# Patient Record
Sex: MALE | Race: WHITE | NOT HISPANIC OR LATINO | Employment: OTHER | URBAN - METROPOLITAN AREA
[De-identification: names, ages, dates, MRNs, and addresses within clinical notes are randomized per-mention and may not be internally consistent; named-entity substitution may affect disease eponyms.]

---

## 2021-01-01 ENCOUNTER — APPOINTMENT (EMERGENCY)
Dept: RADIOLOGY | Facility: HOSPITAL | Age: 76
DRG: 391 | End: 2021-01-01
Payer: COMMERCIAL

## 2021-01-01 ENCOUNTER — TELEPHONE (OUTPATIENT)
Dept: HEMATOLOGY ONCOLOGY | Facility: CLINIC | Age: 76
End: 2021-01-01

## 2021-01-01 ENCOUNTER — LAB (OUTPATIENT)
Dept: LAB | Facility: HOSPITAL | Age: 76
End: 2021-01-01
Attending: INTERNAL MEDICINE
Payer: COMMERCIAL

## 2021-01-01 ENCOUNTER — TELEPHONE (OUTPATIENT)
Dept: HEMATOLOGY ONCOLOGY | Facility: HOSPITAL | Age: 76
End: 2021-01-01

## 2021-01-01 ENCOUNTER — OFFICE VISIT (OUTPATIENT)
Dept: HEMATOLOGY ONCOLOGY | Facility: HOSPITAL | Age: 76
End: 2021-01-01
Payer: COMMERCIAL

## 2021-01-01 ENCOUNTER — TELEPHONE (OUTPATIENT)
Dept: INFUSION CENTER | Facility: CLINIC | Age: 76
End: 2021-01-01

## 2021-01-01 ENCOUNTER — RADIATION ONCOLOGY CONSULT (OUTPATIENT)
Dept: RADIATION ONCOLOGY | Facility: HOSPITAL | Age: 76
End: 2021-01-01
Attending: RADIOLOGY
Payer: COMMERCIAL

## 2021-01-01 ENCOUNTER — APPOINTMENT (OUTPATIENT)
Dept: RADIATION ONCOLOGY | Facility: HOSPITAL | Age: 76
End: 2021-01-01
Payer: COMMERCIAL

## 2021-01-01 ENCOUNTER — APPOINTMENT (EMERGENCY)
Dept: RADIOLOGY | Facility: HOSPITAL | Age: 76
DRG: 643 | End: 2021-01-01
Attending: EMERGENCY MEDICINE
Payer: COMMERCIAL

## 2021-01-01 ENCOUNTER — OFFICE VISIT (OUTPATIENT)
Dept: ENDOCRINOLOGY | Facility: HOSPITAL | Age: 76
End: 2021-01-01
Payer: COMMERCIAL

## 2021-01-01 ENCOUNTER — HOSPITAL ENCOUNTER (OUTPATIENT)
Dept: INFUSION CENTER | Facility: HOSPITAL | Age: 76
Discharge: HOME | End: 2021-04-20
Attending: INTERNAL MEDICINE
Payer: COMMERCIAL

## 2021-01-01 ENCOUNTER — SOCIAL WORK (OUTPATIENT)
Dept: PALLIATIVE MEDICINE | Age: 76
End: 2021-01-01
Payer: COMMERCIAL

## 2021-01-01 ENCOUNTER — TELEPHONE (OUTPATIENT)
Dept: NUTRITION | Facility: CLINIC | Age: 76
End: 2021-01-01

## 2021-01-01 ENCOUNTER — APPOINTMENT (INPATIENT)
Dept: CT IMAGING | Facility: HOSPITAL | Age: 76
DRG: 391 | End: 2021-01-01
Payer: COMMERCIAL

## 2021-01-01 ENCOUNTER — TELEPHONE (OUTPATIENT)
Dept: NEPHROLOGY | Facility: CLINIC | Age: 76
End: 2021-01-01

## 2021-01-01 ENCOUNTER — OFFICE VISIT (OUTPATIENT)
Dept: SURGICAL ONCOLOGY | Facility: CLINIC | Age: 76
End: 2021-01-01
Payer: COMMERCIAL

## 2021-01-01 ENCOUNTER — NUTRITION (OUTPATIENT)
Dept: NUTRITION | Facility: HOSPITAL | Age: 76
End: 2021-01-01

## 2021-01-01 ENCOUNTER — APPOINTMENT (EMERGENCY)
Dept: RADIOLOGY | Facility: HOSPITAL | Age: 76
DRG: 602 | End: 2021-01-01
Payer: COMMERCIAL

## 2021-01-01 ENCOUNTER — APPOINTMENT (INPATIENT)
Dept: NEUROLOGY | Facility: HOSPITAL | Age: 76
DRG: 643 | End: 2021-01-01
Payer: COMMERCIAL

## 2021-01-01 ENCOUNTER — HOSPITAL ENCOUNTER (OUTPATIENT)
Dept: GASTROENTEROLOGY | Facility: HOSPITAL | Age: 76
Setting detail: OUTPATIENT SURGERY
Discharge: HOME | End: 2021-02-23
Attending: INTERNAL MEDICINE | Admitting: INTERNAL MEDICINE
Payer: COMMERCIAL

## 2021-01-01 ENCOUNTER — APPOINTMENT (OUTPATIENT)
Dept: RADIOLOGY | Facility: HOSPITAL | Age: 76
End: 2021-01-01
Payer: COMMERCIAL

## 2021-01-01 ENCOUNTER — TELEPHONE (OUTPATIENT)
Dept: HEMATOLOGY ONCOLOGY | Facility: MEDICAL CENTER | Age: 76
End: 2021-01-01

## 2021-01-01 ENCOUNTER — HOSPITAL ENCOUNTER (INPATIENT)
Facility: HOSPITAL | Age: 76
LOS: 6 days | Discharge: NURSING, REHAB OR LONG-TERM CARE | DRG: 602 | End: 2021-04-14
Attending: EMERGENCY MEDICINE | Admitting: INTERNAL MEDICINE
Payer: COMMERCIAL

## 2021-01-01 ENCOUNTER — OFFICE VISIT (OUTPATIENT)
Dept: PALLIATIVE MEDICINE | Age: 76
End: 2021-01-01
Payer: COMMERCIAL

## 2021-01-01 ENCOUNTER — ANESTHESIA (OUTPATIENT)
Dept: GASTROENTEROLOGY | Facility: HOSPITAL | Age: 76
End: 2021-01-01

## 2021-01-01 ENCOUNTER — PATIENT OUTREACH (OUTPATIENT)
Dept: CASE MANAGEMENT | Facility: HOSPITAL | Age: 76
End: 2021-01-01

## 2021-01-01 ENCOUNTER — ANESTHESIA EVENT (OUTPATIENT)
Dept: PERIOP | Facility: HOSPITAL | Age: 76
End: 2021-01-01
Payer: COMMERCIAL

## 2021-01-01 ENCOUNTER — HOSPITAL ENCOUNTER (OUTPATIENT)
Dept: INFUSION CENTER | Facility: HOSPITAL | Age: 76
Discharge: HOME | End: 2021-08-30
Attending: INTERNAL MEDICINE
Payer: COMMERCIAL

## 2021-01-01 ENCOUNTER — HOSPITAL ENCOUNTER (OUTPATIENT)
Dept: INFUSION CENTER | Facility: HOSPITAL | Age: 76
Discharge: HOME | DRG: 602 | End: 2021-04-06
Attending: INTERNAL MEDICINE
Payer: COMMERCIAL

## 2021-01-01 ENCOUNTER — HOSPITAL ENCOUNTER (OUTPATIENT)
Dept: INFUSION CENTER | Facility: HOSPITAL | Age: 76
Discharge: HOME | End: 2021-08-23
Attending: INTERNAL MEDICINE
Payer: COMMERCIAL

## 2021-01-01 ENCOUNTER — APPOINTMENT (EMERGENCY)
Dept: RADIOLOGY | Facility: HOSPITAL | Age: 76
End: 2021-01-01
Payer: MEDICARE

## 2021-01-01 ENCOUNTER — HOSPITAL ENCOUNTER (OUTPATIENT)
Dept: INFUSION CENTER | Facility: HOSPITAL | Age: 76
Discharge: HOME | End: 2021-03-23
Attending: INTERNAL MEDICINE
Payer: COMMERCIAL

## 2021-01-01 ENCOUNTER — PREP FOR PROCEDURE (OUTPATIENT)
Dept: GASTROENTEROLOGY | Facility: CLINIC | Age: 76
End: 2021-01-01

## 2021-01-01 ENCOUNTER — LAB (OUTPATIENT)
Dept: LAB | Facility: HOSPITAL | Age: 76
End: 2021-01-01
Payer: COMMERCIAL

## 2021-01-01 ENCOUNTER — HOSPITAL ENCOUNTER (OUTPATIENT)
Dept: INFUSION CENTER | Facility: HOSPITAL | Age: 76
Discharge: HOME | End: 2021-08-16
Attending: INTERNAL MEDICINE
Payer: COMMERCIAL

## 2021-01-01 ENCOUNTER — APPOINTMENT (INPATIENT)
Dept: NON INVASIVE DIAGNOSTICS | Facility: HOSPITAL | Age: 76
DRG: 391 | End: 2021-01-01
Payer: COMMERCIAL

## 2021-01-01 ENCOUNTER — PATIENT OUTREACH (OUTPATIENT)
Dept: HEMATOLOGY ONCOLOGY | Facility: CLINIC | Age: 76
End: 2021-01-01

## 2021-01-01 ENCOUNTER — DOCUMENTATION (OUTPATIENT)
Dept: FAMILY MEDICINE CLINIC | Facility: HOSPITAL | Age: 76
End: 2021-01-01

## 2021-01-01 ENCOUNTER — APPOINTMENT (EMERGENCY)
Dept: RADIOLOGY | Facility: HOSPITAL | Age: 76
DRG: 643 | End: 2021-01-01
Payer: COMMERCIAL

## 2021-01-01 ENCOUNTER — HOSPITAL ENCOUNTER (OUTPATIENT)
Dept: INFUSION CENTER | Facility: HOSPITAL | Age: 76
Discharge: HOME | End: 2021-06-07
Attending: INTERNAL MEDICINE
Payer: COMMERCIAL

## 2021-01-01 ENCOUNTER — PATIENT MESSAGE (OUTPATIENT)
Dept: PALLIATIVE MEDICINE | Age: 76
End: 2021-01-01

## 2021-01-01 ENCOUNTER — HOSPITAL ENCOUNTER (INPATIENT)
Facility: HOSPITAL | Age: 76
LOS: 4 days | Discharge: NURSING, REHAB OR LONG-TERM CARE | DRG: 643 | End: 2021-07-19
Attending: EMERGENCY MEDICINE | Admitting: INTERNAL MEDICINE
Payer: COMMERCIAL

## 2021-01-01 ENCOUNTER — DOCUMENTATION (OUTPATIENT)
Dept: HEMATOLOGY ONCOLOGY | Facility: CLINIC | Age: 76
End: 2021-01-01

## 2021-01-01 ENCOUNTER — TELEPHONE (OUTPATIENT)
Dept: CT IMAGING | Facility: HOSPITAL | Age: 76
End: 2021-01-01

## 2021-01-01 ENCOUNTER — LAB (OUTPATIENT)
Dept: LAB | Facility: HOSPITAL | Age: 76
DRG: 602 | End: 2021-01-01
Attending: INTERNAL MEDICINE
Payer: COMMERCIAL

## 2021-01-01 ENCOUNTER — TELEPHONE (OUTPATIENT)
Dept: GASTROENTEROLOGY | Facility: MEDICAL CENTER | Age: 76
End: 2021-01-01

## 2021-01-01 ENCOUNTER — APPOINTMENT (EMERGENCY)
Dept: CT IMAGING | Facility: HOSPITAL | Age: 76
End: 2021-01-01
Payer: MEDICARE

## 2021-01-01 ENCOUNTER — NURSE TRIAGE (OUTPATIENT)
Dept: OTHER | Facility: OTHER | Age: 76
End: 2021-01-01

## 2021-01-01 ENCOUNTER — TELEPHONE (OUTPATIENT)
Dept: NEPHROLOGY | Facility: HOSPITAL | Age: 76
End: 2021-01-01

## 2021-01-01 ENCOUNTER — HOSPITAL ENCOUNTER (EMERGENCY)
Facility: HOSPITAL | Age: 76
Discharge: HOME | End: 2021-09-02
Attending: EMERGENCY MEDICINE | Admitting: EMERGENCY MEDICINE
Payer: COMMERCIAL

## 2021-01-01 ENCOUNTER — APPOINTMENT (EMERGENCY)
Dept: RADIOLOGY | Facility: HOSPITAL | Age: 76
DRG: 637 | End: 2021-01-01
Payer: COMMERCIAL

## 2021-01-01 ENCOUNTER — APPOINTMENT (EMERGENCY)
Dept: CT IMAGING | Facility: HOSPITAL | Age: 76
End: 2021-01-01
Payer: COMMERCIAL

## 2021-01-01 ENCOUNTER — HOSPITAL ENCOUNTER (OUTPATIENT)
Dept: INFUSION CENTER | Facility: HOSPITAL | Age: 76
Discharge: HOME | End: 2021-07-06
Attending: INTERNAL MEDICINE
Payer: COMMERCIAL

## 2021-01-01 ENCOUNTER — CONSULT (OUTPATIENT)
Dept: SURGICAL ONCOLOGY | Facility: HOSPITAL | Age: 76
End: 2021-01-01
Payer: COMMERCIAL

## 2021-01-01 ENCOUNTER — HOSPITAL ENCOUNTER (OUTPATIENT)
Dept: INFUSION CENTER | Facility: HOSPITAL | Age: 76
Discharge: HOME | End: 2021-06-14
Attending: INTERNAL MEDICINE
Payer: COMMERCIAL

## 2021-01-01 ENCOUNTER — APPOINTMENT (EMERGENCY)
Dept: CT IMAGING | Facility: HOSPITAL | Age: 76
DRG: 602 | End: 2021-01-01
Payer: COMMERCIAL

## 2021-01-01 ENCOUNTER — HOSPITAL ENCOUNTER (OUTPATIENT)
Dept: INFUSION CENTER | Facility: HOSPITAL | Age: 76
Discharge: HOME | End: 2021-07-19
Attending: INTERNAL MEDICINE

## 2021-01-01 ENCOUNTER — APPOINTMENT (EMERGENCY)
Dept: CT IMAGING | Facility: HOSPITAL | Age: 76
DRG: 643 | End: 2021-01-01
Payer: COMMERCIAL

## 2021-01-01 ENCOUNTER — HOSPITAL ENCOUNTER (OUTPATIENT)
Dept: INFUSION CENTER | Facility: HOSPITAL | Age: 76
Discharge: HOME | End: 2021-03-30
Attending: INTERNAL MEDICINE
Payer: COMMERCIAL

## 2021-01-01 ENCOUNTER — TELEPHONE (OUTPATIENT)
Dept: OTHER | Facility: HOSPITAL | Age: 76
End: 2021-01-01

## 2021-01-01 ENCOUNTER — ANESTHESIA (OUTPATIENT)
Dept: PERIOP | Facility: HOSPITAL | Age: 76
End: 2021-01-01
Payer: COMMERCIAL

## 2021-01-01 ENCOUNTER — HOSPITAL ENCOUNTER (OUTPATIENT)
Dept: CT IMAGING | Facility: HOSPITAL | Age: 76
Discharge: HOME | End: 2021-03-05
Payer: COMMERCIAL

## 2021-01-01 ENCOUNTER — HOSPITAL ENCOUNTER (OUTPATIENT)
Dept: INFUSION CENTER | Facility: HOSPITAL | Age: 76
End: 2021-01-01
Attending: INTERNAL MEDICINE

## 2021-01-01 ENCOUNTER — APPOINTMENT (OUTPATIENT)
Dept: CT IMAGING | Facility: HOSPITAL | Age: 76
DRG: 643 | End: 2021-01-01
Payer: COMMERCIAL

## 2021-01-01 ENCOUNTER — CONSULT (OUTPATIENT)
Dept: HEMATOLOGY ONCOLOGY | Facility: HOSPITAL | Age: 76
End: 2021-01-01
Payer: COMMERCIAL

## 2021-01-01 ENCOUNTER — HOSPITAL ENCOUNTER (OUTPATIENT)
Dept: RADIOLOGY | Facility: HOSPITAL | Age: 76
Discharge: HOME | End: 2021-07-08
Payer: COMMERCIAL

## 2021-01-01 ENCOUNTER — HOSPITAL ENCOUNTER (INPATIENT)
Facility: HOSPITAL | Age: 76
LOS: 10 days | Discharge: HOME HEALTH | DRG: 391 | End: 2021-05-17
Attending: EMERGENCY MEDICINE | Admitting: INTERNAL MEDICINE
Payer: COMMERCIAL

## 2021-01-01 ENCOUNTER — HOSPITAL ENCOUNTER (OUTPATIENT)
Facility: HOSPITAL | Age: 76
Setting detail: OUTPATIENT SURGERY
Discharge: HOME | End: 2021-03-26
Attending: STUDENT IN AN ORGANIZED HEALTH CARE EDUCATION/TRAINING PROGRAM | Admitting: STUDENT IN AN ORGANIZED HEALTH CARE EDUCATION/TRAINING PROGRAM
Payer: COMMERCIAL

## 2021-01-01 ENCOUNTER — OFFICE VISIT (OUTPATIENT)
Dept: LAB | Facility: HOSPITAL | Age: 76
End: 2021-01-01
Attending: STUDENT IN AN ORGANIZED HEALTH CARE EDUCATION/TRAINING PROGRAM
Payer: COMMERCIAL

## 2021-01-01 ENCOUNTER — LAB (OUTPATIENT)
Dept: LAB | Facility: HOSPITAL | Age: 76
DRG: 637 | End: 2021-01-01
Attending: INTERNAL MEDICINE
Payer: COMMERCIAL

## 2021-01-01 ENCOUNTER — HOSPITAL ENCOUNTER (OUTPATIENT)
Dept: INFUSION CENTER | Facility: HOSPITAL | Age: 76
Discharge: HOME | End: 2021-05-18
Attending: INTERNAL MEDICINE

## 2021-01-01 ENCOUNTER — APPOINTMENT (INPATIENT)
Dept: NON INVASIVE DIAGNOSTICS | Facility: HOSPITAL | Age: 76
DRG: 602 | End: 2021-01-01
Payer: COMMERCIAL

## 2021-01-01 ENCOUNTER — LAB REQUISITION (OUTPATIENT)
Dept: LAB | Facility: HOSPITAL | Age: 76
End: 2021-01-01
Payer: MEDICARE

## 2021-01-01 ENCOUNTER — TELEPHONE (OUTPATIENT)
Dept: ENDOCRINOLOGY | Facility: CLINIC | Age: 76
End: 2021-01-01

## 2021-01-01 ENCOUNTER — HOSPITAL ENCOUNTER (OUTPATIENT)
Dept: INFUSION CENTER | Facility: HOSPITAL | Age: 76
Discharge: HOME | DRG: 643 | End: 2021-07-12
Attending: INTERNAL MEDICINE
Payer: COMMERCIAL

## 2021-01-01 ENCOUNTER — TELEPHONE (OUTPATIENT)
Dept: GASTROENTEROLOGY | Facility: CLINIC | Age: 76
End: 2021-01-01

## 2021-01-01 ENCOUNTER — ANESTHESIA EVENT (OUTPATIENT)
Dept: GASTROENTEROLOGY | Facility: HOSPITAL | Age: 76
End: 2021-01-01

## 2021-01-01 ENCOUNTER — HOSPITAL ENCOUNTER (OUTPATIENT)
Dept: INFUSION CENTER | Facility: HOSPITAL | Age: 76
Discharge: HOME | DRG: 391 | End: 2021-05-04
Attending: INTERNAL MEDICINE
Payer: COMMERCIAL

## 2021-01-01 ENCOUNTER — OFFICE VISIT (OUTPATIENT)
Dept: ENDOCRINOLOGY | Facility: CLINIC | Age: 76
End: 2021-01-01
Payer: COMMERCIAL

## 2021-01-01 ENCOUNTER — HOSPITAL ENCOUNTER (OUTPATIENT)
Dept: INFUSION CENTER | Facility: HOSPITAL | Age: 76
Discharge: HOME | End: 2021-06-21
Attending: INTERNAL MEDICINE
Payer: COMMERCIAL

## 2021-01-01 ENCOUNTER — HOSPITAL ENCOUNTER (EMERGENCY)
Facility: HOSPITAL | Age: 76
Discharge: HOME | End: 2021-09-08
Attending: EMERGENCY MEDICINE | Admitting: EMERGENCY MEDICINE
Payer: MEDICARE

## 2021-01-01 ENCOUNTER — HOSPITAL ENCOUNTER (OUTPATIENT)
Dept: INFUSION CENTER | Facility: HOSPITAL | Age: 76
Discharge: HOME | End: 2021-04-27
Attending: INTERNAL MEDICINE
Payer: COMMERCIAL

## 2021-01-01 ENCOUNTER — HOSPITAL ENCOUNTER (INPATIENT)
Facility: HOSPITAL | Age: 76
LOS: 2 days | Discharge: HOME HEALTH | DRG: 637 | End: 2021-09-01
Attending: EMERGENCY MEDICINE | Admitting: INTERNAL MEDICINE
Payer: COMMERCIAL

## 2021-01-01 ENCOUNTER — OFFICE VISIT (OUTPATIENT)
Dept: NEPHROLOGY | Facility: HOSPITAL | Age: 76
End: 2021-01-01
Payer: COMMERCIAL

## 2021-01-01 ENCOUNTER — HOSPITAL ENCOUNTER (INPATIENT)
Facility: HOSPITAL | Age: 76
LOS: 2 days | Discharge: HOME | DRG: 643 | End: 2021-06-01
Attending: EMERGENCY MEDICINE | Admitting: INTERNAL MEDICINE
Payer: COMMERCIAL

## 2021-01-01 ENCOUNTER — CLINICAL SUPPORT (OUTPATIENT)
Dept: RADIATION ONCOLOGY | Facility: HOSPITAL | Age: 76
End: 2021-01-01
Attending: RADIOLOGY

## 2021-01-01 VITALS
TEMPERATURE: 97.8 F | DIASTOLIC BLOOD PRESSURE: 74 MMHG | OXYGEN SATURATION: 97 % | SYSTOLIC BLOOD PRESSURE: 156 MMHG | HEART RATE: 82 BPM | WEIGHT: 148 LBS | BODY MASS INDEX: 20.05 KG/M2 | RESPIRATION RATE: 16 BRPM | HEIGHT: 72 IN

## 2021-01-01 VITALS
OXYGEN SATURATION: 100 % | RESPIRATION RATE: 14 BRPM | DIASTOLIC BLOOD PRESSURE: 65 MMHG | HEIGHT: 71 IN | TEMPERATURE: 98.2 F | BODY MASS INDEX: 21.14 KG/M2 | WEIGHT: 151.01 LBS | HEART RATE: 90 BPM | SYSTOLIC BLOOD PRESSURE: 151 MMHG

## 2021-01-01 VITALS
RESPIRATION RATE: 14 BRPM | DIASTOLIC BLOOD PRESSURE: 60 MMHG | HEIGHT: 71 IN | HEART RATE: 84 BPM | SYSTOLIC BLOOD PRESSURE: 133 MMHG | OXYGEN SATURATION: 100 % | WEIGHT: 145.94 LBS | TEMPERATURE: 98.8 F | BODY MASS INDEX: 20.43 KG/M2

## 2021-01-01 VITALS
HEIGHT: 70 IN | TEMPERATURE: 98.3 F | WEIGHT: 136.69 LBS | BODY MASS INDEX: 19.57 KG/M2 | DIASTOLIC BLOOD PRESSURE: 58 MMHG | HEART RATE: 101 BPM | OXYGEN SATURATION: 98 % | SYSTOLIC BLOOD PRESSURE: 140 MMHG | RESPIRATION RATE: 16 BRPM

## 2021-01-01 VITALS
HEART RATE: 84 BPM | SYSTOLIC BLOOD PRESSURE: 129 MMHG | TEMPERATURE: 97.7 F | BODY MASS INDEX: 20.33 KG/M2 | OXYGEN SATURATION: 100 % | DIASTOLIC BLOOD PRESSURE: 60 MMHG | RESPIRATION RATE: 20 BRPM | HEIGHT: 70 IN | WEIGHT: 142 LBS

## 2021-01-01 VITALS
RESPIRATION RATE: 16 BRPM | BODY MASS INDEX: 19.66 KG/M2 | DIASTOLIC BLOOD PRESSURE: 68 MMHG | HEART RATE: 81 BPM | SYSTOLIC BLOOD PRESSURE: 118 MMHG | HEIGHT: 70 IN

## 2021-01-01 VITALS
DIASTOLIC BLOOD PRESSURE: 57 MMHG | RESPIRATION RATE: 16 BRPM | HEART RATE: 91 BPM | SYSTOLIC BLOOD PRESSURE: 124 MMHG | WEIGHT: 142.86 LBS | BODY MASS INDEX: 20 KG/M2 | HEIGHT: 71 IN | TEMPERATURE: 97.2 F

## 2021-01-01 VITALS
HEIGHT: 71 IN | RESPIRATION RATE: 14 BRPM | TEMPERATURE: 97.3 F | BODY MASS INDEX: 19.85 KG/M2 | HEART RATE: 101 BPM | SYSTOLIC BLOOD PRESSURE: 106 MMHG | DIASTOLIC BLOOD PRESSURE: 53 MMHG | WEIGHT: 141.76 LBS | OXYGEN SATURATION: 100 %

## 2021-01-01 VITALS
RESPIRATION RATE: 18 BRPM | OXYGEN SATURATION: 97 % | HEIGHT: 70 IN | WEIGHT: 142.8 LBS | TEMPERATURE: 97.5 F | SYSTOLIC BLOOD PRESSURE: 140 MMHG | DIASTOLIC BLOOD PRESSURE: 62 MMHG | HEART RATE: 96 BPM | BODY MASS INDEX: 20.44 KG/M2

## 2021-01-01 VITALS
RESPIRATION RATE: 16 BRPM | SYSTOLIC BLOOD PRESSURE: 112 MMHG | DIASTOLIC BLOOD PRESSURE: 62 MMHG | TEMPERATURE: 97.4 F | HEIGHT: 70 IN | HEART RATE: 108 BPM | WEIGHT: 138 LBS | OXYGEN SATURATION: 97 % | BODY MASS INDEX: 19.76 KG/M2

## 2021-01-01 VITALS
WEIGHT: 148 LBS | BODY MASS INDEX: 20.37 KG/M2 | OXYGEN SATURATION: 97 % | DIASTOLIC BLOOD PRESSURE: 77 MMHG | SYSTOLIC BLOOD PRESSURE: 146 MMHG | TEMPERATURE: 97.8 F | DIASTOLIC BLOOD PRESSURE: 54 MMHG | BODY MASS INDEX: 20.18 KG/M2 | TEMPERATURE: 96.8 F | HEIGHT: 71 IN | SYSTOLIC BLOOD PRESSURE: 116 MMHG | OXYGEN SATURATION: 100 % | RESPIRATION RATE: 16 BRPM | OXYGEN SATURATION: 81 % | RESPIRATION RATE: 14 BRPM | HEART RATE: 97 BPM | WEIGHT: 145.5 LBS | DIASTOLIC BLOOD PRESSURE: 68 MMHG | WEIGHT: 149 LBS | TEMPERATURE: 98.4 F | HEIGHT: 71 IN | SYSTOLIC BLOOD PRESSURE: 166 MMHG | RESPIRATION RATE: 16 BRPM | HEART RATE: 76 BPM | HEIGHT: 72 IN | HEART RATE: 96 BPM | BODY MASS INDEX: 20.72 KG/M2

## 2021-01-01 VITALS
DIASTOLIC BLOOD PRESSURE: 71 MMHG | HEIGHT: 71 IN | OXYGEN SATURATION: 98 % | WEIGHT: 150.35 LBS | HEART RATE: 97 BPM | SYSTOLIC BLOOD PRESSURE: 143 MMHG | TEMPERATURE: 97.8 F | RESPIRATION RATE: 16 BRPM | BODY MASS INDEX: 21.05 KG/M2

## 2021-01-01 VITALS — HEART RATE: 78 BPM

## 2021-01-01 VITALS
OXYGEN SATURATION: 97 % | WEIGHT: 145.06 LBS | DIASTOLIC BLOOD PRESSURE: 63 MMHG | TEMPERATURE: 96.8 F | HEIGHT: 71 IN | HEART RATE: 54 BPM | BODY MASS INDEX: 20.31 KG/M2 | RESPIRATION RATE: 14 BRPM | SYSTOLIC BLOOD PRESSURE: 154 MMHG

## 2021-01-01 VITALS
WEIGHT: 143 LBS | DIASTOLIC BLOOD PRESSURE: 58 MMHG | RESPIRATION RATE: 16 BRPM | HEART RATE: 104 BPM | SYSTOLIC BLOOD PRESSURE: 126 MMHG | HEIGHT: 71 IN | BODY MASS INDEX: 20.02 KG/M2

## 2021-01-01 VITALS
BODY MASS INDEX: 23.03 KG/M2 | SYSTOLIC BLOOD PRESSURE: 131 MMHG | DIASTOLIC BLOOD PRESSURE: 66 MMHG | OXYGEN SATURATION: 97 % | HEART RATE: 89 BPM | WEIGHT: 170 LBS | HEIGHT: 72 IN | TEMPERATURE: 98 F | RESPIRATION RATE: 16 BRPM

## 2021-01-01 VITALS
TEMPERATURE: 98.7 F | SYSTOLIC BLOOD PRESSURE: 135 MMHG | HEART RATE: 100 BPM | BODY MASS INDEX: 19.88 KG/M2 | RESPIRATION RATE: 18 BRPM | DIASTOLIC BLOOD PRESSURE: 70 MMHG | HEIGHT: 70 IN | WEIGHT: 138.89 LBS

## 2021-01-01 VITALS
DIASTOLIC BLOOD PRESSURE: 62 MMHG | TEMPERATURE: 97.6 F | BODY MASS INDEX: 20.35 KG/M2 | OXYGEN SATURATION: 98 % | HEART RATE: 98 BPM | SYSTOLIC BLOOD PRESSURE: 130 MMHG | WEIGHT: 141.8 LBS | RESPIRATION RATE: 18 BRPM

## 2021-01-01 VITALS
BODY MASS INDEX: 19.81 KG/M2 | OXYGEN SATURATION: 99 % | WEIGHT: 141.54 LBS | DIASTOLIC BLOOD PRESSURE: 57 MMHG | HEIGHT: 71 IN | SYSTOLIC BLOOD PRESSURE: 115 MMHG | HEART RATE: 88 BPM | TEMPERATURE: 96.9 F | RESPIRATION RATE: 14 BRPM

## 2021-01-01 VITALS
HEART RATE: 90 BPM | OXYGEN SATURATION: 98 % | TEMPERATURE: 97.6 F | HEIGHT: 70 IN | DIASTOLIC BLOOD PRESSURE: 58 MMHG | BODY MASS INDEX: 21.09 KG/M2 | SYSTOLIC BLOOD PRESSURE: 168 MMHG | RESPIRATION RATE: 18 BRPM

## 2021-01-01 VITALS
HEART RATE: 95 BPM | RESPIRATION RATE: 16 BRPM | BODY MASS INDEX: 20.58 KG/M2 | OXYGEN SATURATION: 96 % | WEIGHT: 147 LBS | SYSTOLIC BLOOD PRESSURE: 96 MMHG | HEIGHT: 71 IN | TEMPERATURE: 97.8 F | DIASTOLIC BLOOD PRESSURE: 42 MMHG

## 2021-01-01 VITALS
HEIGHT: 70 IN | WEIGHT: 148.59 LBS | HEART RATE: 82 BPM | DIASTOLIC BLOOD PRESSURE: 58 MMHG | RESPIRATION RATE: 20 BRPM | SYSTOLIC BLOOD PRESSURE: 118 MMHG | BODY MASS INDEX: 21.27 KG/M2 | OXYGEN SATURATION: 100 % | TEMPERATURE: 97.3 F

## 2021-01-01 VITALS
HEIGHT: 71 IN | OXYGEN SATURATION: 99 % | BODY MASS INDEX: 19.91 KG/M2 | DIASTOLIC BLOOD PRESSURE: 60 MMHG | SYSTOLIC BLOOD PRESSURE: 131 MMHG | WEIGHT: 142.2 LBS | RESPIRATION RATE: 14 BRPM | HEART RATE: 95 BPM | TEMPERATURE: 97.5 F

## 2021-01-01 VITALS
OXYGEN SATURATION: 100 % | TEMPERATURE: 97.7 F | SYSTOLIC BLOOD PRESSURE: 122 MMHG | HEART RATE: 55 BPM | DIASTOLIC BLOOD PRESSURE: 68 MMHG | RESPIRATION RATE: 14 BRPM

## 2021-01-01 VITALS
HEART RATE: 105 BPM | HEIGHT: 70 IN | SYSTOLIC BLOOD PRESSURE: 124 MMHG | BODY MASS INDEX: 20.35 KG/M2 | DIASTOLIC BLOOD PRESSURE: 64 MMHG

## 2021-01-01 VITALS
SYSTOLIC BLOOD PRESSURE: 185 MMHG | DIASTOLIC BLOOD PRESSURE: 100 MMHG | OXYGEN SATURATION: 100 % | HEART RATE: 97 BPM | RESPIRATION RATE: 20 BRPM | TEMPERATURE: 97.6 F

## 2021-01-01 VITALS
SYSTOLIC BLOOD PRESSURE: 129 MMHG | BODY MASS INDEX: 21.18 KG/M2 | TEMPERATURE: 97.4 F | OXYGEN SATURATION: 99 % | RESPIRATION RATE: 18 BRPM | HEART RATE: 102 BPM | WEIGHT: 147.93 LBS | DIASTOLIC BLOOD PRESSURE: 60 MMHG | HEIGHT: 70 IN

## 2021-01-01 VITALS
SYSTOLIC BLOOD PRESSURE: 120 MMHG | HEART RATE: 82 BPM | HEIGHT: 70 IN | DIASTOLIC BLOOD PRESSURE: 64 MMHG | WEIGHT: 139.4 LBS | TEMPERATURE: 97.1 F | RESPIRATION RATE: 16 BRPM | BODY MASS INDEX: 19.96 KG/M2 | OXYGEN SATURATION: 98 %

## 2021-01-01 VITALS
WEIGHT: 138.45 LBS | RESPIRATION RATE: 16 BRPM | BODY MASS INDEX: 19.6 KG/M2 | HEART RATE: 90 BPM | TEMPERATURE: 97.8 F | DIASTOLIC BLOOD PRESSURE: 72 MMHG | OXYGEN SATURATION: 100 % | SYSTOLIC BLOOD PRESSURE: 154 MMHG

## 2021-01-01 VITALS
SYSTOLIC BLOOD PRESSURE: 135 MMHG | DIASTOLIC BLOOD PRESSURE: 65 MMHG | HEART RATE: 93 BPM | TEMPERATURE: 98 F | OXYGEN SATURATION: 100 % | RESPIRATION RATE: 20 BRPM

## 2021-01-01 VITALS
BODY MASS INDEX: 21.14 KG/M2 | HEIGHT: 71 IN | RESPIRATION RATE: 16 BRPM | SYSTOLIC BLOOD PRESSURE: 128 MMHG | TEMPERATURE: 97.8 F | DIASTOLIC BLOOD PRESSURE: 61 MMHG | HEART RATE: 74 BPM | WEIGHT: 151 LBS | OXYGEN SATURATION: 100 %

## 2021-01-01 VITALS
SYSTOLIC BLOOD PRESSURE: 175 MMHG | DIASTOLIC BLOOD PRESSURE: 75 MMHG | HEART RATE: 86 BPM | HEIGHT: 71 IN | BODY MASS INDEX: 20.56 KG/M2 | RESPIRATION RATE: 14 BRPM | OXYGEN SATURATION: 100 % | WEIGHT: 146.83 LBS | TEMPERATURE: 96.9 F

## 2021-01-01 VITALS
BODY MASS INDEX: 20.36 KG/M2 | HEART RATE: 99 BPM | TEMPERATURE: 97 F | HEIGHT: 70 IN | SYSTOLIC BLOOD PRESSURE: 124 MMHG | DIASTOLIC BLOOD PRESSURE: 60 MMHG | WEIGHT: 142.2 LBS | RESPIRATION RATE: 16 BRPM

## 2021-01-01 VITALS
HEART RATE: 103 BPM | TEMPERATURE: 97.8 F | DIASTOLIC BLOOD PRESSURE: 60 MMHG | BODY MASS INDEX: 20.37 KG/M2 | SYSTOLIC BLOOD PRESSURE: 126 MMHG | HEIGHT: 70 IN

## 2021-01-01 VITALS
HEART RATE: 92 BPM | TEMPERATURE: 97.8 F | SYSTOLIC BLOOD PRESSURE: 155 MMHG | OXYGEN SATURATION: 98 % | DIASTOLIC BLOOD PRESSURE: 69 MMHG | RESPIRATION RATE: 16 BRPM

## 2021-01-01 VITALS
WEIGHT: 142 LBS | DIASTOLIC BLOOD PRESSURE: 60 MMHG | SYSTOLIC BLOOD PRESSURE: 118 MMHG | HEART RATE: 82 BPM | TEMPERATURE: 97.9 F | BODY MASS INDEX: 19.86 KG/M2

## 2021-01-01 VITALS
OXYGEN SATURATION: 98 % | BODY MASS INDEX: 21.05 KG/M2 | SYSTOLIC BLOOD PRESSURE: 136 MMHG | HEIGHT: 70 IN | HEART RATE: 79 BPM | WEIGHT: 147 LBS | DIASTOLIC BLOOD PRESSURE: 63 MMHG | TEMPERATURE: 98.4 F | RESPIRATION RATE: 18 BRPM

## 2021-01-01 VITALS — DIASTOLIC BLOOD PRESSURE: 60 MMHG | RESPIRATION RATE: 12 BRPM | TEMPERATURE: 98.2 F | SYSTOLIC BLOOD PRESSURE: 98 MMHG

## 2021-01-01 VITALS
DIASTOLIC BLOOD PRESSURE: 42 MMHG | TEMPERATURE: 98.1 F | SYSTOLIC BLOOD PRESSURE: 104 MMHG | HEART RATE: 98 BPM | RESPIRATION RATE: 16 BRPM | OXYGEN SATURATION: 98 %

## 2021-01-01 DIAGNOSIS — Z78.9 NEED FOR FOLLOW-UP BY SOCIAL WORKER: ICD-10-CM

## 2021-01-01 DIAGNOSIS — N17.9 AKI (ACUTE KIDNEY INJURY) (HCC): Primary | ICD-10-CM

## 2021-01-01 DIAGNOSIS — I10 ESSENTIAL HYPERTENSION: ICD-10-CM

## 2021-01-01 DIAGNOSIS — E78.2 MIXED HYPERLIPIDEMIA: Chronic | ICD-10-CM

## 2021-01-01 DIAGNOSIS — G60.9 IDIOPATHIC PERIPHERAL NEUROPATHY: ICD-10-CM

## 2021-01-01 DIAGNOSIS — C25.9 ADENOCARCINOMA OF PANCREAS (HCC): Primary | ICD-10-CM

## 2021-01-01 DIAGNOSIS — M25.511 RIGHT SHOULDER PAIN, UNSPECIFIED CHRONICITY: ICD-10-CM

## 2021-01-01 DIAGNOSIS — C25.9 ADENOCARCINOMA OF PANCREAS (HCC): ICD-10-CM

## 2021-01-01 DIAGNOSIS — Z79.4 TYPE 2 DIABETES MELLITUS WITH HYPERGLYCEMIA, WITH LONG-TERM CURRENT USE OF INSULIN (HCC): Chronic | ICD-10-CM

## 2021-01-01 DIAGNOSIS — K21.9 GASTROESOPHAGEAL REFLUX DISEASE WITHOUT ESOPHAGITIS: Primary | ICD-10-CM

## 2021-01-01 DIAGNOSIS — E87.1 HYPONATREMIA: ICD-10-CM

## 2021-01-01 DIAGNOSIS — R93.5 ABNORMAL CT OF THE ABDOMEN: ICD-10-CM

## 2021-01-01 DIAGNOSIS — D64.9 ANEMIA, UNSPECIFIED TYPE: ICD-10-CM

## 2021-01-01 DIAGNOSIS — K86.2 PANCREATIC CYST: ICD-10-CM

## 2021-01-01 DIAGNOSIS — R42 POSTURAL DIZZINESS WITH PRESYNCOPE: ICD-10-CM

## 2021-01-01 DIAGNOSIS — Z71.89 GOALS OF CARE, COUNSELING/DISCUSSION: ICD-10-CM

## 2021-01-01 DIAGNOSIS — C25.0 ADENOCARCINOMA OF HEAD OF PANCREAS (HCC): ICD-10-CM

## 2021-01-01 DIAGNOSIS — L03.115 BILATERAL CELLULITIS OF LOWER LEG: ICD-10-CM

## 2021-01-01 DIAGNOSIS — R55 POSTURAL DIZZINESS WITH PRESYNCOPE: ICD-10-CM

## 2021-01-01 DIAGNOSIS — Z66 DNR (DO NOT RESUSCITATE): ICD-10-CM

## 2021-01-01 DIAGNOSIS — Z51.5 HOSPICE CARE PATIENT: ICD-10-CM

## 2021-01-01 DIAGNOSIS — B95.5 STREPTOCOCCAL BACTEREMIA: Primary | ICD-10-CM

## 2021-01-01 DIAGNOSIS — E11.65 TYPE 2 DIABETES MELLITUS WITH HYPERGLYCEMIA, WITH LONG-TERM CURRENT USE OF INSULIN (HCC): Chronic | ICD-10-CM

## 2021-01-01 DIAGNOSIS — Z71.3 NUTRITIONAL COUNSELING: Primary | ICD-10-CM

## 2021-01-01 DIAGNOSIS — Z66 PHYSICIAN ORDERS FOR LIFE-SUSTAINING TREATMENT (POLST) FORM INDICATES PATIENT WISH FOR DO-NOT-RESUSCITATE STATUS: ICD-10-CM

## 2021-01-01 DIAGNOSIS — Z11.59 ENCOUNTER FOR SCREENING FOR OTHER VIRAL DISEASES: ICD-10-CM

## 2021-01-01 DIAGNOSIS — G89.3 CANCER ASSOCIATED PAIN: ICD-10-CM

## 2021-01-01 DIAGNOSIS — Z79.4 TYPE 2 DIABETES MELLITUS WITH HYPERGLYCEMIA, WITH LONG-TERM CURRENT USE OF INSULIN (HCC): Primary | Chronic | ICD-10-CM

## 2021-01-01 DIAGNOSIS — E88.09 HYPOALBUMINEMIA: ICD-10-CM

## 2021-01-01 DIAGNOSIS — R55 SYNCOPE AND COLLAPSE: ICD-10-CM

## 2021-01-01 DIAGNOSIS — E87.1 HYPONATREMIA: Primary | ICD-10-CM

## 2021-01-01 DIAGNOSIS — A41.9 SEVERE SEPSIS (HCC): Primary | ICD-10-CM

## 2021-01-01 DIAGNOSIS — R78.81 BACTEREMIA: ICD-10-CM

## 2021-01-01 DIAGNOSIS — E11.65 TYPE 2 DIABETES MELLITUS WITH HYPERGLYCEMIA, WITH LONG-TERM CURRENT USE OF INSULIN (HCC): ICD-10-CM

## 2021-01-01 DIAGNOSIS — E11.65 TYPE 2 DIABETES MELLITUS WITH HYPERGLYCEMIA, WITH LONG-TERM CURRENT USE OF INSULIN (HCC): Primary | Chronic | ICD-10-CM

## 2021-01-01 DIAGNOSIS — N17.9 AKI (ACUTE KIDNEY INJURY) (HCC): ICD-10-CM

## 2021-01-01 DIAGNOSIS — I95.9 HYPOTENSION: ICD-10-CM

## 2021-01-01 DIAGNOSIS — E43 SEVERE PROTEIN-CALORIE MALNUTRITION (HCC): ICD-10-CM

## 2021-01-01 DIAGNOSIS — K86.89 PANCREATIC MASS: ICD-10-CM

## 2021-01-01 DIAGNOSIS — Z51.5 HOSPICE CARE PATIENT: Primary | ICD-10-CM

## 2021-01-01 DIAGNOSIS — E27.8 ADRENAL NODULE (HCC): ICD-10-CM

## 2021-01-01 DIAGNOSIS — R53.1 GENERALIZED WEAKNESS: ICD-10-CM

## 2021-01-01 DIAGNOSIS — Z79.4 TYPE 2 DIABETES MELLITUS WITH HYPERGLYCEMIA, WITH LONG-TERM CURRENT USE OF INSULIN (HCC): ICD-10-CM

## 2021-01-01 DIAGNOSIS — R42 DIZZINESS: ICD-10-CM

## 2021-01-01 DIAGNOSIS — I10 ESSENTIAL HYPERTENSION: Primary | ICD-10-CM

## 2021-01-01 DIAGNOSIS — R53.1 WEAKNESS: ICD-10-CM

## 2021-01-01 DIAGNOSIS — L03.116 BILATERAL CELLULITIS OF LOWER LEG: ICD-10-CM

## 2021-01-01 DIAGNOSIS — K55.069 MESENTERIC VENOUS THROMBOSIS (HCC): ICD-10-CM

## 2021-01-01 DIAGNOSIS — Z79.4 TYPE 2 DIABETES MELLITUS WITH HYPERGLYCEMIA, WITH LONG-TERM CURRENT USE OF INSULIN (HCC): Primary | ICD-10-CM

## 2021-01-01 DIAGNOSIS — Z20.828 CONTACT WITH AND (SUSPECTED) EXPOSURE TO OTHER VIRAL COMMUNICABLE DISEASES: ICD-10-CM

## 2021-01-01 DIAGNOSIS — E87.5 HYPERKALEMIA: Primary | ICD-10-CM

## 2021-01-01 DIAGNOSIS — L03.90 CELLULITIS: ICD-10-CM

## 2021-01-01 DIAGNOSIS — E78.2 MIXED HYPERLIPIDEMIA: ICD-10-CM

## 2021-01-01 DIAGNOSIS — S00.03XA HEMATOMA OF SCALP, INITIAL ENCOUNTER: ICD-10-CM

## 2021-01-01 DIAGNOSIS — K86.2 PANCREATIC CYST: Primary | ICD-10-CM

## 2021-01-01 DIAGNOSIS — Z71.89 ADVANCED CARE PLANNING/COUNSELING DISCUSSION: ICD-10-CM

## 2021-01-01 DIAGNOSIS — R65.20 SEVERE SEPSIS (HCC): Primary | ICD-10-CM

## 2021-01-01 DIAGNOSIS — D72.829 LEUKOCYTOSIS: ICD-10-CM

## 2021-01-01 DIAGNOSIS — S41.119A ARM LACERATION: Primary | ICD-10-CM

## 2021-01-01 DIAGNOSIS — G62.9 PERIPHERAL POLYNEUROPATHY: ICD-10-CM

## 2021-01-01 DIAGNOSIS — E87.5 HYPERKALEMIA: ICD-10-CM

## 2021-01-01 DIAGNOSIS — R78.81 STREPTOCOCCAL BACTEREMIA: Primary | ICD-10-CM

## 2021-01-01 DIAGNOSIS — E11.65 TYPE 2 DIABETES MELLITUS WITH HYPERGLYCEMIA, WITH LONG-TERM CURRENT USE OF INSULIN (HCC): Primary | ICD-10-CM

## 2021-01-01 DIAGNOSIS — Z71.89 COUNSELING AND COORDINATION OF CARE: Primary | ICD-10-CM

## 2021-01-01 DIAGNOSIS — I10 ESSENTIAL HYPERTENSION: Chronic | ICD-10-CM

## 2021-01-01 DIAGNOSIS — R73.9 HYPERGLYCEMIA: ICD-10-CM
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
## 2021-01-01 DIAGNOSIS — E83.42 HYPOMAGNESEMIA: ICD-10-CM

## 2021-01-01 LAB
ABO GROUP BLD BPU: NORMAL
ABO GROUP BLD BPU: NORMAL
ABO GROUP BLD: NORMAL
ACANTHOCYTES BLD QL SMEAR: PRESENT
ALBUMIN SERPL BCP-MCNC: 1.9 G/DL (ref 3.5–5)
ALBUMIN SERPL BCP-MCNC: 2.1 G/DL (ref 3.5–5)
ALBUMIN SERPL BCP-MCNC: 2.3 G/DL (ref 3.5–5)
ALBUMIN SERPL BCP-MCNC: 2.5 G/DL (ref 3.5–5)
ALBUMIN SERPL BCP-MCNC: 2.6 G/DL (ref 3.5–5)
ALBUMIN SERPL BCP-MCNC: 2.7 G/DL (ref 3.5–5)
ALBUMIN SERPL BCP-MCNC: 2.8 G/DL (ref 3.5–5)
ALBUMIN SERPL BCP-MCNC: 2.9 G/DL (ref 3.5–5)
ALBUMIN SERPL BCP-MCNC: 3 G/DL (ref 3.5–5)
ALBUMIN SERPL BCP-MCNC: 3.1 G/DL (ref 3.5–5)
ALBUMIN SERPL BCP-MCNC: 3.2 G/DL (ref 3.5–5)
ALBUMIN SERPL BCP-MCNC: 3.3 G/DL (ref 3.5–5)
ALP SERPL-CCNC: 100 U/L (ref 46–116)
ALP SERPL-CCNC: 103 U/L (ref 46–116)
ALP SERPL-CCNC: 104 U/L (ref 46–116)
ALP SERPL-CCNC: 105 U/L (ref 46–116)
ALP SERPL-CCNC: 106 U/L (ref 46–116)
ALP SERPL-CCNC: 106 U/L (ref 46–116)
ALP SERPL-CCNC: 107 U/L (ref 46–116)
ALP SERPL-CCNC: 107 U/L (ref 46–116)
ALP SERPL-CCNC: 110 U/L (ref 46–116)
ALP SERPL-CCNC: 112 U/L (ref 46–116)
ALP SERPL-CCNC: 114 U/L (ref 46–116)
ALP SERPL-CCNC: 121 U/L (ref 46–116)
ALP SERPL-CCNC: 121 U/L (ref 46–116)
ALP SERPL-CCNC: 125 U/L (ref 46–116)
ALP SERPL-CCNC: 127 U/L (ref 46–116)
ALP SERPL-CCNC: 129 U/L (ref 46–116)
ALP SERPL-CCNC: 131 U/L (ref 46–116)
ALP SERPL-CCNC: 134 U/L (ref 46–116)
ALP SERPL-CCNC: 152 U/L (ref 46–116)
ALP SERPL-CCNC: 64 U/L (ref 46–116)
ALP SERPL-CCNC: 75 U/L (ref 46–116)
ALP SERPL-CCNC: 79 U/L (ref 46–116)
ALP SERPL-CCNC: 87 U/L (ref 46–116)
ALP SERPL-CCNC: 88 U/L (ref 46–116)
ALP SERPL-CCNC: 89 U/L (ref 46–116)
ALP SERPL-CCNC: 95 U/L (ref 46–116)
ALP SERPL-CCNC: 97 U/L (ref 46–116)
ALT SERPL W P-5'-P-CCNC: 19 U/L (ref 12–78)
ALT SERPL W P-5'-P-CCNC: 22 U/L (ref 12–78)
ALT SERPL W P-5'-P-CCNC: 23 U/L (ref 12–78)
ALT SERPL W P-5'-P-CCNC: 27 U/L (ref 12–78)
ALT SERPL W P-5'-P-CCNC: 28 U/L (ref 12–78)
ALT SERPL W P-5'-P-CCNC: 29 U/L (ref 12–78)
ALT SERPL W P-5'-P-CCNC: 30 U/L (ref 12–78)
ALT SERPL W P-5'-P-CCNC: 31 U/L (ref 12–78)
ALT SERPL W P-5'-P-CCNC: 32 U/L (ref 12–78)
ALT SERPL W P-5'-P-CCNC: 32 U/L (ref 12–78)
ALT SERPL W P-5'-P-CCNC: 33 U/L (ref 12–78)
ALT SERPL W P-5'-P-CCNC: 33 U/L (ref 12–78)
ALT SERPL W P-5'-P-CCNC: 35 U/L (ref 12–78)
ALT SERPL W P-5'-P-CCNC: 38 U/L (ref 12–78)
ALT SERPL W P-5'-P-CCNC: 44 U/L (ref 12–78)
ALT SERPL W P-5'-P-CCNC: 52 U/L (ref 12–78)
ALT SERPL W P-5'-P-CCNC: 53 U/L (ref 12–78)
ALT SERPL W P-5'-P-CCNC: 62 U/L (ref 12–78)
ALT SERPL W P-5'-P-CCNC: 72 U/L (ref 12–78)
ALT SERPL W P-5'-P-CCNC: 78 U/L (ref 12–78)
ALT SERPL W P-5'-P-CCNC: 81 U/L (ref 12–78)
ALT SERPL W P-5'-P-CCNC: 86 U/L (ref 12–78)
AMYLASE FLD QL: 38 U/L
ANION GAP SERPL CALCULATED.3IONS-SCNC: 10 MMOL/L (ref 4–13)
ANION GAP SERPL CALCULATED.3IONS-SCNC: 11 MMOL/L (ref 4–13)
ANION GAP SERPL CALCULATED.3IONS-SCNC: 12 MMOL/L (ref 4–13)
ANION GAP SERPL CALCULATED.3IONS-SCNC: 14 MMOL/L (ref 4–13)
ANION GAP SERPL CALCULATED.3IONS-SCNC: 5 MMOL/L (ref 4–13)
ANION GAP SERPL CALCULATED.3IONS-SCNC: 6 MMOL/L (ref 4–13)
ANION GAP SERPL CALCULATED.3IONS-SCNC: 7 MMOL/L (ref 4–13)
ANION GAP SERPL CALCULATED.3IONS-SCNC: 8 MMOL/L (ref 4–13)
ANION GAP SERPL CALCULATED.3IONS-SCNC: 9 MMOL/L (ref 4–13)
ANISOCYTOSIS BLD QL SMEAR: PRESENT
APTT PPP: 25 SECONDS (ref 23–37)
APTT PPP: 29 SECONDS (ref 23–37)
APTT PPP: 35 SECONDS (ref 23–37)
AST SERPL W P-5'-P-CCNC: 10 U/L (ref 5–45)
AST SERPL W P-5'-P-CCNC: 12 U/L (ref 5–45)
AST SERPL W P-5'-P-CCNC: 13 U/L (ref 5–45)
AST SERPL W P-5'-P-CCNC: 14 U/L (ref 5–45)
AST SERPL W P-5'-P-CCNC: 14 U/L (ref 5–45)
AST SERPL W P-5'-P-CCNC: 15 U/L (ref 5–45)
AST SERPL W P-5'-P-CCNC: 15 U/L (ref 5–45)
AST SERPL W P-5'-P-CCNC: 16 U/L (ref 5–45)
AST SERPL W P-5'-P-CCNC: 16 U/L (ref 5–45)
AST SERPL W P-5'-P-CCNC: 17 U/L (ref 5–45)
AST SERPL W P-5'-P-CCNC: 18 U/L (ref 5–45)
AST SERPL W P-5'-P-CCNC: 19 U/L (ref 5–45)
AST SERPL W P-5'-P-CCNC: 20 U/L (ref 5–45)
AST SERPL W P-5'-P-CCNC: 20 U/L (ref 5–45)
AST SERPL W P-5'-P-CCNC: 21 U/L (ref 5–45)
AST SERPL W P-5'-P-CCNC: 22 U/L (ref 5–45)
AST SERPL W P-5'-P-CCNC: 24 U/L (ref 5–45)
AST SERPL W P-5'-P-CCNC: 25 U/L (ref 5–45)
AST SERPL W P-5'-P-CCNC: 27 U/L (ref 5–45)
AST SERPL W P-5'-P-CCNC: 29 U/L (ref 5–45)
AST SERPL W P-5'-P-CCNC: 30 U/L (ref 5–45)
AST SERPL W P-5'-P-CCNC: 30 U/L (ref 5–45)
AST SERPL W P-5'-P-CCNC: 38 U/L (ref 5–45)
AST SERPL W P-5'-P-CCNC: 49 U/L (ref 5–45)
AST SERPL W P-5'-P-CCNC: 75 U/L (ref 5–45)
ATRIAL RATE: 105 BPM
ATRIAL RATE: 109 BPM
ATRIAL RATE: 127 BPM
ATRIAL RATE: 153 BPM
ATRIAL RATE: 79 BPM
ATRIAL RATE: 83 BPM
ATRIAL RATE: 89 BPM
ATRIAL RATE: 91 BPM
BACTERIA BLD CULT: ABNORMAL
BACTERIA BLD CULT: ABNORMAL
BACTERIA BLD CULT: NORMAL
BACTERIA SPEC BFLD CULT: NORMAL
BACTERIA UR QL AUTO: ABNORMAL /HPF
BACTERIA UR QL AUTO: ABNORMAL /HPF
BACTERIA UR QL AUTO: NORMAL /HPF
BASE EXCESS BLDA CALC-SCNC: -2 MMOL/L (ref -2–3)
BASE EXCESS BLDA CALC-SCNC: -4 MMOL/L (ref -2–3)
BASE EXCESS BLDA CALC-SCNC: -4 MMOL/L (ref -2–3)
BASO STIPL BLD QL SMEAR: PRESENT
BASOPHILS # BLD AUTO: 0.01 THOUSANDS/ΜL (ref 0–0.1)
BASOPHILS # BLD AUTO: 0.02 THOUSANDS/ΜL (ref 0–0.1)
BASOPHILS # BLD AUTO: 0.03 THOUSANDS/ΜL (ref 0–0.1)
BASOPHILS # BLD AUTO: 0.04 THOUSANDS/ΜL (ref 0–0.1)
BASOPHILS # BLD AUTO: 0.05 THOUSANDS/ΜL (ref 0–0.1)
BASOPHILS # BLD AUTO: 0.06 THOUSANDS/ΜL (ref 0–0.1)
BASOPHILS # BLD AUTO: 0.06 THOUSANDS/ΜL (ref 0–0.1)
BASOPHILS # BLD AUTO: 0.07 THOUSANDS/ΜL (ref 0–0.1)
BASOPHILS # BLD AUTO: 0.08 THOUSANDS/ΜL (ref 0–0.1)
BASOPHILS # BLD AUTO: 0.11 THOUSANDS/ΜL (ref 0–0.1)
BASOPHILS # BLD AUTO: 0.16 THOUSANDS/ΜL (ref 0–0.1)
BASOPHILS # BLD AUTO: 0.17 THOUSANDS/ΜL (ref 0–0.1)
BASOPHILS # BLD MANUAL: 0 THOUSAND/UL (ref 0–0.1)
BASOPHILS # BLD MANUAL: 0.09 THOUSAND/UL (ref 0–0.1)
BASOPHILS # BLD MANUAL: 0.13 THOUSAND/UL (ref 0–0.1)
BASOPHILS # BLD MANUAL: 0.16 THOUSAND/UL (ref 0–0.1)
BASOPHILS NFR BLD AUTO: 0 % (ref 0–1)
BASOPHILS NFR BLD AUTO: 1 % (ref 0–1)
BASOPHILS NFR BLD AUTO: 2 % (ref 0–1)
BASOPHILS NFR MAR MANUAL: 0 % (ref 0–1)
BASOPHILS NFR MAR MANUAL: 1 % (ref 0–1)
BASOPHILS NFR MAR MANUAL: 1 % (ref 0–1)
BASOPHILS NFR MAR MANUAL: 2 % (ref 0–1)
BETA-HYDROXYBUTYRATE: 0.1 MMOL/L
BETA-HYDROXYBUTYRATE: 0.4 MMOL/L
BILIRUB DIRECT SERPL-MCNC: 0.1 MG/DL (ref 0–0.2)
BILIRUB SERPL-MCNC: 0.2 MG/DL (ref 0.2–1)
BILIRUB SERPL-MCNC: 0.3 MG/DL (ref 0.2–1)
BILIRUB SERPL-MCNC: 0.3 MG/DL (ref 0.2–1)
BILIRUB SERPL-MCNC: 0.35 MG/DL (ref 0.2–1)
BILIRUB SERPL-MCNC: 0.4 MG/DL (ref 0.2–1)
BILIRUB SERPL-MCNC: 0.5 MG/DL (ref 0.2–1)
BILIRUB SERPL-MCNC: 0.6 MG/DL (ref 0.2–1)
BILIRUB SERPL-MCNC: 0.62 MG/DL (ref 0.2–1)
BILIRUB SERPL-MCNC: 0.7 MG/DL (ref 0.2–1)
BILIRUB SERPL-MCNC: 1 MG/DL (ref 0.2–1)
BILIRUB UR QL STRIP: NEGATIVE
BLD GP AB SCN SERPL QL: NEGATIVE
BLD GP AB SCN SERPL QL: NEGATIVE
BPU ID: NORMAL
BPU ID: NORMAL
BUN SERPL-MCNC: 13 MG/DL (ref 5–25)
BUN SERPL-MCNC: 14 MG/DL (ref 5–25)
BUN SERPL-MCNC: 15 MG/DL (ref 5–25)
BUN SERPL-MCNC: 16 MG/DL (ref 5–25)
BUN SERPL-MCNC: 17 MG/DL (ref 5–25)
BUN SERPL-MCNC: 18 MG/DL (ref 5–25)
BUN SERPL-MCNC: 18 MG/DL (ref 5–25)
BUN SERPL-MCNC: 19 MG/DL (ref 5–25)
BUN SERPL-MCNC: 20 MG/DL (ref 5–25)
BUN SERPL-MCNC: 20 MG/DL (ref 5–25)
BUN SERPL-MCNC: 21 MG/DL (ref 5–25)
BUN SERPL-MCNC: 22 MG/DL (ref 5–25)
BUN SERPL-MCNC: 22 MG/DL (ref 5–25)
BUN SERPL-MCNC: 23 MG/DL (ref 5–25)
BUN SERPL-MCNC: 24 MG/DL (ref 5–25)
BUN SERPL-MCNC: 25 MG/DL (ref 5–25)
BUN SERPL-MCNC: 26 MG/DL (ref 5–25)
BUN SERPL-MCNC: 27 MG/DL (ref 5–25)
BUN SERPL-MCNC: 28 MG/DL (ref 5–25)
BUN SERPL-MCNC: 30 MG/DL (ref 5–25)
BUN SERPL-MCNC: 31 MG/DL (ref 5–25)
BUN SERPL-MCNC: 32 MG/DL (ref 5–25)
BUN SERPL-MCNC: 32 MG/DL (ref 5–25)
BUN SERPL-MCNC: 33 MG/DL (ref 5–25)
BUN SERPL-MCNC: 33 MG/DL (ref 5–25)
BUN SERPL-MCNC: 35 MG/DL (ref 5–25)
C DIFF TOX GENS STL QL NAA+PROBE: NEGATIVE
CA-I BLD-SCNC: 1.11 MMOL/L (ref 1.12–1.32)
CA-I BLD-SCNC: 1.16 MMOL/L (ref 1.12–1.32)
CA-I BLD-SCNC: 1.2 MMOL/L (ref 1.12–1.32)
CALCIUM ALBUM COR SERPL-MCNC: 10.3 MG/DL (ref 8.3–10.1)
CALCIUM ALBUM COR SERPL-MCNC: 8.6 MG/DL (ref 8.3–10.1)
CALCIUM ALBUM COR SERPL-MCNC: 8.8 MG/DL (ref 8.3–10.1)
CALCIUM ALBUM COR SERPL-MCNC: 8.9 MG/DL (ref 8.3–10.1)
CALCIUM ALBUM COR SERPL-MCNC: 8.9 MG/DL (ref 8.3–10.1)
CALCIUM ALBUM COR SERPL-MCNC: 9.1 MG/DL (ref 8.3–10.1)
CALCIUM ALBUM COR SERPL-MCNC: 9.1 MG/DL (ref 8.3–10.1)
CALCIUM ALBUM COR SERPL-MCNC: 9.2 MG/DL (ref 8.3–10.1)
CALCIUM ALBUM COR SERPL-MCNC: 9.3 MG/DL (ref 8.3–10.1)
CALCIUM ALBUM COR SERPL-MCNC: 9.4 MG/DL (ref 8.3–10.1)
CALCIUM ALBUM COR SERPL-MCNC: 9.6 MG/DL (ref 8.3–10.1)
CALCIUM ALBUM COR SERPL-MCNC: 9.6 MG/DL (ref 8.3–10.1)
CALCIUM ALBUM COR SERPL-MCNC: 9.7 MG/DL (ref 8.3–10.1)
CALCIUM ALBUM COR SERPL-MCNC: 9.8 MG/DL (ref 8.3–10.1)
CALCIUM SERPL-MCNC: 7.3 MG/DL (ref 8.3–10.1)
CALCIUM SERPL-MCNC: 7.3 MG/DL (ref 8.3–10.1)
CALCIUM SERPL-MCNC: 7.4 MG/DL (ref 8.3–10.1)
CALCIUM SERPL-MCNC: 7.5 MG/DL (ref 8.3–10.1)
CALCIUM SERPL-MCNC: 7.6 MG/DL (ref 8.3–10.1)
CALCIUM SERPL-MCNC: 7.6 MG/DL (ref 8.3–10.1)
CALCIUM SERPL-MCNC: 7.7 MG/DL (ref 8.3–10.1)
CALCIUM SERPL-MCNC: 7.8 MG/DL (ref 8.3–10.1)
CALCIUM SERPL-MCNC: 7.9 MG/DL (ref 8.3–10.1)
CALCIUM SERPL-MCNC: 8 MG/DL (ref 8.3–10.1)
CALCIUM SERPL-MCNC: 8 MG/DL (ref 8.3–10.1)
CALCIUM SERPL-MCNC: 8.1 MG/DL (ref 8.3–10.1)
CALCIUM SERPL-MCNC: 8.2 MG/DL (ref 8.3–10.1)
CALCIUM SERPL-MCNC: 8.3 MG/DL (ref 8.3–10.1)
CALCIUM SERPL-MCNC: 8.4 MG/DL (ref 8.3–10.1)
CALCIUM SERPL-MCNC: 8.5 MG/DL (ref 8.3–10.1)
CALCIUM SERPL-MCNC: 8.6 MG/DL (ref 8.3–10.1)
CALCIUM SERPL-MCNC: 8.7 MG/DL (ref 8.3–10.1)
CALCIUM SERPL-MCNC: 8.9 MG/DL (ref 8.3–10.1)
CALCIUM SERPL-MCNC: 9.2 MG/DL (ref 8.3–10.1)
CALCIUM SERPL-MCNC: 9.3 MG/DL (ref 8.3–10.1)
CANCER AG19-9 SERPL-ACNC: 1020 U/ML (ref 0–35)
CANCER AG19-9 SERPL-ACNC: 1570 U/ML (ref 0–35)
CANCER AG19-9 SERPL-ACNC: 1632 U/ML (ref 0–35)
CANCER AG19-9 SERPL-ACNC: 2620 U/ML (ref 0–35)
CANCER AG19-9 SERPL-ACNC: 2643 U/ML (ref 0–35)
CANCER AG19-9 SERPL-ACNC: 2838 U/ML (ref 0–35)
CANCER AG19-9 SERPL-ACNC: 3886 U/ML (ref 0–35)
CANCER AG19-9 SERPL-ACNC: 3933 U/ML (ref 0–35)
CANCER AG19-9 SERPL-ACNC: 4584 U/ML (ref 0–35)
CANCER AG19-9 SERPL-ACNC: 4675 U/ML (ref 0–35)
CANCER AG19-9 SERPL-ACNC: 648 U/ML (ref 0–35)
CANCER AG19-9 SERPL-ACNC: 759 U/ML (ref 0–35)
CANCER AG19-9 SERPL-ACNC: 795 U/ML (ref 0–35)
CANCER AG19-9 SERPL-ACNC: 877 U/ML (ref 0–35)
CANCER AG19-9 SERPL-ACNC: ABNORMAL U/ML (ref 0–35)
CEA FLD-MCNC: NORMAL NG/ML
CHLORIDE SERPL-SCNC: 100 MMOL/L (ref 100–108)
CHLORIDE SERPL-SCNC: 101 MMOL/L (ref 100–108)
CHLORIDE SERPL-SCNC: 101 MMOL/L (ref 100–108)
CHLORIDE SERPL-SCNC: 102 MMOL/L (ref 100–108)
CHLORIDE SERPL-SCNC: 102 MMOL/L (ref 100–108)
CHLORIDE SERPL-SCNC: 89 MMOL/L (ref 100–108)
CHLORIDE SERPL-SCNC: 90 MMOL/L (ref 100–108)
CHLORIDE SERPL-SCNC: 91 MMOL/L (ref 100–108)
CHLORIDE SERPL-SCNC: 92 MMOL/L (ref 100–108)
CHLORIDE SERPL-SCNC: 92 MMOL/L (ref 100–108)
CHLORIDE SERPL-SCNC: 94 MMOL/L (ref 100–108)
CHLORIDE SERPL-SCNC: 95 MMOL/L (ref 100–108)
CHLORIDE SERPL-SCNC: 96 MMOL/L (ref 100–108)
CHLORIDE SERPL-SCNC: 97 MMOL/L (ref 100–108)
CHLORIDE SERPL-SCNC: 98 MMOL/L (ref 100–108)
CHLORIDE SERPL-SCNC: 99 MMOL/L (ref 100–108)
CHLORIDE UR-SCNC: 107 MMOL/L
CLARITY UR: CLEAR
CO2 SERPL-SCNC: 18 MMOL/L (ref 21–32)
CO2 SERPL-SCNC: 18 MMOL/L (ref 21–32)
CO2 SERPL-SCNC: 20 MMOL/L (ref 21–32)
CO2 SERPL-SCNC: 21 MMOL/L (ref 21–32)
CO2 SERPL-SCNC: 22 MMOL/L (ref 21–32)
CO2 SERPL-SCNC: 23 MMOL/L (ref 21–32)
CO2 SERPL-SCNC: 24 MMOL/L (ref 21–32)
CO2 SERPL-SCNC: 25 MMOL/L (ref 21–32)
CO2 SERPL-SCNC: 26 MMOL/L (ref 21–32)
CO2 SERPL-SCNC: 26 MMOL/L (ref 21–32)
CO2 SERPL-SCNC: 27 MMOL/L (ref 21–32)
CO2 SERPL-SCNC: 27 MMOL/L (ref 21–32)
COLOR UR: YELLOW
CORTIS AM PEAK SERPL-MCNC: 18.4 UG/DL (ref 4.2–22.4)
CREAT SERPL-MCNC: 0.6 MG/DL (ref 0.6–1.3)
CREAT SERPL-MCNC: 0.64 MG/DL (ref 0.6–1.3)
CREAT SERPL-MCNC: 0.65 MG/DL (ref 0.6–1.3)
CREAT SERPL-MCNC: 0.67 MG/DL (ref 0.6–1.3)
CREAT SERPL-MCNC: 0.68 MG/DL (ref 0.6–1.3)
CREAT SERPL-MCNC: 0.69 MG/DL (ref 0.6–1.3)
CREAT SERPL-MCNC: 0.69 MG/DL (ref 0.6–1.3)
CREAT SERPL-MCNC: 0.7 MG/DL (ref 0.6–1.3)
CREAT SERPL-MCNC: 0.71 MG/DL (ref 0.6–1.3)
CREAT SERPL-MCNC: 0.72 MG/DL (ref 0.6–1.3)
CREAT SERPL-MCNC: 0.72 MG/DL (ref 0.6–1.3)
CREAT SERPL-MCNC: 0.73 MG/DL (ref 0.6–1.3)
CREAT SERPL-MCNC: 0.74 MG/DL (ref 0.6–1.3)
CREAT SERPL-MCNC: 0.75 MG/DL (ref 0.6–1.3)
CREAT SERPL-MCNC: 0.77 MG/DL (ref 0.6–1.3)
CREAT SERPL-MCNC: 0.78 MG/DL (ref 0.6–1.3)
CREAT SERPL-MCNC: 0.78 MG/DL (ref 0.6–1.3)
CREAT SERPL-MCNC: 0.79 MG/DL (ref 0.6–1.3)
CREAT SERPL-MCNC: 0.8 MG/DL (ref 0.6–1.3)
CREAT SERPL-MCNC: 0.81 MG/DL (ref 0.6–1.3)
CREAT SERPL-MCNC: 0.82 MG/DL (ref 0.6–1.3)
CREAT SERPL-MCNC: 0.83 MG/DL (ref 0.6–1.3)
CREAT SERPL-MCNC: 0.84 MG/DL (ref 0.6–1.3)
CREAT SERPL-MCNC: 0.86 MG/DL (ref 0.6–1.3)
CREAT SERPL-MCNC: 0.87 MG/DL (ref 0.6–1.3)
CREAT SERPL-MCNC: 0.87 MG/DL (ref 0.6–1.3)
CREAT SERPL-MCNC: 0.89 MG/DL (ref 0.6–1.3)
CREAT SERPL-MCNC: 0.89 MG/DL (ref 0.6–1.3)
CREAT SERPL-MCNC: 0.9 MG/DL (ref 0.6–1.3)
CREAT SERPL-MCNC: 0.91 MG/DL (ref 0.6–1.3)
CREAT SERPL-MCNC: 0.91 MG/DL (ref 0.6–1.3)
CREAT SERPL-MCNC: 0.93 MG/DL (ref 0.6–1.3)
CREAT SERPL-MCNC: 0.94 MG/DL (ref 0.6–1.3)
CREAT SERPL-MCNC: 0.94 MG/DL (ref 0.6–1.3)
CREAT SERPL-MCNC: 0.95 MG/DL (ref 0.6–1.3)
CREAT SERPL-MCNC: 0.95 MG/DL (ref 0.6–1.3)
CREAT SERPL-MCNC: 1 MG/DL (ref 0.6–1.3)
CREAT SERPL-MCNC: 1.02 MG/DL (ref 0.6–1.3)
CREAT SERPL-MCNC: 1.1 MG/DL (ref 0.6–1.3)
CREAT SERPL-MCNC: 1.12 MG/DL (ref 0.6–1.3)
CREAT SERPL-MCNC: 1.12 MG/DL (ref 0.6–1.3)
CREAT SERPL-MCNC: 1.13 MG/DL (ref 0.6–1.3)
CREAT SERPL-MCNC: 1.14 MG/DL (ref 0.6–1.3)
CREAT SERPL-MCNC: 1.15 MG/DL (ref 0.6–1.3)
CREAT SERPL-MCNC: 1.28 MG/DL (ref 0.6–1.3)
CREAT UR-MCNC: 77.9 MG/DL
CREAT UR-MCNC: <13 MG/DL
CROSSMATCH: NORMAL
CROSSMATCH: NORMAL
EOSINOPHIL # BLD AUTO: 0.01 THOUSAND/ΜL (ref 0–0.61)
EOSINOPHIL # BLD AUTO: 0.02 THOUSAND/ΜL (ref 0–0.61)
EOSINOPHIL # BLD AUTO: 0.03 THOUSAND/ΜL (ref 0–0.61)
EOSINOPHIL # BLD AUTO: 0.08 THOUSAND/ΜL (ref 0–0.61)
EOSINOPHIL # BLD AUTO: 0.09 THOUSAND/ΜL (ref 0–0.61)
EOSINOPHIL # BLD AUTO: 0.11 THOUSAND/ΜL (ref 0–0.61)
EOSINOPHIL # BLD AUTO: 0.13 THOUSAND/ΜL (ref 0–0.61)
EOSINOPHIL # BLD AUTO: 0.17 THOUSAND/ΜL (ref 0–0.61)
EOSINOPHIL # BLD AUTO: 0.2 THOUSAND/ΜL (ref 0–0.61)
EOSINOPHIL # BLD AUTO: 0.2 THOUSAND/ΜL (ref 0–0.61)
EOSINOPHIL # BLD AUTO: 0.23 THOUSAND/ΜL (ref 0–0.61)
EOSINOPHIL # BLD AUTO: 0.25 THOUSAND/ΜL (ref 0–0.61)
EOSINOPHIL # BLD AUTO: 0.28 THOUSAND/ΜL (ref 0–0.61)
EOSINOPHIL # BLD AUTO: 0.28 THOUSAND/ΜL (ref 0–0.61)
EOSINOPHIL # BLD AUTO: 0.35 THOUSAND/ΜL (ref 0–0.61)
EOSINOPHIL # BLD AUTO: 0.37 THOUSAND/ΜL (ref 0–0.61)
EOSINOPHIL # BLD AUTO: 0.42 THOUSAND/ΜL (ref 0–0.61)
EOSINOPHIL # BLD AUTO: 0.44 THOUSAND/ΜL (ref 0–0.61)
EOSINOPHIL # BLD AUTO: 0.49 THOUSAND/ΜL (ref 0–0.61)
EOSINOPHIL # BLD AUTO: 0.56 THOUSAND/ΜL (ref 0–0.61)
EOSINOPHIL # BLD AUTO: 0.63 THOUSAND/ΜL (ref 0–0.61)
EOSINOPHIL # BLD AUTO: 0.98 THOUSAND/ΜL (ref 0–0.61)
EOSINOPHIL # BLD AUTO: 1.17 THOUSAND/ΜL (ref 0–0.61)
EOSINOPHIL # BLD MANUAL: 0 THOUSAND/UL (ref 0–0.4)
EOSINOPHIL # BLD MANUAL: 0 THOUSAND/UL (ref 0–0.4)
EOSINOPHIL # BLD MANUAL: 0.09 THOUSAND/UL (ref 0–0.4)
EOSINOPHIL # BLD MANUAL: 0.19 THOUSAND/UL (ref 0–0.4)
EOSINOPHIL # BLD MANUAL: 0.32 THOUSAND/UL (ref 0–0.4)
EOSINOPHIL # BLD MANUAL: 0.43 THOUSAND/UL (ref 0–0.4)
EOSINOPHIL # BLD MANUAL: 0.56 THOUSAND/UL (ref 0–0.4)
EOSINOPHIL # BLD MANUAL: 0.56 THOUSAND/UL (ref 0–0.4)
EOSINOPHIL # BLD MANUAL: 0.62 THOUSAND/UL (ref 0–0.4)
EOSINOPHIL # BLD MANUAL: 0.72 THOUSAND/UL (ref 0–0.4)
EOSINOPHIL NFR BLD AUTO: 0 % (ref 0–6)
EOSINOPHIL NFR BLD AUTO: 1 % (ref 0–6)
EOSINOPHIL NFR BLD AUTO: 10 % (ref 0–6)
EOSINOPHIL NFR BLD AUTO: 2 % (ref 0–6)
EOSINOPHIL NFR BLD AUTO: 3 % (ref 0–6)
EOSINOPHIL NFR BLD AUTO: 4 % (ref 0–6)
EOSINOPHIL NFR BLD AUTO: 5 % (ref 0–6)
EOSINOPHIL NFR BLD AUTO: 6 % (ref 0–6)
EOSINOPHIL NFR BLD AUTO: 7 % (ref 0–6)
EOSINOPHIL NFR BLD AUTO: 8 % (ref 0–6)
EOSINOPHIL NFR BLD AUTO: 9 % (ref 0–6)
EOSINOPHIL NFR BLD MANUAL: 0 % (ref 0–6)
EOSINOPHIL NFR BLD MANUAL: 0 % (ref 0–6)
EOSINOPHIL NFR BLD MANUAL: 1 % (ref 0–6)
EOSINOPHIL NFR BLD MANUAL: 3 % (ref 0–6)
EOSINOPHIL NFR BLD MANUAL: 4 % (ref 0–6)
EOSINOPHIL NFR BLD MANUAL: 4 % (ref 0–6)
EOSINOPHIL NFR BLD MANUAL: 5 % (ref 0–6)
EOSINOPHIL NFR BLD MANUAL: 5 % (ref 0–6)
EOSINOPHIL NFR BLD MANUAL: 6 % (ref 0–6)
EOSINOPHIL NFR BLD MANUAL: 6 % (ref 0–6)
ERYTHROCYTE [DISTWIDTH] IN BLOOD BY AUTOMATED COUNT: 12.7 % (ref 11.6–15.1)
ERYTHROCYTE [DISTWIDTH] IN BLOOD BY AUTOMATED COUNT: 13.5 % (ref 11.6–15.1)
ERYTHROCYTE [DISTWIDTH] IN BLOOD BY AUTOMATED COUNT: 14.1 % (ref 11.6–15.1)
ERYTHROCYTE [DISTWIDTH] IN BLOOD BY AUTOMATED COUNT: 14.8 % (ref 11.6–15.1)
ERYTHROCYTE [DISTWIDTH] IN BLOOD BY AUTOMATED COUNT: 14.9 % (ref 11.6–15.1)
ERYTHROCYTE [DISTWIDTH] IN BLOOD BY AUTOMATED COUNT: 15.1 % (ref 11.6–15.1)
ERYTHROCYTE [DISTWIDTH] IN BLOOD BY AUTOMATED COUNT: 15.3 % (ref 11.6–15.1)
ERYTHROCYTE [DISTWIDTH] IN BLOOD BY AUTOMATED COUNT: 15.4 % (ref 11.6–15.1)
ERYTHROCYTE [DISTWIDTH] IN BLOOD BY AUTOMATED COUNT: 15.6 % (ref 11.6–15.1)
ERYTHROCYTE [DISTWIDTH] IN BLOOD BY AUTOMATED COUNT: 15.7 % (ref 11.6–15.1)
ERYTHROCYTE [DISTWIDTH] IN BLOOD BY AUTOMATED COUNT: 15.8 % (ref 11.6–15.1)
ERYTHROCYTE [DISTWIDTH] IN BLOOD BY AUTOMATED COUNT: 15.9 % (ref 11.6–15.1)
ERYTHROCYTE [DISTWIDTH] IN BLOOD BY AUTOMATED COUNT: 15.9 % (ref 11.6–15.1)
ERYTHROCYTE [DISTWIDTH] IN BLOOD BY AUTOMATED COUNT: 16.2 % (ref 11.6–15.1)
ERYTHROCYTE [DISTWIDTH] IN BLOOD BY AUTOMATED COUNT: 16.3 % (ref 11.6–15.1)
ERYTHROCYTE [DISTWIDTH] IN BLOOD BY AUTOMATED COUNT: 16.3 % (ref 11.6–15.1)
ERYTHROCYTE [DISTWIDTH] IN BLOOD BY AUTOMATED COUNT: 16.5 % (ref 11.6–15.1)
ERYTHROCYTE [DISTWIDTH] IN BLOOD BY AUTOMATED COUNT: 16.5 % (ref 11.6–15.1)
ERYTHROCYTE [DISTWIDTH] IN BLOOD BY AUTOMATED COUNT: 16.7 % (ref 11.6–15.1)
ERYTHROCYTE [DISTWIDTH] IN BLOOD BY AUTOMATED COUNT: 16.9 % (ref 11.6–15.1)
ERYTHROCYTE [DISTWIDTH] IN BLOOD BY AUTOMATED COUNT: 17 % (ref 11.6–15.1)
ERYTHROCYTE [DISTWIDTH] IN BLOOD BY AUTOMATED COUNT: 17 % (ref 11.6–15.1)
ERYTHROCYTE [DISTWIDTH] IN BLOOD BY AUTOMATED COUNT: 17.2 % (ref 11.6–15.1)
ERYTHROCYTE [DISTWIDTH] IN BLOOD BY AUTOMATED COUNT: 17.3 % (ref 11.6–15.1)
ERYTHROCYTE [DISTWIDTH] IN BLOOD BY AUTOMATED COUNT: 17.8 % (ref 11.6–15.1)
ERYTHROCYTE [DISTWIDTH] IN BLOOD BY AUTOMATED COUNT: 18.1 % (ref 11.6–15.1)
ERYTHROCYTE [DISTWIDTH] IN BLOOD BY AUTOMATED COUNT: 18.3 % (ref 11.6–15.1)
ERYTHROCYTE [DISTWIDTH] IN BLOOD BY AUTOMATED COUNT: 18.4 % (ref 11.6–15.1)
ERYTHROCYTE [DISTWIDTH] IN BLOOD BY AUTOMATED COUNT: 18.5 % (ref 11.6–15.1)
ERYTHROCYTE [DISTWIDTH] IN BLOOD BY AUTOMATED COUNT: 18.6 % (ref 11.6–15.1)
ERYTHROCYTE [DISTWIDTH] IN BLOOD BY AUTOMATED COUNT: 18.6 % (ref 11.6–15.1)
ERYTHROCYTE [DISTWIDTH] IN BLOOD BY AUTOMATED COUNT: 18.7 % (ref 11.6–15.1)
ERYTHROCYTE [DISTWIDTH] IN BLOOD BY AUTOMATED COUNT: 18.9 % (ref 11.6–15.1)
ERYTHROCYTE [DISTWIDTH] IN BLOOD BY AUTOMATED COUNT: 19.1 % (ref 11.6–15.1)
ERYTHROCYTE [DISTWIDTH] IN BLOOD BY AUTOMATED COUNT: 19.1 % (ref 11.6–15.1)
ERYTHROCYTE [DISTWIDTH] IN BLOOD BY AUTOMATED COUNT: 19.2 % (ref 11.6–15.1)
ERYTHROCYTE [DISTWIDTH] IN BLOOD BY AUTOMATED COUNT: 19.4 % (ref 11.6–15.1)
ERYTHROCYTE [DISTWIDTH] IN BLOOD BY AUTOMATED COUNT: 19.6 % (ref 11.6–15.1)
ERYTHROCYTE [DISTWIDTH] IN BLOOD BY AUTOMATED COUNT: 20.5 % (ref 11.6–15.1)
ERYTHROCYTE [DISTWIDTH] IN BLOOD BY AUTOMATED COUNT: 20.8 % (ref 11.6–15.1)
ERYTHROCYTE [DISTWIDTH] IN BLOOD BY AUTOMATED COUNT: 21 % (ref 11.6–15.1)
EST. AVERAGE GLUCOSE BLD GHB EST-MCNC: 223 MG/DL
FERRITIN SERPL-MCNC: 994 NG/ML (ref 8–388)
FLUAV RNA RESP QL NAA+PROBE: NEGATIVE
FLUBV RNA RESP QL NAA+PROBE: NEGATIVE
GFR SERPL CREATININE-BSD FRML MDRD: 54 ML/MIN/1.73SQ M
GFR SERPL CREATININE-BSD FRML MDRD: 61 ML/MIN/1.73SQ M
GFR SERPL CREATININE-BSD FRML MDRD: 62 ML/MIN/1.73SQ M
GFR SERPL CREATININE-BSD FRML MDRD: 63 ML/MIN/1.73SQ M
GFR SERPL CREATININE-BSD FRML MDRD: 65 ML/MIN/1.73SQ M
GFR SERPL CREATININE-BSD FRML MDRD: 71 ML/MIN/1.73SQ M
GFR SERPL CREATININE-BSD FRML MDRD: 73 ML/MIN/1.73SQ M
GFR SERPL CREATININE-BSD FRML MDRD: 77 ML/MIN/1.73SQ M
GFR SERPL CREATININE-BSD FRML MDRD: 77 ML/MIN/1.73SQ M
GFR SERPL CREATININE-BSD FRML MDRD: 78 ML/MIN/1.73SQ M
GFR SERPL CREATININE-BSD FRML MDRD: 78 ML/MIN/1.73SQ M
GFR SERPL CREATININE-BSD FRML MDRD: 79 ML/MIN/1.73SQ M
GFR SERPL CREATININE-BSD FRML MDRD: 82 ML/MIN/1.73SQ M
GFR SERPL CREATININE-BSD FRML MDRD: 82 ML/MIN/1.73SQ M
GFR SERPL CREATININE-BSD FRML MDRD: 83 ML/MIN/1.73SQ M
GFR SERPL CREATININE-BSD FRML MDRD: 84 ML/MIN/1.73SQ M
GFR SERPL CREATININE-BSD FRML MDRD: 85 ML/MIN/1.73SQ M
GFR SERPL CREATININE-BSD FRML MDRD: 85 ML/MIN/1.73SQ M
GFR SERPL CREATININE-BSD FRML MDRD: 86 ML/MIN/1.73SQ M
GFR SERPL CREATININE-BSD FRML MDRD: 87 ML/MIN/1.73SQ M
GFR SERPL CREATININE-BSD FRML MDRD: 87 ML/MIN/1.73SQ M
GFR SERPL CREATININE-BSD FRML MDRD: 88 ML/MIN/1.73SQ M
GFR SERPL CREATININE-BSD FRML MDRD: 89 ML/MIN/1.73SQ M
GFR SERPL CREATININE-BSD FRML MDRD: 90 ML/MIN/1.73SQ M
GFR SERPL CREATININE-BSD FRML MDRD: 91 ML/MIN/1.73SQ M
GFR SERPL CREATININE-BSD FRML MDRD: 92 ML/MIN/1.73SQ M
GFR SERPL CREATININE-BSD FRML MDRD: 93 ML/MIN/1.73SQ M
GFR SERPL CREATININE-BSD FRML MDRD: 93 ML/MIN/1.73SQ M
GFR SERPL CREATININE-BSD FRML MDRD: 95 ML/MIN/1.73SQ M
GFR SERPL CREATININE-BSD FRML MDRD: 95 ML/MIN/1.73SQ M
GFR SERPL CREATININE-BSD FRML MDRD: 98 ML/MIN/1.73SQ M
GLUCOSE P FAST SERPL-MCNC: 166 MG/DL (ref 65–99)
GLUCOSE P FAST SERPL-MCNC: 177 MG/DL (ref 65–99)
GLUCOSE P FAST SERPL-MCNC: 178 MG/DL (ref 65–99)
GLUCOSE P FAST SERPL-MCNC: 231 MG/DL (ref 65–99)
GLUCOSE P FAST SERPL-MCNC: 237 MG/DL (ref 65–99)
GLUCOSE P FAST SERPL-MCNC: 288 MG/DL (ref 65–99)
GLUCOSE P FAST SERPL-MCNC: 291 MG/DL (ref 65–99)
GLUCOSE P FAST SERPL-MCNC: 303 MG/DL (ref 65–99)
GLUCOSE P FAST SERPL-MCNC: 308 MG/DL (ref 65–99)
GLUCOSE P FAST SERPL-MCNC: 396 MG/DL (ref 65–99)
GLUCOSE P FAST SERPL-MCNC: 44 MG/DL (ref 65–99)
GLUCOSE P FAST SERPL-MCNC: 460 MG/DL (ref 65–99)
GLUCOSE SERPL-MCNC: 101 MG/DL (ref 65–140)
GLUCOSE SERPL-MCNC: 103 MG/DL (ref 65–140)
GLUCOSE SERPL-MCNC: 106 MG/DL (ref 65–140)
GLUCOSE SERPL-MCNC: 112 MG/DL (ref 65–140)
GLUCOSE SERPL-MCNC: 115 MG/DL (ref 65–140)
GLUCOSE SERPL-MCNC: 122 MG/DL (ref 65–140)
GLUCOSE SERPL-MCNC: 124 MG/DL (ref 65–140)
GLUCOSE SERPL-MCNC: 129 MG/DL (ref 65–140)
GLUCOSE SERPL-MCNC: 133 MG/DL (ref 65–140)
GLUCOSE SERPL-MCNC: 134 MG/DL (ref 65–140)
GLUCOSE SERPL-MCNC: 141 MG/DL (ref 65–140)
GLUCOSE SERPL-MCNC: 142 MG/DL (ref 65–140)
GLUCOSE SERPL-MCNC: 142 MG/DL (ref 65–140)
GLUCOSE SERPL-MCNC: 146 MG/DL (ref 65–140)
GLUCOSE SERPL-MCNC: 150 MG/DL (ref 65–140)
GLUCOSE SERPL-MCNC: 151 MG/DL (ref 65–140)
GLUCOSE SERPL-MCNC: 153 MG/DL (ref 65–140)
GLUCOSE SERPL-MCNC: 157 MG/DL (ref 65–140)
GLUCOSE SERPL-MCNC: 161 MG/DL (ref 65–140)
GLUCOSE SERPL-MCNC: 162 MG/DL (ref 65–140)
GLUCOSE SERPL-MCNC: 165 MG/DL (ref 65–140)
GLUCOSE SERPL-MCNC: 165 MG/DL (ref 65–140)
GLUCOSE SERPL-MCNC: 167 MG/DL (ref 65–140)
GLUCOSE SERPL-MCNC: 167 MG/DL (ref 65–140)
GLUCOSE SERPL-MCNC: 168 MG/DL (ref 65–140)
GLUCOSE SERPL-MCNC: 168 MG/DL (ref 65–140)
GLUCOSE SERPL-MCNC: 169 MG/DL (ref 65–140)
GLUCOSE SERPL-MCNC: 169 MG/DL (ref 65–140)
GLUCOSE SERPL-MCNC: 171 MG/DL (ref 65–140)
GLUCOSE SERPL-MCNC: 172 MG/DL (ref 65–140)
GLUCOSE SERPL-MCNC: 173 MG/DL (ref 65–140)
GLUCOSE SERPL-MCNC: 174 MG/DL (ref 65–140)
GLUCOSE SERPL-MCNC: 174 MG/DL (ref 65–140)
GLUCOSE SERPL-MCNC: 175 MG/DL (ref 65–140)
GLUCOSE SERPL-MCNC: 177 MG/DL (ref 65–140)
GLUCOSE SERPL-MCNC: 179 MG/DL (ref 65–140)
GLUCOSE SERPL-MCNC: 181 MG/DL (ref 65–140)
GLUCOSE SERPL-MCNC: 181 MG/DL (ref 65–140)
GLUCOSE SERPL-MCNC: 182 MG/DL (ref 65–140)
GLUCOSE SERPL-MCNC: 183 MG/DL (ref 65–140)
GLUCOSE SERPL-MCNC: 183 MG/DL (ref 65–140)
GLUCOSE SERPL-MCNC: 185 MG/DL (ref 65–140)
GLUCOSE SERPL-MCNC: 185 MG/DL (ref 65–140)
GLUCOSE SERPL-MCNC: 187 MG/DL (ref 65–140)
GLUCOSE SERPL-MCNC: 188 MG/DL (ref 65–140)
GLUCOSE SERPL-MCNC: 191 MG/DL (ref 65–140)
GLUCOSE SERPL-MCNC: 194 MG/DL (ref 65–140)
GLUCOSE SERPL-MCNC: 195 MG/DL (ref 65–140)
GLUCOSE SERPL-MCNC: 196 MG/DL (ref 65–140)
GLUCOSE SERPL-MCNC: 199 MG/DL (ref 65–140)
GLUCOSE SERPL-MCNC: 209 MG/DL (ref 65–140)
GLUCOSE SERPL-MCNC: 211 MG/DL (ref 65–140)
GLUCOSE SERPL-MCNC: 211 MG/DL (ref 65–140)
GLUCOSE SERPL-MCNC: 212 MG/DL (ref 65–140)
GLUCOSE SERPL-MCNC: 214 MG/DL (ref 65–140)
GLUCOSE SERPL-MCNC: 216 MG/DL (ref 65–140)
GLUCOSE SERPL-MCNC: 216 MG/DL (ref 65–140)
GLUCOSE SERPL-MCNC: 219 MG/DL (ref 65–140)
GLUCOSE SERPL-MCNC: 220 MG/DL (ref 65–140)
GLUCOSE SERPL-MCNC: 221 MG/DL (ref 65–140)
GLUCOSE SERPL-MCNC: 223 MG/DL (ref 65–140)
GLUCOSE SERPL-MCNC: 225 MG/DL (ref 65–140)
GLUCOSE SERPL-MCNC: 227 MG/DL (ref 65–140)
GLUCOSE SERPL-MCNC: 227 MG/DL (ref 65–140)
GLUCOSE SERPL-MCNC: 228 MG/DL (ref 65–140)
GLUCOSE SERPL-MCNC: 229 MG/DL (ref 65–140)
GLUCOSE SERPL-MCNC: 229 MG/DL (ref 65–140)
GLUCOSE SERPL-MCNC: 233 MG/DL (ref 65–140)
GLUCOSE SERPL-MCNC: 234 MG/DL (ref 65–140)
GLUCOSE SERPL-MCNC: 234 MG/DL (ref 65–140)
GLUCOSE SERPL-MCNC: 236 MG/DL (ref 65–140)
GLUCOSE SERPL-MCNC: 236 MG/DL (ref 65–140)
GLUCOSE SERPL-MCNC: 237 MG/DL (ref 65–140)
GLUCOSE SERPL-MCNC: 237 MG/DL (ref 65–140)
GLUCOSE SERPL-MCNC: 239 MG/DL (ref 65–140)
GLUCOSE SERPL-MCNC: 240 MG/DL (ref 65–140)
GLUCOSE SERPL-MCNC: 244 MG/DL (ref 65–140)
GLUCOSE SERPL-MCNC: 247 MG/DL (ref 65–140)
GLUCOSE SERPL-MCNC: 249 MG/DL (ref 65–140)
GLUCOSE SERPL-MCNC: 251 MG/DL (ref 65–140)
GLUCOSE SERPL-MCNC: 253 MG/DL (ref 65–140)
GLUCOSE SERPL-MCNC: 253 MG/DL (ref 65–140)
GLUCOSE SERPL-MCNC: 254 MG/DL (ref 65–140)
GLUCOSE SERPL-MCNC: 256 MG/DL (ref 65–140)
GLUCOSE SERPL-MCNC: 256 MG/DL (ref 65–140)
GLUCOSE SERPL-MCNC: 257 MG/DL (ref 65–140)
GLUCOSE SERPL-MCNC: 258 MG/DL (ref 65–140)
GLUCOSE SERPL-MCNC: 258 MG/DL (ref 65–140)
GLUCOSE SERPL-MCNC: 259 MG/DL (ref 65–140)
GLUCOSE SERPL-MCNC: 259 MG/DL (ref 65–140)
GLUCOSE SERPL-MCNC: 261 MG/DL (ref 65–140)
GLUCOSE SERPL-MCNC: 262 MG/DL (ref 65–140)
GLUCOSE SERPL-MCNC: 263 MG/DL (ref 65–140)
GLUCOSE SERPL-MCNC: 265 MG/DL (ref 65–140)
GLUCOSE SERPL-MCNC: 267 MG/DL (ref 65–140)
GLUCOSE SERPL-MCNC: 267 MG/DL (ref 65–140)
GLUCOSE SERPL-MCNC: 268 MG/DL (ref 65–140)
GLUCOSE SERPL-MCNC: 268 MG/DL (ref 65–140)
GLUCOSE SERPL-MCNC: 273 MG/DL (ref 65–140)
GLUCOSE SERPL-MCNC: 274 MG/DL (ref 65–140)
GLUCOSE SERPL-MCNC: 275 MG/DL (ref 65–140)
GLUCOSE SERPL-MCNC: 275 MG/DL (ref 65–140)
GLUCOSE SERPL-MCNC: 277 MG/DL (ref 65–140)
GLUCOSE SERPL-MCNC: 279 MG/DL (ref 65–140)
GLUCOSE SERPL-MCNC: 280 MG/DL (ref 65–140)
GLUCOSE SERPL-MCNC: 281 MG/DL (ref 65–140)
GLUCOSE SERPL-MCNC: 284 MG/DL (ref 65–140)
GLUCOSE SERPL-MCNC: 284 MG/DL (ref 65–140)
GLUCOSE SERPL-MCNC: 290 MG/DL (ref 65–140)
GLUCOSE SERPL-MCNC: 291 MG/DL (ref 65–140)
GLUCOSE SERPL-MCNC: 293 MG/DL (ref 65–140)
GLUCOSE SERPL-MCNC: 293 MG/DL (ref 65–140)
GLUCOSE SERPL-MCNC: 296 MG/DL (ref 65–140)
GLUCOSE SERPL-MCNC: 299 MG/DL (ref 65–140)
GLUCOSE SERPL-MCNC: 301 MG/DL (ref 65–140)
GLUCOSE SERPL-MCNC: 305 MG/DL (ref 65–140)
GLUCOSE SERPL-MCNC: 306 MG/DL (ref 65–140)
GLUCOSE SERPL-MCNC: 309 MG/DL (ref 65–140)
GLUCOSE SERPL-MCNC: 316 MG/DL (ref 65–140)
GLUCOSE SERPL-MCNC: 316 MG/DL (ref 65–140)
GLUCOSE SERPL-MCNC: 319 MG/DL (ref 65–140)
GLUCOSE SERPL-MCNC: 345 MG/DL (ref 65–140)
GLUCOSE SERPL-MCNC: 346 MG/DL (ref 65–140)
GLUCOSE SERPL-MCNC: 346 MG/DL (ref 65–140)
GLUCOSE SERPL-MCNC: 354 MG/DL (ref 65–140)
GLUCOSE SERPL-MCNC: 354 MG/DL (ref 65–140)
GLUCOSE SERPL-MCNC: 359 MG/DL (ref 65–140)
GLUCOSE SERPL-MCNC: 373 MG/DL (ref 65–140)
GLUCOSE SERPL-MCNC: 374 MG/DL (ref 65–140)
GLUCOSE SERPL-MCNC: 388 MG/DL (ref 65–140)
GLUCOSE SERPL-MCNC: 395 MG/DL (ref 65–140)
GLUCOSE SERPL-MCNC: 395 MG/DL (ref 65–140)
GLUCOSE SERPL-MCNC: 416 MG/DL (ref 65–140)
GLUCOSE SERPL-MCNC: 421 MG/DL (ref 65–140)
GLUCOSE SERPL-MCNC: 428 MG/DL (ref 65–140)
GLUCOSE SERPL-MCNC: 450 MG/DL (ref 65–140)
GLUCOSE SERPL-MCNC: 462 MG/DL (ref 65–140)
GLUCOSE SERPL-MCNC: 484 MG/DL (ref 65–140)
GLUCOSE SERPL-MCNC: 495 MG/DL (ref 65–140)
GLUCOSE SERPL-MCNC: 66 MG/DL (ref 65–140)
GLUCOSE SERPL-MCNC: 67 MG/DL (ref 65–140)
GLUCOSE SERPL-MCNC: 69 MG/DL (ref 65–140)
GLUCOSE SERPL-MCNC: 73 MG/DL (ref 65–140)
GLUCOSE SERPL-MCNC: 76 MG/DL (ref 65–140)
GLUCOSE SERPL-MCNC: 76 MG/DL (ref 65–140)
GLUCOSE SERPL-MCNC: 83 MG/DL (ref 65–140)
GLUCOSE SERPL-MCNC: 85 MG/DL (ref 65–140)
GLUCOSE SERPL-MCNC: 85 MG/DL (ref 65–140)
GLUCOSE SERPL-MCNC: 86 MG/DL (ref 65–140)
GLUCOSE SERPL-MCNC: 89 MG/DL (ref 65–140)
GLUCOSE SERPL-MCNC: 91 MG/DL (ref 65–140)
GLUCOSE SERPL-MCNC: 95 MG/DL (ref 65–140)
GLUCOSE SERPL-MCNC: 98 MG/DL (ref 65–140)
GLUCOSE UR STRIP-MCNC: ABNORMAL MG/DL
GRAM STN SPEC: ABNORMAL
GRAM STN SPEC: ABNORMAL
GRAM STN SPEC: NORMAL
GRAM STN SPEC: NORMAL
HBA1C MFR BLD HPLC: 7.9 %
HBA1C MFR BLD: 9.4 %
HCO3 BLDA-SCNC: 20.4 MMOL/L (ref 24–30)
HCO3 BLDA-SCNC: 20.4 MMOL/L (ref 24–30)
HCO3 BLDA-SCNC: 21.2 MMOL/L (ref 24–30)
HCT VFR BLD AUTO: 19.5 % (ref 36.5–49.3)
HCT VFR BLD AUTO: 21.3 % (ref 36.5–49.3)
HCT VFR BLD AUTO: 21.6 % (ref 36.5–49.3)
HCT VFR BLD AUTO: 21.6 % (ref 36.5–49.3)
HCT VFR BLD AUTO: 21.8 % (ref 36.5–49.3)
HCT VFR BLD AUTO: 21.9 % (ref 36.5–49.3)
HCT VFR BLD AUTO: 22.6 % (ref 36.5–49.3)
HCT VFR BLD AUTO: 23 % (ref 36.5–49.3)
HCT VFR BLD AUTO: 23.1 % (ref 36.5–49.3)
HCT VFR BLD AUTO: 23.7 % (ref 36.5–49.3)
HCT VFR BLD AUTO: 24.5 % (ref 36.5–49.3)
HCT VFR BLD AUTO: 24.9 % (ref 36.5–49.3)
HCT VFR BLD AUTO: 25 % (ref 36.5–49.3)
HCT VFR BLD AUTO: 25.5 % (ref 36.5–49.3)
HCT VFR BLD AUTO: 25.5 % (ref 36.5–49.3)
HCT VFR BLD AUTO: 25.6 % (ref 36.5–49.3)
HCT VFR BLD AUTO: 25.8 % (ref 36.5–49.3)
HCT VFR BLD AUTO: 26.3 % (ref 36.5–49.3)
HCT VFR BLD AUTO: 26.4 % (ref 36.5–49.3)
HCT VFR BLD AUTO: 26.5 % (ref 36.5–49.3)
HCT VFR BLD AUTO: 26.8 % (ref 36.5–49.3)
HCT VFR BLD AUTO: 26.9 % (ref 36.5–49.3)
HCT VFR BLD AUTO: 27.2 % (ref 36.5–49.3)
HCT VFR BLD AUTO: 27.3 % (ref 36.5–49.3)
HCT VFR BLD AUTO: 27.5 % (ref 36.5–49.3)
HCT VFR BLD AUTO: 27.6 % (ref 36.5–49.3)
HCT VFR BLD AUTO: 27.7 % (ref 36.5–49.3)
HCT VFR BLD AUTO: 27.9 % (ref 36.5–49.3)
HCT VFR BLD AUTO: 28.1 % (ref 36.5–49.3)
HCT VFR BLD AUTO: 28.2 % (ref 36.5–49.3)
HCT VFR BLD AUTO: 28.2 % (ref 36.5–49.3)
HCT VFR BLD AUTO: 28.4 % (ref 36.5–49.3)
HCT VFR BLD AUTO: 28.7 % (ref 36.5–49.3)
HCT VFR BLD AUTO: 29.2 % (ref 36.5–49.3)
HCT VFR BLD AUTO: 29.3 % (ref 36.5–49.3)
HCT VFR BLD AUTO: 29.5 % (ref 36.5–49.3)
HCT VFR BLD AUTO: 29.7 % (ref 36.5–49.3)
HCT VFR BLD AUTO: 30.1 % (ref 36.5–49.3)
HCT VFR BLD AUTO: 30.3 % (ref 36.5–49.3)
HCT VFR BLD AUTO: 30.7 % (ref 36.5–49.3)
HCT VFR BLD AUTO: 30.7 % (ref 36.5–49.3)
HCT VFR BLD AUTO: 31.6 % (ref 36.5–49.3)
HCT VFR BLD AUTO: 32.8 % (ref 36.5–49.3)
HCT VFR BLD AUTO: 33.1 % (ref 36.5–49.3)
HCT VFR BLD AUTO: 33.1 % (ref 36.5–49.3)
HCT VFR BLD CALC: 25 % (ref 36.5–49.3)
HCT VFR BLD CALC: 25 % (ref 36.5–49.3)
HCT VFR BLD CALC: 27 % (ref 36.5–49.3)
HGB BLD-MCNC: 10.2 G/DL (ref 12–17)
HGB BLD-MCNC: 10.6 G/DL (ref 12–17)
HGB BLD-MCNC: 10.7 G/DL (ref 12–17)
HGB BLD-MCNC: 10.8 G/DL (ref 12–17)
HGB BLD-MCNC: 6.5 G/DL (ref 12–17)
HGB BLD-MCNC: 7 G/DL (ref 12–17)
HGB BLD-MCNC: 7.1 G/DL (ref 12–17)
HGB BLD-MCNC: 7.3 G/DL (ref 12–17)
HGB BLD-MCNC: 7.3 G/DL (ref 12–17)
HGB BLD-MCNC: 7.4 G/DL (ref 12–17)
HGB BLD-MCNC: 7.4 G/DL (ref 12–17)
HGB BLD-MCNC: 7.7 G/DL (ref 12–17)
HGB BLD-MCNC: 7.8 G/DL (ref 12–17)
HGB BLD-MCNC: 7.8 G/DL (ref 12–17)
HGB BLD-MCNC: 7.9 G/DL (ref 12–17)
HGB BLD-MCNC: 8.2 G/DL (ref 12–17)
HGB BLD-MCNC: 8.2 G/DL (ref 12–17)
HGB BLD-MCNC: 8.3 G/DL (ref 12–17)
HGB BLD-MCNC: 8.3 G/DL (ref 12–17)
HGB BLD-MCNC: 8.5 G/DL (ref 12–17)
HGB BLD-MCNC: 8.7 G/DL (ref 12–17)
HGB BLD-MCNC: 8.8 G/DL (ref 12–17)
HGB BLD-MCNC: 8.9 G/DL (ref 12–17)
HGB BLD-MCNC: 9 G/DL (ref 12–17)
HGB BLD-MCNC: 9.1 G/DL (ref 12–17)
HGB BLD-MCNC: 9.2 G/DL (ref 12–17)
HGB BLD-MCNC: 9.3 G/DL (ref 12–17)
HGB BLD-MCNC: 9.4 G/DL (ref 12–17)
HGB BLD-MCNC: 9.4 G/DL (ref 12–17)
HGB BLD-MCNC: 9.5 G/DL (ref 12–17)
HGB BLD-MCNC: 9.7 G/DL (ref 12–17)
HGB BLD-MCNC: 9.8 G/DL (ref 12–17)
HGB BLD-MCNC: 9.8 G/DL (ref 12–17)
HGB BLD-MCNC: 9.9 G/DL (ref 12–17)
HGB BLD-MCNC: 9.9 G/DL (ref 12–17)
HGB BLDA-MCNC: 8.5 G/DL (ref 12–17)
HGB BLDA-MCNC: 8.5 G/DL (ref 12–17)
HGB BLDA-MCNC: 9.2 G/DL (ref 12–17)
HGB UR QL STRIP.AUTO: ABNORMAL
HGB UR QL STRIP.AUTO: NEGATIVE
HYPERCHROMIA BLD QL SMEAR: PRESENT
IMM GRANULOCYTES # BLD AUTO: 0.02 THOUSAND/UL (ref 0–0.2)
IMM GRANULOCYTES # BLD AUTO: 0.03 THOUSAND/UL (ref 0–0.2)
IMM GRANULOCYTES # BLD AUTO: 0.05 THOUSAND/UL (ref 0–0.2)
IMM GRANULOCYTES # BLD AUTO: 0.05 THOUSAND/UL (ref 0–0.2)
IMM GRANULOCYTES # BLD AUTO: 0.06 THOUSAND/UL (ref 0–0.2)
IMM GRANULOCYTES # BLD AUTO: 0.06 THOUSAND/UL (ref 0–0.2)
IMM GRANULOCYTES # BLD AUTO: 0.07 THOUSAND/UL (ref 0–0.2)
IMM GRANULOCYTES # BLD AUTO: 0.08 THOUSAND/UL (ref 0–0.2)
IMM GRANULOCYTES # BLD AUTO: 0.1 THOUSAND/UL (ref 0–0.2)
IMM GRANULOCYTES # BLD AUTO: 0.11 THOUSAND/UL (ref 0–0.2)
IMM GRANULOCYTES # BLD AUTO: 0.11 THOUSAND/UL (ref 0–0.2)
IMM GRANULOCYTES # BLD AUTO: 0.12 THOUSAND/UL (ref 0–0.2)
IMM GRANULOCYTES # BLD AUTO: 0.13 THOUSAND/UL (ref 0–0.2)
IMM GRANULOCYTES # BLD AUTO: 0.13 THOUSAND/UL (ref 0–0.2)
IMM GRANULOCYTES # BLD AUTO: 0.14 THOUSAND/UL (ref 0–0.2)
IMM GRANULOCYTES # BLD AUTO: 0.15 THOUSAND/UL (ref 0–0.2)
IMM GRANULOCYTES # BLD AUTO: 0.15 THOUSAND/UL (ref 0–0.2)
IMM GRANULOCYTES # BLD AUTO: 0.23 THOUSAND/UL (ref 0–0.2)
IMM GRANULOCYTES # BLD AUTO: >0.5 THOUSAND/UL (ref 0–0.2)
IMM GRANULOCYTES NFR BLD AUTO: 0 % (ref 0–2)
IMM GRANULOCYTES NFR BLD AUTO: 1 % (ref 0–2)
IMM GRANULOCYTES NFR BLD AUTO: 15 % (ref 0–2)
IMM GRANULOCYTES NFR BLD AUTO: 2 % (ref 0–2)
INR PPP: 1.18 (ref 0.84–1.19)
INR PPP: 1.25 (ref 0.84–1.19)
INR PPP: 1.54 (ref 0.84–1.19)
IRON SATN MFR SERPL: 15 %
IRON SERPL-MCNC: 24 UG/DL (ref 65–175)
KETONES UR STRIP-MCNC: ABNORMAL MG/DL
KETONES UR STRIP-MCNC: ABNORMAL MG/DL
KETONES UR STRIP-MCNC: NEGATIVE MG/DL
LACTATE SERPL-SCNC: 1.7 MMOL/L (ref 0.5–2)
LACTATE SERPL-SCNC: 1.8 MMOL/L (ref 0.5–2)
LACTATE SERPL-SCNC: 1.9 MMOL/L (ref 0.5–2)
LACTATE SERPL-SCNC: 2 MMOL/L (ref 0.5–2)
LACTATE SERPL-SCNC: 2.2 MMOL/L (ref 0.5–2)
LACTATE SERPL-SCNC: 2.7 MMOL/L (ref 0.5–2)
LACTATE SERPL-SCNC: 3.1 MMOL/L (ref 0.5–2)
LACTATE SERPL-SCNC: 3.2 MMOL/L (ref 0.5–2)
LACTATE SERPL-SCNC: 3.9 MMOL/L (ref 0.5–2)
LACTATE SERPL-SCNC: 4.2 MMOL/L (ref 0.5–2)
LACTATE SERPL-SCNC: 4.6 MMOL/L (ref 0.5–2)
LACTATE SERPL-SCNC: 5.7 MMOL/L (ref 0.5–2)
LEUKOCYTE ESTERASE UR QL STRIP: ABNORMAL
LEUKOCYTE ESTERASE UR QL STRIP: NEGATIVE
LIPASE SERPL-CCNC: 81 U/L (ref 73–393)
LYMPHOCYTES # BLD AUTO: 0.33 THOUSANDS/ΜL (ref 0.6–4.47)
LYMPHOCYTES # BLD AUTO: 0.33 THOUSANDS/ΜL (ref 0.6–4.47)
LYMPHOCYTES # BLD AUTO: 0.41 THOUSANDS/ΜL (ref 0.6–4.47)
LYMPHOCYTES # BLD AUTO: 0.42 THOUSAND/UL (ref 0.6–4.47)
LYMPHOCYTES # BLD AUTO: 0.43 THOUSAND/UL (ref 0.6–4.47)
LYMPHOCYTES # BLD AUTO: 0.49 THOUSANDS/ΜL (ref 0.6–4.47)
LYMPHOCYTES # BLD AUTO: 0.5 THOUSANDS/ΜL (ref 0.6–4.47)
LYMPHOCYTES # BLD AUTO: 0.51 THOUSANDS/ΜL (ref 0.6–4.47)
LYMPHOCYTES # BLD AUTO: 0.81 THOUSANDS/ΜL (ref 0.6–4.47)
LYMPHOCYTES # BLD AUTO: 0.83 THOUSANDS/ΜL (ref 0.6–4.47)
LYMPHOCYTES # BLD AUTO: 0.84 THOUSAND/UL (ref 0.6–4.47)
LYMPHOCYTES # BLD AUTO: 0.95 THOUSANDS/ΜL (ref 0.6–4.47)
LYMPHOCYTES # BLD AUTO: 0.96 THOUSANDS/ΜL (ref 0.6–4.47)
LYMPHOCYTES # BLD AUTO: 1.1 THOUSANDS/ΜL (ref 0.6–4.47)
LYMPHOCYTES # BLD AUTO: 1.19 THOUSANDS/ΜL (ref 0.6–4.47)
LYMPHOCYTES # BLD AUTO: 1.2 THOUSANDS/ΜL (ref 0.6–4.47)
LYMPHOCYTES # BLD AUTO: 1.26 THOUSANDS/ΜL (ref 0.6–4.47)
LYMPHOCYTES # BLD AUTO: 1.29 THOUSANDS/ΜL (ref 0.6–4.47)
LYMPHOCYTES # BLD AUTO: 1.33 THOUSANDS/ΜL (ref 0.6–4.47)
LYMPHOCYTES # BLD AUTO: 1.38 THOUSANDS/ΜL (ref 0.6–4.47)
LYMPHOCYTES # BLD AUTO: 1.42 THOUSANDS/ΜL (ref 0.6–4.47)
LYMPHOCYTES # BLD AUTO: 1.56 THOUSAND/UL (ref 0.6–4.47)
LYMPHOCYTES # BLD AUTO: 1.57 THOUSAND/UL (ref 0.6–4.47)
LYMPHOCYTES # BLD AUTO: 1.6 THOUSANDS/ΜL (ref 0.6–4.47)
LYMPHOCYTES # BLD AUTO: 1.62 THOUSAND/UL (ref 0.6–4.47)
LYMPHOCYTES # BLD AUTO: 1.63 THOUSANDS/ΜL (ref 0.6–4.47)
LYMPHOCYTES # BLD AUTO: 1.67 THOUSANDS/ΜL (ref 0.6–4.47)
LYMPHOCYTES # BLD AUTO: 17 % (ref 14–44)
LYMPHOCYTES # BLD AUTO: 2.05 THOUSANDS/ΜL (ref 0.6–4.47)
LYMPHOCYTES # BLD AUTO: 2.08 THOUSAND/UL (ref 0.6–4.47)
LYMPHOCYTES # BLD AUTO: 2.13 THOUSANDS/ΜL (ref 0.6–4.47)
LYMPHOCYTES # BLD AUTO: 21 % (ref 14–44)
LYMPHOCYTES # BLD AUTO: 24 % (ref 14–44)
LYMPHOCYTES # BLD AUTO: 25 % (ref 14–44)
LYMPHOCYTES # BLD AUTO: 32 % (ref 14–44)
LYMPHOCYTES # BLD AUTO: 37 % (ref 14–44)
LYMPHOCYTES # BLD AUTO: 4.51 THOUSAND/UL (ref 0.6–4.47)
LYMPHOCYTES # BLD AUTO: 4.52 THOUSAND/UL (ref 0.6–4.47)
LYMPHOCYTES # BLD AUTO: 40 % (ref 14–44)
LYMPHOCYTES # BLD AUTO: 5.76 THOUSAND/UL (ref 0.6–4.47)
LYMPHOCYTES # BLD AUTO: 6 % (ref 14–44)
LYMPHOCYTES # BLD AUTO: 7 % (ref 14–44)
LYMPHOCYTES # BLD AUTO: 8 % (ref 14–44)
LYMPHOCYTES NFR BLD AUTO: 10 % (ref 14–44)
LYMPHOCYTES NFR BLD AUTO: 12 % (ref 14–44)
LYMPHOCYTES NFR BLD AUTO: 12 % (ref 14–44)
LYMPHOCYTES NFR BLD AUTO: 13 % (ref 14–44)
LYMPHOCYTES NFR BLD AUTO: 14 % (ref 14–44)
LYMPHOCYTES NFR BLD AUTO: 14 % (ref 14–44)
LYMPHOCYTES NFR BLD AUTO: 15 % (ref 14–44)
LYMPHOCYTES NFR BLD AUTO: 15 % (ref 14–44)
LYMPHOCYTES NFR BLD AUTO: 17 % (ref 14–44)
LYMPHOCYTES NFR BLD AUTO: 18 % (ref 14–44)
LYMPHOCYTES NFR BLD AUTO: 19 % (ref 14–44)
LYMPHOCYTES NFR BLD AUTO: 21 % (ref 14–44)
LYMPHOCYTES NFR BLD AUTO: 23 % (ref 14–44)
LYMPHOCYTES NFR BLD AUTO: 29 % (ref 14–44)
LYMPHOCYTES NFR BLD AUTO: 3 % (ref 14–44)
LYMPHOCYTES NFR BLD AUTO: 30 % (ref 14–44)
LYMPHOCYTES NFR BLD AUTO: 4 % (ref 14–44)
LYMPHOCYTES NFR BLD AUTO: 7 % (ref 14–44)
LYMPHOCYTES NFR BLD AUTO: 8 % (ref 14–44)
LYMPHOCYTES NFR BLD AUTO: 9 % (ref 14–44)
MACROCYTES BLD QL AUTO: PRESENT
MACROCYTES BLD QL AUTO: PRESENT
MAGNESIUM SERPL-MCNC: 1.5 MG/DL (ref 1.6–2.6)
MAGNESIUM SERPL-MCNC: 1.6 MG/DL (ref 1.6–2.6)
MAGNESIUM SERPL-MCNC: 1.6 MG/DL (ref 1.6–2.6)
MAGNESIUM SERPL-MCNC: 2 MG/DL (ref 1.6–2.6)
MCH RBC QN AUTO: 29.5 PG (ref 26.8–34.3)
MCH RBC QN AUTO: 29.8 PG (ref 26.8–34.3)
MCH RBC QN AUTO: 29.9 PG (ref 26.8–34.3)
MCH RBC QN AUTO: 29.9 PG (ref 26.8–34.3)
MCH RBC QN AUTO: 30 PG (ref 26.8–34.3)
MCH RBC QN AUTO: 30 PG (ref 26.8–34.3)
MCH RBC QN AUTO: 30.3 PG (ref 26.8–34.3)
MCH RBC QN AUTO: 30.3 PG (ref 26.8–34.3)
MCH RBC QN AUTO: 30.4 PG (ref 26.8–34.3)
MCH RBC QN AUTO: 30.8 PG (ref 26.8–34.3)
MCH RBC QN AUTO: 30.9 PG (ref 26.8–34.3)
MCH RBC QN AUTO: 30.9 PG (ref 26.8–34.3)
MCH RBC QN AUTO: 31.2 PG (ref 26.8–34.3)
MCH RBC QN AUTO: 31.3 PG (ref 26.8–34.3)
MCH RBC QN AUTO: 31.4 PG (ref 26.8–34.3)
MCH RBC QN AUTO: 31.5 PG (ref 26.8–34.3)
MCH RBC QN AUTO: 31.6 PG (ref 26.8–34.3)
MCH RBC QN AUTO: 31.8 PG (ref 26.8–34.3)
MCH RBC QN AUTO: 31.9 PG (ref 26.8–34.3)
MCH RBC QN AUTO: 32 PG (ref 26.8–34.3)
MCH RBC QN AUTO: 32.2 PG (ref 26.8–34.3)
MCH RBC QN AUTO: 32.2 PG (ref 26.8–34.3)
MCH RBC QN AUTO: 32.3 PG (ref 26.8–34.3)
MCH RBC QN AUTO: 32.4 PG (ref 26.8–34.3)
MCH RBC QN AUTO: 32.7 PG (ref 26.8–34.3)
MCH RBC QN AUTO: 33 PG (ref 26.8–34.3)
MCH RBC QN AUTO: 33.3 PG (ref 26.8–34.3)
MCH RBC QN AUTO: 33.5 PG (ref 26.8–34.3)
MCH RBC QN AUTO: 33.6 PG (ref 26.8–34.3)
MCH RBC QN AUTO: 33.6 PG (ref 26.8–34.3)
MCH RBC QN AUTO: 33.8 PG (ref 26.8–34.3)
MCH RBC QN AUTO: 33.9 PG (ref 26.8–34.3)
MCH RBC QN AUTO: 34.3 PG (ref 26.8–34.3)
MCH RBC QN AUTO: 34.4 PG (ref 26.8–34.3)
MCH RBC QN AUTO: 34.5 PG (ref 26.8–34.3)
MCH RBC QN AUTO: 34.6 PG (ref 26.8–34.3)
MCH RBC QN AUTO: 34.8 PG (ref 26.8–34.3)
MCH RBC QN AUTO: 35.3 PG (ref 26.8–34.3)
MCHC RBC AUTO-ENTMCNC: 31 G/DL (ref 31.4–37.4)
MCHC RBC AUTO-ENTMCNC: 31.7 G/DL (ref 31.4–37.4)
MCHC RBC AUTO-ENTMCNC: 31.8 G/DL (ref 31.4–37.4)
MCHC RBC AUTO-ENTMCNC: 32 G/DL (ref 31.4–37.4)
MCHC RBC AUTO-ENTMCNC: 32 G/DL (ref 31.4–37.4)
MCHC RBC AUTO-ENTMCNC: 32.2 G/DL (ref 31.4–37.4)
MCHC RBC AUTO-ENTMCNC: 32.3 G/DL (ref 31.4–37.4)
MCHC RBC AUTO-ENTMCNC: 32.4 G/DL (ref 31.4–37.4)
MCHC RBC AUTO-ENTMCNC: 32.5 G/DL (ref 31.4–37.4)
MCHC RBC AUTO-ENTMCNC: 32.6 G/DL (ref 31.4–37.4)
MCHC RBC AUTO-ENTMCNC: 32.6 G/DL (ref 31.4–37.4)
MCHC RBC AUTO-ENTMCNC: 32.7 G/DL (ref 31.4–37.4)
MCHC RBC AUTO-ENTMCNC: 32.8 G/DL (ref 31.4–37.4)
MCHC RBC AUTO-ENTMCNC: 32.9 G/DL (ref 31.4–37.4)
MCHC RBC AUTO-ENTMCNC: 32.9 G/DL (ref 31.4–37.4)
MCHC RBC AUTO-ENTMCNC: 33 G/DL (ref 31.4–37.4)
MCHC RBC AUTO-ENTMCNC: 33.1 G/DL (ref 31.4–37.4)
MCHC RBC AUTO-ENTMCNC: 33.2 G/DL (ref 31.4–37.4)
MCHC RBC AUTO-ENTMCNC: 33.2 G/DL (ref 31.4–37.4)
MCHC RBC AUTO-ENTMCNC: 33.3 G/DL (ref 31.4–37.4)
MCHC RBC AUTO-ENTMCNC: 33.4 G/DL (ref 31.4–37.4)
MCHC RBC AUTO-ENTMCNC: 33.5 G/DL (ref 31.4–37.4)
MCHC RBC AUTO-ENTMCNC: 33.7 G/DL (ref 31.4–37.4)
MCHC RBC AUTO-ENTMCNC: 33.8 G/DL (ref 31.4–37.4)
MCHC RBC AUTO-ENTMCNC: 33.9 G/DL (ref 31.4–37.4)
MCHC RBC AUTO-ENTMCNC: 33.9 G/DL (ref 31.4–37.4)
MCHC RBC AUTO-ENTMCNC: 34.3 G/DL (ref 31.4–37.4)
MCV RBC AUTO: 100 FL (ref 82–98)
MCV RBC AUTO: 101 FL (ref 82–98)
MCV RBC AUTO: 103 FL (ref 82–98)
MCV RBC AUTO: 103 FL (ref 82–98)
MCV RBC AUTO: 104 FL (ref 82–98)
MCV RBC AUTO: 107 FL (ref 82–98)
MCV RBC AUTO: 108 FL (ref 82–98)
MCV RBC AUTO: 92 FL (ref 82–98)
MCV RBC AUTO: 92 FL (ref 82–98)
MCV RBC AUTO: 93 FL (ref 82–98)
MCV RBC AUTO: 94 FL (ref 82–98)
MCV RBC AUTO: 95 FL (ref 82–98)
MCV RBC AUTO: 96 FL (ref 82–98)
MCV RBC AUTO: 97 FL (ref 82–98)
MCV RBC AUTO: 98 FL (ref 82–98)
MCV RBC AUTO: 98 FL (ref 82–98)
METAMYELOCYTES NFR BLD MANUAL: 1 % (ref 0–1)
METAMYELOCYTES NFR BLD MANUAL: 2 % (ref 0–1)
METAMYELOCYTES NFR BLD MANUAL: 3 % (ref 0–1)
METAMYELOCYTES NFR BLD MANUAL: 6 % (ref 0–1)
MONOCYTES # BLD AUTO: 0.05 THOUSAND/ΜL (ref 0.17–1.22)
MONOCYTES # BLD AUTO: 0.07 THOUSAND/UL (ref 0–1.22)
MONOCYTES # BLD AUTO: 0.08 THOUSAND/ΜL (ref 0.17–1.22)
MONOCYTES # BLD AUTO: 0.08 THOUSAND/ΜL (ref 0.17–1.22)
MONOCYTES # BLD AUTO: 0.1 THOUSAND/ΜL (ref 0.17–1.22)
MONOCYTES # BLD AUTO: 0.14 THOUSAND/ΜL (ref 0.17–1.22)
MONOCYTES # BLD AUTO: 0.14 THOUSAND/ΜL (ref 0.17–1.22)
MONOCYTES # BLD AUTO: 0.18 THOUSAND/ΜL (ref 0.17–1.22)
MONOCYTES # BLD AUTO: 0.2 THOUSAND/ΜL (ref 0.17–1.22)
MONOCYTES # BLD AUTO: 0.23 THOUSAND/ΜL (ref 0.17–1.22)
MONOCYTES # BLD AUTO: 0.31 THOUSAND/UL (ref 0–1.22)
MONOCYTES # BLD AUTO: 0.31 THOUSAND/ΜL (ref 0.17–1.22)
MONOCYTES # BLD AUTO: 0.31 THOUSAND/ΜL (ref 0.17–1.22)
MONOCYTES # BLD AUTO: 0.34 THOUSAND/ΜL (ref 0.17–1.22)
MONOCYTES # BLD AUTO: 0.35 THOUSAND/ΜL (ref 0.17–1.22)
MONOCYTES # BLD AUTO: 0.37 THOUSAND/UL (ref 0–1.22)
MONOCYTES # BLD AUTO: 0.38 THOUSAND/ΜL (ref 0.17–1.22)
MONOCYTES # BLD AUTO: 0.41 THOUSAND/ΜL (ref 0.17–1.22)
MONOCYTES # BLD AUTO: 0.45 THOUSAND/UL (ref 0–1.22)
MONOCYTES # BLD AUTO: 0.49 THOUSAND/UL (ref 0–1.22)
MONOCYTES # BLD AUTO: 0.59 THOUSAND/UL (ref 0–1.22)
MONOCYTES # BLD AUTO: 0.59 THOUSAND/ΜL (ref 0.17–1.22)
MONOCYTES # BLD AUTO: 0.68 THOUSAND/UL (ref 0–1.22)
MONOCYTES # BLD AUTO: 0.84 THOUSAND/ΜL (ref 0.17–1.22)
MONOCYTES # BLD AUTO: 1.16 THOUSAND/ΜL (ref 0.17–1.22)
MONOCYTES # BLD AUTO: 1.3 THOUSAND/UL (ref 0–1.22)
MONOCYTES # BLD AUTO: 1.35 THOUSAND/ΜL (ref 0.17–1.22)
MONOCYTES # BLD AUTO: 1.41 THOUSAND/UL (ref 0–1.22)
MONOCYTES # BLD AUTO: 1.51 THOUSAND/ΜL (ref 0.17–1.22)
MONOCYTES # BLD AUTO: 1.65 THOUSAND/ΜL (ref 0.17–1.22)
MONOCYTES # BLD AUTO: 1.73 THOUSAND/ΜL (ref 0.17–1.22)
MONOCYTES # BLD AUTO: 1.93 THOUSAND/ΜL (ref 0.17–1.22)
MONOCYTES # BLD AUTO: 2.17 THOUSAND/UL (ref 0–1.22)
MONOCYTES NFR BLD AUTO: 0 % (ref 4–12)
MONOCYTES NFR BLD AUTO: 1 % (ref 4–12)
MONOCYTES NFR BLD AUTO: 10 % (ref 4–12)
MONOCYTES NFR BLD AUTO: 12 % (ref 4–12)
MONOCYTES NFR BLD AUTO: 13 % (ref 4–12)
MONOCYTES NFR BLD AUTO: 15 % (ref 4–12)
MONOCYTES NFR BLD AUTO: 15 % (ref 4–12)
MONOCYTES NFR BLD AUTO: 16 % (ref 4–12)
MONOCYTES NFR BLD AUTO: 16 % (ref 4–12)
MONOCYTES NFR BLD AUTO: 2 % (ref 4–12)
MONOCYTES NFR BLD AUTO: 3 % (ref 4–12)
MONOCYTES NFR BLD AUTO: 3 % (ref 4–12)
MONOCYTES NFR BLD AUTO: 5 % (ref 4–12)
MONOCYTES NFR BLD AUTO: 6 % (ref 4–12)
MONOCYTES NFR BLD AUTO: 6 % (ref 4–12)
MONOCYTES NFR BLD AUTO: 7 % (ref 4–12)
MONOCYTES NFR BLD AUTO: 8 % (ref 4–12)
MONOCYTES NFR BLD AUTO: 8 % (ref 4–12)
MONOCYTES NFR BLD: 1 % (ref 4–12)
MONOCYTES NFR BLD: 10 % (ref 4–12)
MONOCYTES NFR BLD: 15 % (ref 4–12)
MONOCYTES NFR BLD: 18 % (ref 4–12)
MONOCYTES NFR BLD: 2 % (ref 4–12)
MONOCYTES NFR BLD: 4 % (ref 4–12)
MONOCYTES NFR BLD: 6 % (ref 4–12)
MONOCYTES NFR BLD: 7 % (ref 4–12)
MONOCYTES NFR BLD: 8 % (ref 4–12)
MONOCYTES NFR BLD: 9 % (ref 4–12)
MYELOCYTES NFR BLD MANUAL: 1 % (ref 0–1)
MYELOCYTES NFR BLD MANUAL: 2 % (ref 0–1)
MYELOCYTES NFR BLD MANUAL: 4 % (ref 0–1)
MYELOCYTES NFR BLD MANUAL: 6 % (ref 0–1)
NEUTROPHILS # BLD AUTO: 10.21 THOUSANDS/ΜL (ref 1.85–7.62)
NEUTROPHILS # BLD AUTO: 11.81 THOUSANDS/ΜL (ref 1.85–7.62)
NEUTROPHILS # BLD AUTO: 13.21 THOUSANDS/ΜL (ref 1.85–7.62)
NEUTROPHILS # BLD AUTO: 16.92 THOUSANDS/ΜL (ref 1.85–7.62)
NEUTROPHILS # BLD AUTO: 17.9 THOUSANDS/ΜL (ref 1.85–7.62)
NEUTROPHILS # BLD AUTO: 2.55 THOUSANDS/ΜL (ref 1.85–7.62)
NEUTROPHILS # BLD AUTO: 2.8 THOUSANDS/ΜL (ref 1.85–7.62)
NEUTROPHILS # BLD AUTO: 3.32 THOUSANDS/ΜL (ref 1.85–7.62)
NEUTROPHILS # BLD AUTO: 3.99 THOUSANDS/ΜL (ref 1.85–7.62)
NEUTROPHILS # BLD AUTO: 4.22 THOUSANDS/ΜL (ref 1.85–7.62)
NEUTROPHILS # BLD AUTO: 4.44 THOUSANDS/ΜL (ref 1.85–7.62)
NEUTROPHILS # BLD AUTO: 4.55 THOUSANDS/ΜL (ref 1.85–7.62)
NEUTROPHILS # BLD AUTO: 5.48 THOUSANDS/ΜL (ref 1.85–7.62)
NEUTROPHILS # BLD AUTO: 5.94 THOUSANDS/ΜL (ref 1.85–7.62)
NEUTROPHILS # BLD AUTO: 6.03 THOUSANDS/ΜL (ref 1.85–7.62)
NEUTROPHILS # BLD AUTO: 6.09 THOUSANDS/ΜL (ref 1.85–7.62)
NEUTROPHILS # BLD AUTO: 6.53 THOUSANDS/ΜL (ref 1.85–7.62)
NEUTROPHILS # BLD AUTO: 6.62 THOUSANDS/ΜL (ref 1.85–7.62)
NEUTROPHILS # BLD AUTO: 7 THOUSANDS/ΜL (ref 1.85–7.62)
NEUTROPHILS # BLD AUTO: 8.48 THOUSANDS/ΜL (ref 1.85–7.62)
NEUTROPHILS # BLD AUTO: 9.02 THOUSANDS/ΜL (ref 1.85–7.62)
NEUTROPHILS # BLD AUTO: 9.09 THOUSANDS/ΜL (ref 1.85–7.62)
NEUTROPHILS # BLD AUTO: 9.63 THOUSANDS/ΜL (ref 1.85–7.62)
NEUTROPHILS # BLD MANUAL: 4.09 THOUSAND/UL (ref 1.85–7.62)
NEUTROPHILS # BLD MANUAL: 4.11 THOUSAND/UL (ref 1.85–7.62)
NEUTROPHILS # BLD MANUAL: 4.61 THOUSAND/UL (ref 1.85–7.62)
NEUTROPHILS # BLD MANUAL: 4.69 THOUSAND/UL (ref 1.85–7.62)
NEUTROPHILS # BLD MANUAL: 4.73 THOUSAND/UL (ref 1.85–7.62)
NEUTROPHILS # BLD MANUAL: 6.48 THOUSAND/UL (ref 1.85–7.62)
NEUTROPHILS # BLD MANUAL: 6.48 THOUSAND/UL (ref 1.85–7.62)
NEUTROPHILS # BLD MANUAL: 7.48 THOUSAND/UL (ref 1.85–7.62)
NEUTROPHILS # BLD MANUAL: 7.94 THOUSAND/UL (ref 1.85–7.62)
NEUTROPHILS # BLD MANUAL: 8.56 THOUSAND/UL (ref 1.85–7.62)
NEUTS BAND NFR BLD MANUAL: 1 % (ref 0–8)
NEUTS BAND NFR BLD MANUAL: 1 % (ref 0–8)
NEUTS BAND NFR BLD MANUAL: 2 % (ref 0–8)
NEUTS BAND NFR BLD MANUAL: 2 % (ref 0–8)
NEUTS BAND NFR BLD MANUAL: 3 % (ref 0–8)
NEUTS BAND NFR BLD MANUAL: 3 % (ref 0–8)
NEUTS BAND NFR BLD MANUAL: 4 % (ref 0–8)
NEUTS BAND NFR BLD MANUAL: 5 % (ref 0–8)
NEUTS SEG NFR BLD AUTO: 37 % (ref 43–75)
NEUTS SEG NFR BLD AUTO: 49 % (ref 43–75)
NEUTS SEG NFR BLD AUTO: 51 % (ref 43–75)
NEUTS SEG NFR BLD AUTO: 52 % (ref 43–75)
NEUTS SEG NFR BLD AUTO: 53 % (ref 43–75)
NEUTS SEG NFR BLD AUTO: 53 % (ref 43–75)
NEUTS SEG NFR BLD AUTO: 56 % (ref 43–75)
NEUTS SEG NFR BLD AUTO: 57 % (ref 43–75)
NEUTS SEG NFR BLD AUTO: 57 % (ref 43–75)
NEUTS SEG NFR BLD AUTO: 60 % (ref 43–75)
NEUTS SEG NFR BLD AUTO: 61 % (ref 43–75)
NEUTS SEG NFR BLD AUTO: 62 % (ref 43–75)
NEUTS SEG NFR BLD AUTO: 63 % (ref 43–75)
NEUTS SEG NFR BLD AUTO: 64 % (ref 43–75)
NEUTS SEG NFR BLD AUTO: 68 % (ref 43–75)
NEUTS SEG NFR BLD AUTO: 69 % (ref 43–75)
NEUTS SEG NFR BLD AUTO: 69 % (ref 43–75)
NEUTS SEG NFR BLD AUTO: 71 % (ref 43–75)
NEUTS SEG NFR BLD AUTO: 73 % (ref 43–75)
NEUTS SEG NFR BLD AUTO: 75 % (ref 43–75)
NEUTS SEG NFR BLD AUTO: 76 % (ref 43–75)
NEUTS SEG NFR BLD AUTO: 77 % (ref 43–75)
NEUTS SEG NFR BLD AUTO: 79 % (ref 43–75)
NEUTS SEG NFR BLD AUTO: 80 % (ref 43–75)
NEUTS SEG NFR BLD AUTO: 80 % (ref 43–75)
NEUTS SEG NFR BLD AUTO: 85 % (ref 43–75)
NEUTS SEG NFR BLD AUTO: 85 % (ref 43–75)
NEUTS SEG NFR BLD AUTO: 90 % (ref 43–75)
NEUTS SEG NFR BLD AUTO: 92 % (ref 43–75)
NEUTS SEG NFR BLD AUTO: 93 % (ref 43–75)
NEUTS SEG NFR BLD AUTO: 95 % (ref 43–75)
NEUTS SEG NFR BLD AUTO: 95 % (ref 43–75)
NEUTS SEG NFR BLD AUTO: 97 % (ref 43–75)
NITRITE UR QL STRIP: NEGATIVE
NON-SQ EPI CELLS URNS QL MICRO: ABNORMAL /HPF
NON-SQ EPI CELLS URNS QL MICRO: ABNORMAL /HPF
NON-SQ EPI CELLS URNS QL MICRO: NORMAL /HPF
NRBC BLD AUTO-RTO: 0 /100 WBCS
NT-PROBNP SERPL-MCNC: 126 PG/ML
OSMOLALITY UR/SERPL-RTO: 276 MMOL/KG (ref 282–298)
OSMOLALITY UR: 285 MMOL/KG
OSMOLALITY UR: 496 MMOL/KG
OVALOCYTES BLD QL SMEAR: PRESENT
P AXIS: 56 DEGREES
P AXIS: 60 DEGREES
P AXIS: 66 DEGREES
P AXIS: 66 DEGREES
P AXIS: 69 DEGREES
P AXIS: 70 DEGREES
P AXIS: 71 DEGREES
P AXIS: 74 DEGREES
PCO2 BLD: 21 MMOL/L (ref 21–32)
PCO2 BLD: 21 MMOL/L (ref 21–32)
PCO2 BLD: 22 MMOL/L (ref 21–32)
PCO2 BLD: 29 MM HG (ref 42–50)
PCO2 BLD: 33.1 MM HG (ref 42–50)
PCO2 BLD: 34 MM HG (ref 42–50)
PH BLD: 7.39 [PH] (ref 7.3–7.4)
PH BLD: 7.4 [PH] (ref 7.3–7.4)
PH BLD: 7.47 [PH] (ref 7.3–7.4)
PH UR STRIP.AUTO: 5.5 [PH]
PH UR STRIP.AUTO: 6 [PH]
PH UR STRIP.AUTO: 7 [PH]
PH UR STRIP.AUTO: 7 [PH]
PHOSPHATE SERPL-MCNC: 3.7 MG/DL (ref 2.3–4.1)
PHOSPHATE SERPL-MCNC: 3.8 MG/DL (ref 2.3–4.1)
PHOSPHATE SERPL-MCNC: 3.9 MG/DL (ref 2.3–4.1)
PLASMA CELLS NFR BLD: 1 % (ref 0–0)
PLATELET # BLD AUTO: 124 THOUSANDS/UL (ref 149–390)
PLATELET # BLD AUTO: 132 THOUSANDS/UL (ref 149–390)
PLATELET # BLD AUTO: 141 THOUSANDS/UL (ref 149–390)
PLATELET # BLD AUTO: 149 THOUSANDS/UL (ref 149–390)
PLATELET # BLD AUTO: 150 THOUSANDS/UL (ref 149–390)
PLATELET # BLD AUTO: 163 THOUSANDS/UL (ref 149–390)
PLATELET # BLD AUTO: 175 THOUSANDS/UL (ref 149–390)
PLATELET # BLD AUTO: 176 THOUSANDS/UL (ref 149–390)
PLATELET # BLD AUTO: 178 THOUSANDS/UL (ref 149–390)
PLATELET # BLD AUTO: 178 THOUSANDS/UL (ref 149–390)
PLATELET # BLD AUTO: 189 THOUSANDS/UL (ref 149–390)
PLATELET # BLD AUTO: 192 THOUSANDS/UL (ref 149–390)
PLATELET # BLD AUTO: 194 THOUSANDS/UL (ref 149–390)
PLATELET # BLD AUTO: 197 THOUSANDS/UL (ref 149–390)
PLATELET # BLD AUTO: 197 THOUSANDS/UL (ref 149–390)
PLATELET # BLD AUTO: 216 THOUSANDS/UL (ref 149–390)
PLATELET # BLD AUTO: 223 THOUSANDS/UL (ref 149–390)
PLATELET # BLD AUTO: 228 THOUSANDS/UL (ref 149–390)
PLATELET # BLD AUTO: 240 THOUSANDS/UL (ref 149–390)
PLATELET # BLD AUTO: 256 THOUSANDS/UL (ref 149–390)
PLATELET # BLD AUTO: 257 THOUSANDS/UL (ref 149–390)
PLATELET # BLD AUTO: 277 THOUSANDS/UL (ref 149–390)
PLATELET # BLD AUTO: 281 THOUSANDS/UL (ref 149–390)
PLATELET # BLD AUTO: 288 THOUSANDS/UL (ref 149–390)
PLATELET # BLD AUTO: 290 THOUSANDS/UL (ref 149–390)
PLATELET # BLD AUTO: 291 THOUSANDS/UL (ref 149–390)
PLATELET # BLD AUTO: 292 THOUSANDS/UL (ref 149–390)
PLATELET # BLD AUTO: 293 THOUSANDS/UL (ref 149–390)
PLATELET # BLD AUTO: 294 THOUSANDS/UL (ref 149–390)
PLATELET # BLD AUTO: 300 THOUSANDS/UL (ref 149–390)
PLATELET # BLD AUTO: 301 THOUSANDS/UL (ref 149–390)
PLATELET # BLD AUTO: 305 THOUSANDS/UL (ref 149–390)
PLATELET # BLD AUTO: 311 THOUSANDS/UL (ref 149–390)
PLATELET # BLD AUTO: 330 THOUSANDS/UL (ref 149–390)
PLATELET # BLD AUTO: 348 THOUSANDS/UL (ref 149–390)
PLATELET # BLD AUTO: 350 THOUSANDS/UL (ref 149–390)
PLATELET # BLD AUTO: 376 THOUSANDS/UL (ref 149–390)
PLATELET # BLD AUTO: 387 THOUSANDS/UL (ref 149–390)
PLATELET # BLD AUTO: 390 THOUSANDS/UL (ref 149–390)
PLATELET # BLD AUTO: 421 THOUSANDS/UL (ref 149–390)
PLATELET # BLD AUTO: 425 THOUSANDS/UL (ref 149–390)
PLATELET # BLD AUTO: 498 THOUSANDS/UL (ref 149–390)
PLATELET # BLD AUTO: 501 THOUSANDS/UL (ref 149–390)
PLATELET # BLD AUTO: 502 THOUSANDS/UL (ref 149–390)
PLATELET # BLD AUTO: 772 THOUSANDS/UL (ref 149–390)
PLATELET BLD QL SMEAR: ABNORMAL
PLATELET BLD QL SMEAR: ABNORMAL
PLATELET BLD QL SMEAR: ADEQUATE
PMV BLD AUTO: 10 FL (ref 8.9–12.7)
PMV BLD AUTO: 10 FL (ref 8.9–12.7)
PMV BLD AUTO: 10.1 FL (ref 8.9–12.7)
PMV BLD AUTO: 10.1 FL (ref 8.9–12.7)
PMV BLD AUTO: 10.3 FL (ref 8.9–12.7)
PMV BLD AUTO: 10.4 FL (ref 8.9–12.7)
PMV BLD AUTO: 10.6 FL (ref 8.9–12.7)
PMV BLD AUTO: 10.6 FL (ref 8.9–12.7)
PMV BLD AUTO: 10.8 FL (ref 8.9–12.7)
PMV BLD AUTO: 10.9 FL (ref 8.9–12.7)
PMV BLD AUTO: 8.6 FL (ref 8.9–12.7)
PMV BLD AUTO: 8.7 FL (ref 8.9–12.7)
PMV BLD AUTO: 8.7 FL (ref 8.9–12.7)
PMV BLD AUTO: 8.9 FL (ref 8.9–12.7)
PMV BLD AUTO: 8.9 FL (ref 8.9–12.7)
PMV BLD AUTO: 9.1 FL (ref 8.9–12.7)
PMV BLD AUTO: 9.1 FL (ref 8.9–12.7)
PMV BLD AUTO: 9.2 FL (ref 8.9–12.7)
PMV BLD AUTO: 9.3 FL (ref 8.9–12.7)
PMV BLD AUTO: 9.4 FL (ref 8.9–12.7)
PMV BLD AUTO: 9.5 FL (ref 8.9–12.7)
PMV BLD AUTO: 9.6 FL (ref 8.9–12.7)
PMV BLD AUTO: 9.6 FL (ref 8.9–12.7)
PMV BLD AUTO: 9.7 FL (ref 8.9–12.7)
PMV BLD AUTO: 9.8 FL (ref 8.9–12.7)
PMV BLD AUTO: 9.9 FL (ref 8.9–12.7)
PMV BLD AUTO: 9.9 FL (ref 8.9–12.7)
PO2 BLD: 21 MM HG (ref 35–45)
PO2 BLD: 22 MM HG (ref 35–45)
PO2 BLD: 52 MM HG (ref 35–45)
POIKILOCYTOSIS BLD QL SMEAR: PRESENT
POLYCHROMASIA BLD QL SMEAR: PRESENT
POLYCHROMASIA BLD QL SMEAR: PRESENT
POTASSIUM BLD-SCNC: 4.2 MMOL/L (ref 3.5–5.3)
POTASSIUM BLD-SCNC: 4.7 MMOL/L (ref 3.5–5.3)
POTASSIUM BLD-SCNC: 5.7 MMOL/L (ref 3.5–5.3)
POTASSIUM SERPL-SCNC: 3.6 MMOL/L (ref 3.5–5.3)
POTASSIUM SERPL-SCNC: 4 MMOL/L (ref 3.5–5.3)
POTASSIUM SERPL-SCNC: 4.1 MMOL/L (ref 3.5–5.3)
POTASSIUM SERPL-SCNC: 4.2 MMOL/L (ref 3.5–5.3)
POTASSIUM SERPL-SCNC: 4.3 MMOL/L (ref 3.5–5.3)
POTASSIUM SERPL-SCNC: 4.4 MMOL/L (ref 3.5–5.3)
POTASSIUM SERPL-SCNC: 4.5 MMOL/L (ref 3.5–5.3)
POTASSIUM SERPL-SCNC: 4.5 MMOL/L (ref 3.5–5.3)
POTASSIUM SERPL-SCNC: 4.6 MMOL/L (ref 3.5–5.3)
POTASSIUM SERPL-SCNC: 4.7 MMOL/L (ref 3.5–5.3)
POTASSIUM SERPL-SCNC: 4.8 MMOL/L (ref 3.5–5.3)
POTASSIUM SERPL-SCNC: 4.9 MMOL/L (ref 3.5–5.3)
POTASSIUM SERPL-SCNC: 5.1 MMOL/L (ref 3.5–5.3)
POTASSIUM SERPL-SCNC: 5.2 MMOL/L (ref 3.5–5.3)
POTASSIUM SERPL-SCNC: 5.3 MMOL/L (ref 3.5–5.3)
POTASSIUM SERPL-SCNC: 5.4 MMOL/L (ref 3.5–5.3)
POTASSIUM SERPL-SCNC: 5.6 MMOL/L (ref 3.5–5.3)
POTASSIUM SERPL-SCNC: 5.7 MMOL/L (ref 3.5–5.3)
POTASSIUM SERPL-SCNC: 5.8 MMOL/L (ref 3.5–5.3)
POTASSIUM SERPL-SCNC: 5.9 MMOL/L (ref 3.5–5.3)
PR INTERVAL: 144 MS
PR INTERVAL: 148 MS
PR INTERVAL: 150 MS
PR INTERVAL: 158 MS
PR INTERVAL: 160 MS
PR INTERVAL: 160 MS
PR INTERVAL: 168 MS
PR INTERVAL: 178 MS
PROCALCITONIN SERPL-MCNC: 0.05 NG/ML
PROCALCITONIN SERPL-MCNC: 0.07 NG/ML
PROCALCITONIN SERPL-MCNC: 0.1 NG/ML
PROCALCITONIN SERPL-MCNC: 0.12 NG/ML
PROCALCITONIN SERPL-MCNC: 0.27 NG/ML
PROCALCITONIN SERPL-MCNC: 0.9 NG/ML
PROCALCITONIN SERPL-MCNC: 1.21 NG/ML
PROCALCITONIN SERPL-MCNC: 1.88 NG/ML
PROCALCITONIN SERPL-MCNC: <0.05 NG/ML
PROCALCITONIN SERPL-MCNC: <0.05 NG/ML
PROT SERPL-MCNC: 4.8 G/DL (ref 6.4–8.2)
PROT SERPL-MCNC: 4.8 G/DL (ref 6.4–8.2)
PROT SERPL-MCNC: 5 G/DL (ref 6.4–8.2)
PROT SERPL-MCNC: 5.1 G/DL (ref 6.4–8.2)
PROT SERPL-MCNC: 5.3 G/DL (ref 6.4–8.2)
PROT SERPL-MCNC: 5.3 G/DL (ref 6.4–8.2)
PROT SERPL-MCNC: 5.5 G/DL (ref 6.4–8.2)
PROT SERPL-MCNC: 5.7 G/DL (ref 6.4–8.2)
PROT SERPL-MCNC: 5.8 G/DL (ref 6.4–8.2)
PROT SERPL-MCNC: 6 G/DL (ref 6.4–8.2)
PROT SERPL-MCNC: 6 G/DL (ref 6.4–8.2)
PROT SERPL-MCNC: 6.1 G/DL (ref 6.4–8.2)
PROT SERPL-MCNC: 6.2 G/DL (ref 6.4–8.2)
PROT SERPL-MCNC: 6.2 G/DL (ref 6.4–8.2)
PROT SERPL-MCNC: 6.3 G/DL (ref 6.4–8.2)
PROT SERPL-MCNC: 6.4 G/DL (ref 6.4–8.2)
PROT SERPL-MCNC: 6.4 G/DL (ref 6.4–8.2)
PROT SERPL-MCNC: 6.5 G/DL (ref 6.4–8.2)
PROT SERPL-MCNC: 6.6 G/DL (ref 6.4–8.2)
PROT SERPL-MCNC: 6.6 G/DL (ref 6.4–8.2)
PROT SERPL-MCNC: 6.7 G/DL (ref 6.4–8.2)
PROT SERPL-MCNC: 6.7 G/DL (ref 6.4–8.2)
PROT SERPL-MCNC: 6.9 G/DL (ref 6.4–8.2)
PROT SERPL-MCNC: 6.9 G/DL (ref 6.4–8.2)
PROT SERPL-MCNC: 7 G/DL (ref 6.4–8.2)
PROT UR STRIP-MCNC: NEGATIVE MG/DL
PROT UR-MCNC: 15 MG/DL
PROT/CREAT UR: 0.19 MG/G{CREAT} (ref 0–0.1)
PROTHROMBIN TIME: 15 SECONDS (ref 11.6–14.5)
PROTHROMBIN TIME: 15.7 SECONDS (ref 11.6–14.5)
PROTHROMBIN TIME: 18.4 SECONDS (ref 11.6–14.5)
QRS AXIS: -14 DEGREES
QRS AXIS: -3 DEGREES
QRS AXIS: -4 DEGREES
QRS AXIS: -6 DEGREES
QRS AXIS: -8 DEGREES
QRS AXIS: 1 DEGREES
QRS AXIS: 2 DEGREES
QRS AXIS: 9 DEGREES
QRSD INTERVAL: 80 MS
QRSD INTERVAL: 84 MS
QRSD INTERVAL: 86 MS
QRSD INTERVAL: 88 MS
QRSD INTERVAL: 88 MS
QT INTERVAL: 202 MS
QT INTERVAL: 316 MS
QT INTERVAL: 326 MS
QT INTERVAL: 330 MS
QT INTERVAL: 334 MS
QT INTERVAL: 336 MS
QT INTERVAL: 352 MS
QT INTERVAL: 362 MS
QTC INTERVAL: 322 MS
QTC INTERVAL: 401 MS
QTC INTERVAL: 403 MS
QTC INTERVAL: 410 MS
QTC INTERVAL: 413 MS
QTC INTERVAL: 425 MS
QTC INTERVAL: 430 MS
QTC INTERVAL: 449 MS
RBC # BLD AUTO: 1.87 MILLION/UL (ref 3.88–5.62)
RBC # BLD AUTO: 2.11 MILLION/UL (ref 3.88–5.62)
RBC # BLD AUTO: 2.16 MILLION/UL (ref 3.88–5.62)
RBC # BLD AUTO: 2.18 MILLION/UL (ref 3.88–5.62)
RBC # BLD AUTO: 2.21 MILLION/UL (ref 3.88–5.62)
RBC # BLD AUTO: 2.27 MILLION/UL (ref 3.88–5.62)
RBC # BLD AUTO: 2.29 MILLION/UL (ref 3.88–5.62)
RBC # BLD AUTO: 2.34 MILLION/UL (ref 3.88–5.62)
RBC # BLD AUTO: 2.37 MILLION/UL (ref 3.88–5.62)
RBC # BLD AUTO: 2.43 MILLION/UL (ref 3.88–5.62)
RBC # BLD AUTO: 2.44 MILLION/UL (ref 3.88–5.62)
RBC # BLD AUTO: 2.58 MILLION/UL (ref 3.88–5.62)
RBC # BLD AUTO: 2.63 MILLION/UL (ref 3.88–5.62)
RBC # BLD AUTO: 2.65 MILLION/UL (ref 3.88–5.62)
RBC # BLD AUTO: 2.66 MILLION/UL (ref 3.88–5.62)
RBC # BLD AUTO: 2.7 MILLION/UL (ref 3.88–5.62)
RBC # BLD AUTO: 2.73 MILLION/UL (ref 3.88–5.62)
RBC # BLD AUTO: 2.74 MILLION/UL (ref 3.88–5.62)
RBC # BLD AUTO: 2.75 MILLION/UL (ref 3.88–5.62)
RBC # BLD AUTO: 2.76 MILLION/UL (ref 3.88–5.62)
RBC # BLD AUTO: 2.76 MILLION/UL (ref 3.88–5.62)
RBC # BLD AUTO: 2.79 MILLION/UL (ref 3.88–5.62)
RBC # BLD AUTO: 2.8 MILLION/UL (ref 3.88–5.62)
RBC # BLD AUTO: 2.81 MILLION/UL (ref 3.88–5.62)
RBC # BLD AUTO: 2.82 MILLION/UL (ref 3.88–5.62)
RBC # BLD AUTO: 2.84 MILLION/UL (ref 3.88–5.62)
RBC # BLD AUTO: 2.91 MILLION/UL (ref 3.88–5.62)
RBC # BLD AUTO: 2.92 MILLION/UL (ref 3.88–5.62)
RBC # BLD AUTO: 2.94 MILLION/UL (ref 3.88–5.62)
RBC # BLD AUTO: 2.95 MILLION/UL (ref 3.88–5.62)
RBC # BLD AUTO: 2.97 MILLION/UL (ref 3.88–5.62)
RBC # BLD AUTO: 3.03 MILLION/UL (ref 3.88–5.62)
RBC # BLD AUTO: 3.04 MILLION/UL (ref 3.88–5.62)
RBC # BLD AUTO: 3.08 MILLION/UL (ref 3.88–5.62)
RBC # BLD AUTO: 3.09 MILLION/UL (ref 3.88–5.62)
RBC # BLD AUTO: 3.1 MILLION/UL (ref 3.88–5.62)
RBC # BLD AUTO: 3.15 MILLION/UL (ref 3.88–5.62)
RBC # BLD AUTO: 3.2 MILLION/UL (ref 3.88–5.62)
RBC # BLD AUTO: 3.21 MILLION/UL (ref 3.88–5.62)
RBC # BLD AUTO: 3.32 MILLION/UL (ref 3.88–5.62)
RBC # BLD AUTO: 3.4 MILLION/UL (ref 3.88–5.62)
RBC # BLD AUTO: 3.4 MILLION/UL (ref 3.88–5.62)
RBC # BLD AUTO: 3.44 MILLION/UL (ref 3.88–5.62)
RBC #/AREA URNS AUTO: ABNORMAL /HPF
RBC #/AREA URNS AUTO: ABNORMAL /HPF
RBC #/AREA URNS AUTO: NORMAL /HPF
RBC MORPH BLD: PRESENT
RH BLD: POSITIVE
RSV RNA RESP QL NAA+PROBE: NEGATIVE
SAO2 % BLD FROM PO2: 34 % (ref 60–85)
SAO2 % BLD FROM PO2: 38 % (ref 60–85)
SAO2 % BLD FROM PO2: 89 % (ref 60–85)
SARS-COV-2 RNA RESP QL NAA+PROBE: NEGATIVE
SODIUM 24H UR-SCNC: 57 MOL/L
SODIUM 24H UR-SCNC: 96 MOL/L
SODIUM BLD-SCNC: 124 MMOL/L (ref 136–145)
SODIUM BLD-SCNC: 129 MMOL/L (ref 136–145)
SODIUM BLD-SCNC: 133 MMOL/L (ref 136–145)
SODIUM SERPL-SCNC: 120 MMOL/L (ref 136–145)
SODIUM SERPL-SCNC: 120 MMOL/L (ref 136–145)
SODIUM SERPL-SCNC: 122 MMOL/L (ref 136–145)
SODIUM SERPL-SCNC: 124 MMOL/L (ref 136–145)
SODIUM SERPL-SCNC: 125 MMOL/L (ref 136–145)
SODIUM SERPL-SCNC: 126 MMOL/L (ref 136–145)
SODIUM SERPL-SCNC: 127 MMOL/L (ref 136–145)
SODIUM SERPL-SCNC: 127 MMOL/L (ref 136–145)
SODIUM SERPL-SCNC: 128 MMOL/L (ref 136–145)
SODIUM SERPL-SCNC: 129 MMOL/L (ref 136–145)
SODIUM SERPL-SCNC: 130 MMOL/L (ref 136–145)
SODIUM SERPL-SCNC: 131 MMOL/L (ref 136–145)
SODIUM SERPL-SCNC: 132 MMOL/L (ref 136–145)
SODIUM SERPL-SCNC: 133 MMOL/L (ref 136–145)
SODIUM SERPL-SCNC: 134 MMOL/L (ref 136–145)
SODIUM SERPL-SCNC: 134 MMOL/L (ref 136–145)
SP GR UR STRIP.AUTO: 1.01 (ref 1–1.03)
SP GR UR STRIP.AUTO: 1.02 (ref 1–1.03)
SP GR UR STRIP.AUTO: 1.03 (ref 1–1.03)
SPECIMEN EXPIRATION DATE: NORMAL
SPECIMEN EXPIRATION DATE: NORMAL
SPECIMEN SOURCE: ABNORMAL
T WAVE AXIS: 36 DEGREES
T WAVE AXIS: 42 DEGREES
T WAVE AXIS: 44 DEGREES
T WAVE AXIS: 56 DEGREES
T WAVE AXIS: 62 DEGREES
T WAVE AXIS: 64 DEGREES
T WAVE AXIS: 66 DEGREES
T WAVE AXIS: 70 DEGREES
T4 FREE SERPL-MCNC: 1.02 NG/DL (ref 0.76–1.46)
TIBC SERPL-MCNC: 155 UG/DL (ref 250–450)
TOTAL CELLS COUNTED SPEC: 100
TROPONIN I SERPL-MCNC: <0.02 NG/ML
TSH SERPL DL<=0.05 MIU/L-ACNC: 4.7 UIU/ML (ref 0.36–3.74)
UNIT DISPENSE STATUS: NORMAL
UNIT DISPENSE STATUS: NORMAL
UNIT PRODUCT CODE: NORMAL
UNIT PRODUCT CODE: NORMAL
UNIT RH: NORMAL
UNIT RH: NORMAL
URATE SERPL-MCNC: 1.6 MG/DL (ref 4.2–8)
URATE UR-MCNC: 12.5 MG/DL
UROBILINOGEN UR QL STRIP.AUTO: 0.2 E.U./DL
UROBILINOGEN UR QL STRIP.AUTO: 1 E.U./DL
UROBILINOGEN UR QL STRIP.AUTO: 1 E.U./DL
VANCOMYCIN TROUGH SERPL-MCNC: 15.7 UG/ML (ref 10–20)
VANCOMYCIN TROUGH SERPL-MCNC: 21.1 UG/ML (ref 10–20)
VANCOMYCIN TROUGH SERPL-MCNC: 8.6 UG/ML (ref 10–20)
VARIANT LYMPHS # BLD AUTO: 1 %
VARIANT LYMPHS # BLD AUTO: 1 %
VENTRICULAR RATE: 101 BPM
VENTRICULAR RATE: 105 BPM
VENTRICULAR RATE: 109 BPM
VENTRICULAR RATE: 153 BPM
VENTRICULAR RATE: 79 BPM
VENTRICULAR RATE: 83 BPM
VENTRICULAR RATE: 89 BPM
VENTRICULAR RATE: 91 BPM
WBC # BLD AUTO: 10.49 THOUSAND/UL (ref 4.31–10.16)
WBC # BLD AUTO: 10.49 THOUSAND/UL (ref 4.31–10.16)
WBC # BLD AUTO: 10.64 THOUSAND/UL (ref 4.31–10.16)
WBC # BLD AUTO: 10.77 THOUSAND/UL (ref 4.31–10.16)
WBC # BLD AUTO: 10.96 THOUSAND/UL (ref 4.31–10.16)
WBC # BLD AUTO: 11.27 THOUSAND/UL (ref 4.31–10.16)
WBC # BLD AUTO: 11.41 THOUSAND/UL (ref 4.31–10.16)
WBC # BLD AUTO: 11.86 THOUSAND/UL (ref 4.31–10.16)
WBC # BLD AUTO: 12.03 THOUSAND/UL (ref 4.31–10.16)
WBC # BLD AUTO: 12.34 THOUSAND/UL (ref 4.31–10.16)
WBC # BLD AUTO: 12.39 THOUSAND/UL (ref 4.31–10.16)
WBC # BLD AUTO: 12.46 THOUSAND/UL (ref 4.31–10.16)
WBC # BLD AUTO: 12.76 THOUSAND/UL (ref 4.31–10.16)
WBC # BLD AUTO: 13.19 THOUSAND/UL (ref 4.31–10.16)
WBC # BLD AUTO: 13.51 THOUSAND/UL (ref 4.31–10.16)
WBC # BLD AUTO: 13.76 THOUSAND/UL (ref 4.31–10.16)
WBC # BLD AUTO: 14.08 THOUSAND/UL (ref 4.31–10.16)
WBC # BLD AUTO: 14.11 THOUSAND/UL (ref 4.31–10.16)
WBC # BLD AUTO: 15.57 THOUSAND/UL (ref 4.31–10.16)
WBC # BLD AUTO: 16.12 THOUSAND/UL (ref 4.31–10.16)
WBC # BLD AUTO: 17.74 THOUSAND/UL (ref 4.31–10.16)
WBC # BLD AUTO: 18.84 THOUSAND/UL (ref 4.31–10.16)
WBC # BLD AUTO: 3.74 THOUSAND/UL (ref 4.31–10.16)
WBC # BLD AUTO: 4.54 THOUSAND/UL (ref 4.31–10.16)
WBC # BLD AUTO: 4.66 THOUSAND/UL (ref 4.31–10.16)
WBC # BLD AUTO: 4.89 THOUSAND/UL (ref 4.31–10.16)
WBC # BLD AUTO: 5.18 THOUSAND/UL (ref 4.31–10.16)
WBC # BLD AUTO: 5.37 THOUSAND/UL (ref 4.31–10.16)
WBC # BLD AUTO: 5.41 THOUSAND/UL (ref 4.31–10.16)
WBC # BLD AUTO: 5.53 THOUSAND/UL (ref 4.31–10.16)
WBC # BLD AUTO: 5.95 THOUSAND/UL (ref 4.31–10.16)
WBC # BLD AUTO: 6.07 THOUSAND/UL (ref 4.31–10.16)
WBC # BLD AUTO: 6.49 THOUSAND/UL (ref 4.31–10.16)
WBC # BLD AUTO: 6.64 THOUSAND/UL (ref 4.31–10.16)
WBC # BLD AUTO: 6.97 THOUSAND/UL (ref 4.31–10.16)
WBC # BLD AUTO: 7.43 THOUSAND/UL (ref 4.31–10.16)
WBC # BLD AUTO: 7.55 THOUSAND/UL (ref 4.31–10.16)
WBC # BLD AUTO: 7.69 THOUSAND/UL (ref 4.31–10.16)
WBC # BLD AUTO: 7.89 THOUSAND/UL (ref 4.31–10.16)
WBC # BLD AUTO: 8.45 THOUSAND/UL (ref 4.31–10.16)
WBC # BLD AUTO: 8.47 THOUSAND/UL (ref 4.31–10.16)
WBC # BLD AUTO: 8.48 THOUSAND/UL (ref 4.31–10.16)
WBC # BLD AUTO: 8.68 THOUSAND/UL (ref 4.31–10.16)
WBC # BLD AUTO: 9.26 THOUSAND/UL (ref 4.31–10.16)
WBC # BLD AUTO: 9.44 THOUSAND/UL (ref 4.31–10.16)
WBC #/AREA URNS AUTO: ABNORMAL /HPF
WBC #/AREA URNS AUTO: ABNORMAL /HPF
WBC #/AREA URNS AUTO: NORMAL /HPF

## 2021-01-01 PROCEDURE — 85025 COMPLETE CBC W/AUTO DIFF WBC: CPT | Performed by: EMERGENCY MEDICINE

## 2021-01-01 PROCEDURE — G1004 CDSM NDSC: HCPCS

## 2021-01-01 PROCEDURE — 99232 SBSQ HOSP IP/OBS MODERATE 35: CPT | Performed by: INTERNAL MEDICINE

## 2021-01-01 PROCEDURE — 80053 COMPREHEN METABOLIC PANEL: CPT

## 2021-01-01 PROCEDURE — 82947 ASSAY GLUCOSE BLOOD QUANT: CPT

## 2021-01-01 PROCEDURE — 85027 COMPLETE CBC AUTOMATED: CPT | Performed by: INTERNAL MEDICINE

## 2021-01-01 PROCEDURE — 96374 THER/PROPH/DIAG INJ IV PUSH: CPT

## 2021-01-01 PROCEDURE — 36415 COLL VENOUS BLD VENIPUNCTURE: CPT | Performed by: EMERGENCY MEDICINE

## 2021-01-01 PROCEDURE — 82948 REAGENT STRIP/BLOOD GLUCOSE: CPT

## 2021-01-01 PROCEDURE — 84145 PROCALCITONIN (PCT): CPT | Performed by: EMERGENCY MEDICINE

## 2021-01-01 PROCEDURE — 36415 COLL VENOUS BLD VENIPUNCTURE: CPT

## 2021-01-01 PROCEDURE — 83605 ASSAY OF LACTIC ACID: CPT | Performed by: NURSE PRACTITIONER

## 2021-01-01 PROCEDURE — 87040 BLOOD CULTURE FOR BACTERIA: CPT

## 2021-01-01 PROCEDURE — 96413 CHEMO IV INFUSION 1 HR: CPT

## 2021-01-01 PROCEDURE — 85025 COMPLETE CBC W/AUTO DIFF WBC: CPT

## 2021-01-01 PROCEDURE — 71260 CT THORAX DX C+: CPT

## 2021-01-01 PROCEDURE — 86301 IMMUNOASSAY TUMOR CA 19-9: CPT

## 2021-01-01 PROCEDURE — 77001 FLUOROGUIDE FOR VEIN DEVICE: CPT | Performed by: STUDENT IN AN ORGANIZED HEALTH CARE EDUCATION/TRAINING PROGRAM

## 2021-01-01 PROCEDURE — 80053 COMPREHEN METABOLIC PANEL: CPT | Performed by: INTERNAL MEDICINE

## 2021-01-01 PROCEDURE — 99205 OFFICE O/P NEW HI 60 MIN: CPT | Performed by: STUDENT IN AN ORGANIZED HEALTH CARE EDUCATION/TRAINING PROGRAM

## 2021-01-01 PROCEDURE — 77301 RADIOTHERAPY DOSE PLAN IMRT: CPT | Performed by: RADIOLOGY

## 2021-01-01 PROCEDURE — 96417 CHEMO IV INFUS EACH ADDL SEQ: CPT

## 2021-01-01 PROCEDURE — 30233N1 TRANSFUSION OF NONAUTOLOGOUS RED BLOOD CELLS INTO PERIPHERAL VEIN, PERCUTANEOUS APPROACH: ICD-10-PCS | Performed by: INTERNAL MEDICINE

## 2021-01-01 PROCEDURE — 84100 ASSAY OF PHOSPHORUS: CPT | Performed by: EMERGENCY MEDICINE

## 2021-01-01 PROCEDURE — 99285 EMERGENCY DEPT VISIT HI MDM: CPT

## 2021-01-01 PROCEDURE — 36415 COLL VENOUS BLD VENIPUNCTURE: CPT | Performed by: INTERNAL MEDICINE

## 2021-01-01 PROCEDURE — 97530 THERAPEUTIC ACTIVITIES: CPT

## 2021-01-01 PROCEDURE — 82728 ASSAY OF FERRITIN: CPT | Performed by: INTERNAL MEDICINE

## 2021-01-01 PROCEDURE — 99239 HOSP IP/OBS DSCHRG MGMT >30: CPT | Performed by: INTERNAL MEDICINE

## 2021-01-01 PROCEDURE — 77386 HB NTSTY MODUL RAD TX DLVR CPLX: CPT | Performed by: INTERNAL MEDICINE

## 2021-01-01 PROCEDURE — 85025 COMPLETE CBC W/AUTO DIFF WBC: CPT | Performed by: PHYSICIAN ASSISTANT

## 2021-01-01 PROCEDURE — U0003 INFECTIOUS AGENT DETECTION BY NUCLEIC ACID (DNA OR RNA); SEVERE ACUTE RESPIRATORY SYNDROME CORONAVIRUS 2 (SARS-COV-2) (CORONAVIRUS DISEASE [COVID-19]), AMPLIFIED PROBE TECHNIQUE, MAKING USE OF HIGH THROUGHPUT TECHNOLOGIES AS DESCRIBED BY CMS-2020-01-R: HCPCS | Performed by: INTERNAL MEDICINE

## 2021-01-01 PROCEDURE — 77263 THER RADIOLOGY TX PLNG CPLX: CPT | Performed by: RADIOLOGY

## 2021-01-01 PROCEDURE — 93010 ELECTROCARDIOGRAM REPORT: CPT | Performed by: INTERNAL MEDICINE

## 2021-01-01 PROCEDURE — 85025 COMPLETE CBC W/AUTO DIFF WBC: CPT | Performed by: INTERNAL MEDICINE

## 2021-01-01 PROCEDURE — U0003 INFECTIOUS AGENT DETECTION BY NUCLEIC ACID (DNA OR RNA); SEVERE ACUTE RESPIRATORY SYNDROME CORONAVIRUS 2 (SARS-COV-2) (CORONAVIRUS DISEASE [COVID-19]), AMPLIFIED PROBE TECHNIQUE, MAKING USE OF HIGH THROUGHPUT TECHNOLOGIES AS DESCRIBED BY CMS-2020-01-R: HCPCS | Performed by: PHYSICIAN ASSISTANT

## 2021-01-01 PROCEDURE — 96360 HYDRATION IV INFUSION INIT: CPT

## 2021-01-01 PROCEDURE — C1788 PORT, INDWELLING, IMP: HCPCS | Performed by: STUDENT IN AN ORGANIZED HEALTH CARE EDUCATION/TRAINING PROGRAM

## 2021-01-01 PROCEDURE — 88305 TISSUE EXAM BY PATHOLOGIST: CPT | Performed by: PATHOLOGY

## 2021-01-01 PROCEDURE — 70450 CT HEAD/BRAIN W/O DYE: CPT

## 2021-01-01 PROCEDURE — 83605 ASSAY OF LACTIC ACID: CPT | Performed by: EMERGENCY MEDICINE

## 2021-01-01 PROCEDURE — 86901 BLOOD TYPING SEROLOGIC RH(D): CPT | Performed by: INTERNAL MEDICINE

## 2021-01-01 PROCEDURE — 84439 ASSAY OF FREE THYROXINE: CPT | Performed by: INTERNAL MEDICINE

## 2021-01-01 PROCEDURE — 82010 KETONE BODYS QUAN: CPT | Performed by: EMERGENCY MEDICINE

## 2021-01-01 PROCEDURE — 84484 ASSAY OF TROPONIN QUANT: CPT | Performed by: EMERGENCY MEDICINE

## 2021-01-01 PROCEDURE — 82570 ASSAY OF URINE CREATININE: CPT | Performed by: INTERNAL MEDICINE

## 2021-01-01 PROCEDURE — 96367 TX/PROPH/DG ADDL SEQ IV INF: CPT

## 2021-01-01 PROCEDURE — 80048 BASIC METABOLIC PNL TOTAL CA: CPT | Performed by: PHYSICIAN ASSISTANT

## 2021-01-01 PROCEDURE — 80053 COMPREHEN METABOLIC PANEL: CPT | Performed by: EMERGENCY MEDICINE

## 2021-01-01 PROCEDURE — 77014 CHG CT GUIDANCE PLACEMENT RAD THERAPY FIELDS: CPT | Performed by: INTERNAL MEDICINE

## 2021-01-01 PROCEDURE — 77014 CHG CT GUIDANCE PLACEMENT RAD THERAPY FIELDS: CPT | Performed by: RADIOLOGY

## 2021-01-01 PROCEDURE — 74177 CT ABD & PELVIS W/CONTRAST: CPT

## 2021-01-01 PROCEDURE — 97116 GAIT TRAINING THERAPY: CPT

## 2021-01-01 PROCEDURE — 71045 X-RAY EXAM CHEST 1 VIEW: CPT

## 2021-01-01 PROCEDURE — 82803 BLOOD GASES ANY COMBINATION: CPT

## 2021-01-01 PROCEDURE — 80048 BASIC METABOLIC PNL TOTAL CA: CPT | Performed by: INTERNAL MEDICINE

## 2021-01-01 PROCEDURE — 99285 EMERGENCY DEPT VISIT HI MDM: CPT | Performed by: EMERGENCY MEDICINE

## 2021-01-01 PROCEDURE — 99205 OFFICE O/P NEW HI 60 MIN: CPT | Performed by: INTERNAL MEDICINE

## 2021-01-01 PROCEDURE — 99232 SBSQ HOSP IP/OBS MODERATE 35: CPT | Performed by: PHYSICIAN ASSISTANT

## 2021-01-01 PROCEDURE — 85027 COMPLETE CBC AUTOMATED: CPT

## 2021-01-01 PROCEDURE — 93005 ELECTROCARDIOGRAM TRACING: CPT

## 2021-01-01 PROCEDURE — 97167 OT EVAL HIGH COMPLEX 60 MIN: CPT

## 2021-01-01 PROCEDURE — 84443 ASSAY THYROID STIM HORMONE: CPT | Performed by: INTERNAL MEDICINE

## 2021-01-01 PROCEDURE — 86301 IMMUNOASSAY TUMOR CA 19-9: CPT | Performed by: INTERNAL MEDICINE

## 2021-01-01 PROCEDURE — 99215 OFFICE O/P EST HI 40 MIN: CPT | Performed by: NURSE PRACTITIONER

## 2021-01-01 PROCEDURE — 85007 BL SMEAR W/DIFF WBC COUNT: CPT

## 2021-01-01 PROCEDURE — 84295 ASSAY OF SERUM SODIUM: CPT

## 2021-01-01 PROCEDURE — 90715 TDAP VACCINE 7 YRS/> IM: CPT | Performed by: EMERGENCY MEDICINE

## 2021-01-01 PROCEDURE — 90471 IMMUNIZATION ADMIN: CPT

## 2021-01-01 PROCEDURE — 85014 HEMATOCRIT: CPT

## 2021-01-01 PROCEDURE — 72125 CT NECK SPINE W/O DYE: CPT

## 2021-01-01 PROCEDURE — 73030 X-RAY EXAM OF SHOULDER: CPT

## 2021-01-01 PROCEDURE — 85610 PROTHROMBIN TIME: CPT | Performed by: EMERGENCY MEDICINE

## 2021-01-01 PROCEDURE — 81001 URINALYSIS AUTO W/SCOPE: CPT | Performed by: EMERGENCY MEDICINE

## 2021-01-01 PROCEDURE — 84560 ASSAY OF URINE/URIC ACID: CPT | Performed by: INTERNAL MEDICINE

## 2021-01-01 PROCEDURE — 77386 HB NTSTY MODUL RAD TX DLVR CPLX: CPT | Performed by: STUDENT IN AN ORGANIZED HEALTH CARE EDUCATION/TRAINING PROGRAM

## 2021-01-01 PROCEDURE — 85730 THROMBOPLASTIN TIME PARTIAL: CPT | Performed by: EMERGENCY MEDICINE

## 2021-01-01 PROCEDURE — 84145 PROCALCITONIN (PCT): CPT | Performed by: INTERNAL MEDICINE

## 2021-01-01 PROCEDURE — 99222 1ST HOSP IP/OBS MODERATE 55: CPT | Performed by: PHYSICIAN ASSISTANT

## 2021-01-01 PROCEDURE — 77300 RADIATION THERAPY DOSE PLAN: CPT | Performed by: RADIOLOGY

## 2021-01-01 PROCEDURE — 85027 COMPLETE CBC AUTOMATED: CPT | Performed by: EMERGENCY MEDICINE

## 2021-01-01 PROCEDURE — 81003 URINALYSIS AUTO W/O SCOPE: CPT | Performed by: EMERGENCY MEDICINE

## 2021-01-01 PROCEDURE — 99213 OFFICE O/P EST LOW 20 MIN: CPT | Performed by: INTERNAL MEDICINE

## 2021-01-01 PROCEDURE — 77338 DESIGN MLC DEVICE FOR IMRT: CPT | Performed by: RADIOLOGY

## 2021-01-01 PROCEDURE — 97163 PT EVAL HIGH COMPLEX 45 MIN: CPT

## 2021-01-01 PROCEDURE — 82330 ASSAY OF CALCIUM: CPT

## 2021-01-01 PROCEDURE — 99214 OFFICE O/P EST MOD 30 MIN: CPT | Performed by: INTERNAL MEDICINE

## 2021-01-01 PROCEDURE — P9016 RBC LEUKOCYTES REDUCED: HCPCS

## 2021-01-01 PROCEDURE — 82150 ASSAY OF AMYLASE: CPT | Performed by: INTERNAL MEDICINE

## 2021-01-01 PROCEDURE — 86900 BLOOD TYPING SEROLOGIC ABO: CPT | Performed by: INTERNAL MEDICINE

## 2021-01-01 PROCEDURE — 99291 CRITICAL CARE FIRST HOUR: CPT | Performed by: EMERGENCY MEDICINE

## 2021-01-01 PROCEDURE — 84550 ASSAY OF BLOOD/URIC ACID: CPT | Performed by: INTERNAL MEDICINE

## 2021-01-01 PROCEDURE — 86850 RBC ANTIBODY SCREEN: CPT | Performed by: INTERNAL MEDICINE

## 2021-01-01 PROCEDURE — 84100 ASSAY OF PHOSPHORUS: CPT | Performed by: INTERNAL MEDICINE

## 2021-01-01 PROCEDURE — 88172 CYTP DX EVAL FNA 1ST EA SITE: CPT | Performed by: PATHOLOGY

## 2021-01-01 PROCEDURE — 84300 ASSAY OF URINE SODIUM: CPT | Performed by: INTERNAL MEDICINE

## 2021-01-01 PROCEDURE — 85007 BL SMEAR W/DIFF WBC COUNT: CPT | Performed by: EMERGENCY MEDICINE

## 2021-01-01 PROCEDURE — 96377 APPLICATON ON-BODY INJECTOR: CPT

## 2021-01-01 PROCEDURE — 99211 OFF/OP EST MAY X REQ PHY/QHP: CPT | Performed by: RADIOLOGY

## 2021-01-01 PROCEDURE — NC001 PR NO CHARGE

## 2021-01-01 PROCEDURE — 99214 OFFICE O/P EST MOD 30 MIN: CPT | Performed by: PHYSICIAN ASSISTANT

## 2021-01-01 PROCEDURE — 80202 ASSAY OF VANCOMYCIN: CPT | Performed by: INTERNAL MEDICINE

## 2021-01-01 PROCEDURE — 87040 BLOOD CULTURE FOR BACTERIA: CPT | Performed by: EMERGENCY MEDICINE

## 2021-01-01 PROCEDURE — 85027 COMPLETE CBC AUTOMATED: CPT | Performed by: PHYSICIAN ASSISTANT

## 2021-01-01 PROCEDURE — 84156 ASSAY OF PROTEIN URINE: CPT

## 2021-01-01 PROCEDURE — 99213 OFFICE O/P EST LOW 20 MIN: CPT | Performed by: STUDENT IN AN ORGANIZED HEALTH CARE EDUCATION/TRAINING PROGRAM

## 2021-01-01 PROCEDURE — 99223 1ST HOSP IP/OBS HIGH 75: CPT | Performed by: NURSE PRACTITIONER

## 2021-01-01 PROCEDURE — 86923 COMPATIBILITY TEST ELECTRIC: CPT

## 2021-01-01 PROCEDURE — 83735 ASSAY OF MAGNESIUM: CPT | Performed by: EMERGENCY MEDICINE

## 2021-01-01 PROCEDURE — 77399 UNLISTED PX MED RADJ PHYSICS: CPT | Performed by: RADIOLOGY

## 2021-01-01 PROCEDURE — 87205 SMEAR GRAM STAIN: CPT | Performed by: INTERNAL MEDICINE

## 2021-01-01 PROCEDURE — 99284 EMERGENCY DEPT VISIT MOD MDM: CPT

## 2021-01-01 PROCEDURE — 93306 TTE W/DOPPLER COMPLETE: CPT | Performed by: INTERNAL MEDICINE

## 2021-01-01 PROCEDURE — 99223 1ST HOSP IP/OBS HIGH 75: CPT | Performed by: INTERNAL MEDICINE

## 2021-01-01 PROCEDURE — 95819 EEG AWAKE AND ASLEEP: CPT | Performed by: PSYCHIATRY & NEUROLOGY

## 2021-01-01 PROCEDURE — 83036 HEMOGLOBIN GLYCOSYLATED A1C: CPT | Performed by: PHYSICIAN ASSISTANT

## 2021-01-01 PROCEDURE — 99284 EMERGENCY DEPT VISIT MOD MDM: CPT | Performed by: EMERGENCY MEDICINE

## 2021-01-01 PROCEDURE — 83550 IRON BINDING TEST: CPT | Performed by: INTERNAL MEDICINE

## 2021-01-01 PROCEDURE — 83605 ASSAY OF LACTIC ACID: CPT | Performed by: PHYSICIAN ASSISTANT

## 2021-01-01 PROCEDURE — 43239 EGD BIOPSY SINGLE/MULTIPLE: CPT | Performed by: INTERNAL MEDICINE

## 2021-01-01 PROCEDURE — 36593 DECLOT VASCULAR DEVICE: CPT

## 2021-01-01 PROCEDURE — 36561 INSERT TUNNELED CV CATH: CPT | Performed by: PHYSICIAN ASSISTANT

## 2021-01-01 PROCEDURE — 88112 CYTOPATH CELL ENHANCE TECH: CPT | Performed by: PATHOLOGY

## 2021-01-01 PROCEDURE — U0003 INFECTIOUS AGENT DETECTION BY NUCLEIC ACID (DNA OR RNA); SEVERE ACUTE RESPIRATORY SYNDROME CORONAVIRUS 2 (SARS-COV-2) (CORONAVIRUS DISEASE [COVID-19]), AMPLIFIED PROBE TECHNIQUE, MAKING USE OF HIGH THROUGHPUT TECHNOLOGIES AS DESCRIBED BY CMS-2020-01-R: HCPCS | Performed by: EMERGENCY MEDICINE

## 2021-01-01 PROCEDURE — 85007 BL SMEAR W/DIFF WBC COUNT: CPT | Performed by: INTERNAL MEDICINE

## 2021-01-01 PROCEDURE — 80202 ASSAY OF VANCOMYCIN: CPT | Performed by: PHYSICIAN ASSISTANT

## 2021-01-01 PROCEDURE — 77470 SPECIAL RADIATION TREATMENT: CPT | Performed by: RADIOLOGY

## 2021-01-01 PROCEDURE — 99239 HOSP IP/OBS DSCHRG MGMT >30: CPT | Performed by: PHYSICIAN ASSISTANT

## 2021-01-01 PROCEDURE — 77386 HB NTSTY MODUL RAD TX DLVR CPLX: CPT | Performed by: RADIOLOGY

## 2021-01-01 PROCEDURE — 93880 EXTRACRANIAL BILAT STUDY: CPT | Performed by: SURGERY

## 2021-01-01 PROCEDURE — 84132 ASSAY OF SERUM POTASSIUM: CPT

## 2021-01-01 PROCEDURE — 83690 ASSAY OF LIPASE: CPT | Performed by: EMERGENCY MEDICINE

## 2021-01-01 PROCEDURE — 99497 ADVNCD CARE PLAN 30 MIN: CPT | Performed by: PHYSICIAN ASSISTANT

## 2021-01-01 PROCEDURE — 83930 ASSAY OF BLOOD OSMOLALITY: CPT | Performed by: INTERNAL MEDICINE

## 2021-01-01 PROCEDURE — 88173 CYTOPATH EVAL FNA REPORT: CPT | Performed by: PATHOLOGY

## 2021-01-01 PROCEDURE — 84145 PROCALCITONIN (PCT): CPT | Performed by: PHYSICIAN ASSISTANT

## 2021-01-01 PROCEDURE — 99222 1ST HOSP IP/OBS MODERATE 55: CPT | Performed by: INTERNAL MEDICINE

## 2021-01-01 PROCEDURE — 83540 ASSAY OF IRON: CPT | Performed by: INTERNAL MEDICINE

## 2021-01-01 PROCEDURE — 81001 URINALYSIS AUTO W/SCOPE: CPT

## 2021-01-01 PROCEDURE — 82378 CARCINOEMBRYONIC ANTIGEN: CPT | Performed by: INTERNAL MEDICINE

## 2021-01-01 PROCEDURE — 77334 RADIATION TREATMENT AID(S): CPT | Performed by: RADIOLOGY

## 2021-01-01 PROCEDURE — 99497 ADVNCD CARE PLAN 30 MIN: CPT | Performed by: INTERNAL MEDICINE

## 2021-01-01 PROCEDURE — 99223 1ST HOSP IP/OBS HIGH 75: CPT | Performed by: PHYSICIAN ASSISTANT

## 2021-01-01 PROCEDURE — U0005 INFEC AGEN DETEC AMPLI PROBE: HCPCS | Performed by: PHYSICIAN ASSISTANT

## 2021-01-01 PROCEDURE — 1036F TOBACCO NON-USER: CPT | Performed by: NURSE PRACTITIONER

## 2021-01-01 PROCEDURE — 99220 PR INITIAL OBSERVATION CARE/DAY 70 MINUTES: CPT | Performed by: INTERNAL MEDICINE

## 2021-01-01 PROCEDURE — 84145 PROCALCITONIN (PCT): CPT | Performed by: NURSE PRACTITIONER

## 2021-01-01 PROCEDURE — 96375 TX/PRO/DX INJ NEW DRUG ADDON: CPT

## 2021-01-01 PROCEDURE — 77336 RADIATION PHYSICS CONSULT: CPT | Performed by: RADIOLOGY

## 2021-01-01 PROCEDURE — 82570 ASSAY OF URINE CREATININE: CPT

## 2021-01-01 PROCEDURE — 77427 RADIATION TX MANAGEMENT X5: CPT | Performed by: RADIOLOGY

## 2021-01-01 PROCEDURE — U0005 INFEC AGEN DETEC AMPLI PROBE: HCPCS | Performed by: INTERNAL MEDICINE

## 2021-01-01 PROCEDURE — 87070 CULTURE OTHR SPECIMN AEROBIC: CPT | Performed by: INTERNAL MEDICINE

## 2021-01-01 PROCEDURE — 80053 COMPREHEN METABOLIC PANEL: CPT | Performed by: PHYSICIAN ASSISTANT

## 2021-01-01 PROCEDURE — 83935 ASSAY OF URINE OSMOLALITY: CPT | Performed by: INTERNAL MEDICINE

## 2021-01-01 PROCEDURE — 87181 SC STD AGAR DILUTION PER AGT: CPT | Performed by: EMERGENCY MEDICINE

## 2021-01-01 PROCEDURE — 86920 COMPATIBILITY TEST SPIN: CPT

## 2021-01-01 PROCEDURE — 73080 X-RAY EXAM OF ELBOW: CPT

## 2021-01-01 PROCEDURE — 84132 ASSAY OF SERUM POTASSIUM: CPT | Performed by: INTERNAL MEDICINE

## 2021-01-01 PROCEDURE — 97535 SELF CARE MNGMENT TRAINING: CPT

## 2021-01-01 PROCEDURE — 87040 BLOOD CULTURE FOR BACTERIA: CPT | Performed by: PHYSICIAN ASSISTANT

## 2021-01-01 PROCEDURE — 77014 CHG CT GUIDANCE PLACEMENT RAD THERAPY FIELDS: CPT | Performed by: STUDENT IN AN ORGANIZED HEALTH CARE EDUCATION/TRAINING PROGRAM

## 2021-01-01 PROCEDURE — 85007 BL SMEAR W/DIFF WBC COUNT: CPT | Performed by: PHYSICIAN ASSISTANT

## 2021-01-01 PROCEDURE — 80048 BASIC METABOLIC PNL TOTAL CA: CPT | Performed by: NURSE PRACTITIONER

## 2021-01-01 PROCEDURE — 99232 SBSQ HOSP IP/OBS MODERATE 35: CPT | Performed by: NURSE PRACTITIONER

## 2021-01-01 PROCEDURE — 93306 TTE W/DOPPLER COMPLETE: CPT

## 2021-01-01 PROCEDURE — 82533 TOTAL CORTISOL: CPT | Performed by: INTERNAL MEDICINE

## 2021-01-01 PROCEDURE — 43238 EGD US FINE NEEDLE BX/ASPIR: CPT | Performed by: INTERNAL MEDICINE

## 2021-01-01 PROCEDURE — 1160F RVW MEDS BY RX/DR IN RCRD: CPT | Performed by: NURSE PRACTITIONER

## 2021-01-01 PROCEDURE — 80048 BASIC METABOLIC PNL TOTAL CA: CPT | Performed by: EMERGENCY MEDICINE

## 2021-01-01 PROCEDURE — 83880 ASSAY OF NATRIURETIC PEPTIDE: CPT | Performed by: EMERGENCY MEDICINE

## 2021-01-01 PROCEDURE — 72040 X-RAY EXAM NECK SPINE 2-3 VW: CPT

## 2021-01-01 PROCEDURE — 99233 SBSQ HOSP IP/OBS HIGH 50: CPT | Performed by: INTERNAL MEDICINE

## 2021-01-01 PROCEDURE — 87493 C DIFF AMPLIFIED PROBE: CPT | Performed by: PHYSICIAN ASSISTANT

## 2021-01-01 PROCEDURE — 87077 CULTURE AEROBIC IDENTIFY: CPT | Performed by: EMERGENCY MEDICINE

## 2021-01-01 PROCEDURE — 77336 RADIATION PHYSICS CONSULT: CPT | Performed by: STUDENT IN AN ORGANIZED HEALTH CARE EDUCATION/TRAINING PROGRAM

## 2021-01-01 PROCEDURE — 84132 ASSAY OF SERUM POTASSIUM: CPT | Performed by: PHYSICIAN ASSISTANT

## 2021-01-01 PROCEDURE — 82436 ASSAY OF URINE CHLORIDE: CPT | Performed by: INTERNAL MEDICINE

## 2021-01-01 PROCEDURE — 99214 OFFICE O/P EST MOD 30 MIN: CPT | Performed by: STUDENT IN AN ORGANIZED HEALTH CARE EDUCATION/TRAINING PROGRAM

## 2021-01-01 PROCEDURE — 95816 EEG AWAKE AND DROWSY: CPT

## 2021-01-01 PROCEDURE — 36561 INSERT TUNNELED CV CATH: CPT | Performed by: STUDENT IN AN ORGANIZED HEALTH CARE EDUCATION/TRAINING PROGRAM

## 2021-01-01 PROCEDURE — 93880 EXTRACRANIAL BILAT STUDY: CPT

## 2021-01-01 PROCEDURE — 83735 ASSAY OF MAGNESIUM: CPT | Performed by: INTERNAL MEDICINE

## 2021-01-01 PROCEDURE — 0241U HB NFCT DS VIR RESP RNA 4 TRGT: CPT | Performed by: INTERNAL MEDICINE

## 2021-01-01 PROCEDURE — 80076 HEPATIC FUNCTION PANEL: CPT | Performed by: INTERNAL MEDICINE

## 2021-01-01 PROCEDURE — 70487 CT MAXILLOFACIAL W/DYE: CPT

## 2021-01-01 PROCEDURE — RECHECK: Performed by: INTERNAL MEDICINE

## 2021-01-01 PROCEDURE — U0005 INFEC AGEN DETEC AMPLI PROBE: HCPCS | Performed by: EMERGENCY MEDICINE

## 2021-01-01 PROCEDURE — 83605 ASSAY OF LACTIC ACID: CPT | Performed by: INTERNAL MEDICINE

## 2021-01-01 PROCEDURE — 96361 HYDRATE IV INFUSION ADD-ON: CPT

## 2021-01-01 PROCEDURE — 99204 OFFICE O/P NEW MOD 45 MIN: CPT | Performed by: INTERNAL MEDICINE

## 2021-01-01 DEVICE — 8F PLASTIC DIGNITY® MID-SIZED CT PORT W/SILICONE FILLED SUTURE HOLES W/ATTACHABLE CHRONOFLEX® POLYURETHANE CATHETER
Type: IMPLANTABLE DEVICE | Site: CHEST | Status: FUNCTIONAL
Brand: DIGNITY®

## 2021-01-01 RX ORDER — SODIUM CHLORIDE 1000 MG
1 TABLET, SOLUBLE MISCELLANEOUS
Qty: 90 TABLET | Refills: 0 | Status: ON HOLD | OUTPATIENT
Start: 2021-01-01 | End: 2021-01-01

## 2021-01-01 RX ORDER — ATORVASTATIN CALCIUM 40 MG/1
40 TABLET, FILM COATED ORAL
Status: DISCONTINUED | OUTPATIENT
Start: 2021-01-01 | End: 2021-01-01 | Stop reason: HOSPADM

## 2021-01-01 RX ORDER — CEFEPIME HYDROCHLORIDE 2 G/50ML
2000 INJECTION, SOLUTION INTRAVENOUS EVERY 12 HOURS
Status: DISCONTINUED | OUTPATIENT
Start: 2021-01-01 | End: 2021-01-01

## 2021-01-01 RX ORDER — MIDAZOLAM HYDROCHLORIDE 2 MG/2ML
INJECTION, SOLUTION INTRAMUSCULAR; INTRAVENOUS AS NEEDED
Status: DISCONTINUED | OUTPATIENT
Start: 2021-01-01 | End: 2021-01-01

## 2021-01-01 RX ORDER — INSULIN GLARGINE 100 [IU]/ML
25 INJECTION, SOLUTION SUBCUTANEOUS
Status: DISCONTINUED | OUTPATIENT
Start: 2021-01-01 | End: 2021-01-01 | Stop reason: HOSPADM

## 2021-01-01 RX ORDER — SODIUM CHLORIDE, SODIUM GLUCONATE, SODIUM ACETATE, POTASSIUM CHLORIDE, MAGNESIUM CHLORIDE, SODIUM PHOSPHATE, DIBASIC, AND POTASSIUM PHOSPHATE .53; .5; .37; .037; .03; .012; .00082 G/100ML; G/100ML; G/100ML; G/100ML; G/100ML; G/100ML; G/100ML
75 INJECTION, SOLUTION INTRAVENOUS CONTINUOUS
Status: DISCONTINUED | OUTPATIENT
Start: 2021-01-01 | End: 2021-01-01

## 2021-01-01 RX ORDER — SODIUM CHLORIDE 9 MG/ML
20 INJECTION, SOLUTION INTRAVENOUS ONCE
Status: CANCELLED | OUTPATIENT
Start: 2021-01-01

## 2021-01-01 RX ORDER — SODIUM CHLORIDE, SODIUM GLUCONATE, SODIUM ACETATE, POTASSIUM CHLORIDE, MAGNESIUM CHLORIDE, SODIUM PHOSPHATE, DIBASIC, AND POTASSIUM PHOSPHATE .53; .5; .37; .037; .03; .012; .00082 G/100ML; G/100ML; G/100ML; G/100ML; G/100ML; G/100ML; G/100ML
1000 INJECTION, SOLUTION INTRAVENOUS ONCE
Status: DISCONTINUED | OUTPATIENT
Start: 2021-01-01 | End: 2021-01-01

## 2021-01-01 RX ORDER — SODIUM CHLORIDE 9 MG/ML
20 INJECTION, SOLUTION INTRAVENOUS ONCE
Status: COMPLETED | OUTPATIENT
Start: 2021-01-01 | End: 2021-01-01

## 2021-01-01 RX ORDER — ACETAMINOPHEN 325 MG/1
650 TABLET ORAL EVERY 4 HOURS PRN
COMMUNITY

## 2021-01-01 RX ORDER — AMLODIPINE BESYLATE 2.5 MG/1
2.5 TABLET ORAL DAILY
Qty: 30 TABLET | Refills: 1 | Status: ON HOLD | OUTPATIENT
Start: 2021-01-01 | End: 2021-01-01 | Stop reason: ALTCHOICE

## 2021-01-01 RX ORDER — DEXTROSE MONOHYDRATE 25 G/50ML
25 INJECTION, SOLUTION INTRAVENOUS ONCE
Status: COMPLETED | OUTPATIENT
Start: 2021-01-01 | End: 2021-01-01

## 2021-01-01 RX ORDER — FUROSEMIDE 10 MG/ML
40 INJECTION INTRAMUSCULAR; INTRAVENOUS ONCE
Status: COMPLETED | OUTPATIENT
Start: 2021-01-01 | End: 2021-01-01

## 2021-01-01 RX ORDER — SODIUM CHLORIDE 9 MG/ML
75 INJECTION, SOLUTION INTRAVENOUS CONTINUOUS
Status: DISCONTINUED | OUTPATIENT
Start: 2021-01-01 | End: 2021-01-01

## 2021-01-01 RX ORDER — LORAZEPAM 0.5 MG/1
0.5 TABLET ORAL EVERY 8 HOURS PRN
Qty: 10 TABLET | Refills: 0 | Status: SHIPPED | OUTPATIENT
Start: 2021-01-01 | End: 2021-01-01

## 2021-01-01 RX ORDER — BLOOD SUGAR DIAGNOSTIC
STRIP MISCELLANEOUS
COMMUNITY
Start: 2021-01-01

## 2021-01-01 RX ORDER — INSULIN GLARGINE 100 [IU]/ML
8 INJECTION, SOLUTION SUBCUTANEOUS
Status: DISCONTINUED | OUTPATIENT
Start: 2021-01-01 | End: 2021-01-01

## 2021-01-01 RX ORDER — PROPOFOL 10 MG/ML
INJECTION, EMULSION INTRAVENOUS CONTINUOUS PRN
Status: DISCONTINUED | OUTPATIENT
Start: 2021-01-01 | End: 2021-01-01

## 2021-01-01 RX ORDER — ACETAMINOPHEN 325 MG/1
650 TABLET ORAL ONCE
Status: COMPLETED | OUTPATIENT
Start: 2021-01-01 | End: 2021-01-01

## 2021-01-01 RX ORDER — PREDNISONE 10 MG/1
10 TABLET ORAL DAILY
Status: COMPLETED | OUTPATIENT
Start: 2021-01-01 | End: 2021-01-01

## 2021-01-01 RX ORDER — ATORVASTATIN CALCIUM 40 MG/1
40 TABLET, FILM COATED ORAL DAILY
COMMUNITY
Start: 2020-01-01

## 2021-01-01 RX ORDER — ESCITALOPRAM OXALATE 5 MG/1
5 TABLET ORAL DAILY
Status: DISCONTINUED | OUTPATIENT
Start: 2021-01-01 | End: 2021-01-01 | Stop reason: HOSPADM

## 2021-01-01 RX ORDER — METOPROLOL SUCCINATE 50 MG/1
100 TABLET, EXTENDED RELEASE ORAL DAILY
Status: DISCONTINUED | OUTPATIENT
Start: 2021-01-01 | End: 2021-01-01 | Stop reason: HOSPADM

## 2021-01-01 RX ORDER — LORAZEPAM 0.5 MG/1
0.25 TABLET ORAL EVERY 8 HOURS PRN
Status: DISCONTINUED | OUTPATIENT
Start: 2021-01-01 | End: 2021-01-01 | Stop reason: HOSPADM

## 2021-01-01 RX ORDER — OXYCODONE HYDROCHLORIDE 5 MG/1
5 TABLET ORAL EVERY 4 HOURS PRN
Status: DISCONTINUED | OUTPATIENT
Start: 2021-01-01 | End: 2021-01-01 | Stop reason: HOSPADM

## 2021-01-01 RX ORDER — ACETAMINOPHEN 325 MG/1
650 TABLET ORAL EVERY 6 HOURS PRN
Status: DISCONTINUED | OUTPATIENT
Start: 2021-01-01 | End: 2021-01-01 | Stop reason: HOSPADM

## 2021-01-01 RX ORDER — CEFTRIAXONE 2 G/50ML
2000 INJECTION, SOLUTION INTRAVENOUS EVERY 24 HOURS
Status: DISCONTINUED | OUTPATIENT
Start: 2021-01-01 | End: 2021-01-01 | Stop reason: HOSPADM

## 2021-01-01 RX ORDER — SODIUM CHLORIDE, SODIUM GLUCONATE, SODIUM ACETATE, POTASSIUM CHLORIDE, MAGNESIUM CHLORIDE, SODIUM PHOSPHATE, DIBASIC, AND POTASSIUM PHOSPHATE .53; .5; .37; .037; .03; .012; .00082 G/100ML; G/100ML; G/100ML; G/100ML; G/100ML; G/100ML; G/100ML
1000 INJECTION, SOLUTION INTRAVENOUS ONCE
Status: COMPLETED | OUTPATIENT
Start: 2021-01-01 | End: 2021-01-01

## 2021-01-01 RX ORDER — LIDOCAINE HYDROCHLORIDE 20 MG/ML
INJECTION, SOLUTION INFILTRATION; PERINEURAL AS NEEDED
Status: DISCONTINUED | OUTPATIENT
Start: 2021-01-01 | End: 2021-01-01

## 2021-01-01 RX ORDER — CEFTRIAXONE 2 G/50ML
2000 INJECTION, SOLUTION INTRAVENOUS EVERY 24 HOURS
Qty: 550 ML | Refills: 0 | Status: SHIPPED | OUTPATIENT
Start: 2021-01-01 | End: 2021-01-01

## 2021-01-01 RX ORDER — LIDOCAINE HYDROCHLORIDE AND EPINEPHRINE 10; 10 MG/ML; UG/ML
20 INJECTION, SOLUTION INFILTRATION; PERINEURAL ONCE
Status: COMPLETED | OUTPATIENT
Start: 2021-01-01 | End: 2021-01-01

## 2021-01-01 RX ORDER — LANCETS
EACH MISCELLANEOUS
COMMUNITY
Start: 2021-01-01

## 2021-01-01 RX ORDER — DEXAMETHASONE 2 MG/1
2 TABLET ORAL
Qty: 30 TABLET | Refills: 0 | Status: SHIPPED | OUTPATIENT
Start: 2021-01-01

## 2021-01-01 RX ORDER — INSULIN GLARGINE 100 [IU]/ML
10 INJECTION, SOLUTION SUBCUTANEOUS EVERY MORNING
Status: DISCONTINUED | OUTPATIENT
Start: 2021-01-01 | End: 2021-01-01 | Stop reason: HOSPADM

## 2021-01-01 RX ORDER — MORPHINE SULFATE 100 MG/5ML
10 SOLUTION, CONCENTRATE ORAL EVERY 8 HOURS SCHEDULED
Qty: 30 ML | Refills: 0 | Status: SHIPPED | OUTPATIENT
Start: 2021-01-01

## 2021-01-01 RX ORDER — ONDANSETRON 2 MG/ML
4 INJECTION INTRAMUSCULAR; INTRAVENOUS EVERY 6 HOURS PRN
Status: DISCONTINUED | OUTPATIENT
Start: 2021-01-01 | End: 2021-01-01 | Stop reason: HOSPADM

## 2021-01-01 RX ORDER — INSULIN GLARGINE 100 [IU]/ML
12 INJECTION, SOLUTION SUBCUTANEOUS
Status: DISCONTINUED | OUTPATIENT
Start: 2021-01-01 | End: 2021-01-01

## 2021-01-01 RX ORDER — HYDROCODONE BITARTRATE AND ACETAMINOPHEN 5; 325 MG/1; MG/1
1 TABLET ORAL EVERY 4 HOURS PRN
COMMUNITY
End: 2021-01-01 | Stop reason: HOSPADM

## 2021-01-01 RX ORDER — CEFAZOLIN SODIUM 1 G/50ML
1000 SOLUTION INTRAVENOUS EVERY 8 HOURS
Status: DISCONTINUED | OUTPATIENT
Start: 2021-01-01 | End: 2021-01-01

## 2021-01-01 RX ORDER — ATORVASTATIN CALCIUM 40 MG/1
40 TABLET, FILM COATED ORAL DAILY
Status: DISCONTINUED | OUTPATIENT
Start: 2021-01-01 | End: 2021-01-01

## 2021-01-01 RX ORDER — METRONIDAZOLE 500 MG/1
500 TABLET ORAL EVERY 8 HOURS SCHEDULED
Qty: 30 TABLET | Refills: 0 | Status: SHIPPED | OUTPATIENT
Start: 2021-01-01 | End: 2021-01-01

## 2021-01-01 RX ORDER — TRAMADOL HYDROCHLORIDE 50 MG/1
50 TABLET ORAL EVERY 6 HOURS PRN
Qty: 15 TABLET | Refills: 0 | Status: SHIPPED | OUTPATIENT
Start: 2021-01-01 | End: 2021-01-01 | Stop reason: SDUPTHER

## 2021-01-01 RX ORDER — PROPOFOL 10 MG/ML
INJECTION, EMULSION INTRAVENOUS AS NEEDED
Status: DISCONTINUED | OUTPATIENT
Start: 2021-01-01 | End: 2021-01-01

## 2021-01-01 RX ORDER — ASPIRIN 81 MG/1
81 TABLET, CHEWABLE ORAL DAILY
Status: DISCONTINUED | OUTPATIENT
Start: 2021-01-01 | End: 2021-01-01 | Stop reason: HOSPADM

## 2021-01-01 RX ORDER — LANOLIN ALCOHOL/MO/W.PET/CERES
6 CREAM (GRAM) TOPICAL
Refills: 0
Start: 2021-01-01 | End: 2021-01-01

## 2021-01-01 RX ORDER — HYDROCODONE BITARTRATE AND ACETAMINOPHEN 5; 325 MG/1; MG/1
1 TABLET ORAL EVERY 4 HOURS PRN
COMMUNITY
Start: 2021-01-01 | End: 2021-01-01 | Stop reason: HOSPADM

## 2021-01-01 RX ORDER — SENNA PLUS 8.6 MG/1
1 TABLET ORAL 2 TIMES DAILY
Qty: 60 TABLET | Refills: 2 | Status: SHIPPED | OUTPATIENT
Start: 2021-01-01

## 2021-01-01 RX ORDER — CEFAZOLIN SODIUM 2 G/50ML
2000 SOLUTION INTRAVENOUS ONCE
Status: DISCONTINUED | OUTPATIENT
Start: 2021-01-01 | End: 2021-01-01

## 2021-01-01 RX ORDER — FENTANYL CITRATE/PF 50 MCG/ML
25 SYRINGE (ML) INJECTION
Status: DISCONTINUED | OUTPATIENT
Start: 2021-01-01 | End: 2021-01-01 | Stop reason: HOSPADM

## 2021-01-01 RX ORDER — ACETAMINOPHEN 325 MG/1
650 TABLET ORAL ONCE
Status: CANCELLED | OUTPATIENT
Start: 2021-01-01

## 2021-01-01 RX ORDER — INSULIN GLARGINE 100 [IU]/ML
INJECTION, SOLUTION SUBCUTANEOUS
Qty: 15 ML | Refills: 0 | Status: ON HOLD | OUTPATIENT
Start: 2021-01-01 | End: 2021-01-01 | Stop reason: SDUPTHER

## 2021-01-01 RX ORDER — SACCHAROMYCES BOULARDII 250 MG
250 CAPSULE ORAL 2 TIMES DAILY
Status: DISCONTINUED | OUTPATIENT
Start: 2021-01-01 | End: 2021-01-01 | Stop reason: HOSPADM

## 2021-01-01 RX ORDER — ATORVASTATIN CALCIUM 40 MG/1
40 TABLET, FILM COATED ORAL DAILY
Status: DISCONTINUED | OUTPATIENT
Start: 2021-01-01 | End: 2021-01-01 | Stop reason: HOSPADM

## 2021-01-01 RX ORDER — LANOLIN ALCOHOL/MO/W.PET/CERES
6 CREAM (GRAM) TOPICAL
Status: DISCONTINUED | OUTPATIENT
Start: 2021-01-01 | End: 2021-01-01 | Stop reason: HOSPADM

## 2021-01-01 RX ORDER — MAGNESIUM SULFATE HEPTAHYDRATE 40 MG/ML
2 INJECTION, SOLUTION INTRAVENOUS ONCE
Status: COMPLETED | OUTPATIENT
Start: 2021-01-01 | End: 2021-01-01

## 2021-01-01 RX ORDER — INSULIN GLARGINE 100 [IU]/ML
18 INJECTION, SOLUTION SUBCUTANEOUS
Status: DISCONTINUED | OUTPATIENT
Start: 2021-01-01 | End: 2021-01-01 | Stop reason: HOSPADM

## 2021-01-01 RX ORDER — METOPROLOL SUCCINATE 100 MG/1
100 TABLET, EXTENDED RELEASE ORAL DAILY
Status: ON HOLD | COMMUNITY
Start: 2021-01-01 | End: 2021-01-01 | Stop reason: ALTCHOICE

## 2021-01-01 RX ORDER — GLIMEPIRIDE 2 MG/1
4 TABLET ORAL
Status: COMPLETED | OUTPATIENT
Start: 2021-01-01 | End: 2021-01-01

## 2021-01-01 RX ORDER — PREDNISONE 20 MG/1
20 TABLET ORAL DAILY
Status: COMPLETED | OUTPATIENT
Start: 2021-01-01 | End: 2021-01-01

## 2021-01-01 RX ORDER — SODIUM CHLORIDE 1000 MG
2 TABLET, SOLUBLE MISCELLANEOUS
Qty: 90 TABLET | Refills: 0
Start: 2021-01-01 | End: 2021-01-01

## 2021-01-01 RX ORDER — AMOXICILLIN 250 MG
2 CAPSULE ORAL
Status: DISCONTINUED | OUTPATIENT
Start: 2021-01-01 | End: 2021-01-01

## 2021-01-01 RX ORDER — HYDRALAZINE HYDROCHLORIDE 20 MG/ML
5 INJECTION INTRAMUSCULAR; INTRAVENOUS EVERY 6 HOURS PRN
Status: DISCONTINUED | OUTPATIENT
Start: 2021-01-01 | End: 2021-01-01 | Stop reason: HOSPADM

## 2021-01-01 RX ORDER — PROCHLORPERAZINE MALEATE 5 MG/1
10 TABLET ORAL EVERY 6 HOURS PRN
Status: DISCONTINUED | OUTPATIENT
Start: 2021-01-01 | End: 2021-01-01 | Stop reason: HOSPADM

## 2021-01-01 RX ORDER — SODIUM CHLORIDE 1000 MG
1 TABLET, SOLUBLE MISCELLANEOUS
Status: DISCONTINUED | OUTPATIENT
Start: 2021-01-01 | End: 2021-01-01

## 2021-01-01 RX ORDER — INSULIN GLARGINE 100 [IU]/ML
20 INJECTION, SOLUTION SUBCUTANEOUS
Status: DISCONTINUED | OUTPATIENT
Start: 2021-01-01 | End: 2021-01-01 | Stop reason: HOSPADM

## 2021-01-01 RX ORDER — AMLODIPINE BESYLATE 2.5 MG/1
2.5 TABLET ORAL DAILY
Status: DISCONTINUED | OUTPATIENT
Start: 2021-01-01 | End: 2021-01-01

## 2021-01-01 RX ORDER — CEFAZOLIN SODIUM 1 G/50ML
1000 SOLUTION INTRAVENOUS ONCE
Status: COMPLETED | OUTPATIENT
Start: 2021-01-01 | End: 2021-01-01

## 2021-01-01 RX ORDER — OXYCODONE HYDROCHLORIDE 5 MG/1
2.5 TABLET ORAL EVERY 4 HOURS PRN
Status: DISCONTINUED | OUTPATIENT
Start: 2021-01-01 | End: 2021-01-01 | Stop reason: HOSPADM

## 2021-01-01 RX ORDER — MAGNESIUM SULFATE HEPTAHYDRATE 40 MG/ML
4 INJECTION, SOLUTION INTRAVENOUS ONCE
Status: COMPLETED | OUTPATIENT
Start: 2021-01-01 | End: 2021-01-01

## 2021-01-01 RX ORDER — TRAMADOL HYDROCHLORIDE 50 MG/1
50 TABLET ORAL EVERY 6 HOURS PRN
Qty: 15 TABLET | Refills: 0 | Status: SHIPPED | OUTPATIENT
Start: 2021-01-01 | End: 2021-01-01 | Stop reason: HOSPADM

## 2021-01-01 RX ORDER — AMLODIPINE BESYLATE 2.5 MG/1
2.5 TABLET ORAL DAILY
Status: DISCONTINUED | OUTPATIENT
Start: 2021-01-01 | End: 2021-01-01 | Stop reason: HOSPADM

## 2021-01-01 RX ORDER — SODIUM CHLORIDE 1000 MG
1 TABLET, SOLUBLE MISCELLANEOUS
Status: DISCONTINUED | OUTPATIENT
Start: 2021-01-01 | End: 2021-01-01 | Stop reason: HOSPADM

## 2021-01-01 RX ORDER — PROCHLORPERAZINE MALEATE 10 MG
10 TABLET ORAL EVERY 6 HOURS PRN
Qty: 45 TABLET | Refills: 3 | Status: SHIPPED | OUTPATIENT
Start: 2021-01-01

## 2021-01-01 RX ORDER — MORPHINE SULFATE 100 MG/5ML
5 SOLUTION, CONCENTRATE ORAL EVERY 8 HOURS SCHEDULED
Qty: 30 ML | Refills: 0 | Status: SHIPPED | OUTPATIENT
Start: 2021-01-01

## 2021-01-01 RX ORDER — OXYCODONE HYDROCHLORIDE 5 MG/1
5 TABLET ORAL EVERY 4 HOURS PRN
Qty: 10 TABLET | Refills: 0 | Status: SHIPPED | OUTPATIENT
Start: 2021-01-01 | End: 2021-01-01

## 2021-01-01 RX ORDER — INSULIN GLARGINE 100 [IU]/ML
INJECTION, SOLUTION SUBCUTANEOUS
Qty: 15 ML | Refills: 0 | Status: SHIPPED | OUTPATIENT
Start: 2021-01-01 | End: 2021-01-01

## 2021-01-01 RX ORDER — CEFEPIME HYDROCHLORIDE 2 G/50ML
2000 INJECTION, SOLUTION INTRAVENOUS EVERY 12 HOURS
Status: DISCONTINUED | OUTPATIENT
Start: 2021-01-01 | End: 2021-01-01 | Stop reason: HOSPADM

## 2021-01-01 RX ORDER — SODIUM CHLORIDE 1000 MG
2 TABLET, SOLUBLE MISCELLANEOUS
Status: DISCONTINUED | OUTPATIENT
Start: 2021-01-01 | End: 2021-01-01 | Stop reason: HOSPADM

## 2021-01-01 RX ORDER — PANTOPRAZOLE SODIUM 40 MG/1
TABLET, DELAYED RELEASE ORAL
Qty: 60 TABLET | Refills: 0 | Status: SHIPPED | OUTPATIENT
Start: 2021-01-01 | End: 2021-01-01

## 2021-01-01 RX ORDER — FENTANYL CITRATE 50 UG/ML
INJECTION, SOLUTION INTRAMUSCULAR; INTRAVENOUS AS NEEDED
Status: DISCONTINUED | OUTPATIENT
Start: 2021-01-01 | End: 2021-01-01

## 2021-01-01 RX ORDER — PREDNISONE 10 MG/1
TABLET ORAL
COMMUNITY
Start: 2021-01-01 | End: 2021-01-01 | Stop reason: HOSPADM

## 2021-01-01 RX ORDER — CEFUROXIME AXETIL 250 MG/1
500 TABLET ORAL EVERY 12 HOURS SCHEDULED
Status: DISCONTINUED | OUTPATIENT
Start: 2021-01-01 | End: 2021-01-01 | Stop reason: HOSPADM

## 2021-01-01 RX ORDER — MAGNESIUM HYDROXIDE/ALUMINUM HYDROXICE/SIMETHICONE 120; 1200; 1200 MG/30ML; MG/30ML; MG/30ML
30 SUSPENSION ORAL EVERY 6 HOURS PRN
Status: DISCONTINUED | OUTPATIENT
Start: 2021-01-01 | End: 2021-01-01 | Stop reason: HOSPADM

## 2021-01-01 RX ORDER — SODIUM CHLORIDE, SODIUM LACTATE, POTASSIUM CHLORIDE, CALCIUM CHLORIDE 600; 310; 30; 20 MG/100ML; MG/100ML; MG/100ML; MG/100ML
INJECTION, SOLUTION INTRAVENOUS CONTINUOUS PRN
Status: DISCONTINUED | OUTPATIENT
Start: 2021-01-01 | End: 2021-01-01

## 2021-01-01 RX ORDER — INSULIN GLARGINE 100 [IU]/ML
15 INJECTION, SOLUTION SUBCUTANEOUS
Status: DISCONTINUED | OUTPATIENT
Start: 2021-01-01 | End: 2021-01-01

## 2021-01-01 RX ORDER — ESCITALOPRAM OXALATE 5 MG/1
5 TABLET ORAL DAILY
COMMUNITY

## 2021-01-01 RX ORDER — PROCHLORPERAZINE MALEATE 5 MG/1
10 TABLET ORAL EVERY 6 HOURS PRN
Status: DISCONTINUED | OUTPATIENT
Start: 2021-01-01 | End: 2021-01-01

## 2021-01-01 RX ORDER — LISINOPRIL 20 MG/1
40 TABLET ORAL DAILY
Status: DISCONTINUED | OUTPATIENT
Start: 2021-01-01 | End: 2021-01-01 | Stop reason: HOSPADM

## 2021-01-01 RX ORDER — TRAMADOL HYDROCHLORIDE 50 MG/1
50 TABLET ORAL EVERY 6 HOURS PRN
Status: DISCONTINUED | OUTPATIENT
Start: 2021-01-01 | End: 2021-01-01

## 2021-01-01 RX ORDER — ASPIRIN 81 MG/1
81 TABLET, CHEWABLE ORAL DAILY
COMMUNITY
End: 2021-01-01 | Stop reason: HOSPADM

## 2021-01-01 RX ORDER — VANCOMYCIN HYDROCHLORIDE 1 G/200ML
15 INJECTION, SOLUTION INTRAVENOUS EVERY 12 HOURS
Status: DISCONTINUED | OUTPATIENT
Start: 2021-01-01 | End: 2021-01-01 | Stop reason: DRUGHIGH

## 2021-01-01 RX ORDER — CEFUROXIME AXETIL 500 MG/1
500 TABLET ORAL EVERY 12 HOURS SCHEDULED
Qty: 6 TABLET | Refills: 0
Start: 2021-01-01 | End: 2021-01-01

## 2021-01-01 RX ORDER — LORAZEPAM 0.5 MG/1
0.5 TABLET ORAL
COMMUNITY
Start: 2021-01-01 | End: 2021-01-01

## 2021-01-01 RX ORDER — LISINOPRIL 20 MG/1
40 TABLET ORAL DAILY
Status: DISCONTINUED | OUTPATIENT
Start: 2021-01-01 | End: 2021-01-01

## 2021-01-01 RX ORDER — SODIUM CHLORIDE 9 MG/ML
50 INJECTION, SOLUTION INTRAVENOUS CONTINUOUS
Status: DISCONTINUED | OUTPATIENT
Start: 2021-01-01 | End: 2021-01-01 | Stop reason: HOSPADM

## 2021-01-01 RX ORDER — AMOXICILLIN 250 MG
2 CAPSULE ORAL 2 TIMES DAILY
Status: DISCONTINUED | OUTPATIENT
Start: 2021-01-01 | End: 2021-01-01

## 2021-01-01 RX ORDER — INSULIN GLARGINE 100 [IU]/ML
20 INJECTION, SOLUTION SUBCUTANEOUS
Status: DISCONTINUED | OUTPATIENT
Start: 2021-01-01 | End: 2021-01-01

## 2021-01-01 RX ORDER — ACETAMINOPHEN 325 MG/1
650 TABLET ORAL EVERY 4 HOURS PRN
Status: DISCONTINUED | OUTPATIENT
Start: 2021-01-01 | End: 2021-01-01

## 2021-01-01 RX ORDER — SODIUM CHLORIDE 9 MG/ML
100 INJECTION, SOLUTION INTRAVENOUS CONTINUOUS
Status: DISCONTINUED | OUTPATIENT
Start: 2021-01-01 | End: 2021-01-01

## 2021-01-01 RX ORDER — ONDANSETRON 2 MG/ML
4 INJECTION INTRAMUSCULAR; INTRAVENOUS ONCE
Status: DISCONTINUED | OUTPATIENT
Start: 2021-01-01 | End: 2021-01-01 | Stop reason: HOSPADM

## 2021-01-01 RX ORDER — INSULIN ASPART 100 [IU]/ML
15 INJECTION, SOLUTION INTRAVENOUS; SUBCUTANEOUS DAILY
COMMUNITY
Start: 2021-01-01 | End: 2021-01-01 | Stop reason: SDUPTHER

## 2021-01-01 RX ORDER — POLYETHYLENE GLYCOL 3350 17 G/17G
17 POWDER, FOR SOLUTION ORAL DAILY
Status: DISCONTINUED | OUTPATIENT
Start: 2021-01-01 | End: 2021-01-01

## 2021-01-01 RX ORDER — SODIUM CHLORIDE 9 MG/ML
INJECTION, SOLUTION INTRAVENOUS CONTINUOUS PRN
Status: DISCONTINUED | OUTPATIENT
Start: 2021-01-01 | End: 2021-01-01

## 2021-01-01 RX ORDER — INSULIN GLARGINE 100 [IU]/ML
10 INJECTION, SOLUTION SUBCUTANEOUS
Status: DISCONTINUED | OUTPATIENT
Start: 2021-01-01 | End: 2021-01-01 | Stop reason: HOSPADM

## 2021-01-01 RX ORDER — PANTOPRAZOLE SODIUM 40 MG/1
TABLET, DELAYED RELEASE ORAL
COMMUNITY
Start: 2021-01-01 | End: 2021-01-01

## 2021-01-01 RX ORDER — NYSTATIN 100000 U/G
CREAM TOPICAL 2 TIMES DAILY
Qty: 30 G | Refills: 0 | Status: SHIPPED | OUTPATIENT
Start: 2021-01-01

## 2021-01-01 RX ORDER — OMEPRAZOLE 20 MG/1
20 CAPSULE, DELAYED RELEASE ORAL DAILY
Qty: 30 CAPSULE | Refills: 1 | Status: SHIPPED | OUTPATIENT
Start: 2021-01-01

## 2021-01-01 RX ORDER — CEFTRIAXONE 1 G/50ML
1000 INJECTION, SOLUTION INTRAVENOUS EVERY 24 HOURS
Status: DISCONTINUED | OUTPATIENT
Start: 2021-01-01 | End: 2021-01-01

## 2021-01-01 RX ORDER — ACETAMINOPHEN 325 MG/1
975 TABLET ORAL EVERY 8 HOURS SCHEDULED
Status: DISCONTINUED | OUTPATIENT
Start: 2021-01-01 | End: 2021-01-01 | Stop reason: HOSPADM

## 2021-01-01 RX ORDER — SILICONE ADHESIVE 1.5" X 3"
1 SHEET (EA) TOPICAL AS NEEDED
COMMUNITY
End: 2021-01-01 | Stop reason: HOSPADM

## 2021-01-01 RX ORDER — OXYCODONE HYDROCHLORIDE 5 MG/1
5 CAPSULE ORAL EVERY 4 HOURS PRN
COMMUNITY

## 2021-01-01 RX ORDER — TRAMADOL HYDROCHLORIDE 50 MG/1
50 TABLET ORAL EVERY 6 HOURS PRN
Status: DISCONTINUED | OUTPATIENT
Start: 2021-01-01 | End: 2021-01-01 | Stop reason: HOSPADM

## 2021-01-01 RX ORDER — RAMIPRIL 10 MG/1
10 CAPSULE ORAL DAILY
COMMUNITY
Start: 2020-01-01 | End: 2021-01-01

## 2021-01-01 RX ORDER — INSULIN ASPART 100 [IU]/ML
INJECTION, SOLUTION INTRAVENOUS; SUBCUTANEOUS
Qty: 15 ML
Start: 2021-01-01 | End: 2021-01-01

## 2021-01-01 RX ORDER — INSULIN HUMAN 100 [IU]/ML
10 INJECTION, SOLUTION PARENTERAL
Qty: 10 ML | Refills: 1 | Status: SHIPPED | OUTPATIENT
Start: 2021-01-01

## 2021-01-01 RX ORDER — INSULIN GLARGINE 100 [IU]/ML
12 INJECTION, SOLUTION SUBCUTANEOUS
Qty: 10 ML | Refills: 0
Start: 2021-01-01 | End: 2021-01-01 | Stop reason: HOSPADM

## 2021-01-01 RX ORDER — BLOOD-GLUCOSE METER
EACH MISCELLANEOUS
COMMUNITY
Start: 2021-01-01

## 2021-01-01 RX ORDER — CIPROFLOXACIN 500 MG/1
500 TABLET, FILM COATED ORAL EVERY 12 HOURS SCHEDULED
Qty: 14 TABLET | Refills: 0 | Status: SHIPPED | OUTPATIENT
Start: 2021-01-01 | End: 2021-10-20

## 2021-01-01 RX ORDER — CIPROFLOXACIN 500 MG/1
500 TABLET, FILM COATED ORAL EVERY 12 HOURS SCHEDULED
Qty: 6 TABLET | Refills: 0 | Status: SHIPPED | OUTPATIENT
Start: 2021-01-01 | End: 2021-01-01

## 2021-01-01 RX ORDER — GLIMEPIRIDE 4 MG/1
2 TABLET ORAL 2 TIMES DAILY
COMMUNITY
Start: 2020-01-01 | End: 2021-01-01 | Stop reason: ALTCHOICE

## 2021-01-01 RX ORDER — OXYCODONE HYDROCHLORIDE 5 MG/1
5 TABLET ORAL EVERY 4 HOURS PRN
Qty: 20 TABLET | Refills: 0 | Status: SHIPPED | OUTPATIENT
Start: 2021-01-01 | End: 2021-01-01 | Stop reason: HOSPADM

## 2021-01-01 RX ORDER — PEN NEEDLE, DIABETIC 32GX 5/32"
NEEDLE, DISPOSABLE MISCELLANEOUS
COMMUNITY
Start: 2021-01-01

## 2021-01-01 RX ORDER — POLYETHYLENE GLYCOL 3350 17 G/17G
17 POWDER, FOR SOLUTION ORAL DAILY PRN
Status: DISCONTINUED | OUTPATIENT
Start: 2021-01-01 | End: 2021-01-01 | Stop reason: HOSPADM

## 2021-01-01 RX ORDER — SACCHAROMYCES BOULARDII 250 MG
250 CAPSULE ORAL 2 TIMES DAILY
Refills: 0
Start: 2021-01-01

## 2021-01-01 RX ORDER — EPHEDRINE SULFATE 50 MG/ML
INJECTION INTRAVENOUS AS NEEDED
Status: DISCONTINUED | OUTPATIENT
Start: 2021-01-01 | End: 2021-01-01

## 2021-01-01 RX ORDER — VANCOMYCIN HYDROCHLORIDE 1 G/200ML
1000 INJECTION, SOLUTION INTRAVENOUS EVERY 12 HOURS
Status: DISCONTINUED | OUTPATIENT
Start: 2021-01-01 | End: 2021-01-01 | Stop reason: HOSPADM

## 2021-01-01 RX ORDER — VANCOMYCIN HYDROCHLORIDE 1 G/200ML
15 INJECTION, SOLUTION INTRAVENOUS ONCE
Status: COMPLETED | OUTPATIENT
Start: 2021-01-01 | End: 2021-01-01

## 2021-01-01 RX ORDER — LISINOPRIL 40 MG/1
40 TABLET ORAL DAILY
COMMUNITY

## 2021-01-01 RX ORDER — CALCIUM GLUCONATE 20 MG/ML
1 INJECTION, SOLUTION INTRAVENOUS ONCE
Status: COMPLETED | OUTPATIENT
Start: 2021-01-01 | End: 2021-01-01

## 2021-01-01 RX ADMIN — SODIUM CHLORIDE 2 G: 1 TABLET ORAL at 12:41

## 2021-01-01 RX ADMIN — OXYCODONE HYDROCHLORIDE 5 MG: 5 TABLET ORAL at 21:07

## 2021-01-01 RX ADMIN — ESCITALOPRAM 5 MG: 5 TABLET, FILM COATED ORAL at 08:01

## 2021-01-01 RX ADMIN — ENOXAPARIN SODIUM 40 MG: 40 INJECTION SUBCUTANEOUS at 09:05

## 2021-01-01 RX ADMIN — CEFAZOLIN SODIUM 1000 MG: 1 SOLUTION INTRAVENOUS at 11:05

## 2021-01-01 RX ADMIN — INSULIN LISPRO 4 UNITS: 100 INJECTION, SOLUTION INTRAVENOUS; SUBCUTANEOUS at 11:30

## 2021-01-01 RX ADMIN — METOPROLOL SUCCINATE 100 MG: 50 TABLET, EXTENDED RELEASE ORAL at 08:17

## 2021-01-01 RX ADMIN — MIDAZOLAM 1 MG: 1 INJECTION INTRAMUSCULAR; INTRAVENOUS at 11:40

## 2021-01-01 RX ADMIN — SODIUM CHLORIDE 1000 ML: 0.9 INJECTION, SOLUTION INTRAVENOUS at 09:24

## 2021-01-01 RX ADMIN — INSULIN LISPRO 3 UNITS: 100 INJECTION, SOLUTION INTRAVENOUS; SUBCUTANEOUS at 07:39

## 2021-01-01 RX ADMIN — ATORVASTATIN CALCIUM 40 MG: 40 TABLET, FILM COATED ORAL at 16:04

## 2021-01-01 RX ADMIN — CEFTRIAXONE 2000 MG: 2 INJECTION, SOLUTION INTRAVENOUS at 17:40

## 2021-01-01 RX ADMIN — INSULIN LISPRO 3 UNITS: 100 INJECTION, SOLUTION INTRAVENOUS; SUBCUTANEOUS at 21:30

## 2021-01-01 RX ADMIN — SODIUM CHLORIDE 1 G: 1 TABLET ORAL at 15:30

## 2021-01-01 RX ADMIN — INSULIN GLARGINE 12 UNITS: 100 INJECTION, SOLUTION SUBCUTANEOUS at 22:44

## 2021-01-01 RX ADMIN — SODIUM CHLORIDE 2 G: 1 TABLET ORAL at 15:53

## 2021-01-01 RX ADMIN — ATORVASTATIN CALCIUM 40 MG: 40 TABLET, FILM COATED ORAL at 11:52

## 2021-01-01 RX ADMIN — GEMCITABINE 1504.2 MG: 38 INJECTION, SOLUTION INTRAVENOUS at 12:34

## 2021-01-01 RX ADMIN — VANCOMYCIN HYDROCHLORIDE 750 MG: 750 INJECTION, SOLUTION INTRAVENOUS at 03:30

## 2021-01-01 RX ADMIN — INSULIN LISPRO 2 UNITS: 100 INJECTION, SOLUTION INTRAVENOUS; SUBCUTANEOUS at 16:57

## 2021-01-01 RX ADMIN — ASPIRIN 81 MG CHEWABLE TABLET 81 MG: 81 TABLET CHEWABLE at 08:21

## 2021-01-01 RX ADMIN — DEXTROSE MONOHYDRATE 25 ML: 500 INJECTION PARENTERAL at 11:58

## 2021-01-01 RX ADMIN — CEFUROXIME AXETIL 500 MG: 250 TABLET ORAL at 08:37

## 2021-01-01 RX ADMIN — INSULIN HUMAN 10 UNITS: 100 INJECTION, SOLUTION PARENTERAL at 11:58

## 2021-01-01 RX ADMIN — VANCOMYCIN HYDROCHLORIDE 750 MG: 750 INJECTION, SOLUTION INTRAVENOUS at 08:52

## 2021-01-01 RX ADMIN — INSULIN LISPRO 2 UNITS: 100 INJECTION, SOLUTION INTRAVENOUS; SUBCUTANEOUS at 21:50

## 2021-01-01 RX ADMIN — Medication 235 MG: at 10:37

## 2021-01-01 RX ADMIN — CEFEPIME HYDROCHLORIDE 2000 MG: 2 INJECTION, SOLUTION INTRAVENOUS at 03:58

## 2021-01-01 RX ADMIN — CEFAZOLIN SODIUM 1000 MG: 1 SOLUTION INTRAVENOUS at 04:38

## 2021-01-01 RX ADMIN — SODIUM CHLORIDE 20 ML/HR: 0.9 INJECTION, SOLUTION INTRAVENOUS at 14:45

## 2021-01-01 RX ADMIN — INSULIN LISPRO 2 UNITS: 100 INJECTION, SOLUTION INTRAVENOUS; SUBCUTANEOUS at 16:05

## 2021-01-01 RX ADMIN — DEXAMETHASONE SODIUM PHOSPHATE: 10 INJECTION, SOLUTION INTRAMUSCULAR; INTRAVENOUS at 09:10

## 2021-01-01 RX ADMIN — DEXAMETHASONE SODIUM PHOSPHATE: 10 INJECTION, SOLUTION INTRAMUSCULAR; INTRAVENOUS at 10:10

## 2021-01-01 RX ADMIN — RIVAROXABAN 15 MG: 15 TABLET, FILM COATED ORAL at 17:08

## 2021-01-01 RX ADMIN — CEFEPIME HYDROCHLORIDE 2000 MG: 2 INJECTION, SOLUTION INTRAVENOUS at 04:00

## 2021-01-01 RX ADMIN — RIVAROXABAN 15 MG: 15 TABLET, FILM COATED ORAL at 08:03

## 2021-01-01 RX ADMIN — INSULIN LISPRO 2 UNITS: 100 INJECTION, SOLUTION INTRAVENOUS; SUBCUTANEOUS at 17:05

## 2021-01-01 RX ADMIN — INSULIN LISPRO 1 UNITS: 100 INJECTION, SOLUTION INTRAVENOUS; SUBCUTANEOUS at 21:17

## 2021-01-01 RX ADMIN — SODIUM CHLORIDE 75 ML/HR: 0.9 INJECTION, SOLUTION INTRAVENOUS at 14:43

## 2021-01-01 RX ADMIN — SODIUM CHLORIDE 2 G: 1 TABLET ORAL at 08:05

## 2021-01-01 RX ADMIN — ACETAMINOPHEN 650 MG: 325 TABLET, FILM COATED ORAL at 13:56

## 2021-01-01 RX ADMIN — LORAZEPAM 0.25 MG: 0.5 TABLET ORAL at 09:48

## 2021-01-01 RX ADMIN — INSULIN LISPRO 3 UNITS: 100 INJECTION, SOLUTION INTRAVENOUS; SUBCUTANEOUS at 11:21

## 2021-01-01 RX ADMIN — INSULIN LISPRO 2 UNITS: 100 INJECTION, SOLUTION INTRAVENOUS; SUBCUTANEOUS at 11:32

## 2021-01-01 RX ADMIN — SODIUM CHLORIDE, SODIUM GLUCONATE, SODIUM ACETATE, POTASSIUM CHLORIDE, MAGNESIUM CHLORIDE, SODIUM PHOSPHATE, DIBASIC, AND POTASSIUM PHOSPHATE 1000 ML: .53; .5; .37; .037; .03; .012; .00082 INJECTION, SOLUTION INTRAVENOUS at 20:03

## 2021-01-01 RX ADMIN — GEMCITABINE 720.2 MG: 38 INJECTION, SOLUTION INTRAVENOUS at 11:21

## 2021-01-01 RX ADMIN — SODIUM CHLORIDE 1 G: 1 TABLET ORAL at 08:32

## 2021-01-01 RX ADMIN — VANCOMYCIN HYDROCHLORIDE 750 MG: 750 INJECTION, SOLUTION INTRAVENOUS at 08:04

## 2021-01-01 RX ADMIN — CEFTRIAXONE 1000 MG: 1 INJECTION, SOLUTION INTRAVENOUS at 21:24

## 2021-01-01 RX ADMIN — INSULIN LISPRO 3 UNITS: 100 INJECTION, SOLUTION INTRAVENOUS; SUBCUTANEOUS at 16:51

## 2021-01-01 RX ADMIN — SODIUM CHLORIDE 1 G: 1 TABLET ORAL at 16:04

## 2021-01-01 RX ADMIN — AMLODIPINE BESYLATE 2.5 MG: 2.5 TABLET ORAL at 09:03

## 2021-01-01 RX ADMIN — LISINOPRIL 40 MG: 20 TABLET ORAL at 08:17

## 2021-01-01 RX ADMIN — FUROSEMIDE 40 MG: 10 INJECTION, SOLUTION INTRAMUSCULAR; INTRAVENOUS at 21:44

## 2021-01-01 RX ADMIN — ACETAMINOPHEN 650 MG: 325 TABLET, FILM COATED ORAL at 10:12

## 2021-01-01 RX ADMIN — INSULIN LISPRO 1 UNITS: 100 INJECTION, SOLUTION INTRAVENOUS; SUBCUTANEOUS at 08:02

## 2021-01-01 RX ADMIN — RIVAROXABAN 15 MG: 15 TABLET, FILM COATED ORAL at 15:43

## 2021-01-01 RX ADMIN — FENTANYL CITRATE 50 MCG: 50 INJECTION, SOLUTION INTRAMUSCULAR; INTRAVENOUS at 11:40

## 2021-01-01 RX ADMIN — METRONIDAZOLE 500 MG: 500 INJECTION, SOLUTION INTRAVENOUS at 11:47

## 2021-01-01 RX ADMIN — IOHEXOL 100 ML: 350 INJECTION, SOLUTION INTRAVENOUS at 21:49

## 2021-01-01 RX ADMIN — INSULIN LISPRO 3 UNITS: 100 INJECTION, SOLUTION INTRAVENOUS; SUBCUTANEOUS at 11:53

## 2021-01-01 RX ADMIN — INSULIN GLARGINE 10 UNITS: 100 INJECTION, SOLUTION SUBCUTANEOUS at 08:03

## 2021-01-01 RX ADMIN — INSULIN LISPRO 3 UNITS: 100 INJECTION, SOLUTION INTRAVENOUS; SUBCUTANEOUS at 08:21

## 2021-01-01 RX ADMIN — CEFEPIME HYDROCHLORIDE 2000 MG: 2 INJECTION, SOLUTION INTRAVENOUS at 14:33

## 2021-01-01 RX ADMIN — FENTANYL CITRATE 25 MCG: 50 INJECTION, SOLUTION INTRAMUSCULAR; INTRAVENOUS at 12:05

## 2021-01-01 RX ADMIN — SODIUM CHLORIDE 2 G: 1 TABLET ORAL at 17:32

## 2021-01-01 RX ADMIN — INSULIN LISPRO 2 UNITS: 100 INJECTION, SOLUTION INTRAVENOUS; SUBCUTANEOUS at 11:52

## 2021-01-01 RX ADMIN — CALCIUM GLUCONATE 1 G: 20 INJECTION, SOLUTION INTRAVENOUS at 12:16

## 2021-01-01 RX ADMIN — PHENYLEPHRINE HYDROCHLORIDE 200 MCG: 10 INJECTION INTRAVENOUS at 10:55

## 2021-01-01 RX ADMIN — INSULIN GLARGINE 15 UNITS: 100 INJECTION, SOLUTION SUBCUTANEOUS at 21:50

## 2021-01-01 RX ADMIN — INSULIN LISPRO 6 UNITS: 100 INJECTION, SOLUTION INTRAVENOUS; SUBCUTANEOUS at 17:31

## 2021-01-01 RX ADMIN — INSULIN LISPRO 6 UNITS: 100 INJECTION, SOLUTION INTRAVENOUS; SUBCUTANEOUS at 16:11

## 2021-01-01 RX ADMIN — INSULIN LISPRO 5 UNITS: 100 INJECTION, SOLUTION INTRAVENOUS; SUBCUTANEOUS at 08:02

## 2021-01-01 RX ADMIN — ASPIRIN 81 MG CHEWABLE TABLET 81 MG: 81 TABLET CHEWABLE at 09:05

## 2021-01-01 RX ADMIN — RIVAROXABAN 15 MG: 15 TABLET, FILM COATED ORAL at 09:02

## 2021-01-01 RX ADMIN — PROPOFOL 40 MG: 10 INJECTION, EMULSION INTRAVENOUS at 11:49

## 2021-01-01 RX ADMIN — DEXAMETHASONE SODIUM PHOSPHATE: 10 INJECTION, SOLUTION INTRAMUSCULAR; INTRAVENOUS at 09:46

## 2021-01-01 RX ADMIN — DEXAMETHASONE SODIUM PHOSPHATE: 10 INJECTION, SOLUTION INTRAMUSCULAR; INTRAVENOUS at 13:52

## 2021-01-01 RX ADMIN — Medication 188 MG: at 10:45

## 2021-01-01 RX ADMIN — POLYETHYLENE GLYCOL 3350 17 G: 17 POWDER, FOR SOLUTION ORAL at 08:39

## 2021-01-01 RX ADMIN — Medication 250 MG: at 09:49

## 2021-01-01 RX ADMIN — LISINOPRIL 40 MG: 20 TABLET ORAL at 08:04

## 2021-01-01 RX ADMIN — INSULIN GLARGINE 18 UNITS: 100 INJECTION, SOLUTION SUBCUTANEOUS at 21:17

## 2021-01-01 RX ADMIN — FUROSEMIDE 40 MG: 10 INJECTION, SOLUTION INTRAVENOUS at 13:36

## 2021-01-01 RX ADMIN — METOPROLOL SUCCINATE 100 MG: 50 TABLET, EXTENDED RELEASE ORAL at 08:07

## 2021-01-01 RX ADMIN — IOHEXOL 85 ML: 350 INJECTION, SOLUTION INTRAVENOUS at 14:16

## 2021-01-01 RX ADMIN — FENTANYL CITRATE 25 MCG: 50 INJECTION, SOLUTION INTRAMUSCULAR; INTRAVENOUS at 12:07

## 2021-01-01 RX ADMIN — ASPIRIN 81 MG CHEWABLE TABLET 81 MG: 81 TABLET CHEWABLE at 09:03

## 2021-01-01 RX ADMIN — ESCITALOPRAM 5 MG: 5 TABLET, FILM COATED ORAL at 09:47

## 2021-01-01 RX ADMIN — ATORVASTATIN CALCIUM 40 MG: 40 TABLET, FILM COATED ORAL at 16:44

## 2021-01-01 RX ADMIN — SODIUM CHLORIDE 1 G: 1 TABLET ORAL at 12:20

## 2021-01-01 RX ADMIN — AMLODIPINE BESYLATE 2.5 MG: 2.5 TABLET ORAL at 08:01

## 2021-01-01 RX ADMIN — PROPOFOL 80 MG: 10 INJECTION, EMULSION INTRAVENOUS at 10:18

## 2021-01-01 RX ADMIN — INSULIN LISPRO 1 UNITS: 100 INJECTION, SOLUTION INTRAVENOUS; SUBCUTANEOUS at 12:15

## 2021-01-01 RX ADMIN — INSULIN LISPRO 10 UNITS: 100 INJECTION, SOLUTION INTRAVENOUS; SUBCUTANEOUS at 15:55

## 2021-01-01 RX ADMIN — SODIUM CHLORIDE, SODIUM LACTATE, POTASSIUM CHLORIDE, AND CALCIUM CHLORIDE: .6; .31; .03; .02 INJECTION, SOLUTION INTRAVENOUS at 10:59

## 2021-01-01 RX ADMIN — RIVAROXABAN 15 MG: 15 TABLET, FILM COATED ORAL at 07:54

## 2021-01-01 RX ADMIN — RIVAROXABAN 15 MG: 15 TABLET, FILM COATED ORAL at 09:25

## 2021-01-01 RX ADMIN — INSULIN LISPRO 3 UNITS: 100 INJECTION, SOLUTION INTRAVENOUS; SUBCUTANEOUS at 12:47

## 2021-01-01 RX ADMIN — IOHEXOL 100 ML: 350 INJECTION, SOLUTION INTRAVENOUS at 20:16

## 2021-01-01 RX ADMIN — INSULIN LISPRO 4 UNITS: 100 INJECTION, SOLUTION INTRAVENOUS; SUBCUTANEOUS at 21:09

## 2021-01-01 RX ADMIN — SODIUM CHLORIDE 50 ML/HR: 0.9 INJECTION, SOLUTION INTRAVENOUS at 08:53

## 2021-01-01 RX ADMIN — SODIUM CHLORIDE 1000 ML: 0.9 INJECTION, SOLUTION INTRAVENOUS at 21:44

## 2021-01-01 RX ADMIN — INSULIN LISPRO 2 UNITS: 100 INJECTION, SOLUTION INTRAVENOUS; SUBCUTANEOUS at 21:17

## 2021-01-01 RX ADMIN — SODIUM CHLORIDE 75 ML/HR: 0.9 INJECTION, SOLUTION INTRAVENOUS at 13:01

## 2021-01-01 RX ADMIN — RIVAROXABAN 15 MG: 15 TABLET, FILM COATED ORAL at 17:01

## 2021-01-01 RX ADMIN — GEMCITABINE 1800 MG: 38 INJECTION, SOLUTION INTRAVENOUS at 11:48

## 2021-01-01 RX ADMIN — AMLODIPINE BESYLATE 2.5 MG: 2.5 TABLET ORAL at 08:21

## 2021-01-01 RX ADMIN — GLIMEPIRIDE 4 MG: 2 TABLET ORAL at 13:21

## 2021-01-01 RX ADMIN — CEFEPIME HYDROCHLORIDE 2000 MG: 2 INJECTION, SOLUTION INTRAVENOUS at 02:54

## 2021-01-01 RX ADMIN — ATORVASTATIN CALCIUM 40 MG: 40 TABLET, FILM COATED ORAL at 17:08

## 2021-01-01 RX ADMIN — RIVAROXABAN 15 MG: 15 TABLET, FILM COATED ORAL at 08:23

## 2021-01-01 RX ADMIN — METRONIDAZOLE 500 MG: 500 INJECTION, SOLUTION INTRAVENOUS at 04:17

## 2021-01-01 RX ADMIN — INSULIN LISPRO 8 UNITS: 100 INJECTION, SOLUTION INTRAVENOUS; SUBCUTANEOUS at 08:11

## 2021-01-01 RX ADMIN — VANCOMYCIN HYDROCHLORIDE 750 MG: 750 INJECTION, SOLUTION INTRAVENOUS at 03:32

## 2021-01-01 RX ADMIN — ACETAMINOPHEN 650 MG: 325 TABLET, FILM COATED ORAL at 09:47

## 2021-01-01 RX ADMIN — VANCOMYCIN HYDROCHLORIDE 750 MG: 750 INJECTION, SOLUTION INTRAVENOUS at 15:45

## 2021-01-01 RX ADMIN — RIVAROXABAN 15 MG: 15 TABLET, FILM COATED ORAL at 16:44

## 2021-01-01 RX ADMIN — INSULIN GLARGINE 10 UNITS: 100 INJECTION, SOLUTION SUBCUTANEOUS at 21:58

## 2021-01-01 RX ADMIN — ASPIRIN 81 MG CHEWABLE TABLET 81 MG: 81 TABLET CHEWABLE at 08:02

## 2021-01-01 RX ADMIN — INSULIN GLARGINE 18 UNITS: 100 INJECTION, SOLUTION SUBCUTANEOUS at 23:03

## 2021-01-01 RX ADMIN — ASPIRIN 81 MG CHEWABLE TABLET 81 MG: 81 TABLET CHEWABLE at 09:47

## 2021-01-01 RX ADMIN — DEXAMETHASONE SODIUM PHOSPHATE 10 MG: 10 INJECTION, SOLUTION INTRAMUSCULAR; INTRAVENOUS at 14:45

## 2021-01-01 RX ADMIN — INSULIN LISPRO 5 UNITS: 100 INJECTION, SOLUTION INTRAVENOUS; SUBCUTANEOUS at 17:09

## 2021-01-01 RX ADMIN — INSULIN LISPRO 8 UNITS: 100 INJECTION, SOLUTION INTRAVENOUS; SUBCUTANEOUS at 09:47

## 2021-01-01 RX ADMIN — SODIUM CHLORIDE 20 ML/HR: 0.9 INJECTION, SOLUTION INTRAVENOUS at 09:07

## 2021-01-01 RX ADMIN — INSULIN LISPRO 1 UNITS: 100 INJECTION, SOLUTION INTRAVENOUS; SUBCUTANEOUS at 17:14

## 2021-01-01 RX ADMIN — METOPROLOL SUCCINATE 100 MG: 50 TABLET, EXTENDED RELEASE ORAL at 08:01

## 2021-01-01 RX ADMIN — GEMCITABINE 1504.2 MG: 38 INJECTION, SOLUTION INTRAVENOUS at 14:40

## 2021-01-01 RX ADMIN — IOHEXOL 100 ML: 350 INJECTION, SOLUTION INTRAVENOUS at 17:08

## 2021-01-01 RX ADMIN — INSULIN LISPRO 1 UNITS: 100 INJECTION, SOLUTION INTRAVENOUS; SUBCUTANEOUS at 07:58

## 2021-01-01 RX ADMIN — ATORVASTATIN CALCIUM 40 MG: 40 TABLET, FILM COATED ORAL at 17:07

## 2021-01-01 RX ADMIN — INSULIN LISPRO 2 UNITS: 100 INJECTION, SOLUTION INTRAVENOUS; SUBCUTANEOUS at 11:56

## 2021-01-01 RX ADMIN — ESCITALOPRAM 5 MG: 5 TABLET, FILM COATED ORAL at 08:04

## 2021-01-01 RX ADMIN — SODIUM CHLORIDE 1 G: 1 TABLET ORAL at 13:55

## 2021-01-01 RX ADMIN — INSULIN LISPRO 1 UNITS: 100 INJECTION, SOLUTION INTRAVENOUS; SUBCUTANEOUS at 17:26

## 2021-01-01 RX ADMIN — RIVAROXABAN 15 MG: 15 TABLET, FILM COATED ORAL at 17:17

## 2021-01-01 RX ADMIN — INSULIN LISPRO 6 UNITS: 100 INJECTION, SOLUTION INTRAVENOUS; SUBCUTANEOUS at 12:44

## 2021-01-01 RX ADMIN — ACETAMINOPHEN 650 MG: 325 TABLET, FILM COATED ORAL at 22:09

## 2021-01-01 RX ADMIN — RIVAROXABAN 15 MG: 15 TABLET, FILM COATED ORAL at 16:50

## 2021-01-01 RX ADMIN — INSULIN LISPRO 2 UNITS: 100 INJECTION, SOLUTION INTRAVENOUS; SUBCUTANEOUS at 11:43

## 2021-01-01 RX ADMIN — ASPIRIN 81 MG: 81 TABLET, CHEWABLE ORAL at 11:52

## 2021-01-01 RX ADMIN — PREDNISONE 10 MG: 10 TABLET ORAL at 08:32

## 2021-01-01 RX ADMIN — PEGFILGRASTIM 6 MG: KIT SUBCUTANEOUS at 10:49

## 2021-01-01 RX ADMIN — INSULIN LISPRO 3 UNITS: 100 INJECTION, SOLUTION INTRAVENOUS; SUBCUTANEOUS at 11:05

## 2021-01-01 RX ADMIN — INSULIN LISPRO 3 UNITS: 100 INJECTION, SOLUTION INTRAVENOUS; SUBCUTANEOUS at 13:01

## 2021-01-01 RX ADMIN — ASPIRIN 81 MG CHEWABLE TABLET 81 MG: 81 TABLET CHEWABLE at 09:25

## 2021-01-01 RX ADMIN — INSULIN LISPRO 2 UNITS: 100 INJECTION, SOLUTION INTRAVENOUS; SUBCUTANEOUS at 07:39

## 2021-01-01 RX ADMIN — ATORVASTATIN CALCIUM 40 MG: 40 TABLET, FILM COATED ORAL at 18:16

## 2021-01-01 RX ADMIN — VANCOMYCIN HYDROCHLORIDE 750 MG: 750 INJECTION, SOLUTION INTRAVENOUS at 15:32

## 2021-01-01 RX ADMIN — INSULIN GLARGINE 15 UNITS: 100 INJECTION, SOLUTION SUBCUTANEOUS at 21:17

## 2021-01-01 RX ADMIN — RIVAROXABAN 15 MG: 15 TABLET, FILM COATED ORAL at 08:01

## 2021-01-01 RX ADMIN — CEFAZOLIN SODIUM 1000 MG: 1 SOLUTION INTRAVENOUS at 20:30

## 2021-01-01 RX ADMIN — ALTEPLASE 2 MG: 2.2 INJECTION, POWDER, LYOPHILIZED, FOR SOLUTION INTRAVENOUS at 12:03

## 2021-01-01 RX ADMIN — ACETAMINOPHEN 975 MG: 325 TABLET, FILM COATED ORAL at 05:07

## 2021-01-01 RX ADMIN — ASPIRIN 81 MG CHEWABLE TABLET 81 MG: 81 TABLET CHEWABLE at 08:05

## 2021-01-01 RX ADMIN — INSULIN LISPRO 1 UNITS: 100 INJECTION, SOLUTION INTRAVENOUS; SUBCUTANEOUS at 11:53

## 2021-01-01 RX ADMIN — INSULIN LISPRO 5 UNITS: 100 INJECTION, SOLUTION INTRAVENOUS; SUBCUTANEOUS at 11:48

## 2021-01-01 RX ADMIN — INSULIN LISPRO 1 UNITS: 100 INJECTION, SOLUTION INTRAVENOUS; SUBCUTANEOUS at 08:11

## 2021-01-01 RX ADMIN — INSULIN GLARGINE 10 UNITS: 100 INJECTION, SOLUTION SUBCUTANEOUS at 21:20

## 2021-01-01 RX ADMIN — SODIUM CHLORIDE 20 ML/HR: 0.9 INJECTION, SOLUTION INTRAVENOUS at 10:47

## 2021-01-01 RX ADMIN — ASPIRIN 81 MG CHEWABLE TABLET 81 MG: 81 TABLET CHEWABLE at 08:45

## 2021-01-01 RX ADMIN — ASPIRIN 81 MG CHEWABLE TABLET 81 MG: 81 TABLET CHEWABLE at 08:01

## 2021-01-01 RX ADMIN — CEFUROXIME AXETIL 500 MG: 250 TABLET ORAL at 21:22

## 2021-01-01 RX ADMIN — INSULIN GLARGINE 12 UNITS: 100 INJECTION, SOLUTION SUBCUTANEOUS at 21:25

## 2021-01-01 RX ADMIN — INSULIN LISPRO 2 UNITS: 100 INJECTION, SOLUTION INTRAVENOUS; SUBCUTANEOUS at 17:09

## 2021-01-01 RX ADMIN — Medication 6 MG: at 21:57

## 2021-01-01 RX ADMIN — Medication 250 MG: at 18:22

## 2021-01-01 RX ADMIN — Medication 188 MG: at 10:32

## 2021-01-01 RX ADMIN — LISINOPRIL 40 MG: 20 TABLET ORAL at 08:13

## 2021-01-01 RX ADMIN — DEXAMETHASONE SODIUM PHOSPHATE: 10 INJECTION, SOLUTION INTRAMUSCULAR; INTRAVENOUS at 08:55

## 2021-01-01 RX ADMIN — ACETAMINOPHEN 975 MG: 325 TABLET, FILM COATED ORAL at 21:19

## 2021-01-01 RX ADMIN — SODIUM CHLORIDE 20 ML/HR: 9 INJECTION, SOLUTION INTRAVENOUS at 10:57

## 2021-01-01 RX ADMIN — ASPIRIN 81 MG CHEWABLE TABLET 81 MG: 81 TABLET CHEWABLE at 08:00

## 2021-01-01 RX ADMIN — INSULIN LISPRO 6 UNITS: 100 INJECTION, SOLUTION INTRAVENOUS; SUBCUTANEOUS at 12:45

## 2021-01-01 RX ADMIN — SODIUM BICARBONATE 50 MEQ: 84 INJECTION INTRAVENOUS at 12:14

## 2021-01-01 RX ADMIN — INSULIN GLARGINE 20 UNITS: 100 INJECTION, SOLUTION SUBCUTANEOUS at 21:08

## 2021-01-01 RX ADMIN — INSULIN GLARGINE 8 UNITS: 100 INJECTION, SOLUTION SUBCUTANEOUS at 21:30

## 2021-01-01 RX ADMIN — ALUMINUM HYDROXIDE, MAGNESIUM HYDROXIDE, AND SIMETHICONE 30 ML: 200; 200; 20 SUSPENSION ORAL at 02:18

## 2021-01-01 RX ADMIN — SODIUM CHLORIDE, SODIUM GLUCONATE, SODIUM ACETATE, POTASSIUM CHLORIDE, MAGNESIUM CHLORIDE, SODIUM PHOSPHATE, DIBASIC, AND POTASSIUM PHOSPHATE 1000 ML: .53; .5; .37; .037; .03; .012; .00082 INJECTION, SOLUTION INTRAVENOUS at 20:53

## 2021-01-01 RX ADMIN — SODIUM CHLORIDE, SODIUM GLUCONATE, SODIUM ACETATE, POTASSIUM CHLORIDE, MAGNESIUM CHLORIDE, SODIUM PHOSPHATE, DIBASIC, AND POTASSIUM PHOSPHATE 75 ML/HR: .53; .5; .37; .037; .03; .012; .00082 INJECTION, SOLUTION INTRAVENOUS at 13:40

## 2021-01-01 RX ADMIN — INSULIN LISPRO 1 UNITS: 100 INJECTION, SOLUTION INTRAVENOUS; SUBCUTANEOUS at 09:26

## 2021-01-01 RX ADMIN — ENOXAPARIN SODIUM 40 MG: 40 INJECTION SUBCUTANEOUS at 08:45

## 2021-01-01 RX ADMIN — ATORVASTATIN CALCIUM 40 MG: 40 TABLET, FILM COATED ORAL at 21:57

## 2021-01-01 RX ADMIN — INSULIN LISPRO 6 UNITS: 100 INJECTION, SOLUTION INTRAVENOUS; SUBCUTANEOUS at 21:58

## 2021-01-01 RX ADMIN — INSULIN GLARGINE 15 UNITS: 100 INJECTION, SOLUTION SUBCUTANEOUS at 22:52

## 2021-01-01 RX ADMIN — INSULIN LISPRO 5 UNITS: 100 INJECTION, SOLUTION INTRAVENOUS; SUBCUTANEOUS at 16:04

## 2021-01-01 RX ADMIN — AMLODIPINE BESYLATE 2.5 MG: 2.5 TABLET ORAL at 08:00

## 2021-01-01 RX ADMIN — PROPOFOL 120 MG: 10 INJECTION, EMULSION INTRAVENOUS at 11:48

## 2021-01-01 RX ADMIN — METOPROLOL SUCCINATE 100 MG: 50 TABLET, EXTENDED RELEASE ORAL at 08:45

## 2021-01-01 RX ADMIN — CEFEPIME HYDROCHLORIDE 2000 MG: 2 INJECTION, SOLUTION INTRAVENOUS at 12:46

## 2021-01-01 RX ADMIN — INSULIN LISPRO 2 UNITS: 100 INJECTION, SOLUTION INTRAVENOUS; SUBCUTANEOUS at 12:33

## 2021-01-01 RX ADMIN — SODIUM CHLORIDE 20 ML/HR: 0.9 INJECTION, SOLUTION INTRAVENOUS at 12:58

## 2021-01-01 RX ADMIN — AMLODIPINE BESYLATE 2.5 MG: 2.5 TABLET ORAL at 08:02

## 2021-01-01 RX ADMIN — SODIUM CHLORIDE 100 ML/HR: 0.9 INJECTION, SOLUTION INTRAVENOUS at 13:39

## 2021-01-01 RX ADMIN — MELATONIN TAB 3 MG 6 MG: 3 TAB at 21:17

## 2021-01-01 RX ADMIN — SODIUM CHLORIDE 100 ML/HR: 0.9 INJECTION, SOLUTION INTRAVENOUS at 02:45

## 2021-01-01 RX ADMIN — ATORVASTATIN CALCIUM 40 MG: 40 TABLET, FILM COATED ORAL at 17:04

## 2021-01-01 RX ADMIN — INSULIN LISPRO 3 UNITS: 100 INJECTION, SOLUTION INTRAVENOUS; SUBCUTANEOUS at 08:52

## 2021-01-01 RX ADMIN — INSULIN LISPRO 2 UNITS: 100 INJECTION, SOLUTION INTRAVENOUS; SUBCUTANEOUS at 16:44

## 2021-01-01 RX ADMIN — GEMCITABINE 1504.2 MG: 38 INJECTION, SOLUTION INTRAVENOUS at 10:17

## 2021-01-01 RX ADMIN — INSULIN LISPRO 5 UNITS: 100 INJECTION, SOLUTION INTRAVENOUS; SUBCUTANEOUS at 12:24

## 2021-01-01 RX ADMIN — INSULIN LISPRO 5 UNITS: 100 INJECTION, SOLUTION INTRAVENOUS; SUBCUTANEOUS at 08:03

## 2021-01-01 RX ADMIN — AMLODIPINE BESYLATE 2.5 MG: 2.5 TABLET ORAL at 10:47

## 2021-01-01 RX ADMIN — Medication 235 MG: at 09:49

## 2021-01-01 RX ADMIN — INSULIN LISPRO 4 UNITS: 100 INJECTION, SOLUTION INTRAVENOUS; SUBCUTANEOUS at 21:23

## 2021-01-01 RX ADMIN — ACETAMINOPHEN 650 MG: 325 TABLET, FILM COATED ORAL at 09:29

## 2021-01-01 RX ADMIN — SODIUM CHLORIDE 2 G: 1 TABLET ORAL at 12:46

## 2021-01-01 RX ADMIN — METOPROLOL SUCCINATE 100 MG: 50 TABLET, EXTENDED RELEASE ORAL at 08:00

## 2021-01-01 RX ADMIN — INSULIN LISPRO 2 UNITS: 100 INJECTION, SOLUTION INTRAVENOUS; SUBCUTANEOUS at 11:20

## 2021-01-01 RX ADMIN — GEMCITABINE 1504.2 MG: 38 INJECTION, SOLUTION INTRAVENOUS at 12:01

## 2021-01-01 RX ADMIN — SENNOSIDES AND DOCUSATE SODIUM 2 TABLET: 8.6; 5 TABLET ORAL at 17:07

## 2021-01-01 RX ADMIN — SODIUM CHLORIDE 20 ML/HR: 9 INJECTION, SOLUTION INTRAVENOUS at 09:45

## 2021-01-01 RX ADMIN — ATORVASTATIN CALCIUM 40 MG: 40 TABLET, FILM COATED ORAL at 17:01

## 2021-01-01 RX ADMIN — CEFAZOLIN SODIUM 1000 MG: 1 SOLUTION INTRAVENOUS at 20:33

## 2021-01-01 RX ADMIN — Medication 6 MG: at 21:20

## 2021-01-01 RX ADMIN — DEXAMETHASONE SODIUM PHOSPHATE 10 MG: 10 INJECTION, SOLUTION INTRAMUSCULAR; INTRAVENOUS at 10:47

## 2021-01-01 RX ADMIN — CEFEPIME HYDROCHLORIDE 2000 MG: 2 INJECTION, SOLUTION INTRAVENOUS at 15:16

## 2021-01-01 RX ADMIN — GEMCITABINE 1504.2 MG: 38 INJECTION, SOLUTION INTRAVENOUS at 10:14

## 2021-01-01 RX ADMIN — SODIUM CHLORIDE 20 ML/HR: 0.9 INJECTION, SOLUTION INTRAVENOUS at 09:36

## 2021-01-01 RX ADMIN — ATORVASTATIN CALCIUM 40 MG: 40 TABLET, FILM COATED ORAL at 17:30

## 2021-01-01 RX ADMIN — EPHEDRINE SULFATE 10 MG: 50 INJECTION, SOLUTION INTRAVENOUS at 12:05

## 2021-01-01 RX ADMIN — ASPIRIN 81 MG CHEWABLE TABLET 81 MG: 81 TABLET CHEWABLE at 08:47

## 2021-01-01 RX ADMIN — INSULIN LISPRO 10 UNITS: 100 INJECTION, SOLUTION INTRAVENOUS; SUBCUTANEOUS at 21:58

## 2021-01-01 RX ADMIN — SODIUM CHLORIDE 75 ML/HR: 0.9 INJECTION, SOLUTION INTRAVENOUS at 23:18

## 2021-01-01 RX ADMIN — INSULIN GLARGINE 20 UNITS: 100 INJECTION, SOLUTION SUBCUTANEOUS at 22:09

## 2021-01-01 RX ADMIN — INSULIN LISPRO 5 UNITS: 100 INJECTION, SOLUTION INTRAVENOUS; SUBCUTANEOUS at 08:06

## 2021-01-01 RX ADMIN — GEMCITABINE 1504.2 MG: 38 INJECTION, SOLUTION INTRAVENOUS at 11:46

## 2021-01-01 RX ADMIN — CEFTRIAXONE 2000 MG: 2 INJECTION, SOLUTION INTRAVENOUS at 18:16

## 2021-01-01 RX ADMIN — ACETAMINOPHEN 650 MG: 325 TABLET, FILM COATED ORAL at 11:26

## 2021-01-01 RX ADMIN — GEMCITABINE 1800 MG: 38 INJECTION, SOLUTION INTRAVENOUS at 11:05

## 2021-01-01 RX ADMIN — ESCITALOPRAM 5 MG: 5 TABLET, FILM COATED ORAL at 08:23

## 2021-01-01 RX ADMIN — CEFTRIAXONE 2000 MG: 2 INJECTION, SOLUTION INTRAVENOUS at 18:32

## 2021-01-01 RX ADMIN — INSULIN LISPRO 5 UNITS: 100 INJECTION, SOLUTION INTRAVENOUS; SUBCUTANEOUS at 16:12

## 2021-01-01 RX ADMIN — SODIUM CHLORIDE 2 G: 1 TABLET ORAL at 17:08

## 2021-01-01 RX ADMIN — RIVAROXABAN 15 MG: 15 TABLET, FILM COATED ORAL at 17:09

## 2021-01-01 RX ADMIN — ACETAMINOPHEN 650 MG: 325 TABLET, FILM COATED ORAL at 09:36

## 2021-01-01 RX ADMIN — INSULIN LISPRO 5 UNITS: 100 INJECTION, SOLUTION INTRAVENOUS; SUBCUTANEOUS at 17:31

## 2021-01-01 RX ADMIN — METFORMIN HYDROCHLORIDE 850 MG: 850 TABLET, FILM COATED ORAL at 13:21

## 2021-01-01 RX ADMIN — ACETAMINOPHEN 975 MG: 325 TABLET, FILM COATED ORAL at 06:20

## 2021-01-01 RX ADMIN — METOPROLOL SUCCINATE 100 MG: 50 TABLET, EXTENDED RELEASE ORAL at 08:21

## 2021-01-01 RX ADMIN — SODIUM CHLORIDE 50 ML/HR: 0.9 INJECTION, SOLUTION INTRAVENOUS at 14:10

## 2021-01-01 RX ADMIN — IOHEXOL 85 ML: 350 INJECTION, SOLUTION INTRAVENOUS at 16:15

## 2021-01-01 RX ADMIN — SODIUM CHLORIDE: 0.9 INJECTION, SOLUTION INTRAVENOUS at 10:15

## 2021-01-01 RX ADMIN — ACETAMINOPHEN 975 MG: 325 TABLET, FILM COATED ORAL at 14:07

## 2021-01-01 RX ADMIN — SODIUM CHLORIDE 12 UNITS/HR: 9 INJECTION, SOLUTION INTRAVENOUS at 21:45

## 2021-01-01 RX ADMIN — INSULIN GLARGINE 20 UNITS: 100 INJECTION, SOLUTION SUBCUTANEOUS at 21:42

## 2021-01-01 RX ADMIN — CEFAZOLIN SODIUM 1000 MG: 1 SOLUTION INTRAVENOUS at 03:44

## 2021-01-01 RX ADMIN — LISINOPRIL 40 MG: 20 TABLET ORAL at 08:19

## 2021-01-01 RX ADMIN — PHENYLEPHRINE HYDROCHLORIDE 100 MCG: 10 INJECTION INTRAVENOUS at 10:41

## 2021-01-01 RX ADMIN — INSULIN LISPRO 5 UNITS: 100 INJECTION, SOLUTION INTRAVENOUS; SUBCUTANEOUS at 11:57

## 2021-01-01 RX ADMIN — AMLODIPINE BESYLATE 2.5 MG: 2.5 TABLET ORAL at 08:07

## 2021-01-01 RX ADMIN — INSULIN LISPRO 8 UNITS: 100 INJECTION, SOLUTION INTRAVENOUS; SUBCUTANEOUS at 12:45

## 2021-01-01 RX ADMIN — IOHEXOL 50 ML: 240 INJECTION, SOLUTION INTRATHECAL; INTRAVASCULAR; INTRAVENOUS; ORAL at 15:08

## 2021-01-01 RX ADMIN — ENOXAPARIN SODIUM 40 MG: 40 INJECTION SUBCUTANEOUS at 08:52

## 2021-01-01 RX ADMIN — DEXAMETHASONE SODIUM PHOSPHATE 10 MG: 10 INJECTION, SOLUTION INTRAMUSCULAR; INTRAVENOUS at 12:58

## 2021-01-01 RX ADMIN — RIVAROXABAN 15 MG: 15 TABLET, FILM COATED ORAL at 08:07

## 2021-01-01 RX ADMIN — INSULIN LISPRO 3 UNITS: 100 INJECTION, SOLUTION INTRAVENOUS; SUBCUTANEOUS at 11:47

## 2021-01-01 RX ADMIN — INSULIN LISPRO 3 UNITS: 100 INJECTION, SOLUTION INTRAVENOUS; SUBCUTANEOUS at 16:26

## 2021-01-01 RX ADMIN — CEFAZOLIN SODIUM 1000 MG: 1 SOLUTION INTRAVENOUS at 20:26

## 2021-01-01 RX ADMIN — DEXAMETHASONE SODIUM PHOSPHATE: 10 INJECTION, SOLUTION INTRAMUSCULAR; INTRAVENOUS at 09:28

## 2021-01-01 RX ADMIN — SENNOSIDES AND DOCUSATE SODIUM 2 TABLET: 8.6; 5 TABLET ORAL at 17:05

## 2021-01-01 RX ADMIN — INSULIN LISPRO 5 UNITS: 100 INJECTION, SOLUTION INTRAVENOUS; SUBCUTANEOUS at 13:26

## 2021-01-01 RX ADMIN — ASPIRIN 81 MG CHEWABLE TABLET 81 MG: 81 TABLET CHEWABLE at 08:52

## 2021-01-01 RX ADMIN — SODIUM CHLORIDE 75 ML/HR: 0.9 INJECTION, SOLUTION INTRAVENOUS at 02:38

## 2021-01-01 RX ADMIN — ENOXAPARIN SODIUM 40 MG: 40 INJECTION SUBCUTANEOUS at 08:12

## 2021-01-01 RX ADMIN — INSULIN LISPRO 5 UNITS: 100 INJECTION, SOLUTION INTRAVENOUS; SUBCUTANEOUS at 08:44

## 2021-01-01 RX ADMIN — SODIUM CHLORIDE 2 G: 1 TABLET ORAL at 07:54

## 2021-01-01 RX ADMIN — Medication 188 MG: at 11:18

## 2021-01-01 RX ADMIN — METOPROLOL SUCCINATE 100 MG: 50 TABLET, EXTENDED RELEASE ORAL at 08:03

## 2021-01-01 RX ADMIN — FENTANYL CITRATE 50 MCG: 50 INJECTION INTRAMUSCULAR; INTRAVENOUS at 10:17

## 2021-01-01 RX ADMIN — CEFAZOLIN SODIUM 1000 MG: 1 SOLUTION INTRAVENOUS at 11:27

## 2021-01-01 RX ADMIN — ASPIRIN 81 MG CHEWABLE TABLET 81 MG: 81 TABLET CHEWABLE at 08:17

## 2021-01-01 RX ADMIN — ATORVASTATIN CALCIUM 40 MG: 40 TABLET, FILM COATED ORAL at 08:23

## 2021-01-01 RX ADMIN — VANCOMYCIN HYDROCHLORIDE 750 MG: 750 INJECTION, SOLUTION INTRAVENOUS at 22:43

## 2021-01-01 RX ADMIN — INSULIN LISPRO 5 UNITS: 100 INJECTION, SOLUTION INTRAVENOUS; SUBCUTANEOUS at 09:47

## 2021-01-01 RX ADMIN — ATORVASTATIN CALCIUM 40 MG: 40 TABLET, FILM COATED ORAL at 16:26

## 2021-01-01 RX ADMIN — SODIUM CHLORIDE 1000 ML: 0.9 INJECTION, SOLUTION INTRAVENOUS at 09:49

## 2021-01-01 RX ADMIN — INSULIN LISPRO 2 UNITS: 100 INJECTION, SOLUTION INTRAVENOUS; SUBCUTANEOUS at 17:30

## 2021-01-01 RX ADMIN — CEFAZOLIN SODIUM 1000 MG: 1 SOLUTION INTRAVENOUS at 11:57

## 2021-01-01 RX ADMIN — LIDOCAINE HYDROCHLORIDE 5 ML: 20 INJECTION, SOLUTION INFILTRATION; PERINEURAL at 10:18

## 2021-01-01 RX ADMIN — INSULIN LISPRO 4 UNITS: 100 INJECTION, SOLUTION INTRAVENOUS; SUBCUTANEOUS at 07:39

## 2021-01-01 RX ADMIN — INSULIN LISPRO 5 UNITS: 100 INJECTION, SOLUTION INTRAVENOUS; SUBCUTANEOUS at 08:50

## 2021-01-01 RX ADMIN — ATORVASTATIN CALCIUM 40 MG: 40 TABLET, FILM COATED ORAL at 21:19

## 2021-01-01 RX ADMIN — MELATONIN TAB 3 MG 6 MG: 3 TAB at 23:03

## 2021-01-01 RX ADMIN — INSULIN LISPRO 10 UNITS: 100 INJECTION, SOLUTION INTRAVENOUS; SUBCUTANEOUS at 11:41

## 2021-01-01 RX ADMIN — CEFAZOLIN SODIUM 1000 MG: 1 SOLUTION INTRAVENOUS at 03:36

## 2021-01-01 RX ADMIN — CEFAZOLIN SODIUM 1000 MG: 1 SOLUTION INTRAVENOUS at 04:05

## 2021-01-01 RX ADMIN — POLYETHYLENE GLYCOL 3350 17 G: 17 POWDER, FOR SOLUTION ORAL at 08:01

## 2021-01-01 RX ADMIN — INSULIN LISPRO 1 UNITS: 100 INJECTION, SOLUTION INTRAVENOUS; SUBCUTANEOUS at 12:24

## 2021-01-01 RX ADMIN — CEFAZOLIN SODIUM 1000 MG: 1 SOLUTION INTRAVENOUS at 20:39

## 2021-01-01 RX ADMIN — ACETAMINOPHEN 975 MG: 325 TABLET, FILM COATED ORAL at 13:53

## 2021-01-01 RX ADMIN — CEFTRIAXONE 2000 MG: 2 INJECTION, SOLUTION INTRAVENOUS at 18:45

## 2021-01-01 RX ADMIN — PROPOFOL 120 MCG/KG/MIN: 10 INJECTION, EMULSION INTRAVENOUS at 10:18

## 2021-01-01 RX ADMIN — SODIUM CHLORIDE 2 G: 1 TABLET ORAL at 08:23

## 2021-01-01 RX ADMIN — Medication 250 MG: at 13:53

## 2021-01-01 RX ADMIN — SODIUM CHLORIDE 2 G: 1 TABLET ORAL at 16:10

## 2021-01-01 RX ADMIN — ACETAMINOPHEN 975 MG: 325 TABLET, FILM COATED ORAL at 13:21

## 2021-01-01 RX ADMIN — ASPIRIN 81 MG CHEWABLE TABLET 81 MG: 81 TABLET CHEWABLE at 08:37

## 2021-01-01 RX ADMIN — LISINOPRIL 40 MG: 20 TABLET ORAL at 08:37

## 2021-01-01 RX ADMIN — INSULIN LISPRO 5 UNITS: 100 INJECTION, SOLUTION INTRAVENOUS; SUBCUTANEOUS at 15:53

## 2021-01-01 RX ADMIN — METOPROLOL SUCCINATE 100 MG: 50 TABLET, EXTENDED RELEASE ORAL at 23:17

## 2021-01-01 RX ADMIN — CEFEPIME HYDROCHLORIDE 2000 MG: 2 INJECTION, SOLUTION INTRAVENOUS at 04:08

## 2021-01-01 RX ADMIN — INSULIN LISPRO 3 UNITS: 100 INJECTION, SOLUTION INTRAVENOUS; SUBCUTANEOUS at 11:56

## 2021-01-01 RX ADMIN — SODIUM CHLORIDE 20 ML/HR: 9 INJECTION, SOLUTION INTRAVENOUS at 09:28

## 2021-01-01 RX ADMIN — CEFTRIAXONE 2000 MG: 2 INJECTION, SOLUTION INTRAVENOUS at 18:28

## 2021-01-01 RX ADMIN — ASPIRIN 81 MG CHEWABLE TABLET 81 MG: 81 TABLET CHEWABLE at 08:07

## 2021-01-01 RX ADMIN — ACETAMINOPHEN 650 MG: 325 TABLET, FILM COATED ORAL at 16:25

## 2021-01-01 RX ADMIN — INSULIN LISPRO 5 UNITS: 100 INJECTION, SOLUTION INTRAVENOUS; SUBCUTANEOUS at 11:54

## 2021-01-01 RX ADMIN — INSULIN LISPRO 3 UNITS: 100 INJECTION, SOLUTION INTRAVENOUS; SUBCUTANEOUS at 17:12

## 2021-01-01 RX ADMIN — RIVAROXABAN 15 MG: 15 TABLET, FILM COATED ORAL at 16:17

## 2021-01-01 RX ADMIN — PREDNISONE 20 MG: 20 TABLET ORAL at 08:00

## 2021-01-01 RX ADMIN — METRONIDAZOLE 500 MG: 500 INJECTION, SOLUTION INTRAVENOUS at 19:54

## 2021-01-01 RX ADMIN — MAGNESIUM SULFATE HEPTAHYDRATE 2 G: 40 INJECTION, SOLUTION INTRAVENOUS at 21:44

## 2021-01-01 RX ADMIN — INSULIN LISPRO 5 UNITS: 100 INJECTION, SOLUTION INTRAVENOUS; SUBCUTANEOUS at 11:42

## 2021-01-01 RX ADMIN — INSULIN GLARGINE 20 UNITS: 100 INJECTION, SOLUTION SUBCUTANEOUS at 22:07

## 2021-01-01 RX ADMIN — ESCITALOPRAM 5 MG: 5 TABLET, FILM COATED ORAL at 08:44

## 2021-01-01 RX ADMIN — RIVAROXABAN 15 MG: 15 TABLET, FILM COATED ORAL at 07:59

## 2021-01-01 RX ADMIN — RIVAROXABAN 15 MG: 15 TABLET, FILM COATED ORAL at 17:12

## 2021-01-01 RX ADMIN — VANCOMYCIN HYDROCHLORIDE 750 MG: 750 INJECTION, SOLUTION INTRAVENOUS at 17:03

## 2021-01-01 RX ADMIN — SENNOSIDES AND DOCUSATE SODIUM 2 TABLET: 8.6; 5 TABLET ORAL at 08:32

## 2021-01-01 RX ADMIN — VANCOMYCIN HYDROCHLORIDE 750 MG: 750 INJECTION, SOLUTION INTRAVENOUS at 23:16

## 2021-01-01 RX ADMIN — INSULIN LISPRO 6 UNITS: 100 INJECTION, SOLUTION INTRAVENOUS; SUBCUTANEOUS at 12:40

## 2021-01-01 RX ADMIN — INSULIN LISPRO 2 UNITS: 100 INJECTION, SOLUTION INTRAVENOUS; SUBCUTANEOUS at 17:07

## 2021-01-01 RX ADMIN — ASPIRIN 81 MG: 81 TABLET, CHEWABLE ORAL at 09:02

## 2021-01-01 RX ADMIN — INSULIN LISPRO 5 UNITS: 100 INJECTION, SOLUTION INTRAVENOUS; SUBCUTANEOUS at 16:57

## 2021-01-01 RX ADMIN — INSULIN GLARGINE 12 UNITS: 100 INJECTION, SOLUTION SUBCUTANEOUS at 21:34

## 2021-01-01 RX ADMIN — RIVAROXABAN 15 MG: 15 TABLET, FILM COATED ORAL at 15:53

## 2021-01-01 RX ADMIN — ACETAMINOPHEN 975 MG: 325 TABLET, FILM COATED ORAL at 14:33

## 2021-01-01 RX ADMIN — SODIUM CHLORIDE 1000 ML: 0.9 INJECTION, SOLUTION INTRAVENOUS at 20:13

## 2021-01-01 RX ADMIN — INSULIN LISPRO 4 UNITS: 100 INJECTION, SOLUTION INTRAVENOUS; SUBCUTANEOUS at 16:18

## 2021-01-01 RX ADMIN — GEMCITABINE 720.2 MG: 38 INJECTION, SOLUTION INTRAVENOUS at 13:27

## 2021-01-01 RX ADMIN — ATORVASTATIN CALCIUM 40 MG: 40 TABLET, FILM COATED ORAL at 08:04

## 2021-01-01 RX ADMIN — ACETAMINOPHEN 975 MG: 325 TABLET, FILM COATED ORAL at 21:30

## 2021-01-01 RX ADMIN — LISINOPRIL 40 MG: 20 TABLET ORAL at 08:45

## 2021-01-01 RX ADMIN — CEFTRIAXONE 2000 MG: 2 INJECTION, SOLUTION INTRAVENOUS at 17:49

## 2021-01-01 RX ADMIN — ACETAMINOPHEN 975 MG: 325 TABLET, FILM COATED ORAL at 05:18

## 2021-01-01 RX ADMIN — CEFEPIME HYDROCHLORIDE 2000 MG: 2 INJECTION, SOLUTION INTRAVENOUS at 14:35

## 2021-01-01 RX ADMIN — LIDOCAINE HYDROCHLORIDE,EPINEPHRINE BITARTRATE 20 ML: 10; .01 INJECTION, SOLUTION INFILTRATION; PERINEURAL at 23:32

## 2021-01-01 RX ADMIN — INSULIN GLARGINE 20 UNITS: 100 INJECTION, SOLUTION SUBCUTANEOUS at 21:59

## 2021-01-01 RX ADMIN — SODIUM CHLORIDE 20 ML/HR: 9 INJECTION, SOLUTION INTRAVENOUS at 09:46

## 2021-01-01 RX ADMIN — SODIUM CHLORIDE 75 ML/HR: 0.9 INJECTION, SOLUTION INTRAVENOUS at 02:58

## 2021-01-01 RX ADMIN — ATORVASTATIN CALCIUM 40 MG: 40 TABLET, FILM COATED ORAL at 09:02

## 2021-01-01 RX ADMIN — METOPROLOL SUCCINATE 100 MG: 50 TABLET, EXTENDED RELEASE ORAL at 09:03

## 2021-01-01 RX ADMIN — INSULIN LISPRO 2 UNITS: 100 INJECTION, SOLUTION INTRAVENOUS; SUBCUTANEOUS at 21:06

## 2021-01-01 RX ADMIN — INSULIN LISPRO 3 UNITS: 100 INJECTION, SOLUTION INTRAVENOUS; SUBCUTANEOUS at 11:32

## 2021-01-01 RX ADMIN — INSULIN LISPRO 3 UNITS: 100 INJECTION, SOLUTION INTRAVENOUS; SUBCUTANEOUS at 16:44

## 2021-01-01 RX ADMIN — CEFAZOLIN SODIUM 1000 MG: 1 SOLUTION INTRAVENOUS at 12:15

## 2021-01-01 RX ADMIN — ACETAMINOPHEN 975 MG: 325 TABLET, FILM COATED ORAL at 06:42

## 2021-01-01 RX ADMIN — INSULIN LISPRO 2 UNITS: 100 INJECTION, SOLUTION INTRAVENOUS; SUBCUTANEOUS at 13:01

## 2021-01-01 RX ADMIN — ESCITALOPRAM 5 MG: 5 TABLET, FILM COATED ORAL at 13:55

## 2021-01-01 RX ADMIN — MELATONIN TAB 3 MG 6 MG: 3 TAB at 23:50

## 2021-01-01 RX ADMIN — INSULIN LISPRO 8 UNITS: 100 INJECTION, SOLUTION INTRAVENOUS; SUBCUTANEOUS at 07:39

## 2021-01-01 RX ADMIN — ATORVASTATIN CALCIUM 40 MG: 40 TABLET, FILM COATED ORAL at 17:17

## 2021-01-01 RX ADMIN — PHENYLEPHRINE HYDROCHLORIDE 100 MCG: 10 INJECTION INTRAVENOUS at 12:05

## 2021-01-01 RX ADMIN — PIPERACILLIN SODIUM AND TAZOBACTAM SODIUM 3.38 G: 3; .375 INJECTION, POWDER, LYOPHILIZED, FOR SOLUTION INTRAVENOUS at 20:05

## 2021-01-01 RX ADMIN — ASPIRIN 81 MG CHEWABLE TABLET 81 MG: 81 TABLET CHEWABLE at 08:32

## 2021-01-01 RX ADMIN — ENOXAPARIN SODIUM 40 MG: 40 INJECTION SUBCUTANEOUS at 16:25

## 2021-01-01 RX ADMIN — INSULIN LISPRO 2 UNITS: 100 INJECTION, SOLUTION INTRAVENOUS; SUBCUTANEOUS at 08:50

## 2021-01-01 RX ADMIN — INSULIN LISPRO 4 UNITS: 100 INJECTION, SOLUTION INTRAVENOUS; SUBCUTANEOUS at 11:35

## 2021-01-01 RX ADMIN — ATORVASTATIN CALCIUM 40 MG: 40 TABLET, FILM COATED ORAL at 21:30

## 2021-01-01 RX ADMIN — INSULIN GLARGINE 10 UNITS: 100 INJECTION, SOLUTION SUBCUTANEOUS at 09:02

## 2021-01-01 RX ADMIN — ACETAMINOPHEN 650 MG: 325 TABLET, FILM COATED ORAL at 09:51

## 2021-01-01 RX ADMIN — SODIUM CHLORIDE 20 ML/HR: 0.9 INJECTION, SOLUTION INTRAVENOUS at 10:07

## 2021-01-01 RX ADMIN — CEFEPIME HYDROCHLORIDE 2000 MG: 2 INJECTION, SOLUTION INTRAVENOUS at 15:24

## 2021-01-01 RX ADMIN — VANCOMYCIN HYDROCHLORIDE 1000 MG: 1 INJECTION, SOLUTION INTRAVENOUS at 17:35

## 2021-01-01 RX ADMIN — ATORVASTATIN CALCIUM 40 MG: 40 TABLET, FILM COATED ORAL at 15:43

## 2021-01-01 RX ADMIN — INSULIN LISPRO 2 UNITS: 100 INJECTION, SOLUTION INTRAVENOUS; SUBCUTANEOUS at 07:15

## 2021-01-01 RX ADMIN — INSULIN GLARGINE 15 UNITS: 100 INJECTION, SOLUTION SUBCUTANEOUS at 23:18

## 2021-01-01 RX ADMIN — INSULIN LISPRO 6 UNITS: 100 INJECTION, SOLUTION INTRAVENOUS; SUBCUTANEOUS at 10:58

## 2021-01-01 RX ADMIN — SODIUM CHLORIDE 2 G: 1 TABLET ORAL at 08:46

## 2021-01-01 RX ADMIN — ACETAMINOPHEN 650 MG: 325 TABLET, FILM COATED ORAL at 22:58

## 2021-01-01 RX ADMIN — ESCITALOPRAM 5 MG: 5 TABLET, FILM COATED ORAL at 08:32

## 2021-01-01 RX ADMIN — SODIUM CHLORIDE 2 G: 1 TABLET ORAL at 09:49

## 2021-01-01 RX ADMIN — METOPROLOL SUCCINATE 100 MG: 50 TABLET, EXTENDED RELEASE ORAL at 09:25

## 2021-01-01 RX ADMIN — INSULIN GLARGINE 8 UNITS: 100 INJECTION, SOLUTION SUBCUTANEOUS at 21:43

## 2021-01-01 RX ADMIN — METRONIDAZOLE 500 MG: 500 INJECTION, SOLUTION INTRAVENOUS at 05:08

## 2021-01-01 RX ADMIN — INSULIN LISPRO 5 UNITS: 100 INJECTION, SOLUTION INTRAVENOUS; SUBCUTANEOUS at 17:12

## 2021-01-01 RX ADMIN — ESCITALOPRAM 5 MG: 5 TABLET, FILM COATED ORAL at 08:05

## 2021-01-01 RX ADMIN — MAGNESIUM SULFATE IN WATER 4 G: 40 INJECTION, SOLUTION INTRAVENOUS at 12:57

## 2021-01-01 RX ADMIN — METRONIDAZOLE 500 MG: 500 INJECTION, SOLUTION INTRAVENOUS at 20:35

## 2021-01-01 RX ADMIN — INSULIN LISPRO 4 UNITS: 100 INJECTION, SOLUTION INTRAVENOUS; SUBCUTANEOUS at 21:44

## 2021-01-01 RX ADMIN — INSULIN GLARGINE 8 UNITS: 100 INJECTION, SOLUTION SUBCUTANEOUS at 22:05

## 2021-01-01 RX ADMIN — METOPROLOL SUCCINATE 100 MG: 50 TABLET, EXTENDED RELEASE ORAL at 08:52

## 2021-01-01 RX ADMIN — ATORVASTATIN CALCIUM 40 MG: 40 TABLET, FILM COATED ORAL at 16:50

## 2021-01-01 RX ADMIN — INSULIN LISPRO 1 UNITS: 100 INJECTION, SOLUTION INTRAVENOUS; SUBCUTANEOUS at 11:42

## 2021-01-01 RX ADMIN — VANCOMYCIN HYDROCHLORIDE 750 MG: 750 INJECTION, SOLUTION INTRAVENOUS at 23:09

## 2021-01-01 RX ADMIN — ASPIRIN 81 MG CHEWABLE TABLET 81 MG: 81 TABLET CHEWABLE at 08:19

## 2021-01-01 RX ADMIN — GEMCITABINE 1504.2 MG: 38 INJECTION, SOLUTION INTRAVENOUS at 10:12

## 2021-01-01 RX ADMIN — INSULIN LISPRO 1 UNITS: 100 INJECTION, SOLUTION INTRAVENOUS; SUBCUTANEOUS at 08:03

## 2021-01-01 RX ADMIN — LISINOPRIL 40 MG: 20 TABLET ORAL at 08:23

## 2021-01-01 RX ADMIN — ENOXAPARIN SODIUM 40 MG: 40 INJECTION SUBCUTANEOUS at 08:21

## 2021-01-01 RX ADMIN — INSULIN LISPRO 5 UNITS: 100 INJECTION, SOLUTION INTRAVENOUS; SUBCUTANEOUS at 12:40

## 2021-01-01 RX ADMIN — PROCHLORPERAZINE MALEATE 10 MG: 5 TABLET ORAL at 21:07

## 2021-01-01 RX ADMIN — ATORVASTATIN CALCIUM 40 MG: 40 TABLET, FILM COATED ORAL at 16:57

## 2021-01-01 RX ADMIN — RIVAROXABAN 15 MG: 15 TABLET, FILM COATED ORAL at 15:30

## 2021-01-01 RX ADMIN — Medication 250 MG: at 08:05

## 2021-01-01 RX ADMIN — CEFEPIME HYDROCHLORIDE 2000 MG: 2 INJECTION, SOLUTION INTRAVENOUS at 14:18

## 2021-01-01 RX ADMIN — RIVAROXABAN 15 MG: 15 TABLET, FILM COATED ORAL at 16:36

## 2021-01-01 RX ADMIN — RIVAROXABAN 15 MG: 15 TABLET, FILM COATED ORAL at 16:04

## 2021-01-01 RX ADMIN — CEFEPIME HYDROCHLORIDE 2000 MG: 2 INJECTION, SOLUTION INTRAVENOUS at 03:29

## 2021-01-01 RX ADMIN — HYDRALAZINE HYDROCHLORIDE 5 MG: 20 INJECTION INTRAMUSCULAR; INTRAVENOUS at 14:20

## 2021-01-01 RX ADMIN — DEXAMETHASONE SODIUM PHOSPHATE: 10 INJECTION, SOLUTION INTRAMUSCULAR; INTRAVENOUS at 09:36

## 2021-01-01 RX ADMIN — SODIUM CHLORIDE 2 G: 1 TABLET ORAL at 11:41

## 2021-01-01 RX ADMIN — METOPROLOL SUCCINATE 100 MG: 50 TABLET, EXTENDED RELEASE ORAL at 08:37

## 2021-01-01 RX ADMIN — ACETAMINOPHEN 650 MG: 325 TABLET, FILM COATED ORAL at 08:55

## 2021-01-01 RX ADMIN — ACETAMINOPHEN 975 MG: 325 TABLET, FILM COATED ORAL at 21:57

## 2021-01-01 RX ADMIN — TETANUS TOXOID, REDUCED DIPHTHERIA TOXOID AND ACELLULAR PERTUSSIS VACCINE, ADSORBED 0.5 ML: 5; 2.5; 8; 8; 2.5 SUSPENSION INTRAMUSCULAR at 22:10

## 2021-01-01 RX ADMIN — INSULIN LISPRO 1 UNITS: 100 INJECTION, SOLUTION INTRAVENOUS; SUBCUTANEOUS at 21:23

## 2021-01-01 RX ADMIN — GEMCITABINE 1504.2 MG: 38 INJECTION, SOLUTION INTRAVENOUS at 11:40

## 2021-01-01 RX ADMIN — DEXAMETHASONE SODIUM PHOSPHATE: 10 INJECTION, SOLUTION INTRAMUSCULAR; INTRAVENOUS at 09:29

## 2021-01-01 RX ADMIN — ONDANSETRON 4 MG: 2 INJECTION INTRAMUSCULAR; INTRAVENOUS at 23:14

## 2021-01-01 RX ADMIN — RIVAROXABAN 15 MG: 15 TABLET, FILM COATED ORAL at 17:32

## 2021-01-01 RX ADMIN — ENOXAPARIN SODIUM 40 MG: 40 INJECTION SUBCUTANEOUS at 08:03

## 2021-01-01 RX ADMIN — INSULIN LISPRO 3 UNITS: 100 INJECTION, SOLUTION INTRAVENOUS; SUBCUTANEOUS at 17:58

## 2021-01-01 RX ADMIN — INSULIN LISPRO 5 UNITS: 100 INJECTION, SOLUTION INTRAVENOUS; SUBCUTANEOUS at 08:12

## 2021-01-01 RX ADMIN — INSULIN LISPRO 5 UNITS: 100 INJECTION, SOLUTION INTRAVENOUS; SUBCUTANEOUS at 08:37

## 2021-01-01 RX ADMIN — SODIUM CHLORIDE 500 ML: 0.9 INJECTION, SOLUTION INTRAVENOUS at 09:59

## 2021-01-01 RX ADMIN — METRONIDAZOLE 500 MG: 500 INJECTION, SOLUTION INTRAVENOUS at 11:50

## 2021-01-01 RX ADMIN — CIPROFLOXACIN: 2 INJECTION, SOLUTION INTRAVENOUS at 10:35

## 2021-01-01 RX ADMIN — INSULIN LISPRO 1 UNITS: 100 INJECTION, SOLUTION INTRAVENOUS; SUBCUTANEOUS at 17:01

## 2021-01-01 RX ADMIN — ACETAMINOPHEN 650 MG: 325 TABLET, FILM COATED ORAL at 09:27

## 2021-01-01 RX ADMIN — IOHEXOL 100 ML: 350 INJECTION, SOLUTION INTRAVENOUS at 22:16

## 2021-01-01 RX ADMIN — SODIUM CHLORIDE 1 G: 1 TABLET ORAL at 14:07

## 2021-01-01 RX ADMIN — SODIUM CHLORIDE 20 ML/HR: 0.9 INJECTION, SOLUTION INTRAVENOUS at 10:11

## 2021-01-01 RX ADMIN — CEFTRIAXONE 2000 MG: 2 INJECTION, SOLUTION INTRAVENOUS at 16:36

## 2021-01-01 RX ADMIN — INSULIN GLARGINE 20 UNITS: 100 INJECTION, SOLUTION SUBCUTANEOUS at 22:00

## 2021-01-01 RX ADMIN — SODIUM CHLORIDE 20 ML/HR: 0.9 INJECTION, SOLUTION INTRAVENOUS at 13:51

## 2021-01-01 RX ADMIN — INSULIN LISPRO 3 UNITS: 100 INJECTION, SOLUTION INTRAVENOUS; SUBCUTANEOUS at 09:04

## 2021-01-01 RX ADMIN — METRONIDAZOLE 500 MG: 500 INJECTION, SOLUTION INTRAVENOUS at 12:54

## 2021-01-01 RX ADMIN — POLYETHYLENE GLYCOL 3350 17 G: 17 POWDER, FOR SOLUTION ORAL at 13:55

## 2021-01-01 RX ADMIN — INSULIN LISPRO 1 UNITS: 100 INJECTION, SOLUTION INTRAVENOUS; SUBCUTANEOUS at 09:05

## 2021-01-01 RX ADMIN — PREDNISONE 30 MG: 20 TABLET ORAL at 13:55

## 2021-01-01 RX ADMIN — VANCOMYCIN HYDROCHLORIDE 1250 MG: 1 INJECTION, POWDER, LYOPHILIZED, FOR SOLUTION INTRAVENOUS at 12:47

## 2021-01-01 RX ADMIN — ATORVASTATIN CALCIUM 40 MG: 40 TABLET, FILM COATED ORAL at 16:25

## 2021-01-01 RX ADMIN — SODIUM CHLORIDE 20 ML/HR: 0.9 INJECTION, SOLUTION INTRAVENOUS at 09:48

## 2021-01-01 RX ADMIN — METOPROLOL SUCCINATE 100 MG: 50 TABLET, EXTENDED RELEASE ORAL at 08:19

## 2021-01-01 RX ADMIN — CEFTRIAXONE 2000 MG: 2 INJECTION, SOLUTION INTRAVENOUS at 18:57

## 2021-01-01 RX ADMIN — INSULIN LISPRO 6 UNITS: 100 INJECTION, SOLUTION INTRAVENOUS; SUBCUTANEOUS at 12:10

## 2021-01-01 RX ADMIN — RIVAROXABAN 15 MG: 15 TABLET, FILM COATED ORAL at 07:58

## 2021-01-01 RX ADMIN — ASPIRIN 81 MG CHEWABLE TABLET 81 MG: 81 TABLET CHEWABLE at 08:13

## 2021-01-01 RX ADMIN — INSULIN LISPRO 3 UNITS: 100 INJECTION, SOLUTION INTRAVENOUS; SUBCUTANEOUS at 11:49

## 2021-01-01 RX ADMIN — RIVAROXABAN 15 MG: 15 TABLET, FILM COATED ORAL at 07:35

## 2021-01-01 RX ADMIN — INSULIN LISPRO 2 UNITS: 100 INJECTION, SOLUTION INTRAVENOUS; SUBCUTANEOUS at 18:16

## 2021-01-01 RX ADMIN — ASPIRIN 81 MG: 81 TABLET, CHEWABLE ORAL at 08:03

## 2021-01-01 RX ADMIN — MELATONIN TAB 3 MG 6 MG: 3 TAB at 21:22

## 2021-01-01 RX ADMIN — DEXAMETHASONE SODIUM PHOSPHATE: 10 INJECTION, SOLUTION INTRAMUSCULAR; INTRAVENOUS at 10:57

## 2021-01-01 RX ADMIN — INSULIN LISPRO 5 UNITS: 100 INJECTION, SOLUTION INTRAVENOUS; SUBCUTANEOUS at 11:21

## 2021-01-01 RX ADMIN — SODIUM CHLORIDE 1 G: 1 TABLET ORAL at 11:37

## 2021-01-01 RX ADMIN — METOPROLOL SUCCINATE 100 MG: 50 TABLET, EXTENDED RELEASE ORAL at 09:05

## 2021-01-01 RX ADMIN — INSULIN LISPRO 3 UNITS: 100 INJECTION, SOLUTION INTRAVENOUS; SUBCUTANEOUS at 17:05

## 2021-01-01 RX ADMIN — ATORVASTATIN CALCIUM 40 MG: 40 TABLET, FILM COATED ORAL at 16:36

## 2021-01-01 RX ADMIN — SODIUM CHLORIDE, SODIUM GLUCONATE, SODIUM ACETATE, POTASSIUM CHLORIDE, MAGNESIUM CHLORIDE, SODIUM PHOSPHATE, DIBASIC, AND POTASSIUM PHOSPHATE 1000 ML: .53; .5; .37; .037; .03; .012; .00082 INJECTION, SOLUTION INTRAVENOUS at 22:07

## 2021-01-01 RX ADMIN — ACETAMINOPHEN 650 MG: 325 TABLET, FILM COATED ORAL at 09:15

## 2021-01-01 RX ADMIN — METOPROLOL SUCCINATE 100 MG: 50 TABLET, EXTENDED RELEASE ORAL at 08:13

## 2021-01-01 RX ADMIN — SODIUM CHLORIDE 1000 ML: 0.9 INJECTION, SOLUTION INTRAVENOUS at 19:00

## 2021-01-01 RX ADMIN — LISINOPRIL 40 MG: 20 TABLET ORAL at 08:03

## 2021-01-01 RX ADMIN — SODIUM CHLORIDE 20 ML/HR: 9 INJECTION, SOLUTION INTRAVENOUS at 08:53

## 2021-01-01 RX ADMIN — INSULIN LISPRO 2 UNITS: 100 INJECTION, SOLUTION INTRAVENOUS; SUBCUTANEOUS at 23:03

## 2021-01-01 RX ADMIN — GEMCITABINE 720.2 MG: 38 INJECTION, SOLUTION INTRAVENOUS at 15:20

## 2021-01-01 RX ADMIN — Medication 250 MG: at 17:32

## 2021-01-01 RX ADMIN — SODIUM CHLORIDE 1 G: 1 TABLET ORAL at 07:58

## 2021-01-01 RX ADMIN — ACETAMINOPHEN 650 MG: 325 TABLET, FILM COATED ORAL at 02:18

## 2021-01-01 RX ADMIN — ACETAMINOPHEN 650 MG: 325 TABLET, FILM COATED ORAL at 22:23

## 2021-01-01 RX ADMIN — ATORVASTATIN CALCIUM 40 MG: 40 TABLET, FILM COATED ORAL at 08:03

## 2021-01-01 RX ADMIN — ATORVASTATIN CALCIUM 40 MG: 40 TABLET, FILM COATED ORAL at 21:43

## 2021-01-01 RX ADMIN — INSULIN LISPRO 2 UNITS: 100 INJECTION, SOLUTION INTRAVENOUS; SUBCUTANEOUS at 23:19

## 2021-01-01 RX ADMIN — INSULIN LISPRO 1 UNITS: 100 INJECTION, SOLUTION INTRAVENOUS; SUBCUTANEOUS at 08:35

## 2021-01-01 RX ADMIN — Medication 235 MG: at 10:35

## 2021-01-01 RX ADMIN — DEXAMETHASONE SODIUM PHOSPHATE: 10 INJECTION, SOLUTION INTRAMUSCULAR; INTRAVENOUS at 09:49

## 2021-01-01 RX ADMIN — INSULIN GLARGINE 10 UNITS: 100 INJECTION, SOLUTION SUBCUTANEOUS at 22:03

## 2021-01-01 RX ADMIN — ACETAMINOPHEN 650 MG: 325 TABLET, FILM COATED ORAL at 20:35

## 2021-01-01 RX ADMIN — INSULIN LISPRO 2 UNITS: 100 INJECTION, SOLUTION INTRAVENOUS; SUBCUTANEOUS at 16:38

## 2021-01-01 RX ADMIN — SENNOSIDES AND DOCUSATE SODIUM 2 TABLET: 8.6; 5 TABLET ORAL at 21:43

## 2021-01-01 RX ADMIN — ACETAMINOPHEN 650 MG: 325 TABLET, FILM COATED ORAL at 02:26

## 2021-01-01 RX ADMIN — CEFAZOLIN SODIUM 1000 MG: 1 SOLUTION INTRAVENOUS at 05:02

## 2021-01-01 RX ADMIN — ASPIRIN 81 MG CHEWABLE TABLET 81 MG: 81 TABLET CHEWABLE at 08:03

## 2021-01-01 RX ADMIN — INSULIN GLARGINE 18 UNITS: 100 INJECTION, SOLUTION SUBCUTANEOUS at 21:23

## 2021-01-01 RX ADMIN — AMLODIPINE BESYLATE 2.5 MG: 2.5 TABLET ORAL at 09:25

## 2021-01-01 RX ADMIN — CEFUROXIME AXETIL 500 MG: 250 TABLET ORAL at 13:32

## 2021-01-01 RX ADMIN — VANCOMYCIN HYDROCHLORIDE 750 MG: 750 INJECTION, SOLUTION INTRAVENOUS at 14:18

## 2021-02-03 NOTE — TELEPHONE ENCOUNTER
Need to pull [facility] records including CT scan to get scanned into patient's chart

## 2021-02-03 NOTE — TELEPHONE ENCOUNTER
Pt called wants to know if Dr Davida Reardon has scheduled his Endoscopic US yet?  Please call him and let him know-if he does not answer lvm

## 2021-02-03 NOTE — TELEPHONE ENCOUNTER
Reviewed pt's [facility] notes and did find it to say will likely need an ERCP vs EUS to evaluate  Please advise

## 2021-02-08 NOTE — TELEPHONE ENCOUNTER
Pt called today questioning when the EUS is scheduled  I informed him records were obtained from [facility], but it is not yet scheduled and he should be hearing from the other office soon  Patient is anxious and would like to contacted this week if possible  He also requests information to be emailed to him, as his own handwriting is very shaky and he can hardly read it now   Cb: [phone]

## 2021-02-08 NOTE — TELEPHONE ENCOUNTER
Mert De Dios  - patient's PCP called today Dr Cheyanne Arroyo to f/u on this patient  Dr Cheyanne Arroyo is calling you directly to assist getting the patient scheduled for the EUS     Omar Babcock

## 2021-02-11 NOTE — TELEPHONE ENCOUNTER
EUS scheduled on 2/23/21 with Dr Hollins at [facility]  I gave pt verbal instructions/mailed

## 2021-02-23 PROBLEM — I10 HTN (HYPERTENSION): Chronic | Status: ACTIVE | Noted: 2021-01-01

## 2021-02-23 PROBLEM — K21.9 GASTROESOPHAGEAL REFLUX DISEASE: Chronic | Status: ACTIVE | Noted: 2021-01-01

## 2021-02-23 PROBLEM — K86.2 PANCREATIC CYST: Status: ACTIVE | Noted: 2021-01-01

## 2021-02-23 PROBLEM — E11.9 DIABETES MELLITUS, TYPE 2 (HCC): Chronic | Status: ACTIVE | Noted: 2021-01-01

## 2021-02-23 PROBLEM — E78.5 HYPERLIPIDEMIA: Chronic | Status: ACTIVE | Noted: 2021-01-01

## 2021-02-23 NOTE — ANESTHESIA PREPROCEDURE EVALUATION
Procedure:  ENDOSCOPIC ULTRASOUND (UPPER)    Relevant Problems   ANESTHESIA (within normal limits)      CARDIO   (+) HTN (hypertension)   (+) Hyperlipidemia      ENDO   (+) Diabetes mellitus, type 2 (HCC)      GI/HEPATIC   (+) Gastroesophageal reflux disease   (+) Pancreatic cyst        Physical Exam    Airway    Mallampati score: II  TM Distance: >3 FB  Neck ROM: full     Dental   Comment: Chipped teeth,     Cardiovascular  Rhythm: regular, Rate: normal,     Pulmonary  Pulmonary exam normal Breath sounds clear to auscultation,     Other Findings        Anesthesia Plan  ASA Score- 3     Anesthesia Type- IV sedation with anesthesia with ASA Monitors  Additional Monitors:   Airway Plan:           Plan Factors-Exercise tolerance (METS): >4 METS  Chart reviewed  Existing labs reviewed  Patient summary reviewed  Patient is not a current smoker  Induction-     Postoperative Plan-     Informed Consent- Anesthetic plan and risks discussed with patient  I personally reviewed this patient with the CRNA  Discussed and agreed on the Anesthesia Plan with the MARCUS Thompson

## 2021-02-23 NOTE — H&P
History and Physical - [facility]  Starrucca Setting 67 y o  male MRN: [id number]    HPI: Star[place]cca Setting is a 67y o  year old male who presents with  Recent onset of abdominal pain and weight loss was found to have a large peripancreatic fluid collection measuring 9 8 cm on imaging  He also has concern for a 2 1 cm mass in the head of the pancreas  Review of Systems    Historical Information   Past Medical History:   Diagnosis Date    Diabetes mellitus ([address] )     GERD (gastroesophageal reflux disease)     Hyperlipidemia     Hypertension      Past Surgical History:   Procedure Laterality Date    APPENDECTOMY      COLONOSCOPY       Social History   Social History     Substance and Sexual Activity   Alcohol Use Not Currently     Social History     Substance and Sexual Activity   Drug Use Never     Social History     Tobacco Use   Smoking Status Former Smoker    Types: Pipe    Quit date: 2/23/2011    Years since quitting: 10 0   Smokeless Tobacco Never Used     History reviewed  No pertinent family history  Meds/Allergies     (Not in a hospital admission)      No Known Allergies    Objective     /79   Pulse 99   Temp (!) 96 8 °F (36 °C) (Tympanic)   Resp 18   Ht 6' (1 829 m)   Wt 77 1 kg (170 lb)   SpO2 99%   BMI 23 06 kg/m²       PHYSICAL EXAM    Gen: NAD  CV: RRR  CHEST: Clear  ABD: soft, NT/ND  EXT: no edema  Neuro: AAO      ASSESSMENT/PLAN:  This is a 67y o  year old male here for   EUS for evaluation of pancreatic cyst as well as a pancreatic head mass  Will plan for EUS guided FNA of the pancreatic mass if seen  Possible aspiration or drainage of the peripancreatic fluid collection  The risks and complications of the procedures including bleeding, perforation and pancreatitis were discussed with the patient      PLAN:   Procedure:   EUS

## 2021-02-23 NOTE — ANESTHESIA POSTPROCEDURE EVALUATION
Post-Op Assessment Note    CV Status:  Stable  Pain Score: 0    Pain management: adequate     Mental Status:  Sleepy   Hydration Status:  Stable and euvolemic   PONV Controlled:  None   Airway Patency:  Patent      Post Op Vitals Reviewed: Yes      Staff: CRNA         No complications documented      BP   102/58   Temp     Pulse  71   Resp   18   SpO2   99

## 2021-03-01 NOTE — PROGRESS NOTES
Appointments made for [facility] per request of Dr Adrian Khalil:    CT chest- 3/5/21- Lynne Britton Seen- 3/12/21- [place] Dr Majorie Goltz- 3/15/21- Lynne butler    Call to [facility], introduced myself, explained my role and provided my contact information, reviewed the above appointments with him, he was agreeable, also made him aware for the CT he cannot eat 3 hours prior but may have clear liquids, and informed him there is blood work ordered for him and to please have that done as well, he stated he would have that done prior to his CT scan, he stated he may have some transportation issues, I advised him to let me know so I can assist him in obtaining transportation should he need it, he will be in touch, will also refer him to cancer counselor, he lives alone and has no children, but states he does have good support, I emailed him the above appointment information as he stated he was unsure he would be able to read his own handwriting, he was appreciative of the email, I instructed him to call with any questions or concerns as I am happy to assist however I can, will continue to follow up with [facility]

## 2021-03-02 NOTE — PROGRESS NOTES
Received a call from [facility] to review the location of his appointments, went through them and locations, he was very appreciative of the help, instructed him to call with any other questions or concerns

## 2021-03-04 NOTE — PROGRESS NOTES
Return call to Rahul Millan, he said he saw his PCP on Tuesday and he asked if he could be made aware of all plans for Rahul Millan moving forward, informed Rahul Millan I would be happy to send notes to him, Rahul Millan provided his contact information:    Dr Peyman Mccrary  Fax: [phone]  Phone: [phone]    Once he has his CT scan, surg/onc consult and Med/onc consult complete, I will fax results and notes to him, he was appreciative of this, will ask the team to keep him up to date as well

## 2021-03-09 NOTE — TELEPHONE ENCOUNTER
I called and left a voicemail for the patient to possibly come in at an earlier time for his appointment with Dr Luz Maria Mora on 3/15

## 2021-03-10 PROBLEM — C25.9 ADENOCARCINOMA OF PANCREAS (HCC): Status: ACTIVE | Noted: 2021-01-01

## 2021-03-10 NOTE — TELEPHONE ENCOUNTER
----- Message from Zaira Combs MD sent at 3/9/2021  4:00 PM EST -----  Please call patient :  No metastatic disease in the chest  He will be seeing oncology and surgery  Please call patient :  Overall blood work suggests hemoglobin (blood counts) were low and his pancreas cancer numbers are high which also goes along with the diagnosis of pancreas cancer

## 2021-03-10 NOTE — TELEPHONE ENCOUNTER
Return for pending biopsy result right lateral forehead;otherwise 6mos for total skin exam.    During the hours of 8:00 am to 5:00 pm, please contact us at one of the following offices: [facility] [phone] or [facility] [phone].     If it is urgent that you speak with someone outside of these hours, our [facility] Call Center will be able to assist you at [phone].    Thank you for choosing [facility] for your Dermatology care.    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    WOUND CARE AFTER SKIN BIOPSY: right lateral forehead    1.  Leave the bandaid on until tomorrow morning.  Then you may get the biopsy site wet in the bath or shower.    2. Starting tomorrow, gently cleanse the biopsy site 1-2 times each day with soap and water, pat dry and apply vaseline and a new bandage to the area until it is healed.  Keeping the area covered will allow it to heal more quickly than if it is left open to the air.    3. Continue treating the biopsy site until fully healed.  This may take 2-3 weeks for head or neck sites.  This may take up to 3-6 weeks for trunk or extremity (arm or leg) sites.    4. Call our office immediately if any signs of infection at the biopsy site (such as pain 1 inch beyond the incision site, green or yellow drainage, or red streaks from the site), bleeding, excessive swelling.  A thin pink rim surrounding the site is normal.    5. You will be notified of the results by letter or phone call (or at a recheck appointment).  If 3 weeks have passed since your procedure and you have not received the results, please call our office.    Dr. Nano Murphy  [facility]:  [phone]  [facility]:  [phone]      Cryotherapy / Liquid Nitrogen Therapy    Cryotherapy is used to treat warts, seborrheic keratoses, actinic keratoses ('precancers') and other benign and premalignant lesions.    Cryotherapy  Spoke to pt aware of results destroys tissue by direct freezing and thawing of skin lesions.  Larger lesions may require multiple treatments or longer freezing and thawing cycles.    Side effects include:           Mild to moderate pain                                             Redness around the treated area                                             Blisters                                             Temporary Numbness                                             Mild scarring                                             Local pigment changes             Nail dystrophy (if cuticle is treated)    If you develop a blister, it is typically best to leave it alone if possible.  If it is a large blister, it is okay to pop it with a sterilized needle. Regardless, try to keep the blister roof intact as this acts as 'nature's band aid' minimizing the risk of infection.  If you are prescribed efudex cream, wait until the cryotherapy sites heal completely before starting the efudex.  Call the clinic if you develop:  Bleeding  Open sores that do not heal within 1 week  Open sores that have draining pus, spreading redness, tenderness or warmth

## 2021-03-11 NOTE — PROGRESS NOTES
RECTAL/GI MULTIDISCIPLINARY CASE REVIEW    DATE: 3/11/21      PRESENTING DOCTOR: Dr Adrian Khalil      DIAGNOSIS: Pancreas Cancer      Luis Antonio Goncalves was presented at the Rectal/GI Multidisciplinary Conference today  PHYSICIAN RECOMMENDED PLAN:    -Consult Surgical Oncology and Medical Oncology  -Genetic Counselor recommends genetic counseling    -Patient is scheduled to see Dr Britton Seen on 3/12/21  -Patient is scheduled to see Dr Majorie Goltz on 3/15/21    Team agreed to plan      NCCN guidelines were readily available for review at this discussion

## 2021-03-12 NOTE — PROGRESS NOTES
Surgical Oncology Consultation    [address]  [facility] [address] [address]    Patient:  Mark Olvera  [birth year]  [id number]    Primary Care provider:  Xiao Polanco DO  [address] [address]    Referring provider:  No referring provider defined for this encounter  Diagnoses and all orders for this visit:    Adenocarcinoma of pancreas [facility])  -     Ambulatory referral to Social Work; Future    Need for follow-up by   -     Ambulatory referral to Social Work; Future    Other orders  -     ramipril (ALTACE) 10 MG capsule; Take 10 mg by mouth 2 (two) times a day  -     metoprolol succinate (TOPROL-XL) 100 mg 24 hr tablet; Take 100 mg by mouth daily  -     LORazepam (ATIVAN) 0 5 mg tablet; Take 0 5 mg by mouth  -     aspirin 81 mg chewable tablet; Chew 81 mg daily        Chief Complaint   Patient presents with    Consult       No follow-ups on file  Oncology History    No history exists  History of Present Illness  : This is a 21-year-old gentleman presents with a new diagnosis of pancreatic cancer  Briefly, the patient was admitted to [facility] about week's history significant abdominal pain, nausea, changes in stool  Workup there included CT scan which revealed large pancreatic cyst as well as an abnormality of the pancreatic tissue with atrophy, dilated duct, and potential for pancreatic mass of the pancreatic head  The patient clinically improved and was discharged home  He then underwent an EUS with our team, which revealed large pancreatic cyst, cyst fluid was consistent mucinous neoplasm, and there was a pancreatic head mass detected, and FNA of this mass revealed adenocarcinoma  The patient presents today for evaluation  Of note, CT chest has been completed, with no evidence of malignancy  CA 19 9 is near 20,000      Today he states that his symptoms have much improved  The symptoms he was having when he presented to [place] with the worse they have been in the course of his disease  He states that he feels quite well  The pain that he was experiencing is nearly gone, although he has intermittent pain that resolved quickly  He is tolerating p o  intake with no issues  He experiences no nausea no vomiting  He does have loose stool every once in a while, although the consistency of his stools not appear to be consistent with pancreatic insufficiency  Likewise, he is insulin dependent, but there have been no changes to his insulin requirements recently  He endorses some fatigue, but denies any significant weight loss  He denies any jaundice, bright red blood per rectum, changes in his functional status  He denies any history of pancreatitis, any history of gallbladder pathology, any risk factors for gallbladder pathology or pancreatitis  He has no drinking history  He denies any chest pain, shortness of breath or history of cardiac issues  He lives alone and accomplishes all of his own ADLs  He walks regularly  Review of Systems  Complete ROS Surg Onc:   Constitutional: The patient denies new or recent history of general fatigue, no recent weight loss, no change in appetite  Eyes: No complaints of visual problems, no scleral icterus  ENT: No complaints of ear pain, no hoarseness, no difficulty swallowing,  no tinnitus and no new masses in head, oral cavity, or neck  Cardiovascular: No complaints of chest pain, no palpitations, no ankle edema  Respiratory: No complaints of shortness of breath, no cough  Gastrointestinal: No complaints of jaundice, no bloody stools, no pale stools  Genitourinary: No complaints of dysuria, no hematuria, no nocturia, no frequent urination, no urethral discharge  Musculoskeletal: No complaints of weakness, paralysis, joint stiffness or arthralgias  Integumentary: No complaints of rash, no new lesions  Neurological: No complaints of convulsions, no seizures, no dizziness  Hematologic/Lymphatic: No complaints of easy bruising  Endocrine:  No hot or cold intolerance  No polydipsia, polyphagia, or polyuria  Allergy/immunology:  No environmental allergies  No food allergies  Not immunocompromised  Skin:  No pallor or rash  No wound  Patient Active Problem List   Diagnosis    Hyperlipidemia    HTN (hypertension)    Diabetes mellitus, type 2 ([place] [address] )    Gastroesophageal reflux disease    Pancreatic cyst    Adenocarcinoma of pancreas ([place] [address] )     Past Medical History:   Diagnosis Date    Diabetes mellitus ([place] [address] )     GERD (gastroesophageal reflux disease)     Hyperlipidemia     Hypertension      Past Surgical History:   Procedure Laterality Date    APPENDECTOMY      COLONOSCOPY       No family history on file    Social History     Socioeconomic History    Marital status: Unknown     Spouse name: Not on file    Number of children: Not on file    Years of education: Not on file    Highest education level: Not on file   Occupational History    Not on file   Social Needs    Financial resource strain: Not on file    Food insecurity     Worry: Not on file     Inability: Not on file    Transportation needs     Medical: Not on file     Non-medical: Not on file   Tobacco Use    Smoking status: Former Smoker     Types: Pipe     Quit date: 2/23/2011     Years since quitting: 10 0    Smokeless tobacco: Never Used   Substance and Sexual Activity    Alcohol use: Not Currently    Drug use: Never    Sexual activity: Not on file   Lifestyle    Physical activity     Days per week: Not on file     Minutes per session: Not on file    Stress: Not on file   Relationships    Social connections     Talks on phone: Not on file     Gets together: Not on file     Attends Jewish service: Not on file     Active member of club or organization: Not on file     Attends meetings of clubs or organizations: Not on file     Relationship status: Not on file    Intimate partner violence     Fear of current or ex partner: Not on file     Emotionally abused: Not on file     Physically abused: Not on file     Forced sexual activity: Not on file   Other Topics Concern    Not on file   Social History Narrative    Not on file       Current Outpatient Medications:     aspirin 81 mg chewable tablet, Chew 81 mg daily, Disp: , Rfl:     atorvastatin (LIPITOR) 40 mg tablet, Take 40 mg by mouth daily, Disp: , Rfl:     glimepiride (AMARYL) 4 mg tablet, Take 4 mg by mouth 2 (two) times a day, Disp: , Rfl:     metFORMIN (GLUCOPHAGE) 850 mg tablet, TAKE 1 TABLET BY MOUTH TWICE DAILY TAKE WITH MORNING AND EVENING MEAL, Disp: , Rfl:     metoprolol succinate (TOPROL-XL) 100 mg 24 hr tablet, Take 100 mg by mouth daily, Disp: , Rfl:     NovoLOG FlexPen 100 units/mL injection pen, Inject 9 Units under the skin daily , Disp: , Rfl:     pantoprazole (PROTONIX) 40 mg tablet, Take 1 tablet by mouth twice daily, Disp: 60 tablet, Rfl: 0    ramipril (ALTACE) 10 MG capsule, Take 10 mg by mouth 2 (two) times a day, Disp: , Rfl:     Accu-Chek Guide test strip, USE 1 STRIP TWICE DAILY TO THREE TIMES DAILY, Disp: , Rfl:     Blood Glucose Monitoring Suppl (Accu-Chek Guide) w/Device KIT, USE TO CHECK GLUCOSE TWO TO THREE TIMES DAILY, Disp: , Rfl:     LORazepam (ATIVAN) 0 5 mg tablet, Take 0 5 mg by mouth, Disp: , Rfl:   No Known Allergies    Vitals:    03/12/21 1127   BP: 156/74   Pulse: 82   Resp: 16   Temp: 97 8 °F (36 6 °C)   SpO2: 97%       Physical Exam   General: Appears well, appears stated age  Skin: Warm, anicteric  HEENT: Normocephalic, atraumatic; sclera aniceteric, mucous membranes moist; cervical nodes without adenopathy  Cardiopulmonary: RRR, Easy WOB, no BLE edema  Abd: Flat and soft, nontender, firm mass at epigasgtrium appreciated, consistent with cyst    MSK: Symmetric, no cyanosis, no overt weakness  Lymphatic: No cervical, axillary or inguinal lymphadenopathy  Neuro: Affect appropriate, no gross motor abnormalities      Pathology:  Final Diagnosis   A -C  Pancreas, head mass: (Thin-Prep, smears, and cell block)  Malignant (PSC Category VI) - See note  Adenocarcinoma      Note: The above diagnostic category is part of the six-tiered system proposed by The Papanicolaou Society of Cytopathology for the reporting of pancreaticobiliary specimens  This proposed scheme provides terminology that correlates the cytologic diagnostic nomenclature with the 2010 WHO classification of pancreatic tumors and standardizes the categorization of the various diseases of the pancreas, some of which are difficult to diagnose specifically by cytology  *The Papanicolaou Society of Cytopathology System for Reporting Pancreaticobiliary Cytology: Definitions, Criteria and Explanatory Notes  Dann Koch; Cha, 2015      Dr Zaira Combs is notified of the diagnosis in [employer] via [employer] on 2/25/2021  at 1:10 pm    Intradepartmental consultation is in agreement      D E  Pancreas, body cyst: (Thin-Prep and cell block)  Non-Neoplastic ([place] category II)  Mixed inflammatory cells  Labs: Reviewed     Imaging  Ct Chest W Contrast    Result Date: 3/7/2021  Narrative: CT CHEST WITH IV CONTRAST INDICATION:   C25 9: Malignant neoplasm of pancreas, unspecified  COMPARISON:  None  TECHNIQUE: CT examination of the chest was performed  Axial, sagittal, and coronal 2D reformatted images were created from the source data and submitted for interpretation  Radiation dose length product (DLP) for this visit:  211 22 mGy-cm   This examination, like all CT scans performed in the [facility], was performed utilizing techniques to minimize radiation dose exposure, including the use of iterative  reconstruction and automated exposure control  IV Contrast:  85 mL of iohexol (OMNIPAQUE)  350 FINDINGS: LUNGS:  No pulmonary nodule or infiltrate  Minimal linear scar at the lung bases left greater than right  Central airways are clear  PLEURA:  Unremarkable  HEART/GREAT VESSELS:  Heart is not enlarged  No pericardial effusion  Aortic and coronary artery calcification  MEDIASTINUM AND SHAILESH:  Unremarkable  CHEST WALL AND LOWER NECK:   Incidental discovery of one or more thyroid nodule(s) measuring less than 1 5 cm and without suspicious features is noted in this patient who is above 28years old; according to guidelines published in the February 2015 white  paper on incidental thyroid nodules in the Journal of the [employer] of radiology [facility]), no further evaluation is recommended  VISUALIZED STRUCTURES IN THE UPPER ABDOMEN:  Partially visualized cystic mass that appears to be arising from the junction of the pancreatic neck and body measuring at least 9 8 x 8 5 cm  Heterogeneous hypodense in the head and neck of the pancreas measures approximately 3 5 x 3 5 cm image 62 series 601 and image 67 series 2 attributed to recent biopsy proven pancreatic malignancy  Severe atrophy and pancreatic duct dilation in the body and tail of the pancreas  OSSEOUS STRUCTURES:  No acute fracture or osseous destructive lesion identified  Mild degenerative changes of the spine  Impression: Pancreatic head and neck mass measuring approximately 3 5 x 3 5 cm attributed to recent biopsy proven malignancy  No evidence of thoracic metastatic disease  Workstation performed: [id number]     I reviewed the above laboratory and imaging data  I personally reviewed the outside films and reports  Discussion/Summary: This is a 58-year-old gentleman with a new diagnosis of pancreatic head adenocarcinoma, as well as a large mucinous cyst   Initially, based on his presentation, it appeared that the cyst was causing significant pain as well as the potential for compressive symptoms    At this time, however, the symptoms appear to be nearly resolved and the patient is well tolerating p o  intake with no significant pain  Given the infiltrative nature of the mass as well as the very high CA 19 9, I would like to pursue neoadjuvant chemotherapy in this patient  I will discuss this with Dr Milagros Méndez, who sees the patient sees next week  I discussed the nature of pancreatic cancer, as well as the rationale for the recommended sequence of therapy  I discussed we will likely pursue 2-3 months of systemic therapy, followed by repeat imaging to gauge response  I have also recommended placement of port, and the nature of this procedure was explained in full to the patient and his brother  I discussed the complications of bleeding, infection, damage to the lung, pneumothorax  Consent was obtained today  All questions answered

## 2021-03-12 NOTE — LETTER
March 12, 2021     Ciaran Maier, [address] [facility] [address]    Patient: Ethel Urban   YOB: 1945   Date of Visit: 3/12/2021       Dear Dr Milton Dobbs:    Thank you for referring Ethel Urban to me for evaluation  Below are my notes for this consultation  If you have questions, please do not hesitate to call me  I look forward to following your patient along with you  Sincerely,        Taylor Barragan MD        CC: MD Naomy Fuller RN Estle Mania, MD  3/12/2021 12:46 PM  Sign when Signing Visit  Surgical Oncology Consultation    [address] [address]    Patient:  Ethel Urban  [birth year]  [id number]    Primary Care provider:  Aishwarya Gtz DO  [address] [address]    Referring provider:  No referring provider defined for this encounter  Diagnoses and all orders for this visit:    Adenocarcinoma of pancreas [facility])  -     Ambulatory referral to Social Work; Future    Need for follow-up by   -     Ambulatory referral to Social Work; Future    Other orders  -     ramipril (ALTACE) 10 MG capsule; Take 10 mg by mouth 2 (two) times a day  -     metoprolol succinate (TOPROL-XL) 100 mg 24 hr tablet; Take 100 mg by mouth daily  -     LORazepam (ATIVAN) 0 5 mg tablet; Take 0 5 mg by mouth  -     aspirin 81 mg chewable tablet; Chew 81 mg daily        Chief Complaint   Patient presents with    Consult       No follow-ups on file  Oncology History    No history exists  History of Present Illness  : This is a 69-year-old gentleman presents with a new diagnosis of pancreatic cancer  Briefly, the patient was admitted to [facility] about week's history significant abdominal pain, nausea, changes in stool    Workup there included CT scan which revealed large pancreatic cyst as well as an abnormality of the pancreatic tissue with atrophy, dilated duct, and potential for pancreatic mass of the pancreatic head  The patient clinically improved and was discharged home  He then underwent an EUS with our team, which revealed large pancreatic cyst, cyst fluid was consistent mucinous neoplasm, and there was a pancreatic head mass detected, and FNA of this mass revealed adenocarcinoma  The patient presents today for evaluation  Of note, CT chest has been completed, with no evidence of malignancy  CA 19 9 is near 20,000  Today he states that his symptoms have much improved  The symptoms he was having when he presented to [place] with the worse they have been in the course of his disease  He states that he feels quite well  The pain that he was experiencing is nearly gone, although he has intermittent pain that resolved quickly  He is tolerating p o  intake with no issues  He experiences no nausea no vomiting  He does have loose stool every once in a while, although the consistency of his stools not appear to be consistent with pancreatic insufficiency  Likewise, he is insulin dependent, but there have been no changes to his insulin requirements recently  He endorses some fatigue, but denies any significant weight loss  He denies any jaundice, bright red blood per rectum, changes in his functional status  He denies any history of pancreatitis, any history of gallbladder pathology, any risk factors for gallbladder pathology or pancreatitis  He has no drinking history  He denies any chest pain, shortness of breath or history of cardiac issues  He lives alone and accomplishes all of his own ADLs  He walks regularly  Review of Systems  Complete ROS Surg Onc:   Constitutional: The patient denies new or recent history of general fatigue, no recent weight loss, no change in appetite  Eyes: No complaints of visual problems, no scleral icterus     ENT: No complaints of ear pain, no hoarseness, no difficulty swallowing,  no tinnitus and no new masses in head, oral cavity, or neck  Cardiovascular: No complaints of chest pain, no palpitations, no ankle edema  Respiratory: No complaints of shortness of breath, no cough  Gastrointestinal: No complaints of jaundice, no bloody stools, no pale stools  Genitourinary: No complaints of dysuria, no hematuria, no nocturia, no frequent urination, no urethral discharge  Musculoskeletal: No complaints of weakness, paralysis, joint stiffness or arthralgias  Integumentary: No complaints of rash, no new lesions  Neurological: No complaints of convulsions, no seizures, no dizziness  Hematologic/Lymphatic: No complaints of easy bruising  Endocrine:  No hot or cold intolerance  No polydipsia, polyphagia, or polyuria  Allergy/immunology:  No environmental allergies  No food allergies  Not immunocompromised  Skin:  No pallor or rash  No wound  Patient Active Problem List   Diagnosis    Hyperlipidemia    HTN (hypertension)    Diabetes mellitus, type 2 ([place] [address] )    Gastroesophageal reflux disease    Pancreatic cyst    Adenocarcinoma of pancreas ([place] [address] )     Past Medical History:   Diagnosis Date    Diabetes mellitus ([place] [address] )     GERD (gastroesophageal reflux disease)     Hyperlipidemia     Hypertension      Past Surgical History:   Procedure Laterality Date    APPENDECTOMY      COLONOSCOPY       No family history on file    Social History     Socioeconomic History    Marital status: Unknown     Spouse name: Not on file    Number of children: Not on file    Years of education: Not on file    Highest education level: Not on file   Occupational History    Not on file   Social Needs    Financial resource strain: Not on file    Food insecurity     Worry: Not on file     Inability: Not on file    Transportation needs     Medical: Not on file     Non-medical: Not on file   Tobacco Use    Smoking status: Former Smoker     Types: Pipe     Quit date: 2/23/2011 Years since quitting: 10 0    Smokeless tobacco: Never Used   Substance and Sexual Activity    Alcohol use: Not Currently    Drug use: Never    Sexual activity: Not on file   Lifestyle    Physical activity     Days per week: Not on file     Minutes per session: Not on file    Stress: Not on file   Relationships    Social connections     Talks on phone: Not on file     Gets together: Not on file     Attends Jewish service: Not on file     Active member of club or organization: Not on file     Attends meetings of clubs or organizations: Not on file     Relationship status: Not on file    Intimate partner violence     Fear of current or ex partner: Not on file     Emotionally abused: Not on file     Physically abused: Not on file     Forced sexual activity: Not on file   Other Topics Concern    Not on file   Social History Narrative    Not on file       Current Outpatient Medications:     aspirin 81 mg chewable tablet, Chew 81 mg daily, Disp: , Rfl:     atorvastatin (LIPITOR) 40 mg tablet, Take 40 mg by mouth daily, Disp: , Rfl:     glimepiride (AMARYL) 4 mg tablet, Take 4 mg by mouth 2 (two) times a day, Disp: , Rfl:     metFORMIN (GLUCOPHAGE) 850 mg tablet, TAKE 1 TABLET BY MOUTH TWICE DAILY TAKE WITH MORNING AND EVENING MEAL, Disp: , Rfl:     metoprolol succinate (TOPROL-XL) 100 mg 24 hr tablet, Take 100 mg by mouth daily, Disp: , Rfl:     NovoLOG FlexPen 100 units/mL injection pen, Inject 9 Units under the skin daily , Disp: , Rfl:     pantoprazole (PROTONIX) 40 mg tablet, Take 1 tablet by mouth twice daily, Disp: 60 tablet, Rfl: 0    ramipril (ALTACE) 10 MG capsule, Take 10 mg by mouth 2 (two) times a day, Disp: , Rfl:     Accu-Chek Guide test strip, USE 1 STRIP TWICE DAILY TO THREE TIMES DAILY, Disp: , Rfl:     Blood Glucose Monitoring Suppl (Accu-Chek Guide) w/Device KIT, USE TO CHECK GLUCOSE TWO TO THREE TIMES DAILY, Disp: , Rfl:     LORazepam (ATIVAN) 0 5 mg tablet, Take 0 5 mg by mouth, Disp: , Rfl:   No Known Allergies    Vitals:    03/12/21 1127   BP: 156/74   Pulse: 82   Resp: 16   Temp: 97 8 °F (36 6 °C)   SpO2: 97%       Physical Exam   General: Appears well, appears stated age  Skin: Warm, anicteric  HEENT: Normocephalic, atraumatic; sclera aniceteric, mucous membranes moist; cervical nodes without adenopathy  Cardiopulmonary: RRR, Easy WOB, no BLE edema  Abd: Flat and soft, nontender, firm mass at epigasgtrium appreciated, consistent with cyst    MSK: Symmetric, no cyanosis, no overt weakness  Lymphatic: No cervical, axillary or inguinal lymphadenopathy  Neuro: Affect appropriate, no gross motor abnormalities      Pathology:  Final Diagnosis   A -C  Pancreas, head mass: (Thin-Prep, smears, and cell block)  Malignant (PSC Category VI) - See note  Adenocarcinoma      Note: The above diagnostic category is part of the six-tiered system proposed by The Papanicolaou Society of Cytopathology for the reporting of pancreaticobiliary specimens  This proposed scheme provides terminology that correlates the cytologic diagnostic nomenclature with the 2010 WHO classification of pancreatic tumors and standardizes the categorization of the various diseases of the pancreas, some of which are difficult to diagnose specifically by cytology  *The Papanicolaou Society of Cytopathology System for Reporting Pancreaticobiliary Cytology: Definitions, Criteria and Explanatory Notes  Giorgio Cornelius; Cha, 2015      Dr Radha King is notified of the diagnosis in [facility] via [employer] on 2/25/2021  at 1:10 pm    Intradepartmental consultation is in agreement      D E  Pancreas, body cyst: (Thin-Prep and cell block)  Non-Neoplastic (Tommy category II)  Mixed inflammatory cells  Labs: Reviewed     Imaging  Ct Chest W Contrast    Result Date: 3/7/2021  Narrative: CT CHEST WITH IV CONTRAST INDICATION:   C25 9: Malignant neoplasm of pancreas, unspecified  COMPARISON:  None   TECHNIQUE: CT examination of the chest was performed  Axial, sagittal, and coronal 2D reformatted images were created from the source data and submitted for interpretation  Radiation dose length product (DLP) for this visit:  211 22 mGy-cm   This examination, like all CT scans performed in the [facility], was performed utilizing techniques to minimize radiation dose exposure, including the use of iterative  reconstruction and automated exposure control  IV Contrast:  85 mL of iohexol (OMNIPAQUE)  350 FINDINGS: LUNGS:  No pulmonary nodule or infiltrate  Minimal linear scar at the lung bases left greater than right  Central airways are clear  PLEURA:  Unremarkable  HEART/GREAT VESSELS:  Heart is not enlarged  No pericardial effusion  Aortic and coronary artery calcification  MEDIASTINUM AND SHAILESH:  Unremarkable  CHEST WALL AND LOWER NECK:   Incidental discovery of one or more thyroid nodule(s) measuring less than 1 5 cm and without suspicious features is noted in this patient who is above 28years old; according to guidelines published in the February 2015 white  paper on incidental thyroid nodules in the Journal of the [address] [place] [address] of radiology [facility]), no further evaluation is recommended  VISUALIZED STRUCTURES IN THE UPPER ABDOMEN:  Partially visualized cystic mass that appears to be arising from the junction of the pancreatic neck and body measuring at least 9 8 x 8 5 cm  Heterogeneous hypodense in the head and neck of the pancreas measures approximately 3 5 x 3 5 cm image 62 series 601 and image 67 series 2 attributed to recent biopsy proven pancreatic malignancy  Severe atrophy and pancreatic duct dilation in the body and tail of the pancreas  OSSEOUS STRUCTURES:  No acute fracture or osseous destructive lesion identified  Mild degenerative changes of the spine  Impression: Pancreatic head and neck mass measuring approximately 3 5 x 3 5 cm attributed to recent biopsy proven malignancy   No evidence of thoracic metastatic disease  Workstation performed: [id number]     I reviewed the above laboratory and imaging data  I personally reviewed the outside films and reports  Discussion/Summary: This is a 76-year-old gentleman with a new diagnosis of pancreatic head adenocarcinoma, as well as a large mucinous cyst   Initially, based on his presentation, it appeared that the cyst was causing significant pain as well as the potential for compressive symptoms  At this time, however, the symptoms appear to be nearly resolved and the patient is well tolerating p o  intake with no significant pain  Given the infiltrative nature of the mass as well as the very high CA 19 9, I would like to pursue neoadjuvant chemotherapy in this patient  I will discuss this with Dr Marily Macias, who sees the patient sees next week  I discussed the nature of pancreatic cancer, as well as the rationale for the recommended sequence of therapy  I discussed we will likely pursue 2-3 months of systemic therapy, followed by repeat imaging to gauge response  I have also recommended placement of port, and the nature of this procedure was explained in full to the patient and his brother  I discussed the complications of bleeding, infection, damage to the lung, pneumothorax  Consent was obtained today  All questions answered

## 2021-03-12 NOTE — H&P (VIEW-ONLY)
Surgical Oncology Consultation    [address]  [facility] [address]  [facility] [address]    Patient:  Chelsey Quinonez  [birth year]  [id number]    Primary Care provider:  Neil Henry DO  [address] [address]    Referring provider:  No referring provider defined for this encounter  Diagnoses and all orders for this visit:    Adenocarcinoma of pancreas [facility])  -     Ambulatory referral to Social Work; Future    Need for follow-up by   -     Ambulatory referral to Social Work; Future    Other orders  -     ramipril (ALTACE) 10 MG capsule; Take 10 mg by mouth 2 (two) times a day  -     metoprolol succinate (TOPROL-XL) 100 mg 24 hr tablet; Take 100 mg by mouth daily  -     LORazepam (ATIVAN) 0 5 mg tablet; Take 0 5 mg by mouth  -     aspirin 81 mg chewable tablet; Chew 81 mg daily        Chief Complaint   Patient presents with    Consult       No follow-ups on file  Oncology History    No history exists  History of Present Illness  : This is a 27-year-old gentleman presents with a new diagnosis of pancreatic cancer  Briefly, the patient was admitted to [address] [place] [address] about week's history significant abdominal pain, nausea, changes in stool  Workup there included CT scan which revealed large pancreatic cyst as well as an abnormality of the pancreatic tissue with atrophy, dilated duct, and potential for pancreatic mass of the pancreatic head  The patient clinically improved and was discharged home  He then underwent an EUS with our team, which revealed large pancreatic cyst, cyst fluid was consistent mucinous neoplasm, and there was a pancreatic head mass detected, and FNA of this mass revealed adenocarcinoma  The patient presents today for evaluation  Of note, CT chest has been completed, with no evidence of malignancy  CA 19 9 is near 20,000      Today he states that his symptoms have much improved  The symptoms he was having when he presented to [place] with the worse they have been in the course of his disease  He states that he feels quite well  The pain that he was experiencing is nearly gone, although he has intermittent pain that resolved quickly  He is tolerating p o  intake with no issues  He experiences no nausea no vomiting  He does have loose stool every once in a while, although the consistency of his stools not appear to be consistent with pancreatic insufficiency  Likewise, he is insulin dependent, but there have been no changes to his insulin requirements recently  He endorses some fatigue, but denies any significant weight loss  He denies any jaundice, bright red blood per rectum, changes in his functional status  He denies any history of pancreatitis, any history of gallbladder pathology, any risk factors for gallbladder pathology or pancreatitis  He has no drinking history  He denies any chest pain, shortness of breath or history of cardiac issues  He lives alone and accomplishes all of his own ADLs  He walks regularly  Review of Systems  Complete ROS Surg Onc:   Constitutional: The patient denies new or recent history of general fatigue, no recent weight loss, no change in appetite  Eyes: No complaints of visual problems, no scleral icterus  ENT: No complaints of ear pain, no hoarseness, no difficulty swallowing,  no tinnitus and no new masses in head, oral cavity, or neck  Cardiovascular: No complaints of chest pain, no palpitations, no ankle edema  Respiratory: No complaints of shortness of breath, no cough  Gastrointestinal: No complaints of jaundice, no bloody stools, no pale stools  Genitourinary: No complaints of dysuria, no hematuria, no nocturia, no frequent urination, no urethral discharge  Musculoskeletal: No complaints of weakness, paralysis, joint stiffness or arthralgias  Integumentary: No complaints of rash, no new lesions  Neurological: No complaints of convulsions, no seizures, no dizziness  Hematologic/Lymphatic: No complaints of easy bruising  Endocrine:  No hot or cold intolerance  No polydipsia, polyphagia, or polyuria  Allergy/immunology:  No environmental allergies  No food allergies  Not immunocompromised  Skin:  No pallor or rash  No wound  Patient Active Problem List   Diagnosis    Hyperlipidemia    HTN (hypertension)    Diabetes mellitus, type 2 ([place] [address] )    Gastroesophageal reflux disease    Pancreatic cyst    Adenocarcinoma of pancreas ([place] [address] )     Past Medical History:   Diagnosis Date    Diabetes mellitus ([place] [address] )     GERD (gastroesophageal reflux disease)     Hyperlipidemia     Hypertension      Past Surgical History:   Procedure Laterality Date    APPENDECTOMY      COLONOSCOPY       No family history on file    Social History     Socioeconomic History    Marital status: Unknown     Spouse name: Not on file    Number of children: Not on file    Years of education: Not on file    Highest education level: Not on file   Occupational History    Not on file   Social Needs    Financial resource strain: Not on file    Food insecurity     Worry: Not on file     Inability: Not on file    Transportation needs     Medical: Not on file     Non-medical: Not on file   Tobacco Use    Smoking status: Former Smoker     Types: Pipe     Quit date: 2/23/2011     Years since quitting: 10 0    Smokeless tobacco: Never Used   Substance and Sexual Activity    Alcohol use: Not Currently    Drug use: Never    Sexual activity: Not on file   Lifestyle    Physical activity     Days per week: Not on file     Minutes per session: Not on file    Stress: Not on file   Relationships    Social connections     Talks on phone: Not on file     Gets together: Not on file     Attends Hoahaoism service: Not on file     Active member of club or organization: Not on file     Attends meetings of clubs or organizations: Not on file     Relationship status: Not on file    Intimate partner violence     Fear of current or ex partner: Not on file     Emotionally abused: Not on file     Physically abused: Not on file     Forced sexual activity: Not on file   Other Topics Concern    Not on file   Social History Narrative    Not on file       Current Outpatient Medications:     aspirin 81 mg chewable tablet, Chew 81 mg daily, Disp: , Rfl:     atorvastatin (LIPITOR) 40 mg tablet, Take 40 mg by mouth daily, Disp: , Rfl:     glimepiride (AMARYL) 4 mg tablet, Take 4 mg by mouth 2 (two) times a day, Disp: , Rfl:     metFORMIN (GLUCOPHAGE) 850 mg tablet, TAKE 1 TABLET BY MOUTH TWICE DAILY TAKE WITH MORNING AND EVENING MEAL, Disp: , Rfl:     metoprolol succinate (TOPROL-XL) 100 mg 24 hr tablet, Take 100 mg by mouth daily, Disp: , Rfl:     NovoLOG FlexPen 100 units/mL injection pen, Inject 9 Units under the skin daily , Disp: , Rfl:     pantoprazole (PROTONIX) 40 mg tablet, Take 1 tablet by mouth twice daily, Disp: 60 tablet, Rfl: 0    ramipril (ALTACE) 10 MG capsule, Take 10 mg by mouth 2 (two) times a day, Disp: , Rfl:     Accu-Chek Guide test strip, USE 1 STRIP TWICE DAILY TO THREE TIMES DAILY, Disp: , Rfl:     Blood Glucose Monitoring Suppl (Accu-Chek Guide) w/Device KIT, USE TO CHECK GLUCOSE TWO TO THREE TIMES DAILY, Disp: , Rfl:     LORazepam (ATIVAN) 0 5 mg tablet, Take 0 5 mg by mouth, Disp: , Rfl:   No Known Allergies    Vitals:    03/12/21 1127   BP: 156/74   Pulse: 82   Resp: 16   Temp: 97 8 °F (36 6 °C)   SpO2: 97%       Physical Exam   General: Appears well, appears stated age  Skin: Warm, anicteric  HEENT: Normocephalic, atraumatic; sclera aniceteric, mucous membranes moist; cervical nodes without adenopathy  Cardiopulmonary: RRR, Easy WOB, no BLE edema  Abd: Flat and soft, nontender, firm mass at epigasgtrium appreciated, consistent with cyst    MSK: Symmetric, no cyanosis, no overt weakness  Lymphatic: No cervical, axillary or inguinal lymphadenopathy  Neuro: Affect appropriate, no gross motor abnormalities      Pathology:  Final Diagnosis   A -C  Pancreas, head mass: (Thin-Prep, smears, and cell block)  Malignant (PSC Category VI) - See note  Adenocarcinoma      Note: The above diagnostic category is part of the six-tiered system proposed by The Papanicolaou Society of Cytopathology for the reporting of pancreaticobiliary specimens  This proposed scheme provides terminology that correlates the cytologic diagnostic nomenclature with the 2010 WHO classification of pancreatic tumors and standardizes the categorization of the various diseases of the pancreas, some of which are difficult to diagnose specifically by cytology  *The Papanicolaou Society of Cytopathology System for Reporting Pancreaticobiliary Cytology: Definitions, Criteria and Explanatory Notes  Leodan Mcwilliams; Cha, 2015      Dr Yuliana Mora is notified of the diagnosis in [employer] via [employer] on 2/25/2021  at 1:10 pm    Intradepartmental consultation is in agreement      D E  Pancreas, body cyst: (Thin-Prep and cell block)  Non-Neoplastic ([place] category II)  Mixed inflammatory cells  Labs: Reviewed     Imaging  Ct Chest W Contrast    Result Date: 3/7/2021  Narrative: CT CHEST WITH IV CONTRAST INDICATION:   C25 9: Malignant neoplasm of pancreas, unspecified  COMPARISON:  None  TECHNIQUE: CT examination of the chest was performed  Axial, sagittal, and coronal 2D reformatted images were created from the source data and submitted for interpretation  Radiation dose length product (DLP) for this visit:  211 22 mGy-cm   This examination, like all CT scans performed in the [facility], was performed utilizing techniques to minimize radiation dose exposure, including the use of iterative  reconstruction and automated exposure control  IV Contrast:  85 mL of iohexol (OMNIPAQUE)  350 FINDINGS: LUNGS:  No pulmonary nodule or infiltrate  Minimal linear scar at the lung bases left greater than right  Central airways are clear  PLEURA:  Unremarkable  HEART/GREAT VESSELS:  Heart is not enlarged  No pericardial effusion  Aortic and coronary artery calcification  MEDIASTINUM AND SHAILESH:  Unremarkable  CHEST WALL AND LOWER NECK:   Incidental discovery of one or more thyroid nodule(s) measuring less than 1 5 cm and without suspicious features is noted in this patient who is above 28years old; according to guidelines published in the February 2015 white  paper on incidental thyroid nodules in the Journal of the [employer] of radiology [facility]), no further evaluation is recommended  VISUALIZED STRUCTURES IN THE UPPER ABDOMEN:  Partially visualized cystic mass that appears to be arising from the junction of the pancreatic neck and body measuring at least 9 8 x 8 5 cm  Heterogeneous hypodense in the head and neck of the pancreas measures approximately 3 5 x 3 5 cm image 62 series 601 and image 67 series 2 attributed to recent biopsy proven pancreatic malignancy  Severe atrophy and pancreatic duct dilation in the body and tail of the pancreas  OSSEOUS STRUCTURES:  No acute fracture or osseous destructive lesion identified  Mild degenerative changes of the spine  Impression: Pancreatic head and neck mass measuring approximately 3 5 x 3 5 cm attributed to recent biopsy proven malignancy  No evidence of thoracic metastatic disease  Workstation performed: [id number]     I reviewed the above laboratory and imaging data  I personally reviewed the outside films and reports  Discussion/Summary: This is a 67-year-old gentleman with a new diagnosis of pancreatic head adenocarcinoma, as well as a large mucinous cyst   Initially, based on his presentation, it appeared that the cyst was causing significant pain as well as the potential for compressive symptoms    At this time, however, the symptoms appear to be nearly resolved and the patient is well tolerating p o  intake with no significant pain  Given the infiltrative nature of the mass as well as the very high CA 19 9, I would like to pursue neoadjuvant chemotherapy in this patient  I will discuss this with Dr Jesus Schwartz, who sees the patient sees next week  I discussed the nature of pancreatic cancer, as well as the rationale for the recommended sequence of therapy  I discussed we will likely pursue 2-3 months of systemic therapy, followed by repeat imaging to gauge response  I have also recommended placement of port, and the nature of this procedure was explained in full to the patient and his brother  I discussed the complications of bleeding, infection, damage to the lung, pneumothorax  Consent was obtained today  All questions answered

## 2021-03-15 NOTE — PROGRESS NOTES
Oncology Outpatient Consult Note  Sanjiv Wiggins 67 y o  male MRN: @ Encounter: [id number]        Date:  3/15/2021        CC:   Newly diagnosed localized pancreatic cancer  HPI:  Sanjiv Wiggins is seen for initial consultation 3/15/2021 regarding  Newly diagnosed pancreatic cancer  The patient presented to [facility] with nausea and vomiting  Workup revealed a pancreatic head mass and a cyst   Biopsy revealed adenocarcinoma  Per the report from the outside hospital he does not have any metastatic disease apart from the disease in his pancreas which is quite infiltrative  He was seen by our colleagues in Surgical Oncology will have recommended neoadjuvant chemotherapy prior to resection  I think this is very reasonable based on the nature and extent of his disease  He is 67years of age with an ECOG performance status of 0-1  We went over various regimens and I think gemcitabine and Abraxane would be best suited for his disease at full dose  As far symptoms are concerned he states his symptoms did improve after he got discharged  Today his main complaint is weight loss  He states food does not taste very good  Denies any pain  Denies any nausea or vomiting  Denies any diarrhea  The rest of his 14 point review of systems today was negative  Test Results:    Imaging: Ct Chest W Contrast    Result Date: 3/7/2021  Narrative: CT CHEST WITH IV CONTRAST INDICATION:   C25 9: Malignant neoplasm of pancreas, unspecified  COMPARISON:  None  TECHNIQUE: CT examination of the chest was performed  Axial, sagittal, and coronal 2D reformatted images were created from the source data and submitted for interpretation  Radiation dose length product (DLP) for this visit:  211 22 mGy-cm     This examination, like all CT scans performed in the [facility], was performed utilizing techniques to minimize radiation dose exposure, including the use of iterative  reconstruction and automated exposure control  IV Contrast:  85 mL of iohexol (OMNIPAQUE)  350 FINDINGS: LUNGS:  No pulmonary nodule or infiltrate  Minimal linear scar at the lung bases left greater than right  Central airways are clear  PLEURA:  Unremarkable  HEART/GREAT VESSELS:  Heart is not enlarged  No pericardial effusion  Aortic and coronary artery calcification  MEDIASTINUM AND SHAILESH:  Unremarkable  CHEST WALL AND LOWER NECK:   Incidental discovery of one or more thyroid nodule(s) measuring less than 1 5 cm and without suspicious features is noted in this patient who is above 28years old; according to guidelines published in the February 2015 white  paper on incidental thyroid nodules in the Journal of the [employer] of radiology [facility]), no further evaluation is recommended  VISUALIZED STRUCTURES IN THE UPPER ABDOMEN:  Partially visualized cystic mass that appears to be arising from the junction of the pancreatic neck and body measuring at least 9 8 x 8 5 cm  Heterogeneous hypodense in the head and neck of the pancreas measures approximately 3 5 x 3 5 cm image 62 series 601 and image 67 series 2 attributed to recent biopsy proven pancreatic malignancy  Severe atrophy and pancreatic duct dilation in the body and tail of the pancreas  OSSEOUS STRUCTURES:  No acute fracture or osseous destructive lesion identified  Mild degenerative changes of the spine  Impression: Pancreatic head and neck mass measuring approximately 3 5 x 3 5 cm attributed to recent biopsy proven malignancy  No evidence of thoracic metastatic disease   Workstation performed: [id number]       Labs:   Lab Results   Component Value Date    WBC 12 34 (H) 03/04/2021    HGB 9 9 (L) 03/04/2021    HCT 30 7 (L) 03/04/2021    MCV 96 03/04/2021     (H) 03/04/2021     Lab Results   Component Value Date    K 4 8 03/04/2021    CL 96 (L) 03/04/2021    CO2 23 03/04/2021    BUN 21 03/04/2021    CREATININE 1 13 03/04/2021    GLUF 166 (H) 03/04/2021    CALCIUM 8 7 03/04/2021 CORRECTEDCA 9 7 03/04/2021    AST 25 03/04/2021    ALT 33 03/04/2021    ALKPHOS 125 (H) 03/04/2021    EGFR 63 03/04/2021       ROS: As stated in history of present illness otherwise her 14 point review of systems today was negative  Active Problems:   Patient Active Problem List   Diagnosis    Hyperlipidemia    HTN (hypertension)    Diabetes mellitus, type 2 ([place] [address] )    Gastroesophageal reflux disease    Pancreatic cyst    Adenocarcinoma of pancreas ([place] [address] )       Past Medical History:   Past Medical History:   Diagnosis Date    Diabetes mellitus ([facility] )     Frequent urination     GERD (gastroesophageal reflux disease)     Hyperlipidemia     Hypertension     Weakness     Weight loss        Surgical History:   Past Surgical History:   Procedure Laterality Date    APPENDECTOMY      COLONOSCOPY      SKIN LESION EXCISION      of a wart on right arm       Family History:  No family history on file  Cancer-related family history is not on file      Social History:   Social History     Socioeconomic History    Marital status: Unknown     Spouse name: Not on file    Number of children: Not on file    Years of education: Not on file    Highest education level: Not on file   Occupational History    Not on file   Social Needs    Financial resource strain: Not on file    Food insecurity     Worry: Not on file     Inability: Not on file    Transportation needs     Medical: Not on file     Non-medical: Not on file   Tobacco Use    Smoking status: Former Smoker     Types: Pipe     Quit date: 2/23/2011     Years since quitting: 10 0    Smokeless tobacco: Never Used   Substance and Sexual Activity    Alcohol use: Not Currently    Drug use: Never    Sexual activity: Not on file   Lifestyle    Physical activity     Days per week: Not on file     Minutes per session: Not on file    Stress: Not on file   Relationships    Social connections     Talks on phone: Not on file     Gets together: Not on file Attends Worship service: Not on file     Active member of club or organization: Not on file     Attends meetings of clubs or organizations: Not on file     Relationship status: Not on file    Intimate partner violence     Fear of current or ex partner: Not on file     Emotionally abused: Not on file     Physically abused: Not on file     Forced sexual activity: Not on file   Other Topics Concern    Not on file   Social History Narrative    Not on file       Current Medications:   Current Outpatient Medications   Medication Sig Dispense Refill    Accu-Chek FastClix Lancets MISC USE 1 TO CHECK GLUCOSE TWICE DAILY TO THREE TIMES DAILY      Accu-Chek Guide test strip USE 1 STRIP TWICE DAILY TO THREE TIMES DAILY      aspirin 81 mg chewable tablet Chew 81 mg daily      atorvastatin (LIPITOR) 40 mg tablet Take 40 mg by mouth daily      Blood Glucose Monitoring Suppl (Accu-Chek Guide) w/Device KIT USE TO CHECK GLUCOSE TWO TO THREE TIMES DAILY      glimepiride (AMARYL) 4 mg tablet Take 4 mg by mouth 2 (two) times a day      metFORMIN (GLUCOPHAGE) 850 mg tablet TAKE 1 TABLET BY MOUTH TWICE DAILY TAKE WITH MORNING AND EVENING MEAL      metoprolol succinate (TOPROL-XL) 100 mg 24 hr tablet Take 100 mg by mouth daily      NovoLOG FlexPen 100 units/mL injection pen Inject 9 Units under the skin daily       ramipril (ALTACE) 10 MG capsule Take 10 mg by mouth 2 (two) times a day      ReliOn Pen Needles 31G X 6 MM MISC USE 1 IN THE EVENING      traMADol (ULTRAM) 50 mg tablet Take 1 tablet (50 mg total) by mouth every 6 (six) hours as needed for moderate pain 15 tablet 0    pantoprazole (PROTONIX) 40 mg tablet Take 1 tablet by mouth twice daily (Patient not taking: Reported on 3/15/2021) 60 tablet 0     No current facility-administered medications for this visit  Allergies: No Known Allergies      Physical Exam:    Body surface area is 1 88 meters squared      Wt Readings from Last 3 Encounters:   03/15/21 67 6 kg (149 lb)   03/12/21 67 1 kg (148 lb)   02/23/21 77 1 kg (170 lb)        Temp Readings from Last 3 Encounters:   03/15/21 97 8 °F (36 6 °C) (Temporal)   03/12/21 97 8 °F (36 6 °C) (Temporal)   02/23/21 98 °F (36 7 °C)        BP Readings from Last 3 Encounters:   03/15/21 146/68   03/12/21 156/74   02/23/21 131/66         Pulse Readings from Last 3 Encounters:   03/15/21 96   03/12/21 82   02/23/21 89       Physical Exam     Constitutional   General appearance: No acute distress, well appearing and well nourished  Eyes   Conjunctiva and lids: No swelling, erythema or discharge  Pupils and irises: Equal, round and reactive to light  Ears, Nose, Mouth, and Throat   External inspection of ears and nose: Normal     Nasal mucosa, septum, and turbinates: Normal without edema or erythema  Oropharynx: Normal with no erythema, edema, exudate or lesions  Pulmonary   Respiratory effort: No increased work of breathing or signs of respiratory distress  Auscultation of lungs: Clear to auscultation  Cardiovascular   Palpation of heart: Normal PMI, no thrills  Auscultation of heart: Normal rate and rhythm, normal S1 and S2, without murmurs  Examination of extremities for edema and/or varicosities: Normal     Carotid pulses: Normal     Abdomen   Abdomen: Non-tender, no masses  Liver and spleen: No hepatomegaly or splenomegaly  Lymphatic   Palpation of lymph nodes in neck: No lymphadenopathy  Musculoskeletal   Gait and station: Normal     Digits and nails: Normal without clubbing or cyanosis  Inspection/palpation of joints, bones, and muscles: Normal     Skin   Skin and subcutaneous tissue: Normal without rashes or lesions  Neurologic   Cranial nerves: Cranial nerves 2-12 intact  Sensation: No sensory loss      Psychiatric   Orientation to person, place, and time: Normal     Mood and affect: Normal       Assessment/ Plan:        The patient is a pleasant 21-year-old male with newly diagnosed adenocarcinoma of the pancreas which appears to be localized to the pancreas  He was seen by our colleagues in Surgical Oncology recommended neoadjuvant treatment prior to consideration of resection  I agree with this assessment  My recommendation based on his age and performance status would be gemcitabine and Abraxane weekly 3/4 weeks for a total of 3 cycles to be followed by reimaging and consideration of surgery  I went over the risks benefits and alternatives of treatment  I explained the possible side effects to include but not limited to neuropathy, hair loss, nausea, vomiting, diarrhea, mouth sores, low blood counts, risk of infection and even death  The patient is agreeable to proceeding  We will start him as soon as possible  He is having a port placed but he has chemotherapy can be started through peripheral vein as soon as we can find a chair  The patient and his brother will accompanied him are in agreement with the plan  He had some questions about prep for his port placement I advised him to call our colleagues in Surgical Oncology or their office staff to discuss this with them in addition to his preop testing  I will see him back in a month  He will have started treatment by then  We will make any dose adjustments as needed based on his side effects if he has any  The patient will sign a consent form today  Goals and Barriers:  Current Goal: Prolong Survival from Pancreatic Cancer  Barriers: None  Patient's Capacity to Self Care:  Patient able to self care  Portions of the record may have been created with voice recognition software   Occasional wrong word or "sound a like" substitutions may have occurred due to the inherent limitations of voice recognition software   Read the chart carefully and recognize, using context, where substitutions have occurred

## 2021-03-16 NOTE — PROGRESS NOTES
MSW received a Distress Thermometer from Med Onc, where the pt self scored a 5 to indicate his level of stress  The pt marked off fears, nervousness, sadness and worry as his psychosocial stressors  He indicated 1 out of 21 physical stressors  MSW reached out to the pt to offer support and he was open and receptive to this call  Pt shared how he lives alone and has a brother and cousin that live local and are very supportive  His cousin will be bringing him to his first infusion as he felt that he wanted someone to be with him  MSW explained the [employer] and offered this for him  The pt shared that he is not interested at this time but will consider it for later  It became evident through the conversation that the pt was continuing to process all of the information he was given on his diagnosis  He was tearful, at times, as he spoke about first learning of his diagnosis, the preparation of his port, the potential surgery down the road and how he may react to chemo  MSW listened and acknowledged how overwhelming all of this information is and assured the pt that he has the support from his treatment team    The pt also spoke of his [place] family and how supportive they have been to him  The pt spoke about all of the testing he needs prior to his port and he asked about the lab work he needs before his chemo  All questions were answered  The pt then asked about a sponge that he has for using prior to receiving his port  Pt asked if the sponge has soap or if he needs to use a special soap on it prior to using  MSW reached out to Emiliana Carpenter RN Navigator  MSW will follow up with pt when an answer is received  MSW will plan to meet with the pt at his first chemo and continue to follow and provide ongoing support  Significant support offered during this call  MSW received message from Emiliana Carpenter RN Navigator, with regards to the pt's question    Britta explained that the soap is on the sponge and the pt does not need to purchase any additional items  MSW reached out to pt to explain  Pt sounded relieved and expressed appreciation for this information

## 2021-03-19 NOTE — TELEPHONE ENCOUNTER
Patient called in regards to his tramdol medication that Dr Garcia had prescribed to him  Patient stated he went to pick it up from the pharmacy which is [facility] on [address] [facility] [address] and that the pharmacy said the medication wasn't ready  However when looking into patient's medication list it stated that the tramdol was sent to the [facility] and that it was confirmed by the pharmacy on 3/12 at 12:40pm  Stated this to patient-- not sure why this is causing patient and issue  Also stated to patient that I would pass this on to [facility] team to see if someone can further assist patient with this matter

## 2021-03-19 NOTE — TELEPHONE ENCOUNTER
Received notification by RN Navigator (Luisa Rosenthal ) on 3/19/21 that pt has triggered for oncology nutrition care (reason for referral: Malnutrition Screening Tool (MST) Triggers: scored a 4 indicating >/=34# (>/=15 4 kg) recent wt loss and is not eating poorly due to a decreased appetite  (Date of MST: 3/19/21))  Contacted Chan and introduced self and explained the reason for today's call  Discussed oncology nutrition services available (options for in-person and phone consultation) and the benefits of meeting for a consultation  Spoke with Anat Beasley today who reports he doesn't know what to eat during tx  He says he will be starting tx on Tuesday morning and is quite nervous  He reports that he has lost ~30# in the last 6 months  He says his wt has been steady ~148# for the past 3 months  His UBW is 170# and he was up to 230# at one point in his life (10 years ago when he was working)  He says his height is 72"  He says that his appetite has been "pretty good"  He reports that he tries to follow a diabetic diet d/t T2DM  He checks his BG 2-3x/daniel and his readings recently were 142 this AM and 177 last night 2 hrs after eating  He says his BM's are small, thin, and frequent (4x/day)  He reports a lot of gas and belching  NKFA/Intolerances  Per Britta's note, she e-mailed him the [employer] Eating Hints book  Discussed how to navigate this book  I will also mail it to him, along with some other resources, so that he can have a hard copy  Initial RD phone consultation set up for 3/29 at 10am      Mailed to pts home: [address], [facility], Oncology Milkshake & Smoothie Recipes, Nutrition Supplements List, Increasing Calories & Protein handout, and the [employer] Diet & Nutrition booklet  Provided this RDs contact information asking that Anat Beasley reach out prn  All questions/concerns addressed at this time      PMH:  Oncology Diagnosis & Treatments:  Oncology History   Adenocarcinoma of pancreas ([facility] ) 3/10/2021 Initial Diagnosis    Adenocarcinoma of pancreas ([facility] )     3/23/2021 -  Chemotherapy    PACLitaxel protein bound (ABRAXANE) 235 mg in IVPB 47 mL, 125 mg/m2 = 235 mg, Intravenous, Once, 0 of 6 cycles  gemcitabine (GEMZAR) 1,879 8 mg in sodium chloride 0 9 % 250 mL infusion, 1,000 mg/m2 = 1,879 8 mg, Intravenous, Once, 0 of 6 cycles       Patient Active Problem List   Diagnosis    Hyperlipidemia    HTN (hypertension)    Diabetes mellitus, type 2 ([place] [address] )    Gastroesophageal reflux disease    Pancreatic cyst    Adenocarcinoma of pancreas ([place] [address] )     Past Medical History:   Diagnosis Date    Diabetes mellitus ([place] [address] )     Frequent urination     GERD (gastroesophageal reflux disease)     Hyperlipidemia     Hypertension     Weakness     Weight loss      Past Surgical History:   Procedure Laterality Date    APPENDECTOMY      COLONOSCOPY      SKIN LESION EXCISION      of a wart on right arm       Review of Medications:     Current Outpatient Medications:     Accu-Chek FastClix Lancets MISC, USE 1 TO CHECK GLUCOSE TWICE DAILY TO THREE TIMES DAILY, Disp: , Rfl:     Accu-Chek Guide test strip, USE 1 STRIP TWICE DAILY TO THREE TIMES DAILY, Disp: , Rfl:     aspirin 81 mg chewable tablet, Chew 81 mg daily, Disp: , Rfl:     atorvastatin (LIPITOR) 40 mg tablet, Take 40 mg by mouth daily, Disp: , Rfl:     Blood Glucose Monitoring Suppl (Accu-Chek Guide) w/Device KIT, USE TO CHECK GLUCOSE TWO TO THREE TIMES DAILY, Disp: , Rfl:     glimepiride (AMARYL) 4 mg tablet, Take 4 mg by mouth 2 (two) times a day, Disp: , Rfl:     metFORMIN (GLUCOPHAGE) 850 mg tablet, TAKE 1 TABLET BY MOUTH TWICE DAILY TAKE WITH MORNING AND EVENING MEAL, Disp: , Rfl:     metoprolol succinate (TOPROL-XL) 100 mg 24 hr tablet, Take 100 mg by mouth daily, Disp: , Rfl:     NovoLOG FlexPen 100 units/mL injection pen, Inject 9 Units under the skin daily , Disp: , Rfl:     pantoprazole (PROTONIX) 40 mg tablet, Take 1 tablet by mouth twice daily (Patient not taking: Reported on 3/15/2021), Disp: 60 tablet, Rfl: 0    prochlorperazine (COMPAZINE) 10 mg tablet, Take 1 tablet (10 mg total) by mouth every 6 (six) hours as needed for nausea or vomiting, Disp: 45 tablet, Rfl: 3    ramipril (ALTACE) 10 MG capsule, Take 10 mg by mouth 2 (two) times a day, Disp: , Rfl:     ReliOn Pen Needles 31G X 6 MM MISC, USE 1 IN THE EVENING, Disp: , Rfl:     traMADol (ULTRAM) 50 mg tablet, Take 1 tablet (50 mg total) by mouth every 6 (six) hours as needed for moderate pain, Disp: 15 tablet, Rfl: 0    Most Recent Lab Results:   Lab Results   Component Value Date    WBC 12 34 (H) 03/04/2021    ALT 33 03/04/2021    AST 25 03/04/2021    ALB 2 8 (L) 03/04/2021    SODIUM 130 (L) 03/04/2021    K 4 8 03/04/2021    CL 96 (L) 03/04/2021    BUN 21 03/04/2021    CREATININE 1 13 03/04/2021    EGFR 63 03/04/2021    POCGLU 181 (H) 02/23/2021    GLUF 166 (H) 03/04/2021    CALCIUM 8 7 03/04/2021       Anthropometric Measurements:   Ht Readings from Last 1 Encounters:   03/15/21 6' (1 829 m)     -Weight History: Wt Readings from Last 15 Encounters:   03/15/21 67 6 kg (149 lb)   03/12/21 67 1 kg (148 lb)   02/23/21 77 1 kg (170 lb)     Estimated body mass index is 20 21 kg/m² as calculated from the following:    Height as of 3/15/21: 6' (1 829 m)  Weight as of 3/15/21: 67 6 kg (149 lb)

## 2021-03-19 NOTE — PROGRESS NOTES
Call to [facility] to complete GI assessment, he had questions about the neulasta on pro, explained that to him in detail, he was satisfied with that explanation, also told him the infusion center will show him a video about it as well, asked if he had any questions about the chemotherapy, he said he is very confused about it and concerned about the side effects, asked if he had any specific questions, he said not really, he is just concerned about getting sick, told him to just go into treatment with a clear mind and try not to worry about getting sick as he may not get sick, we discussed how to take the compazine should he need it, he said the pharmacy told him they may not cover the Tramadol Dr Christopher Patel had prescribed for him for after the port placement, told him to call the pharmacy prior to picking up and if they will not cover it and the cost is high to call and let me know as we will work on assisting in covering the cost, he was appreciative, he said that Dr Christopher Patel told him to get a stool softener as well but he didn't know what to get, told him he could talk to the pharmacist at the drug store and ask for assistance in getting a generic stool softener, he stated he has lost 30-35lbs in the past 3 months but feels his eating has improved, we discussed getting the dietitians on board, he was a bit hesitant but we discussed the importance of keeping his weight up if we were looking at a possible surgery in a few months and they would be helpful, he said he would talk to them and see if he thinks they are helpful, referral was entered, emailed him eating hints book, chemotherapy and you book, he was appreciative for the call, instructed him to call with any other questions or concerns

## 2021-03-22 NOTE — PROGRESS NOTES
Repeat potassium on arrival; ok to proceed without results per Vernel Level RN with Dr Erick Olmstead office

## 2021-03-23 NOTE — TELEPHONE ENCOUNTER
Received notification from Infusion RN Leila Hernandez on 3/23/21 that pt has triggered for oncology nutrition care: "Pt scored a 3 on screening form; please f/u with pt "    Please refer to note written by this RD on 3/19/21  Pt already has nutrition cx set up for 3/29

## 2021-03-23 NOTE — PROGRESS NOTES
MSW met with the pt in the infusion center this day  Pt was expressing much concern as he had attempted to  his prescription for tramadol at [facility] and was informed  there was no prescription there for him  Pt states he called the office and explained and thought it was resolved  Pt shared this has been going on since 3/12, where he is informed per the office the script was sent , he attempts to pick this up and the script is not there  MSW called [facility] this morning and spoke with pharmacist   It was confirmed they do not have a script for the pt, despite the order in [address] [place] [address] Rd  Pt presents as highly anxious as he is to get his port on Friday  Any resolution today would be helpful as this will assist in reducing the pt's anxiety  MSW sat with the pt in the infusion center while he was receiving his first infusion  Pt spoke openly about his nervousness with regards to his treatment and what his immediate future holds for him  Pt shared that he lost his son to cancer in   Pt's son was never  and lived alone  Pt described his son as having a learning disability and was proud that he was able to get him a job at the same place of employment where the pt worked for 30+ years  Pt shared that he was  for 35 years and had not spoken to his wife from the time she left him until the time they were planning a  for their son  The pt shared that even during that time she was not kind and made a point of telling the pt they "are never to speak again "    The pt is engaged with a [employer] and  described a strong support system  The pt explained that they visit him, send cards and he feels "as though they really care about me "  The pt has a brother local as well as a cousin, who brought him for treatment today  Pt lives in a 2 story home and the bathroom is on the second floor  He expressed concern over how he would feel and if he would be able to get upstairs    The nurse provided instruction  MSW received notification, during the pt;s treatment that the script was resent  MSW called [facility] and learned that the script was ready for pick-up  MSW notified the pt  The pt expressed gratitude as he stated again [place] concerned he had been  Emotional support provided an MSW encouraged pt to call as needed

## 2021-03-23 NOTE — PROGRESS NOTES
Pt asked if he is getting Neulasta as part of his treatment plan; not currently on order; would be given after his Day 15 treatment; notified Hilario Simmons RN/ Dr Marily Macias who stated she will look into it and let us know; Pt tolerated treatment with no adv reactions; AVS given; signs and symptoms to report to physician reviewed with pt who verb understanding; pt left unit ambulatory with steady gait

## 2021-03-24 NOTE — PRE-PROCEDURE INSTRUCTIONS
Pre-Surgery Instructions:   Medication Instructions    Accu-Chek FastClix Lancets MISC Patient was instructed by Physician and understands   Accu-Chek Guide test strip Patient was instructed by Physician and understands   aspirin 81 mg chewable tablet Patient was instructed by Physician and understands   atorvastatin (LIPITOR) 40 mg tablet Patient was instructed by Physician and understands   Blood Glucose Monitoring Suppl (Accu-Chek Guide) w/Device KIT Instructed patient per Anesthesia Guidelines   glimepiride (AMARYL) 4 mg tablet Patient was instructed by Physician and understands   metFORMIN (GLUCOPHAGE) 850 mg tablet Patient was instructed by Physician and understands   metoprolol succinate (TOPROL-XL) 100 mg 24 hr tablet Patient was instructed by Physician and understands  May take am of procedure    NovoLOG FlexPen 100 units/mL injection pen Patient was instructed by Physician and understands   prochlorperazine (COMPAZINE) 10 mg tablet Patient was instructed by Physician and understands   ramipril (ALTACE) 10 MG capsule Patient was instructed by Physician and understands   ReliOn Pen Needles 31G X 6 MM MISC Patient was instructed by Physician and understands   sodium chloride (GRAHAM 128) 5 % hypertonic ophthalmic solution Patient was instructed by Physician and understands   traMADol (ULTRAM) 50 mg tablet Patient was instructed by Physician and understands  Pt states he was instructed by Dr Virgil Lindsey to continue aspirin until morning of procedure  Will take metoprolol am of procedure & hold all other meds    Before your operation, you play an important role in decreasing your risk for infection by washing with special antiseptic soap  This is an effective way to reduce bacteria on the skin which may help to prevent infections at the surgical site  Please read the following directions in advance  1   In the week before your operation purchase a 4 ounce bottle of antiseptic soap containing chlorhexidine gluconate 4%  Some brand names include: Aplicare, Endure, and Hibiclens  The cost is usually less than $5 00  · For your convenience, the [address] [place] [address] carries the soap  · It may also be available at your doctor's office or pre-admission testing center, and at most retail pharmacies  · If you are allergic or sensitive to soaps containing chlorhexidine gluconate (CHG), please let your doctor know so another antiseptic soap can be suggested  · CHG antiseptic soap is for external use only  2      The day before your operation follow these directions carefully to get ready  · Place clean lines (sheets) on your bed; you should sleep on clean sheets after your evening shower  · Get clean towels and washcloths ready - you need enough for 2 showers  · Set aside clean underwear, pajamas, and clothing to wear after the shower  Reminders:  · DO NOT use any other soap or body rinse on your skin during or after the antiseptic showers  · DO NOT use lotion , powder, deodorant, or perfume/aftershave of any kind on your skin after your antiseptic shower  · DO NOT shave any body parts in the 24 hours/the day before your operation  · DO NOT get the antiseptic soap in your eyes, ears, nose, mouth, or vaginal area  3      You will need to shower the night before AND the morning of your Surgery  Shower 1:  · The evening before your operation, take the fist shower  · First, shampoo your hair with regular shampoo and rinse it completely before you use the anitseptic soap  After washing and rinsing your hair, rinse your body  · Next, use a clean wash cloth to apply the antiseptic soap and wash your body from the neck down to your toes using 1/2 bottle of the antiseptic soap  You will use the other 1/2 bottle for the second shower  · Clean the area where your incision will be; later this area well for about 2 minutes    · If you ar having head or neck surgery, wash areas with the antiseptic soap  · Rinse yourself completely with running water  · Use a clean towel to dry off  · Wear clean underwear and clothing/pajamas  Shower 2:  · The Morning of your operation, take the second shower following the same steps as Shower 1 using the second 1/2 of the bottle of antiseptic soap  · Use clean cloths and towels to was and dry yourself off  · Wear clean underwear and clothing

## 2021-03-26 NOTE — OP NOTE
OPERATIVE REPORT  PATIENT NAME: Wood Martinez    :  [birth year]  MRN: [id number]  Pt Location: [facility] OR ROOM 01    SURGERY DATE: 3/26/2021    Surgeon(s) and Role:     * Domonique Deleon MD - Primary     * Wili Sánchez PA-C - Assisting    Preop Diagnosis:  Adenocarcinoma of pancreas ([facility] [address] ) [C25 9]    Post-Op Diagnosis Codes:     * Adenocarcinoma of pancreas ([facility] [address] ) [C25 9]    Procedure(s) (LRB):  INSERTION VENOUS PORT (PORT-A-CATH) (N/A)    Specimen(s):  * No specimens in log *    Estimated Blood Loss:   Minimal    Drains:  * No LDAs found *    Anesthesia Type:   General    Operative Indications:  Adenocarcinoma of pancreas ([facility] [address] ) [C25 9]    Need for infusional therapy    Operative Findings:  Successful fluoroscopy-guided placement of right subclavian port    Complications:   None    Procedure and Technique:  The patient was brought to the operating suite and identified  Sedation was achieved  The right arm was padded and tucked  A shoulder roll was placed  The right neck and chest were prepped and draped in the usual sterile fashion  The port device was assembled and flushed  Timeout was performed  Local anesthesia was injected over the planned needle injection site as well as the planned 3 cm pocket over the right chest  The patient was placed in reverse Trendelenburg  Two attempts at accessing the right subclavian vein were attempted, with successful access at the second attempt  The wire was passed through the needle and into the vein, confirming placement of the wire into the vein with both ultrasound and fluoroscopy  An incision was made sharply over the pocket site and carried down with electrocautery to the pectoral fascia  A 2 cm region of subcutaneous fat was excised to facilitate port access  An inferior pocket was then created just deep to the dermis to accommodate the port  The pocket was inspected for hemostastis   The subcutaneous tunnel from the port site to the injection site was then infiltrated with local anesthetic, and the pre loaded port with the catheter attached was brought from the port site to the wire insertion site with the use of this subcutaneous tunneler  Using fluoroscopic guidance, the catheter was cut to terminate at the cavoatrial junction  The dilator and sheath were then introduced over the wire and into the vein  The wire and dilator were removed  The sheath was then divided and the port introduced through the sheath into the vein until the sheath was completely removed  Fluoroscopy confirmed proper placement of the catheter into the vein  The port was then accesses with successful draw back of blood and easy forward flushing  The port was then fixed with 3-0 Prolene to the pectoral fascia  The port site was inspected for hemostasis and found to be hemostatic  The dermis was then closed with running 3-0 Vicryl and the skin of both the pocket and the needle insertion site were closed with 4-0 Monocryl followed by skin glue        I was present for the entire procedure, A qualified resident physician was not available and A physician assistant was required during the procedure for retraction tissue handling,dissection and suturing    Patient Disposition:  PACU     SIGNATURE: Sandra Del Cid MD  DATE: March 26, 2021  TIME: 12:38 PM

## 2021-03-26 NOTE — DISCHARGE INSTRUCTIONS
There is skin glue and a small band aid strip over your incisions  Beginning tomorrow, you can shower with gentle soap and water as usual and the band aid strip will fall off over time  Do not soak your incision (eg in a bath) for 10 days  Use over the counter tylenol and advil as needed for pain - these can be utilized at the same time  For example, if needed, I recommend 1G tylenol @ 0900, 600 mg advil @ 1200, 1G tylenol @ 1500, 600 mg advil @ 1800, etc  If you have pain that exceeds this, take prescribed pain medication every 4-6 hours  Expect tenderness after surgery  If you have any significant fever (>101), spreading redness, drainage of pus-like fluid, give my office a call   If you have any other issues or questions, call anytime  [phone]

## 2021-03-26 NOTE — ANESTHESIA PREPROCEDURE EVALUATION
Procedure:  INSERTION VENOUS PORT (PORT-A-CATH) (N/A Chest)    Relevant Problems   CARDIO   (+) HTN (hypertension)   (+) Hyperlipidemia      ENDO   (+) Diabetes mellitus, type 2 (HCC)      GI/HEPATIC   (+) Adenocarcinoma of pancreas (HCC)   (+) Gastroesophageal reflux disease   (+) Pancreatic cyst      Other   (+) Peripheral neuropathy        Physical Exam    Airway    Mallampati score: II  TM Distance: >3 FB  Neck ROM: limited     Dental   No notable dental hx     Cardiovascular  Cardiovascular exam normal    Pulmonary  Pulmonary exam normal     Other Findings        Anesthesia Plan  ASA Score- 4     Anesthesia Type- general with ASA Monitors  Additional Monitors:   Airway Plan: LMA  Plan Factors-Exercise tolerance (METS): <4 METS  Chart reviewed  Patient summary reviewed  Patient is not a current smoker  Induction- intravenous  Postoperative Plan- Plan for postoperative opioid use  Informed Consent- Anesthetic plan and risks discussed with patient  I personally reviewed this patient with the CRNA  Discussed and agreed on the Anesthesia Plan with the CRNA  Joan Ingram

## 2021-03-26 NOTE — INTERVAL H&P NOTE
H&P reviewed  After examining the patient I find no changes in the patients condition since the H&P had been written      Vitals:    03/26/21 1047   BP: 157/69   Pulse: 96   Resp: 18   Temp: 98 1 °F (36 7 °C)   SpO2: 99%

## 2021-03-26 NOTE — ANESTHESIA POSTPROCEDURE EVALUATION
Post-Op Assessment Note    CV Status:  Stable  Pain Score: 0    Pain management: adequate     Mental Status:  Alert and awake   Hydration Status:  Euvolemic   PONV Controlled:  Controlled   Airway Patency:  Patent      Post Op Vitals Reviewed: Yes      Staff: CRNA   Reason for prolonged intubation > 24 hours:  N/aReason for prolonged intubation > 48 hours:  N/a      No complications documented      /68 (03/26/21 1250)    Temp      Pulse 90 (03/26/21 1250)   Resp      SpO2 100 % (03/26/21 1250)

## 2021-03-29 NOTE — PATIENT INSTRUCTIONS
Nutrition Rx & Recommendations:  · Diet: Diabetic, High Calorie & High Protein Diet  · Eat slowly and chew food thoroughly before swallowing  · Small, frequent meals/snacks may be easier to tolerate than 3 large daily meals  Aim for 5-6 small meals per day (every 2-3 hours)  · Include protein at all meals/snacks  · Stay hydrated by sipping fluids of choice/tolerance throughout the day  · Liquid nutrition may be better tolerated than solids at times  · Alter food choices and eating patterns to accommodate changing needs  · Refer to Eating Hints book for other meal/snack ideas and symptom management  · For weight loss: monitor your weight at home at least 2x/week, record your weight, start by adding 250-500 extra calories per day, eat 5-6 small scheduled meals every 2-3 hrs, choose foods that are high in protein and calories (see pages 49-53 in your Eating Hints book), drink liquids with calories for example: milkshakes/smoothies/juice/soup/whole milk/chocolate milk, cook with protein fortified milk (see recipe on page 36 in your Eating Hints book), consider ready-to-drink oral nutrition supplements such as Ensure Plus, Ensure Enlive, Boost Plus, or Boost Very High Calorie, avoid "diet" and "light" foods when possible, avoid drinking too much with meals, contact your dietitian with any continued weight loss over the course of 1 week  For more info see pages 35-37 in your Eating Hints book  · Weigh yourself regularly  If you notice weight loss, make an effort to increase your daily food/calorie intake  If you continue to notice loss after these efforts, reach out to your dietitian to establish a plan to stabilize weight  · Avoid gas-producing foods such as broccoli, cabbage, sauerkraut, [place] sprouts, cauliflower, corn, cucumbers, onions, peppers, beans, peas, lentils, apples, apple juice, avocado, and melons  Carbonated drinks, chewing gum, and drinking through a straw can also cause gas    Limiting lactose may help for those who are sensitive to milk products  · Fluids: Increase your fluid intake to 80-95 oz per day  Choose fluids with calories: 1% milk, Glucerna shakes, etc   Sip your fluids throughout the day  · Nutrition Supplements: Add 1 Glucerna shake as a morning and an afternoon snack - total of 2 shakes each day  · Have cheese or low-sugar [place] yogurt with your pretzels for your evening snack  This will help you get more calories and protein  It should also hep stabilize your blood sugar  · Try [place] yogurt for a higher protein yogurt  Choose a yogurt that has <10 grams of sugar per serving  · Please review the handouts that were mailed to you for more diet and nutrition ideas      Follow Up Plan: 4/29/21 at 10:30am  Recommend Referral to Other Providers: none at this time

## 2021-03-30 NOTE — PROGRESS NOTES
Pt here for chemo day 8  Port accessed, Tolerated well  Port de-accessed, dsd applied  Disch amb to home, steady gait    Pt did not want AVS

## 2021-03-30 NOTE — PLAN OF CARE
Problem: Potential for Falls  Goal: Patient will remain free of falls  Description: INTERVENTIONS:  - Assess patient frequently for physical needs  -  Identify cognitive and physical deficits and behaviors that affect risk of falls  -  [place] fall precautions as indicated by assessment   - Educate patient/family on patient safety including physical limitations  - Instruct patient to call for assistance with activity based on assessment  - Modify environment to reduce risk of injury  - Consider OT/PT consult to assist with strengthening/mobility  Outcome: Progressing     Problem: Knowledge Deficit  Goal: Patient/family/caregiver demonstrates understanding of disease process, treatment plan, medications, and discharge instructions  Description: Complete learning assessment and assess knowledge base    Interventions:  - Provide teaching at level of understanding  - Provide teaching via preferred learning methods  Outcome: Progressing

## 2021-03-30 NOTE — PROGRESS NOTES
MSW met with the pt in the  [facility] 1540 this day  The pt was open and receptive to chairside conversation  Pt was pleasant and denied pain or discomfort at this time  Pt spoke about his port and how it was not as painful as he had anticipated  He also spoke about the reactions to his chemo, which appear to have been minimal   The pt spoke of some nausea and shared that the medication that he had was successful in reducing this  Overall, pt reports to be doing well  MSW sat with the pt and provided an emotional presence  MSW will continue to follow

## 2021-04-06 NOTE — PROGRESS NOTES
Patient tolerated treatment today with no adverse reactions  Pt educated to notify MD with s/s of reactions including SOB, Rash, and hives Patient gave verbal understanding  Patient then d/cd home ambulatory with steady gait  An After Visit Summary was printed and given to the patient

## 2021-04-06 NOTE — PLAN OF CARE
Problem: Potential for Falls  Goal: Patient will remain free of falls  Description: INTERVENTIONS:  - Assess patient frequently for physical needs  -  Identify cognitive and physical deficits and behaviors that affect risk of falls    -  [place] fall precautions as indicated by assessment   - Educate patient/family on patient safety including physical limitations  - Instruct patient to call for assistance with activity based on assessment  - Modify environment to reduce risk of injury  - Consider OT/PT consult to assist with strengthening/mobility  Outcome: Progressing

## 2021-04-06 NOTE — PROGRESS NOTES
Patient here for chemo treatment  C/o b/l LE edema  +3 left extends up to knee, +3 right not quite as far up the leg  Patient states he saw PCP for it and US was done on left leg to r/o DVT, which was negative  Notified Yaritza Rudd RN, who said ok to proceed with tx, have patient continue to f/u with PCP  This was explained to patient who indicated verbal understanding

## 2021-04-08 PROBLEM — L03.119 CELLULITIS OF LOWER EXTREMITY: Status: ACTIVE | Noted: 2021-01-01

## 2021-04-08 PROBLEM — E87.1 HYPONATREMIA: Status: ACTIVE | Noted: 2021-01-01

## 2021-04-08 PROBLEM — R55 SYNCOPE: Status: ACTIVE | Noted: 2021-01-01

## 2021-04-08 PROBLEM — E87.2 LACTIC ACIDOSIS: Status: ACTIVE | Noted: 2021-01-01

## 2021-04-08 NOTE — ED PROVIDER NOTES
History  Chief Complaint   Patient presents with    Weakness - Generalized     pt had an unwittnessed syncapable episode  pt denies head strike, pt has been increasinly weak over the last two weeks  pt complains of lower leg pain and high sugars     This 53-year-old male with history of adenocarcinoma but of the pancreas with no detectable intrathoracic spread as of March 12, 2021, diabetes type 2 with recent increase in insulin requirement, hypertension and hyperlipidemia says that today after dinner he felt weak and lightheaded while standing in the kitchen to do the dishes  He then fell to the floor, unwitnessed  He is unsure whether he had a brief syncopal episode or not  He denies injuring his head or spine but feels his shoulders and elbows are a little bit sore  Denies chest pain, palpitations, dyspnea  He was too weak to get up but was able to call his family who came in and then called the medics  Blood sugar for paramedics was 308 and patient was tachycardic with a dry mouth  IV fluids were started  Patient says that over the last week or so his legs and feet have become swollen and a little red  He had a Doppler scan of left lower extremity that showed no evidence of DVT  Although he has had nausea recently and very occasional right upper quadrant discomfort there has been no vomiting, diarrhea, bloody stool or dysuria  His last chemotherapy was infused 2 days ago  Prior to Admission Medications   Prescriptions Last Dose Informant Patient Reported? Taking?    Accu-Chek FastClix Lancets MISC 4/8/2021 at Unknown time  Yes Yes   Sig: USE 1 TO CHECK GLUCOSE TWICE DAILY TO THREE TIMES DAILY   Accu-Chek Guide test strip 4/8/2021 at Unknown time Self Yes Yes   Sig: USE 1 STRIP TWICE DAILY TO THREE TIMES DAILY   Blood Glucose Monitoring Suppl (Accu-Chek Guide) w/Device KIT 4/8/2021 at Unknown time Self Yes Yes   Sig: USE TO CHECK GLUCOSE TWO TO THREE TIMES DAILY   NovoLOG FlexPen 100 units/mL injection pen 2021 at Unknown time Self Yes Yes   Sig: Inject 15 Units under the skin daily 30 min before dinner   ReliOn Pen Needles 31G X 6 MM MISC 2021 at Unknown time  Yes Yes   Sig: USE 1 IN THE EVENING   aspirin 81 mg chewable tablet 2021 at Unknown time Self Yes Yes   Sig: Chew 81 mg daily   atorvastatin (LIPITOR) 40 mg tablet 2021 at Unknown time Self Yes Yes   Sig: Take 40 mg by mouth daily   glimepiride (AMARYL) 4 mg tablet 2021 at Unknown time Self Yes Yes   Sig: Take 2 mg by mouth 2 (two) times a day    metFORMIN (GLUCOPHAGE) 850 mg tablet 2021 at Unknown time Self Yes Yes   Sig: TAKE 1 TABLET BY MOUTH TWICE DAILY TAKE WITH MORNING AND EVENING MEAL   metoprolol succinate (TOPROL-XL) 100 mg 24 hr tablet 2021 at Unknown time Self Yes Yes   Sig: Take 100 mg by mouth daily   oxyCODONE (ROXICODONE) 5 mg immediate release tablet Not Taking at Unknown time  No No   Sig: Take 1 tablet (5 mg total) by mouth every 4 (four) hours as needed for moderate pain for up to 20 dosesMax Daily Amount: 30 mg   Patient not taking: Reported on 2021   prochlorperazine (COMPAZINE) 10 mg tablet 2021 at Unknown time  No Yes   Sig: Take 1 tablet (10 mg total) by mouth every 6 (six) hours as needed for nausea or vomiting   ramipril (ALTACE) 10 MG capsule 2021 at Unknown time Self Yes Yes   Sig: Take 10 mg by mouth daily    sodium chloride (GRAHAM 128) 5 % hypertonic ophthalmic solution 2021 at Unknown time  Yes Yes   Si drop as needed   traMADol (ULTRAM) 50 mg tablet Not Taking at Unknown time  No No   Sig: Take 1 tablet (50 mg total) by mouth every 6 (six) hours as needed for moderate pain   Patient not taking: Reported on 2021      Facility-Administered Medications: None       Past Medical History:   Diagnosis Date    Cancer [facility])     pancreatic    Diabetes mellitus ([address] )     Frequent urination     GERD (gastroesophageal reflux disease)     Hyperlipidemia     Hypertension  Peripheral neuropathy     Weakness     Weight loss        Past Surgical History:   Procedure Laterality Date    APPENDECTOMY      CATARACT EXTRACTION      COLONOSCOPY      SKIN LESION EXCISION      of a wart on right arm    TUNNELED VENOUS PORT PLACEMENT N/A 3/26/2021    Procedure: INSERTION VENOUS PORT (PORT-A-CATH); Surgeon: Servando Rabago MD;  Location:  MAIN OR;  Service: Surgical Oncology       History reviewed  No pertinent family history  I have reviewed and agree with the history as documented  E-Cigarette/Vaping    E-Cigarette Use Never User      E-Cigarette/Vaping Substances    Nicotine No     THC No     CBD No     Flavoring No     Other No     Unknown No      Social History     Tobacco Use    Smoking status: Former Smoker     Types: Pipe     Quit date: 2/23/2011     Years since quitting: 10 1    Smokeless tobacco: Never Used   Substance Use Topics    Alcohol use: Never     Frequency: Never     Binge frequency: Never    Drug use: Never       Review of Systems   Constitutional: Positive for activity change, appetite change, fatigue and unexpected weight change  Negative for diaphoresis and fever  HENT: Negative  Eyes: Negative  Respiratory: Negative  Cardiovascular: Positive for leg swelling  Negative for chest pain and palpitations  Gastrointestinal: Positive for abdominal pain and nausea  Negative for blood in stool, diarrhea and vomiting  Endocrine: Negative  Genitourinary: Negative  Musculoskeletal: Negative  Skin: Negative  Allergic/Immunologic: Negative  Neurological: Positive for syncope ( today), weakness and light-headedness (Today)  Hematological: Negative  Psychiatric/Behavioral: Negative  All other systems reviewed and are negative  Physical Exam  Physical Exam  Vitals signs and nursing note reviewed  Constitutional:       General: He is not in acute distress  Appearance: He is well-developed  He is ill-appearing  He is not diaphoretic  Comments: Patient appears malnourished   HENT:      Head: Normocephalic and atraumatic  Right Ear: External ear normal       Left Ear: External ear normal       Nose: Nose normal  No rhinorrhea  Mouth/Throat:      Mouth: Mucous membranes are moist       Pharynx: Oropharynx is clear  No oropharyngeal exudate or posterior oropharyngeal erythema  Eyes:      General: No scleral icterus  Conjunctiva/sclera: Conjunctivae normal       Pupils: Pupils are equal, round, and reactive to light  Neck:      Musculoskeletal: Normal range of motion and neck supple  No muscular tenderness  Vascular: No JVD  Cardiovascular:      Rate and Rhythm: Normal rate and regular rhythm  Pulses: Normal pulses  Heart sounds: Normal heart sounds  No murmur  Pulmonary:      Effort: Pulmonary effort is normal       Breath sounds: Normal breath sounds  Chest:      Chest wall: No tenderness  Abdominal:      General: Bowel sounds are normal  There is no distension  Palpations: Abdomen is soft  There is no mass  Tenderness: There is no abdominal tenderness  There is no guarding or rebound  Musculoskeletal: Normal range of motion  General: Tenderness (Minimal tenderness of right shoulder) present  No deformity or signs of injury  Right lower leg: Edema present  Left lower leg: Edema present  Lymphadenopathy:      Cervical: No cervical adenopathy  Skin:     General: Skin is warm and dry  Capillary Refill: Capillary refill takes less than 2 seconds  Findings: Erythema ( both feet and lower legs  No blistering noted  No drainage  No lymphangitis streaking  No calf cord  ) present  No rash  Neurological:      General: No focal deficit present  Mental Status: He is alert and oriented to person, place, and time  Cranial Nerves: No cranial nerve deficit  Sensory: No sensory deficit  Motor: No weakness        Coordination: Coordination normal       Deep Tendon Reflexes: Reflexes are normal and symmetric     Psychiatric:         Mood and Affect: Mood normal          Behavior: Behavior normal          Vital Signs  ED Triage Vitals   Temperature Pulse Respirations Blood Pressure SpO2   04/08/21 1849 04/08/21 1849 04/08/21 1849 04/08/21 1849 04/08/21 1849   98 8 °F (37 1 °C) 105 18 134/62 100 %      Temp Source Heart Rate Source Patient Position - Orthostatic VS BP Location FiO2 (%)   04/09/21 1559 04/08/21 1849 04/08/21 2000 04/08/21 2000 --   Oral Monitor Lying Right arm       Pain Score       04/08/21 2045       8           Vitals:    04/10/21 1333 04/10/21 1614 04/11/21 0035 04/11/21 0737   BP: (!) 114/49 135/58 113/50 127/54   Pulse: 74 77 80 74   Patient Position - Orthostatic VS:   Lying Sitting         Visual Acuity  Visual Acuity      Most Recent Value   L Pupil Size (mm)  3   R Pupil Size (mm)  3          ED Medications  Medications   aspirin chewable tablet 81 mg (81 mg Oral Given 4/10/21 0813)   atorvastatin (LIPITOR) tablet 40 mg (40 mg Oral Given 4/10/21 1657)   metoprolol succinate (TOPROL-XL) 24 hr tablet 100 mg (100 mg Oral Given 4/10/21 0813)   lisinopril (ZESTRIL) tablet 40 mg (40 mg Oral Given 4/10/21 0813)   prochlorperazine (COMPAZINE) tablet 10 mg (has no administration in time range)   acetaminophen (TYLENOL) tablet 650 mg (has no administration in time range)   aluminum-magnesium hydroxide-simethicone (MYLANTA) oral suspension 30 mL (has no administration in time range)   enoxaparin (LOVENOX) subcutaneous injection 40 mg (40 mg Subcutaneous Given 4/10/21 0812)   ceFAZolin (ANCEF) IVPB (premix in dextrose) 1,000 mg 50 mL (1,000 mg Intravenous New Bag 4/11/21 0344)   insulin lispro (HumaLOG) 100 units/mL subcutaneous injection 5 Units (5 Units Subcutaneous Given 4/10/21 1657)   insulin glargine (LANTUS) subcutaneous injection 15 Units 0 15 mL (15 Units Subcutaneous Given 4/10/21 2117)   insulin lispro (HumaLOG) 100 units/mL subcutaneous injection 1-5 Units (2 Units Subcutaneous Given 4/10/21 1657)   insulin lispro (HumaLOG) 100 units/mL subcutaneous injection 1-5 Units (2 Units Subcutaneous Given 4/10/21 2117)   melatonin tablet 6 mg (6 mg Oral Given 4/10/21 2350)   sodium chloride 0 9 % bolus 1,000 mL (0 mL Intravenous Stopped 4/8/21 2005)   piperacillin-tazobactam (ZOSYN) IVPB 3 375 g (0 g Intravenous Stopped 4/8/21 2035)   multi-electrolyte (PLASMALYTE-A/ISOLYTE-S PH 7 4) IV solution 1,000 mL (0 mL Intravenous Stopped 4/8/21 2039)     Followed by   multi-electrolyte (ISOLYTE-S PH 7 4) bolus 1,000 mL (0 mL Intravenous Stopped 4/9/21 0846)     Followed by   multi-electrolyte (PLASMALYTE-A/ISOLYTE-S PH 7 4) IV solution 1,000 mL (0 mL Intravenous Stopped 4/9/21 0846)   furosemide (LASIX) injection 40 mg (40 mg Intravenous Given 4/10/21 1336)       Diagnostic Studies  Results Reviewed     Procedure Component Value Units Date/Time    Blood culture #1 [[id number]] Collected: 04/08/21 1859    Lab Status: Preliminary result Specimen: Blood from Arm, Left Updated: 04/11/21 0289     Blood Culture No Growth at 48 hrs  Blood culture #2 [[id number]] Collected: 04/08/21 1859    Lab Status: Preliminary result Specimen: Blood from Arm, Right Updated: 04/11/21 7158     Blood Culture No Growth at 48 hrs      Procalcitonin with AM Reflex [[id number]]  (Normal) Collected: 04/08/21 1859    Lab Status: Final result Specimen: Blood from Arm, Left Updated: 04/09/21 0731     Procalcitonin 0 05 ng/ml     Hemoglobin A1c w/EAG Estimation (Orders if not completed within the last 90 days) [[id number]]  (Abnormal) Collected: 04/08/21 1859    Lab Status: Final result Specimen: Blood from Arm, Left Updated: 04/09/21 0544     Hemoglobin A1C 9 4 %       mg/dl     UA w Reflex to Microscopic w Reflex to Culture [[id number]]  (Abnormal) Collected: 04/08/21 2206    Lab Status: Final result Specimen: Urine, Clean Catch Updated: 04/08/21 2213     Color, UA Yellow     Clarity, UA Clear     Specific Gravity, UA 1 010     pH, UA 6 0     Leukocytes, UA Elevated glucose may cause decreased leukocyte values  See urine microscopic for [facility] result/     Nitrite, UA Negative     Protein, UA Negative mg/dl      Glucose, UA >=1000 (1%) mg/dl      Ketones, UA Negative mg/dl      Urobilinogen, UA 0 2 E U /dl      Bilirubin, UA Negative     Blood, UA Trace-Intact    Lactic acid 2 Hours [[id number]]  (Abnormal) Collected: 04/08/21 2049    Lab Status: Final result Specimen: Blood from Arm, Left Updated: 04/08/21 2125     LACTIC ACID 3 2 mmol/L     Narrative:      Result may be elevated if tourniquet was used during collection  CBC and differential [[id number]]  (Abnormal) Collected: 04/08/21 1859    Lab Status: Final result Specimen: Blood from Arm, Left Updated: 04/08/21 2000     WBC 10 96 Thousand/uL      RBC 2 70 Million/uL      Hemoglobin 8 2 g/dL      Hematocrit 25 0 %      MCV 93 fL      MCH 30 4 pg      MCHC 32 8 g/dL      RDW 15 7 %      MPV 9 4 fL      Platelets 636 Thousands/uL      nRBC 0 /100 WBCs      Neutrophils Relative 93 %      Immat GRANS % 1 %      Lymphocytes Relative 3 %      Monocytes Relative 1 %      Eosinophils Relative 2 %      Basophils Relative 0 %      Neutrophils Absolute 10 21 Thousands/µL      Immature Grans Absolute 0 07 Thousand/uL      Lymphocytes Absolute 0 33 Thousands/µL      Monocytes Absolute 0 08 Thousand/µL      Eosinophils Absolute 0 25 Thousand/µL      Basophils Absolute 0 02 Thousands/µL     Narrative: This is an appended report  These results have been appended to a previously verified report  Lactic acid [[id number]]  (Abnormal) Collected: 04/08/21 1859    Lab Status: Final result Specimen: Blood from Arm, Left Updated: 04/08/21 1938     LACTIC ACID 5 7 mmol/L     Narrative:      Result may be elevated if tourniquet was used during collection      Protime-INR [[id number]]  (Abnormal) Collected: 04/08/21 1859    Lab Status: Final result Specimen: Blood from Arm, Left Updated: 04/08/21 1934     Protime 15 7 seconds      INR 1 25    APTT [[id number]]  (Normal) Collected: 04/08/21 1859    Lab Status: Final result Specimen: Blood from Arm, Left Updated: 04/08/21 1934     PTT 29 seconds     Comprehensive metabolic panel [[id number]]  (Abnormal) Collected: 04/08/21 1859    Lab Status: Final result Specimen: Blood from Arm, Left Updated: 04/08/21 1928     Sodium 130 mmol/L      Potassium 4 7 mmol/L      Chloride 96 mmol/L      CO2 20 mmol/L      ANION GAP 14 mmol/L      BUN 25 mg/dL      Creatinine 1 14 mg/dL      Glucose 261 mg/dL      Calcium 8 6 mg/dL      Corrected Calcium 9 7 mg/dL      AST 49 U/L      ALT 81 U/L      Alkaline Phosphatase 106 U/L      Total Protein 6 0 g/dL      Albumin 2 6 g/dL      Total Bilirubin 0 60 mg/dL      eGFR 62 ml/min/1 73sq m     Narrative:      [place] guidelines for Chronic Kidney Disease (CKD):     Stage 1 with normal or high GFR (GFR > 90 mL/min/1 73 square meters)    Stage 2 Mild CKD (GFR = 60-89 mL/min/1 73 square meters)    Stage 3A Moderate CKD (GFR = 45-59 mL/min/1 73 square meters)    Stage 3B Moderate CKD (GFR = 30-44 mL/min/1 73 square meters)    Stage 4 Severe CKD (GFR = 15-29 mL/min/1 73 square meters)    Stage 5 End Stage CKD (GFR <15 mL/min/1 73 square meters)  Note: GFR calculation is accurate only with a steady state creatinine    Beta Hydroxybutyrate [[id number]]  (Normal) Collected: 04/08/21 1859    Lab Status: Final result Specimen: Blood from Arm, Left Updated: 04/08/21 1920     BETA-HYDROXYBUTYRATE 0 1 mmol/L                  VAS carotid complete study   Final Result by Jackie Silva MD (04/09 2227)      XR shoulder 2+ views RIGHT   ED Interpretation by Jonh Archer DO (04/08 1938)   No acute fracture or dislocation      Final Result by Tash Markham MD (04/09 2818)   Degenerative arthritis   Chronic rotator cuff tear   No acute osseous abnormality  Workstation performed: [id number]         CT head without contrast   Final Result by Donta Jose MD (04/08 2001)      No acute intracranial abnormality  Workstation performed: [id number]         XR chest portable   ED Interpretation by Lucrecia Cleveland DO (04/08 1938)   No acute disease      Final Result by London Whiting MD (04/09 3531)      1  Stable exam with no acute cardiopulmonary disease  2   Right chest wall port as noted  Workstation performed: [id number]                    Procedures  Procedures         ED Course                         Initial Sepsis Screening     Row Name 04/08/21 1949                Is the patient's history suggestive of a new or worsening infection? (!) Yes (Proceed)  -        Suspected source of infection  soft tissue  -MF        Are two or more of the following signs & symptoms of infection both present and new to the patient?  --        Indicate SIRS criteria  Tachycardia > 90 bpm;Altered mental status  -        If the answer is yes to both questions, suspicion of sepsis is present  --        If severe sepsis is present AND tissue hypoperfusion perists in the hour after fluid resuscitation or lactate > 4, the patient meets criteria for SEPTIC SHOCK  --        Are any of the following organ dysfunction criteria present within 6 hours of suspected infection and SIRS criteria that are NOT considered to be chronic conditions?   (!) Yes  -        Organ dysfunction  Lactate >/equal 4 0 mmol/L  -        Date of presentation of severe sepsis  04/08/21  -        Time of presentation of severe sepsis  1942  -        Tissue hypoperfusion persists in the hour after crystalloid fluid administration, evidenced, by either:  --        Was hypotension present within one hour of the conclusion of crystalloid fluid administration?  --        Date of presentation of septic shock  --        Time of presentation of septic shock  -- User Key  (r) = Recorded By, (t) = Taken By, (c) = Cosigned By    [address] Name Provider Type     Pelon Liang DO Physician           Default Flowsheet Data (last 720 hours)      Sepsis Reassess     [address] Name 04/08/21 2258 04/08/21 2048                Repeat Volume Status and Tissue Perfusion Assessment Performed    Repeat Volume Status and Tissue Perfusion Assessment Performed  Yes  -NS  Yes  -MF          Volume Status and Tissue Perfusion Post Fluid Resuscitation * Must Document All *    Vital Signs Reviewed (HR, RR, BP, T)  Yes 118/53  T- 98 3  P-96  R 16  -NS  Yes 84, 16, 146/66, 98 8  -MF       Shock Index Reviewed  Yes  -NS  Yes 0 6  -MF       Arterial Oxygen Saturation Reviewed (POx, SaO2 or SpO2)  Yes (comment %) 100% on RA  -NS  --       Cardio  Normal S1/S2; Regular rate and rhythm  -NS  Normal S1/S2; Regular rate and rhythm; No murmor  -       Pulmonary  Normal effort;Clear to auscultation  -NS  Normal effort  -       Capillary Refill  Brisk  -NS  Brisk  -MF       Peripheral Pulses  Radial;Dorsalis Pedis; Posterior Tibialis  -NS  Radial;Dorsalis Pedis  -MF       Peripheral Pulse  +2  -NS  +1  -MF       Dorsalis Pedis  +2  -NS  --       Posterior Tibialis  +2  -NS  --       Skin  Warm  -NS  Warm;Dry;Other/Abnormal (Comment) Erythema both feet and lower legs  No blistering  No drainage    -       Urine output assessed  Adequate  -NS  Adequate  -MF          *OR*   Intensive Monitoring- Must Document One of the Following Four *:    Vital Signs Reviewed  --  --       * Central Venous Pressure (CVP or RAP)  --  --       * Central Venous Oxygen (SVO2, ScvO2 or Oxygen saturation via central catheter)  --  --       * Bedside Cardiovascular US in IVC diameter and % collapse  --  --       * Passive Leg Raise OR Crystalloid Challenge  --  --         User Key  (r) = Recorded By, (t) = Taken By, (c) = Cosigned By    Initials Name Provider Type     Pelon Liang DO Physician    [address] [facility] [place], DANTE Physician Assistant        SBIRT 22yo+      Most Recent Value   SBIRT (25 yo +)   In order to provide better care to our patients, we are screening all of our patients for alcohol and drug use  Would it be okay to ask you these screening questions? Yes Filed at: 04/08/2021 1902   Initial Alcohol Screen: US AUDIT-C    1  How often do you have a drink containing alcohol?  0 Filed at: 04/08/2021 1902   2  How many drinks containing alcohol do you have on a typical day you are drinking? 0 Filed at: 04/08/2021 1902   3a  Male UNDER 65: How often do you have five or more drinks on one occasion? 0 Filed at: 04/08/2021 1902   3b  FEMALE Any Age, or MALE 65+: How often do you have 4 or more drinks on one occassion? 0 Filed at: 04/08/2021 1902   Audit-C Score  0 Filed at: 04/08/2021 1902   MAURICIO: How many times in the past year have you    Used an illegal drug or used a prescription medication for non-medical reasons? Never Filed at: 04/08/2021 1902                    MDM  Number of Diagnoses or Management Options  Cellulitis: new and requires workup  Postural dizziness with presyncope: new and requires workup  Severe sepsis [facility]): new and requires workup  Diagnosis management comments: Elderly male with multiple comorbidities had presyncopal episode  Presents with severe sepsis likely due to cellulitis of both lower extremities  Due to his presentation and comorbidities patient requires inpatient care with IV fluid resuscitation IV antibiotics and further workup  No evidence for diabetic ketoacidosis, acute MI, stroke, GI bleed or significant sequelae of trauma         Amount and/or Complexity of Data Reviewed  Clinical lab tests: ordered and reviewed  Tests in the radiology section of CPT®: ordered and reviewed  Review and summarize past medical records: yes  Discuss the patient with other providers: yes  Independent visualization of images, tracings, or specimens: yes        Disposition  Final diagnoses: Severe sepsis (HCC)   Cellulitis   Postural dizziness with presyncope     Time reflects when diagnosis was documented in both MDM as applicable and the Disposition within this note     Time User Action Codes Description Comment    4/8/2021  7:52 PM Margaret Rode Add [A41 9,  R65 20] Severe sepsis ([address] )     4/8/2021  8:10 PM Margaret Maher Add [L03 90] Cellulitis     4/8/2021  8:10 PM Margaret Rodcindy Add [T87,  R55] Postural dizziness with presyncope     4/9/2021  2:54 PM Cyn Lanier Add [R55] Syncope and collapse       ED Disposition     ED Disposition Condition Date/Time Comment    Admit Stable Thu Apr 8, 2021  7:59 PM Case was discussed with KAMILAH AP and the patient's admission status was agreed to be Admission Status: inpatient status to the service of Dr Ward Connor           Follow-up Information    None         Current Discharge Medication List      CONTINUE these medications which have NOT CHANGED    Details   Accu-Chek FastClix Lancets MISC USE 1 TO CHECK GLUCOSE TWICE DAILY TO THREE TIMES DAILY      Accu-Chek Guide test strip USE 1 STRIP TWICE DAILY TO THREE TIMES DAILY      aspirin 81 mg chewable tablet Chew 81 mg daily      atorvastatin (LIPITOR) 40 mg tablet Take 40 mg by mouth daily      Blood Glucose Monitoring Suppl (Accu-Chek Guide) w/Device KIT USE TO CHECK GLUCOSE TWO TO THREE TIMES DAILY      glimepiride (AMARYL) 4 mg tablet Take 2 mg by mouth 2 (two) times a day       metFORMIN (GLUCOPHAGE) 850 mg tablet TAKE 1 TABLET BY MOUTH TWICE DAILY TAKE WITH MORNING AND EVENING MEAL      metoprolol succinate (TOPROL-XL) 100 mg 24 hr tablet Take 100 mg by mouth daily      NovoLOG FlexPen 100 units/mL injection pen Inject 15 Units under the skin daily 30 min before dinner      prochlorperazine (COMPAZINE) 10 mg tablet Take 1 tablet (10 mg total) by mouth every 6 (six) hours as needed for nausea or vomiting  Qty: 45 tablet, Refills: 3    Associated Diagnoses: Adenocarcinoma of pancreas (HCC) ramipril (ALTACE) 10 MG capsule Take 10 mg by mouth daily       ReliOn Pen Needles 31G X 6 MM MISC USE 1 IN THE EVENING      sodium chloride (GRAHAM 128) 5 % hypertonic ophthalmic solution 1 drop as needed      oxyCODONE (ROXICODONE) 5 mg immediate release tablet Take 1 tablet (5 mg total) by mouth every 4 (four) hours as needed for moderate pain for up to 20 dosesMax Daily Amount: 30 mg  Qty: 20 tablet, Refills: 0    Associated Diagnoses: Adenocarcinoma of pancreas (HCC)      traMADol (ULTRAM) 50 mg tablet Take 1 tablet (50 mg total) by mouth every 6 (six) hours as needed for moderate pain  Qty: 15 tablet, Refills: 0    Associated Diagnoses: Adenocarcinoma of pancreas (HCC)           No discharge procedures on file      PDMP Review     None          ED Provider  Electronically Signed by           Zeeshan Ring DO  04/11/21 2634

## 2021-04-09 PROBLEM — L03.116 BILATERAL CELLULITIS OF LOWER LEG: Status: ACTIVE | Noted: 2021-01-01

## 2021-04-09 PROBLEM — L03.115 BILATERAL CELLULITIS OF LOWER LEG: Status: ACTIVE | Noted: 2021-01-01

## 2021-04-09 NOTE — UTILIZATION REVIEW
Initial Clinical Review    Admission: Date/Time/Statement:   Admission Orders (From admission, onward)     Ordered        04/08/21 2014  Inpatient Admission  Once                   Orders Placed This Encounter   Procedures    Inpatient Admission     Standing Status:   Standing     Number of Occurrences:   1     Order Specific Question:   Level of Care     Answer:   Med Surg [16]     Order Specific Question:   Estimated length of stay     Answer:   More than 2 Midnights     Order Specific Question:   Certification     Answer:   I certify that inpatient services are medically necessary for this patient for a duration of greater than two midnights  See H&P and MD Progress Notes for additional information about the patient's course of treatment  ED Arrival Information     Expected Arrival Acuity Means of Arrival Escorted By Service Admission Type    - 4/8/2021 18:44 Urgent Ambulance [facility]) General Medicine Urgent    Arrival Complaint    syncope        Chief Complaint   Patient presents with    Weakness - Generalized     pt had an unwittnessed syncapable episode  pt denies head strike, pt has been increasinly weak over the last two weeks  pt complains of lower leg pain and high sugars     Assessment/Plan: 68 yo m w/hx newly dx pancreatic ca on chemo, dm, htn to ED from home by EMS Admitted as inpatient due to syncope, BLE cellulitis, and lactic acidosis/hyponatremia  Presented after felt lightheaded when got up from sitting position, started to walk, then felt self falling backwards  He then awoke on the floor  Prior similar episode many years ago and was found to have carotid stenosis, which was never treated  C/o increased bilat ankle swelling with 8/10 pain and high sugars over the past week  Feels fatigued/insomnia since starting chemo  Exam reveals pale, tachycardic, L neck tenderness, +4 pitting bipedal edema/erythema with small wound R ankle  +2 dopplerable pulses   Imaging nothing acute, EKG sinus tach  IV antbx, IVF,  tele in progress  Carotid doppler ordered, rechecking labs  4/9: no overnight events  changed antbx to IV ancef w/improving erythema  Na and lactic acid improved with IVF, no further fluids  Lungs are normal, persistent +3 BLE edema  hgb has dropped  PM update: carotid doppler >70%stenosis on the R; 50-69% stenosis on the L  ED Triage Vitals   Temperature Pulse Respirations Blood Pressure SpO2   04/08/21 1849 04/08/21 1849 04/08/21 1849 04/08/21 1849 04/08/21 1849   98 8 °F (37 1 °C) 105 18 134/62 100 %      Temp src Heart Rate Source Patient Position - Orthostatic VS BP Location FiO2 (%)   -- 04/08/21 1849 04/08/21 2000 04/08/21 2000 --    Monitor Lying Right arm       Pain Score       04/08/21 2045       8          Wt Readings from Last 1 Encounters:   04/08/21 68 5 kg (151 lb)     Additional Vital Signs:   Date/Time  Temp  Pulse  Resp  BP  MAP (mmHg)  SpO2  O2 Device    04/09/21 08:26:25  --  82  --  117/48Abnormal   71  90 %  None (Room air)    04/09/21 08:25:05  --  80  --  123/49Abnormal   74  90 %  --    04/09/21 08:21:42  --  78  --  113/52  72  96 %  --    04/09/21 07:25:25  98 1 °F (36 7 °C)  83  --  106/45Abnormal   65  96 %  --    04/08/21 22:49:25  98 3 °F (36 8 °C)  96  16  118/53  75  100 %  --    04/08/21 21:42:21  --  97  --  128/58  81  100 %  --    04/08/21 2045  --  82  20  146/66  95  100 %  --    04/08/21 2030  --  86  20  127/60  86  94 %  --    04/08/21 2015  --  90  20  132/63  91  99 %  --    04/08/21 2000  --  94  20  132/63  90  95 %  None (Room air)    04/08/21 1945  --  89  19  127/60  87  93 %  --    04/08/21 1930  --  92  20  126/58  84  95 %  --    04/08/21 1915  --  99  20  133/61  88  99 %  --    04/08/21 1900  --  98  20  119/56  81  100 %  --        Pertinent Labs/Diagnostic Test Results:      4/8  EKG:  Sinus tachycardia rate of 127 beats per minute  No acute signs of ischemia  Sinus tachycardia is new when compared to EKG from prior  Slightly prolonged QT interval of 459 milliseconds  XR shoulder 2+ views RIGHT   ED Interpretation by Misty Smiley DO (04/08 1938)   No acute fracture or dislocation       CT head without contrast   Final Result by Mari Delcid MD (04/08 2001)       No acute intracranial abnormality  XR chest portable   ED Interpretation by Misty Smiley DO (04/08 1938)   No acute disease     4/9 carotid doppler:RIGHT:  There is >70% stenosis noted in the internal carotid artery  Plaque is  calcified and irregular  Vertebral artery flow was not appreciated during this exam  There is no  significant subclavian artery disease  LEFT:  There is 50-69% stenosis noted in the internal carotid artery  Plaque is  calcified and irregular  Vertebral artery flow is antegrade  There is no significant subclavian artery  disease      Results from last 7 days   Lab Units 04/09/21  0510 04/08/21 1859 04/05/21  0717   WBC Thousand/uL 11 86* 10 96* 6 07   HEMOGLOBIN g/dL 7 0* 8 2* 8 2*   HEMATOCRIT % 21 6* 25 0* 25 6*   PLATELETS Thousands/uL 176 178 197   NEUTROS ABS Thousands/µL  --  10 21* 4 22         Results from last 7 days   Lab Units 04/09/21  0510 04/08/21  2330 04/08/21  1859 04/05/21  0717   SODIUM mmol/L 133* 131* 130* 130*   POTASSIUM mmol/L 4 1 4 9 4 7 4 6   CHLORIDE mmol/L 101 98* 96* 97*   CO2 mmol/L 20* 23 20* 22   ANION GAP mmol/L 12 10 14* 11   BUN mg/dL 18 23 25 23   CREATININE mg/dL 0 87 1 15 1 14 0 94   EGFR ml/min/1 73sq m 84 61 62 78   CALCIUM mg/dL 7 5* 7 9* 8 6 8 6     Results from last 7 days   Lab Units 04/09/21  0510 04/08/21  1859 04/05/21  0717   AST U/L 75* 49* 29   ALT U/L 86* 81* 72   ALK PHOS U/L 79 106 114   TOTAL PROTEIN g/dL 4 8* 6 0* 6 1*   ALBUMIN g/dL 1 9* 2 6* 2 6*   TOTAL BILIRUBIN mg/dL 0 60 0 60 0 40     Results from last 7 days   Lab Units 04/09/21  1111 04/09/21  0723 04/08/21  2309   POC GLUCOSE mg/dl 183* 146* 234*     Results from last 7 days   Lab Units 04/09/21  0510 04/08/21  2330 04/08/21  1859   GLUCOSE RANDOM mg/dL 157* 239* 261*         Results from last 7 days   Lab Units 04/08/21  1859   HEMOGLOBIN A1C % 9 4*   EAG mg/dl 223     BETA-HYDROXYBUTYRATE   Date Value Ref Range Status   04/08/2021 0 1 <0 6 mmol/L Final     Results from last 7 days   Lab Units 04/08/21  1859   PROTIME seconds 15 7*   INR  1 25*   PTT seconds 29         Results from last 7 days   Lab Units 04/08/21  1859   PROCALCITONIN ng/ml 0 05     Results from last 7 days   Lab Units 04/08/21  2330 04/08/21  2049 04/08/21  1859   LACTIC ACID mmol/L 1 7 3 2* 5 7*         Results from last 7 days   Lab Units 04/08/21  2206   CLARITY UA  Clear   COLOR UA  Yellow   SPEC GRAV UA  1 010   PH UA  6 0   GLUCOSE UA mg/dl >=1000 (1%)*   KETONES UA mg/dl Negative   BLOOD UA  Trace-Intact*   PROTEIN UA mg/dl Negative   NITRITE UA  Negative   BILIRUBIN UA  Negative   UROBILINOGEN UA E U /dl 0 2   LEUKOCYTES UA  Elevated glucose may cause decreased leukocyte values  See urine microscopic for [facility] result/*   WBC UA /hpf 2-4   RBC UA /hpf 4-10*   BACTERIA UA /hpf Occasional   EPITHELIAL CELLS WET PREP /hpf None Seen       Results from last 7 days   Lab Units 04/08/21  1859   BLOOD CULTURE  Received in Microbiology Lab  Culture in Progress  Received in Microbiology Lab  Culture in Progress           ED Treatment:   Medication Administration from 04/08/2021 1844 to 04/08/2021 2118       Date/Time Order Dose Route Action     04/08/2021 1900 sodium chloride 0 9 % bolus 1,000 mL 1,000 mL Intravenous New Bag     04/08/2021 2005 piperacillin-tazobactam (ZOSYN) IVPB 3 375 g 3 375 g Intravenous New Bag     04/08/2021 2003 multi-electrolyte (PLASMALYTE-A/ISOLYTE-S PH 7 4) IV solution 1,000 mL 1,000 mL Intravenous New Bag     04/08/2021 2053 multi-electrolyte (ISOLYTE-S PH 7 4) bolus 1,000 mL 1,000 mL Intravenous New Bag        Past Medical History:   Diagnosis Date    Cancer [facility])     pancreatic    Diabetes mellitus ([facility] [address] )     Frequent urination     GERD (gastroesophageal reflux disease)     Hyperlipidemia     Hypertension     Peripheral neuropathy     Weakness     Weight loss      Present on Admission:   Diabetes mellitus, type 2 ([place] [address] )   HTN (hypertension)   Adenocarcinoma of pancreas ([place] [address] )      Admitting Diagnosis: Cellulitis [L03 90]  Syncope [R55]  Severe sepsis ([place] [address] ) [A41 9, R65 20]  Postural dizziness with presyncope [R42, R55]  Age/Sex: 67 y o  male  Admission Orders:  Scheduled Medications:  aspirin, 81 mg, Oral, Daily  atorvastatin, 40 mg, Oral, Daily With Dinner  cefazolin, 1,000 mg, Intravenous, Q8H  enoxaparin, 40 mg, Subcutaneous, Daily  insulin glargine, 15 Units, Subcutaneous, HS  insulin lispro, 1-5 Units, Subcutaneous, TID AC  insulin lispro, 1-5 Units, Subcutaneous, HS  insulin lispro, 5 Units, Subcutaneous, TID With Meals  lisinopril, 40 mg, Oral, Daily  metoprolol succinate, 100 mg, Oral, Daily      Continuous IV Infusions:multi-electrolyte (PLASMALYTE-A/ISOLYTE-S PH 7 4) IV solution 1,000 mL   Dose: 1,000 mL  Freq: Once Route: IV  Last Dose: Stopped (04/09/21 0846)  Start: 04/08/21 2100 End: 04/09/21 0846    sodium chloride 0 9 % infusion   Rate: 75 mL/hr Dose: 75 mL/hr  Freq: Continuous Route: IV  Indications of Use: IV Hydration  Last Dose: Stopped (04/09/21 0757)  Start: 04/09/21 0245 End: 04/09/21 0718     PRN Meds:  acetaminophen, 650 mg, Oral, Q6H PRN  aluminum-magnesium hydroxide-simethicone, 30 mL, Oral, Q6H PRN  prochlorperazine, 10 mg, Oral, Q6H PRN    Tele  Diabetic diet    IP CONSULT TO CASE MANAGEMENT    Network Utilization Review Department  ATTENTION: Please call with any questions or concerns to [phone] and carefully listen to the prompts so that you are directed to the right person   All voicemails are confidential   Martha Velarde all requests for admission clinical reviews, approved or denied determinations and any other requests to dedicated fax number below belonging to the campus where the patient is receiving treatment   List of dedicated fax numbers for the Facilities:  [address] [place] [address] DENIALS (Administrative/Medical Necessity) [phone]   [address] (Maternity/NICU/Pediatrics) [phone] [address] [place] [address] Dr Kvng Erwin 2318 (  Trish Ornelas "Yoselyn" 103) [address] [address] Veronica White 1481 [address] [facility]) [facility]0 [place] [id number] [phone] Yes

## 2021-04-09 NOTE — ASSESSMENT & PLAN NOTE
· Reported episode of unwitnessed syncope this evening    Patient reports that he stood up after eating dinner and began to feel lightheaded and then he woke up on the floor  · Pt reports prior episode of syncope 4 years ago - where it was found he has right carotid stenosis (records at [facility], will obtain) - no intervention performed   · CTH negative  · CXR without acute abnormality  · R shoulder XR - no acute fracture or dislocation  · Nexus score of 0 - C-spine cleared clinically   · Monitor on tele  · Updated carotid duplex   · Orthostatic BP

## 2021-04-09 NOTE — PROGRESS NOTES
[place]  Progress Note - Sherman Levi 1945, 67 y o  male MRN: [id number]  Unit/Bed#: -Paty Encounter: [id number]  Primary Care Provider: Leeanne Sacks, DO   Date and time admitted to hospital: 4/8/2021  6:46 PM    * Cellulitis of lower extremity  Assessment & Plan  · B/L LE cellulitis, poa, a/e/b erythema, edema, and pain x1 week   · Only SIRS criteria met on admission is tachycardia, which could be explained by patient not taking his metoprolol today   · NO ulcers or wounds noted  · S/p IV zosyn in ED - transitioned to IV ancef   · Erythema improving  · Trend CBC and temp curve    Syncope  Assessment & Plan  · Reported episode of unwitnessed syncope this evening    Patient reports that he stood up after eating dinner and began to feel lightheaded and then he woke up on the floor  · Pt reports prior episode of syncope 4 years ago - where it was found he has right carotid stenosis (records at [facility], will obtain) - no intervention performed   · CTH negative  · CXR without acute abnormality  · R shoulder XR - no acute fracture or dislocation  · Nexus score of 0 - C-spine cleared clinically   · Follow carotid duplex results  · Orthostatic BP     Hyponatremia  Assessment & Plan  · Na 131 on admission  · S/p IVFs  · Hyponatremia improving, 133 today  · Trend BMP    Lactic acidosis  Assessment & Plan  · Lactic acidosis, 5 7 on admission in the setting of pancreatic cancer  · Lactic acid improved to 3 2 after IVFs  · Hold off on further IVFs      Adenocarcinoma of pancreas [facility])  Assessment & Plan  · Newly diagnosed adenocarcinoma of the pancreas (diagnosed March 2021) - s/p hospitalization at [facility] for N/V and weight loss   · CT chest 3/7/21 showed no signs of metastatic disease in the thorax   · Follows with Oncology outpatient, Dr Rodrigo Pierce, to receive 2-3months adjuvant chemotherapy prior to resection by Dr Boo Piña   · Currently undergoing chemotherapy treatment with Abraxane and Gemzar - last treatment   · CA 19-9 at 20,000 at diagnosis - down trending, most recent 1632 on    · To continue with scheduled OP follow up on discharge    Diabetes mellitus, type 2 [facility])  Assessment & Plan  Lab Results   Component Value Date    HGBA1C 9 4 (H) 2021       Recent Labs     21  2309 21  0723 21  1111   POCGLU 234* 146* 183*       Blood Sugar Average: Last 72 hrs:  · (P) 912 [id number]   · home regimen:  Glimepiride 4 mg b i d , NovoLog 15 units with dinner, and metformin 850 mg b i d    · hold home glimepiride and metformin   · A1c 9 4 - uncontrolled DM  · On lantus 15U HS and novolog 5U TID with SSI   · Adjust insulin dosage based on accucheks        HTN (hypertension)  Assessment & Plan  · Home regimen:  Toprol  mg daily and ramipril 10 mg daily   · Switch ramipril to 40mg lisinopril while inpatient  · C/w Toprol XL  Monitor BP per unit protocol      VTE Pharmacologic Prophylaxis:   Pharmacologic: Enoxaparin (Lovenox)  Mechanical VTE Prophylaxis in Place: Yes    Patient Centered Rounds: I have performed bedside rounds with nursing staff today  Discussions with Specialists or Other Care Team Provider: CM    Education and Discussions with Family / Patient: patient declined family update    Time Spent for Care: 30 minutes  More than 50% of total time spent on counseling and coordination of care as described above      Current Length of Stay: 1 day(s)    Current Patient Status: Inpatient   Certification Statement: The patient will continue to require additional inpatient hospital stay due to as above    Discharge Plan: 2-3 days    Code Status: Level 1 - Full Code      Subjective:   No overnight events, reports improving erythema and tenderness    Objective:     Vitals:   Temp (24hrs), Av 4 °F (36 9 °C), Min:98 1 °F (36 7 °C), Max:98 8 °F (37 1 °C)    Temp:  [98 1 °F (36 7 °C)-98 8 °F (37 1 °C)] 98 1 °F (36 7 °C)  HR:  [] 82  Resp:  [16-20] 16  BP: (106-146)/(45-66) 117/48  SpO2:  [90 %-100 %] 90 %  Body mass index is 21 06 kg/m²  Input and Output Summary (last 24 hours): Intake/Output Summary (Last 24 hours) at 4/9/2021 1351  Last data filed at 4/9/2021 1245  Gross per 24 hour   Intake 2391 ml   Output 450 ml   Net 1941 ml       Physical Exam:     Physical Exam  Vitals signs and nursing note reviewed  Constitutional:       General: He is not in acute distress  Appearance: Normal appearance  He is not ill-appearing, toxic-appearing or diaphoretic  Cardiovascular:      Rate and Rhythm: Normal rate and regular rhythm  Pulses: Normal pulses  Heart sounds: No murmur  No gallop  Pulmonary:      Effort: Pulmonary effort is normal  No respiratory distress  Breath sounds: Normal breath sounds  No stridor  No wheezing, rhonchi or rales  Chest:      Chest wall: No tenderness  Abdominal:      General: Bowel sounds are normal  There is no distension  Palpations: Abdomen is soft  Tenderness: There is no abdominal tenderness  There is no right CVA tenderness, left CVA tenderness or guarding  Musculoskeletal: Normal range of motion  General: Tenderness (bilateral lower extremity) present  Right lower leg: Edema (3+) present  Left lower leg: Edema (3+ ankle) present  Skin:     General: Skin is warm and dry  Capillary Refill: Capillary refill takes less than 2 seconds  Coloration: Skin is not jaundiced  Findings: Erythema (improving bilateral lower extremity erythema) present  No bruising  Neurological:      General: No focal deficit present  Mental Status: He is alert  Mental status is at baseline  Cranial Nerves: No cranial nerve deficit  Psychiatric:         Mood and Affect: Mood normal          Thought Content:  Thought content normal        Additional Data:     Labs:    Results from last 7 days   Lab Units 04/09/21  0510 04/08/21  1859   WBC Thousand/uL 11 86* 10 96* HEMOGLOBIN g/dL 7 0* 8 2*   HEMATOCRIT % 21 6* 25 0*   PLATELETS Thousands/uL 176 178   NEUTROS PCT %  --  93*   LYMPHS PCT %  --  3*   MONOS PCT %  --  1*   EOS PCT %  --  2     Results from last 7 days   Lab Units 04/09/21  0510   SODIUM mmol/L 133*   POTASSIUM mmol/L 4 1   CHLORIDE mmol/L 101   CO2 mmol/L 20*   BUN mg/dL 18   CREATININE mg/dL 0 87   ANION GAP mmol/L 12   CALCIUM mg/dL 7 5*   ALBUMIN g/dL 1 9*   TOTAL BILIRUBIN mg/dL 0 60   ALK PHOS U/L 79   ALT U/L 86*   AST U/L 75*   GLUCOSE RANDOM mg/dL 157*     Results from last 7 days   Lab Units 04/08/21  1859   INR  1 25*     Results from last 7 days   Lab Units 04/09/21  1111 04/09/21  0723 04/08/21  2309   POC GLUCOSE mg/dl 183* 146* 234*     Results from last 7 days   Lab Units 04/08/21  1859   HEMOGLOBIN A1C % 9 4*     Results from last 7 days   Lab Units 04/08/21  2330 04/08/21  2049 04/08/21  1859   LACTIC ACID mmol/L 1 7 3 2* 5 7*   PROCALCITONIN ng/ml  --   --  0 05           * I Have Reviewed All Lab Data Listed Above  * Additional Pertinent Lab Tests Reviewed: All Labs Within Last 24 Hours Reviewed    Imaging:    Imaging Reports Reviewed Today Include:   Imaging Personally Reviewed by Myself Includes:      Recent Cultures (last 7 days):     Results from last 7 days   Lab Units 04/08/21  1859   BLOOD CULTURE  Received in Microbiology Lab  Culture in Progress  Received in Microbiology Lab  Culture in Progress         Last 24 Hours Medication List:   Current Facility-Administered Medications   Medication Dose Route Frequency Provider Last Rate    acetaminophen  650 mg Oral Q6H PRN Efren Gimenez PA-C      aluminum-magnesium hydroxide-simethicone  30 mL Oral Q6H PRN Efren Gimenez PA-C      aspirin  81 mg Oral Daily Efren Gimenez PA-C      atorvastatin  40 mg Oral Daily With Alyssa Rosario PA-C      cefazolin  1,000 mg Intravenous Q8H Efren Gimenez PA-C 1,000 mg (04/09/21 1127)    enoxaparin  40 mg Subcutaneous Daily Arlester Smaller, PA-C      insulin glargine  15 Units Subcutaneous HS Arlester Smaller, PA-C      insulin lispro  1-5 Units Subcutaneous TID AC Arlester Smaller, PA-C      insulin lispro  1-5 Units Subcutaneous HS Arlester Smaller, PA-C      insulin lispro  5 Units Subcutaneous TID With Meals Arlester Smaller, PA-C      lisinopril  40 mg Oral Daily Arlester Smaller, PA-C      metoprolol succinate  100 mg Oral Daily Arlester Smaller, PA-C      prochlorperazine  10 mg Oral Q6H PRN Arlester Smaller, PA-C          Today, Patient Was Seen By: Shelly Henry MD    ** Please Note: Dictation voice to text software may have been used in the creation of this document   **

## 2021-04-09 NOTE — ASSESSMENT & PLAN NOTE
· Home regimen:  Toprol  mg daily and ramipril 10 mg daily   · Switch ramipril to 40mg lisinopril while inpatient  · C/w Toprol XL  Monitor BP per unit protocol

## 2021-04-09 NOTE — PLAN OF CARE
Problem: Potential for Falls  Goal: Patient will remain free of falls  Description: INTERVENTIONS:  - Assess patient frequently for physical needs  -  Identify cognitive and physical deficits and behaviors that affect risk of falls    -  [place] fall precautions as indicated by assessment   - Educate patient/family on patient safety including physical limitations  - Instruct patient to call for assistance with activity based on assessment  - Modify environment to reduce risk of injury  - Consider OT/PT consult to assist with strengthening/mobility  Outcome: Progressing     Problem: PAIN - ADULT  Goal: Verbalizes/displays adequate comfort level or baseline comfort level  Description: Interventions:  - Encourage patient to monitor pain and request assistance  - Assess pain using appropriate pain scale  - Administer analgesics based on type and severity of pain and evaluate response  - Implement non-pharmacological measures as appropriate and evaluate response  - Consider cultural and social influences on pain and pain management  - Notify physician/advanced practitioner if interventions unsuccessful or patient reports new pain  Outcome: Progressing     Problem: INFECTION - ADULT  Goal: Absence or prevention of progression during hospitalization  Description: INTERVENTIONS:  - Assess and monitor for signs and symptoms of infection  - Monitor lab/diagnostic results  - Monitor all insertion sites, i e  indwelling lines, tubes, and drains  - Monitor endotracheal if appropriate and nasal secretions for changes in amount and color  - [place] appropriate cooling/warming therapies per order  - Administer medications as ordered  - Instruct and encourage patient and family to use good hand hygiene technique  - Identify and instruct in appropriate isolation precautions for identified infection/condition  Outcome: Progressing  Goal: Absence of fever/infection during neutropenic period  Description: INTERVENTIONS:  - Monitor WBC    Outcome: Progressing     Problem: SAFETY ADULT  Goal: Patient will remain free of falls  Description: INTERVENTIONS:  - Assess patient frequently for physical needs  -  Identify cognitive and physical deficits and behaviors that affect risk of falls    -  [place] fall precautions as indicated by assessment   - Educate patient/family on patient safety including physical limitations  - Instruct patient to call for assistance with activity based on assessment  - Modify environment to reduce risk of injury  - Consider OT/PT consult to assist with strengthening/mobility  Outcome: Progressing  Goal: Maintain or return to baseline ADL function  Description: INTERVENTIONS:  -  Assess patient's ability to carry out ADLs; assess patient's baseline for ADL function and identify physical deficits which impact ability to perform ADLs (bathing, care of mouth/teeth, toileting, grooming, dressing, etc )  - Assess/evaluate cause of self-care deficits   - Assess range of motion  - Assess patient's mobility; develop plan if impaired  - Assess patient's need for assistive devices and provide as appropriate  - Encourage maximum independence but intervene and supervise when necessary  - Involve family in performance of ADLs  - Assess for home care needs following discharge   - Consider OT consult to assist with ADL evaluation and planning for discharge  - Provide patient education as appropriate  Outcome: Progressing  Goal: Maintain or return mobility status to optimal level  Description: INTERVENTIONS:  - Assess patient's baseline mobility status (ambulation, transfers, stairs, etc )    - Identify cognitive and physical deficits and behaviors that affect mobility  - Identify mobility aids required to assist with transfers and/or ambulation (gait belt, sit-to-stand, lift, walker, cane, etc )  - [place] fall precautions as indicated by assessment  - Record patient progress and toleration of activity level on Mobility SBAR; progress patient to next Phase/Stage  - Instruct patient to call for assistance with activity based on assessment  - Consider rehabilitation consult to assist with strengthening/weightbearing, etc   Outcome: Progressing     Problem: DISCHARGE PLANNING  Goal: Discharge to home or other facility with appropriate resources  Description: INTERVENTIONS:  - Identify barriers to discharge w/patient and caregiver  - Arrange for needed discharge resources and transportation as appropriate  - Identify discharge learning needs (meds, wound care, etc )  - Arrange for interpretive services to assist at discharge as needed  - Refer to Case Management Department for coordinating discharge planning if the patient needs post-hospital services based on physician/advanced practitioner order or complex needs related to functional status, cognitive ability, or social support system  Outcome: Progressing     Problem: Knowledge Deficit  Goal: Patient/family/caregiver demonstrates understanding of disease process, treatment plan, medications, and discharge instructions  Description: Complete learning assessment and assess knowledge base    Interventions:  - Provide teaching at level of understanding  - Provide teaching via preferred learning methods  Outcome: Progressing

## 2021-04-09 NOTE — ASSESSMENT & PLAN NOTE
· Home regimen:  Toprol  mg daily and ramipril 10 mg daily   · Blood pressure appears well controlled here

## 2021-04-09 NOTE — NUTRITION
04/09/21 1805   Assessment   Timepoint Initial  (HGABIC 9 4)   Labs & Meds   Labs/Meds Review Lab values reviewed; Meds reviewed  (4/9/21 , 183, 254  Glucose 157 AST/ALT 75/86 meds: lipitor, lantusm humalog, 75 ml/hr IVF)   Feeding Route   PO Independent   Adequacy of Intake   Nutrition Modality PO  (CCD2)   Current Meal Intake %; Inadequate   Estimated Calorie Intake 50-75%   Estimated Protein Intake  50-75%   Estimated Fluid Intake %   Estimated calorie intake compared to estimated need Pt has high estimated needs due to cancer, so likely not meeting needs currently  Nutrition Prognosis   Nutrition Concerns RN skin assessment reviewed; No edema documented   Comorbid Concerns   (per chart review: syncope)   Nutrition Considerations   (diet education: reviewed current intake and HGBAIC level  do not want to discourage cancer pt from eating, should have DM meds adjusted as needed )   PES Statement   Problem Intake   Energy Balance (1) Inadequate energy intake NI-1 2   Related to Decreased appetite   As evidenced by: Intake < estimated needs   Patient Nutrition Goals   Goal Increase kcal/PRO intake   Goal Status initiated   Timeframe to complete goal by next f/u   Recommendations/Interventions   Malnutrition/BMI Present Yes   Adult Malnutrition type Chronic illness   Adult Degree of Malnutrition Other severe protein calorie malnutrition   Malnutrition Characteristics Fat loss;Muscle loss; Inadequate energy;Weight loss  (Pt presents with seer protein calorie malnutrition as evideinced by 11% wt loss in 6 weeks, sunken orbitals, siginficant clavicle protrusion, temproral scooping and < 50% po intake > 1 month )   Interventions Diet: continued as ordered; Supplement initiate   Nutrition Recommendations Continue diet as ordered; Other (Specify)  (Per RD protocol add Glucerna BID )   Nutrition Complexity Risk   Nutrition complexity level Moderate risk   Follow up date 04/16/21  (po intake, supplement intake)

## 2021-04-09 NOTE — ASSESSMENT & PLAN NOTE
· B/L LE erythema, edema, and pain x1 week   · Only SIRS criteria met on admission is tachycardia, which could be explained by patient not taking his metoprolol today   · Exam consistent with cellulitis - no purulent drainage   · Started on zosyn in ED - will transition to IV ancef   · Erythema borders marked on admission   · Encouraged elevation of bilateral lower extremities   · Will avoid SCDs

## 2021-04-09 NOTE — ASSESSMENT & PLAN NOTE
· Newly diagnosed adenocarcinoma of the pancreas (diagnosed March 2021) - s/p hospitalization at [facility] for N/V and weight loss   · CT chest 3/7/21 showed no signs of metastatic disease in the thorax   · Follows with Oncology outpatient, Dr Jojo Gamez, to receive 2-3months adjuvant chemotherapy prior to resection by Dr Areli Burgos   · Currently undergoing chemotherapy treatment with Abraxane and Gemzar - last treatment 4/6  · CA 19-9 at 20,000 at diagnosis - down trending, most recent 1632 on 4/5   · To continue with scheduled OP follow up on discharge

## 2021-04-09 NOTE — ASSESSMENT & PLAN NOTE
· Reported episode of unwitnessed syncope this evening    Patient reports that he stood up after eating dinner and began to feel lightheaded and then he woke up on the floor  · Pt reports prior episode of syncope 4 years ago - where it was found he has right carotid stenosis (records at [facility], will obtain) - no intervention performed   · CTH negative  · CXR without acute abnormality  · R shoulder XR - no acute fracture or dislocation  · Nexus score of 0 - C-spine cleared clinically   · Follow carotid duplex results  · Orthostatic BP

## 2021-04-09 NOTE — H&P
[facility]  H&P- Fabiola Bah 1945, 67 y o  male MRN: [id number]  Unit/Bed#: -Paty Encounter: [id number]  Primary Care Provider: Mena Bay DO   Date and time admitted to hospital: 4/8/2021  6:46 PM    * Syncope  Assessment & Plan  · Reported episode of unwitnessed syncope this evening    Patient reports that he stood up after eating dinner and began to feel lightheaded and then he woke up on the floor  · Pt reports prior episode of syncope 4 years ago - where it was found he has right carotid stenosis (records at [facility], will obtain) - no intervention performed   · CTH negative  · CXR without acute abnormality  · R shoulder XR - no acute fracture or dislocation  · Nexus score of 0 - C-spine cleared clinically   · Monitor on tele  · Updated carotid duplex   · Orthostatic BP     Cellulitis of lower extremity  Assessment & Plan  · B/L LE erythema, edema, and pain x1 week   · Only SIRS criteria met on admission is tachycardia, which could be explained by patient not taking his metoprolol today   · Exam consistent with cellulitis - no purulent drainage   · Started on zosyn in ED - will transition to IV ancef   · Erythema borders marked on admission   · Encouraged elevation of bilateral lower extremities   · Will avoid SCDs    Lactic acidosis  Assessment & Plan  · Initial lactic 5 7 - 2 hour repeat 3 2 - Will order 2 hour repeat after completion of fluids  · Metformin could be contributory to this - would discontinue on d/c     Hyponatremia  Assessment & Plan  · Sodium on admission 130 - 133 when corrected for hyperglycemia  · Will recheck BMP at 2300 to evaluate need for normal saline     Adenocarcinoma of pancreas [facility])  Assessment & Plan  · Newly diagnosed in March 2021 - s/p hospitalization at [facility] for N/V and weight loss   · CT chest 3/7/21 showed no signs of metastatic disease in the thorax   · Follows with Oncology outpatient, Dr Tari Oneil, to receive 2-3months adjuvant chemotherapy prior to resection by Dr Pratima Patel   · Currently undergoing chemotherapy treatment with Abraxane and Gemzar - last treatment 4/6  · CA 19-9 at 20,000 at diagnosis - down trending, most recent 1632 on 4/5     Diabetes mellitus, type 2 ([facility] [address] )  Assessment & Plan  No results found for: HGBA1C    No results for input(s): POCGLU in the last 72 hours  Blood Sugar Average: Last 72 hrs:  ·  home regimen:  Glimepiride 4 mg b i d , NovoLog 15 units with dinner, and metformin 850 mg b i d  · Will hold home glimepiride and metformin   · Will start lantus 15U HS and novolog 5U TID with SSI   · May need to increase insulin in setting of active infection  · Will obtain updated hemoglobin A1c      HTN (hypertension)  Assessment & Plan  · Home regimen:  Toprol  mg daily and ramipril 10 mg daily   · Blood pressure appears well controlled here    VTE Prophylaxis: Enoxaparin (Lovenox)  / reason for no mechanical VTE prophylaxis Bilateral lower extremity cellulitis and edema   Code Status:  Level 1 full code  POLST: POLST form is not discussed and not completed at this time  Discussion with family: Pt declined family update    Anticipated Length of Stay:  Patient will be admitted on an Inpatient basis with an anticipated length of stay of  > 2 midnights  Justification for Hospital Stay: IV abx for cellulitis and syncope evaluation     Total Time for Visit, including Counseling / Coordination of Care: 1 hour  Greater than 50% of this total time spent on direct patient counseling and coordination of care  Chief Complaint:   "I blacked out"    History of Present Illness:    Manuel Kenny is a 67 y o  male with past medical history of newly diagnosed adenocarcinoma of the pancreas currently undergoing chemotherapy, IDDM 2, and HTN who presents from home for evaluation of syncope  Patient reports that after standing up from the kitchen table he began to feel lightheaded    He was able to slowly walk over to the kitchen counter and then he felt himself starting to fall backwards  The next thing he remembers is waking up on the floor  He admits to likely head strike  He reports a prior similar episode 4-5 years ago where he was found to have right-sided carotid stenosis  No intervention has been performed  He follows with [facility] for imaging  Patient notes that he drinks 4-5 8 oz glasses of water a day  He also reports that he has had increased swelling and erythema of bilateral ankles for the last 1 week  He has also began to experience significant pain in his ankles rated a 8/10 in severity at onset, and currently a 7/10 in severity  No drainage from the legs  No fevers or chills  He has had hyperglycemia over the last 1 week, as well  Blood sugar at 1630 was 380  pt gave himself 20U of Novolog  Also admits to fatigue and insomnia since starting chemotherapy  Patient denies any neck pain, chest pain, dyspnea, change in bowel or bladder habits, numbness, weakness, headache, vision changes, or speech difficulty  Review of Systems:    Review of Systems   Constitutional: Positive for unexpected weight change  Negative for chills and fever  HENT: Negative for congestion and sore throat  Eyes: Negative for visual disturbance  Respiratory: Negative for cough and shortness of breath  Cardiovascular: Positive for leg swelling  Negative for chest pain and palpitations  Gastrointestinal: Negative for abdominal pain, constipation, diarrhea, nausea and vomiting  Genitourinary: Negative for difficulty urinating, dysuria and hematuria  Musculoskeletal: Negative for neck pain  Skin: Positive for color change  Neurological: Positive for syncope and light-headedness  Negative for dizziness, weakness, numbness and headaches  All other systems reviewed and are negative        Past Medical and Surgical History:     Past Medical History:   Diagnosis Date    Cancer [facility])     pancreatic    Diabetes mellitus ([facility] [address] )     Frequent urination     GERD (gastroesophageal reflux disease)     Hyperlipidemia     Hypertension     Peripheral neuropathy     Weakness     Weight loss        Past Surgical History:   Procedure Laterality Date    APPENDECTOMY      CATARACT EXTRACTION      COLONOSCOPY      SKIN LESION EXCISION      of a wart on right arm    TUNNELED VENOUS PORT PLACEMENT N/A 3/26/2021    Procedure: INSERTION VENOUS PORT (PORT-A-CATH); Surgeon: Ventura Pryor MD;  Location:  MAIN OR;  Service: Surgical Oncology       Meds/Allergies:    Prior to Admission medications    Medication Sig Start Date End Date Taking?  Authorizing Provider   Accu-Chek FastClix Lancets MISC USE 1 TO CHECK GLUCOSE TWICE DAILY TO THREE TIMES DAILY 2/9/21  Yes Historical Provider, MD   Accu-Chek Guide test strip USE 1 STRIP TWICE DAILY TO THREE TIMES DAILY 2/9/21  Yes Historical Provider, MD   aspirin 81 mg chewable tablet Chew 81 mg daily   Yes Historical Provider, MD   atorvastatin (LIPITOR) 40 mg tablet Take 40 mg by mouth daily 12/20/20  Yes Historical Provider, MD   Blood Glucose Monitoring Suppl (Accu-Chek Guide) w/Device KIT USE TO CHECK GLUCOSE TWO TO THREE TIMES DAILY 1/8/21  Yes Historical Provider, MD   glimepiride (AMARYL) 4 mg tablet Take 2 mg by mouth 2 (two) times a day  12/20/20  Yes Historical Provider, MD   metFORMIN (GLUCOPHAGE) 850 mg tablet TAKE 1 TABLET BY MOUTH TWICE DAILY TAKE WITH MORNING AND EVENING MEAL 12/20/20  Yes Historical Provider, MD   metoprolol succinate (TOPROL-XL) 100 mg 24 hr tablet Take 100 mg by mouth daily 3/8/21  Yes Historical Provider, MD   NovoLOG FlexPen 100 units/mL injection pen Inject 15 Units under the skin daily 30 min before dinner 1/2/21  Yes Historical Provider, MD   prochlorperazine (COMPAZINE) 10 mg tablet Take 1 tablet (10 mg total) by mouth every 6 (six) hours as needed for nausea or vomiting 3/17/21  Yes Allen Serrano MD   ramipril (ALTACE) 10 MG capsule Take 10 mg by mouth daily 12/20/20  Yes Historical Provider, MD   ReliOn Pen Needles 31G X 6 MM MISC USE 1 IN THE EVENING 1/1/21  Yes Historical Provider, MD   sodium chloride (GRAHAM 128) 5 % hypertonic ophthalmic solution 1 drop as needed   Yes Historical Provider, MD   oxyCODONE (ROXICODONE) 5 mg immediate release tablet Take 1 tablet (5 mg total) by mouth every 4 (four) hours as needed for moderate pain for up to 20 dosesMax Daily Amount: 30 mg  Patient not taking: Reported on 4/8/2021 3/26/21   Brando Garay PA-C   traMADol (ULTRAM) 50 mg tablet Take 1 tablet (50 mg total) by mouth every 6 (six) hours as needed for moderate pain  Patient not taking: Reported on 4/8/2021 3/23/21   JOI Dutta     I have reviewed home medications with patient personally  Allergies: No Known Allergies    Social History:     Marital Status: Unknown   Occupation: retired   Patient Pre-hospital Living Situation: lives alone  Patient Pre-hospital Level of Mobility: independent   Patient Pre-hospital Diet Restrictions: none   Substance Use History:   Social History     Substance and Sexual Activity   Alcohol Use Yes    Frequency: Monthly or less    Binge frequency: Never    Comment: rare     Social History     Tobacco Use   Smoking Status Former Smoker    Types: Pipe    Quit date: 2/23/2011    Years since quitting: 10 1   Smokeless Tobacco Never Used     Social History     Substance and Sexual Activity   Drug Use Never       Family History:    History reviewed  No pertinent family history  Physical Exam:     Vitals:   Blood Pressure: 118/53 (04/08/21 2249)  Pulse: 96 (04/08/21 2249)  Temperature: 98 3 °F (36 8 °C) (04/08/21 2249)  Respirations: 16 (04/08/21 2249)  Height: 5' 11" (180 3 cm) (04/08/21 1849)  Weight - Scale: 68 5 kg (151 lb) (04/08/21 1849)  SpO2: 100 % (04/08/21 2249)    Physical Exam  Vitals signs and nursing note reviewed  Constitutional:       Appearance: He is ill-appearing (chronic)     HENT:      Head: Normocephalic and atraumatic  Comments: No signs of trauma on the head  Mouth/Throat:      Mouth: Mucous membranes are moist    Eyes:      Extraocular Movements: Extraocular movements intact  Conjunctiva/sclera: Conjunctivae normal    Neck:      Musculoskeletal: Normal range of motion and neck supple  Comments: Mild left paracervical tenderness  No midline spinous process tenderness  Full range of motion of the neck without any pain  Cardiovascular:      Rate and Rhythm: Regular rhythm  Tachycardia present  Pulses: Normal pulses  Heart sounds: No murmur  Pulmonary:      Effort: Pulmonary effort is normal       Breath sounds: Normal breath sounds  No wheezing, rhonchi or rales  Abdominal:      General: Abdomen is flat  Bowel sounds are normal  There is no distension  Palpations: Abdomen is soft  Tenderness: There is no abdominal tenderness  Musculoskeletal: Normal range of motion  Comments: 4+ pitting edema from the dorsal aspect of the foot to the mid lower leg bilaterally  Overlying erythema  Warm to touch  Small 1-2 cm circular wound on the medial aspect of the right ankle  No surrounding drainage  Appears to be healing  Dopplers are 2+ bilaterally DP and PT pulses  Skin:     General: Skin is warm and dry  Coloration: Skin is pale  Neurological:      General: No focal deficit present  Mental Status: He is alert and oriented to person, place, and time  Psychiatric:         Mood and Affect: Mood normal          Thought Content: Thought content normal        Additional Data:     Lab Results: I have personally reviewed pertinent reports        Results from last 7 days   Lab Units 04/08/21  1859   WBC Thousand/uL 10 96*   HEMOGLOBIN g/dL 8 2*   HEMATOCRIT % 25 0*   PLATELETS Thousands/uL 178   NEUTROS PCT % 93*   LYMPHS PCT % 3*   MONOS PCT % 1*   EOS PCT % 2     Results from last 7 days   Lab Units 04/08/21  1859   SODIUM mmol/L 130*   POTASSIUM mmol/L 4 7 CHLORIDE mmol/L 96*   CO2 mmol/L 20*   BUN mg/dL 25   CREATININE mg/dL 1 14   ANION GAP mmol/L 14*   CALCIUM mg/dL 8 6   ALBUMIN g/dL 2 6*   TOTAL BILIRUBIN mg/dL 0 60   ALK PHOS U/L 106   ALT U/L 81*   AST U/L 49*   GLUCOSE RANDOM mg/dL 261*     Results from last 7 days   Lab Units 04/08/21  1859   INR  1 25*             Results from last 7 days   Lab Units 04/08/21  2049 04/08/21  1859   LACTIC ACID mmol/L 3 2* 5 7*       Imaging: I have personally reviewed pertinent reports  and I have personally reviewed pertinent films in PACS    XR shoulder 2+ views RIGHT   ED Interpretation by Jumana Maloney DO (04/08 1938)   No acute fracture or dislocation      CT head without contrast   Final Result by Isael Blanco MD (04/08 2001)      No acute intracranial abnormality  Workstation performed: [id number]         XR chest portable   ED Interpretation by Jumana Maloney DO (04/08 1938)   No acute disease      VAS carotid complete study    (Results Pending)       EKG, Pathology, and Other Studies Reviewed on Admission:   · EKG:  Sinus tachycardia rate of 127 beats per minute  No acute signs of ischemia  Sinus tachycardia is new when compared to EKG from prior  Slightly prolonged QT interval of 459 milliseconds  Allscripts / Epic Records Reviewed: Yes     ** Please Note: This note has been constructed using a voice recognition system   **

## 2021-04-09 NOTE — PLAN OF CARE
Problem: PHYSICAL THERAPY ADULT  Goal: Performs mobility at highest level of function for planned discharge setting  See evaluation for individualized goals  Description: Treatment/Interventions: ADL retraining, Functional transfer training, LE strengthening/ROM, Therapeutic exercise, Endurance training, Patient/family training, Equipment eval/education, Gait training, Spoke to nursing, Spoke to case management, OT  Equipment Recommended: Marla Ramírez       See flowsheet documentation for full assessment, interventions and recommendations  Outcome: Progressing  Note: Prognosis: Good  Problem List: Decreased strength, Decreased endurance, Impaired balance, Decreased mobility, Decreased skin integrity  Assessment: Pt able to mobilize with rw and 1 assist after adm w/ syncope adn lower extremity cellulitis  No syncopy during session  States feet are slightly better adn RW use helps decrease pressure on feet  Pt usually Ind for amb wihtout AD and does steps regularly  Pt below normal levle of funcitona and requires asist for safe mboility  REcommend STR at d/c to regain safe ind funciotnal status  Will follow see goals  Barriers to Discharge: (medical status mobility)     PT Discharge Recommendation: Post-Acute Rehabilitation Services     PT - OK to Discharge: No    See flowsheet documentation for full assessment

## 2021-04-09 NOTE — PHYSICAL THERAPY NOTE
PT eval   04/09/21 0950   PT Last Visit   PT Visit Date 04/09/21   Note Type   Note type Evaluation   Pain Assessment   Pain Assessment Tool Pain Assessment not indicated - pt denies pain   Pain Score 2   Pain Location/Orientation Orientation: Bilateral;Location: Foot  (ankles)   Hospital Pain Intervention(s) Elevated   Home Living   Type of [address] Two level   Home Equipment Cane   Additional Comments usually ind a drives shops   Prior Function   Level of [place] Independent with ADLs and functional mobility   Lives With Alone   Receives Help From Family  (drives him to and from rehab)   ADL Assistance Independent   IADLs Independent   Vocational Retired   Restrictions/Precautions   Wells Jesse Bearing Precautions Per Order No   Other Precautions Fall Risk   General   Additional Pertinent History pt suffered syncopal episode at home, undergoing chemo for pancreatic CA, + DM and carotid stenosis   Family/Caregiver Present No   Cognition   Overall Cognitive Status WFL   Arousal/Participation Alert   Orientation Level Oriented X4   Memory Within functional limits   Following Commands Follows all commands and directions without difficulty   RUE Assessment   RUE Assessment WFL   LUE Assessment   LUE Assessment WFL   RLE Assessment   RLE Assessment WFL  (Blower legs and feet red swollen stregnth 4-/5)   LLE Assessment   LLE Assessment WFL  (stregnth 4-/5)   Coordination   Movements are Fluid and Coordinated 1   Sharp/Dull   RLE Sharp/Dull Grossly intact   LLE Sharp/Dull Grossly intact   Proprioception   RLE Proprioception Grossly intact   LLE Proprioception Grossly Intact   Bed Mobility   Additional Comments oob in recliner   Transfers   Sit to Stand 4  Minimal assistance   Additional items Assist x 1   Stand to Sit 4  Minimal assistance   Additional items Assist x 1   Stand pivot 4  Minimal assistance   Additional items Assist x 1; Increased time required;Verbal cues;Armrests  (rw) Ambulation/Elevation   Gait pattern Improper Weight shift; Forward Flexion; Wide COLE;Shuffling; Inconsistent champ; Short stride; Step to  (cautious champ)   Gait Assistance 4  Minimal assist   Additional items Assist x 1   Assistive Device Rolling walker   Distance 25'   Balance   Static Sitting Good   Dynamic Sitting Fair   Static Standing Fair   Dynamic Standing Anastasiia Cristobal [id number]   Endurance Deficit   Endurance Deficit No   Activity Tolerance   Activity Tolerance Patient tolerated treatment well   Medical Staff Made Aware OT Amada   Nurse Made Aware Gianna Read   Assessment   Prognosis Good   Problem List Decreased strength;Decreased endurance; Impaired balance;Decreased mobility; Decreased skin integrity   Assessment Pt able to mobilize with rw and 1 assist after adm w/ syncope adn lower extremity cellulitis  No syncopy during session  States feet are slightly better adn RW use helps decrease pressure on feet  Pt usually Ind for amb wihtout AD and does steps regularly  Pt below normal levle of funcitona and requires asist for safe mboility  REcommend STR at d/c to regain safe ind funciotnal status  Will follow see goals   Barriers to Discharge   (medical status mobility)   Goals   Patient Goals get better   STG Expiration Date 04/19/21   Short Term Goal #1 1) improve strength Bles 1/2 grade 2)safe idn trnasfers with rw or cane 3) safe ind amb with rw or cane 100' level and up/down 1 flight steps with rail    Plan   Treatment/Interventions ADL retraining;Functional transfer training;LE strengthening/ROM; Therapeutic exercise; Endurance training;Patient/family training;Equipment eval/education;Gait training;Spoke to nursing;Spoke to case management;OT   PT Frequency 5x/wk   Recommendation   PT Discharge Recommendation Post-Acute Rehabilitation Services   Equipment Recommended [facility] Recommended Wheeled walker   PT - OK to Discharge No   AM-PAC Basic Mobility Inpatient   Turning in Bed Without Bedrails 3   Lying on Back to Sitting on Edge of Flat Bed 3   Moving Bed to Chair 3   Standing Up From Chair 3   Walk in Room 3   Climb 3-5 Stairs 2   Basic Mobility Inpatient Raw Score 17   Basic Mobility Standardized Score 39 67   Modified [place] Scale   Modified [place] Scale 4   Mala Baer, PT  PT tx time [id number], time out 0950  PT tx continues with gait training with rw x25' level minAx1 v cues for walker palmer  Able to aftab standing x 5 min for adls at sink with OT  Oob in chair after session with le elevated and on pillow   Con't PT

## 2021-04-09 NOTE — CASE MANAGEMENT
LOS: 1 day  PATIENT IS NOT A MEDICARE BUNDLE OR A 30 DAY READMISSION  UNPLANNED READMISSION RISK SCORE IS 21 - GREEN  Met with patient  Explained role of care management  Patient lives alone in a 2 story home with 3 SHANTA  Bedroom and bathroom are on the second floor  Patient is independent adl's and ambulation, drives, is retired  Provides own meals  DME - cane  Past services - denies history of VNA, SNF, MH or D&A  He goes to [facility] for chemotherapy weekly x 3 then one week off  His cousin and spouse drive him to chemo  Pharmacy of choice is [facility] in [place]  He has a prescription plan and is able to afford his medication  PCP is Nimisha Bond  His cousin or brother would help at home if needed  He plans on returning home at discharge and is agreeable to VNA/home PT/OT  Given freedom of choice  Referral via [facility] to [facility]/SN/PT/OT  Await PT/OT recommendation  Will follow  CM reviewed d/c planning process including the following: identifying help at home, patient preference for d/c planning needs, availability of treatment team to discuss questions or concerns patient and/or family may have regarding understanding medications and recognizing signs and symptoms once discharged  CM also encouraged patient to follow up with all recommended appointments after discharge  Patient advised of importance for patient and family to participate in managing patients medical well being

## 2021-04-09 NOTE — PLAN OF CARE
Problem: Potential for Falls  Goal: Patient will remain free of falls  Description: INTERVENTIONS:  - Assess patient frequently for physical needs  -  Identify cognitive and physical deficits and behaviors that affect risk of falls    -  [place] fall precautions as indicated by assessment   - Educate patient/family on patient safety including physical limitations  - Instruct patient to call for assistance with activity based on assessment  - Modify environment to reduce risk of injury  - Consider OT/PT consult to assist with strengthening/mobility  Outcome: Progressing     Problem: PAIN - ADULT  Goal: Verbalizes/displays adequate comfort level or baseline comfort level  Description: Interventions:  - Encourage patient to monitor pain and request assistance  - Assess pain using appropriate pain scale  - Administer analgesics based on type and severity of pain and evaluate response  - Implement non-pharmacological measures as appropriate and evaluate response  - Consider cultural and social influences on pain and pain management  - Notify physician/advanced practitioner if interventions unsuccessful or patient reports new pain  Outcome: Progressing     Problem: INFECTION - ADULT  Goal: Absence or prevention of progression during hospitalization  Description: INTERVENTIONS:  - Assess and monitor for signs and symptoms of infection  - Monitor lab/diagnostic results  - Monitor all insertion sites, i e  indwelling lines, tubes, and drains  - Monitor endotracheal if appropriate and nasal secretions for changes in amount and color  - [place] appropriate cooling/warming therapies per order  - Administer medications as ordered  - Instruct and encourage patient and family to use good hand hygiene technique  - Identify and instruct in appropriate isolation precautions for identified infection/condition  Outcome: Progressing  Goal: Absence of fever/infection during neutropenic period  Description: INTERVENTIONS:  - Monitor WBC    Outcome: Progressing     Problem: SAFETY ADULT  Goal: Patient will remain free of falls  Description: INTERVENTIONS:  - Assess patient frequently for physical needs  -  Identify cognitive and physical deficits and behaviors that affect risk of falls    -  [place] fall precautions as indicated by assessment   - Educate patient/family on patient safety including physical limitations  - Instruct patient to call for assistance with activity based on assessment  - Modify environment to reduce risk of injury  - Consider OT/PT consult to assist with strengthening/mobility  Outcome: Progressing  Goal: Maintain or return to baseline ADL function  Description: INTERVENTIONS:  -  Assess patient's ability to carry out ADLs; assess patient's baseline for ADL function and identify physical deficits which impact ability to perform ADLs (bathing, care of mouth/teeth, toileting, grooming, dressing, etc )  - Assess/evaluate cause of self-care deficits   - Assess range of motion  - Assess patient's mobility; develop plan if impaired  - Assess patient's need for assistive devices and provide as appropriate  - Encourage maximum independence but intervene and supervise when necessary  - Involve family in performance of ADLs  - Assess for home care needs following discharge   - Consider OT consult to assist with ADL evaluation and planning for discharge  - Provide patient education as appropriate  Outcome: Progressing  Goal: Maintain or return mobility status to optimal level  Description: INTERVENTIONS:  - Assess patient's baseline mobility status (ambulation, transfers, stairs, etc )    - Identify cognitive and physical deficits and behaviors that affect mobility  - Identify mobility aids required to assist with transfers and/or ambulation (gait belt, sit-to-stand, lift, walker, cane, etc )  - [place] fall precautions as indicated by assessment  - Record patient progress and toleration of activity level on Mobility SBAR; progress patient to next Phase/Stage  - Instruct patient to call for assistance with activity based on assessment  - Consider rehabilitation consult to assist with strengthening/weightbearing, etc   Outcome: Progressing     Problem: DISCHARGE PLANNING  Goal: Discharge to home or other facility with appropriate resources  Description: INTERVENTIONS:  - Identify barriers to discharge w/patient and caregiver  - Arrange for needed discharge resources and transportation as appropriate  - Identify discharge learning needs (meds, wound care, etc )  - Arrange for interpretive services to assist at discharge as needed  - Refer to Case Management Department for coordinating discharge planning if the patient needs post-hospital services based on physician/advanced practitioner order or complex needs related to functional status, cognitive ability, or social support system  Outcome: Progressing     Problem: Knowledge Deficit  Goal: Patient/family/caregiver demonstrates understanding of disease process, treatment plan, medications, and discharge instructions  Description: Complete learning assessment and assess knowledge base    Interventions:  - Provide teaching at level of understanding  - Provide teaching via preferred learning methods  Outcome: Progressing

## 2021-04-09 NOTE — OCCUPATIONAL THERAPY NOTE
Occupational Therapy Evaluation (9:07- 9:29) & Treatment Note (9:30-9:51)     Patient Name: Fabiola Bah  AAHMG'V Date: 4/9/2021  Problem List  Principal Problem:    Bilateral cellulitis of lower leg  Active Problems:    HTN (hypertension)    Diabetes mellitus, type 2 (HCC)    Adenocarcinoma of pancreas (HCC)    Lactic acidosis    Hyponatremia    Syncope    Past Medical History  Past Medical History:   Diagnosis Date    Cancer ([facility] [address] )     pancreatic    Diabetes mellitus ([facility] [address] )     Frequent urination     GERD (gastroesophageal reflux disease)     Hyperlipidemia     Hypertension     Peripheral neuropathy     Weakness     Weight loss      Past Surgical History  Past Surgical History:   Procedure Laterality Date    APPENDECTOMY      CATARACT EXTRACTION      COLONOSCOPY      SKIN LESION EXCISION      of a wart on right arm    TUNNELED VENOUS PORT PLACEMENT N/A 3/26/2021    Procedure: INSERTION VENOUS PORT (PORT-A-CATH);   Surgeon: César Miranda MD;  Location:  MAIN OR;  Service: Surgical Oncology         04/09/21 0951   OT Last Visit   OT Visit Date 04/09/21   Note Type   Note type Evaluation  (& Treatment Note)   Restrictions/Precautions   Weight Bearing Precautions Per Order No   Other Precautions Fall Risk   Pain Assessment   Pain Assessment Tool 0-10   Pain Score 2   Pain Location/Orientation Orientation: Bilateral;Location: [facility]   Hospital Pain Intervention(s) Elevated   Home Living   Type of [address] [place] [address] Two level;Stairs to enter with rails;Bed/bath upstairs  (no sleeping/bathroom accomodation on 1st floor )   Bathroom Shower/Tub Tub/shower unit  (Reports that he sponge bathes 2/2 fear of falling)   [address] [place] [address] Dr chair   [facility]   Prior Function   Level of [place] Independent with ADLs and functional mobility   Lives With Alone   Receives Help From Family   ADL Assistance Independent   IADLs Independent   Vocational Retired   Comments Pt reports that he does drive from time time, however relies of family to drive him to chemo appts  Subjective   Subjective Pt received seated in recliner  Pt agreeable to session  ADL   Eating Assistance 5  Supervision/Setup   Grooming Assistance 5  Supervision/Setup   UB Bathing Assistance 4  Minimal Assistance   LB Bathing Assistance 3  Moderate Assistance   UB Dressing Assistance 4  Minimal Assistance   LB Dressing Assistance 3  Moderate Assistance   Toileting Assistance  3  Moderate Assistance   Bed Mobility   Additional Comments Pt received OOB   Transfers   Sit to Stand 4  Minimal assistance   Additional items Assist x 1;Verbal cues   Stand to Sit 4  Minimal assistance   Additional items Assist x 1;Verbal cues   Stand pivot 4  Minimal assistance   Additional items Assist x 1;Verbal cues  (RW)   Functional Mobility   Functional Mobility 4  Minimal assistance   Additional Comments x 1, RW   Balance   Static Sitting Good   Dynamic Sitting Fair +   Static Standing Fair   Dynamic Standing Fair -   Activity Tolerance   Activity Tolerance Patient tolerated treatment well   Medical Staff Made Aware PT Johanne   Nurse Made Aware LISA Brink   RUE Assessment   RUE Assessment WFL   LUE Assessment   LUE Assessment WFL   Cognition   Overall Cognitive Status WFL   Arousal/Participation Alert   Attention Within functional limits   Orientation Level Oriented X4   Memory Within functional limits   Following Commands Follows all commands and directions without difficulty   Assessment   Limitation Decreased ADL status; Decreased self-care trans;Decreased high-level ADLs; Decreased endurance   Prognosis Good   Assessment Pt is a 67 y o  male seen for OT evaluation at [facility], admitted 4/8/2021 w/ Bilateral cellulitis of lower leg  OT completed extensive review of pt's medical and social history   Comorbidities affecting pt's functional performance at time of assessment include: sycope, severe sepsis, HTn, pancreatic ca, and hyponatremia  Personal factors affecting pt at time of IE include:steps to enter environment, limited home support, difficulty performing ADLS, difficulty performing IADLS  and decreased functional mobility   Prior to admission, pt was living alone in a house with bedroom/bathroom on 2nd floor  Pt was I w/  ADLS  Pt reports being able to perform grocery shopping/prepare meals/drive  However relied on family to transport to chemo  Pt relied on a cane at baseline  Upon evaluation: Pt requires min A for functional mobility/transfers, supervision-min A for UB ADLs and mod A for LB ADLS 2* the following deficits impacting occupational performance: weakness, decreased balance and decreased tolerance  Pt to benefit from continued skilled OT tx while in the hospital to address deficits as defined above and maximize level of functional independence w ADL's and functional mobility  Occupational Performance areas to address include: bathing/shower, toilet hygiene, dressing, functional mobility and household maintenance  Based on findings, pt is of high complexity  The patient's raw score on the AM-PAC Daily Activity inpatient short form is 18, standardized score is 38 66, less than 39 4  Patients at this level are likely to benefit from DC to post-acute rehabilitation services  Please refer to the recommendation of the Occupational Therapist for safe DC planning  At this time, OT recommendations at time of discharge are short term rehab  Please follow pt's progress  Goals   Patient Goals Pt wishes to get better   Plan   Treatment Interventions ADL retraining; Endurance training;Functional transfer training;Equipment evaluation/education;Patient/family training; Compensatory technique education   Goal Expiration Date 04/19/21   OT Treatment Day 0   OT Frequency 2-3x/wk   Additional Treatment Session   Treatment Assessment Pt seen for OT tx session with focus on functional balance, functional mobility, ADL status, and transfer safety  Patient agreeable to additional OT treatment session  Performed functional mobility into bathroom with min A x1 with RW  Pt tolerated grooming standing at sink with supervision  Performed bathing seated in chair at sink with min-mod A x 1  Pt returned to bedside recliner with min A x1   Patient continues to be functioning below baseline level, occupational performance remains limited secondary to factors listed above, and pt at increased risk for falls and injury  From OT standpoint, recommendation at time of d/c would be Short Term Rehab  Patient to benefit from continued Occupational Therapy treatment while in the hospital to address deficits as defined above and maximize level of functional independence with ADLs and functional mobility  Pt left with call bell in reach, tray table in reach, needs met  RN aware  Recommendation   OT Discharge Recommendation Post-Acute Rehabilitation Services  (Please follow pt's progress)   AM-PAC Daily Activity Inpatient   Lower Body Dressing 2   Bathing 2   Toileting 3   Upper Body Dressing 3   Grooming 4   Eating 4   Daily Activity Raw Score 18   Daily Activity Standardized Score (Calc for Raw Score >=11) 38 66   AM-PAC Applied Cognition Inpatient   Following a Speech/Presentation 4   Understanding Ordinary Conversation 4   Taking Medications 4   Remembering Where Things Are Placed or Put Away 4   Remembering List of 4-5 Errands 4   Taking Care of Complicated Tasks 4   Applied Cognition Raw Score 24   Applied Cognition Standardized Score 62 21         Pt will achieve the following goals within 10 days      *Pt will complete UB bathing and dressing with mod I     *Pt will complete LB bathing and dressing with mod I      * Pt will complete toileting w/ mod I w/ G hygiene/thoroughness using DME PRN    *Pt will complete bed mobility with mod I, with bed flat and no side rail to prep for purposeful tasks    *Pt will perform functional transfers with on/off all surfaces with mod I using DME as needed w/ G balance/safety  *Pt will increase standing tolerance to 15 minutes in order to complete morning ADLs  *Pt will identify 3-5 fall risks during ADL routine to ensure home safety upon discharge  *Pt will improve functional mobility during ADL/IADL/leisure tasks to mod I using DME as needed w/ G balance/safety       *Assess DME needs           Barbara Manzano, TRIXIER/L

## 2021-04-09 NOTE — UTILIZATION REVIEW
Notification of Inpatient Admission/Inpatient Authorization Request   This is a Notification of Inpatient Admission for [place]  Be advised that this patient was admitted to our facility under Inpatient Status  Contact Erum Shirley at [phone] for additional admission information  [facility] DEPT  DEDICATED -[phone]  Patient Name:   Bj Weller   YOB: 1945       [address]   [address]:   [address]  Tax ID: [id number]  NPI: [id number] Attending Provider/NPI:  Phone:  Address: Marko Page Md [[id number]]  [phone]  [facility]AME AS FACILITY   Place of Service Code: 24 Place of Service Name:  [address] [place] [address]   Start Date: 4/8/21 2013 Discharge Date & Time: No discharge date for patient encounter  Type of Admission: Inpatient Status Discharge Disposition (if discharged): Home/Self Care   Patient Diagnoses: Cellulitis [L03 90]  Syncope [R55]  Severe sepsis ([address] ) [A41 9, R65 20]  Postural dizziness with presyncope [R42, R55]     Orders: Admission Orders (From admission, onward)     Ordered        04/08/21 2014  Inpatient Admission  Once                    Assigned Utilization Review Contact: Rosemarie Shirley  Utilization   Network Utilization Review Department  Phone: [phone]; Fax [phone]  Email: [email or web address]   ATTENTION PAYERS: Please call the assigned Utilization  directly with any questions or concerns ALL voicemails in the department are confidential  Send all requests for admission clinical reviews, approved or denied determinations and any other requests to dedicated fax number belonging to the campus where the patient is receiving treatment

## 2021-04-09 NOTE — SEPSIS NOTE
Sepsis Note   Anastasia Lucero 67 y o  male MRN: [id number]  Unit/Bed#: -01 Encounter: [id number]      qSOFA     Row Name 04/08/21 22:49:25 04/08/21 21:42:21 04/08/21 2045 04/08/21 2030 04/08/21 2015    Altered mental status GCS < 15  --  --  --  --  --    Respiratory Rate > / =22  0  --  0  0  0    Systolic BP < / =905  0  0  0  0  0    Q Sofa Score  0  0  0  0  0    Row Name 04/08/21 2000 04/08/21 1945 04/08/21 1930 04/08/21 1915 04/08/21 1902    Altered mental status GCS < 15  --  --  --  --  0    Respiratory Rate > / =22  0  0  0  0  --    Systolic BP < / =181  0  0  0  0  --    Q Sofa Score  0  0  0  0  0    Row Name 04/08/21 1900 04/08/21 1849             Altered mental status GCS < 15  --  --       Respiratory Rate > / =22  0  0       Systolic BP < / =795  0  0       Q Sofa Score  0  0           Initial Sepsis Screening     Row Name 04/08/21 1949                Is the patient's history suggestive of a new or worsening infection? (!) Yes (Proceed)  -        Suspected source of infection  soft tissue  -MF        Are two or more of the following signs & symptoms of infection both present and new to the patient?  --        Indicate SIRS criteria  Tachycardia > 90 bpm;Altered mental status  -        If the answer is yes to both questions, suspicion of sepsis is present  --        If severe sepsis is present AND tissue hypoperfusion perists in the hour after fluid resuscitation or lactate > 4, the patient meets criteria for SEPTIC SHOCK  --        Are any of the following organ dysfunction criteria present within 6 hours of suspected infection and SIRS criteria that are NOT considered to be chronic conditions?   (!) Yes  -        Organ dysfunction  Lactate >/equal 4 0 mmol/L  -        Date of presentation of severe sepsis  04/08/21  -        Time of presentation of severe sepsis  1942  -        Tissue hypoperfusion persists in the hour after crystalloid fluid administration, evidenced, by either:  -- Was hypotension present within one hour of the conclusion of crystalloid fluid administration?  --        Date of presentation of septic shock  --        Time of presentation of septic shock  --          User Key  (r) = Recorded By, (t) = Taken By, (c) = Cosigned By    [address] Name Provider Type    BRINA Gardner DO Physician               Default Flowsheet Data (last 720 hours)      Sepsis Reassess     [address] Name 04/08/21 2258 04/08/21 2048                Repeat Volume Status and Tissue Perfusion Assessment Performed    Repeat Volume Status and Tissue Perfusion Assessment Performed  Yes  -NS  Yes  -MF          Volume Status and Tissue Perfusion Post Fluid Resuscitation * Must Document All *    Vital Signs Reviewed (HR, RR, BP, T)  Yes 118/53  T- 98 3  P-96  R 16  -NS  Yes 84, 16, 146/66, 98 8  -MF       Shock Index Reviewed  Yes  -NS  Yes 0 6  -MF       Arterial Oxygen Saturation Reviewed (POx, SaO2 or SpO2)  Yes (comment %) 100% on RA  -NS  --       Cardio  Normal S1/S2; Regular rate and rhythm  -NS  Normal S1/S2; Regular rate and rhythm; No murmor  -       Pulmonary  Normal effort;Clear to auscultation  -NS  Normal effort  -MF       Capillary Refill  Brisk  -NS  Brisk  -MF       Peripheral Pulses  Radial;Dorsalis Pedis; Posterior Tibialis  -NS  Radial;Dorsalis Pedis  -MF       Peripheral Pulse  +2  -NS  +1  -MF       Dorsalis Pedis  +2  -NS  --       Posterior Tibialis  +2  -NS  --       Skin  Warm  -NS  Warm;Dry;Other/Abnormal (Comment) Erythema both feet and lower legs  No blistering  No drainage    -MF       Urine output assessed  Adequate  -NS  Adequate  -MF          *OR*   Intensive Monitoring- Must Document One of the Following Four *:    Vital Signs Reviewed  --  --       * Central Venous Pressure (CVP or RAP)  --  --       * Central Venous Oxygen (SVO2, ScvO2 or Oxygen saturation via central catheter)  --  --       * Bedside Cardiovascular US in IVC diameter and % collapse  --  --       * Passive Leg Raise OR Crystalloid Challenge  --  --         User Key  (r) = Recorded By, (t) = Taken By, (c) = Cosigned By    [address] Name Provider Type    BRINA Espinal DO Physician    [facility], PA-C Physician Assistant

## 2021-04-09 NOTE — PLAN OF CARE
Problem: OCCUPATIONAL THERAPY ADULT  Goal: Performs self-care activities at highest level of function for planned discharge setting  See evaluation for individualized goals  Description: Treatment Interventions: ADL retraining, Endurance training, Functional transfer training, Equipment evaluation/education, Patient/family training, Compensatory technique education          See flowsheet documentation for full assessment, interventions and recommendations  Note: Limitation: Decreased ADL status, Decreased self-care trans, Decreased high-level ADLs, Decreased endurance  Prognosis: Good  Assessment: Pt is a 67 y o  male seen for OT evaluation at [facility], admitted 4/8/2021 w/ Bilateral cellulitis of lower leg  OT completed extensive review of pt's medical and social history  Comorbidities affecting pt's functional performance at time of assessment include: sycope, severe sepsis, HTn, pancreatic ca, and hyponatremia  Personal factors affecting pt at time of IE include:steps to enter environment, limited home support, difficulty performing ADLS, difficulty performing IADLS  and decreased functional mobility   Prior to admission, pt was living alone in a house with bedroom/bathroom on 2nd floor  Pt was I w/  ADLS  Pt reports being able to perform grocery shopping/prepare meals/drive  However relied on family to transport to chemo  Pt relied on a cane at baseline  Upon evaluation: Pt requires min A for functional mobility/transfers, supervision-min A for UB ADLs and mod A for LB ADLS 2* the following deficits impacting occupational performance: weakness, decreased balance and decreased tolerance  Pt to benefit from continued skilled OT tx while in the hospital to address deficits as defined above and maximize level of functional independence w ADL's and functional mobility   Occupational Performance areas to address include: bathing/shower, toilet hygiene, dressing, functional mobility and household maintenance  Based on findings, pt is of high complexity  The patient's raw score on the AM-PAC Daily Activity inpatient short form is 18, standardized score is 38 66, less than 39 4  Patients at this level are likely to benefit from DC to post-acute rehabilitation services  Please refer to the recommendation of the Occupational Therapist for safe DC planning  At this time, OT recommendations at time of discharge are short term rehab  Please follow pt's progress       OT Discharge Recommendation: Post-Acute Rehabilitation Services(Please follow pt's progress)

## 2021-04-09 NOTE — ASSESSMENT & PLAN NOTE
· Lactic acidosis, 5 7 on admission in the setting of pancreatic cancer  · Lactic acid improved to 3 2 after IVFs  · Hold off on further IVFs

## 2021-04-09 NOTE — ASSESSMENT & PLAN NOTE
· Initial lactic 5 7 - 2 hour repeat 3 2 - Will order 2 hour repeat after completion of fluids  · Metformin could be contributory to this - would discontinue on d/c

## 2021-04-09 NOTE — ASSESSMENT & PLAN NOTE
Lab Results   Component Value Date    HGBA1C 9 4 (H) 04/08/2021       Recent Labs     04/08/21  2309 04/09/21  0723 04/09/21  1111   POCGLU 234* 146* 183*       Blood Sugar Average: Last 72 hrs:  · (P) 425 [id number]   · home regimen:  Glimepiride 4 mg b i d , NovoLog 15 units with dinner, and metformin 850 mg b i d    · hold home glimepiride and metformin   · A1c 9 4 - uncontrolled DM  · On lantus 15U HS and novolog 5U TID with SSI   · Adjust insulin dosage based on accucheks

## 2021-04-09 NOTE — ASSESSMENT & PLAN NOTE
· Newly diagnosed in March 2021 - s/p hospitalization at [facility] for N/V and weight loss   · CT chest 3/7/21 showed no signs of metastatic disease in the thorax   · Follows with Oncology outpatient, Dr Noemi Casey, to receive 2-3months adjuvant chemotherapy prior to resection by Dr Arslan Lazaro   · Currently undergoing chemotherapy treatment with Abraxane and Gemzar - last treatment 4/6  · CA 19-9 at 20,000 at diagnosis - down trending, most recent 1632 on 4/5

## 2021-04-09 NOTE — ASSESSMENT & PLAN NOTE
No results found for: HGBA1C    No results for input(s): POCGLU in the last 72 hours  Blood Sugar Average: Last 72 hrs:  ·  home regimen:  Glimepiride 4 mg b i d , NovoLog 15 units with dinner, and metformin 850 mg b i d     · Will hold home glimepiride and metformin   · Will start lantus 15U HS and novolog 5U TID with SSI   · May need to increase insulin in setting of active infection  · Will obtain updated hemoglobin A1c

## 2021-04-09 NOTE — ASSESSMENT & PLAN NOTE
· Sodium on admission 130 - 133 when corrected for hyperglycemia  · Will recheck BMP at 2300 to evaluate need for normal saline

## 2021-04-09 NOTE — ASSESSMENT & PLAN NOTE
· B/L LE cellulitis, poa, a/e/b erythema, edema, and pain x1 week   · Only SIRS criteria met on admission is tachycardia, which could be explained by patient not taking his metoprolol today   · NO ulcers or wounds noted  · S/p IV zosyn in ED - transitioned to IV ancef   · Erythema improving  · Trend CBC and temp curve

## 2021-04-10 PROBLEM — E43 SEVERE PROTEIN-CALORIE MALNUTRITION (HCC): Status: ACTIVE | Noted: 2021-01-01

## 2021-04-10 PROBLEM — D64.9 ANEMIA: Status: ACTIVE | Noted: 2021-01-01

## 2021-04-10 NOTE — ASSESSMENT & PLAN NOTE
· B/L LE cellulitis, poa, a/e/b erythema, edema, and pain x1 week   · Only SIRS criteria met on admission is tachycardia, which could be explained by patient not taking his metoprolol today   · NO ulcers or wounds noted  · S/p IV zosyn in ED - transitioned to IV ancef - c/w over the weekend  · Erythema improving  · Encourage leg elevation  · Compression stockings placed given ankle edema  · Trend CBC and temp curve

## 2021-04-10 NOTE — ASSESSMENT & PLAN NOTE
Lab Results   Component Value Date    HGBA1C 9 4 (H) 04/08/2021       Recent Labs     04/09/21  1605 04/09/21  2035 04/10/21  0707 04/10/21  1120   POCGLU 254* 293* 151* 316*       Blood Sugar Average: Last 72 hrs:  · (P) 225 [id number]   · home regimen:  Glimepiride 4 mg b i d , NovoLog 15 units with dinner, and metformin 850 mg b i d    · hold home glimepiride and metformin   · A1c 9 4 - uncontrolled DM  · Increase lantus to 18U HS and novolog to 10U TID with SSI   · Adjust insulin dosage based on accucheks

## 2021-04-10 NOTE — PLAN OF CARE
Problem: Potential for Falls  Goal: Patient will remain free of falls  Description: INTERVENTIONS:  - Assess patient frequently for physical needs  -  Identify cognitive and physical deficits and behaviors that affect risk of falls    -  [place] fall precautions as indicated by assessment   - Educate patient/family on patient safety including physical limitations  - Instruct patient to call for assistance with activity based on assessment  - Modify environment to reduce risk of injury  - Consider OT/PT consult to assist with strengthening/mobility  Outcome: Progressing     Problem: PAIN - ADULT  Goal: Verbalizes/displays adequate comfort level or baseline comfort level  Description: Interventions:  - Encourage patient to monitor pain and request assistance  - Assess pain using appropriate pain scale  - Administer analgesics based on type and severity of pain and evaluate response  - Implement non-pharmacological measures as appropriate and evaluate response  - Consider cultural and social influences on pain and pain management  - Notify physician/advanced practitioner if interventions unsuccessful or patient reports new pain  Outcome: Progressing     Problem: INFECTION - ADULT  Goal: Absence or prevention of progression during hospitalization  Description: INTERVENTIONS:  - Assess and monitor for signs and symptoms of infection  - Monitor lab/diagnostic results  - Monitor all insertion sites, i e  indwelling lines, tubes, and drains  - Monitor endotracheal if appropriate and nasal secretions for changes in amount and color  - [place] appropriate cooling/warming therapies per order  - Administer medications as ordered  - Instruct and encourage patient and family to use good hand hygiene technique  - Identify and instruct in appropriate isolation precautions for identified infection/condition  Outcome: Progressing  Goal: Absence of fever/infection during neutropenic period  Description: INTERVENTIONS:  - Monitor WBC    Outcome: Progressing     Problem: SAFETY ADULT  Goal: Patient will remain free of falls  Description: INTERVENTIONS:  - Assess patient frequently for physical needs  -  Identify cognitive and physical deficits and behaviors that affect risk of falls    -  [place] fall precautions as indicated by assessment   - Educate patient/family on patient safety including physical limitations  - Instruct patient to call for assistance with activity based on assessment  - Modify environment to reduce risk of injury  - Consider OT/PT consult to assist with strengthening/mobility  Outcome: Progressing  Goal: Maintain or return to baseline ADL function  Description: INTERVENTIONS:  -  Assess patient's ability to carry out ADLs; assess patient's baseline for ADL function and identify physical deficits which impact ability to perform ADLs (bathing, care of mouth/teeth, toileting, grooming, dressing, etc )  - Assess/evaluate cause of self-care deficits   - Assess range of motion  - Assess patient's mobility; develop plan if impaired  - Assess patient's need for assistive devices and provide as appropriate  - Encourage maximum independence but intervene and supervise when necessary  - Involve family in performance of ADLs  - Assess for home care needs following discharge   - Consider OT consult to assist with ADL evaluation and planning for discharge  - Provide patient education as appropriate  Outcome: Progressing  Goal: Maintain or return mobility status to optimal level  Description: INTERVENTIONS:  - Assess patient's baseline mobility status (ambulation, transfers, stairs, etc )    - Identify cognitive and physical deficits and behaviors that affect mobility  - Identify mobility aids required to assist with transfers and/or ambulation (gait belt, sit-to-stand, lift, walker, cane, etc )  - [place] fall precautions as indicated by assessment  - Record patient progress and toleration of activity level on Mobility SBAR; progress patient to next Phase/Stage  - Instruct patient to call for assistance with activity based on assessment  - Consider rehabilitation consult to assist with strengthening/weightbearing, etc   Outcome: Progressing     Problem: DISCHARGE PLANNING  Goal: Discharge to home or other facility with appropriate resources  Description: INTERVENTIONS:  - Identify barriers to discharge w/patient and caregiver  - Arrange for needed discharge resources and transportation as appropriate  - Identify discharge learning needs (meds, wound care, etc )  - Arrange for interpretive services to assist at discharge as needed  - Refer to Case Management Department for coordinating discharge planning if the patient needs post-hospital services based on physician/advanced practitioner order or complex needs related to functional status, cognitive ability, or social support system  Outcome: Progressing     Problem: Knowledge Deficit  Goal: Patient/family/caregiver demonstrates understanding of disease process, treatment plan, medications, and discharge instructions  Description: Complete learning assessment and assess knowledge base  Interventions:  - Provide teaching at level of understanding  - Provide teaching via preferred learning methods  Outcome: Progressing     Problem: Nutrition/Hydration-ADULT  Goal: Nutrient/Hydration intake appropriate for improving, restoring or maintaining nutritional needs  Description: Monitor and assess patient's nutrition/hydration status for malnutrition  Collaborate with interdisciplinary team and initiate plan and interventions as ordered  Monitor patient's weight and dietary intake as ordered or per policy  Utilize nutrition screening tool and intervene as necessary  Determine patient's food preferences and provide high-protein, high-caloric foods as appropriate       INTERVENTIONS:  - Monitor oral intake, urinary output, labs, and treatment plans  - Assess nutrition and hydration status and recommend course of action  - Evaluate amount of meals eaten  - Assist patient with eating if necessary   - Allow adequate time for meals  - Recommend/ encourage appropriate diets, oral nutritional supplements, and vitamin/mineral supplements  - Order, calculate, and assess calorie counts as needed  - Recommend, monitor, and adjust tube feedings and TPN/PPN based on assessed needs  - Assess need for intravenous fluids  - Provide specific nutrition/hydration education as appropriate  - Include patient/family/caregiver in decisions related to nutrition  Outcome: Progressing

## 2021-04-10 NOTE — PROGRESS NOTES
[place]  Progress Note - Edward Self 1945, 67 y o  male MRN: [id number]  Unit/Bed#: -[facility] Encounter: [id number]  Primary Care Provider: Ross Reece DO   Date and time admitted to hospital: 4/8/2021  6:46 PM    * Bilateral cellulitis of lower leg  Assessment & Plan  · B/L LE cellulitis, poa, a/e/b erythema, edema, and pain x1 week   · Only SIRS criteria met on admission is tachycardia, which could be explained by patient not taking his metoprolol today   · NO ulcers or wounds noted  · S/p IV zosyn in ED - transitioned to IV ancef - c/w over the weekend  · Erythema improving  · Encourage leg elevation  · Compression stockings placed given ankle edema  · Trend CBC and temp curve    Syncope  Assessment & Plan  · Reported episode of unwitnessed syncope this evening    Patient reports that he stood up after eating dinner and began to feel lightheaded and then he woke up on the floor  · Pt reports prior episode of syncope 4 years ago - where it was found he has right carotid stenosis (records at [facility], will obtain) - no intervention performed   · CTH negative  · CXR without acute abnormality  · R shoulder XR - no acute fracture or dislocation  · Nexus score of 0 - C-spine cleared clinically   · Updated carotid duplex - 70% right carotid stenosis  · To follow up with vascular surgery on discharge    Hyponatremia  Assessment & Plan  · Na 131 on admission  · S/p IVFs  · Hyponatremia improving, 133 today  · Trend BMP    Lactic acidosis  Assessment & Plan  · Lactic acidosis, 5 7 on admission in the setting of pancreatic cancer  · Lactic acid improved to 3 2 after IVFs  · Hold off on further IVFs      Adenocarcinoma of pancreas [facility])  Assessment & Plan  · Newly diagnosed adenocarcinoma of the pancreas (diagnosed March 2021) - s/p hospitalization at [facility] for N/V and weight loss   · CT chest 3/7/21 showed no signs of metastatic disease in the thorax   · Follows with Oncology outpatient, Dr Emily Dubois, to receive 2-3months adjuvant chemotherapy prior to resection by Dr Brody Armenta   · Currently undergoing chemotherapy treatment with Abraxane and Gemzar - last treatment 4/6  · CA 19-9 at 20,000 at diagnosis - down trending, most recent 1632 on 4/5   · To continue with scheduled OP follow up on discharge    Diabetes mellitus, type 2 [facility])  Assessment & Plan  Lab Results   Component Value Date    HGBA1C 9 4 (H) 04/08/2021       Recent Labs     04/09/21  1605 04/09/21  2035 04/10/21  0707 04/10/21  1120   POCGLU 254* 293* 151* 316*       Blood Sugar Average: Last 72 hrs:  · (P) 225 [id number]   · home regimen:  Glimepiride 4 mg b i d , NovoLog 15 units with dinner, and metformin 850 mg b i d    · hold home glimepiride and metformin   · A1c 9 4 - uncontrolled DM  · Increase lantus to 18U HS and novolog to 10U TID with SSI   · Adjust insulin dosage based on accucheks        HTN (hypertension)  Assessment & Plan  · Home regimen:  Toprol  mg daily and ramipril 10 mg daily   · Switch ramipril to 40mg lisinopril while inpatient  · C/w Toprol XL  Monitor BP per unit protocol        VTE Pharmacologic Prophylaxis:   Pharmacologic: Enoxaparin (Lovenox)  Mechanical VTE Prophylaxis in Place: Yes    Patient Centered Rounds: I have performed bedside rounds with nursing staff today  Discussions with Specialists or Other Care Team Provider:     Education and Discussions with Family / Patient: patient declined family update    Time Spent for Care: 30 minutes  More than 50% of total time spent on counseling and coordination of care as described above      Current Length of Stay: 2 day(s)    Current Patient Status: Inpatient   Certification Statement: The patient will continue to require additional inpatient hospital stay due to as above    Discharge Plan: 1-2 days    Code Status: Level 1 - Full Code      Subjective:   No overnight events, b/l tenderness improving    Objective:     Vitals:   Temp (24hrs), Av 1 °F (36 7 °C), Min:97 7 °F (36 5 °C), Max:98 8 °F (37 1 °C)    Temp:  [97 7 °F (36 5 °C)-98 8 °F (37 1 °C)] 97 7 °F (36 5 °C)  HR:  [80-82] 80  Resp:  [17-18] 18  BP: (118-138)/(49-58) 118/53  SpO2:  [100 %] 100 %  Body mass index is 21 06 kg/m²  Input and Output Summary (last 24 hours): Intake/Output Summary (Last 24 hours) at 4/10/2021 1305  Last data filed at 2021  Gross per 24 hour   Intake --   Output 300 ml   Net -300 ml       Physical Exam:     Physical Exam  Vitals signs and nursing note reviewed  Constitutional:       General: He is not in acute distress  Appearance: Normal appearance  He is not ill-appearing, toxic-appearing or diaphoretic  Neck:      Musculoskeletal: Normal range of motion and neck supple  Cardiovascular:      Rate and Rhythm: Normal rate and regular rhythm  Pulses: Normal pulses  Heart sounds: Normal heart sounds  No murmur  Pulmonary:      Effort: Pulmonary effort is normal  No respiratory distress  Breath sounds: Normal breath sounds  No wheezing, rhonchi or rales  Chest:      Chest wall: No tenderness  Abdominal:      General: Bowel sounds are normal  There is no distension  Palpations: Abdomen is soft  Tenderness: There is no abdominal tenderness  There is no right CVA tenderness, left CVA tenderness or guarding  Musculoskeletal: Normal range of motion  General: Tenderness (BLE ) present  Right lower leg: Edema present  Left lower leg: Edema present  Skin:     General: Skin is warm  Capillary Refill: Capillary refill takes less than 2 seconds  Coloration: Skin is not jaundiced  Findings: Erythema (Faint BLE erythema) present  No bruising  Neurological:      General: No focal deficit present  Mental Status: He is alert  Mental status is at baseline  Cranial Nerves: No cranial nerve deficit     Psychiatric:         Mood and Affect: Mood normal          Thought Content: Thought content normal          Judgment: Judgment normal        Additional Data:     Labs:    Results from last 7 days   Lab Units 04/10/21  0445  04/08/21  1859   WBC Thousand/uL 8 45   < > 10 96*   HEMOGLOBIN g/dL 7 3*   < > 8 2*   HEMATOCRIT % 22 6*   < > 25 0*   PLATELETS Thousands/uL 240   < > 178   NEUTROS PCT %  --   --  93*   LYMPHS PCT %  --   --  3*   MONOS PCT %  --   --  1*   EOS PCT %  --   --  2    < > = values in this interval not displayed  Results from last 7 days   Lab Units 04/10/21  0445 04/09/21  0510   SODIUM mmol/L 133* 133*   POTASSIUM mmol/L 4 4 4 1   CHLORIDE mmol/L 102 101   CO2 mmol/L 20* 20*   BUN mg/dL 20 18   CREATININE mg/dL 0 73 0 87   ANION GAP mmol/L 11 12   CALCIUM mg/dL 7 9* 7 5*   ALBUMIN g/dL  --  1 9*   TOTAL BILIRUBIN mg/dL  --  0 60   ALK PHOS U/L  --  79   ALT U/L  --  86*   AST U/L  --  75*   GLUCOSE RANDOM mg/dL 165* 157*     Results from last 7 days   Lab Units 04/08/21  1859   INR  1 25*     Results from last 7 days   Lab Units 04/10/21  1120 04/10/21  0707 04/09/21  2035 04/09/21  1605 04/09/21  1111 04/09/21  0723 04/08/21  2309   POC GLUCOSE mg/dl 316* 151* 293* 254* 183* 146* 234*     Results from last 7 days   Lab Units 04/08/21  1859   HEMOGLOBIN A1C % 9 4*     Results from last 7 days   Lab Units 04/08/21  2330 04/08/21  2049 04/08/21  1859   LACTIC ACID mmol/L 1 7 3 2* 5 7*   PROCALCITONIN ng/ml  --   --  0 05           * I Have Reviewed All Lab Data Listed Above  * Additional Pertinent Lab Tests Reviewed: All Labs Within Last 24 Hours Reviewed    Imaging:    Imaging Reports Reviewed Today Include:   Imaging Personally Reviewed by Myself Includes:      Recent Cultures (last 7 days):     Results from last 7 days   Lab Units 04/08/21  1859   BLOOD CULTURE  No Growth at 24 hrs  No Growth at 24 hrs         Last 24 Hours Medication List:   Current Facility-Administered Medications   Medication Dose Route Frequency Provider Last Rate    acetaminophen  650 mg Oral Q6H PRN Efren Gimenez PA-C      aluminum-magnesium hydroxide-simethicone  30 mL Oral Q6H PRN Efren Gimenez PA-C      aspirin  81 mg Oral Daily Efren Gimenez PA-C      atorvastatin  40 mg Oral Daily With Alyssasonia Rosario PA-C      cefazolin  1,000 mg Intravenous Q8H Efren Gimenez PA-C 1,000 mg (04/10/21 1157)    enoxaparin  40 mg Subcutaneous Daily Efren Gimenez PA-C      insulin glargine  15 Units Subcutaneous HS Efren Gimenez PA-C      insulin lispro  1-5 Units Subcutaneous TID AC Efren Gimenez PA-C      insulin lispro  1-5 Units Subcutaneous HS Efren Gimenez PA-C      insulin lispro  5 Units Subcutaneous TID With Meals Efren Gimenez PA-C      lisinopril  40 mg Oral Daily Efren Gimenez PA-C      metoprolol succinate  100 mg Oral Daily Efren Gimenez PA-C      prochlorperazine  10 mg Oral Q6H PRN Efren Gimenez PA-C          Today, Patient Was Seen By: Ana Almaguer MD    ** Please Note: Dictation voice to text software may have been used in the creation of this document   **

## 2021-04-10 NOTE — ASSESSMENT & PLAN NOTE
· Reported episode of unwitnessed syncope this evening    Patient reports that he stood up after eating dinner and began to feel lightheaded and then he woke up on the floor  · Pt reports prior episode of syncope 4 years ago - where it was found he has right carotid stenosis (records at [facility], [place], operated by [facility], will obtain) - no intervention performed   · CTH negative  · CXR without acute abnormality  · R shoulder XR - no acute fracture or dislocation  · Nexus score of 0 - C-spine cleared clinically   · Updated carotid duplex - 70% right carotid stenosis  · To follow up with vascular surgery on discharge

## 2021-04-10 NOTE — ASSESSMENT & PLAN NOTE
· Newly diagnosed adenocarcinoma of the pancreas (diagnosed March 2021) - s/p hospitalization at [facility] for N/V and weight loss   · CT chest 3/7/21 showed no signs of metastatic disease in the thorax   · Follows with Oncology outpatient, Dr Noemi Casey, to receive 2-3months adjuvant chemotherapy prior to resection by Dr Arslan Laazro   · Currently undergoing chemotherapy treatment with Abraxane and Gemzar - last treatment 4/6  · CA 19-9 at 20,000 at diagnosis - down trending, most recent 1632 on 4/5   · To continue with scheduled OP follow up on discharge

## 2021-04-10 NOTE — PLAN OF CARE
Problem: Potential for Falls  Goal: Patient will remain free of falls  Description: INTERVENTIONS:  - Assess patient frequently for physical needs  -  Identify cognitive and physical deficits and behaviors that affect risk of falls    -  [place] fall precautions as indicated by assessment   - Educate patient/family on patient safety including physical limitations  - Instruct patient to call for assistance with activity based on assessment  - Modify environment to reduce risk of injury  - Consider OT/PT consult to assist with strengthening/mobility  Outcome: Progressing     Problem: PAIN - ADULT  Goal: Verbalizes/displays adequate comfort level or baseline comfort level  Description: Interventions:  - Encourage patient to monitor pain and request assistance  - Assess pain using appropriate pain scale  - Administer analgesics based on type and severity of pain and evaluate response  - Implement non-pharmacological measures as appropriate and evaluate response  - Consider cultural and social influences on pain and pain management  - Notify physician/advanced practitioner if interventions unsuccessful or patient reports new pain  Outcome: Progressing     Problem: INFECTION - ADULT  Goal: Absence or prevention of progression during hospitalization  Description: INTERVENTIONS:  - Assess and monitor for signs and symptoms of infection  - Monitor lab/diagnostic results  - Monitor all insertion sites, i e  indwelling lines, tubes, and drains  - Monitor endotracheal if appropriate and nasal secretions for changes in amount and color  - [place] appropriate cooling/warming therapies per order  - Administer medications as ordered  - Instruct and encourage patient and family to use good hand hygiene technique  - Identify and instruct in appropriate isolation precautions for identified infection/condition  Outcome: Progressing  Goal: Absence of fever/infection during neutropenic period  Description: INTERVENTIONS:  - Monitor WBC    Outcome: Progressing     Problem: SAFETY ADULT  Goal: Patient will remain free of falls  Description: INTERVENTIONS:  - Assess patient frequently for physical needs  -  Identify cognitive and physical deficits and behaviors that affect risk of falls    -  [place] fall precautions as indicated by assessment   - Educate patient/family on patient safety including physical limitations  - Instruct patient to call for assistance with activity based on assessment  - Modify environment to reduce risk of injury  - Consider OT/PT consult to assist with strengthening/mobility  Outcome: Progressing  Goal: Maintain or return to baseline ADL function  Description: INTERVENTIONS:  -  Assess patient's ability to carry out ADLs; assess patient's baseline for ADL function and identify physical deficits which impact ability to perform ADLs (bathing, care of mouth/teeth, toileting, grooming, dressing, etc )  - Assess/evaluate cause of self-care deficits   - Assess range of motion  - Assess patient's mobility; develop plan if impaired  - Assess patient's need for assistive devices and provide as appropriate  - Encourage maximum independence but intervene and supervise when necessary  - Involve family in performance of ADLs  - Assess for home care needs following discharge   - Consider OT consult to assist with ADL evaluation and planning for discharge  - Provide patient education as appropriate  Outcome: Progressing  Goal: Maintain or return mobility status to optimal level  Description: INTERVENTIONS:  - Assess patient's baseline mobility status (ambulation, transfers, stairs, etc )    - Identify cognitive and physical deficits and behaviors that affect mobility  - Identify mobility aids required to assist with transfers and/or ambulation (gait belt, sit-to-stand, lift, walker, cane, etc )  - [place] fall precautions as indicated by assessment  - Record patient progress and toleration of activity level on Mobility SBAR; progress patient to next Phase/Stage  - Instruct patient to call for assistance with activity based on assessment  - Consider rehabilitation consult to assist with strengthening/weightbearing, etc   Outcome: Progressing     Problem: DISCHARGE PLANNING  Goal: Discharge to home or other facility with appropriate resources  Description: INTERVENTIONS:  - Identify barriers to discharge w/patient and caregiver  - Arrange for needed discharge resources and transportation as appropriate  - Identify discharge learning needs (meds, wound care, etc )  - Arrange for interpretive services to assist at discharge as needed  - Refer to Case Management Department for coordinating discharge planning if the patient needs post-hospital services based on physician/advanced practitioner order or complex needs related to functional status, cognitive ability, or social support system  Outcome: Progressing     Problem: Knowledge Deficit  Goal: Patient/family/caregiver demonstrates understanding of disease process, treatment plan, medications, and discharge instructions  Description: Complete learning assessment and assess knowledge base  Interventions:  - Provide teaching at level of understanding  - Provide teaching via preferred learning methods  Outcome: Progressing     Problem: Nutrition/Hydration-ADULT  Goal: Nutrient/Hydration intake appropriate for improving, restoring or maintaining nutritional needs  Description: Monitor and assess patient's nutrition/hydration status for malnutrition  Collaborate with interdisciplinary team and initiate plan and interventions as ordered  Monitor patient's weight and dietary intake as ordered or per policy  Utilize nutrition screening tool and intervene as necessary  Determine patient's food preferences and provide high-protein, high-caloric foods as appropriate       INTERVENTIONS:  - Monitor oral intake, urinary output, labs, and treatment plans  - Assess nutrition and hydration status and recommend course of action  - Evaluate amount of meals eaten  - Assist patient with eating if necessary   - Allow adequate time for meals  - Recommend/ encourage appropriate diets, oral nutritional supplements, and vitamin/mineral supplements  - Order, calculate, and assess calorie counts as needed  - Recommend, monitor, and adjust tube feedings and TPN/PPN based on assessed needs  - Assess need for intravenous fluids  - Provide specific nutrition/hydration education as appropriate  - Include patient/family/caregiver in decisions related to nutrition  Outcome: Progressing

## 2021-04-10 NOTE — ASSESSMENT & PLAN NOTE
Acute on chronic anemia, HB 7 1 today  Baseline Hb 8-9  Iron panel - Suggestive of anemia of chronic inflammation  Would transfuse with 1iu prbcs  No signs of obvious bleeding  DC Lovenox  tranfuse to keep Hb > 7

## 2021-04-11 NOTE — QUICK NOTE
I was told by blood bank that patient would not be supplied blood given HB 7 1 and would need to get approval from Dr Joe Mendoza (the pathologist) who I called at [phone]  He was not picking up and his VM message said 'the messages left would not be checked for an extended period of time'  Hold off on transfusion for now - given lack of approval from blood bank

## 2021-04-11 NOTE — PLAN OF CARE
Problem: Potential for Falls  Goal: Patient will remain free of falls  Description: INTERVENTIONS:  - Assess patient frequently for physical needs  -  Identify cognitive and physical deficits and behaviors that affect risk of falls    -  [place] fall precautions as indicated by assessment   - Educate patient/family on patient safety including physical limitations  - Instruct patient to call for assistance with activity based on assessment  - Modify environment to reduce risk of injury  - Consider OT/PT consult to assist with strengthening/mobility  Outcome: Progressing     Problem: PAIN - ADULT  Goal: Verbalizes/displays adequate comfort level or baseline comfort level  Description: Interventions:  - Encourage patient to monitor pain and request assistance  - Assess pain using appropriate pain scale  - Administer analgesics based on type and severity of pain and evaluate response  - Implement non-pharmacological measures as appropriate and evaluate response  - Consider cultural and social influences on pain and pain management  - Notify physician/advanced practitioner if interventions unsuccessful or patient reports new pain  Outcome: Progressing     Problem: INFECTION - ADULT  Goal: Absence or prevention of progression during hospitalization  Description: INTERVENTIONS:  - Assess and monitor for signs and symptoms of infection  - Monitor lab/diagnostic results  - Monitor all insertion sites, i e  indwelling lines, tubes, and drains  - Monitor endotracheal if appropriate and nasal secretions for changes in amount and color  - [place] appropriate cooling/warming therapies per order  - Administer medications as ordered  - Instruct and encourage patient and family to use good hand hygiene technique  - Identify and instruct in appropriate isolation precautions for identified infection/condition  Outcome: Progressing  Goal: Absence of fever/infection during neutropenic period  Description: INTERVENTIONS:  - Monitor WBC    Outcome: Progressing     Problem: SAFETY ADULT  Goal: Patient will remain free of falls  Description: INTERVENTIONS:  - Assess patient frequently for physical needs  -  Identify cognitive and physical deficits and behaviors that affect risk of falls    -  [place] fall precautions as indicated by assessment   - Educate patient/family on patient safety including physical limitations  - Instruct patient to call for assistance with activity based on assessment  - Modify environment to reduce risk of injury  - Consider OT/PT consult to assist with strengthening/mobility  Outcome: Progressing  Goal: Maintain or return to baseline ADL function  Description: INTERVENTIONS:  -  Assess patient's ability to carry out ADLs; assess patient's baseline for ADL function and identify physical deficits which impact ability to perform ADLs (bathing, care of mouth/teeth, toileting, grooming, dressing, etc )  - Assess/evaluate cause of self-care deficits   - Assess range of motion  - Assess patient's mobility; develop plan if impaired  - Assess patient's need for assistive devices and provide as appropriate  - Encourage maximum independence but intervene and supervise when necessary  - Involve family in performance of ADLs  - Assess for home care needs following discharge   - Consider OT consult to assist with ADL evaluation and planning for discharge  - Provide patient education as appropriate  Outcome: Progressing  Goal: Maintain or return mobility status to optimal level  Description: INTERVENTIONS:  - Assess patient's baseline mobility status (ambulation, transfers, stairs, etc )    - Identify cognitive and physical deficits and behaviors that affect mobility  - Identify mobility aids required to assist with transfers and/or ambulation (gait belt, sit-to-stand, lift, walker, cane, etc )  - [place] fall precautions as indicated by assessment  - Record patient progress and toleration of activity level on Mobility SBAR; progress patient to next Phase/Stage  - Instruct patient to call for assistance with activity based on assessment  - Consider rehabilitation consult to assist with strengthening/weightbearing, etc   Outcome: Progressing     Problem: DISCHARGE PLANNING  Goal: Discharge to home or other facility with appropriate resources  Description: INTERVENTIONS:  - Identify barriers to discharge w/patient and caregiver  - Arrange for needed discharge resources and transportation as appropriate  - Identify discharge learning needs (meds, wound care, etc )  - Arrange for interpretive services to assist at discharge as needed  - Refer to Case Management Department for coordinating discharge planning if the patient needs post-hospital services based on physician/advanced practitioner order or complex needs related to functional status, cognitive ability, or social support system  Outcome: Progressing     Problem: Knowledge Deficit  Goal: Patient/family/caregiver demonstrates understanding of disease process, treatment plan, medications, and discharge instructions  Description: Complete learning assessment and assess knowledge base  Interventions:  - Provide teaching at level of understanding  - Provide teaching via preferred learning methods  Outcome: Progressing     Problem: Nutrition/Hydration-ADULT  Goal: Nutrient/Hydration intake appropriate for improving, restoring or maintaining nutritional needs  Description: Monitor and assess patient's nutrition/hydration status for malnutrition  Collaborate with interdisciplinary team and initiate plan and interventions as ordered  Monitor patient's weight and dietary intake as ordered or per policy  Utilize nutrition screening tool and intervene as necessary  Determine patient's food preferences and provide high-protein, high-caloric foods as appropriate       INTERVENTIONS:  - Monitor oral intake, urinary output, labs, and treatment plans  - Assess nutrition and hydration status and recommend course of action  - Evaluate amount of meals eaten  - Assist patient with eating if necessary   - Allow adequate time for meals  - Recommend/ encourage appropriate diets, oral nutritional supplements, and vitamin/mineral supplements  - Order, calculate, and assess calorie counts as needed  - Recommend, monitor, and adjust tube feedings and TPN/PPN based on assessed needs  - Assess need for intravenous fluids  - Provide specific nutrition/hydration education as appropriate  - Include patient/family/caregiver in decisions related to nutrition  Outcome: Progressing     Problem: Prexisting or High Potential for Compromised Skin Integrity  Goal: Skin integrity is maintained or improved  Description: INTERVENTIONS:  - Identify patients at risk for skin breakdown  - Assess and monitor skin integrity  - Assess and monitor nutrition and hydration status  - Monitor labs   - Assess for incontinence   - Turn and reposition patient  - Assist with mobility/ambulation  - Relieve pressure over bony prominences  - Avoid friction and shearing  - Provide appropriate hygiene as needed including keeping skin clean and dry  - Evaluate need for skin moisturizer/barrier cream  - Collaborate with interdisciplinary team   - Patient/family teaching  - Consider wound care consult   Outcome: Progressing

## 2021-04-11 NOTE — PLAN OF CARE
Problem: Potential for Falls  Goal: Patient will remain free of falls  Description: INTERVENTIONS:  - Assess patient frequently for physical needs  -  Identify cognitive and physical deficits and behaviors that affect risk of falls    -  [place] fall precautions as indicated by assessment   - Educate patient/family on patient safety including physical limitations  - Instruct patient to call for assistance with activity based on assessment  - Modify environment to reduce risk of injury  - Consider OT/PT consult to assist with strengthening/mobility  Outcome: Progressing     Problem: PAIN - ADULT  Goal: Verbalizes/displays adequate comfort level or baseline comfort level  Description: Interventions:  - Encourage patient to monitor pain and request assistance  - Assess pain using appropriate pain scale  - Administer analgesics based on type and severity of pain and evaluate response  - Implement non-pharmacological measures as appropriate and evaluate response  - Consider cultural and social influences on pain and pain management  - Notify physician/advanced practitioner if interventions unsuccessful or patient reports new pain  Outcome: Progressing     Problem: INFECTION - ADULT  Goal: Absence or prevention of progression during hospitalization  Description: INTERVENTIONS:  - Assess and monitor for signs and symptoms of infection  - Monitor lab/diagnostic results  - Monitor all insertion sites, i e  indwelling lines, tubes, and drains  - Monitor endotracheal if appropriate and nasal secretions for changes in amount and color  - [place] appropriate cooling/warming therapies per order  - Administer medications as ordered  - Instruct and encourage patient and family to use good hand hygiene technique  - Identify and instruct in appropriate isolation precautions for identified infection/condition  Outcome: Progressing  Goal: Absence of fever/infection during neutropenic period  Description: INTERVENTIONS:  - Monitor WBC    Outcome: Progressing     Problem: SAFETY ADULT  Goal: Patient will remain free of falls  Description: INTERVENTIONS:  - Assess patient frequently for physical needs  -  Identify cognitive and physical deficits and behaviors that affect risk of falls    -  [place] fall precautions as indicated by assessment   - Educate patient/family on patient safety including physical limitations  - Instruct patient to call for assistance with activity based on assessment  - Modify environment to reduce risk of injury  - Consider OT/PT consult to assist with strengthening/mobility  Outcome: Progressing  Goal: Maintain or return to baseline ADL function  Description: INTERVENTIONS:  -  Assess patient's ability to carry out ADLs; assess patient's baseline for ADL function and identify physical deficits which impact ability to perform ADLs (bathing, care of mouth/teeth, toileting, grooming, dressing, etc )  - Assess/evaluate cause of self-care deficits   - Assess range of motion  - Assess patient's mobility; develop plan if impaired  - Assess patient's need for assistive devices and provide as appropriate  - Encourage maximum independence but intervene and supervise when necessary  - Involve family in performance of ADLs  - Assess for home care needs following discharge   - Consider OT consult to assist with ADL evaluation and planning for discharge  - Provide patient education as appropriate  Outcome: Progressing  Goal: Maintain or return mobility status to optimal level  Description: INTERVENTIONS:  - Assess patient's baseline mobility status (ambulation, transfers, stairs, etc )    - Identify cognitive and physical deficits and behaviors that affect mobility  - Identify mobility aids required to assist with transfers and/or ambulation (gait belt, sit-to-stand, lift, walker, cane, etc )  - [place] fall precautions as indicated by assessment  - Record patient progress and toleration of activity level on Mobility SBAR; progress patient to next Phase/Stage  - Instruct patient to call for assistance with activity based on assessment  - Consider rehabilitation consult to assist with strengthening/weightbearing, etc   Outcome: Progressing     Problem: DISCHARGE PLANNING  Goal: Discharge to home or other facility with appropriate resources  Description: INTERVENTIONS:  - Identify barriers to discharge w/patient and caregiver  - Arrange for needed discharge resources and transportation as appropriate  - Identify discharge learning needs (meds, wound care, etc )  - Arrange for interpretive services to assist at discharge as needed  - Refer to Case Management Department for coordinating discharge planning if the patient needs post-hospital services based on physician/advanced practitioner order or complex needs related to functional status, cognitive ability, or social support system  Outcome: Progressing     Problem: Knowledge Deficit  Goal: Patient/family/caregiver demonstrates understanding of disease process, treatment plan, medications, and discharge instructions  Description: Complete learning assessment and assess knowledge base  Interventions:  - Provide teaching at level of understanding  - Provide teaching via preferred learning methods  Outcome: Progressing     Problem: Nutrition/Hydration-ADULT  Goal: Nutrient/Hydration intake appropriate for improving, restoring or maintaining nutritional needs  Description: Monitor and assess patient's nutrition/hydration status for malnutrition  Collaborate with interdisciplinary team and initiate plan and interventions as ordered  Monitor patient's weight and dietary intake as ordered or per policy  Utilize nutrition screening tool and intervene as necessary  Determine patient's food preferences and provide high-protein, high-caloric foods as appropriate       INTERVENTIONS:  - Monitor oral intake, urinary output, labs, and treatment plans  - Assess nutrition and hydration status and recommend course of action  - Evaluate amount of meals eaten  - Assist patient with eating if necessary   - Allow adequate time for meals  - Recommend/ encourage appropriate diets, oral nutritional supplements, and vitamin/mineral supplements  - Order, calculate, and assess calorie counts as needed  - Recommend, monitor, and adjust tube feedings and TPN/PPN based on assessed needs  - Assess need for intravenous fluids  - Provide specific nutrition/hydration education as appropriate  - Include patient/family/caregiver in decisions related to nutrition  Outcome: Progressing

## 2021-04-11 NOTE — ASSESSMENT & PLAN NOTE
Lab Results   Component Value Date    HGBA1C 9 4 (H) 04/08/2021       Recent Labs     04/10/21  1120 04/10/21  1645 04/10/21  2104 04/11/21  0737   POCGLU 316* 236* 268* 209*       Blood Sugar Average: Last 72 hrs:  · (P) 229   · home regimen:  Glimepiride 4 mg b i d , NovoLog 15 units with dinner, and metformin 850 mg b i d    · hold home glimepiride and metformin   · A1c 9 4 - uncontrolled DM  · Increase lantus to 20U HS and novolog to 12U TID with SSI   · Adjust insulin dosage based on accucheks

## 2021-04-11 NOTE — ASSESSMENT & PLAN NOTE
· Reported episode of unwitnessed syncope this evening    Patient reports that he stood up after eating dinner and began to feel lightheaded and then he woke up on the floor  · Pt reports prior episode of syncope 4 years ago - where it was found he has right carotid stenosis (records at [facility], will obtain) - no intervention performed   · CTH negative  · CXR without acute abnormality  · R shoulder XR - no acute fracture or dislocation  · Nexus score of 0 - C-spine cleared clinically   · Updated carotid duplex - 70% right carotid stenosis  · To follow up with vascular surgery on discharge

## 2021-04-11 NOTE — PROGRESS NOTES
[place]  Progress Note - Doll Payment 1945, 67 y o  male MRN: [id number]  Unit/Bed#: [facility]-01 Encounter: [id number]  Primary Care Provider: Nita Morales DO   Date and time admitted to hospital: 4/8/2021  6:46 PM    * Bilateral cellulitis of lower leg  Assessment & Plan  · B/L LE cellulitis, poa, a/e/b erythema, edema, and pain x1 week   · Only SIRS criteria met on admission is tachycardia, which could be explained by patient not taking his metoprolol today   · NO ulcers or wounds noted  · S/p IV zosyn in ED - transitioned to IV ancef - c/w over the weekend  · Erythema improving  · Encourage leg elevation  · Compression stockings placed given ankle edema  · Trend CBC and temp curve    Syncope  Assessment & Plan  · Reported episode of unwitnessed syncope this evening  Patient reports that he stood up after eating dinner and began to feel lightheaded and then he woke up on the floor  · Pt reports prior episode of syncope 4 years ago - where it was found he has right carotid stenosis (records at [facility], will obtain) - no intervention performed   · CTH negative  · CXR without acute abnormality  · R shoulder XR - no acute fracture or dislocation  · Nexus score of 0 - C-spine cleared clinically   · Updated carotid duplex - 70% right carotid stenosis  · To follow up with vascular surgery on discharge    Anemia  Assessment & Plan  Acute on chronic anemia, HB 7 1 today  Baseline Hb 8-9  Iron panel - Suggestive of anemia of chronic inflammation  Would transfuse with 1iu prbcs  No signs of obvious bleeding  DC Lovenox  tranfuse to keep Hb > 7    Severe protein-calorie malnutrition (HCC)  Assessment & Plan  Malnutrition Findings:   Adult Malnutrition type: Chronic illness  Adult Degree of Malnutrition: Other severe protein calorie malnutrition    BMI Findings: Body mass index is 21 06 kg/m²         Hyponatremia  Assessment & Plan  · Na 131 on admission  · S/p IVFs  · Place on fluid restriction  · Na 128 this am - corrected Na for glucose 130    Lactic acidosis  Assessment & Plan  · Lactic acidosis, 5 7 on admission in the setting of pancreatic cancer  · Lactic acid improved to 3 2 after IVFs  · Hold off on further IVFs      Adenocarcinoma of pancreas [facility])  Assessment & Plan  · Newly diagnosed adenocarcinoma of the pancreas (diagnosed March 2021) [place] [facility]   · CT chest 3/7/21 showed no signs of metastatic disease in the thorax   · Follows with Oncology outpatient, Dr Jeff Hinson, to receive 2-3months adjuvant chemotherapy prior to resection by Dr Mary Stein   · Currently undergoing chemotherapy treatment with Abraxane and Gemzar - last treatment 4/6  · CA 19-9 at 20,000 at diagnosis - down trending, most recent 1632 on 4/5   · To continue with scheduled OP follow up on discharge    Diabetes mellitus, type 2 [facility])  Assessment & Plan  Lab Results   Component Value Date    HGBA1C 9 4 (H) 04/08/2021       Recent Labs     04/10/21  1120 04/10/21  1645 04/10/21  2104 04/11/21  0737   POCGLU 316* 236* 268* 209*       Blood Sugar Average: Last 72 hrs:  · (P) 229   · home regimen:  Glimepiride 4 mg b i d , NovoLog 15 units with dinner, and metformin 850 mg b i d    · hold home glimepiride and metformin   · A1c 9 4 - uncontrolled DM  · Increase lantus to 20U HS and novolog to 12U TID with SSI   · Adjust insulin dosage based on accucheks        HTN (hypertension)  Assessment & Plan  · Home regimen:  Toprol  mg daily and ramipril 10 mg daily   · Switch ramipril to 40mg lisinopril while inpatient  · C/w Toprol XL  Monitor BP per unit protocol      VTE Pharmacologic Prophylaxis:   Pharmacologic: Pharmacologic VTE Prophylaxis contraindicated due to anemia - DC lovenox for now  Mechanical VTE Prophylaxis in Place: Yes    Patient Centered Rounds: I have performed bedside rounds with nursing staff today      Discussions with Specialists or Other Care Team Provider: Education and Discussions with Family / Patient: patient declined family update    Time Spent for Care: 30 minutes  More than 50% of total time spent on counseling and coordination of care as described above  Current Length of Stay: 3 day(s)    Current Patient Status: Inpatient   Certification Statement: The patient will continue to require additional inpatient hospital stay due to as above    Discharge Plan: 1-2 days    Code Status: Level 1 - Full Code      Subjective:   No overnight events reported  Still with occasional lower extremity tenderness    Objective:     Vitals:   Temp (24hrs), Av 8 °F (36 6 °C), Min:97 6 °F (36 4 °C), Max:98 1 °F (36 7 °C)    Temp:  [97 6 °F (36 4 °C)-98 1 °F (36 7 °C)] 97 8 °F (36 6 °C)  HR:  [74-80] 74  Resp:  [18] 18  BP: (113-135)/(49-58) 127/54  SpO2:  [99 %-100 %] 99 %  Body mass index is 21 06 kg/m²  Input and Output Summary (last 24 hours): Intake/Output Summary (Last 24 hours) at 2021 0900  Last data filed at 2021 0344  Gross per 24 hour   Intake --   Output 2425 ml   Net -2425 ml       Physical Exam:     Physical Exam  Vitals signs and nursing note reviewed  Constitutional:       General: He is not in acute distress  Appearance: Normal appearance  He is not ill-appearing, toxic-appearing or diaphoretic  Cardiovascular:      Rate and Rhythm: Normal rate and regular rhythm  Pulses: Normal pulses  Heart sounds: No murmur  Pulmonary:      Effort: Pulmonary effort is normal  No respiratory distress  Breath sounds: Normal breath sounds  No stridor  No wheezing, rhonchi or rales  Chest:      Chest wall: No tenderness  Abdominal:      General: Bowel sounds are normal  There is no distension  Palpations: Abdomen is soft  Tenderness: There is no abdominal tenderness  There is no right CVA tenderness, left CVA tenderness or guarding  Musculoskeletal: Normal range of motion           General: No swelling, tenderness, deformity or signs of injury  Right lower leg: Edema (trace) present  Left lower leg: Edema (trace) present  Skin:     General: Skin is warm and dry  Capillary Refill: Capillary refill takes less than 2 seconds  Coloration: Skin is not jaundiced  Findings: Erythema present  No bruising or lesion  Neurological:      General: No focal deficit present  Mental Status: He is alert  Mental status is at baseline  Cranial Nerves: No cranial nerve deficit  Psychiatric:         Mood and Affect: Mood normal          Thought Content: Thought content normal          Judgment: Judgment normal          Additional Data:     Labs:    Results from last 7 days   Lab Units 04/11/21  0438  04/08/21  1859   WBC Thousand/uL 4 66   < > 10 96*   HEMOGLOBIN g/dL 7 1*   < > 8 2*   HEMATOCRIT % 21 9*   < > 25 0*   PLATELETS Thousands/uL 294   < > 178   NEUTROS PCT %  --   --  93*   LYMPHS PCT %  --   --  3*   MONOS PCT %  --   --  1*   EOS PCT %  --   --  2    < > = values in this interval not displayed  Results from last 7 days   Lab Units 04/11/21  0438 04/09/21  0510   SODIUM mmol/L 128*   < > 133*   POTASSIUM mmol/L 4 2   < > 4 1   CHLORIDE mmol/L 98*   < > 101   CO2 mmol/L 22   < > 20*   BUN mg/dL 22   < > 18   CREATININE mg/dL 0 81   < > 0 87   ANION GAP mmol/L 8   < > 12   CALCIUM mg/dL 7 5*   < > 7 5*   ALBUMIN g/dL  --   --  1 9*   TOTAL BILIRUBIN mg/dL  --   --  0 60   ALK PHOS U/L  --   --  79   ALT U/L  --   --  86*   AST U/L  --   --  75*   GLUCOSE RANDOM mg/dL 199*   < > 157*    < > = values in this interval not displayed       Results from last 7 days   Lab Units 04/08/21  1859   INR  1 25*     Results from last 7 days   Lab Units 04/11/21  0737 04/10/21  2104 04/10/21  1645 04/10/21  1120 04/10/21  0707 04/09/21  2035 04/09/21  1605 04/09/21  1111 04/09/21  0723 04/08/21  2309   POC GLUCOSE mg/dl 209* 268* 236* 316* 151* 293* 254* 183* 146* 234*     Results from last 7 days   Lab Units 04/08/21  1859   HEMOGLOBIN A1C % 9 4*     Results from last 7 days   Lab Units 04/10/21  0445 04/08/21  2330 04/08/21  2049 04/08/21  1859   LACTIC ACID mmol/L  --  1 7 3 2* 5 7*   PROCALCITONIN ng/ml 0 10  --   --  0 05           * I Have Reviewed All Lab Data Listed Above  * Additional Pertinent Lab Tests Reviewed: All Labs Within Last 24 Hours Reviewed    Imaging:    Imaging Reports Reviewed Today Include:   Imaging Personally Reviewed by Myself Includes:      Recent Cultures (last 7 days):     Results from last 7 days   Lab Units 04/08/21  1859   BLOOD CULTURE  No Growth at 48 hrs  No Growth at 48 hrs  Last 24 Hours Medication List:   Current Facility-Administered Medications   Medication Dose Route Frequency Provider Last Rate    acetaminophen  650 mg Oral Q6H PRN Elma Kwesi, PA-C      aluminum-magnesium hydroxide-simethicone  30 mL Oral Q6H PRN Elma Kwesi, PA-C      aspirin  81 mg Oral Daily Emla Kwesi, PA-C      atorvastatin  40 mg Oral Daily With OLIVIA Altamirano-C      cefazolin  1,000 mg Intravenous Q8H Elma Kwesi, PA-C 1,000 mg (04/11/21 0344)    enoxaparin  40 mg Subcutaneous Daily Elma Kwesi, PA-C      insulin glargine  15 Units Subcutaneous HS Elma Kwesi, PA-C      insulin lispro  1-5 Units Subcutaneous TID AC Elma Kwesi, PA-C      insulin lispro  1-5 Units Subcutaneous HS Elma Kwesi, PA-C      insulin lispro  5 Units Subcutaneous TID With Meals Elma Kwesi, PA-C      lisinopril  40 mg Oral Daily Elma Kwesi, PA-C      melatonin  6 mg Oral HS Elma Kwesi, PA-C      metoprolol succinate  100 mg Oral Daily Elma Kwesi, PA-C      prochlorperazine  10 mg Oral Q6H PRN Elma Kwesi, PA-C          Today, Patient Was Seen By: Jasmine Grajeda MD    ** Please Note: Dictation voice to text software may have been used in the creation of this document   **

## 2021-04-11 NOTE — ASSESSMENT & PLAN NOTE
· Newly diagnosed adenocarcinoma of the pancreas (diagnosed March 2021) - s/p hospitalization at [address] [place] [address] for N/V and weight loss   · CT chest 3/7/21 showed no signs of metastatic disease in the thorax   · Follows with Oncology outpatient, Dr Anastasia Montoya, to receive 2-3months adjuvant chemotherapy prior to resection by Dr Augustus Monaco   · Currently undergoing chemotherapy treatment with Abraxane and Gemzar - last treatment 4/6  · CA 19-9 at 20,000 at diagnosis - down trending, most recent 1632 on 4/5   · To continue with scheduled OP follow up on discharge

## 2021-04-11 NOTE — ASSESSMENT & PLAN NOTE
Malnutrition Findings:   Adult Malnutrition type: Chronic illness  Adult Degree of Malnutrition: Other severe protein calorie malnutrition    BMI Findings: Body mass index is 21 06 kg/m²

## 2021-04-11 NOTE — ASSESSMENT & PLAN NOTE
· Na 131 on admission  · S/p IVFs  · Place on fluid restriction  · Na 128 this am - corrected Na for glucose 130

## 2021-04-12 NOTE — PLAN OF CARE
Problem: PHYSICAL THERAPY ADULT  Goal: Performs mobility at highest level of function for planned discharge setting  See evaluation for individualized goals  Description: Treatment/Interventions: ADL retraining, Functional transfer training, LE strengthening/ROM, Therapeutic exercise, Endurance training, Patient/family training, Equipment eval/education, Gait training, Spoke to nursing, Spoke to case management, OT  Equipment Recommended: Marla Ramírez       See flowsheet documentation for full assessment, interventions and recommendations  Outcome: Progressing  Note: Prognosis: Good  Problem List: Decreased strength, Decreased endurance, Impaired balance, Decreased mobility  Assessment: Pt with improved mobility,but continuin g R le weakness, hip@ 3/5  No safe for steps and unabl eto stay 1stf monae at home  Conitnue to recommend STR at d/c to conitnue strengthening adn step training  Can't PT  Barriers to Discharge: (medical clearance)     PT Discharge Recommendation: Post-Acute Rehabilitation Services     PT - OK to Discharge: Yes    See flowsheet documentation for full assessment

## 2021-04-12 NOTE — ASSESSMENT & PLAN NOTE
· B/L LE cellulitis, poa, a/e/b erythema, edema, and pain x1 week   · Only SIRS criteria met on admission is tachycardia, which could be explained by patient not taking his metoprolol today   · NO ulcers or wounds noted  · S/p IV zosyn in ED - transitioned to IV ancef   · Erythema improving  · Encourage leg elevation  · Compression stockings placed given ankle edema  · Trend CBC and temp curve

## 2021-04-12 NOTE — PROGRESS NOTES
[facility]  Progress Note - Pamela Campos 1945, 67 y o  male MRN: [id number]  Unit/Bed#: -01 Encounter: [id number]  Primary Care Provider: Damir Joaquin DO   Date and time admitted to hospital: 4/8/2021  6:46 PM    Anemia  Assessment & Plan  Acute on chronic anemia, HB 7 1 today  Baseline Hb 8-9  Iron panel - Suggestive of anemia of chronic inflammation  Patient received 1 unit of packed red cell transfusion on 04/11  Hemoglobin this morning is 9  No signs of obvious bleeding  DC Lovenox  tranfuse to keep Hb > 7    Severe protein-calorie malnutrition (HCC)  Assessment & Plan  Malnutrition Findings:   Adult Malnutrition type: Chronic illness  Adult Degree of Malnutrition: Other severe protein calorie malnutrition    BMI Findings: Body mass index is 21 06 kg/m²  Syncope  Assessment & Plan  · Reported episode of unwitnessed syncope this evening  Patient reports that he stood up after eating dinner and began to feel lightheaded and then he woke up on the floor  · Pt reports prior episode of syncope 4 years ago - where it was found he has right carotid stenosis (records at [address] [place] [address], will obtain) - no intervention performed   · CTH negative  · CXR without acute abnormality  · R shoulder XR - no acute fracture or dislocation  · Nexus score of 0 - C-spine cleared clinically   · Updated carotid duplex - 70% right carotid stenosis  · To follow up with vascular surgery on discharge    Hyponatremia  Assessment & Plan  · Na 131 on admission  · S/p IVFs  · Na 129 this a m    · Will start on fluid restriction  · Repeat BMP this evening    Lactic acidosis  Assessment & Plan  · Lactic acidosis, 5 7 on admission in the setting of pancreatic cancer  · Lactic acid improved to 3 2 after IVFs  · Hold off on further IVFs      Adenocarcinoma of pancreas [facility])  Assessment & Plan  · Newly diagnosed adenocarcinoma of the pancreas (diagnosed March 2021) - s/p hospitalization at [facility] for N/V and weight loss   · CT chest 3/7/21 showed no signs of metastatic disease in the thorax   · Follows with Oncology outpatient, Dr Tod Gowers, to receive 2-3months adjuvant chemotherapy prior to resection by Dr Aury Munguia   · Currently undergoing chemotherapy treatment with Abraxane and Gemzar - last treatment 4/6  · CA 19-9 at 20,000 at diagnosis - down trending, most recent 1632 on 4/5   · To continue with scheduled OP follow up on discharge    Diabetes mellitus, type 2 [facility])  Assessment & Plan  Lab Results   Component Value Date    HGBA1C 9 4 (H) 04/08/2021       Recent Labs     04/11/21  1646 04/11/21  2108 04/12/21  0726 04/12/21  1119   POCGLU 259* 194* 112 179*       Blood Sugar Average: Last 72 hrs:  · (P) 225 [id number]   · home regimen:  Glimepiride 4 mg b i d , NovoLog 15 units with dinner, and metformin 850 mg b i d    · hold home glimepiride and metformin   · A1c 9 4 - uncontrolled DM  · Increase lantus to 20U HS and novolog to 12U TID with SSI   · Adjust insulin dosage based on accucheks        HTN (hypertension)  Assessment & Plan  · Home regimen:  Toprol  mg daily and ramipril 10 mg daily   · Switch ramipril to 40mg lisinopril while inpatient  · C/w Toprol XL  Monitor BP per unit protocol    * Bilateral cellulitis of lower leg  Assessment & Plan  · B/L LE cellulitis, poa, a/e/b erythema, edema, and pain x1 week   · Only SIRS criteria met on admission is tachycardia, which could be explained by patient not taking his metoprolol today   · NO ulcers or wounds noted  · S/p IV zosyn in ED - transitioned to IV ancef   · Erythema improving  · Encourage leg elevation  · Compression stockings placed given ankle edema  · Trend CBC and temp curve        Labs & Imaging: I have personally reviewed pertinent reports        VTE Pharmacologic Prophylaxis: Reason for no pharmacologic prophylaxis Anemia  VTE Mechanical Prophylaxis: sequential compression device    Code Status:   Level 1 - Full Code    Patient Centered Rounds: I have performed bedside rounds with nursing staff today  Discussions with Specialists or Other Care Team Provider: CM    Education and Discussions with Family / Patient:  Patient is updating his family  I offered to call    Current Length of Stay: 4 day(s)    Current Patient Status: Inpatient   Certification Statement: The patient will continue to require additional inpatient hospital stay due to see my assessment and plan  Subjective:   Patient is seen and examined at bedside  Denies any new complaints  Afebrile  All other ROS are negative  Objective:    Vitals: Blood pressure 141/56, pulse 70, temperature 97 8 °F (36 6 °C), resp  rate 19, height 5' 11" (1 803 m), weight 68 5 kg (151 lb), SpO2 98 %  ,Body mass index is 21 06 kg/m²  SPO2 RA Rest      ED to Hosp-Admission (Current) from 4/8/2021 in [address] Med Surg Unit   SpO2  98 %   SpO2 Activity  At Rest   O2 Device  None (Room air)   O2 Flow Rate  --        I&O:     Intake/Output Summary (Last 24 hours) at 4/12/2021 1148  Last data filed at 4/12/2021 0904  Gross per 24 hour   Intake 410 ml   Output 1175 ml   Net -765 ml       Physical Exam:    General- Alert, sitting comfortably in chair  Not in any acute distress  Neck- Supple, No JVD  CVS- regular, S1 and S2 normal  Chest- Bilateral Air entry, No rhochi, crackles or wheezing present  Abdomen- soft, nontender, not distended, no guarding or rigidity, BS+  Extremities-  No pedal edema, No calf tenderness  Minimal erythema noticed on lower extremity  No warmth  CNS-   Alert, awake and orientedx3  No focal deficits present  Invasive Devices     Central Venous Catheter Line            Port A Cath 03/26/21 Right Subclavian 16 days          Peripheral Intravenous Line            Peripheral IV 04/11/21 Right Forearm less than 1 day                      Social History  reviewed  History reviewed  No pertinent family history   reviewed    Meds:  Current Facility-Administered Medications   Medication Dose Route Frequency Provider Last Rate Last Admin    acetaminophen (TYLENOL) tablet 650 mg  650 mg Oral Q6H PRN Timothy Meyer PA-C   650 mg at 04/12/21 0218    aluminum-magnesium hydroxide-simethicone (MYLANTA) oral suspension 30 mL  30 mL Oral Q6H PRN Timothy Meyer PA-C   30 mL at 04/12/21 0835    aspirin chewable tablet 81 mg  81 mg Oral Daily Timothy Meyer PA-C   81 mg at 04/12/21 3476    atorvastatin (LIPITOR) tablet 40 mg  40 mg Oral Daily With Martina Avitia PA-C   40 mg at 04/11/21 1707    ceFAZolin (ANCEF) IVPB (premix in dextrose) 1,000 mg 50 mL  1,000 mg Intravenous Q8H Timothy Meyer PA-C   Stopped at 04/12/21 0435    insulin glargine (LANTUS) subcutaneous injection 18 Units 0 18 mL  18 Units Subcutaneous HS Gaudencio Whitaker MD   18 Units at 04/11/21 2117    insulin lispro (HumaLOG) 100 units/mL subcutaneous injection 1-5 Units  1-5 Units Subcutaneous TID AC Timothy Meyer PA-C   2 Units at 04/11/21 1709    insulin lispro (HumaLOG) 100 units/mL subcutaneous injection 1-5 Units  1-5 Units Subcutaneous HS Timothy Meyer PA-C   1 Units at 04/11/21 2117    insulin lispro (HumaLOG) 100 units/mL subcutaneous injection 10 Units  10 Units Subcutaneous TID With Meals Gaudencio Whitaker MD   10 Units at 04/12/21 0819    lisinopril (ZESTRIL) tablet 40 mg  40 mg Oral Daily Timothy Meyer PA-C   40 mg at 04/12/21 7177    melatonin tablet 6 mg  6 mg Oral HS Timothy Meyer PA-C   6 mg at 04/11/21 2117    metoprolol succinate (TOPROL-XL) 24 hr tablet 100 mg  100 mg Oral Daily Timothy Meyer PA-C   100 mg at 04/12/21 4272    prochlorperazine (COMPAZINE) tablet 10 mg  10 mg Oral Q6H PRN Timothy Meyer PA-C          Medications Prior to Admission   Medication    Accu-Chek FastClix Lancets [facility]    Accu-Chek Guide test strip    aspirin 81 mg chewable tablet    atorvastatin (LIPITOR) 40 mg tablet    Blood Glucose Monitoring Suppl (Accu-Chek Guide) w/Device KIT    glimepiride (AMARYL) 4 mg tablet    metFORMIN (GLUCOPHAGE) 850 mg tablet    metoprolol succinate (TOPROL-XL) 100 mg 24 hr tablet    NovoLOG FlexPen 100 units/mL injection pen    prochlorperazine (COMPAZINE) 10 mg tablet    ramipril (ALTACE) 10 MG capsule    ReliOn Pen Needles 31G X 6 MM MISC    sodium chloride (GRAHAM 128) 5 % hypertonic ophthalmic solution    oxyCODONE (ROXICODONE) 5 mg immediate release tablet    traMADol (ULTRAM) 50 mg tablet       Labs:  Results from last 7 days   Lab Units 04/12/21  0443 04/11/21  0438 04/10/21  0445  04/08/21  1859   WBC Thousand/uL 8 48 4 66 8 45   < > 10 96*   HEMOGLOBIN g/dL 9 0* 7 1* 7 3*   < > 8 2*   I STAT HEMOGLOBIN   --   --   --    < >  --    HEMATOCRIT % 27 2* 21 9* 22 6*   < > 25 0*   HEMATOCRIT, ISTAT   --   --   --    < >  --    PLATELETS Thousands/uL 288 294 240   < > 178   NEUTROS PCT %  --   --   --   --  93*   LYMPHS PCT %  --   --   --   --  3*   MONOS PCT %  --   --   --   --  1*   EOS PCT %  --   --   --   --  2    < > = values in this interval not displayed  Results from last 7 days   Lab Units 04/12/21  0444 04/11/21  0438 04/10/21  0445 04/09/21  0510  04/08/21  1907 04/08/21  1859   POTASSIUM mmol/L 4 7 4 2 4 4 4 1   < >  --  4 7   CHLORIDE mmol/L 99* 98* 102 101   < >  --  96*   CO2 mmol/L 22 22 20* 20*   < >  --  20*   CO2, I-STAT mmol/L  --   --   --   --   --  21  --    BUN mg/dL 24 22 20 18   < >  --  25   CREATININE mg/dL 0 82 0 81 0 73 0 87   < >  --  1 14   CALCIUM mg/dL 7 9* 7 5* 7 9* 7 5*   < >  --  8 6   ALK PHOS U/L  --   --   --  79  --   --  106   ALT U/L  --   --   --  86*  --   --  81*   AST U/L  --   --   --  75*  --   --  49*   GLUCOSE, ISTAT mg/dl  --   --   --   --   --  256*  --     < > = values in this interval not displayed       No results found for: TROPONINI, CKMB, CKTOTAL  Results from last 7 days   Lab Units 04/08/21  1859   INR  1 25*     Lab Results Component Value Date    BLOODCX No Growth at 72 hrs  04/08/2021    BLOODCX No Growth at 72 hrs  04/08/2021         Imaging:  Results for orders placed during the hospital encounter of 04/08/21   XR chest portable    Narrative CHEST     INDICATION:   Sepsis, syncope  COMPARISON:  3/26/2021    EXAM PERFORMED/VIEWS:  XR CHEST PORTABLE  7:04 PM      FINDINGS:  Implantable right chest wall port again noted with catheter entering the subclavian vein and terminating in the superior vena cava  Cardiomediastinal silhouette appears unremarkable  The lungs are clear  No pneumothorax or pleural effusion  Osseous structures appear within normal limits for patient age  Impression 1  Stable exam with no acute cardiopulmonary disease  2   Right chest wall port as noted  Workstation performed: [id number]       No results found for this or any previous visit      Last 24 Hours Medication List:   Current Facility-Administered Medications   Medication Dose Route Frequency Provider Last Rate    acetaminophen  650 mg Oral Q6H PRN David Horan PA-C      aluminum-magnesium hydroxide-simethicone  30 mL Oral Q6H PRN David Horan PA-C      aspirin  81 mg Oral Daily David Horan PA-C      atorvastatin  40 mg Oral Daily With Isaiah Mcnulty PA-C      cefazolin  1,000 mg Intravenous Q8H David Horan PA-C Stopped (04/12/21 0435)   Contreras insulin glargine  18 Units Subcutaneous HS Cain Jesus MD      insulin lispro  1-5 Units Subcutaneous TID AC David Horan PA-C      insulin lispro  1-5 Units Subcutaneous HS David Horan PA-C      insulin lispro  10 Units Subcutaneous TID With Meals Cain Jesus MD      lisinopril  40 mg Oral Daily David Horan PA-C      melatonin  6 mg Oral HS David Horan PA-C      metoprolol succinate  100 mg Oral Daily David Horan PA-C      prochlorperazine  10 mg Oral Q6H PRN David Horan PA-C Today, Patient Was Seen By: Abby Motley MD    ** Please Note: Dictation voice to text software may have been used in the creation of this document   **

## 2021-04-12 NOTE — ASSESSMENT & PLAN NOTE
· Reported episode of unwitnessed syncope this evening    Patient reports that he stood up after eating dinner and began to feel lightheaded and then he woke up on the floor  · Pt reports prior episode of syncope 4 years ago - where it was found he has right carotid stenosis (records at [facility], will obtain) - no intervention performed   · CTH negative  · CXR without acute abnormality  · R shoulder XR - no acute fracture or dislocation  · Nexus score of 0 - C-spine cleared clinically   · Updated carotid duplex - 70% right carotid stenosis  · To follow up with vascular surgery on discharge

## 2021-04-12 NOTE — ASSESSMENT & PLAN NOTE
· Newly diagnosed adenocarcinoma of the pancreas (diagnosed March 2021) - s/p hospitalization at [facility] for N/V and weight loss   · CT chest 3/7/21 showed no signs of metastatic disease in the thorax   · Follows with Oncology outpatient, Dr Luna Domingo, to receive 2-3months adjuvant chemotherapy prior to resection by Dr Amy Hawkins   · Currently undergoing chemotherapy treatment with Abraxane and Gemzar - last treatment 4/6  · CA 19-9 at 20,000 at diagnosis - down trending, most recent 1632 on 4/5   · To continue with scheduled OP follow up on discharge

## 2021-04-12 NOTE — PLAN OF CARE
Problem: Potential for Falls  Goal: Patient will remain free of falls  Description: INTERVENTIONS:  - Assess patient frequently for physical needs  -  Identify cognitive and physical deficits and behaviors that affect risk of falls    -  [place] fall precautions as indicated by assessment   - Educate patient/family on patient safety including physical limitations  - Instruct patient to call for assistance with activity based on assessment  - Modify environment to reduce risk of injury  - Consider OT/PT consult to assist with strengthening/mobility  Outcome: Progressing     Problem: PAIN - ADULT  Goal: Verbalizes/displays adequate comfort level or baseline comfort level  Description: Interventions:  - Encourage patient to monitor pain and request assistance  - Assess pain using appropriate pain scale  - Administer analgesics based on type and severity of pain and evaluate response  - Implement non-pharmacological measures as appropriate and evaluate response  - Consider cultural and social influences on pain and pain management  - Notify physician/advanced practitioner if interventions unsuccessful or patient reports new pain  Outcome: Progressing     Problem: INFECTION - ADULT  Goal: Absence or prevention of progression during hospitalization  Description: INTERVENTIONS:  - Assess and monitor for signs and symptoms of infection  - Monitor lab/diagnostic results  - Monitor all insertion sites, i e  indwelling lines, tubes, and drains  - Monitor endotracheal if appropriate and nasal secretions for changes in amount and color  - [place] appropriate cooling/warming therapies per order  - Administer medications as ordered  - Instruct and encourage patient and family to use good hand hygiene technique  - Identify and instruct in appropriate isolation precautions for identified infection/condition  Outcome: Progressing  Goal: Absence of fever/infection during neutropenic period  Description: INTERVENTIONS:  - Monitor WBC    Outcome: Progressing     Problem: SAFETY ADULT  Goal: Patient will remain free of falls  Description: INTERVENTIONS:  - Assess patient frequently for physical needs  -  Identify cognitive and physical deficits and behaviors that affect risk of falls    -  [place] fall precautions as indicated by assessment   - Educate patient/family on patient safety including physical limitations  - Instruct patient to call for assistance with activity based on assessment  - Modify environment to reduce risk of injury  - Consider OT/PT consult to assist with strengthening/mobility  Outcome: Progressing  Goal: Maintain or return to baseline ADL function  Description: INTERVENTIONS:  -  Assess patient's ability to carry out ADLs; assess patient's baseline for ADL function and identify physical deficits which impact ability to perform ADLs (bathing, care of mouth/teeth, toileting, grooming, dressing, etc )  - Assess/evaluate cause of self-care deficits   - Assess range of motion  - Assess patient's mobility; develop plan if impaired  - Assess patient's need for assistive devices and provide as appropriate  - Encourage maximum independence but intervene and supervise when necessary  - Involve family in performance of ADLs  - Assess for home care needs following discharge   - Consider OT consult to assist with ADL evaluation and planning for discharge  - Provide patient education as appropriate  Outcome: Progressing  Goal: Maintain or return mobility status to optimal level  Description: INTERVENTIONS:  - Assess patient's baseline mobility status (ambulation, transfers, stairs, etc )    - Identify cognitive and physical deficits and behaviors that affect mobility  - Identify mobility aids required to assist with transfers and/or ambulation (gait belt, sit-to-stand, lift, walker, cane, etc )  - [place] fall precautions as indicated by assessment  - Record patient progress and toleration of activity level on Mobility SBAR; progress patient to next Phase/Stage  - Instruct patient to call for assistance with activity based on assessment  - Consider rehabilitation consult to assist with strengthening/weightbearing, etc   Outcome: Progressing     Problem: DISCHARGE PLANNING  Goal: Discharge to home or other facility with appropriate resources  Description: INTERVENTIONS:  - Identify barriers to discharge w/patient and caregiver  - Arrange for needed discharge resources and transportation as appropriate  - Identify discharge learning needs (meds, wound care, etc )  - Arrange for interpretive services to assist at discharge as needed  - Refer to Case Management Department for coordinating discharge planning if the patient needs post-hospital services based on physician/advanced practitioner order or complex needs related to functional status, cognitive ability, or social support system  Outcome: Progressing     Problem: Knowledge Deficit  Goal: Patient/family/caregiver demonstrates understanding of disease process, treatment plan, medications, and discharge instructions  Description: Complete learning assessment and assess knowledge base  Interventions:  - Provide teaching at level of understanding  - Provide teaching via preferred learning methods  Outcome: Progressing     Problem: Nutrition/Hydration-ADULT  Goal: Nutrient/Hydration intake appropriate for improving, restoring or maintaining nutritional needs  Description: Monitor and assess patient's nutrition/hydration status for malnutrition  Collaborate with interdisciplinary team and initiate plan and interventions as ordered  Monitor patient's weight and dietary intake as ordered or per policy  Utilize nutrition screening tool and intervene as necessary  Determine patient's food preferences and provide high-protein, high-caloric foods as appropriate       INTERVENTIONS:  - Monitor oral intake, urinary output, labs, and treatment plans  - Assess nutrition and hydration status and recommend course of action  - Evaluate amount of meals eaten  - Assist patient with eating if necessary   - Allow adequate time for meals  - Recommend/ encourage appropriate diets, oral nutritional supplements, and vitamin/mineral supplements  - Order, calculate, and assess calorie counts as needed  - Recommend, monitor, and adjust tube feedings and TPN/PPN based on assessed needs  - Assess need for intravenous fluids  - Provide specific nutrition/hydration education as appropriate  - Include patient/family/caregiver in decisions related to nutrition  Outcome: Progressing     Problem: Prexisting or High Potential for Compromised Skin Integrity  Goal: Skin integrity is maintained or improved  Description: INTERVENTIONS:  - Identify patients at risk for skin breakdown  - Assess and monitor skin integrity  - Assess and monitor nutrition and hydration status  - Monitor labs   - Assess for incontinence   - Turn and reposition patient  - Assist with mobility/ambulation  - Relieve pressure over bony prominences  - Avoid friction and shearing  - Provide appropriate hygiene as needed including keeping skin clean and dry  - Evaluate need for skin moisturizer/barrier cream  - Collaborate with interdisciplinary team   - Patient/family teaching  - Consider wound care consult   Outcome: Progressing

## 2021-04-12 NOTE — CASE MANAGEMENT
LOS 4 DAYS  Therapy recommended STR; Pt requested [facility]  This CM attempted to submit for Auth through [employer] (not sure if I submitted this correctly); CM will f/u tomorrow  [facility] NPI: [id number], Dr Dilan Grande NPI: [id number]  CM to follow

## 2021-04-12 NOTE — ASSESSMENT & PLAN NOTE
Acute on chronic anemia, HB 7 1 today  Baseline Hb 8-9  Iron panel - Suggestive of anemia of chronic inflammation  Patient received 1 unit of packed red cell transfusion on 04/11  Hemoglobin this morning is 9  No signs of obvious bleeding  DC Lovenox  tranfuse to keep Hb > 7

## 2021-04-12 NOTE — PHYSICAL THERAPY NOTE
PT tx     04/12/21 1441   PT Last Visit   PT Visit Date 04/12/21   Note Type   Note Type Treatment   Pain Assessment   Pain Assessment Tool Pain Assessment not indicated - pt denies pain   Pain Score No Pain   Restrictions/Precautions   Weight Bearing Precautions Per Order No   Other Precautions Fall Risk   General   Chart Reviewed Yes   Additional Pertinent History les better less swelling and no redness   Cognition   Overall Cognitive Status WFL   Arousal/Participation Alert   Attention Within functional limits   Orientation Level Oriented X4   Memory Within functional limits   Following Commands Follows all commands and directions without difficulty   Subjective   Subjective I'm better but this R leg is still weak   Bed Mobility   Additional Comments oob in chair   Transfers   Sit to Stand 5  Supervision   Additional items Verbal cues   Stand to Sit 5  Supervision   Additional items Verbal cues;Armrests   Stand pivot 6  Modified independent   Additional items Assist x 1;Verbal cues;Armrests; Increased time required   Ambulation/Elevation   Gait pattern Forward Flexion;Narrow COLE;Shuffling; Inconsistent champ; Short stride   Gait Assistance 4  Minimal assist   Additional items Assist x 1;Verbal cues; Tactile cues   Assistive Device Rolling walker   Distance 50'x2   Balance   Static Sitting Good   Dynamic Sitting Fair +   Static Standing Fair +   Dynamic Standing Fair   Ambulatory Fair   Endurance Deficit   Endurance Deficit Yes   Endurance Deficit Description tires quickly   Activity Tolerance   Activity Tolerance Patient tolerated treatment well   Medical Staff Made Aware OT Carli   Nurse Made Aware LISA Almaguer   Exercises   Knee AROM Long Arc Quad Sitting;10 reps;AROM; Right;Left   Balance training  standing at sink x 2 in with rw cg of 1   Assessment   Prognosis Good   Problem List Decreased strength;Decreased endurance; Impaired balance;Decreased mobility   Assessment Pt with improved mobility,but renaldo WHITFIELD le weakness, hip@ 3/5  No safe for steps and unabl eto stay 1stf monae at home  Conitnue to recommend STR at d/c to conitnue strengthening adn step training  Can't PT   Barriers to Discharge   (medical clearance)   Goals   Patient Goals get better   Plan   Treatment/Interventions ADL retraining;Functional transfer training;LE strengthening/ROM; Elevations; Therapeutic exercise;Gait training;Spoke to nursing;Spoke to case management;OT   Progress Improving as expected   PT Frequency 5x/wk   Recommendation   PT Discharge Recommendation Post-Acute Rehabilitation Services   Equipment Recommended [address] [facility] [place] Recommended Wheeled walker   PT - OK to Discharge Yes   Additional Comments   (to STR with medical clearance)   AM-PAC Basic Mobility Inpatient   Turning in Bed Without Bedrails 4   Lying on Back to Sitting on Edge of Flat Bed 3   Moving Bed to Chair 3   Standing Up From Chair 3   Walk in Room 3   Climb 3-5 Stairs 2   Basic Mobility Inpatient Raw Score 18   Basic Mobility Standardized Score 41 05   Austin Mariana, PT

## 2021-04-12 NOTE — PLAN OF CARE
Problem: OCCUPATIONAL THERAPY ADULT  Goal: Performs self-care activities at highest level of function for planned discharge setting  See evaluation for individualized goals  Description: Treatment Interventions: ADL retraining, Endurance training, Functional transfer training, Equipment evaluation/education, Patient/family training, Compensatory technique education          See flowsheet documentation for full assessment, interventions and recommendations  Outcome: Progressing  Note: Limitation: Decreased ADL status, Decreased self-care trans, Decreased high-level ADLs, Decreased endurance  Prognosis: Good  Assessment: Pt seen for OT tx session with focus on functional balance, functional mobility, ADL status, and transfer safety  Patient agreeable to OT treatment session  Pt received seated OOB to Recliner  Performed transfers with supervision  Performed functional mobility with min A x1 with RW  Pt demonstrated one near LOB while standing at sink to perform grooming task requiring CGA x 1 for balance recovery  Continues to require assistance for LB ADLs  BLE swelling is improving  Pt continues to be motivated but limited by weakness and decreased endurance  Patient continues to be functioning below baseline level, occupational performance remains limited secondary to factors listed above, and pt at increased risk for falls and injury  The patient's raw score on the AM-PAC Daily Activity inpatient short form is 18, standardized score is 38 66, less than 39 4  Patients at this level are likely to benefit from DC to post-acute rehabilitation services  Please refer to the recommendation of the Occupational Therapist for safe DC planning  From OT standpoint, recommendation at time of d/c would be Short Term Rehab    Patient to benefit from continued Occupational Therapy treatment while in the hospital to address deficits as defined above and maximize level of functional independence with ADLs and functional mobility  Pt left with call bell in reach, tray table in reach, needs met  RN aware        OT Discharge Recommendation: Post-Acute Rehabilitation Services

## 2021-04-12 NOTE — OCCUPATIONAL THERAPY NOTE
Occupational Therapy Tx Note     Patient Name: Pamela Campos  SIHJB'S Date: 4/12/2021  Problem List  Principal Problem:    Bilateral cellulitis of lower leg  Active Problems:    HTN (hypertension)    Diabetes mellitus, type 2 (HCC)    Adenocarcinoma of pancreas (HCC)    Lactic acidosis    Hyponatremia    Syncope    Severe protein-calorie malnutrition ([facility] [address] )    Anemia    Past Medical History  Past Medical History:   Diagnosis Date    Cancer ([facility] [address] )     pancreatic    Diabetes mellitus ([facility] [address] )     Frequent urination     GERD (gastroesophageal reflux disease)     Hyperlipidemia     Hypertension     Peripheral neuropathy     Weakness     Weight loss      Past Surgical History  Past Surgical History:   Procedure Laterality Date    APPENDECTOMY      CATARACT EXTRACTION      COLONOSCOPY      SKIN LESION EXCISION      of a wart on right arm    TUNNELED VENOUS PORT PLACEMENT N/A 3/26/2021    Procedure: INSERTION VENOUS PORT (PORT-A-CATH); Surgeon: Bhakti Ahn MD;  Location[facility];  Service: Surgical Oncology         04/12/21 1440   OT Last Visit   OT Visit Date 04/12/21   Note Type   Note Type Treatment   Restrictions/Precautions   Weight Bearing Precautions Per Order No   Other Precautions Fall Risk   Pain Assessment   Pain Assessment Tool Pain Assessment not indicated - pt denies pain   ADL   Grooming Assistance   (CGA while standing at sink 2/2 LOB)   Grooming Deficit Verbal cueing;Standing with assistive device;Brushing hair   LB Dressing Assistance 4  Minimal Assistance   LB Dressing Deficit Don/doff R sock; Don/doff L sock; Verbal cueing; Increased time to complete   LB Dressing Comments via tailor sit method   Bed Mobility   Additional Comments Pt received sitting in recliner   Transfers   Sit to Stand 5  Supervision   Additional items Verbal cues;Armrests   Stand to Sit 5  Supervision   Additional items Verbal cues;Armrests   Functional Mobility   Functional Mobility 4  Minimal assistance Additional Comments x 1, RW  Pt occasionally loses balande posteriorly requiring CGA for balance recovery   Cognition   Overall Cognitive Status WFL   Arousal/Participation Alert   Attention Within functional limits   Orientation Level Oriented X4   Memory Within functional limits   Following Commands Follows all commands and directions without difficulty   Activity Tolerance   Activity Tolerance Patient tolerated treatment well   Medical Staff Made Aware PT LISA Whiting   Assessment   Assessment Pt seen for OT tx session with focus on functional balance, functional mobility, ADL status, and transfer safety  Patient agreeable to OT treatment session  Pt received seated OOB to Recliner  Performed transfers with supervision  Performed functional mobility with min A x1 with RW  Pt demonstrated one near LOB while standing at sink to perform grooming task requiring CGA x 1 for balance recovery  Continues to require assistance for LB ADLs  BLE swelling is improving  Pt continues to be motivated but limited by weakness and decreased endurance  Patient continues to be functioning below baseline level, occupational performance remains limited secondary to factors listed above, and pt at increased risk for falls and injury  The patient's raw score on the AM-PAC Daily Activity inpatient short form is 18, standardized score is 38 66, less than 39 4  Patients at this level are likely to benefit from DC to post-acute rehabilitation services  Please refer to the recommendation of the Occupational Therapist for safe DC planning  From OT standpoint, recommendation at time of d/c would be Short Term Rehab  Patient to benefit from continued Occupational Therapy treatment while in the hospital to address deficits as defined above and maximize level of functional independence with ADLs and functional mobility  Pt left with call bell in reach, tray table in reach, needs met  RN aware      Plan   Treatment Interventions ADL retraining;Functional transfer training;Patient/family training; Compensatory technique education; Endurance training   Goal Expiration Date 04/19/21   OT Treatment Day 1   OT Frequency 2-3x/wk   Recommendation   OT Discharge Recommendation Post-Acute Rehabilitation Services   AM-PAC Daily Activity Inpatient   Lower Body Dressing 2   Bathing 2   Toileting 3   Upper Body Dressing 3   Grooming 4   Eating 4   Daily Activity Raw Score 18   Daily Activity Standardized Score (Calc for Raw Score >=11) 38 66   AM-PAC Applied Cognition Inpatient   Following a Speech/Presentation 4   Understanding Ordinary Conversation 4   Taking Medications 4   Remembering Where Things Are Placed or Put Away 4   Remembering List of 4-5 Errands 4   Taking Care of Complicated Tasks 4   Applied Cognition Raw Score 24   Applied Cognition Standardized Score 62 21         Carli Robison OTR/L

## 2021-04-12 NOTE — ASSESSMENT & PLAN NOTE
· Na 131 on admission  · S/p IVFs  · Na 129 this a m    · Will start on fluid restriction  · Repeat BMP this evening

## 2021-04-12 NOTE — ASSESSMENT & PLAN NOTE
Lab Results   Component Value Date    HGBA1C 9 4 (H) 04/08/2021       Recent Labs     04/11/21  1646 04/11/21  2108 04/12/21  0726 04/12/21  1119   POCGLU 259* 194* 112 179*       Blood Sugar Average: Last 72 hrs:  · (P) 225 [id number]   · home regimen:  Glimepiride 4 mg b i d , NovoLog 15 units with dinner, and metformin 850 mg b i d    · hold home glimepiride and metformin   · A1c 9 4 - uncontrolled DM  · Increase lantus to 20U HS and novolog to 12U TID with SSI   · Adjust insulin dosage based on accucheks

## 2021-04-13 NOTE — ASSESSMENT & PLAN NOTE
Acute on chronic anemia, HB 7 1 today  Baseline Hb 8-9  Iron panel - Suggestive of anemia of chronic inflammation  Patient received 1 unit of packed red cell transfusion on 04/11  Hemoglobin this morning is 8 9  No signs of obvious bleeding  DC Lovenox  tranfuse to keep Hb > 7

## 2021-04-13 NOTE — PROGRESS NOTES
[place]  Progress Note - Angie Canchola 1945, 67 y o  male MRN: [id number]  Unit/Bed#: MS [facility] Encounter: [id number]  Primary Care Provider: Jim Hassan DO   Date and time admitted to hospital: 4/8/2021  6:46 PM    Anemia  Assessment & Plan  Acute on chronic anemia, HB 7 1 today  Baseline Hb 8-9  Iron panel - Suggestive of anemia of chronic inflammation  Patient received 1 unit of packed red cell transfusion on 04/11  Hemoglobin this morning is 8 9  No signs of obvious bleeding  DC Lovenox  tranfuse to keep Hb > 7    Severe protein-calorie malnutrition (HCC)  Assessment & Plan  Malnutrition Findings:   Adult Malnutrition type: Chronic illness  Adult Degree of Malnutrition: Other severe protein calorie malnutrition    BMI Findings: Body mass index is 21 06 kg/m²  Syncope  Assessment & Plan  · Reported episode of unwitnessed syncope this evening  Patient reports that he stood up after eating dinner and began to feel lightheaded and then he woke up on the floor  · Pt reports prior episode of syncope 4 years ago - where it was found he has right carotid stenosis (records at [facility], will obtain) - no intervention performed   · CTH negative  · CXR without acute abnormality  · R shoulder XR - no acute fracture or dislocation  · Nexus score of 0 - C-spine cleared clinically   · Updated carotid duplex - 70% right carotid stenosis  · To follow up with vascular surgery on discharge    Hyponatremia  Assessment & Plan  · Na 131 on admission  · S/p IVFs  · Na 133 this a m    · Continue on fluid restriction  · Repeat BMP this evening    Lactic acidosis  Assessment & Plan  · Lactic acidosis, 5 7 on admission in the setting of pancreatic cancer  · Lactic acid improved to 3 2 after IVFs  · Hold off on further IVFs      Adenocarcinoma of pancreas [facility])  Assessment & Plan  · Newly diagnosed adenocarcinoma of the pancreas (diagnosed March 2021) - s/p hospitalization at [facility] for N/V and weight loss   · CT chest 3/7/21 showed no signs of metastatic disease in the thorax   · Follows with Oncology outpatient, Dr Solomon Borden, to receive 2-3months adjuvant chemotherapy prior to resection by Dr Wale Mcduffie   · Currently undergoing chemotherapy treatment with Abraxane and Gemzar - last treatment 4/6  · CA 19-9 at 20,000 at diagnosis - down trending, most recent 1632 on 4/5   · To continue with scheduled OP follow up on discharge    Diabetes mellitus, type 2 [facility])  Assessment & Plan  Lab Results   Component Value Date    HGBA1C 9 4 (H) 04/08/2021       Recent Labs     04/12/21  1601 04/12/21  2100 04/13/21  0755 04/13/21  1103   POCGLU 263* 262* 76 258*       Blood Sugar Average: Last 72 hrs:  · (P) 224 [id number]   · home regimen:  Glimepiride 4 mg b i d , NovoLog 15 units with dinner, and metformin 850 mg b i d    · hold home glimepiride and metformin   · A1c 9 4 - uncontrolled DM  · Increase lantus to 20U HS and novolog to 12U TID with SSI   · Adjust insulin dosage based on accucheks        HTN (hypertension)  Assessment & Plan  · Home regimen:  Toprol  mg daily and ramipril 10 mg daily   · Switch ramipril to 40mg lisinopril while inpatient  · C/w Toprol XL  Monitor BP per unit protocol    * Bilateral cellulitis of lower leg  Assessment & Plan  · B/L LE cellulitis, poa, a/e/b erythema, edema, and pain x1 week   · Only SIRS criteria met on admission is tachycardia, which could be explained by patient not taking his metoprolol today   · NO ulcers or wounds noted  · S/p IV zosyn in ED - transitioned to IV ancef  Will switch to Ceftin to complete the course   · Erythema improving  · Encourage leg elevation  · Compression stockings placed given ankle edema  · Trend CBC and temp curve  · Physical therapy recommended skilled nursing rehab  Awaiting placement        Labs & Imaging: I have personally reviewed pertinent reports        VTE Pharmacologic Prophylaxis: Reason for no pharmacologic prophylaxis Anemia  VTE Mechanical Prophylaxis: sequential compression device    Code Status:   Level 1 - Full Code    Patient Centered Rounds: I have performed bedside rounds with nursing staff today  Discussions with Specialists or Other Care Team Provider: CM    Education and Discussions with Family / Patient:  Patient states he will update his family  I offered to call    Current Length of Stay: 5 day(s)    Current Patient Status: Inpatient   Certification Statement: The patient will continue to require additional inpatient hospital stay due to see my assessment and plan  Subjective:   Patient is seen and examined at bedside  Denies any new complaints  Afebrile  All other ROS are negative  Objective:    Vitals: Blood pressure (!) 111/49, pulse 72, temperature 97 5 °F (36 4 °C), resp  rate 16, height 5' 11" (1 803 m), weight 68 5 kg (151 lb), SpO2 99 %  ,Body mass index is 21 06 kg/m²  SPO2 RA Rest      ED to Hosp-Admission (Current) from 4/8/2021 in [address] [place] [address] Med Surg Unit   SpO2  99 %   SpO2 Activity  At Rest   O2 Device  None (Room air)   O2 Flow Rate  --        I&O:     Intake/Output Summary (Last 24 hours) at 4/13/2021 1328  Last data filed at 4/13/2021 0336  Gross per 24 hour   Intake 510 ml   Output 400 ml   Net 110 ml       Physical Exam:    General- Alert, sitting comfortably in chair  Not in any acute distress  Neck- Supple, No JVD  CVS- regular, S1 and S2 normal  Chest- Bilateral Air entry, No rhochi, crackles or wheezing present  Abdomen- soft, nontender, not distended, no guarding or rigidity, BS+  Extremities-  No pedal edema, No calf tenderness  CNS-   Alert, awake and orientedx3  No focal deficits present  Invasive Devices     Central Venous Catheter Line            Port A Cath 03/26/21 Right Subclavian 18 days                      Social History  reviewed  History reviewed  No pertinent family history   reviewed    Meds:  Current Facility-Administered Medications Medication Dose Route Frequency Provider Last Rate Last Admin    acetaminophen (TYLENOL) tablet 650 mg  650 mg Oral Q6H PRN Los Goncalves PA-C   650 mg at 04/12/21 0218    aluminum-magnesium hydroxide-simethicone (MYLANTA) oral suspension 30 mL  30 mL Oral Q6H PRN Los Goncalves PA-C   30 mL at 04/12/21 5522    aspirin chewable tablet 81 mg  81 mg Oral Daily Los Goncalves PA-C   81 mg at 04/13/21 7034    atorvastatin (LIPITOR) tablet 40 mg  40 mg Oral Daily With Carole Nguyen PA-C   40 mg at 04/12/21 1816    cefuroxime (CEFTIN) tablet 500 mg  500 mg Oral Q12H Virgil Gamez MD        insulin glargine (LANTUS) subcutaneous injection 18 Units 0 18 mL  18 Units Subcutaneous HS Geovany Flowers MD   18 Units at 04/12/21 2303    insulin lispro (HumaLOG) 100 units/mL subcutaneous injection 1-5 Units  1-5 Units Subcutaneous TID AC Los Goncalves PA-C   2 Units at 04/13/21 1152    insulin lispro (HumaLOG) 100 units/mL subcutaneous injection 1-5 Units  1-5 Units Subcutaneous HS Los Goncalves PA-C   2 Units at 04/12/21 2303    insulin lispro (HumaLOG) 100 units/mL subcutaneous injection 10 Units  10 Units Subcutaneous TID With Meals Geovany Flowers MD   10 Units at 04/13/21 1153    lisinopril (ZESTRIL) tablet 40 mg  40 mg Oral Daily Los Goncalves PA-C   40 mg at 04/13/21 0817    melatonin tablet 6 mg  6 mg Oral HS Los Goncalves PA-C   6 mg at 04/12/21 2303    metoprolol succinate (TOPROL-XL) 24 hr tablet 100 mg  100 mg Oral Daily Los Goncalves PA-C   100 mg at 04/13/21 4004    prochlorperazine (COMPAZINE) tablet 10 mg  10 mg Oral Q6H PRN Los Goncalves PA-C          Medications Prior to Admission   Medication    Accu-Chek FastClix Lancets MISC    Accu-Chek Guide test strip    aspirin 81 mg chewable tablet    atorvastatin (LIPITOR) 40 mg tablet    Blood Glucose Monitoring Suppl (Accu-Chek Guide) w/Device KIT    glimepiride (AMARYL) 4 mg tablet    metFORMIN (GLUCOPHAGE) 850 mg tablet    metoprolol succinate (TOPROL-XL) 100 mg 24 hr tablet    NovoLOG FlexPen 100 units/mL injection pen    prochlorperazine (COMPAZINE) 10 mg tablet    ramipril (ALTACE) 10 MG capsule    ReliOn Pen Needles 31G X 6 MM MISC    sodium chloride (GRAHAM 128) 5 % hypertonic ophthalmic solution    oxyCODONE (ROXICODONE) 5 mg immediate release tablet    traMADol (ULTRAM) 50 mg tablet       Labs:  Results from last 7 days   Lab Units 04/13/21  0336 04/12/21  0443 04/11/21  0438  04/08/21  1859   WBC Thousand/uL 9 26 8 48 4 66   < > 10 96*   HEMOGLOBIN g/dL 8 9* 9 0* 7 1*   < > 8 2*   I STAT HEMOGLOBIN   --   --   --    < >  --    HEMATOCRIT % 27 6* 27 2* 21 9*   < > 25 0*   HEMATOCRIT, ISTAT   --   --   --    < >  --    PLATELETS Thousands/uL 305 288 294   < > 178   NEUTROS PCT %  --   --   --   --  93*   LYMPHS PCT %  --   --   --   --  3*   LYMPHO PCT % 17  --   --   --   --    MONOS PCT %  --   --   --   --  1*   MONO PCT % 4  --   --   --   --    EOS PCT % 1  --   --   --  2    < > = values in this interval not displayed  Results from last 7 days   Lab Units 04/13/21  0336 04/12/21 2021 04/12/21  0444  04/09/21  0510  04/08/21  1907 04/08/21  1859   POTASSIUM mmol/L 4 7 5 1 4 7   < > 4 1   < >  --  4 7   CHLORIDE mmol/L 101 99* 99*   < > 101   < >  --  96*   CO2 mmol/L 25 26 22   < > 20*   < >  --  20*   CO2, I-STAT mmol/L  --   --   --   --   --   --  21  --    BUN mg/dL 23 27* 24   < > 18   < >  --  25   CREATININE mg/dL 0 70 0 81 0 82   < > 0 87   < >  --  1 14   CALCIUM mg/dL 7 7* 7 7* 7 9*   < > 7 5*   < >  --  8 6   ALK PHOS U/L  --   --   --   --  79  --   --  106   ALT U/L  --   --   --   --  86*  --   --  81*   AST U/L  --   --   --   --  75*  --   --  49*   GLUCOSE, ISTAT mg/dl  --   --   --   --   --   --  256*  --     < > = values in this interval not displayed       No results found for: TROPONINI, CKMB, CKTOTAL  Results from last 7 days   Lab Units 04/08/21  1859   INR  1 25*     Lab Results   Component Value Date    BLOODCX No Growth After 4 Days  04/08/2021    BLOODCX No Growth After 4 Days  04/08/2021         Imaging:  Results for orders placed during the hospital encounter of 04/08/21   XR chest portable    Narrative CHEST     INDICATION:   Sepsis, syncope  COMPARISON:  3/26/2021    EXAM PERFORMED/VIEWS:  XR CHEST PORTABLE  7:04 PM      FINDINGS:  Implantable right chest wall port again noted with catheter entering the subclavian vein and terminating in the superior vena cava  Cardiomediastinal silhouette appears unremarkable  The lungs are clear  No pneumothorax or pleural effusion  Osseous structures appear within normal limits for patient age  Impression 1  Stable exam with no acute cardiopulmonary disease  2   Right chest wall port as noted  Workstation performed: [id number]       No results found for this or any previous visit      Last 24 Hours Medication List:   Current Facility-Administered Medications   Medication Dose Route Frequency Provider Last Rate    acetaminophen  650 mg Oral Q6H PRN Fidel Spray, PA-C      aluminum-magnesium hydroxide-simethicone  30 mL Oral Q6H PRN Fidel Spray, PA-C      aspirin  81 mg Oral Daily Fidel Spray, PA-C      atorvastatin  40 mg Oral Daily With [place], PA-ANAHY      cefuroxime  500 mg Oral Q12H [address] Rony Roy MD      insulin glargine  18 Units Subcutaneous HS Dannie Hong MD      insulin lispro  1-5 Units Subcutaneous TID AC Fidel Cicero, PA-C      insulin lispro  1-5 Units Subcutaneous HS Fidel Cicero, PA-C      insulin lispro  10 Units Subcutaneous TID With Meals Dannie Hong MD      lisinopril  40 mg Oral Daily Fidel Spray, PA-ANAHY      melatonin  6 mg Oral HS Fidel Spray, PA-C      metoprolol succinate  100 mg Oral Daily Fidel Spray, PA-C      prochlorperazine  10 mg Oral Q6H PRN Fabio Fat Torrie Castañeda PA-C          Today, Patient Was Seen By: Rony Roy MD    ** Please Note: Dictation voice to text software may have been used in the creation of this document   **

## 2021-04-13 NOTE — ASSESSMENT & PLAN NOTE
· Reported episode of unwitnessed syncope this evening    Patient reports that he stood up after eating dinner and began to feel lightheaded and then he woke up on the floor  · Pt reports prior episode of syncope 4 years ago - where it was found he has right carotid stenosis (records at [facility], will obtain) - no intervention performed   · CTH negative  · CXR without acute abnormality  · R shoulder XR - no acute fracture or dislocation  · Nexus score of 0 - C-spine cleared clinically   · Updated carotid duplex - 70% right carotid stenosis  · To follow up with vascular surgery on discharge

## 2021-04-13 NOTE — ASSESSMENT & PLAN NOTE
· B/L LE cellulitis, poa, a/e/b erythema, edema, and pain x1 week   · Only SIRS criteria met on admission is tachycardia, which could be explained by patient not taking his metoprolol today   · NO ulcers or wounds noted  · S/p IV zosyn in ED - transitioned to IV ancef  Will switch to Ceftin to complete the course   · Erythema improving  · Encourage leg elevation  · Compression stockings placed given ankle edema  · Trend CBC and temp curve  · Physical therapy recommended skilled nursing rehab    Awaiting placement

## 2021-04-13 NOTE — ASSESSMENT & PLAN NOTE
· Na 131 on admission  · S/p IVFs  · Na 133 this a m    · Continue on fluid restriction  · Repeat BMP this evening

## 2021-04-13 NOTE — ASSESSMENT & PLAN NOTE
Lab Results   Component Value Date    HGBA1C 9 4 (H) 04/08/2021       Recent Labs     04/12/21  1601 04/12/21  2100 04/13/21  0755 04/13/21  1103   POCGLU 263* 262* 76 258*       Blood Sugar Average: Last 72 hrs:  · (P) 224 [id number]   · home regimen:  Glimepiride 4 mg b i d , NovoLog 15 units with dinner, and metformin 850 mg b i d    · hold home glimepiride and metformin   · A1c 9 4 - uncontrolled DM  · Increase lantus to 20U HS and novolog to 12U TID with SSI   · Adjust insulin dosage based on accucheks

## 2021-04-13 NOTE — ASSESSMENT & PLAN NOTE
· Newly diagnosed adenocarcinoma of the pancreas (diagnosed March 2021) - s/p hospitalization at [facility] for N/V and weight loss   · CT chest 3/7/21 showed no signs of metastatic disease in the thorax   · Follows with Oncology outpatient, Dr Emily Dubois, to receive 2-3months adjuvant chemotherapy prior to resection by Dr Brody Armenta   · Currently undergoing chemotherapy treatment with Abraxane and Gemzar - last treatment 4/6  · CA 19-9 at 20,000 at diagnosis - down trending, most recent 1632 on 4/5   · To continue with scheduled OP follow up on discharge

## 2021-04-13 NOTE — CASE MANAGEMENT
CM received call from [facility] at [facility] regarding SNF Alyx Champion submitted via [employer] CW#[phone]  Per [facility] they did not receive clinical for SNF request via [address] [place] [address]  She requested CM fax same to #[phone] for review  SPQ#[id number]  CM faxed clinical as requested  [facility] updated  Pt will need transport to facility once auth obtained  Pt updated

## 2021-04-13 NOTE — PLAN OF CARE
Problem: Potential for Falls  Goal: Patient will remain free of falls  Description: INTERVENTIONS:  - Assess patient frequently for physical needs  -  Identify cognitive and physical deficits and behaviors that affect risk of falls    -  [place] fall precautions as indicated by assessment   - Educate patient/family on patient safety including physical limitations  - Instruct patient to call for assistance with activity based on assessment  - Modify environment to reduce risk of injury  - Consider OT/PT consult to assist with strengthening/mobility  Outcome: Progressing     Problem: PAIN - ADULT  Goal: Verbalizes/displays adequate comfort level or baseline comfort level  Description: Interventions:  - Encourage patient to monitor pain and request assistance  - Assess pain using appropriate pain scale  - Administer analgesics based on type and severity of pain and evaluate response  - Implement non-pharmacological measures as appropriate and evaluate response  - Consider cultural and social influences on pain and pain management  - Notify physician/advanced practitioner if interventions unsuccessful or patient reports new pain  Outcome: Progressing     Problem: INFECTION - ADULT  Goal: Absence or prevention of progression during hospitalization  Description: INTERVENTIONS:  - Assess and monitor for signs and symptoms of infection  - Monitor lab/diagnostic results  - Monitor all insertion sites, i e  indwelling lines, tubes, and drains  - Monitor endotracheal if appropriate and nasal secretions for changes in amount and color  - [place] appropriate cooling/warming therapies per order  - Administer medications as ordered  - Instruct and encourage patient and family to use good hand hygiene technique  - Identify and instruct in appropriate isolation precautions for identified infection/condition  Outcome: Progressing  Goal: Absence of fever/infection during neutropenic period  Description: INTERVENTIONS:  - Monitor WBC    Outcome: Progressing     Problem: SAFETY ADULT  Goal: Patient will remain free of falls  Description: INTERVENTIONS:  - Assess patient frequently for physical needs  -  Identify cognitive and physical deficits and behaviors that affect risk of falls    -  [place] fall precautions as indicated by assessment   - Educate patient/family on patient safety including physical limitations  - Instruct patient to call for assistance with activity based on assessment  - Modify environment to reduce risk of injury  - Consider OT/PT consult to assist with strengthening/mobility  Outcome: Progressing  Goal: Maintain or return to baseline ADL function  Description: INTERVENTIONS:  -  Assess patient's ability to carry out ADLs; assess patient's baseline for ADL function and identify physical deficits which impact ability to perform ADLs (bathing, care of mouth/teeth, toileting, grooming, dressing, etc )  - Assess/evaluate cause of self-care deficits   - Assess range of motion  - Assess patient's mobility; develop plan if impaired  - Assess patient's need for assistive devices and provide as appropriate  - Encourage maximum independence but intervene and supervise when necessary  - Involve family in performance of ADLs  - Assess for home care needs following discharge   - Consider OT consult to assist with ADL evaluation and planning for discharge  - Provide patient education as appropriate  Outcome: Progressing  Goal: Maintain or return mobility status to optimal level  Description: INTERVENTIONS:  - Assess patient's baseline mobility status (ambulation, transfers, stairs, etc )    - Identify cognitive and physical deficits and behaviors that affect mobility  - Identify mobility aids required to assist with transfers and/or ambulation (gait belt, sit-to-stand, lift, walker, cane, etc )  - [place] fall precautions as indicated by assessment  - Record patient progress and toleration of activity level on Mobility SBAR; progress patient to next Phase/Stage  - Instruct patient to call for assistance with activity based on assessment  - Consider rehabilitation consult to assist with strengthening/weightbearing, etc   Outcome: Progressing     Problem: DISCHARGE PLANNING  Goal: Discharge to home or other facility with appropriate resources  Description: INTERVENTIONS:  - Identify barriers to discharge w/patient and caregiver  - Arrange for needed discharge resources and transportation as appropriate  - Identify discharge learning needs (meds, wound care, etc )  - Arrange for interpretive services to assist at discharge as needed  - Refer to Case Management Department for coordinating discharge planning if the patient needs post-hospital services based on physician/advanced practitioner order or complex needs related to functional status, cognitive ability, or social support system  Outcome: Progressing     Problem: Knowledge Deficit  Goal: Patient/family/caregiver demonstrates understanding of disease process, treatment plan, medications, and discharge instructions  Description: Complete learning assessment and assess knowledge base  Interventions:  - Provide teaching at level of understanding  - Provide teaching via preferred learning methods  Outcome: Progressing     Problem: Nutrition/Hydration-ADULT  Goal: Nutrient/Hydration intake appropriate for improving, restoring or maintaining nutritional needs  Description: Monitor and assess patient's nutrition/hydration status for malnutrition  Collaborate with interdisciplinary team and initiate plan and interventions as ordered  Monitor patient's weight and dietary intake as ordered or per policy  Utilize nutrition screening tool and intervene as necessary  Determine patient's food preferences and provide high-protein, high-caloric foods as appropriate       INTERVENTIONS:  - Monitor oral intake, urinary output, labs, and treatment plans  - Assess nutrition and hydration status and recommend course of action  - Evaluate amount of meals eaten  - Assist patient with eating if necessary   - Allow adequate time for meals  - Recommend/ encourage appropriate diets, oral nutritional supplements, and vitamin/mineral supplements  - Order, calculate, and assess calorie counts as needed  - Recommend, monitor, and adjust tube feedings and TPN/PPN based on assessed needs  - Assess need for intravenous fluids  - Provide specific nutrition/hydration education as appropriate  - Include patient/family/caregiver in decisions related to nutrition  Outcome: Progressing     Problem: Prexisting or High Potential for Compromised Skin Integrity  Goal: Skin integrity is maintained or improved  Description: INTERVENTIONS:  - Identify patients at risk for skin breakdown  - Assess and monitor skin integrity  - Assess and monitor nutrition and hydration status  - Monitor labs   - Assess for incontinence   - Turn and reposition patient  - Assist with mobility/ambulation  - Relieve pressure over bony prominences  - Avoid friction and shearing  - Provide appropriate hygiene as needed including keeping skin clean and dry  - Evaluate need for skin moisturizer/barrier cream  - Collaborate with interdisciplinary team   - Patient/family teaching  - Consider wound care consult   Outcome: Progressing

## 2021-04-14 PROBLEM — E87.2 LACTIC ACIDOSIS: Status: RESOLVED | Noted: 2021-01-01 | Resolved: 2021-01-01

## 2021-04-14 PROBLEM — R55 SYNCOPE: Status: RESOLVED | Noted: 2021-01-01 | Resolved: 2021-01-01

## 2021-04-14 NOTE — PLAN OF CARE
Problem: Potential for Falls  Goal: Patient will remain free of falls  Description: INTERVENTIONS:  - Assess patient frequently for physical needs  -  Identify cognitive and physical deficits and behaviors that affect risk of falls    -  [place] fall precautions as indicated by assessment   - Educate patient/family on patient safety including physical limitations  - Instruct patient to call for assistance with activity based on assessment  - Modify environment to reduce risk of injury  - Consider OT/PT consult to assist with strengthening/mobility  Outcome: Progressing     Problem: PAIN - ADULT  Goal: Verbalizes/displays adequate comfort level or baseline comfort level  Description: Interventions:  - Encourage patient to monitor pain and request assistance  - Assess pain using appropriate pain scale  - Administer analgesics based on type and severity of pain and evaluate response  - Implement non-pharmacological measures as appropriate and evaluate response  - Consider cultural and social influences on pain and pain management  - Notify physician/advanced practitioner if interventions unsuccessful or patient reports new pain  Outcome: Progressing     Problem: INFECTION - ADULT  Goal: Absence or prevention of progression during hospitalization  Description: INTERVENTIONS:  - Assess and monitor for signs and symptoms of infection  - Monitor lab/diagnostic results  - Monitor all insertion sites, i e  indwelling lines, tubes, and drains  - Monitor endotracheal if appropriate and nasal secretions for changes in amount and color  - [place] appropriate cooling/warming therapies per order  - Administer medications as ordered  - Instruct and encourage patient and family to use good hand hygiene technique  - Identify and instruct in appropriate isolation precautions for identified infection/condition  Outcome: Progressing  Goal: Absence of fever/infection during neutropenic period  Description: INTERVENTIONS:  - Monitor WBC    Outcome: Progressing     Problem: SAFETY ADULT  Goal: Patient will remain free of falls  Description: INTERVENTIONS:  - Assess patient frequently for physical needs  -  Identify cognitive and physical deficits and behaviors that affect risk of falls    -  [place] fall precautions as indicated by assessment   - Educate patient/family on patient safety including physical limitations  - Instruct patient to call for assistance with activity based on assessment  - Modify environment to reduce risk of injury  - Consider OT/PT consult to assist with strengthening/mobility  Outcome: Progressing  Goal: Maintain or return to baseline ADL function  Description: INTERVENTIONS:  -  Assess patient's ability to carry out ADLs; assess patient's baseline for ADL function and identify physical deficits which impact ability to perform ADLs (bathing, care of mouth/teeth, toileting, grooming, dressing, etc )  - Assess/evaluate cause of self-care deficits   - Assess range of motion  - Assess patient's mobility; develop plan if impaired  - Assess patient's need for assistive devices and provide as appropriate  - Encourage maximum independence but intervene and supervise when necessary  - Involve family in performance of ADLs  - Assess for home care needs following discharge   - Consider OT consult to assist with ADL evaluation and planning for discharge  - Provide patient education as appropriate  Outcome: Progressing  Goal: Maintain or return mobility status to optimal level  Description: INTERVENTIONS:  - Assess patient's baseline mobility status (ambulation, transfers, stairs, etc )    - Identify cognitive and physical deficits and behaviors that affect mobility  - Identify mobility aids required to assist with transfers and/or ambulation (gait belt, sit-to-stand, lift, walker, cane, etc )  - [place] fall precautions as indicated by assessment  - Record patient progress and toleration of activity level on Mobility SBAR; progress patient to next Phase/Stage  - Instruct patient to call for assistance with activity based on assessment  - Consider rehabilitation consult to assist with strengthening/weightbearing, etc   Outcome: Progressing     Problem: DISCHARGE PLANNING  Goal: Discharge to home or other facility with appropriate resources  Description: INTERVENTIONS:  - Identify barriers to discharge w/patient and caregiver  - Arrange for needed discharge resources and transportation as appropriate  - Identify discharge learning needs (meds, wound care, etc )  - Arrange for interpretive services to assist at discharge as needed  - Refer to Case Management Department for coordinating discharge planning if the patient needs post-hospital services based on physician/advanced practitioner order or complex needs related to functional status, cognitive ability, or social support system  Outcome: Progressing     Problem: Knowledge Deficit  Goal: Patient/family/caregiver demonstrates understanding of disease process, treatment plan, medications, and discharge instructions  Description: Complete learning assessment and assess knowledge base  Interventions:  - Provide teaching at level of understanding  - Provide teaching via preferred learning methods  Outcome: Progressing     Problem: Nutrition/Hydration-ADULT  Goal: Nutrient/Hydration intake appropriate for improving, restoring or maintaining nutritional needs  Description: Monitor and assess patient's nutrition/hydration status for malnutrition  Collaborate with interdisciplinary team and initiate plan and interventions as ordered  Monitor patient's weight and dietary intake as ordered or per policy  Utilize nutrition screening tool and intervene as necessary  Determine patient's food preferences and provide high-protein, high-caloric foods as appropriate       INTERVENTIONS:  - Monitor oral intake, urinary output, labs, and treatment plans  - Assess nutrition and hydration status and recommend course of action  - Evaluate amount of meals eaten  - Assist patient with eating if necessary   - Allow adequate time for meals  - Recommend/ encourage appropriate diets, oral nutritional supplements, and vitamin/mineral supplements  - Order, calculate, and assess calorie counts as needed  - Recommend, monitor, and adjust tube feedings and TPN/PPN based on assessed needs  - Assess need for intravenous fluids  - Provide specific nutrition/hydration education as appropriate  - Include patient/family/caregiver in decisions related to nutrition  Outcome: Progressing     Problem: Prexisting or High Potential for Compromised Skin Integrity  Goal: Skin integrity is maintained or improved  Description: INTERVENTIONS:  - Identify patients at risk for skin breakdown  - Assess and monitor skin integrity  - Assess and monitor nutrition and hydration status  - Monitor labs   - Assess for incontinence   - Turn and reposition patient  - Assist with mobility/ambulation  - Relieve pressure over bony prominences  - Avoid friction and shearing  - Provide appropriate hygiene as needed including keeping skin clean and dry  - Evaluate need for skin moisturizer/barrier cream  - Collaborate with interdisciplinary team   - Patient/family teaching  - Consider wound care consult   Outcome: Progressing

## 2021-04-14 NOTE — COVID-19 HEALTH CARE FACILITY TRANSFER FORM
[facility] to [facility] Transfer - COVID-19 Assessment             Name of Patient: Power Vega                :           Transport Date: 21       Has the patient been laboratory tested for COVID-19? []  NO  If No,Test was not indicated per  CDC Testing Criteria   May Transfer Patient   [x] YES  If Tested Results below     COVID-19 References              SARS-CoV-2   Date/Time Value Ref Range Status   2021 11:58 AM Negative Negative Final            Question is to be completed for any patient who tests positive for COVID-19        1  [x] Yes [May Transfer] [] No [May Not Transfer]          Question is to be completed for any patient who is tested for COVID-19            2    [] Yes [May Not Transfer] [x] No [May Transfer]          Signature of Physician or Health Care Professional: Laxmi Wall MD 21          Form updated as of 3/24/2020

## 2021-04-14 NOTE — ASSESSMENT & PLAN NOTE
· Reported episode of unwitnessed syncope this evening    Patient reports that he stood up after eating dinner and began to feel lightheaded and then he woke up on the floor  · Pt reports prior episode of syncope 4 years ago - where it was found he has right carotid stenosis (records at [facility], will obtain) - no intervention performed   · CTH negative  · CXR without acute abnormality  · R shoulder XR - no acute fracture or dislocation  · Nexus score of 0 - C-spine cleared clinically   · Updated carotid duplex - 70% right carotid stenosis  · To follow up with vascular surgery on discharge

## 2021-04-14 NOTE — ASSESSMENT & PLAN NOTE
Acute on chronic anemia, HB 7 1 today  Baseline Hb 8-9  Iron panel - Suggestive of anemia of chronic inflammation  Patient received 1 unit of packed red cell transfusion on 04/11  Hemoglobin this morning is 8 9  No signs of obvious bleeding  Stable

## 2021-04-14 NOTE — DISCHARGE SUMMARY
[place]  Discharge- Samantha Artdy 1945, 67 y o  male MRN: [id number]  Unit/Bed#: MS [facility] Encounter: [id number]  Primary Care Provider: Mario Mcconnell DO   Date and time admitted to hospital: 4/8/2021  6:46 PM    Anemia  Assessment & Plan  Acute on chronic anemia, HB 7 1 today  Baseline Hb 8-9  Iron panel - Suggestive of anemia of chronic inflammation  Patient received 1 unit of packed red cell transfusion on 04/11  Hemoglobin this morning is 8 9  No signs of obvious bleeding  Stable    Severe protein-calorie malnutrition (HCC)  Assessment & Plan  Malnutrition Findings:   Adult Malnutrition type: Chronic illness  Adult Degree of Malnutrition: Other severe protein calorie malnutrition    BMI Findings: Body mass index is 21 06 kg/m²  Syncope  Assessment & Plan  · Reported episode of unwitnessed syncope this evening  Patient reports that he stood up after eating dinner and began to feel lightheaded and then he woke up on the floor  · Pt reports prior episode of syncope 4 years ago - where it was found he has right carotid stenosis (records at [facility], will obtain) - no intervention performed   · CTH negative  · CXR without acute abnormality  · R shoulder XR - no acute fracture or dislocation  · Nexus score of 0 - C-spine cleared clinically   · Updated carotid duplex - 70% right carotid stenosis  · To follow up with vascular surgery on discharge    Hyponatremia  Assessment & Plan  · Na 131 on admission  · S/p IVFs  · Na 134 this a m    · Continue on fluid restriction  · Repeat BMP this evening    Lactic acidosis  Assessment & Plan  · Lactic acidosis, 5 7 on admission in the setting of pancreatic cancer  · Lactic acid improved to 3 2 after IVFs  · Hold off on further IVFs      Adenocarcinoma of pancreas [facility])  Assessment & Plan  · Newly diagnosed adenocarcinoma of the pancreas (diagnosed March 2021) - s/p hospitalization at [facility] for N/V and weight loss   · CT chest 3/7/21 showed no signs of metastatic disease in the thorax   · Follows with Oncology outpatient, Dr Cinthya Elder, to receive 2-3months adjuvant chemotherapy prior to resection by Dr Jia Sanchez   · Currently undergoing chemotherapy treatment with Abraxane and Gemzar - last treatment 4/6  · CA 19-9 at 20,000 at diagnosis - down trending, most recent 1632 on 4/5   · To continue with scheduled OP follow up on discharge    Diabetes mellitus, type 2 [facility])  Assessment & Plan  Lab Results   Component Value Date    HGBA1C 9 4 (H) 04/08/2021       Recent Labs     04/13/21  2122 04/14/21  0729 04/14/21  0842 04/14/21  1027   POCGLU 171* 73 150* 274*       Blood Sugar Average: Last 72 hrs:  · (P) 466 [id number]   · home regimen:  Glimepiride 4 mg b i d , NovoLog 15 units with dinner, and metformin 850 mg b i d    · hold home glimepiride and metformin   · A1c 9 4 - uncontrolled DM  · Continue Lantus to 18U HS and novolog to 10U TID with SSI   · Stable for discharge        HTN (hypertension)  Assessment & Plan  · Home regimen:  Toprol  mg daily and ramipril 10 mg daily   · Switch ramipril to 40mg lisinopril while inpatient  · C/w Toprol XL  Monitor BP per unit protocol    * Bilateral cellulitis of lower leg  Assessment & Plan  · B/L LE cellulitis, poa, a/e/b erythema, edema, and pain x1 week   · Only SIRS criteria met on admission is tachycardia, which could be explained by patient not taking his metoprolol today   · NO ulcers or wounds noted  · S/p IV zosyn in ED - transitioned to IV ancef  Will switch to Ceftin to complete the course   · Erythema improving  · Encourage leg elevation  · Compression stockings placed given ankle edema  · Trend CBC and temp curve  · Physical therapy recommended skilled nursing rehab    Patient will be discharged to [facility] for skilled nursing rehab      Hospital Course:     Re Cobb is a 67 y o  male patient who originally presented to the hospital on   Admission Orders (From admission, onward)     Ordered        04/08/21 2014  Inpatient Admission  Once                  due to syncope  Patient has history of pancreatic cancer, diabetes, hypertension who presented after he felt lightheaded and passed out  Patient was admitted for syncope  Patient was found to have cellulitis of lower extremity and was started on IV Ancef  Patient has history of pancreatic cancer follows up with Dr Rich Tilley as outpatient and is undergoing chemotherapy  Patient has a right carotid stenosis  Will recommend outpatient follow-up with vascular surgery  CT head was negative  Patient was found to have hyponatremia and was placed on fluid restriction  Patient's cellulitis improved and was switched to Ceftin  During this admission patient was given 1 unit of packed red cell transfusion  Hemoglobin is stable  Patient was seen by Physical therapy and recommended skilled nursing rehab  Patient will be discharged to [facility] for skilled nursing rehab  Patient is hemodynamically stable for discharge  On exam  Chest-clear to auscultation  Abdomen-soft, nontender  Extremity-minimal erythema  Neuro-alert awake oriented x3  No focal deficits  Follow-up with PCP as outpatient in 1 week  Follow-up with Dr Rich Tilley as outpatient  Follow-up with vascular surgery as outpatient  Return to ER with any worsening shortness of breath, abdominal pain, palpitations, confusion or any other alarming symptoms  Please see above list of diagnoses and related plan for additional information  Condition at Discharge:  good      Discharge instructions/Information to patient and family:   See after visit summary for information provided to patient and family  Provisions for Follow-Up Care:  See after visit summary for information related to follow-up care and any pertinent home health orders        Disposition:     Dorota Brown at [id number] [address]       Discharge Statement:  I spent 45 minutes discharging the patient  This time was spent on the day of discharge  I had direct contact with the patient on the day of discharge  Greater than 50% of the total time was spent examining patient, answering all patient questions, arranging and discussing plan of care with patient as well as directly providing post-discharge instructions  Additional time then spent on discharge activities  Discharge Medications:  See after visit summary for reconciled discharge medications provided to patient and family        ** Please Note: This note has been constructed using a voice recognition system **

## 2021-04-14 NOTE — DISCHARGE INSTR - AVS FIRST PAGE
Follow-up with PCP as outpatient in 1 week  Follow-up with Dr Limon Sites as outpatient  Follow-up with vascular surgery as outpatient  Return to ER with any worsening shortness of breath, abdominal pain, palpitations, confusion or any other alarming symptoms

## 2021-04-14 NOTE — ASSESSMENT & PLAN NOTE
· Na 131 on admission  · S/p IVFs  · Na 134 this a m    · Continue on fluid restriction  · Repeat BMP this evening

## 2021-04-14 NOTE — CASE MANAGEMENT
CM spoke with [facility] at [address] to check status of auth for STR  Per [facility] they recieved clinical yesterday and are reviewing  They do not need any additional clinical at this time  Determination should be made later today  Pt and brother Denzel Arely updated

## 2021-04-14 NOTE — ASSESSMENT & PLAN NOTE
· Newly diagnosed adenocarcinoma of the pancreas (diagnosed March 2021) - s/p hospitalization at [facility] for N/V and weight loss   · CT chest 3/7/21 showed no signs of metastatic disease in the thorax   · Follows with Oncology outpatient, Dr Bina White, to receive 2-3months adjuvant chemotherapy prior to resection by Dr Reyes He   · Currently undergoing chemotherapy treatment with Abraxane and Gemzar - last treatment 4/6  · CA 19-9 at 20,000 at diagnosis - down trending, most recent 1632 on 4/5   · To continue with scheduled OP follow up on discharge

## 2021-04-14 NOTE — CASE MANAGEMENT
CM spoke with Carlito Morgan at [facility] [employer]  They [facility] SNF [facility]14-4/16  [id number]  Next review with Harsha Mcnamara IY#[phone], fax #[phone]  Rufino Angry at [place] provided with [employer]  They can accept pt today  Per pt brother Nicol Ho pt cousin Lucy Lord can transport pt at 1430  She has been vaccinated  Per Melany at [facility] they are agreeable to family tranpsorting  Pt nurse Meseret Kim aware and will call report  Pt aware and in agreement with discharge plan  IMM reviewed and copy provided

## 2021-04-14 NOTE — ASSESSMENT & PLAN NOTE
· B/L LE cellulitis, poa, a/e/b erythema, edema, and pain x1 week   · Only SIRS criteria met on admission is tachycardia, which could be explained by patient not taking his metoprolol today   · NO ulcers or wounds noted  · S/p IV zosyn in ED - transitioned to IV ancef  Will switch to Ceftin to complete the course   · Erythema improving  · Encourage leg elevation  · Compression stockings placed given ankle edema  · Trend CBC and temp curve  · Physical therapy recommended skilled nursing rehab    Patient will be discharged to [facility] for skilled nursing rehab

## 2021-04-14 NOTE — ASSESSMENT & PLAN NOTE
Lab Results   Component Value Date    HGBA1C 9 4 (H) 04/08/2021       Recent Labs     04/13/21  2122 04/14/21  0729 04/14/21  0842 04/14/21  1027   POCGLU 171* 73 150* 274*       Blood Sugar Average: Last 72 hrs:  · (P) 196 [id number]   · home regimen:  Glimepiride 4 mg b i d , NovoLog 15 units with dinner, and metformin 850 mg b i d    · hold home glimepiride and metformin   · A1c 9 4 - uncontrolled DM  · Continue Lantus to 18U HS and novolog to 10U TID with SSI   · Stable for discharge

## 2021-04-15 NOTE — PROGRESS NOTES
Hematology/Oncology Outpatient Follow- up Note  Angie Canchola, [birth year], [id number]  4/16/2021        Chief Complaint   Patient presents with    Follow-up       HPI:  Angie Canchola is a 66 yo male with newly diagnosed adenocarcinoma of the pancreas  Patient presented with symptoms of significant abdominal pain, nausea, and changes in his stool  His initial workup was completed a [address] scan demonstrated a large pancreatic cyst as well as an abnormality of the pancreatic tissue with atrophy, dilated duct, and potential for pancreatic mass of the pancreatic head  He underwent an EUS  Guided FNA of the pancreatic mass on 2/3/21 and  Pathology demonstrated adenocarcinoma  CT chest was negative for metastatic disease  CA 19 9 was near 20,000 at presentation  He was seen for initial consult by Dr Sharon Boxer on 03/15/2021 for consideration of neoadjuvant chemotherapy prior to resection    He was started on weekly gemcitabine and Abraxane        Previous Hematologic/ Oncologic History:    Oncology History   Adenocarcinoma of pancreas ([facility] [address] )   3/10/2021 Initial Diagnosis    Adenocarcinoma of pancreas ([facility] [address] )     3/23/2021 -  Chemotherapy    pegfilgrastim (NEULASTA ONPRO) subcutaneous injection kit 6 mg, 6 mg, Subcutaneous, Once, 1 of 1 cycle  PACLitaxel protein bound (ABRAXANE) 235 mg in IVPB 47 mL, 125 mg/m2 = 235 mg, Intravenous, Once, 1 of 6 cycles  Dose modification: 100 mg/m2 (original dose 125 mg/m2, Cycle 2, Reason: Dose Not Tolerated)  Administration: 235 mg (3/23/2021), 235 mg (3/30/2021), 235 mg (4/6/2021)  gemcitabine (GEMZAR) 1,800 mg in sodium chloride 0 9 % 250 mL infusion, 1,879 8 mg, Intravenous, Once, 1 of 6 cycles  Dose modification: 800 mg/m2 (original dose 1,000 mg/m2, Cycle 2, Reason: Dose Not Tolerated)  Administration: 1,800 mg (3/23/2021), 1,800 mg (3/30/2021), 1,800 mg (4/6/2021)         Current Hematologic/ Oncologic Treatment:    Dose Reduced Gemzar and Abraxane 3/4 weeks:  Gemzar 800 mg/m2 day 1,8,15  Abraxane 100 mg/m2 day 1,8,15    ECO - Symptomatic, <50% confined to bed    Interval History:   The patient presents for routine follow up accompanied by family friend  He has completed one cycle of chemo  He was recently hospitalized at [address] [place] [address] d/t syncopal episode, discharged 21  Work up while inpatient demonstrated right carotid stenosis  He was also noted to have hyponatremia, lower extremity cellulitis, anemia requiring 1 unit of blood  He is finishing course of oral antibiotics, today last day  He is currently in rehab  He is due to start cycle 2 on   He had blood work this morning, pending  He has not had any further syncopal episodes  He is scheduled to f/u with vascular surgery  He still feels weak, reports "balance is off"  He has some numbness in his feet  He is working with PT while in rehab  He lives alone  He denies N/V  His appetite is fair  He is on fluid restriction d/t hyponatremia  His Na+ was up to 134 at discharge  Cancer Staging:  Cancer Staging  No matching staging information was found for the patient  Molecular Testing:         Test Results:    Imaging: Xr Chest Portable    Result Date: 2021  Narrative: CHEST INDICATION:   Sepsis, syncope  COMPARISON:  3/26/2021 EXAM PERFORMED/VIEWS:  XR CHEST PORTABLE  7:04 PM FINDINGS:  Implantable right chest wall port again noted with catheter entering the subclavian vein and terminating in the superior vena cava  Cardiomediastinal silhouette appears unremarkable  The lungs are clear  No pneumothorax or pleural effusion  Osseous structures appear within normal limits for patient age  Impression: 1  Stable exam with no acute cardiopulmonary disease  2   Right chest wall port as noted  Workstation performed: [id number]     Xr Chest Portable    Result Date: 3/29/2021  Narrative: CHEST INDICATION:   post op portacath  COMPARISON:  Fluoroscopy study from port placement from 3/26/2021   EXAM PERFORMED/VIEWS:  XR CHEST PORTABLE  FINDINGS:  Right port in upper SVC  Cardiomediastinal silhouette normal  Lungs clear  No effusion or pneumothorax  Skinfold over the peripheral right lung base  Osseous structures normal for age  Old left clavicle fracture  Impression: No acute cardiopulmonary disease  Right port in upper SVC with no pneumothorax  Workstation performed: [id number]     Xr Shoulder 2+ Views Right    Result Date: 4/9/2021  Narrative: RIGHT SHOULDER INDICATION:   Fall  COMPARISON:  None VIEWS:  XR SHOULDER 2+ VW RIGHT Images: 3 FINDINGS: Superior subluxation of the humeral head abutting the acromion on consistent with chronic rotator cuff tear There is no acute fracture or dislocation  Moderate acromioclavicular and glenohumeral degenerative changes No lytic or blastic osseous lesion  Soft tissues are unremarkable  Impression: Degenerative arthritis Chronic rotator cuff tear No acute osseous abnormality  Workstation performed: [id number]     Ct Head Without Contrast    Result Date: 4/8/2021  Narrative: CT BRAIN - WITHOUT CONTRAST INDICATION:   Syncope  COMPARISON:  None  TECHNIQUE:  CT examination of the brain was performed  In addition to axial images, sagittal and coronal 2D reformatted images were created and submitted for interpretation  Radiation dose length product (DLP) for this visit:  887 mGy-cm   This examination, like all CT scans performed in the [facility], was performed utilizing techniques to minimize radiation dose exposure, including the use of iterative reconstruction and automated exposure control  IMAGE QUALITY:  Diagnostic  FINDINGS: PARENCHYMA:  No evidence of acute vascular territorial infarction  Periventricular and subcortical hypoattenuating foci consistent with mild microangiopathic disease  No acute intracranial hemorrhage or mass effect  VENTRICLES AND EXTRA-AXIAL SPACES:  No hydrocephalus or extra-axial collection   VISUALIZED ORBITS AND PARANASAL SINUSES:  Intact globes and orbits  Clear paranasal sinuses  CALVARIUM AND EXTRACRANIAL SOFT TISSUES:  No lytic or blastic lesion or fracture  Impression: No acute intracranial abnormality  Workstation performed: [id number]     Xr Chest 1 View    Result Date: 3/30/2021  Narrative: C-ARM - INDICATION: insertion port-a-cath  Procedure guidance  COMPARISON:  Chest CT from 3/5/2021  TECHNIQUE: FLUOROSCOPY TIME:   3 8 seconds 1 FLUOROSCOPIC IMAGES FINDINGS: Fluoroscopic guidance provided for procedure guidance  Osseous and soft tissue detail limited by technique  Impression: Fluoroscopic guidance provided for procedure guidance  Please refer to the separate procedure notes for additional details  Workstation performed: [id number]     Vas Carotid Complete Study    Result Date: 4/9/2021  Narrative:  [facility] REPORT CLINICAL: Indications:  Patient presents with recent episode of dizziness in which he blacked out and woke up on the floor  He is currently asymptomatic  Risk Factors The patient has history of HTN, Diabetes (NIDDM (oral meds)) and HLD  Clinical Right Pressure:  117/48 mm Hg, Left Pressure: not obtained  FINDINGS:  Right        Impression      PSV  EDV (cm/s)  Ratio  Dist  ICA                     50          17   0 66  Mid  ICA                     100          23   1 33  Prox  ICA    70%+            233          39   3 10  Dist CCA                      79          12         Mid CCA                       75           8   0 89  Prox CCA                      85           0         Ext Carotid                  112           0   1 49  Prox Vert    Not visualized                          Subclavian                   174           0          Left         Impression  PSV  EDV (cm/s)  Ratio  Dist  ICA                117          22   1 15  Mid  ICA                 139          27   1 37  Prox   ICA    50 - 69%    176          40   1 74  Dist CCA                 122          18         Mid CCA                  101          18   1 12  Prox CCA                  90          14         Ext Carotid              126           0   1 25  Prox Vert                 94          18         Subclavian                86           0            CONCLUSION:  Impression RIGHT: There is >70% stenosis noted in the internal carotid artery  Plaque is calcified and irregular  Vertebral artery flow was not appreciated during this exam  There is no significant subclavian artery disease  LEFT: There is 50-69% stenosis noted in the internal carotid artery  Plaque is calcified and irregular  Vertebral artery flow is antegrade  There is no significant subclavian artery disease  There is no previous study for comparison  Recommend repeat testing in 6month as per protocol unless otherwise indicated  Internal carotid artery stenosis determination by consensus criteria from: Rosalie Metzger et al  Carotid Artery Stenosis: Gray-Scale and Doppler US Diagnosis - Society of Radiologists in [address] [place] [address], Radiology 2003; [phone]  SIGNATURE: Electronically Signed by: Konstantin Thomas on 2021-04-09 10:27:36 PM      Labs:   Lab Results   Component Value Date    WBC 12 39 (H) 04/16/2021    HGB 10 8 (L) 04/16/2021    HCT 33 1 (L) 04/16/2021    MCV 96 04/16/2021     04/16/2021     Lab Results   Component Value Date    K 5 4 (H) 04/16/2021    CL 99 (L) 04/16/2021    CO2 24 04/16/2021    BUN 21 04/16/2021    CREATININE 0 91 04/16/2021    GLUCOSE 256 (H) 04/08/2021    GLUF 231 (H) 04/16/2021    CALCIUM 8 2 (L) 04/16/2021    CORRECTEDCA 9 2 04/16/2021    AST 38 04/16/2021    ALT 78 04/16/2021    ALKPHOS 152 (H) 04/16/2021    EGFR 82 04/16/2021           Review of Systems   Constitutional: Positive for activity change and fatigue  Cardiovascular: Positive for leg swelling  Musculoskeletal: Positive for gait problem  Neurological: Positive for weakness  All other systems reviewed and are negative          Active Problems:   Patient Active Problem List   Diagnosis    Hyperlipidemia    HTN (hypertension)    Diabetes mellitus, type 2 ([place] [address] )    Gastroesophageal reflux disease    Pancreatic cyst    Adenocarcinoma of pancreas ([place] [address] )    Peripheral neuropathy    Bilateral cellulitis of lower leg    Hyponatremia    Severe protein-calorie malnutrition (HCC)    Anemia       Past Medical History:   Past Medical History:   Diagnosis Date    Cancer ([place] [address] )     pancreatic    Diabetes mellitus ([place] [address] )     Frequent urination     GERD (gastroesophageal reflux disease)     Hyperlipidemia     Hypertension     Peripheral neuropathy     Weakness     Weight loss        Surgical History:   Past Surgical History:   Procedure Laterality Date    APPENDECTOMY      CATARACT EXTRACTION      COLONOSCOPY      SKIN LESION EXCISION      of a wart on right arm    TUNNELED VENOUS PORT PLACEMENT N/A 3/26/2021    Procedure: INSERTION VENOUS PORT (PORT-A-CATH); Surgeon: Silverio Childs MD;  [facility] OR;  Service: Surgical Oncology       Family History:  History reviewed  No pertinent family history  Cancer-related family history is not on file      Social History:   Social History     Socioeconomic History    Marital status: Unknown     Spouse name: Not on file    Number of children: Not on file    Years of education: Not on file    Highest education level: Not on file   Occupational History    Not on file   Social Needs    Financial resource strain: Not on file    Food insecurity     Worry: Not on file     Inability: Not on file    Transportation needs     Medical: Not on file     Non-medical: Not on file   Tobacco Use    Smoking status: Former Smoker     Types: Pipe     Quit date: 2/23/2011     Years since quitting: 10 1    Smokeless tobacco: Never Used   Substance and Sexual Activity    Alcohol use: Never     Frequency: Never     Binge frequency: Never    Drug use: Never    Sexual activity: Not on file   Lifestyle  Physical activity     Days per week: Not on file     Minutes per session: Not on file    Stress: Not on file   Relationships    Social connections     Talks on phone: Not on file     Gets together: Not on file     Attends Hinduism service: Not on file     Active member of club or organization: Not on file     Attends meetings of clubs or organizations: Not on file     Relationship status: Not on file    Intimate partner violence     Fear of current or ex partner: Not on file     Emotionally abused: Not on file     Physically abused: Not on file     Forced sexual activity: Not on file   Other Topics Concern    Not on file   Social History Narrative    Not on file       Current Medications:   Current Outpatient Medications   Medication Sig Dispense Refill    Accu-Chek FastClix Lancets MISC USE 1 TO CHECK GLUCOSE TWICE DAILY TO THREE TIMES DAILY      Accu-Chek Guide test strip USE 1 STRIP TWICE DAILY TO THREE TIMES DAILY      acetaminophen (TYLENOL) 325 mg tablet Take 650 mg by mouth every 4 (four) hours as needed for mild pain      aspirin 81 mg chewable tablet Chew 81 mg daily      atorvastatin (LIPITOR) 40 mg tablet Take 40 mg by mouth daily      Blood Glucose Monitoring Suppl (Accu-Chek Guide) w/Device KIT USE TO CHECK GLUCOSE TWO TO THREE TIMES DAILY      cefuroxime (CEFTIN) 500 mg tablet Take 1 tablet (500 mg total) by mouth every 12 (twelve) hours for 3 days 6 tablet 0    glimepiride (AMARYL) 4 mg tablet Take 2 mg by mouth 2 (two) times a day       metFORMIN (GLUCOPHAGE) 850 mg tablet TAKE 1 TABLET BY MOUTH TWICE DAILY TAKE WITH MORNING AND EVENING MEAL      metoprolol succinate (TOPROL-XL) 100 mg 24 hr tablet Take 100 mg by mouth daily      NovoLOG FlexPen 100 units/mL injection pen Inject 15 Units under the skin daily 30 min before dinner      prochlorperazine (COMPAZINE) 10 mg tablet Take 1 tablet (10 mg total) by mouth every 6 (six) hours as needed for nausea or vomiting 45 tablet 3    ramipril (ALTACE) 10 MG capsule Take 10 mg by mouth daily       ReliOn Pen Needles 31G X 6 MM MISC USE 1 IN THE EVENING      sodium chloride (GRAHAM 128) 5 % hypertonic ophthalmic solution 1 drop as needed      traMADol (ULTRAM) 50 mg tablet Take 1 tablet (50 mg total) by mouth every 6 (six) hours as needed for moderate pain 15 tablet 0     No current facility-administered medications for this visit  Allergies: No Known Allergies    Physical Exam:  /68 (BP Location: Left arm, Patient Position: Sitting, Cuff Size: Adult)   Pulse 55   Temp 97 7 °F (36 5 °C) (Temporal)   Resp 14   SpO2 100%   There is no height or weight on file to calculate BSA  Wt Readings from Last 3 Encounters:   04/08/21 68 5 kg (151 lb)   04/06/21 68 5 kg (151 lb 0 2 oz)   03/30/21 66 2 kg (145 lb 15 1 oz)           Physical Exam  Constitutional:       Appearance: He is ill-appearing (chronically ill appearing)  Comments: Arrive via WC  Appears frail  NAD   HENT:      Head: Normocephalic and atraumatic  Mouth/Throat:      Mouth: Mucous membranes are moist       Pharynx: Oropharynx is clear  No oropharyngeal exudate  Eyes:      General: No scleral icterus  Right eye: No discharge  Left eye: No discharge  Extraocular Movements: Extraocular movements intact  Conjunctiva/sclera: Conjunctivae normal    Neck:      Musculoskeletal: Normal range of motion  Cardiovascular:      Rate and Rhythm: Normal rate and regular rhythm  Pulmonary:      Effort: Pulmonary effort is normal       Breath sounds: Normal breath sounds  Abdominal:      General: Bowel sounds are normal       Palpations: Abdomen is soft  Musculoskeletal: Normal range of motion  Right lower leg: Edema (trace to +1 ankle ) present  Left lower leg: Edema (trace to +1) present  Skin:     General: Skin is warm and dry  Neurological:      General: No focal deficit present  Mental Status: He is alert     Psychiatric: Mood and Affect: Mood normal          Behavior: Behavior normal          Assessment / Plan:    1  Adenocarcinoma of pancreas [facility])      The patient is a 66 yo male with newly diagnosed adenocarcinoma of the pancreas  Patient presented with symptoms of significant abdominal pain, nausea, and changes in his stool  His initial workup was completed a [address] scan demonstrated a large pancreatic cyst as well as an abnormality of the pancreatic tissue with atrophy, dilated duct, and potential for pancreatic mass of the pancreatic head  He underwent an EUS  Guided FNA of the pancreatic mass on 2/3/21 and  Pathology demonstrated adenocarcinoma  CT chest was negative for metastatic disease  CA 19 9 was near 20,000 at presentation  Patient has been started on neoadjuvant chemotherapy with Gemzar and Abraxane with plan to treat for 3 cycles and then re-image to assess disease response and possibly refer for resection  He has completed 1 cycle of chemotherapy  He is recovering from recent hospitalization for which she was worked up for syncopal episode, treated for hyponatremia, and bilateral lower extremity cellulitis  He is currently in rehab due to deconditioning  He is scheduled to begin cycle 2 next week  Due to his overall performance status, and complications that resulted in hospitalization I had a discussion with patient today regarding his goals  He does wish to proceed with disease directed therapy  I did encourage adequate nutrition as he will require this if he is to be considered a candidate for resection  He will continue to work on his overall strength while at rehab  After discussion with Dr Lilo Velasquez, we will dose reduce his chemotherapy by 20%  His Gemzar will be reduced to 800 mg/m2, Abraxane reduced to 100 mg/m2  Patient is in agreement  He will return for a follow up visit in 3 weeks  He will have weekly blood work  Patient verbalized understanding   He is instructed to call at any time with questions or concerns  Goals and Barriers:  Current Goal:  Prolong Survival from pancreatic cancer  Barriers: None  Patient's Capacity to Self Care:  Patient  able to self care  Portions of the record may have been created with voice recognition software  Occasional wrong word or "sound a like" substitutions may have occurred due to the inherent limitations of voice recognition software  Read the chart carefully and recognize, using context, where substitutions have occurred

## 2021-04-20 NOTE — PLAN OF CARE
Problem: Potential for Falls  Goal: Patient will remain free of falls  Description: INTERVENTIONS:  - Assess patient frequently for physical needs  -  Identify cognitive and physical deficits and behaviors that affect risk of falls    -  [place] fall precautions as indicated by assessment   - Educate patient/family on patient safety including physical limitations  - Instruct patient to call for assistance with activity based on assessment  - Modify environment to reduce risk of injury  - Consider OT/PT consult to assist with strengthening/mobility  Outcome: Progressing

## 2021-04-20 NOTE — PROGRESS NOTES
Patient completed chemotherapy infusion with no adverse reactions  Called cousin for pick-up  Left unit ambulatory with steady gait

## 2021-04-22 NOTE — UTILIZATION REVIEW
Notification of Discharge   This is a Notification of Discharge from our facility [address]  Please be advised that this patient has been discharge from our facility  Below you will find the admission and discharge date and time including the patients disposition  UTILIZATION REVIEW CONTACT:  Parker Baton  Utilization   Network Utilization Review Department  Phone: [phone] x carefully listen to the prompts  All voicemails are confidential   Email: [email or web address]     PHYSICIAN ADVISORY SERVICES:  FOR [facility] REVIEW - MEDICAL NECESSITY DENIAL  Phone: [phone]  Fax: [phone]  Email: [email or web address]     PRESENTATION DATE: 4/8/2021  6:46 PM  OBERVATION ADMISSION DATE:   INPATIENT ADMISSION DATE: 4/8/21 2013   DISCHARGE DATE: 4/14/2021  3:13 PM  DISPOSITION: Non [facility]/S[facility] Non [facility] SN[facility]/SNU      IMPORTANT INFORMATION:  Send all requests for admission clinical reviews, approved or denied determinations and any other requests to dedicated fax number below belonging to the campus where the patient is receiving treatment   List of dedicated fax numbers:  [address] [place] [address] DENIALS (Administrative/Medical Necessity) [phone]   [address]  (Maternity/NICU/Pediatrics) [phone]   Carmina Hollins [phone]   Valencia Graf [phone]   Coco Doe [phone]   [facility] [address] [place] [address] [phone]   [facility]  [phone]   [address] [facility] [address]  [address] [facility] [address] [facility] [phone]

## 2021-04-22 NOTE — TELEPHONE ENCOUNTER
Received call from person who is not on communication consent  Could not speak furhter in regards to patient due to HIPPA laws

## 2021-04-27 NOTE — PLAN OF CARE
Problem: Potential for Falls  Goal: Patient will remain free of falls  Description: INTERVENTIONS:  - Assess patient frequently for physical needs  -  Identify cognitive and physical deficits and behaviors that affect risk of falls    -  [place] fall precautions as indicated by assessment   - Educate patient/family on patient safety including physical limitations  - Instruct patient to call for assistance with activity based on assessment  - Modify environment to reduce risk of injury  - Consider OT/PT consult to assist with strengthening/mobility  Outcome: Progressing

## 2021-04-29 NOTE — TELEPHONE ENCOUNTER
Sharmin Higgins was scheduled for an RD phone follow up today (4/29/2021) and was unable to attend his appointment (no answer)  A call was placed and a voice message was left encouraging him to call back and reschedule his appointment at a more convenient time for him  RD contact information was provided

## 2021-05-03 NOTE — TELEPHONE ENCOUNTER
Patient returned my call  He has not used all of his refills  I provided him with the number for [address]    He will call to reorder medication

## 2021-05-03 NOTE — TELEPHONE ENCOUNTER
Medication Refill     Who is Calling  Patient    Medication prochlorperazine (COMPAZINE) 10 mg tablet        How many pills left    Preferred Pharmacy / Address [facility]    Call back number [phone]   Relevant Information

## 2021-05-04 NOTE — PROGRESS NOTES
Premeds completed, Abraxane infusing    Pt up to bathroom with walker and assist   [facility]

## 2021-05-04 NOTE — PROGRESS NOTES
Pt arrived via w/c for chemo  C/o occasional dizziness  S[place]ney when he takes his pills in am with water  Also states he coburn occasional pain in back of neck  States his stool are soft and frequent, no diarrhea lately  Instructed on use of Imodium  Pt states he takes Excedrin for neck pain and it goes away  Using Glucerna and eating greek yogurt for protein

## 2021-05-04 NOTE — PLAN OF CARE
Problem: Potential for Falls  Goal: Patient will remain free of falls  Description: INTERVENTIONS:  - Assess patient frequently for physical needs  -  Identify cognitive and physical deficits and behaviors that affect risk of falls  -  [place] fall precautions as indicated by assessment   - Educate patient/family on patient safety including physical limitations  - Instruct patient to call for assistance with activity based on assessment  - Modify environment to reduce risk of injury  - Consider OT/PT consult to assist with strengthening/mobility  Outcome: Progressing     Problem: Knowledge Deficit  Goal: Patient/family/caregiver demonstrates understanding of disease process, treatment plan, medications, and discharge instructions  Description: Complete learning assessment and assess knowledge base    Interventions:  - Provide teaching at level of understanding  - Provide teaching via preferred learning methods  Outcome: Progressing

## 2021-05-04 NOTE — PROGRESS NOTES
Pt tolerated infusions w/o AR  Port de-accessed, dsd applied  Disch via w/c to ome with family  AVS given and reviewed with pt  Nutrition consult sent

## 2021-05-04 NOTE — PROGRESS NOTES
Hematology/Oncology Outpatient Follow- up Note  Marshal Smart, [birth year], [id number]  5/7/2021        Chief Complaint   Patient presents with    Follow-up       HPI:  Marshal Smart is a 66 yo male with newly diagnosed adenocarcinoma of the pancreas  Patient presented with symptoms of significant abdominal pain, nausea, and changes in his stool  His initial workup was completed a [address] scan demonstrated a large pancreatic cyst as well as an abnormality of the pancreatic tissue with atrophy, dilated duct, and potential for pancreatic mass of the pancreatic head  He underwent an EUS  Guided FNA of the pancreatic mass on 2/3/21 and  Pathology demonstrated adenocarcinoma  CT chest was negative for metastatic disease  CA 19 9 was near 20,000 at presentation  He was seen for initial consult by Dr Jojo Gamez on 03/15/2021 for consideration of neoadjuvant chemotherapy prior to resection    He was started on weekly gemcitabine and Abraxane        Previous Hematologic/ Oncologic History:    Oncology History   Adenocarcinoma of pancreas ([facility] [address] )   3/10/2021 Initial Diagnosis    Adenocarcinoma of pancreas ([facility] [address] )     3/23/2021 -  Chemotherapy    pegfilgrastim (NEULASTA ONPRO) subcutaneous injection kit 6 mg, 6 mg, Subcutaneous, Once, 1 of 1 cycle  PACLitaxel protein bound (ABRAXANE) 235 mg in IVPB 47 mL, 125 mg/m2 = 235 mg, Intravenous, Once, 2 of 6 cycles  Dose modification: 100 mg/m2 (original dose 125 mg/m2, Cycle 2, Reason: Dose Not Tolerated)  Administration: 235 mg (3/23/2021), 235 mg (3/30/2021), 235 mg (4/6/2021), 188 mg (4/20/2021), 188 mg (5/4/2021), 188 mg (4/27/2021)  gemcitabine (GEMZAR) 1,800 mg in sodium chloride 0 9 % 250 mL infusion, 1,879 8 mg, Intravenous, Once, 2 of 6 cycles  Dose modification: 800 mg/m2 (original dose 1,000 mg/m2, Cycle 2, Reason: Dose Not Tolerated)  Administration: 1,800 mg (3/23/2021), 1,800 mg (3/30/2021), 1,800 mg (4/6/2021), 1,504 2 mg (4/20/2021), 1,504 2 mg (5/4/2021), 1,504 2 mg (2021)     Dose Reduced Gemzar and Abraxane 3/4 weeks:  Gemzar 800 mg/m2 day 1,8,15  Abraxane 100 mg/m2 day 1,8,15    Current Hematologic/ Oncologic Treatment:    Will D/C Abraxane and continue treatment with single agent Gemzar    ECO - Symptomatic, <50% confined to bed    Interval History:     The patient presents for routine follow up  He has completed 2 cycles of treatment, day 15 of cycle 2 was given on   Most recent blood work completed on 5/3 was reviewed  His blood work remains in acceptable treatment range, however his Hgb is trending down 9 7  His Na+ was low 131  Creatinine 0 93  Alk Phos 121  His CA 19-9 continues to trend down, from 2643 now at 648    Today, patient appears weak, fatigued  He endorses symptoms of generalized weakness, extreme fatigue, dizziness  He states "I had a bad night last night"  His BP is low 98/60  Pulse ox 89% of RA  HR 76     He endorses pain in his sacral region and states he has a bedsore there  He also has wounds on his ankles  I am concerned he may have worsening anemia, hyponatremia  ?cellulitis  His left ankle wound is very reddened  I have recommended ED evaluation given his presentation in clinic today  Cancer Staging:  Cancer Staging  No matching staging information was found for the patient  Molecular Testing:         Test Results:    Imaging: Xr Chest Portable    Result Date: 2021  Narrative: CHEST INDICATION:   Sepsis, syncope  COMPARISON:  3/26/2021 EXAM PERFORMED/VIEWS:  XR CHEST PORTABLE  7:04 PM FINDINGS:  Implantable right chest wall port again noted with catheter entering the subclavian vein and terminating in the superior vena cava  Cardiomediastinal silhouette appears unremarkable  The lungs are clear  No pneumothorax or pleural effusion  Osseous structures appear within normal limits for patient age  Impression: 1  Stable exam with no acute cardiopulmonary disease  2   Right chest wall port as noted  Workstation performed: [id number]     Xr Chest Portable    Result Date: 3/29/2021  Narrative: CHEST INDICATION:   post op portacath  COMPARISON:  Fluoroscopy study from port placement from 3/26/2021  EXAM PERFORMED/VIEWS:  XR CHEST PORTABLE  FINDINGS:  Right port in upper SVC  Cardiomediastinal silhouette normal  Lungs clear  No effusion or pneumothorax  Skinfold over the peripheral right lung base  Osseous structures normal for age  Old left clavicle fracture  Impression: No acute cardiopulmonary disease  Right port in upper SVC with no pneumothorax  Workstation performed: [id number]     Xr Shoulder 2+ Views Right    Result Date: 4/9/2021  Narrative: RIGHT SHOULDER INDICATION:   Fall  COMPARISON:  None VIEWS:  XR SHOULDER 2+ VW RIGHT Images: 3 FINDINGS: Superior subluxation of the humeral head abutting the acromion on consistent with chronic rotator cuff tear There is no acute fracture or dislocation  Moderate acromioclavicular and glenohumeral degenerative changes No lytic or blastic osseous lesion  Soft tissues are unremarkable  Impression: Degenerative arthritis Chronic rotator cuff tear No acute osseous abnormality  Workstation performed: [id number]     Ct Head Without Contrast    Result Date: 4/8/2021  Narrative: CT BRAIN - WITHOUT CONTRAST INDICATION:   Syncope  COMPARISON:  None  TECHNIQUE:  CT examination of the brain was performed  In addition to axial images, sagittal and coronal 2D reformatted images were created and submitted for interpretation  Radiation dose length product (DLP) for this visit:  887 mGy-cm   This examination, like all CT scans performed in the [facility], was performed utilizing techniques to minimize radiation dose exposure, including the use of iterative reconstruction and automated exposure control  IMAGE QUALITY:  Diagnostic  FINDINGS: PARENCHYMA:  No evidence of acute vascular territorial infarction   Periventricular and subcortical hypoattenuating foci consistent with mild microangiopathic disease  No acute intracranial hemorrhage or mass effect  VENTRICLES AND EXTRA-AXIAL SPACES:  No hydrocephalus or extra-axial collection  VISUALIZED ORBITS AND PARANASAL SINUSES:  Intact globes and orbits  Clear paranasal sinuses  CALVARIUM AND EXTRACRANIAL SOFT TISSUES:  No lytic or blastic lesion or fracture  Impression: No acute intracranial abnormality  Workstation performed: [id number]     Xr Chest 1 View    Result Date: 3/30/2021  Narrative: C-ARM - INDICATION: insertion port-a-cath  Procedure guidance  COMPARISON:  Chest CT from 3/5/2021  TECHNIQUE: FLUOROSCOPY TIME:   3 8 seconds 1 FLUOROSCOPIC IMAGES FINDINGS: Fluoroscopic guidance provided for procedure guidance  Osseous and soft tissue detail limited by technique  Impression: Fluoroscopic guidance provided for procedure guidance  Please refer to the separate procedure notes for additional details  Workstation performed: [id number]     Vas Carotid Complete Study    Result Date: 4/9/2021  Narrative:  [facility] REPORT CLINICAL: Indications:  Patient presents with recent episode of dizziness in which he blacked out and woke up on the floor  He is currently asymptomatic  Risk Factors The patient has history of HTN, Diabetes (NIDDM (oral meds)) and HLD  Clinical Right Pressure:  117/48 mm Hg, Left Pressure: not obtained  FINDINGS:  Right        Impression      PSV  EDV (cm/s)  Ratio  Dist  ICA                     50          17   0 66  Mid  ICA                     100          23   1 33  Prox   ICA    70%+            233          39   3 10  Dist CCA                      79          12         Mid CCA                       75           8   0 89  Prox CCA                      85           0         Ext Carotid                  112           0   1 49  Prox Vert    Not visualized                          Subclavian                   174           0          Left         Impression  PSV  EDV (cm/s) Ratio  Dist  ICA                117          22   1 15  Mid  ICA                 139          27   1 37  Prox  ICA    50 - 69%    176          40   1 74  Dist CCA                 122          18         Mid CCA                  101          18   1 12  Prox CCA                  90          14         Ext Carotid              126           0   1 25  Prox Vert                 94          18         Subclavian                86           0            CONCLUSION:  Impression RIGHT: There is >70% stenosis noted in the internal carotid artery  Plaque is calcified and irregular  Vertebral artery flow was not appreciated during this exam  There is no significant subclavian artery disease  LEFT: There is 50-69% stenosis noted in the internal carotid artery  Plaque is calcified and irregular  Vertebral artery flow is antegrade  There is no significant subclavian artery disease  There is no previous study for comparison  Recommend repeat testing in 6month as per protocol unless otherwise indicated  Internal carotid artery stenosis determination by consensus criteria from: Anabelle Martinez et al  Carotid Artery Stenosis: Gray-Scale and Doppler US Diagnosis - Society of Radiologists in [address] [place] [address], Radiology 2003; [phone]    SIGNATURE: Electronically Signed by: Saeid Hairston on 2021-04-09 10:27:36 PM      Labs:   Lab Results   Component Value Date    WBC 5 18 05/03/2021    HGB 9 7 (L) 05/03/2021    HCT 29 5 (L) 05/03/2021    MCV 96 05/03/2021     05/03/2021     Lab Results   Component Value Date    K 4 9 05/03/2021    CL 97 (L) 05/03/2021    CO2 25 05/03/2021    BUN 31 (H) 05/03/2021    CREATININE 0 93 05/03/2021    GLUCOSE 256 (H) 04/08/2021    GLUF 178 (H) 05/03/2021    CALCIUM 8 4 05/03/2021    CORRECTEDCA 9 3 05/03/2021    AST 20 05/03/2021    ALT 32 05/03/2021    ALKPHOS 121 (H) 05/03/2021    EGFR 79 05/03/2021           Review of Systems   Constitutional: Positive for activity change and fatigue  Cardiovascular: Positive for leg swelling  Skin: Positive for pallor and wound  Neurological: Positive for dizziness and weakness  All other systems reviewed and are negative  Active Problems:   Patient Active Problem List   Diagnosis    Hyperlipidemia    HTN (hypertension)    Diabetes mellitus, type 2 (HCC)    Gastroesophageal reflux disease    Pancreatic cyst    Adenocarcinoma of pancreas ([place] [address] )    Peripheral neuropathy    Bilateral cellulitis of lower leg    Hyponatremia    Severe protein-calorie malnutrition (HCC)    Anemia       Past Medical History:   Past Medical History:   Diagnosis Date    Cancer ([place] [address] )     pancreatic    Diabetes mellitus ([place] [address] )     Frequent urination     GERD (gastroesophageal reflux disease)     Hyperlipidemia     Hypertension     Peripheral neuropathy     Weakness     Weight loss        Surgical History:   Past Surgical History:   Procedure Laterality Date    APPENDECTOMY      CATARACT EXTRACTION      COLONOSCOPY      SKIN LESION EXCISION      of a wart on right arm    TUNNELED VENOUS PORT PLACEMENT N/A 3/26/2021    Procedure: INSERTION VENOUS PORT (PORT-A-CATH); Surgeon: Anderson Garrett MD;  Location: [facility] OR;  Service: Surgical Oncology       Family History:  History reviewed  No pertinent family history  Cancer-related family history is not on file      Social History:   Social History     Socioeconomic History    Marital status: Unknown     Spouse name: Not on file    Number of children: Not on file    Years of education: Not on file    Highest education level: Not on file   Occupational History    Not on file   Social Needs    Financial resource strain: Not on file    Food insecurity     Worry: Not on file     Inability: Not on file    Transportation needs     Medical: Not on file     Non-medical: Not on file   Tobacco Use    Smoking status: Former Smoker     Types: Pipe     Quit date: 2/23/2011     Years since quitting: 10 2    Smokeless tobacco: Never Used   Substance and Sexual Activity    Alcohol use: Never     Frequency: Never     Binge frequency: Never    Drug use: Never    Sexual activity: Not on file   Lifestyle    Physical activity     Days per week: Not on file     Minutes per session: Not on file    Stress: Not on file   Relationships    Social connections     Talks on phone: Not on file     Gets together: Not on file     Attends Hindu service: Not on file     Active member of club or organization: Not on file     Attends meetings of clubs or organizations: Not on file     Relationship status: Not on file    Intimate partner violence     Fear of current or ex partner: Not on file     Emotionally abused: Not on file     Physically abused: Not on file     Forced sexual activity: Not on file   Other Topics Concern    Not on file   Social History Narrative    Not on file       Current Medications:   Current Outpatient Medications   Medication Sig Dispense Refill    Accu-Chek FastClix Lancets MISC USE 1 TO CHECK GLUCOSE TWICE DAILY TO THREE TIMES DAILY      Accu-Chek Guide test strip USE 1 STRIP TWICE DAILY TO THREE TIMES DAILY      acetaminophen (TYLENOL) 325 mg tablet Take 650 mg by mouth every 4 (four) hours as needed for mild pain      aspirin 81 mg chewable tablet Chew 81 mg daily      atorvastatin (LIPITOR) 40 mg tablet Take 40 mg by mouth daily      Blood Glucose Monitoring Suppl (Accu-Chek Guide) w/Device KIT USE TO CHECK GLUCOSE TWO TO THREE TIMES DAILY      insulin glargine (Lantus SoloStar) 100 units/mL injection pen Inject 8 units at bedtime 15 mL 0    metFORMIN (GLUCOPHAGE) 850 mg tablet TAKE 1 TABLET BY MOUTH TWICE DAILY TAKE WITH MORNING AND EVENING MEAL      NovoLOG FlexPen 100 units/mL injection pen Inject as per sliding scale , (  Scale - 150-200 -1 units, 201-250-2 units, 251-300 -3 units, 301-350-4 units, > 350- 5  units) with meals 15 mL     prochlorperazine (COMPAZINE) 10 mg tablet Take 1 tablet (10 mg total) by mouth every 6 (six) hours as needed for nausea or vomiting 45 tablet 3    ramipril (ALTACE) 10 MG capsule Take 10 mg by mouth daily       ReliOn Pen Needles 31G X 6 MM MISC USE 1 IN THE EVENING      traMADol (ULTRAM) 50 mg tablet Take 1 tablet (50 mg total) by mouth every 6 (six) hours as needed for moderate pain 15 tablet 0    metoprolol succinate (TOPROL-XL) 100 mg 24 hr tablet Take 100 mg by mouth daily      sodium chloride (GRAHAM 128) 5 % hypertonic ophthalmic solution 1 drop as needed       No current facility-administered medications for this visit  Allergies: No Known Allergies    Physical Exam:  BP 98/60 (BP Location: Right arm, Patient Position: Sitting, Cuff Size: Adult)   Temp 98 2 °F (36 8 °C) (Temporal)   Resp 12   There is no height or weight on file to calculate BSA  Wt Readings from Last 3 Encounters:   05/05/21 64 4 kg (142 lb)   05/04/21 64 2 kg (141 lb 8 6 oz)   04/27/21 65 8 kg (145 lb 1 oz)           Physical Exam  Constitutional:       General: He is not in acute distress  Appearance: He is ill-appearing  He is not toxic-appearing  Comments: Appears weak, fatigued, arrived via [place]:      Head: Normocephalic and atraumatic  Eyes:      General: No scleral icterus  Right eye: No discharge  Left eye: No discharge  Neck:      Musculoskeletal: Normal range of motion  Cardiovascular:      Rate and Rhythm: Normal rate and regular rhythm  Heart sounds: Normal heart sounds  Pulmonary:      Effort: Pulmonary effort is normal  No respiratory distress  Abdominal:      General: Bowel sounds are normal       Palpations: Abdomen is soft  Musculoskeletal:      Right lower leg: Edema present  Left lower leg: Edema present  Lymphadenopathy:      Cervical: No cervical adenopathy  Skin:     General: Skin is warm and dry  Coloration: Skin is pale        Comments: Reported sacral wound not assessed  Left ankle wound with dressing in place  Also area of eschar that is reddened    Neurological:      General: No focal deficit present  Mental Status: He is alert and oriented to person, place, and time  Psychiatric:         Mood and Affect: Mood normal          Behavior: Behavior normal          Assessment / Plan:    1  Adenocarcinoma of pancreas [facility])      The patient is a 66 yo male with newly diagnosed adenocarcinoma of the pancreas  Patient presented with symptoms of significant abdominal pain, nausea, and changes in his stool  His initial workup was completed a [address] scan demonstrated a large pancreatic cyst as well as an abnormality of the pancreatic tissue with atrophy, dilated duct, and potential for pancreatic mass of the pancreatic head  He underwent an EUS  Guided FNA of the pancreatic mass on 2/3/21 and  Pathology demonstrated adenocarcinoma  CT chest was negative for metastatic disease  CA 19 9 was near 20,000 at presentation  Patient has been started on neoadjuvant chemotherapy with Gemzar and Abraxane with plan to treat for 3 cycles and then re-image to assess disease response and possibly refer for resection  His doses were reduced by 20% after cycle 1 secondary to decreased performance status and hospitalization for which he was treated for Hyponatremia and bilateral lower extremity cellulitis  He has now completed 2 cycles of treatment  Chemo last given earlier this week on 5/4  He presents today and appears dehydrated  He endorses symptoms of weakness, extreme fatigue, dizziness  His BP is low  I am concerned for worsening hyponatremia and anemia  He also has chronic wounds that need further evaluation  I have requested that he be further evaluated in ED today  Patient is agreeable  He will go directly to ED for further evaluation      Given his overall performance status, I have discussed his treatment regimen with Dr Humberto Gaona and we will discontinue Abraxane from his treatment regimen  He will continue with single agent Gemzar at current dose  I am concerned that he will not be a candidate for surgical resection given his decreased performance status, chronic non-healing wounds  At this time, patient himself feels he is too weak to even consider a surgical resection  His next treatment round is scheduled for 5/18  We will re-evaluate after he is seen in ED today if we will proceed as scheduled or delay treatment  I will see him back in 2 weeks  Patient and his family member were in agreement with plan of care  Goals and Barriers:  Current Goal:  Prolong Survival from pancreatic cancer  Barriers: None  Patient's Capacity to Self Care:  Patient  able to self care  Portions of the record may have been created with voice recognition software  Occasional wrong word or "sound a like" substitutions may have occurred due to the inherent limitations of voice recognition software  Read the chart carefully and recognize, using context, where substitutions have occurred

## 2021-05-05 NOTE — PROGRESS NOTES
Noemi Reyes 67 y o  male MRN: [id number]    Encounter: [id number]      Assessment/Plan     Assessment: This is a 67y o -year-old male with diabetes with hyperglycemia  Plan:    Diagnoses and all orders for this visit:    Type 2 diabetes mellitus with hyperglycemia, with long-term current use of insulin ([address] )  Lab Results   Component Value Date    HGBA1C 9 4 (H) 04/08/2021    A1c is uncontrolled 9 4%, goal for A1c is 7%  considering patient has history of pancreatic cancer and undergoing chemotherapy with frequent hypoglycemic episodes during the day will discontinue glimepiride  will start Lantus 8 units at bedtime, sent prescription to the pharmacy  until we know if Lantus is covered I have given him Basaglar samples and asked him to take 8 units at bedtime  Also discussed once he stops Basaglar then he can start Lantus injection  also I have given him scale for NovoLog with meals  discussed to check blood sugars 4-6 times daily and send log in 1 week  discussed hypoglycemia symptoms and treatment  discussed to make appointment with Podiatry as patient has redness of left foot and swelling  also discussed to make appointment with Ophthalmology to rule out retinopathy  -     insulin glargine (Lantus SoloStar) 100 units/mL injection pen; Inject 8 units at bedtime  -     NovoLOG FlexPen 100 units/mL injection pen; Inject as per sliding scale , (  Scale - 150-200 -1 units, 201-250-2 units, 251-300 -3 units, 301-350-4 units, > 350- 5  units) with meals    Mixed hyperlipidemia   continue Lipitor 40 mg daily  Essential hypertension   blood pressure well controlled, 118/60   continue current management    Nancy Bardales MD        CC: Diabetes    History of Present Illness     HPI:    Noemi Reyes is 67 yr old man with medical hx of type 2 diabetes, long term on insulin regimen, hypertension , hyperlipidemia is here for establishing visit for type 2 diabetes management      Type 2 diabetes for 10 yrs     he was recently diagnosed with pancreatic cancer and currently undergoing chemotherapy,    He is currently taking amaryl 2 mg twice a day  Metformin 850 mg twice a day    uses continues glucose monitor sensor, by Prasanth Escobar, April 22nd to May 5, 2021   % time ANAHY ROSE is active 42%   target range blood sugars, 29% times  low blood sugar in between 54-69 mg/dL, 1% times   blood sugars high, 180-250 mg/dL, 18% times  blood sugars are very high more than 250 mg/dL, 52% times  glucose variability 34 8%   average glucose is 233 mg/dL   blood sugars are usually elevated after 9:00 a m  until 5:00 p m    blood sugars are dropping after 6:00 p m  to 12:00 a m  Lab Results   Component Value Date    HGBA1C 9 4 (H) 04/08/2021     Lab Results   Component Value Date    CREATININE 0 93 05/03/2021         Ct scan , march 2021   VISUALIZED STRUCTURES IN THE UPPER ABDOMEN:  Partially visualized cystic mass that appears to be arising from the junction of the pancreatic neck and body measuring at least 9 8 x 8 5 cm  Heterogeneous hypodense in the head and neck of the pancreas   measures approximately 3 5 x 3 5 cm image 62 series 601 and image 67 series 2 attributed to recent biopsy proven pancreatic malignancy  Severe atrophy and pancreatic duct dilation in the body and tail of the pancreas     Review of Systems   Constitutional: Positive for activity change, fatigue and unexpected weight change  Negative for diaphoresis and fever  HENT: Negative  Eyes: Negative for visual disturbance  Respiratory: Negative for cough, chest tightness and shortness of breath  Cardiovascular: Negative for chest pain, palpitations and leg swelling  Gastrointestinal: Negative for abdominal pain, constipation, diarrhea, nausea and vomiting  Endocrine: Positive for polyuria  Negative for cold intolerance, heat intolerance, polydipsia and polyphagia  Genitourinary: Negative for dysuria, enuresis, frequency and urgency  Musculoskeletal: Positive for arthralgias and myalgias  Skin: Negative for pallor, rash and wound  Allergic/Immunologic: Negative  Neurological: Negative for dizziness, tremors, weakness and numbness  Hematological: Negative  Psychiatric/Behavioral: Negative  Historical Information   Past Medical History:   Diagnosis Date    Cancer [facility])     pancreatic    Diabetes mellitus ([facility] [address] )     Frequent urination     GERD (gastroesophageal reflux disease)     Hyperlipidemia     Hypertension     Peripheral neuropathy     Weakness     Weight loss      Past Surgical History:   Procedure Laterality Date    APPENDECTOMY      CATARACT EXTRACTION      COLONOSCOPY      SKIN LESION EXCISION      of a wart on right arm    TUNNELED VENOUS PORT PLACEMENT N/A 3/26/2021    Procedure: INSERTION VENOUS PORT (PORT-A-CATH); Surgeon: Ashley Patino MD;  Location:  MAIN OR;  Service: Surgical Oncology     Social History   Social History     Substance and Sexual Activity   Alcohol Use Never    Frequency: Never    Binge frequency: Never     Social History     Substance and Sexual Activity   Drug Use Never     Social History     Tobacco Use   Smoking Status Former Smoker    Types: Pipe    Quit date: 2/23/2011    Years since quitting: 10 2   Smokeless Tobacco Never Used     Family History: History reviewed  No pertinent family history      Meds/Allergies   Current Outpatient Medications   Medication Sig Dispense Refill    Accu-Chek FastClix Lancets MISC USE 1 TO CHECK GLUCOSE TWICE DAILY TO THREE TIMES DAILY      Accu-Chek Guide test strip USE 1 STRIP TWICE DAILY TO THREE TIMES DAILY      aspirin 81 mg chewable tablet Chew 81 mg daily      atorvastatin (LIPITOR) 40 mg tablet Take 40 mg by mouth daily      Blood Glucose Monitoring Suppl (Accu-Chek Guide) w/Device KIT USE TO CHECK GLUCOSE TWO TO THREE TIMES DAILY      metFORMIN (GLUCOPHAGE) 850 mg tablet TAKE 1 TABLET BY MOUTH TWICE DAILY TAKE WITH MORNING AND EVENING MEAL      metoprolol succinate (TOPROL-XL) 100 mg 24 hr tablet Take 100 mg by mouth daily      NovoLOG FlexPen 100 units/mL injection pen Inject as per sliding scale , (  Scale - 150-200 -1 units, 201-250-2 units, 251-300 -3 units, 301-350-4 units, > 350- 5  units) with meals 15 mL     prochlorperazine (COMPAZINE) 10 mg tablet Take 1 tablet (10 mg total) by mouth every 6 (six) hours as needed for nausea or vomiting 45 tablet 3    ramipril (ALTACE) 10 MG capsule Take 10 mg by mouth daily       sodium chloride (GRAHAM 128) 5 % hypertonic ophthalmic solution 1 drop as needed      acetaminophen (TYLENOL) 325 mg tablet Take 650 mg by mouth every 4 (four) hours as needed for mild pain      insulin glargine (Lantus SoloStar) 100 units/mL injection pen Inject 8 units at bedtime 15 mL 0    ReliOn Pen Needles 31G X 6 MM MISC USE 1 IN THE EVENING      traMADol (ULTRAM) 50 mg tablet Take 1 tablet (50 mg total) by mouth every 6 (six) hours as needed for moderate pain (Patient not taking: Reported on 5/5/2021) 15 tablet 0     No current facility-administered medications for this visit  No Known Allergies    Objective   Vitals: Blood pressure 118/60, pulse 82, temperature 97 9 °F (36 6 °C), weight 64 4 kg (142 lb)  Physical Exam  Vitals signs reviewed  Constitutional:       General: He is not in acute distress  Appearance: Normal appearance  He is well-developed  HENT:      Head: Normocephalic and atraumatic  Eyes:      Pupils: Pupils are equal, round, and reactive to light  Neck:      Musculoskeletal: Normal range of motion and neck supple  Thyroid: No thyromegaly  Cardiovascular:      Rate and Rhythm: Normal rate and regular rhythm  Heart sounds: Normal heart sounds  Pulmonary:      Effort: Pulmonary effort is normal  No respiratory distress  Breath sounds: Normal breath sounds  Abdominal:      General: Bowel sounds are normal       Palpations: Abdomen is soft  Musculoskeletal: Normal range of motion  General: Deformity present  No swelling  Comments: Redness of left foot present   Skin:     General: Skin is warm and dry  Findings: No erythema  Neurological:      General: No focal deficit present  Mental Status: He is alert and oriented to person, place, and time  Psychiatric:         Mood and Affect: Mood normal          Behavior: Behavior normal          The history was obtained from the review of the chart, patient      Lab Results:   Lab Results   Component Value Date/Time    Hemoglobin A1C 9 4 (H) 04/08/2021 06:59 PM    WBC 5 18 05/03/2021 07:49 AM    WBC 6 49 04/26/2021 07:53 AM    WBC 12 39 (H) 04/16/2021 07:34 AM    Hemoglobin 9 7 (L) 05/03/2021 07:49 AM    Hemoglobin 10 7 (L) 04/26/2021 07:53 AM    Hemoglobin 10 8 (L) 04/16/2021 07:34 AM    Hematocrit 29 5 (L) 05/03/2021 07:49 AM    Hematocrit 32 8 (L) 04/26/2021 07:53 AM    Hematocrit 33 1 (L) 04/16/2021 07:34 AM    MCV 96 05/03/2021 07:49 AM    MCV 97 04/26/2021 07:53 AM    MCV 96 04/16/2021 07:34 AM    Platelets 131 55/31/1834 07:49 AM    Platelets 602 (H) 18/85/9255 07:53 AM    Platelets 753 91/67/0636 07:34 AM    BUN 31 (H) 05/03/2021 07:49 AM    BUN 27 (H) 04/26/2021 07:53 AM    BUN 21 04/16/2021 07:34 AM    Potassium 4 9 05/03/2021 07:49 AM    Potassium 5 2 04/26/2021 07:53 AM    Potassium 5 4 (H) 04/16/2021 07:34 AM    Chloride 97 (L) 05/03/2021 07:49 AM    Chloride 99 (L) 04/26/2021 07:53 AM    Chloride 99 (L) 04/16/2021 07:34 AM    CO2 25 05/03/2021 07:49 AM    CO2 23 04/26/2021 07:53 AM    CO2 24 04/16/2021 07:34 AM    CO2, i-STAT 21 04/08/2021 07:07 PM    Creatinine 0 93 05/03/2021 07:49 AM    Creatinine 0 81 04/26/2021 07:53 AM    Creatinine 0 91 04/16/2021 07:34 AM    AST 20 05/03/2021 07:49 AM    AST 20 04/26/2021 07:53 AM    AST 38 04/16/2021 07:34 AM    ALT 32 05/03/2021 07:49 AM    ALT 44 04/26/2021 07:53 AM    ALT 78 04/16/2021 07:34 AM    Albumin 2 9 (L) 05/03/2021 07:49 AM    Albumin 3 1 (L) 04/26/2021 07:53 AM    Albumin 2 7 (L) 04/16/2021 07:34 AM           Imaging Studies: I have personally reviewed pertinent reports  Portions of the record may have been created with voice recognition software  Occasional wrong word or "sound a like" substitutions may have occurred due to the inherent limitations of voice recognition software  Read the chart carefully and recognize, using context, where substitutions have occurred

## 2021-05-05 NOTE — TELEPHONE ENCOUNTER
----- Message from Priscila Charles RN sent at 5/4/2021  2:49 PM EDT -----  Regarding: weight loss  Pt is diabetic and having trouble with his blood sugars  Albumin and protein are decreased on labs  Gets diarrhea , never knows when  30# wt loss since 2/23  Has 1 - 2 greek yogurts a day and drinks Glucerna  Call his cell phone # [phone]  I believe you have tried to contact him before, but when they try to return your call there is no answer or number is wrong    Thank you

## 2021-05-05 NOTE — PATIENT INSTRUCTIONS
Hypoglycemia instructions   Josie Denver  5/5/2021  [id number]    Low Blood Sugar    Steps to treat low blood sugar  1  Test blood sugar if you have symptoms of low blood sugar:   Low Blood Sugar Symptoms:  o Sweaty  o Dizzy  o Rapid heartbeat  o Shaky    o Bad mood  o Hungry      2  Treat blood sugar less than 70 with 15 grams of fast-acting carbohydrate:   Examples of 15 grams Fast-Acting Carbohydrate:  o 4 oz juice  o 4 oz regular soda  o 3-4 glucose tablets (chew)  o 3-4 hard candies (chew)              3    Wait 15 minutes and test your blood sugar again           4   If blood sugar is less than 100, repeat steps 2-3       5  When your blood sugar is 100 or more, eat a snack if it will be longer than one hour until your next meal  The snack should be 15 grams of carbohydrate and a protein:   Examples of snacks:  o ½ sandwich  o 6 crackers with cheese  o Piece of fruit with cheese or peanut butter  o 6 crackers with peanut butter      Joe Manley MD

## 2021-05-05 NOTE — TELEPHONE ENCOUNTER
Call placed to [facility] today per Infusion RN request (see below)  No answer and unable to leave voice message on cell  Will try to reach pt at a later date

## 2021-05-07 PROBLEM — E11.65 TYPE 2 DIABETES MELLITUS WITH HYPERGLYCEMIA, WITH LONG-TERM CURRENT USE OF INSULIN (HCC): Chronic | Status: ACTIVE | Noted: 2021-01-01

## 2021-05-07 PROBLEM — Z79.4 TYPE 2 DIABETES MELLITUS WITH HYPERGLYCEMIA, WITH LONG-TERM CURRENT USE OF INSULIN (HCC): Chronic | Status: ACTIVE | Noted: 2021-01-01

## 2021-05-07 PROBLEM — D72.829 LEUKOCYTOSIS: Status: ACTIVE | Noted: 2021-01-01

## 2021-05-07 PROBLEM — E87.5 HYPERKALEMIA: Status: ACTIVE | Noted: 2021-01-01

## 2021-05-07 PROBLEM — S31.000A SACRAL WOUND: Chronic | Status: ACTIVE | Noted: 2021-01-01

## 2021-05-07 PROBLEM — R53.1 GENERALIZED WEAKNESS: Status: ACTIVE | Noted: 2021-01-01

## 2021-05-07 NOTE — ASSESSMENT & PLAN NOTE
· Dx March 2021  · Follows with Oncology outpatient, Dr Odell Melton, to receive 2-3months adjuvant chemotherapy prior to resection by Dr Yovani Lees   · Currently undergoing chemotherapy treatment with Abraxane and Gemzar - last treatment 4/6

## 2021-05-07 NOTE — PLAN OF CARE
Problem: Potential for Falls  Goal: Patient will remain free of falls  Description: INTERVENTIONS:  - Assess patient frequently for physical needs  -  Identify cognitive and physical deficits and behaviors that affect risk of falls    -  [place] fall precautions as indicated by assessment   - Educate patient/family on patient safety including physical limitations  - Instruct patient to call for assistance with activity based on assessment  - Modify environment to reduce risk of injury  - Consider OT/PT consult to assist with strengthening/mobility  Outcome: Progressing

## 2021-05-07 NOTE — ASSESSMENT & PLAN NOTE
· Blood pressure stable  · Continue Toprol XL  · Ramipril on hold secondary to hyperkalemia    · Monitor BP

## 2021-05-07 NOTE — TELEPHONE ENCOUNTER
Attempted to reach pt again to reschedule his missed appt, however, noted pt to be in the hospital at this time  Will try again at another time

## 2021-05-07 NOTE — ASSESSMENT & PLAN NOTE
· Presents with generalized weakness over the past 2 weeks and intermittent dizziness/lightheadedness  · Timing seems to correlate with his 2nd round of chemotherapy  · Appearing dry on exam, mild dehydration may be the culprit  · Had recent admission approximately 1 month ago secondary to syncope, was given IV hydration and sent to rehabilitation  Medical workup was pertinent for right-sided 70% carotid stenosis    · PT/OT evals

## 2021-05-07 NOTE — ED PROVIDER NOTES
History  Chief Complaint   Patient presents with    Dizziness     patient reports dizziness as though lightheaded since last night  denies CP, SOB, v/d, fevers  58-year-old male undergoing chemotherapy for pancreatic cancer presents for evaluation of lightheadedness and dizziness beginning last evening  The patient denies any associated chest pain, pressure, shortness of breath  The patient is fatigue  He states that he had shaking chills last evening which have since resolved  He was sent over for further evaluation from the infusion center  The patient denies any trouble with urination  He denies any nausea or vomiting  He states that he has had loose stools for the past week  Prior to Admission Medications   Prescriptions Last Dose Informant Patient Reported? Taking?    Accu-Chek FastClix Lancets MISC  Self Yes No   Sig: USE 1 TO CHECK GLUCOSE TWICE DAILY TO THREE TIMES DAILY   Accu-Chek Guide test strip  Self Yes No   Sig: USE 1 STRIP TWICE DAILY TO THREE TIMES DAILY   Blood Glucose Monitoring Suppl (Accu-Chek Guide) w/Device KIT  Self Yes No   Sig: USE TO CHECK GLUCOSE TWO TO THREE TIMES DAILY   NovoLOG FlexPen 100 units/mL injection pen  Self No No   Sig: Inject as per sliding scale , (  Scale - 150-200 -1 units, 201-250-2 units, 251-300 -3 units, 301-350-4 units, > 350- 5  units) with meals   ReliOn Pen Needles 31G X 6 MM MISC  Self Yes No   Sig: USE 1 IN THE EVENING   acetaminophen (TYLENOL) 325 mg tablet  Self Yes No   Sig: Take 650 mg by mouth every 4 (four) hours as needed for mild pain   aspirin 81 mg chewable tablet  Self Yes No   Sig: Chew 81 mg daily   atorvastatin (LIPITOR) 40 mg tablet  Self Yes No   Sig: Take 40 mg by mouth daily   insulin glargine (Lantus SoloStar) 100 units/mL injection pen  Self No No   Sig: Inject 8 units at bedtime   metFORMIN (GLUCOPHAGE) 850 mg tablet  Self Yes No   Sig: TAKE 1 TABLET BY MOUTH TWICE DAILY TAKE WITH MORNING AND EVENING MEAL   metoprolol succinate (TOPROL-XL) 100 mg 24 hr tablet  Self Yes No   Sig: Take 100 mg by mouth daily   prochlorperazine (COMPAZINE) 10 mg tablet  Self No No   Sig: Take 1 tablet (10 mg total) by mouth every 6 (six) hours as needed for nausea or vomiting   ramipril (ALTACE) 10 MG capsule  Self Yes No   Sig: Take 10 mg by mouth daily    sodium chloride (GRAHAM 128) 5 % hypertonic ophthalmic solution  Self Yes No   Si drop as needed   traMADol (ULTRAM) 50 mg tablet  Self No No   Sig: Take 1 tablet (50 mg total) by mouth every 6 (six) hours as needed for moderate pain      Facility-Administered Medications: None       Past Medical History:   Diagnosis Date    [facility]     pancreatic    Diabetes [facility]     Frequent urination     GERD (gastroesophageal reflux disease)     Hyperlipidemia     Hypertension     Peripheral neuropathy     Weakness     Weight loss        Past Surgical History:   Procedure Laterality Date    APPENDECTOMY      CATARACT EXTRACTION      COLONOSCOPY      SKIN LESION EXCISION      of a wart on right arm    TUNNELED VENOUS PORT PLACEMENT N/A 3/26/2021    Procedure: INSERTION VENOUS PORT (PORT-A-CATH); Surgeon: Dianne Carlos MD;  Location: [facility] OR;  Service: Surgical Oncology       History reviewed  No pertinent family history  I have reviewed and agree with the history as documented  E-Cigarette/Vaping    E-Cigarette Use Never User      E-Cigarette/Vaping Substances    Nicotine No     THC No     CBD No     Flavoring No     Other No     Unknown No      Social History     Tobacco Use    Smoking status: Former Smoker     Types: Pipe     Quit date: 2011     Years since quitting: 10 2    Smokeless tobacco: Never Used   Substance Use Topics    Alcohol use: Never     Frequency: Never     Binge frequency: Never    Drug use: Never       Review of Systems   Constitutional: Positive for fatigue  Negative for chills and fever  HENT: Negative for sore throat  Respiratory: Negative for cough, chest tightness and shortness of breath  Cardiovascular: Negative for chest pain and palpitations  Gastrointestinal: Negative for abdominal pain, constipation, diarrhea, nausea and vomiting  Genitourinary: Negative for difficulty urinating and dysuria  Musculoskeletal: Negative for back pain  Skin: Negative for rash  Neurological: Positive for dizziness and light-headedness  Negative for seizures, syncope, weakness and headaches  All other systems reviewed and are negative  Physical Exam  Physical Exam  Vitals signs and nursing note reviewed  Constitutional:       General: He is not in acute distress  Appearance: He is well-developed  HENT:      Head: Normocephalic and atraumatic  Right Ear: External ear normal       Left Ear: External ear normal       Mouth/Throat:      Pharynx: No oropharyngeal exudate  Eyes:      General: No scleral icterus  Pupils: Pupils are equal, round, and reactive to light  Neck:      Musculoskeletal: Normal range of motion and neck supple  Cardiovascular:      Rate and Rhythm: Normal rate and regular rhythm  Heart sounds: Normal heart sounds  Pulmonary:      Effort: Pulmonary effort is normal  No respiratory distress  Breath sounds: Normal breath sounds  Abdominal:      General: Bowel sounds are normal       Palpations: Abdomen is soft  Tenderness: There is no abdominal tenderness  There is no guarding or rebound  Musculoskeletal: Normal range of motion  Skin:     General: Skin is warm and dry  Findings: No rash  Neurological:      Mental Status: He is alert and oriented to person, place, and time           Vital Signs  ED Triage Vitals [05/07/21 0931]   Temperature Pulse Respirations Blood Pressure SpO2   97 8 °F (36 6 °C) 75 18 122/57 98 %      Temp Source Heart Rate Source Patient Position - Orthostatic VS BP Location FiO2 (%)   Temporal Monitor Standing Right arm --      Pain Score       No Pain           Vitals:    05/07/21 1345 05/07/21 1400 05/07/21 1430 05/07/21 1530   BP: 104/54 158/66 134/61 150/67   Pulse: 79 81 79 87   Patient Position - Orthostatic VS: Lying  Lying Lying         Visual Acuity  Visual Acuity      Most Recent Value   L Pupil Size (mm)  3   R Pupil Size (mm)  3   L Pupil Shape  Round   R Pupil Shape  Round          ED Medications  Medications   sodium chloride 0 9 % infusion (100 mL/hr Intravenous New Bag 5/7/21 1339)   insulin glargine (LANTUS) subcutaneous injection 8 Units 0 08 mL (has no administration in time range)   atorvastatin (LIPITOR) tablet 40 mg (has no administration in time range)   aspirin chewable tablet 81 mg (has no administration in time range)   metoprolol succinate (TOPROL-XL) 24 hr tablet 100 mg (has no administration in time range)   traMADol (ULTRAM) tablet 50 mg (has no administration in time range)   ondansetron (ZOFRAN) injection 4 mg (has no administration in time range)   acetaminophen (TYLENOL) tablet 650 mg (has no administration in time range)   enoxaparin (LOVENOX) subcutaneous injection 40 mg (has no administration in time range)   insulin lispro (HumaLOG) 100 units/mL subcutaneous injection 1-5 Units (has no administration in time range)   sodium chloride 0 9 % bolus 1,000 mL (0 mL Intravenous Stopped 5/7/21 1049)   insulin regular (HumuLIN R,NovoLIN R) injection 10 Units (10 Units Intravenous Given 5/7/21 1158)   dextrose 50 % IV solution 25 mL (25 mL Intravenous Given 5/7/21 1158)   calcium gluconate 1 g in sodium chloride 0 9% 50 mL (premix) (0 g Intravenous Stopped 5/7/21 1246)   sodium bicarbonate 8 4 % injection 50 mEq (50 mEq Intravenous Given 5/7/21 1214)       Diagnostic Studies  Results Reviewed     Procedure Component Value Units Date/Time    Platelet count [[id number]]     Lab Status: No result Specimen: Blood     Potassium [[id number]]  (Normal) Collected: 05/07/21 1339    Lab Status: Final result Specimen: Blood from Arm, Left Updated: 05/07/21 1402     Potassium 4 7 mmol/L     Blood culture #2 [[id number]] Collected: 05/07/21 1110    Lab Status: In process Specimen: Blood from Arm, Right Updated: 05/07/21 1252    Blood culture #1 [[id number]] Collected: 05/07/21 1110    Lab Status:  In process Specimen: Blood from Arm, Left Updated: 05/07/21 1252    UA (URINE) with reflex to Scope [[id number]]  (Abnormal) Collected: 05/07/21 1157    Lab Status: Final result Specimen: Urine, Clean Catch Updated: 05/07/21 1231     Color, UA Yellow     Clarity, UA Clear     Specific Gravity, UA 1 010     pH, UA 6 0     Leukocytes, UA Negative     Nitrite, UA Negative     Protein, UA Negative mg/dl      Glucose,  (1/4%) mg/dl      Ketones, UA Negative mg/dl      Urobilinogen, UA 0 2 E U /dl      Bilirubin, UA Negative     Blood, UA Negative    Fingerstick Glucose (POCT) [[id number]]  (Abnormal) Collected: 05/07/21 1228    Lab Status: Final result Updated: 05/07/21 1229     POC Glucose 225 mg/dl     Comprehensive metabolic panel [[id number]]  (Abnormal) Collected: 05/07/21 1046    Lab Status: Final result Specimen: Blood from Arm, Left Updated: 05/07/21 1126     Sodium 126 mmol/L      Potassium 5 9 mmol/L      Chloride 96 mmol/L      CO2 18 mmol/L      ANION GAP 12 mmol/L      BUN 33 mg/dL      Creatinine 1 12 mg/dL      Glucose 275 mg/dL      Calcium 8 2 mg/dL      Corrected Calcium 9 3 mg/dL      AST 17 U/L      ALT 29 U/L      Alkaline Phosphatase 106 U/L      Total Protein 5 7 g/dL      Albumin 2 6 g/dL      Total Bilirubin 0 70 mg/dL      eGFR 63 ml/min/1 73sq m     Narrative:      [place] guidelines for Chronic Kidney Disease (CKD):     Stage 1 with normal or high GFR (GFR > 90 mL/min/1 73 square meters)    Stage 2 Mild CKD (GFR = 60-89 mL/min/1 73 square meters)    Stage 3A Moderate CKD (GFR = 45-59 mL/min/1 73 square meters)    Stage 3B Moderate CKD (GFR = 30-44 mL/min/1 73 square meters)    Stage 4 Severe CKD (GFR = 15-29 mL/min/1 73 square meters)    Stage 5 End Stage CKD (GFR <15 mL/min/1 73 square meters)  Note: GFR calculation is accurate only with a steady state creatinine    CBC and differential [[id number]]  (Abnormal) Collected: 05/07/21 0948    Lab Status: Final result Specimen: Blood from Arm, Left Updated: 05/07/21 1029     WBC 17 74 Thousand/uL      RBC 2 81 Million/uL      Hemoglobin 9 1 g/dL      Hematocrit 26 9 %      MCV 96 fL      MCH 32 4 pg      MCHC 33 8 g/dL      RDW 19 1 %      MPV 10 1 fL      Platelets 590 Thousands/uL      nRBC 0 /100 WBCs      Neutrophils Relative 95 %      Immat GRANS % 1 %      Lymphocytes Relative 3 %      Monocytes Relative 1 %      Eosinophils Relative 0 %      Basophils Relative 0 %      Neutrophils Absolute 16 92 Thousands/µL      Immature Grans Absolute 0 15 Thousand/uL      Lymphocytes Absolute 0 49 Thousands/µL      Monocytes Absolute 0 10 Thousand/µL      Eosinophils Absolute 0 03 Thousand/µL      Basophils Absolute 0 05 Thousands/µL     Narrative: This is an appended report  These results have been appended to a previously verified report  Troponin I [[id number]]  (Normal) Collected: 05/07/21 0948    Lab Status: Final result Specimen: Blood from Arm, Left Updated: 05/07/21 1018     Troponin I <0 02 ng/mL     Magnesium [[id number]]  (Normal) Collected: 05/07/21 0948    Lab Status: Final result Specimen: Blood from Arm, Left Updated: 05/07/21 1017     Magnesium 1 6 mg/dL                  XR chest 1 view portable   ED Interpretation by Rosa M Stoner DO (05/07 6758)   No acute cardiopulmonary disease  Unchanged when compared to previous      Final Result by Thanh Carr MD (05/07 1215)      No acute cardiopulmonary disease  Workstation performed: [id number]                    Procedures  ECG 12 Lead Documentation Only    Date/Time: 5/7/2021 9:44 AM  Performed by: Rosa M Stoner DO  Authorized by:  Rosa M Stoner DO     Indications / Diagnosis:  Fatigue  ECG reviewed by me, the ED Provider: yes    Patient location:  ED  Previous ECG:     Previous ECG:  Compared to current    Comparison ECG info:  A bright 2021    Similarity:  No change  Interpretation:     Interpretation: normal    Rate:     ECG rate:  84    ECG rate assessment: normal    Rhythm:     Rhythm: sinus rhythm    Ectopy:     Ectopy: none    QRS:     QRS axis:  Normal    QRS intervals:  Normal  Conduction:     Conduction: normal    ST segments:     ST segments:  Normal  T waves:     T waves: normal               ED Course  ED Course as of May 07 1618   Fri May 07, 2021   1053 Patient stable upon re-evaluation      1141 I discussed the case with the hospitalist  We reviewed the HPI, pertinent PMH, ED course and workup  Hospitalist agreed with plan and will admit the patient to the hospital                                 SBIRT 20yo+      Most Recent Value   SBIRT (23 yo +)   In order to provide better care to our patients, we are screening all of our patients for alcohol and drug use  Would it be okay to ask you these screening questions? No Filed at: 05/07/2021 0949                    MDM  Number of Diagnoses or Management Options  Dizziness:   Hyperkalemia:   Hyponatremia:   Leukocytosis:   Diagnosis management comments: Differential diagnosis:  Anemia, renal failure, electrolyte disturbance, dehydration, infection, arrhythmia,    Hyperkalemia noted without significant EKG changes    Begin hyper K protocol and admit       Amount and/or Complexity of Data Reviewed  Clinical lab tests: reviewed  Tests in the radiology section of CPT®: reviewed  Decide to obtain previous medical records or to obtain history from someone other than the patient: yes  Discuss the patient with other providers: yes  Independent visualization of images, tracings, or specimens: yes        Disposition  Final diagnoses:   Dizziness   Hyponatremia   Hyperkalemia   Leukocytosis     Time reflects when diagnosis was documented in both MDM as applicable and the Disposition within this note     Time User Action Codes Description Comment    5/7/2021 11:43 AM Jasmine Cynthia B Add [R42] Dizziness     5/7/2021 11:43 AM Jasmine Cynthia B Add [E87 6] Hypokalemia     5/7/2021 11:43 AM Jasmine Cynthia B Remove [E87 6] Hypokalemia     5/7/2021 11:43 AM Jasmine Cynthia B Add [E87 1] Hyponatremia     5/7/2021 11:43 AM Jasmine Cynthia B Add [E87 5] Hyperkalemia     5/7/2021 11:44 AM Anuel Soda Add [R00 705] Leukocytosis       ED Disposition     ED Disposition Condition Date/Time Comment    Admit Stable Fri May 7, 2021 11:43 AM Case was discussed with Dr Romana Casanova and the patient's admission status was agreed to be Admission Status: inpatient status to the service of Dr Romana Casanova   Follow-up Information    None         Patient's Medications   Discharge Prescriptions    No medications on file     No discharge procedures on file      PDMP Review       Value Time User    PDMP Reviewed  Yes 4/14/2021 12:47 PM Chel Zarate MD          ED Provider  Electronically Signed by           Allison Shipley DO  05/07/21 7837

## 2021-05-07 NOTE — ASSESSMENT & PLAN NOTE
· Potassium upon admission 5 9  · Was given calcium gluconate, D50/insulin/sodium bicarbonate  · Repeat potassium

## 2021-05-07 NOTE — ASSESSMENT & PLAN NOTE
· Sodium corrects to 129 in the setting of hyperglycemia  · Will give IV hydration overnight, repeat BMP in a m  Kedar Avitia · Encourage oral intake

## 2021-05-07 NOTE — ASSESSMENT & PLAN NOTE
· Present upon admission at 16   · No evidence of active infection  Reported chills but no fever home  · May be secondary to hemoconcentration from dehydration    Will give IV hydration and repeat CBC with differential

## 2021-05-07 NOTE — ASSESSMENT & PLAN NOTE
Lab Results   Component Value Date    HGBA1C 9 4 (H) 04/08/2021       Recent Labs     05/07/21  1228   POCGLU 225*       Blood Sugar Average: Last 72 hrs:  (P) 225   · Recent A1c shows poor control  · Recently started on Lantus 8 units q h s  With Endocrinology    · Continue sliding scale coverage  · Adjust insulin regimen as needed  · Metformin on hold

## 2021-05-07 NOTE — H&P
[place]  H&P- Bryan Dennison 1945, 67 y o  male MRN: [id number]  Unit/Bed#: ED 08 Encounter: [id number]  Primary Care Provider: Dewayne Lemus DO   Date and time admitted to hospital: 5/7/2021  9:27 AM    * Generalized weakness  Assessment & Plan  · Presents with generalized weakness over the past 2 weeks and intermittent dizziness/lightheadedness  · Timing seems to correlate with his 2nd round of chemotherapy  · Appearing dry on exam, mild dehydration may be the culprit  · Had recent admission approximately 1 month ago secondary to syncope, was given IV hydration and sent to rehabilitation  Medical workup was pertinent for right-sided 70% carotid stenosis  · PT/OT evals    Hyponatremia  Assessment & Plan  · Sodium corrects to 129 in the setting of hyperglycemia  · Will give IV hydration overnight, repeat BMP in a [facility] · Encourage oral intake  Hyperkalemia  Assessment & Plan  · Potassium upon admission 5 9  · Was given calcium gluconate, D50/insulin/sodium bicarbonate  · Repeat potassium  Leukocytosis  Assessment & Plan  · Present upon admission at 16   · No evidence of active infection  Reported chills but no fever home  · May be secondary to hemoconcentration from dehydration  Will give IV hydration and repeat CBC with differential    Sacral wound  Assessment & Plan  · Stable sacral wound noted upon exam   No erythema, drainage      Adenocarcinoma of pancreas [facility])  Assessment & Plan  · Dx March 2021  · Follows with Oncology outpatient, Dr Vicente Winter, to receive 2-3months adjuvant chemotherapy prior to resection by Dr Evelyn Ponce   · Currently undergoing chemotherapy treatment with Abraxane and Gemzar - last treatment 4/6    Type 2 diabetes mellitus with hyperglycemia, with long-term current use of insulin [facility])  Assessment & Plan  Lab Results   Component Value Date    HGBA1C 9 4 (H) 04/08/2021       Recent Labs     05/07/21  1228   POCGLU 225*       Blood Sugar Average: Last 72 hrs:  (P) 225   · Recent A1c shows poor control  · Recently started on Lantus 8 units q h s  With Endocrinology  · Continue sliding scale coverage  · Adjust insulin regimen as needed  · Metformin on hold    HTN (hypertension)  Assessment & Plan  · Blood pressure stable  · Continue Toprol XL  · Ramipril on hold secondary to hyperkalemia  · Monitor BP    VTE Prophylaxis: Enoxaparin (Lovenox)  / sequential compression device   Code Status: full code  POLST: POLST form is not discussed and not completed at this time  Discussion with family: patient     Anticipated Length of Stay:  Patient will be admitted on an Inpatient basis with an anticipated length of stay of > 2 midnights  Justification for Hospital Stay: weakness, hyponatremia    Total Time for Visit, including Counseling / Coordination of Care: 70 minutes  Greater than 50% of this total time spent on direct patient counseling and coordination of care  Chief Complaint:   Weakness, dizziness    History of Present Illness:    Marilin Levy is a 67 y o  male past medical history significant for adenocarcinoma of pancreas, type 2 diabetes, hypertension who presents with dizziness, lightheadedness, generalized weakness over the past 2 weeks after starting his 2nd round of chemotherapy  Follows with Dr Alma Lima and has been receiving chemotherapy for the past 2 weeks with increased weakness, loose stools, lightheadedness, dizziness  He does report good appetite and oral intake, with adequate urination  Reports loose stools but no diarrhea  Ever since starting his 2nd round of chemotherapy reports it is taking a toll on him  Blood sugars have been high, into the 200s at times  Has seen an endocrinologist and was recently started on Lantus  Denies any abdominal pain, nausea, vomiting  No syncopal episodes  Reports his dizziness is positional at times  He has had an episode of chills last night but no fever    No reports of any coughing, shortness of breath, sick contacts, chest pain  Review of Systems:    Review of Systems   Constitutional: Positive for chills and fatigue  Negative for activity change, appetite change and fever  HENT: Negative for congestion, rhinorrhea, sinus pressure and sore throat  Eyes: Negative for photophobia, pain and visual disturbance  Respiratory: Negative for cough, shortness of breath and wheezing  Cardiovascular: Negative for chest pain, palpitations and leg swelling  Gastrointestinal: Positive for diarrhea (loose stools)  Negative for abdominal distention, abdominal pain, constipation, nausea and vomiting  Endocrine: Negative for cold intolerance, heat intolerance, polydipsia and polyuria  Genitourinary: Negative for difficulty urinating, dysuria, flank pain, frequency and hematuria  Musculoskeletal: Negative for arthralgias, back pain and joint swelling  Skin: Negative for color change, pallor and rash  Allergic/Immunologic: Negative  Neurological: Positive for weakness and light-headedness  Negative for dizziness, syncope and headaches  Hematological: Negative  Psychiatric/Behavioral: Negative  Past Medical and Surgical History:     Past Medical History:   Diagnosis Date    Cancer [facility])     pancreatic    Diabetes mellitus ([facility] [address] )     Frequent urination     GERD (gastroesophageal reflux disease)     Hyperlipidemia     Hypertension     Peripheral neuropathy     Weakness     Weight loss        Past Surgical History:   Procedure Laterality Date    APPENDECTOMY      CATARACT EXTRACTION      COLONOSCOPY      SKIN LESION EXCISION      of a wart on right arm    TUNNELED VENOUS PORT PLACEMENT N/A 3/26/2021    Procedure: INSERTION VENOUS PORT (PORT-A-CATH); Surgeon: Ryan Randolph MD;  Location:  MAIN OR;  Service: Surgical Oncology       Meds/Allergies:    Prior to Admission medications    Medication Sig Start Date End Date Taking?  Authorizing Provider   Kaden Rocha Lancets MISC USE 1 TO CHECK GLUCOSE TWICE DAILY TO THREE TIMES DAILY 2/9/21   Historical Provider, MD   Accu-Chek Guide test strip USE 1 STRIP TWICE DAILY TO THREE TIMES DAILY 2/9/21   Historical Provider, MD   acetaminophen (TYLENOL) 325 mg tablet Take 650 mg by mouth every 4 (four) hours as needed for mild pain    Historical Provider, MD   aspirin 81 mg chewable tablet Chew 81 mg daily    Historical Provider, MD   atorvastatin (LIPITOR) 40 mg tablet Take 40 mg by mouth daily 12/20/20   Historical Provider, MD   Blood Glucose Monitoring Suppl (Accu-Chek Guide) w/Device KIT USE TO CHECK GLUCOSE TWO TO THREE TIMES DAILY 1/8/21   Historical Provider, MD   insulin glargine (Lantus SoloStar) 100 units/mL injection pen Inject 8 units at bedtime 5/5/21   Lucy Yousif MD   metFORMIN (GLUCOPHAGE) 850 mg tablet TAKE 1 TABLET BY MOUTH TWICE DAILY TAKE WITH MORNING AND EVENING MEAL 12/20/20   Historical Provider, MD   metoprolol succinate (TOPROL-XL) 100 mg 24 hr tablet Take 100 mg by mouth daily 3/8/21   Historical Provider, MD   NovoLOG FlexPen 100 units/mL injection pen Inject as per sliding scale , (  Scale - 150-200 -1 units, 201-250-2 units, 251-300 -3 units, 301-350-4 units, > 350- 5  units) with meals 5/5/21   Lucy Yousif MD   prochlorperazine (COMPAZINE) 10 mg tablet Take 1 tablet (10 mg total) by mouth every 6 (six) hours as needed for nausea or vomiting 3/17/21   Brittnee Mathew MD   ramipril (ALTACE) 10 MG capsule Take 10 mg by mouth daily  12/20/20   Historical Provider, MD   ReliOn Pen Needles 31G X 6 MM MISC USE 1 IN THE EVENING 1/1/21   Historical Provider, MD   sodium chloride (GRAHAM 128) 5 % hypertonic ophthalmic solution 1 drop as needed    Historical Provider, MD   traMADol (ULTRAM) 50 mg tablet Take 1 tablet (50 mg total) by mouth every 6 (six) hours as needed for moderate pain 3/23/21   JOI Brown     I have reviewed home medications with patient personally      Allergies: No Known Allergies    Social History:     Marital Status:    Occupation: non-contributory  Patient Pre-hospital Living Situation: with cousin  Patient Pre-hospital Level of Mobility: full  Patient Pre-hospital Diet Restrictions: low carb  Substance Use History:   Social History     Substance and Sexual Activity   Alcohol Use Never    Frequency: Never    Binge frequency: Never     Social History     Tobacco Use   Smoking Status Former Smoker    Types: Pipe    Quit date: 2/23/2011    Years since quitting: 10 2   Smokeless Tobacco Never Used     Social History     Substance and Sexual Activity   Drug Use Never       Family History:    non-contributory    Physical Exam:     Vitals:   Blood Pressure: 130/60 (05/07/21 1130)  Pulse: 74 (05/07/21 1200)  Temperature: 98 °F (36 7 °C) (05/07/21 1106)  Temp Source: Temporal (05/07/21 1106)  Respirations: 20 (05/07/21 1200)  Height: 5' 10" (177 8 cm) (05/07/21 0931)  Weight - Scale: 64 4 kg (142 lb) (05/07/21 0931)  SpO2: 100 % (05/07/21 1200)    Physical Exam  Vitals signs and nursing note reviewed  Constitutional:       General: He is not in acute distress  Appearance: Normal appearance  HENT:      Head: Normocephalic  Mouth/Throat:      Mouth: Mucous membranes are moist    Eyes:      Pupils: Pupils are equal, round, and reactive to light  Neck:      Musculoskeletal: Normal range of motion  Cardiovascular:      Rate and Rhythm: Normal rate and regular rhythm  Heart sounds: No murmur  Pulmonary:      Effort: Pulmonary effort is normal  No respiratory distress  Breath sounds: Normal breath sounds  No wheezing, rhonchi or rales  Abdominal:      General: Bowel sounds are normal  There is no distension  Palpations: Abdomen is soft  Tenderness: There is no abdominal tenderness  There is no guarding  Musculoskeletal:         General: No deformity  Right lower leg: No edema  Left lower leg: No edema     Skin:     Capillary Refill: Capillary refill takes less than 2 seconds  Coloration: Skin is pale  Comments: Stage 2 sacral wound with no surrounding erythema or drainage, purulence   Neurological:      General: No focal deficit present  Mental Status: He is alert and oriented to person, place, and time  Mental status is at baseline  Additional Data:     Lab Results: I have personally reviewed pertinent reports  Results from last 7 days   Lab Units 05/07/21  0948   WBC Thousand/uL 17 74*   HEMOGLOBIN g/dL 9 1*   HEMATOCRIT % 26 9*   PLATELETS Thousands/uL 141*   NEUTROS PCT % 95*   LYMPHS PCT % 3*   MONOS PCT % 1*   EOS PCT % 0     Results from last 7 days   Lab Units 05/07/21  1046   SODIUM mmol/L 126*   POTASSIUM mmol/L 5 9*   CHLORIDE mmol/L 96*   CO2 mmol/L 18*   BUN mg/dL 33*   CREATININE mg/dL 1 12   ANION GAP mmol/L 12   CALCIUM mg/dL 8 2*   ALBUMIN g/dL 2 6*   TOTAL BILIRUBIN mg/dL 0 70   ALK PHOS U/L 106   ALT U/L 29   AST U/L 17   GLUCOSE RANDOM mg/dL 275*         Results from last 7 days   Lab Units 05/07/21  1228   POC GLUCOSE mg/dl 225*               Imaging: I have personally reviewed pertinent reports  XR chest 1 view portable   ED Interpretation by Cameron Stein DO (05/07 2630)   No acute cardiopulmonary disease  Unchanged when compared to previous      Final Result by Lobo Gaviria MD (05/07 1219)      No acute cardiopulmonary disease  Workstation performed: [id number]             EKG, Pathology, and Other Studies Reviewed on Admission:   · Prior pertinent studies and records reviewed in  [facility] [employer] Records Reviewed: Yes     ** Please Note: This note has been constructed using a voice recognition system   **

## 2021-05-08 NOTE — PROGRESS NOTES
[place]  Progress Note - Elner Sides 1945, 67 y o  male MRN: [id number]  Unit/Bed#: -Pat[facility] Encounter: [id number]  Primary Care Provider: Maricruz Do DO   Date and time admitted to hospital: 5/7/2021  9:27 AM    * Generalized weakness  Assessment & Plan  · Presents with generalized weakness over the past 2 weeks and intermittent dizziness/lightheadedness  · Timing seems to correlate with his 2nd round of chemotherapy  · Appearing dry on exam, mild dehydration may be the culprit  · Had recent admission approximately 1 month ago secondary to syncope, was given IV hydration and sent to rehabilitation  Medical workup was pertinent for right-sided 70% carotid stenosis  · PT/OT evals  · Check orthostatics    Hyponatremia  Assessment & Plan  · Sodium corrects to 129 in the setting of hyperglycemia  · Will give IV hydration overnight, repeat BMP in a [facility] · Encourage oral intake  Type 2 diabetes mellitus with hyperglycemia, with long-term current use of insulin [facility])  Assessment & Plan  Lab Results   Component Value Date    HGBA1C 9 4 (H) 04/08/2021       Recent Labs     05/07/21  1228 05/07/21  2143 05/08/21  0836   POCGLU 225* 316* 319*       Blood Sugar Average: Last 72 hrs:  (P) 913 [id number]   · Recent A1c shows poor control  · Recently started on Lantus 8 units q h s  With Endocrinology  Will increase to 12 units  · Continue sliding scale coverage  · Adjust insulin regimen as needed  · Metformin on hold    Leukocytosis  Assessment & Plan  · Present upon admission at 17   · No evidence of active infection  Reported chills but no fever home  · May be secondary to hemoconcentration from dehydration     · Resolved    Adenocarcinoma of pancreas [facility])  Assessment & Plan  · Dx March 2021  · Follows with Oncology outpatient, Dr Chuck Pallas, to receive 2-3months adjuvant chemotherapy prior to resection by Dr Abby Bejarano   · Currently undergoing chemotherapy treatment with Abraxane and Gemzar - last treatment     HTN (hypertension)  Assessment & Plan  · Blood pressure stable  · Continue Toprol XL  · Ramipril on hold secondary to hyperkalemia  · Monitor BP  · Check orthostatics    Sacral wound  Assessment & Plan  · Stable sacral wound noted upon exam   No erythema, drainage  Hyperkalemia  Assessment & Plan  · Potassium upon admission 5 9  · Was given calcium gluconate, D50/insulin/sodium bicarbonate  · Resolved    Anemia  Assessment & Plan  · Likely secondary to chemo  · Monitor          VTE Pharmacologic Prophylaxis: VTE Score: 5 High Risk (Score >/= 5) - Pharmacological DVT Prophylaxis Ordered: enoxaparin (Lovenox)  Sequential Compression Devices Ordered  Patient Centered Rounds: I performed bedside rounds with nursing staff today  Discussions with Specialists or Other Care Team Provider:     Education and Discussions with Family / Patient: Updated  (brother) at bedside  Time Spent for Care: 30 minutes  More than 50% of total time spent on counseling and coordination of care as described above  Current Length of Stay: 1 day(s)  Current Patient Status: Inpatient   Certification Statement: The patient will continue to require additional inpatient hospital stay due to see above  Discharge Plan: Anticipate discharge in 48 hrs to discharge location to be determined pending rehab evaluations  Code Status: Level 1 - Full Code    Subjective:   Feels a little better  Nursing states that the patient is still pretty shaky standing up    Objective:     Vitals:   Temp (24hrs), Av 5 °F (36 9 °C), Min:97 8 °F (36 6 °C), Max:99 °F (37 2 °C)    Temp:  [97 8 °F (36 6 °C)-99 °F (37 2 °C)] 97 8 °F (36 6 °C)  HR:  [] 88  Resp:  [17-22] 20  BP: (104-171)/(54-73) 131/61  SpO2:  [95 %-100 %] 100 %  Body mass index is 20 37 kg/m²  Input and Output Summary (last 24 hours):      Intake/Output Summary (Last 24 hours) at 2021 1227  Last data filed at 2021 1114  Gross per 24 hour   Intake 1660 ml   Output 1950 ml   Net -290 ml       Physical Exam:   Physical Exam  Constitutional:       Appearance: Normal appearance  Comments: thin   HENT:      Head: Normocephalic and atraumatic  Cardiovascular:      Rate and Rhythm: Normal rate and regular rhythm  Heart sounds: No murmur  Pulmonary:      Effort: Pulmonary effort is normal       Breath sounds: Normal breath sounds  Abdominal:      General: Bowel sounds are normal       Palpations: Abdomen is soft  Tenderness: There is no abdominal tenderness  Neurological:      Mental Status: He is alert and oriented to person, place, and time  Mental status is at baseline  Psychiatric:         Mood and Affect: Mood normal          Additional Data:     Labs:  Results from last 7 days   Lab Units 05/08/21  0436 05/07/21  0948   WBC Thousand/uL 6 97 17 74*   HEMOGLOBIN g/dL 7 7* 9 1*   HEMATOCRIT % 23 1* 26 9*   PLATELETS Thousands/uL 150 141*   BANDS PCT % 1  --    NEUTROS PCT %  --  95*   LYMPHS PCT %  --  3*   LYMPHO PCT % 6*  --    MONOS PCT %  --  1*   MONO PCT % 1*  --    EOS PCT % 0 0     Results from last 7 days   Lab Units 05/08/21  0436  05/07/21  1046   SODIUM mmol/L 127*  --  126*   POTASSIUM mmol/L 4 4   < > 5 9*   CHLORIDE mmol/L 96*  --  96*   CO2 mmol/L 22  --  18*   BUN mg/dL 22  --  33*   CREATININE mg/dL 0 78  --  1 12   ANION GAP mmol/L 9  --  12   CALCIUM mg/dL 7 6*  --  8 2*   ALBUMIN g/dL  --   --  2 6*   TOTAL BILIRUBIN mg/dL  --   --  0 70   ALK PHOS U/L  --   --  106   ALT U/L  --   --  29   AST U/L  --   --  17   GLUCOSE RANDOM mg/dL 228*  --  275*    < > = values in this interval not displayed           Results from last 7 days   Lab Units 05/08/21  0836 05/07/21  2143 05/07/21  1228   POC GLUCOSE mg/dl 319* 316* 225*               Lines/Drains:  Invasive Devices     Central Venous Catheter Line            Port A Cath 03/26/21 Right Subclavian 43 days          Peripheral Intravenous Line            Peripheral IV 05/07/21 Right Hand less than 1 day                Central Line:  Goal for removal: Will discontinue when meds requiring line are completed  Imaging: No pertinent imaging reviewed  Recent Cultures (last 7 days):   Results from last 7 days   Lab Units 05/07/21  1110   BLOOD CULTURE  Received in Microbiology Lab  Culture in Progress  Received in Microbiology Lab  Culture in Progress  Last 24 Hours Medication List:   Current Facility-Administered Medications   Medication Dose Route Frequency Provider Last Rate    acetaminophen  650 mg Oral Q6H PRN Renee Murillo PA-C      aspirin  81 mg Oral Daily Renee Murillo PA-C      atorvastatin  40 mg Oral Daily With Maury PA-C      enoxaparin  40 mg Subcutaneous Daily Renee Murillo PA-C      insulin glargine  8 Units Subcutaneous HS Renee Murillo PA-C      insulin lispro  1-5 Units Subcutaneous TID [facility] Renee Murillo PA-C      metoprolol succinate  100 mg Oral Daily Renee Murillo PA-C      ondansetron  4 mg Intravenous Q6H PRN Renee Murillo PA-C      sodium chloride  100 mL/hr Intravenous Continuous Renee Murillo PA-C 100 mL/hr (05/08/21 0245)    traMADol  50 mg Oral Q6H PRN Renee Murillo PA-C          Today, Patient Was Seen By: Axel Rothman PA-C    **Please Note: This note may have been constructed using a voice recognition system  **

## 2021-05-08 NOTE — ASSESSMENT & PLAN NOTE
· Blood pressure stable  · Continue Toprol XL  · Ramipril on hold secondary to hyperkalemia    · Monitor BP  · Check orthostatics

## 2021-05-08 NOTE — ASSESSMENT & PLAN NOTE
· Sodium corrects to 129 in the setting of hyperglycemia  · Will give IV hydration overnight, repeat BMP in [facility] · Encourage oral intake

## 2021-05-08 NOTE — ASSESSMENT & PLAN NOTE
· Presents with generalized weakness over the past 2 weeks and intermittent dizziness/lightheadedness  · Timing seems to correlate with his 2nd round of chemotherapy  · Appearing dry on exam, mild dehydration may be the culprit  · Had recent admission approximately 1 month ago secondary to syncope, was given IV hydration and sent to rehabilitation  Medical workup was pertinent for right-sided 70% carotid stenosis    · PT/OT evals  · Check orthostatics

## 2021-05-08 NOTE — ASSESSMENT & PLAN NOTE
· Present upon admission at 16   · No evidence of active infection  Reported chills but no fever home  · May be secondary to hemoconcentration from dehydration     · Resolved

## 2021-05-08 NOTE — ASSESSMENT & PLAN NOTE
· Potassium upon admission 5 9  · Was given calcium gluconate, D50/insulin/sodium bicarbonate  · Resolved

## 2021-05-08 NOTE — ASSESSMENT & PLAN NOTE
Lab Results   Component Value Date    HGBA1C 9 4 (H) 04/08/2021       Recent Labs     05/07/21  1228 05/07/21  2143 05/08/21  0836   POCGLU 225* 316* 319*       Blood Sugar Average: Last 72 hrs:  (P) 139 [id number]   · Recent A1c shows poor control  · Recently started on Lantus 8 units q h s  With Endocrinology   Will increase to 12 units  · Continue sliding scale coverage  · Adjust insulin regimen as needed  · Metformin on hold

## 2021-05-09 PROBLEM — R78.81 STREPTOCOCCAL BACTEREMIA: Status: ACTIVE | Noted: 2021-01-01

## 2021-05-09 PROBLEM — D72.829 LEUKOCYTOSIS: Status: RESOLVED | Noted: 2021-01-01 | Resolved: 2021-01-01

## 2021-05-09 PROBLEM — B95.5 STREPTOCOCCAL BACTEREMIA: Status: ACTIVE | Noted: 2021-01-01

## 2021-05-09 NOTE — PROGRESS NOTES
Vancomycin Assessment    Marshal Smart is a 67 y o  male who is currently receiving vancomycin 1250 mg Q12H (first dose at 1230 hrs 5/9/21) for bacteremia     Relevant clinical data and objective history reviewed:  Creatinine   Date Value Ref Range Status   05/09/2021 0 67 0 60 - 1 30 mg/dL Final     Comment:     Standardized to IDMS reference method   05/08/2021 0 78 0 60 - 1 30 mg/dL Final     Comment:     Standardized to IDMS reference method   05/07/2021 1 12 0 60 - 1 30 mg/dL Final     Comment:     Standardized to IDMS reference method     /70 (BP Location: Right arm)   Pulse 71   Temp 98 4 °F (36 9 °C) (Oral)   Resp 20   Ht 5' 10" (1 778 m)   Wt 64 4 kg (142 lb)   SpO2 100%   BMI 20 37 kg/m²   I/O last 3 completed shifts: In: 3980 [P O :980; I V :3000]  Out: 2750 [Urine:2750]  Lab Results   Component Value Date/Time    BUN 16 05/09/2021 04:56 AM    WBC 6 97 05/08/2021 04:36 AM    HGB 7 7 (L) 05/08/2021 04:36 AM    HCT 23 1 (L) 05/08/2021 04:36 AM    MCV 95 05/08/2021 04:36 AM     05/08/2021 04:36 AM     Temp Readings from Last 3 Encounters:   05/09/21 98 4 °F (36 9 °C) (Oral)   05/07/21 98 2 °F (36 8 °C) (Temporal)   05/05/21 97 9 °F (36 6 °C)     Vancomycin Days of Therapy: 1    Assessment/Plan  The patient is currently on vancomycin utilizing scheduled dosing  Baseline risks associated with therapy include: advanced age  The patient will receive 1250 mg Q12H (first dose at 1230 hrs 5/9/21) with the most recent vancomycin level being not at steady-state and sub-therapeutic based on a goal of 15-20 (appropriate for most indications) ; therefore, after clinical evaluation dosing as per above (originally ordered by physician as 1000 mg q12h, but no doses given)  Pharmacy will continue to follow closely for s/sx of nephrotoxicity, infusion reactions, and appropriateness of therapy  BMP and CBC will be ordered per protocol    Plan for trough as patient approaches steady state, prior to the 5th  dose at approximately 1200 hrs on 5/11/21  Pharmacy will continue to follow the patients culture results and clinical progress daily      Vida Ellington, Pharmacist

## 2021-05-09 NOTE — PROGRESS NOTES
[place]  Progress Note - Thomas Vick [birth year], 67 y o  male MRN: [id number]  Unit/Bed#: -[facility] Encounter: [id number]  Primary Care Provider: Ivett Alfaro DO   Date and time admitted to hospital: 5/7/2021  9:27 AM    Hyperkalemia  Assessment & Plan  · Potassium upon admission 5 9-this a m  Is 4 2  · Was given calcium gluconate, D50/insulin/sodium bicarbonate  · Resolved    Generalized weakness  Assessment & Plan  · Presents with generalized weakness over the past 2 weeks and intermittent dizziness/lightheadedness  · Timing seems to correlate with his 2nd round of chemotherapy  · Appearing dry on initial exam,  · Will have patient ambulated by staff  ·  Had recent admission approximately 1 month ago secondary to syncope, was given IV hydration and sent to rehabilitation  Medical workup was pertinent for right-sided 70% carotid stenosis  · PT/OT evals  · Check orthostatics    Hyponatremia  Assessment & Plan  · Sodium gradually improving at 1:31 a m  This morning  ·  Will cap IV fluids and recheck  ·  Encourage oral intake      Adenocarcinoma of pancreas [facility])  Assessment & Plan  · Dx March 2021  · Follows with Oncology outpatient, Dr Solomon Borden, to receive 2-3months adjuvant chemotherapy prior to resection by Dr Wale Mcduffie   · Currently undergoing chemotherapy treatment with Abraxane and Gemzar - last treatment 4/6  · Oncology team consulted    * Streptococcal bacteremia  Assessment & Plan  Blood cultures from 05/07 came back as positive with Gram-positive cocci  -repeat cultures order  Will consult ID  Cefepime and vancomycin ordered      Sacral wound  Assessment & Plan  · Stable sacral wound noted upon exam   No erythema, drainage -local care and off weighting    Anemia  Assessment & Plan  · Likely secondary to chemo  · Continue to follow serial labs    Type 2 diabetes mellitus with hyperglycemia, with long-term current use of insulin [facility])  Assessment & Plan  Lab Results   Component Value Date    HGBA1C 9 4 (H) 2021       Recent Labs     21  1137 21  1646 21  0750   POCGLU 247* 221* 244* 169*       Blood Sugar Average: Last 72 hrs:  (P) 248 [id number]   · Recent A1c shows poor control  · Recently started on Lantus 8 units q h s  With Endocrinology  On admission and increased to 12 units  · Continue sliding scale coverage  · Adjust insulin regimen as needed  · Metformin on hold-will restart on discharge    HTN (hypertension)  Assessment & Plan  · Blood pressure mildly elevated-will add low dose of amlodipine 2 5 mg daily and observe blood pressure  · Continue Toprol XL  · Ramipril on hold secondary to hyperkalemia  · Monitor BP  · Check orthostatics    Hyperlipidemia  Assessment & Plan  Patient being continued on usual atorvastatin           VTE Prophylaxis: in place    Patient Centered Rounds: I rounded with patient's nurse    Current Length of Stay: 2 day(s)    Current Patient Status: Inpatient    Certification Statement: Pt requires additional inpatient hospital stay due to: see assessment and plan        Subjective:     No complaints of shortness of breath or lightheadedness-has not been out of bed yet  He denies any abdominal pain or nausea    All other ROS are negative    Objective:     Vitals:   Temp (24hrs), Av 5 °F (36 9 °C), Min:98 4 °F (36 9 °C), Max:98 6 °F (37 °C)    Temp:  [98 4 °F (36 9 °C)-98 6 °F (37 °C)] 98 4 °F (36 9 °C)  HR:  [71-84] 71  Resp:  [19-21] 20  BP: (123-161)/(60-70) 161/70  SpO2:  [98 %-100 %] 100 %  Body mass index is 20 37 kg/m²  Input and Output Summary (last 24 hours): Intake/Output Summary (Last 24 hours) at 2021 1135  Last data filed at 2021 0842  Gross per 24 hour   Intake 2740 ml   Output 1200 ml   Net 1540 ml       Physical Exam:   Physical Exam  Vitals signs and nursing note reviewed     Constitutional:       Comments: Appears generally fatigued   Eyes:      General: No scleral icterus  Right eye: No discharge  Pupils: Pupils are equal, round, and reactive to light  Cardiovascular:      Rate and Rhythm: Normal rate and regular rhythm  Heart sounds: No murmur  Pulmonary:      Breath sounds: No stridor  Abdominal:      General: There is no distension  Tenderness: There is no guarding  Musculoskeletal:         General: No swelling or tenderness  Neurological:      Comments: Generally weak   Psychiatric:         Mood and Affect: Mood normal                I personally reviewed labs and imaging reports for today  Last 24 Hours Medication List:   Current Facility-Administered Medications   Medication Dose Route Frequency Provider Last Rate    acetaminophen  650 mg Oral Q6H PRN Geovanna Reveles, DANTE      amLODIPine  2 5 mg Oral Daily DO Ava      aspirin  81 mg Oral Daily Geovanna Abdirizak, PA-ANAHY      atorvastatin  40 mg Oral Daily With [employer], PA-ANAHY      cefepime  2,000 mg Intravenous Q12H Amberly Fly, DO      enoxaparin  40 mg Subcutaneous Daily Geovanna Reveles, PA-ANAHY      insulin glargine  12 Units Subcutaneous HS Matteo Rings, PA-C      insulin lispro  1-5 Units Subcutaneous TID AC Geovanna Reveles, PA-C      insulin lispro  3 Units Subcutaneous TID With Meals Matteo Rings, PA-C      metoprolol succinate  100 mg Oral Daily Geovanna Reveles, PA-ANAHY      ondansetron  4 mg Intravenous Q6H PRN Geovanna Reveles, PA-ANAHY      traMADol  50 mg Oral Q6H PRN Geovanna Reveles, PA-C      vancomycin  15 mg/kg Intravenous Q12H DO Ava          I left a message on [place] machine to update him in current condition  Today, Patient Was Seen By: DO Ava    ** Please Note: Dictation voice to text software may have been used in the creation of this document   **

## 2021-05-09 NOTE — ASSESSMENT & PLAN NOTE
Lab Results   Component Value Date    HGBA1C 9 4 (H) 04/08/2021       Recent Labs     05/08/21  1137 05/08/21  1646 05/08/21 2053 05/09/21  0750   POCGLU 247* 221* 244* 169*       Blood Sugar Average: Last 72 hrs:  (P) 248 [id number]   · Recent A1c shows poor control  · Recently started on Lantus 8 units q h s  With Endocrinology    On admission and increased to 12 units  · Continue sliding scale coverage  · Adjust insulin regimen as needed  · Metformin on hold-will restart on discharge

## 2021-05-09 NOTE — ASSESSMENT & PLAN NOTE
· Potassium upon admission 5 9-this a m   Is 4 2  · Was given calcium gluconate, D50/insulin/sodium bicarbonate  · Resolved

## 2021-05-09 NOTE — ASSESSMENT & PLAN NOTE
Blood cultures from 05/07 came back as positive with Gram-positive cocci  -repeat cultures order  Will consult ID  Cefepime and vancomycin ordered

## 2021-05-09 NOTE — ASSESSMENT & PLAN NOTE
· Presents with generalized weakness over the past 2 weeks and intermittent dizziness/lightheadedness  · Timing seems to correlate with his 2nd round of chemotherapy  · Appearing dry on initial exam,  · Will have patient ambulated by staff  ·  Had recent admission approximately 1 month ago secondary to syncope, was given IV hydration and sent to rehabilitation  Medical workup was pertinent for right-sided 70% carotid stenosis    · PT/OT evals  · Check orthostatics

## 2021-05-09 NOTE — ASSESSMENT & PLAN NOTE
· Dx March 2021  · Follows with Oncology outpatient, Dr Tod Gowers, to receive 2-3months adjuvant chemotherapy prior to resection by Dr Aury Munguia   · Currently undergoing chemotherapy treatment with Abraxane and Gemzar - last treatment 4/6  · Oncology team consulted

## 2021-05-09 NOTE — UTILIZATION REVIEW
Initial Clinical Review    Admission: Date/Time/Statement:   Admission Orders (From admission, onward)     Ordered        05/07/21 1627  Inpatient Admission  Once                   Orders Placed This Encounter   Procedures    Inpatient Admission     Standing Status:   Standing     Number of Occurrences:   1     Order Specific Question:   Level of Care     Answer:   Med Surg [16]     Order Specific Question:   Estimated length of stay     Answer:   More than 2 Midnights     Order Specific Question:   Certification     Answer:   I certify that inpatient services are medically necessary for this patient for a duration of greater than two midnights  See H&P and MD Progress Notes for additional information about the patient's course of treatment  ED Arrival Information     Expected Arrival Acuity Means of Arrival Escorted By Service Admission Type    5/7/2021 09:05 5/7/2021 09:22 Emergent Wheelchair Family Member Hospitalist Emergency    Arrival Complaint    dizzy, sob        Chief Complaint   Patient presents with    Dizziness     patient reports dizziness as though lightheaded since last night  denies CP, SOB, v/d, fevers  Initial Presentation:   Mr Renee Trent is a 66 yo male who presents to the ED from home with c/o dizziness, lightheadedness, generalized weakness over the past 2 weeks after starting his 2nd round of chemotherapy  recently started on Lantus  Positional dizziness, chills, no fever, fatigue, diarrhea  PMH: adenocarcinoma of pancreas, type 2 diabetes, hypertension  Pt is admitted to INPATIENT status with Generalized weakness - PT/OT evals  Hyponatremia - 129 - IV fluids, oral intake  Hyperkalemia - 5 9 - Calcium gluconate, D50/insulin/Na bicarb, repeat K   Leukocytosis - 17 - no evidence of infection - IV hydration  Sacral Wound - no erythema or drainage  AdenoCA Pancreas - last tx 4/6  Type 2 IDDM - SSI cover  HTN - Toprol XL, Ramipril on hold        Date: 5/8   Day 2:   Orthostatics are negative  Na is 127  H/H is 7 7/23  1  Na is low 126  Glucose remains elevated  Procalcitonin is elevated       ED Triage Vitals [05/07/21 0931]   Temperature Pulse Respirations Blood Pressure SpO2   97 8 °F (36 6 °C) 75 18 122/57 98 %      Temp Source Heart Rate Source Patient Position - Orthostatic VS BP Location FiO2 (%)   Temporal Monitor Standing Right arm --      Pain Score       No Pain          Wt Readings from Last 1 Encounters:   05/07/21 64 4 kg (142 lb)     Additional Vital Signs:   05/08/21 1741  98 4 °F (36 9 °C)  --  --  123/60  86  --  --  Standing - Orthostatic VS   05/08/21 1738  --  --  --  129/61  88  --  --  Sitting - Orthostatic VS   05/08/21 1737  --  84  19  139/63  90  98 %  None (Room air)  Lying - Orthostatic VS   05/08/21 0900  --  --  --  --  --  --  None (Room air)  --   05/08/21 0835  97 8 °F (36 6 °C)  88  20  131/61  88  100 %  None (Room air)  Lying   05/07/21 2300  98 6 °F (37 °C)  107Abnormal   18  171/73Abnormal   105  99 %  None (Room air)  Lying   05/07/21 1803  99 °F (37 2 °C)  94  19  137/62  89  97 %  None (Room air)  Lying   05/07/21 1630  --  85  18  156/70  100  99 %  None (Room air)  Lying   05/07/21 1530  --  87  22  150/67  97  95 %  None (Room air)  Lying   05/07/21 1430  --  79  17  134/61  88  97 %  None (Room air)  Lying   05/07/21 1400  --  81  17  158/66  95  --  --  --   05/07/21 1345  --  79  17  104/54  76  95 %  None (Room air)  Lying   05/07/21 1200  --  74  20  --  --  100 %  --  --   05/07/21 1130  --  74  21  130/60  87  98 %  --  --   05/07/21 1106  98 °F (36 7 °C)  --  --  --  --  --  --  --   05/07/21 1100  --  67  19  128/60  86  100 %  --  --   05/07/21 1045  --  66  19  114/59  83  97 %  None (Room air)  Lying   05/07/21 1030  --  67  19  --  --  97 %  None (Room air)  --     Pertinent Labs/Diagnostic Test Results:     5/7 CXR - No acute cardiopulmonary disease    5/7 ECG - Sinus rhythm  Prominent appearing T-waves  When compared with ECG of 08-APR-2021 18:55,  Nonspecific T wave abnormality now evident in Lateral leads    Results from last 7 days   Lab Units 05/08/21  0436 05/07/21  0948 05/03/21  0749   WBC Thousand/uL 6 97 17 74* 5 18   HEMOGLOBIN g/dL 7 7* 9 1* 9 7*   HEMATOCRIT % 23 1* 26 9* 29 5*   PLATELETS Thousands/uL 150 141* 178   NEUTROS ABS Thousands/µL  --  16 92* 3 32   BANDS PCT % 1  --   --          Results from last 7 days   Lab Units 05/08/21  0436 05/07/21  1339 05/07/21  1046 05/07/21  0948 05/03/21  0749   SODIUM mmol/L 127*  --  126*  --  131*   POTASSIUM mmol/L 4 4 4 7 5 9*  --  4 9   CHLORIDE mmol/L 96*  --  96*  --  97*   CO2 mmol/L 22  --  18*  --  25   ANION GAP mmol/L 9  --  12  --  9   BUN mg/dL 22  --  33*  --  31*   CREATININE mg/dL 0 78  --  1 12  --  0 93   EGFR ml/min/1 73sq m 88  --  63  --  79   CALCIUM mg/dL 7 6*  --  8 2*  --  8 4   MAGNESIUM mg/dL  --   --   --  1 6  --      Results from last 7 days   Lab Units 05/07/21  1046 05/03/21  0749   AST U/L 17 20   ALT U/L 29 32   ALK PHOS U/L 106 121*   TOTAL PROTEIN g/dL 5 7* 6 1*   ALBUMIN g/dL 2 6* 2 9*   TOTAL BILIRUBIN mg/dL 0 70 0 50     Results from last 7 days   Lab Units 05/08/21 2053 05/08/21  1646 05/08/21  1137 05/08/21  0836 05/07/21  2143 05/07/21  1228   POC GLUCOSE mg/dl 244* 221* 247* 319* 316* 225*     Results from last 7 days   Lab Units 05/08/21  0436 05/07/21  1046   GLUCOSE RANDOM mg/dL 228* 275*     BETA-HYDROXYBUTYRATE   Date Value Ref Range Status   04/08/2021 0 1 <0 6 mmol/L Final      Results from last 7 days   Lab Units 05/07/21  0948   TROPONIN I ng/mL <0 02     Results from last 7 days   Lab Units 05/08/21  0436   PROCALCITONIN ng/ml 1 88*     Results from last 7 days   Lab Units 05/07/21  1157   CLARITY UA  Clear   COLOR UA  Yellow   SPEC GRAV UA  1 010   PH UA  6 0   GLUCOSE UA mg/dl 250 (1/4%)*   KETONES UA mg/dl Negative   BLOOD UA  Negative   PROTEIN UA mg/dl Negative   NITRITE UA  Negative   BILIRUBIN UA  Negative   UROBILINOGEN UA E U /dl 0 2   LEUKOCYTES UA  Negative     Results from last 7 days   Lab Units 05/07/21  1110   BLOOD CULTURE  No Growth at 24 hrs     GRAM STAIN RESULT  Gram positive cocci in chains*     ED Treatment:   Medication Administration from 05/07/2021 0905 to 05/07/2021 1651    Date/Time Order Dose Route Action   05/07/2021 0949 sodium chloride 0 9 % bolus 1,000 mL 1,000 mL Intravenous New Bag   05/07/2021 1158 insulin regular (HumuLIN R,NovoLIN R) injection 10 Units 10 Units Intravenous Given   05/07/2021 1158 dextrose 50 % IV solution 25 mL 25 mL Intravenous Given   05/07/2021 1216 calcium gluconate 1 g in sodium chloride 0 9% 50 mL (premix) 1 g Intravenous New Bag   05/07/2021 1214 sodium bicarbonate 8 4 % injection 50 mEq 50 mEq Intravenous Given   05/07/2021 1339 sodium chloride 0 9 % infusion 100 mL/hr Intravenous New Bag   05/07/2021 1625 atorvastatin (LIPITOR) tablet 40 mg 40 mg Oral Given   05/07/2021 1625 acetaminophen (TYLENOL) tablet 650 mg 650 mg Oral Given   05/07/2021 1625 enoxaparin (LOVENOX) subcutaneous injection 40 mg 40 mg Subcutaneous Given        Past Medical History:   Diagnosis Date    Cancer ([facility] )     pancreatic    Diabetes mellitus ([facility] )     Frequent urination     GERD (gastroesophageal reflux disease)     Hyperlipidemia     Hypertension     Peripheral neuropathy     Weakness     Weight loss      Present on Admission:   Adenocarcinoma of pancreas ([facility] )   Sacral wound   Leukocytosis   Generalized weakness   Hyponatremia   HTN (hypertension)   Hyperkalemia   Anemia    Admitting Diagnosis: Shortness of breath [R06 02]  Hyperkalemia [E87 5]  Dizziness [R42]  Hyponatremia [E87 1]  Leukocytosis [D72 829]  Dizzy [R42]     Age/Sex: 67 y o  male     Admission Orders:    Scheduled Medications:  aspirin, 81 mg, Oral, Daily  atorvastatin, 40 mg, Oral, Daily With Dinner  enoxaparin, 40 mg, Subcutaneous, Daily  insulin glargine, 12 Units, Subcutaneous, HS  insulin lispro, 1-5 Units, Subcutaneous, TID AC  insulin lispro, 3 Units, Subcutaneous, TID With Meals  metoprolol succinate, 100 mg, Oral, Daily      Continuous IV Infusions:  sodium chloride, 75 mL/hr, Intravenous, Continuous      PRN Meds:  acetaminophen, 650 mg, Oral, Q6H PRN - x 1 5/7  ondansetron, 4 mg, Intravenous, Q6H PRN  traMADol, 50 mg, Oral, Q6H PRN    SCDs  Up w/ assist   POC GLUCOSE AC/HS WITH SSI COVERAGE   Blood cultures  ADA diet     Network Utilization Review Department  ATTENTION: Please call with any questions or concerns to [phone] and carefully listen to the prompts so that you are directed to the right person  All voicemails are confidential   Camryn Bryant all requests for admission clinical reviews, approved or denied determinations and any other requests to dedicated fax number below belonging to the campus where the patient is receiving treatment   List of dedicated fax numbers for the Facilities:  [address] DENIALS (Administrative/Medical Necessity) [phone]   [address] (Maternity/NICU/Pediatrics) [phone]   [address] Kvng Erwin 1714 [address] [place] [address] Jaleel White [address] [facility] [place] [address] [phone]

## 2021-05-09 NOTE — CONSULTS
Vancomycin IV Pharmacy-to-Dose Consultation  Angie Canchola is a 67 y o  male who was receiving Vancomycin IV with management by the Pharmacy Consult service for treatment of bacteremia    The patients Vancomycin therapy has been completed / discontinued  Thank you for allowing us to take part in this patient's care  Pharmacy will sign-off now; please call or re-consult if there are any questions       Annika ArzolaD  Pharmacist

## 2021-05-09 NOTE — CONSULTS
Consultation - Infectious Disease   Nga Wisdom 67 y o  male MRN: [id number]  Unit/Bed#: -01 Encounter: [id number]      Assessment/Plan   1  Streptococcus anginosis bacteremia/Pancreatic adenocarcinoma on neoadjuvant chemotherapy/Leukopenia: Immunosuppressed male admitted with ~2 weeks of generalized weakness and loose stool, found to have leukocytosis and GPCs in chains from 5/7 blood cultures, now identified as Streptococcus anginosis  This milleri group Strep species known for pyogenic nature, prone to developing abscesses, including odontogenic and intra-abdominal abscesses  Patient with no evidence of dental abscess, no sinusitis to suggest clear etiology from head/neck infection  Port without erythema to suggest direct inoculation  Mild erythema or MCP joints without pain, speaking against septic arthritis  - will d/c vancomycin/cefepime and start ceftriaxone for Strep anginosis bacteremia  - will check CT A/P w/ IV contrast to evaluate for occult intra-abdominal abscess in setting of known pancreatic adenocarcinoma and neoadjuvant chemotherapy  - trend WBC as last chemotherapy administered 5/4 with continued decline in WBC  - will follow up pending blood cultures          History of Present Illness   Physician Requesting Consult: Bakari Gardner DO  Reason for Consult / Principal Problem: Streptococcus anginosis bacteremia    HPI: Nga Wisdom is a 67y o  year old male with H/O IDDM, HTN, pancreatic adenocarcinoma diagnosis in Feb 2021, now on neoadjuvant chemotherapy with gemcitabine and paclitaxel, completing 2 cycles of treatment, day 15 of cycle 2 was given 5/4, who presented to ED @ [facility] on 5/7 with dizziness, lightheadedness, generalized weakness x 2 weeks, and loose stools without caroline diarrhea  In ED, he was noted to have leukocytosis to 17 4, plt 141 and portable CXR without infiltrate  Blood cultures were collected and patient was admitted for supportive care        Patient has remained afebrile since admission with tmax of 99 degrees  Blood cultures from admission now growing Streptococcus anginosis  Patient denies dental procedures or dental pain, notes transient mouth sores and sore throat about ~1-2 weeks ago  Denies abdominal pain, but notes loose stool and "tailbone" pain s/p syncopal episode in April 2021  Patient reports weakness and soreness in b/l LE without known trauma  No dysuria, no cough, no SOB, no sinus tenderness, no joint pain, no ear pain  Patient currently living with cousin who has a dog, but denies bites/scratches from dog  ID consulted for additional evaluation  Inpatient consult to Infectious Diseases  Consult performed by: Laura Talbert MD  Consult ordered by: Marycarmen Frausto DO          ROS: 12 systems reviewed, remainder is neg  Historical Information   Past Medical History:   Diagnosis Date    Cancer [facility])     pancreatic    Diabetes mellitus ([facility] [address] )     Frequent urination     GERD (gastroesophageal reflux disease)     Hyperlipidemia     Hypertension     Peripheral neuropathy     Weakness     Weight loss      Past Surgical History:   Procedure Laterality Date    APPENDECTOMY      CATARACT EXTRACTION      COLONOSCOPY      SKIN LESION EXCISION      of a wart on right arm    TUNNELED VENOUS PORT PLACEMENT N/A 3/26/2021    Procedure: INSERTION VENOUS PORT (PORT-A-CATH); Surgeon: Yfn Booth MD;  Location:  MAIN OR;  Service: Surgical Oncology     Social History   Social History     Substance and Sexual Activity   Alcohol Use Never    Frequency: Never    Binge frequency: Never     Social History     Substance and Sexual Activity   Drug Use Never     Social History     Tobacco Use   Smoking Status Former Smoker    Types: Pipe    Quit date: 2/23/2011    Years since quitting: 10 2   Smokeless Tobacco Never Used     History reviewed  No pertinent family history      Meds/Allergies     Abx Hx:   - vancomycin - 5/9  - cefepime - 5/9      Current Facility-Administered Medications:     acetaminophen (TYLENOL) tablet 650 mg, 650 mg, Oral, Q6H PRN, Carli Jesus PA-C, 650 mg at 05/07/21 1625    amLODIPine (NORVASC) tablet 2 5 mg, 2 5 mg, Oral, Daily, Amberly Fly, DO, 2 5 mg at 05/09/21 1047    aspirin chewable tablet 81 mg, 81 mg, Oral, Daily, Carli Jesus PA-C, 81 mg at 05/09/21 2212    atorvastatin (LIPITOR) tablet 40 mg, 40 mg, Oral, Daily With Vivian Hedrick PA-C, 40 mg at 05/09/21 1626    cefepime (MAXIPIME) IVPB (premix in dextrose) 2,000 mg 50 mL, 2,000 mg, Intravenous, Q12H, Amberly Fly, DO, Last Rate: 100 mL/hr at 05/09/21 1246, 2,000 mg at 05/09/21 1246    enoxaparin (LOVENOX) subcutaneous injection 40 mg, 40 mg, Subcutaneous, Daily, Carli Jesus PA-C, 40 mg at 05/09/21 0905    insulin glargine (LANTUS) subcutaneous injection 12 Units 0 12 mL, 12 Units, Subcutaneous, HS, Enriqueta Weaver PA-C, 12 Units at 05/08/21 2134    insulin lispro (HumaLOG) 100 units/mL subcutaneous injection 1-5 Units, 1-5 Units, Subcutaneous, TID AC, 3 Units at 05/09/21 1626 **AND** Fingerstick Glucose (POCT), , , TID AC, Carli Jesus PA-C    insulin lispro (HumaLOG) 100 units/mL subcutaneous injection 3 Units, 3 Units, Subcutaneous, TID With Meals, Stormy Neighbor, PA-C, 3 Units at 05/09/21 1626    metoprolol succinate (TOPROL-XL) 24 hr tablet 100 mg, 100 mg, Oral, Daily, Carli Jesus PA-C, 100 mg at 05/09/21 0905    ondansetron (ZOFRAN) injection 4 mg, 4 mg, Intravenous, Q6H PRN, Carli Jesus PA-C    traMADol (ULTRAM) tablet 50 mg, 50 mg, Oral, Q6H PRN, Carli Jesus PA-C    vancomycin (VANCOCIN) 1,250 mg in sodium chloride 0 9 % 250 mL IVPB, 20 mg/kg, Intravenous, Q12H, Amberly Diego DO, Last Rate: 166 7 mL/hr at 05/09/21 1247, 1,250 mg at 05/09/21 1247    No Known Allergies      Intake/Output Summary (Last 24 hours) at 5/9/2021 1809  Last data filed at 5/9/2021 1746  Gross per 24 hour   Intake 2340 ml   Output 1505 ml Net 835 ml       PE:  /60 (BP Location: Right arm)   Pulse 70   Temp 98 1 °F (36 7 °C) (Oral)   Resp 21   Ht 5' 10" (1 778 m)   Wt 64 4 kg (142 lb)   SpO2 100%   BMI 20 37 kg/m²    GEN: NAD, talking on cell phone  HEENT: no icterus, clear OP, poor dental hygiene, no edema/erythema of gums, no oral lesions, mild erythema of left ear without TTP, no edema  Neck: supple  Lymph: no submandibular, cervical, supraclavicular LAD  CV: RRR, no MRG  PULM: CTAB, easy work of breathing  GI: hypoactive BS, soft, NT/ND, no rebound  MSK: mild erythema of right 1st, 2nd MCP joints without TTP  EXT: 1+ edema of LE, no erythema of LE  DERM: no rashes  Port over right chest without surrounding erythema, no TTP  NEURO: A+O x 3, no focal deficits    Invasive Devices:   Port A Cath 03/26/21 Right Subclavian (Active)       Peripheral IV 05/07/21 Right Hand (Active)   Site Assessment [facility] 05/09/21 1000   Dressing Type Transparent 05/09/21 1000   Line Status Flushed;Saline locked 05/09/21 1000   Dressing Status Clean;Dry; Intact 05/09/21 1000       Peripheral IV 05/09/21 Left Antecubital (Active)   Site Assessment [facility] 05/09/21 1000   Dressing Type Transparent 05/09/21 1000   Line Status Blood return noted; Flushed 05/09/21 1000   Dressing Status Clean;Dry; Intact 05/09/21 1000           Lab Results:   Admission on 05/07/2021   Component Date Value    WBC 05/07/2021 17 74*    RBC 05/07/2021 2 81*    Hemoglobin 05/07/2021 9 1*    Hematocrit 05/07/2021 26 9*    MCV 05/07/2021 96     MCH 05/07/2021 32 4     MCHC 05/07/2021 33 8     RDW 05/07/2021 19 1*    MPV 05/07/2021 10 1     Platelets 02/40/6407 141*    nRBC 05/07/2021 0     Neutrophils Relative 05/07/2021 95*    Immat GRANS % 05/07/2021 1     Lymphocytes Relative 05/07/2021 3*    Monocytes Relative 05/07/2021 1*    Eosinophils Relative 05/07/2021 0     Basophils Relative 05/07/2021 0     Neutrophils Absolute 05/07/2021 16 92*    Immature Grans Absolute 05/07/2021 0 15     Lymphocytes Absolute 05/07/2021 0 49*    Monocytes Absolute 05/07/2021 0 10*    Eosinophils Absolute 05/07/2021 0 03     Basophils Absolute 05/07/2021 0 05     Sodium 05/07/2021 126*    Potassium 05/07/2021 5 9*    Chloride 05/07/2021 96*    CO2 05/07/2021 18*    ANION GAP 05/07/2021 12     BUN 05/07/2021 33*    Creatinine 05/07/2021 1 12     Glucose 05/07/2021 275*    Calcium 05/07/2021 8 2*    Corrected Calcium 05/07/2021 9 3     AST 05/07/2021 17     ALT 05/07/2021 29     Alkaline Phosphatase 05/07/2021 106     Total Protein 05/07/2021 5 7*    Albumin 05/07/2021 2 6*    Total Bilirubin 05/07/2021 0 70     eGFR 05/07/2021 63     Magnesium 05/07/2021 1 6     Color, UA 05/07/2021 Yellow     Clarity, UA 05/07/2021 Clear     Specific Gravity, UA 05/07/2021 1 010     pH, UA 05/07/2021 6 0     Leukocytes, UA 05/07/2021 Negative     Nitrite, UA 05/07/2021 Negative     Protein, UA 05/07/2021 Negative     Glucose, UA 05/07/2021 250 (1/4%)*    Ketones, UA 05/07/2021 Negative     Urobilinogen, UA 05/07/2021 0 2     Bilirubin, UA 05/07/2021 Negative     Blood, UA 05/07/2021 Negative     Troponin I 05/07/2021 <0 02     Blood Culture 05/07/2021 Streptococcus anginosus*    Gram Stain Result 05/07/2021 Gram positive cocci in chains*    Gram Stain Result 05/07/2021 Gram positive cocci in chains*    Ventricular Rate 05/07/2021 153     Atrial Rate 05/07/2021 153     NM Interval 05/07/2021 160     QRSD Interval 05/07/2021 86     QT Interval 05/07/2021 202     QTC Interval 05/07/2021 322     P Axis 05/07/2021 56     QRS Axis 05/07/2021 -3     T Wave Axis 05/07/2021 42     POC Glucose 05/07/2021 225*    Potassium 05/07/2021 4 7     POC Glucose 05/07/2021 316*    Sodium 05/08/2021 127*    Potassium 05/08/2021 4 4     Chloride 05/08/2021 96*    CO2 05/08/2021 22     ANION GAP 05/08/2021 9     BUN 05/08/2021 22     Creatinine 05/08/2021 0 78     Glucose 05/08/2021 228*    Calcium 05/08/2021 7 6*    eGFR 05/08/2021 88     WBC 05/08/2021 6 97     RBC 05/08/2021 2 44*    Hemoglobin 05/08/2021 7 7*    Hematocrit 05/08/2021 23 1*    MCV 05/08/2021 95     MCH 05/08/2021 31 6     MCHC 05/08/2021 33 3     RDW 05/08/2021 19 1*    MPV 05/08/2021 9 6     Platelets 14/95/3072 150     Procalcitonin 05/08/2021 1 88*    Segmented % 05/08/2021 92*    Bands % 05/08/2021 1     Lymphocytes % 05/08/2021 6*    Monocytes % 05/08/2021 1*    Eosinophils, % 05/08/2021 0     Basophils % 05/08/2021 0     Absolute Neutrophils 05/08/2021 6 48     Lymphocytes Absolute 05/08/2021 0 42*    Monocytes Absolute 05/08/2021 0 07     Eosinophils Absolute 05/08/2021 0 00     Basophils Absolute 05/08/2021 0 00     Total Counted 05/08/2021 100     RBC Morphology 05/08/2021 Present     Anisocytosis 05/08/2021 Present     Platelet Estimate 44/90/8927 Adequate     POC Glucose 05/08/2021 319*    POC Glucose 05/08/2021 247*    Procalcitonin 05/09/2021 0 90*    POC Glucose 05/08/2021 221*    POC Glucose 05/08/2021 244*    Sodium 05/09/2021 131*    Potassium 05/09/2021 4 2     Chloride 05/09/2021 100     CO2 05/09/2021 21     ANION GAP 05/09/2021 10     BUN 05/09/2021 16     Creatinine 05/09/2021 0 67     Glucose 05/09/2021 182*    Calcium 05/09/2021 7 3*    eGFR 05/09/2021 93     Blood Culture 05/09/2021 Received in Microbiology Lab  Culture in Progress   Blood Culture 05/09/2021 Received in Microbiology Lab  Culture in Progress       POC Glucose 05/09/2021 169*    WBC 05/09/2021 3 74*    RBC 05/09/2021 2 75*    Hemoglobin 05/09/2021 8 8*    Hematocrit 05/09/2021 26 3*    MCV 05/09/2021 96     MCH 05/09/2021 32 0     MCHC 05/09/2021 33 5     RDW 05/09/2021 19 2*    Platelets 05/04/0778 194     MPV 05/09/2021 9 7     Sodium 05/09/2021 131*    Potassium 05/09/2021 4 1     Chloride 05/09/2021 98*    CO2 05/09/2021 25     ANION GAP 05/09/2021 8     BUN 05/09/2021 17     Creatinine 05/09/2021 0 75     Glucose 05/09/2021 259*    Calcium 05/09/2021 7 8*    Corrected Calcium 05/09/2021 9 3     AST 05/09/2021 21     ALT 05/09/2021 31     Alkaline Phosphatase 05/09/2021 103     Total Protein 05/09/2021 5 1*    Albumin 05/09/2021 2 1*    Total Bilirubin 05/09/2021 0 20     eGFR 05/09/2021 89     POC Glucose 05/09/2021 305*    POC Glucose 05/09/2021 301*     Imaging Studies: I have personally reviewed pertinent reports  and I have personally reviewed pertinent films in PACS  EKG, Pathology, and Other Studies: I have personally reviewed pertinent reports  Culture  Lab Results   Component Value Date    BLOODCX Received in Microbiology Lab  Culture in Progress  05/09/2021    BLOODCX Received in Microbiology Lab  Culture in Progress  05/09/2021    BLOODCX Streptococcus anginosus (A) 05/07/2021    BLOODCX No Growth After 5 Days  04/08/2021    BLOODCX No Growth After 5 Days   04/08/2021     No results found for: WOUNDCULT  No results found for: URINECX  No results found for: SPUTUMCULTUR    Principal Problem:    Streptococcal bacteremia  Active Problems:    Hyperlipidemia    HTN (hypertension)    Type 2 diabetes mellitus with hyperglycemia, with long-term current use of insulin (HCC)    Adenocarcinoma of pancreas (HCC)    Hyponatremia    Anemia    Sacral wound    Generalized weakness    Hyperkalemia

## 2021-05-09 NOTE — ASSESSMENT & PLAN NOTE
· Blood pressure mildly elevated-will add low dose of amlodipine 2 5 mg daily and observe blood pressure  · Continue Toprol XL  · Ramipril on hold secondary to hyperkalemia    · Monitor BP  · Check orthostatics

## 2021-05-09 NOTE — ASSESSMENT & PLAN NOTE
· Sodium gradually improving at 1:31 a m  This morning  ·  Will cap IV fluids and recheck  ·  Encourage oral intake

## 2021-05-10 PROBLEM — E87.5 HYPERKALEMIA: Status: RESOLVED | Noted: 2021-01-01 | Resolved: 2021-01-01

## 2021-05-10 PROBLEM — K55.069 MESENTERIC VENOUS THROMBOSIS (HCC): Status: ACTIVE | Noted: 2021-01-01

## 2021-05-10 NOTE — PHYSICAL THERAPY NOTE
PHYSICAL THERAPY EVAL       05/10/21 1047   PT Last Visit   PT Visit Date 05/10/21   Note Type   Note type Evaluation   Pain Assessment   Pain Assessment Tool Diallo-Baker FACES   Pain Score No Pain  (Simultaneous filing  User may not have seen previous data )   Diallo-Villafuerte FACES Pain Rating 2   Pain Location/Orientation Location: Hip;Orientation: Right   Home Living   Type of Jaleel Khan2 to live on main level with bedroom/bathroom  (0STE)   Home Equipment Walker   Prior Function   Level of [place]   (Mod I mobility, A with ADL's/IADL's)   Lives With   (Staying with cousin and cousins spouse)   Receives Help From Family   ADL Assistance Needs assistance   IADLs Needs assistance   Falls in the last 6 months 1 to 4   Restrictions/Precautions   Weight Bearing Precautions Per Order No   Other Precautions Pain; Fall Risk   General   Additional Pertinent History Recent hospital stay for LE cellulitis  Went to [facility] and has been home for approx 1 week   Family/Caregiver Present No   Cognition   Overall Cognitive Status WFL   Arousal/Participation Alert   Orientation Level Oriented X4   Memory Within functional limits   Following Commands Follows all commands and directions without difficulty   RLE Assessment   RLE Assessment WFL   LLE Assessment   LLE Assessment WFL   Coordination   Sensation WFL   Light Touch   RLE Light Touch Grossly intact   LLE Light Touch Grossly intact   Bed Mobility   Supine to Sit 5  Supervision   Additional items HOB elevated   Additional Comments OOB in chair with LE's elevated,   (Call bell within reach  Waffle cushion and wedge in place)   Transfers   Sit to Stand 5  Supervision   Additional items Armrests   Stand to Sit 5  Supervision   Additional items Armrests   Ambulation/Elevation   Gait pattern Narrow COLE; Decreased foot clearance  (Toe out posture)   Gait Assistance 5  Supervision Assistive Device Rolling walker   Distance 125ft   Stair Management Assistance Not tested   Balance   Static Sitting Normal   Dynamic Sitting Good   Static Standing Fair +   Dynamic Standing Fair +   Ambulatory Fair +   Endurance Deficit   Endurance Deficit No   Activity Tolerance   Activity Tolerance Patient tolerated treatment well   Medical Staff Made Aware Fatemeh MARCUM   Nurse Made Aware RN   Assessment   Prognosis Good   Problem List Impaired balance;Decreased mobility;Pain;Decreased skin integrity   Assessment Patient is a 76y/o M who presented with abdominal pain, nausea, dizziness  Found to h ave hyperkalemia and streptococcal bacteremia  Currently resides with cousin and cousins spouse in a single story home with 0 SHANTA  Was ind with use of a RW and needed assist with ADL's  Plan is to eventually return home alone  Current medical status includes pain, fall risk, dizziness, decreased strength, balance, endurance and mobility  Patient tolerated session well  He performed all mobility at a supervision level  Orthostatic BP's taken  Supine: 128/60, Sittin/59, Standing 143/70, after amb standing 129/62  Patient with heavy reliance on RW at this time  Mildly unsteady when turning with RW but able to self correct  Patient was fatigued after amb  Patient is mildly deconditioned but has been ambulating during stay  Advised patient to continue to amb with staff  Positioned patient at end of session in recliner with waffle cushion and wedge under L side to relieve sacral pressure due to wound  Also advised that patient change position with nursing in 30 minutes  Reviewed skin protection techniques  Recommending continued P T  during stay to achieve PLOF and recommending that he have home P T  at discharge  The patient's AM-PAC Basic Mobility Inpatient Short Form Raw Score is 20, Standardized Score is 43 99  A standardized score greater than 42 9 suggests the patient may benefit from discharge to home   Please also refer to the recommendation of the Physical Therapist for safe discharge planning  Barriers to Discharge None   Goals   Patient Goals To get better and go home   STG Expiration Date 05/24/21   Short Term Goal #1 1  perform supine<>sit with HOB flat without the use of bedrails ind 2  Perform sit<>stand transfers with use of UE's mod I 3  Amb 300ft with RW at a mod I level  PT Treatment Day 0   Plan   Treatment/Interventions Functional transfer training; Therapeutic exercise;Equipment eval/education; Bed mobility;Gait training;Spoke to nursing;OT   PT Frequency Other (Comment)  (3-5x/wk)   Recommendation   PT Discharge Recommendation Home with home health rehabilitation   Equipment Recommended   (Owns RW)   PT - OK to Discharge Yes   Additional Comments To home with home P T  and support from family   Dillon 8 in Bed Without Bedrails 4   Lying on Back to Sitting on Edge of Flat Bed 4   Moving Bed to Chair 3   Standing Up From Chair 3   Walk in Room 3   Climb 3-5 Stairs 3   Basic Mobility Inpatient Raw Score 20   Basic Mobility Standardized Score 43 99     Nivia Preciado, PT          Patient Name: Justin Cruz  VERNA Date: 5/10/2021

## 2021-05-10 NOTE — PLAN OF CARE
Problem: Potential for Falls  Goal: Patient will remain free of falls  Description: INTERVENTIONS:  - Assess patient frequently for physical needs  -  Identify cognitive and physical deficits and behaviors that affect risk of falls    -  [place] fall precautions as indicated by assessment   - Educate patient/family on patient safety including physical limitations  - Instruct patient to call for assistance with activity based on assessment  - Modify environment to reduce risk of injury  - Consider OT/PT consult to assist with strengthening/mobility  Outcome: Progressing     Problem: Prexisting or High Potential for Compromised Skin Integrity  Goal: Skin integrity is maintained or improved  Description: INTERVENTIONS:  - Identify patients at risk for skin breakdown  - Assess and monitor skin integrity  - Assess and monitor nutrition and hydration status  - Monitor labs   - Assess for incontinence   - Turn and reposition patient  - Assist with mobility/ambulation  - Relieve pressure over bony prominences  - Avoid friction and shearing  - Provide appropriate hygiene as needed including keeping skin clean and dry  - Evaluate need for skin moisturizer/barrier cream  - Collaborate with interdisciplinary team   - Patient/family teaching  - Consider wound care consult   Outcome: Progressing     Problem: PAIN - ADULT  Goal: Verbalizes/displays adequate comfort level or baseline comfort level  Description: Interventions:  - Encourage patient to monitor pain and request assistance  - Assess pain using appropriate pain scale  - Administer analgesics based on type and severity of pain and evaluate response  - Implement non-pharmacological measures as appropriate and evaluate response  - Consider cultural and social influences on pain and pain management  - Notify physician/advanced practitioner if interventions unsuccessful or patient reports new pain  Outcome: Progressing     Problem: INFECTION - ADULT  Goal: Absence or prevention of progression during hospitalization  Description: INTERVENTIONS:  - Assess and monitor for signs and symptoms of infection  - Monitor lab/diagnostic results  - Monitor all insertion sites, i e  indwelling lines, tubes, and drains  - Monitor endotracheal if appropriate and nasal secretions for changes in amount and color  - [place] appropriate cooling/warming therapies per order  - Administer medications as ordered  - Instruct and encourage patient and family to use good hand hygiene technique  - Identify and instruct in appropriate isolation precautions for identified infection/condition  Outcome: Progressing  Goal: Absence of fever/infection during neutropenic period  Description: INTERVENTIONS:  - Monitor WBC    Outcome: Progressing     Problem: SAFETY ADULT  Goal: Patient will remain free of falls  Description: INTERVENTIONS:  - Assess patient frequently for physical needs  -  Identify cognitive and physical deficits and behaviors that affect risk of falls    -  [place] fall precautions as indicated by assessment   - Educate patient/family on patient safety including physical limitations  - Instruct patient to call for assistance with activity based on assessment  - Modify environment to reduce risk of injury  - Consider OT/PT consult to assist with strengthening/mobility  Outcome: Progressing  Goal: Maintain or return to baseline ADL function  Description: INTERVENTIONS:  -  Assess patient's ability to carry out ADLs; assess patient's baseline for ADL function and identify physical deficits which impact ability to perform ADLs (bathing, care of mouth/teeth, toileting, grooming, dressing, etc )  - Assess/evaluate cause of self-care deficits   - Assess range of motion  - Assess patient's mobility; develop plan if impaired  - Assess patient's need for assistive devices and provide as appropriate  - Encourage maximum independence but intervene and supervise when necessary  - Involve family in performance of ADLs  - Assess for home care needs following discharge   - Consider OT consult to assist with ADL evaluation and planning for discharge  - Provide patient education as appropriate  Outcome: Progressing  Goal: Maintain or return mobility status to optimal level  Description: INTERVENTIONS:  - Assess patient's baseline mobility status (ambulation, transfers, stairs, etc )    - Identify cognitive and physical deficits and behaviors that affect mobility  - Identify mobility aids required to assist with transfers and/or ambulation (gait belt, sit-to-stand, lift, walker, cane, etc )  - [place] fall precautions as indicated by assessment  - Record patient progress and toleration of activity level on Mobility SBAR; progress patient to next Phase/Stage  - Instruct patient to call for assistance with activity based on assessment  - Consider rehabilitation consult to assist with strengthening/weightbearing, etc   Outcome: Progressing     Problem: DISCHARGE PLANNING  Goal: Discharge to home or other facility with appropriate resources  Description: INTERVENTIONS:  - Identify barriers to discharge w/patient and caregiver  - Arrange for needed discharge resources and transportation as appropriate  - Identify discharge learning needs (meds, wound care, etc )  - Arrange for interpretive services to assist at discharge as needed  - Refer to Case Management Department for coordinating discharge planning if the patient needs post-hospital services based on physician/advanced practitioner order or complex needs related to functional status, cognitive ability, or social support system  Outcome: Progressing     Problem: Knowledge Deficit  Goal: Patient/family/caregiver demonstrates understanding of disease process, treatment plan, medications, and discharge instructions  Description: Complete learning assessment and assess knowledge base    Interventions:  - Provide teaching at level of understanding  - Provide teaching via preferred learning methods  Outcome: Progressing

## 2021-05-10 NOTE — ASSESSMENT & PLAN NOTE
· Dx March 2021  · Follows with Oncology outpatient, Dr Chapin Delgadillo, to receive 2-3months adjuvant chemotherapy prior to resection by Dr Jammie Soriano   · Currently undergoing chemotherapy treatment with Abraxane and Gemzar - last treatment 4/6  · Oncology team consulted

## 2021-05-10 NOTE — ASSESSMENT & PLAN NOTE
· Likely due to ongoing pancreatic malignancy pain treated with chemotherapy  · Improved since presentation, PT/OT recommend home with family support

## 2021-05-10 NOTE — PLAN OF CARE
Problem: PHYSICAL THERAPY ADULT  Goal: Performs mobility at highest level of function for planned discharge setting  See evaluation for individualized goals  Description: Treatment/Interventions: Functional transfer training, Therapeutic exercise, Equipment eval/education, Bed mobility, Gait training, Spoke to nursing, OT  Equipment Recommended: (Owns RW)       See flowsheet documentation for full assessment, interventions and recommendations  Note: Prognosis: Good  Problem List: Impaired balance, Decreased mobility, Pain, Decreased skin integrity  Assessment: Patient is a 79y/o M who presented with abdominal pain, nausea, dizziness  Found to h ave hyperkalemia and streptococcal bacteremia  Currently resides with cousin and cousins spouse in a single story home with 0 SHANTA  Was ind with use of a RW and needed assist with ADL's  Plan is to eventually return home alone  Current medical status includes pain, fall risk, dizziness, decreased strength, balance, endurance and mobility  Patient tolerated session well  He performed all mobility at a supervision level  Orthostatic BP's taken  Supine: 128/60, Sittin/59, Standing 143/70, after amb standing 129/62  Patient with heavy reliance on RW at this time  Mildly unsteady when turning with RW but able to self correct  Patient was fatigued after amb  Patient is mildly deconditioned but has been ambulating during stay  Advised patient to continue to amb with staff  Recommending continued P T  during stay to achieve PLOF and recommending that he have home P T  at discharge  The patient's AM-PAC Basic Mobility Inpatient Short Form Raw Score is 20, Standardized Score is 43 99  A standardized score greater than 42 9 suggests the patient may benefit from discharge to home  Please also refer to the recommendation of the Physical Therapist for safe discharge planning    Barriers to Discharge: None        PT Discharge Recommendation: Home with home health rehabilitation PT - OK to Discharge: Yes    See flowsheet documentation for full assessment

## 2021-05-10 NOTE — ASSESSMENT & PLAN NOTE
· Potassium upon admission 5 9, status post treatment, now resolved  · Was given calcium gluconate, D50/insulin/sodium bicarbonate  · Resolved

## 2021-05-10 NOTE — CASE MANAGEMENT
LOS: 3  Patient is a readmission from a 4/8/21-4/14/-21 admission at [facility] for sepsis  He was sent to the ED by infusion center after presenting with weak and lightheaded  He is not a Medicare Bundled patient  His risk for unplanned readmission is 24, [place]  Met with patient and reviewed the discharge planning process including identifying help at home and patient preference for discharge  Patient is a readmission from 4/8/21-4/14/21 admission and went to [facility] for rehab  He currently is living with his cousin and his wife, Wilbert Greene and Dalia Obrien at the one level home with 0 SHANTA in [facility]  He reports their address is [address] [place] [address]   He uses a RW and is current with [facility] for PT/OT/SN, referral sent for JESSIE  He uses  [address] in [facility] for his meds and reports no barriers  in obtaining his medication  His PCP is Ross Reece  He gores to [facility] infusion [facility] every week for 3 weeks and the off a week  He plans to return home with hi cousin  Family to transport him at discharge

## 2021-05-10 NOTE — ASSESSMENT & PLAN NOTE
Unknown source at present, no recent dental work, port site without erythema or tenderness  Blood cultures from 05/07 growing Streptococcus anginosus, repeat blood cultures from 05/09/2021 without growth  Seen by Infectious Disease, antibiotics switched to ceftriaxone on 05/09/2021 #2, continue  CT of the abdomen and pelvis without source of infection, again shows mass in the pancreatic head, tiny filling defect is noted at the junction of the SMV and portal confluence

## 2021-05-10 NOTE — OCCUPATIONAL THERAPY NOTE
Occupational Therapy Evaluation     Patient Name: Mary Ellen Carroll  GTWHG'G Date: 5/10/2021  Problem List  Principal Problem:    Streptococcal bacteremia  Active Problems:    Hyperlipidemia    HTN (hypertension)    Type 2 diabetes mellitus with hyperglycemia, with long-term current use of insulin ([facility] [address] [address] )    Adenocarcinoma of pancreas ([facility] )    Hyponatremia    Anemia    Sacral wound    Generalized weakness    Hyperkalemia    Past Medical History  Past Medical History:   Diagnosis Date    Cancer ([facility] )     pancreatic    Diabetes mellitus ([facility] )     Frequent urination     GERD (gastroesophageal reflux disease)     Hyperlipidemia     Hypertension     Peripheral neuropathy     Weakness     Weight loss      Past Surgical History  Past Surgical History:   Procedure Laterality Date    APPENDECTOMY      CATARACT EXTRACTION      COLONOSCOPY      SKIN LESION EXCISION      of a wart on right arm    TUNNELED VENOUS PORT PLACEMENT N/A 3/26/2021    Procedure: INSERTION VENOUS PORT (PORT-A-CATH); Surgeon: Keeley Schmitt MD;  Location:  MAIN OR;  Service: Surgical Oncology         05/10/21 1046   OT Last Visit   OT Visit Date 05/10/21   Note Type   Note type Evaluation   Restrictions/Precautions   Weight Bearing Precautions Per Order No   Pain Assessment   Pain Assessment Tool Diallo-Baker FACES   Diallo-Baker FACES Pain Rating 4   Pain Location/Orientation Orientation: Right;Location: Hip   Hospital Pain Intervention(s) Repositioned; Ambulation/increased activity; Emotional support   Home Living   Type of [address] [place] [address] One level  (0 SHANTA)   Bathroom Shower/Tub Tub/shower unit   Bathroom Toilet Standard   Bathroom Equipment Tub transfer bench;Commode   Bathroom Accessibility Accessible via walker   [address] With Family; Other (Comment)  (Cousin & Cousin's wife )   Cirilo Owusu Help From Family   ADL Assistance Independent  ((A) for showers )   IADLs Needs assistance  (Family performs )   Falls in the last 6 months 1 to 4  ((2) )   Lifestyle   Autonomy Pt was at [facility] and discharged to cousin's home  There he was (I) for dressing but had assist for showers  Family does IADLs  Ambulates using RW  H/o 2 falls  Pt was only at cousin's home 1 week prior to coming to hospital     Reciprocal Relationships Family    Intrinsic Gratification None stated    Psychosocial   Psychosocial (WDL) WDL   Subjective   Subjective I'm feeling better    ADL   Eating Assistance 7  Independent   Grooming Assistance 6  Modified Independent   Grooming Deficit Setup   UB Bathing Assistance 5  Supervision/Setup   UB Bathing Deficit Setup   LB Bathing Assistance 5  Supervision/Setup   LB Bathing Deficit Setup   UB Dressing Assistance 5  Supervision/Setup   UB Dressing Deficit Setup   LB Dressing Assistance 5  Supervision/Setup   LB Dressing Deficit Setup   Toileting Assistance  5  Supervision/Setup   Additional Comments Requires (S) for safety due to h/o dizziness    Bed Mobility   Supine to Sit 5  Supervision   Additional items HOB elevated; Bedrails; Increased time required   Additional Comments Remains in recliner w/ all needs at end of session and alarm engaged  Placed cushion and wedge on chair due to buttock wound  Transfers   Sit to Stand 5  Supervision   Additional items Armrests; Increased time required   Stand to Sit 5  Supervision   Additional items Armrests; Increased time required   Stand pivot 5  Supervision   Additional items   (RW)   Functional Mobility   Functional Mobility 5  Supervision   Additional Comments RW   Balance   Static Sitting Good   Dynamic Sitting Fair +   Static Standing Fair +   Dynamic Standing Fair   Ambulatory Fair   Activity Tolerance   Activity Tolerance Patient tolerated treatment well   Medical Staff Made Aware PT, Jeniffer    Nurse Made Aware Colette GONZALEZ    RUHEATHER Assessment   RUE Assessment WFL  (4/5)   LUE Assessment   LUE Assessment WFL  (4/5)   Hand Function   Gross Motor Coordination Functional   Fine Motor Coordination Functional   Sensation   Light Touch No apparent deficits  (Denies numbness in UE's but reports some in LE's)   Cognition   Arousal/Participation Cooperative   Attention Within functional limits   Orientation Level Oriented X4   Memory Within functional limits   Following Commands Follows all commands and directions without difficulty   Comments Pleasant and cooperative  Assessment   Assessment Pt admit 5/7/21 with streptococcal bacteremia  Recently at [facility] and discharged to cousin's home  Recent hospitalization in April  OT completed extensive review of pt's medical and social history  Pt with h/o pancreatic CA, DM, frequent urination, HLD, HTN, neuropathy, and venous port placement  Prior to admit was living in ranch home w/ cousin and cousin's wife  Reports being (I) w/ dressing and having assist for showers  Family does IADLs  Ambulates using RW  H/o 2 falls  Pt presents to OT below baseline due to the following performance deficits: edema; pain; balance; stand tolerance; functional mobility; community integration; and self care  Therefore, pt would benefit from OT services to achieve optimal level of performance  Occupational performance areas to be addressed include: bathing, toileting, dressing, activity tolerance, functional mobility, and clothing management  Pt required (S) for mobility and ADLS  C/o R hip pain w/ certain movements  Fatigues w/ minimal challenges  BP's as follows during session: Supine 128/60; Seated 123/59; Stand 143/63; and Stand after ambulation 129/60  Pt receives chemo treatments and reports increased assists and weakness s/p treatments  Based on findings, pt is of high complexity  The patient's raw score on the AM-PAC Daily Activity inpatient short form is 21, standardized score is 44 27, greater than 39 4  Patients at this level are likely to benefit from discharge to home   Recommend pt return home w/ family assist     Goals Patient Goals To go home    Plan   Treatment Interventions ADL retraining;Functional transfer training; Endurance training;Patient/family training;Equipment evaluation/education; Activityengagement   Goal Expiration Date 05/24/21   OT Treatment Day 0   OT Frequency 2-3x/wk   Recommendation   OT Discharge Recommendation No rehabilitation needs  (Home w/ family support )   AM-PAC Daily Activity Inpatient   Lower Body Dressing 3   Bathing 3   Toileting 3   Upper Body Dressing 4   Grooming 4   Eating 4   Daily Activity Raw Score 21   Daily Activity Standardized Score (Calc for Raw Score >=11) 44 27   AM-PAC Applied Cognition Inpatient   Following a Speech/Presentation 4   Understanding Ordinary Conversation 4   Taking Medications 4   Remembering Where Things Are Placed or Put Away 3   Remembering List of 4-5 Errands 3   Taking Care of Complicated Tasks 3   Applied Cognition Raw Score 21   Applied Cognition Standardized Score 44 3     GOALS:      Pt will complete UB ADL's w/ MI      Pt will complete LB ADL's w/ MI using AE PRN     Pt will complete toileting including hygiene and clothing management w/ MI      Pt will complete functional transfers w/ MI using RW     Pt will complete dynamic and unsupported stand balance w/ G- for safe clothing management      Pt will improve stand tolerance to 5-10 mins for safety w/ ADL tasks      Pt will improve activity tolerance to G for 20- 30 min tx session for increased engagement in functional tasks       After education, pt will verbalize 3 energy conservation techs to utilize to increase activity tolerance during functional tasks       Kylee ESTRADA OTR/L

## 2021-05-10 NOTE — ASSESSMENT & PLAN NOTE
· Home medication is ramipril 10 mg daily, Toprol- mg daily  · Ramipril currently held due to hyperkalemia on presentation, switched to amlodipine 2 5 mg daily  · continue amlodipine 2 5 mg daily, Toprol- mg daily  · Monitor BP

## 2021-05-10 NOTE — ASSESSMENT & PLAN NOTE
Lab Results   Component Value Date    HGBA1C 9 4 (H) 04/08/2021       Recent Labs     05/09/21  1558 05/09/21  2113 05/10/21  0739 05/10/21  1150   POCGLU 301* 220* 85 191*       Blood Sugar Average: Last 72 hrs:  (P) 171 [id number]   · Recent A1c shows poor control    · Continue Lantus 12 units hs, Continue sliding scale coverage  · Adjust insulin regimen as needed  · Metformin on hold-will restart on discharge

## 2021-05-10 NOTE — PLAN OF CARE
Problem: OCCUPATIONAL THERAPY ADULT  Goal: Performs self-care activities at highest level of function for planned discharge setting  See evaluation for individualized goals  Description: Treatment Interventions: ADL retraining, Functional transfer training, Endurance training, Patient/family training, Equipment evaluation/education, Activityengagement          See flowsheet documentation for full assessment, interventions and recommendations  Note:       Assessment: Pt admit 5/7/21 with streptococcal bacteremia  Recently at [facility] and discharged to cousin's home  Recent hospitalization in April  OT completed extensive review of pt's medical and social history  Pt with h/o pancreatic CA, DM, frequent urination, HLD, HTN, neuropathy, and venous port placement  Prior to admit was living in ranch home w/ cousin and cousin's wife  Reports being (I) w/ dressing and having assist for showers  Family does IADLs  Ambulates using RW  H/o 2 falls  Pt presents to OT below baseline due to the following performance deficits: edema; pain; balance; stand tolerance; functional mobility; community integration; and self care  Therefore, pt would benefit from OT services to achieve optimal level of performance  Occupational performance areas to be addressed include: bathing, toileting, dressing, activity tolerance, functional mobility, and clothing management  Pt required (S) for mobility and ADLS  C/o R hip pain w/ certain movements  Fatigues w/ minimal challenges  BP's as follows during session: Supine 128/60; Seated 123/59; Stand 143/63; and Stand after ambulation 129/60  Pt receives chemo treatments and reports increased assists and weakness s/p treatments  Based on findings, pt is of high complexity  The patient's raw score on the AM-PAC Daily Activity inpatient short form is 21, standardized score is 44 27, greater than 39 4  Patients at this level are likely to benefit from discharge to home   Recommend pt return home w/ family assist       OT Discharge Recommendation: No rehabilitation needs(Home w/ family support )

## 2021-05-10 NOTE — UTILIZATION REVIEW
Initial Clinical Review    Admission: Date/Time/Statement:   Admission Orders (From admission, onward)     Ordered        05/07/21 1627  Inpatient Admission  Once                   Orders Placed This Encounter   Procedures    Inpatient Admission     Standing Status:   Standing     Number of Occurrences:   1     Order Specific Question:   Level of Care     Answer:   Med Surg [16]     Order Specific Question:   Estimated length of stay     Answer:   More than 2 Midnights     Order Specific Question:   Certification     Answer:   I certify that inpatient services are medically necessary for this patient for a duration of greater than two midnights  See H&P and MD Progress Notes for additional information about the patient's course of treatment  ED Arrival Information     Expected Arrival Acuity Means of Arrival Escorted By Service Admission Type    5/7/2021 09:05 5/7/2021 09:22 Emergent Wheelchair Family Member Hospitalist Emergency    Arrival Complaint    dizzy, sob        Chief Complaint   Patient presents with    Dizziness     patient reports dizziness as though lightheaded since last night  denies CP, SOB, v/d, fevers  Initial Presentation:   Mr Erling Burkitt is a 68 yo male who presents to the ED from home with c/o dizziness, lightheadedness, generalized weakness over the past 2 weeks after starting his 2nd round of chemotherapy  recently started on Lantus  Positional dizziness, chills, no fever, fatigue, diarrhea  PMH: adenocarcinoma of pancreas, type 2 diabetes, hypertension  Pt is admitted to INPATIENT status with Generalized weakness - PT/OT evals  Hyponatremia - 129 - IV fluids, oral intake  Hyperkalemia - 5 9 - Calcium gluconate, D50/insulin/Na bicarb, repeat K   Leukocytosis - 17 - no evidence of infection - IV hydration  Sacral Wound - no erythema or drainage  AdenoCA Pancreas - last tx 4/6  Type 2 IDDM - SSI cover  HTN - Toprol XL, Ramipril on hold        Date: 5/8   Day 2:   Orthostatics are negative  Na is 127  H/H is 7 7/23  1  Na is low 126  Glucose remains elevated  Procalcitonin is elevated       ED Triage Vitals [05/07/21 0931]   Temperature Pulse Respirations Blood Pressure SpO2   97 8 °F (36 6 °C) 75 18 122/57 98 %      Temp Source Heart Rate Source Patient Position - Orthostatic VS BP Location FiO2 (%)   Temporal Monitor Standing Right arm --      Pain Score       No Pain          Wt Readings from Last 1 Encounters:   05/07/21 64 4 kg (142 lb)     Additional Vital Signs:   05/08/21 1741  98 4 °F (36 9 °C)  --  --  123/60  86  --  --  Standing - Orthostatic VS   05/08/21 1738  --  --  --  129/61  88  --  --  Sitting - Orthostatic VS   05/08/21 1737  --  84  19  139/63  90  98 %  None (Room air)  Lying - Orthostatic VS   05/08/21 0900  --  --  --  --  --  --  None (Room air)  --   05/08/21 0835  97 8 °F (36 6 °C)  88  20  131/61  88  100 %  None (Room air)  Lying   05/07/21 2300  98 6 °F (37 °C)  107Abnormal   18  171/73Abnormal   105  99 %  None (Room air)  Lying   05/07/21 1803  99 °F (37 2 °C)  94  19  137/62  89  97 %  None (Room air)  Lying   05/07/21 1630  --  85  18  156/70  100  99 %  None (Room air)  Lying   05/07/21 1530  --  87  22  150/67  97  95 %  None (Room air)  Lying   05/07/21 1430  --  79  17  134/61  88  97 %  None (Room air)  Lying   05/07/21 1400  --  81  17  158/66  95  --  --  --   05/07/21 1345  --  79  17  104/54  76  95 %  None (Room air)  Lying   05/07/21 1200  --  74  20  --  --  100 %  --  --   05/07/21 1130  --  74  21  130/60  87  98 %  --  --   05/07/21 1106  98 °F (36 7 °C)  --  --  --  --  --  --  --   05/07/21 1100  --  67  19  128/60  86  100 %  --  --   05/07/21 1045  --  66  19  114/59  83  97 %  None (Room air)  Lying   05/07/21 1030  --  67  19  --  --  97 %  None (Room air)  --     Pertinent Labs/Diagnostic Test Results:     5/7 CXR - No acute cardiopulmonary disease    5/7 ECG - Sinus rhythm  Prominent appearing T-waves  When compared with ECG of 08-APR-2021 18:55,  Nonspecific T wave abnormality now evident in Lateral leads    Results from last 7 days   Lab Units 05/10/21  0455 05/09/21  1226 05/08/21  0436 05/07/21  0948   WBC Thousand/uL 7 69 3 74* 6 97 17 74*   HEMOGLOBIN g/dL 8 9* 8 8* 7 7* 9 1*   HEMATOCRIT % 26 4* 26 3* 23 1* 26 9*   PLATELETS Thousands/uL 197 194 150 141*   NEUTROS ABS Thousands/µL  --   --   --  16 92*   BANDS PCT %  --   --  1  --          Results from last 7 days   Lab Units 05/10/21  0455 05/09/21  1227 05/09/21  0456 05/08/21  0436 05/07/21  1339 05/07/21  1046  05/07/21  0948   SODIUM mmol/L 133* 131* 131* 127*  --  126*  --   --    POTASSIUM mmol/L 4 2 4 1 4 2 4 4 4 7 5 9*   < >  --    CHLORIDE mmol/L 100 98* 100 96*  --  96*  --   --    CO2 mmol/L 24 25 21 22  --  18*  --   --    ANION GAP mmol/L 9 8 10 9  --  12  --   --    BUN mg/dL 17 17 16 22  --  33*  --   --    CREATININE mg/dL 0 79 0 75 0 67 0 78  --  1 12  --   --    EGFR ml/min/1 73sq m 87 89 93 88  --  63  --   --    CALCIUM mg/dL 7 9* 7 8* 7 3* 7 6*  --  8 2*  --   --    MAGNESIUM mg/dL  --   --   --   --   --   --   --  1 6    < > = values in this interval not displayed       Results from last 7 days   Lab Units 05/10/21  0455 05/09/21  1227 05/07/21  1046   AST U/L 24 21 17   ALT U/L 38 31 29   ALK PHOS U/L 105 103 106   TOTAL PROTEIN g/dL 5 3* 5 1* 5 7*   ALBUMIN g/dL 2 1* 2 1* 2 6*   TOTAL BILIRUBIN mg/dL 0 20 0 20 0 70     Results from last 7 days   Lab Units 05/10/21  1150 05/10/21  0739 05/09/21  2113 05/09/21  1558 05/09/21  1143 05/09/21  0750 05/08/21  2053 05/08/21  1646 05/08/21  1137 05/08/21  0836 05/07/21  2143 05/07/21  1228   POC GLUCOSE mg/dl 191* 85 220* 301* 305* 169* 244* 221* 247* 319* 316* 225*     Results from last 7 days   Lab Units 05/10/21  0455 05/09/21  1227 05/09/21  0456 05/08/21  0436 05/07/21  1046   GLUCOSE RANDOM mg/dL 86 259* 182* 228* 275*     BETA-HYDROXYBUTYRATE   Date Value Ref Range Status   04/08/2021 0 1 <0 6 mmol/L Final Results from last 7 days   Lab Units 05/07/21  0948   TROPONIN I ng/mL <0 02     Results from last 7 days   Lab Units 05/09/21  0456 05/08/21  0436   PROCALCITONIN ng/ml 0 90* 1 88*     Results from last 7 days   Lab Units 05/07/21  1157   CLARITY UA  Clear   COLOR UA  Yellow   SPEC GRAV UA  1 010   PH UA  6 0   GLUCOSE UA mg/dl 250 (1/4%)*   KETONES UA mg/dl Negative   BLOOD UA  Negative   PROTEIN UA mg/dl Negative   NITRITE UA  Negative   BILIRUBIN UA  Negative   UROBILINOGEN UA E U /dl 0 2   LEUKOCYTES UA  Negative     Results from last 7 days   Lab Units 05/09/21  0457 05/07/21  1110   BLOOD CULTURE  No Growth at 24 hrs  No Growth at 24 hrs   Streptococcus anginosus*  Streptococcus anginosus*   GRAM STAIN RESULT   --  Gram positive cocci in chains*  Gram positive cocci in chains*     ED Treatment:   Medication Administration from 05/07/2021 0905 to 05/07/2021 1651    Date/Time Order Dose Route Action   05/07/2021 0949 sodium chloride 0 9 % bolus 1,000 mL 1,000 mL Intravenous New Bag   05/07/2021 1158 insulin regular (HumuLIN R,NovoLIN R) injection 10 Units 10 Units Intravenous Given   05/07/2021 1158 dextrose 50 % IV solution 25 mL 25 mL Intravenous Given   05/07/2021 1216 calcium gluconate 1 g in sodium chloride 0 9% 50 mL (premix) 1 g Intravenous New Bag   05/07/2021 1214 sodium bicarbonate 8 4 % injection 50 mEq 50 mEq Intravenous Given   05/07/2021 1339 sodium chloride 0 9 % infusion 100 mL/hr Intravenous New Bag   05/07/2021 1625 atorvastatin (LIPITOR) tablet 40 mg 40 mg Oral Given   05/07/2021 1625 acetaminophen (TYLENOL) tablet 650 mg 650 mg Oral Given   05/07/2021 1625 enoxaparin (LOVENOX) subcutaneous injection 40 mg 40 mg Subcutaneous Given        Past Medical History:   Diagnosis Date    Cancer ([facility] )     pancreatic    Diabetes mellitus ([place] [address] )     Frequent urination     GERD (gastroesophageal reflux disease)     Hyperlipidemia     Hypertension     Peripheral neuropathy     Weakness     Weight loss      Present on Admission:   Adenocarcinoma of pancreas ([facility] )   Sacral wound   (Resolved) Leukocytosis   Generalized weakness   Hyponatremia   HTN (hypertension)   (Resolved) Hyperkalemia   Anemia   Hyperlipidemia   Streptococcal bacteremia    Admitting Diagnosis: Shortness of breath [R06 02]  Hyperkalemia [E87 5]  Dizziness [R42]  Hyponatremia [E87 1]  Leukocytosis [D72 829]  Dizzy [R42]     Age/Sex: 67 y o  male     Admission Orders:    Scheduled Medications:  amLODIPine, 2 5 mg, Oral, Daily  aspirin, 81 mg, Oral, Daily  atorvastatin, 40 mg, Oral, Daily With Dinner  cefTRIAXone, 2,000 mg, Intravenous, Q24H  insulin glargine, 12 Units, Subcutaneous, HS  insulin lispro, 1-5 Units, Subcutaneous, TID AC  insulin lispro, 3 Units, Subcutaneous, TID With Meals  metoprolol succinate, 100 mg, Oral, Daily  rivaroxaban, 15 mg, Oral, BID With Meals      Continuous IV Infusions:     PRN Meds:  acetaminophen, 650 mg, Oral, Q6H PRN - x 1 5/7  ondansetron, 4 mg, Intravenous, Q6H PRN  traMADol, 50 mg, Oral, Q6H PRN    SCDs  Up w/ assist   POC GLUCOSE AC/HS WITH SSI COVERAGE   Blood cultures  ADA diet     Network Utilization Review Department  ATTENTION: Please call with any questions or concerns to [phone] and carefully listen to the prompts so that you are directed to the right person  All voicemails are confidential   [facility] all requests for admission clinical reviews, approved or denied determinations and any other requests to dedicated fax number below belonging to the campus where the patient is receiving treatment   List of dedicated fax numbers for the Facilities:  [address] [place] [address] DENIALS (Administrative/Medical Necessity) [phone]   [address] (Maternity/NICU/Pediatrics) [phone]   [address] [place] [address] - [place] [address] [facility] [id number] [place] [address] Jaleel [address] [phone] [address] [facility] [phone]

## 2021-05-10 NOTE — ASSESSMENT & PLAN NOTE
Incidental finding of CT scan of the abdomen and pelvis with tiny filling defect noted at the junction of the SMV and portal confluence likely due to underlying malignancy    Ideally should be treated with anticoagulation  Will hold subcutaneous Lovenox, will start rivaroxaban  Consult hematology

## 2021-05-10 NOTE — PROGRESS NOTES
[place]  Progress Note - Nadia Tigist 1945, 67 y o  male MRN: [id number]  Unit/Bed#: -Paty Encounter: [id number]  Primary Care Provider: Keo Love DO   Date and time admitted to hospital: 5/7/2021  9:27 AM    * Streptococcal bacteremia  Assessment & Plan  Unknown source at present, no recent dental work, port site without erythema or tenderness  Blood cultures from 05/07 growing Streptococcus anginosus, repeat blood cultures from 05/09/2021 without growth  Seen by Infectious Disease, antibiotics switched to ceftriaxone on 05/09/2021 #2, continue  CT of the abdomen and pelvis without source of infection, again shows mass in the pancreatic head, tiny filling defect is noted at the junction of the SMV and portal confluence  Mesenteric venous thrombosis (HCC)  Assessment & Plan  Incidental finding of CT scan of the abdomen and pelvis with tiny filling defect noted at the junction of the SMV and portal confluence likely due to underlying malignancy  Ideally should be treated with anticoagulation  Will hold subcutaneous Lovenox, will start rivaroxaban  Consult hematology    Hyponatremia  Assessment & Plan  · Sodium gradually improving at 133  · Trend BMP  ·  Encourage oral intake      Generalized weakness  Assessment & Plan  · Likely due to ongoing pancreatic malignancy pain treated with chemotherapy  · Improved since presentation, PT/OT recommend home with family support    Sacral wound  Assessment & Plan  · Stable sacral wound noted upon exam   No erythema, drainage -local care and off weighting    Anemia  Assessment & Plan  · Likely secondary to chemo  · Continue to follow serial labs    Adenocarcinoma of pancreas [facility])  Assessment & Plan  · Dx March 2021  · Follows with Oncology outpatient, Dr Perry Blackwell, to receive 2-3months adjuvant chemotherapy prior to resection by Dr Christian Covington   · Currently undergoing chemotherapy treatment with Abraxane and Gemzar - last treatment 4/6  · Oncology team consulted    Type 2 diabetes mellitus with hyperglycemia, with long-term current use of insulin [facility])  Assessment & Plan  Lab Results   Component Value Date    HGBA1C 9 4 (H) 04/08/2021       Recent Labs     05/09/21  1558 05/09/21  2113 05/10/21  0739 05/10/21  1150   POCGLU 301* 220* 85 191*       Blood Sugar Average: Last 72 hrs:  (P) 068 [id number]   · Recent A1c shows poor control  · Continue Lantus 12 units hs, Continue sliding scale coverage  · Adjust insulin regimen as needed  · Metformin on hold-will restart on discharge    HTN (hypertension)  Assessment & Plan  · Home medication is ramipril 10 mg daily, Toprol- mg daily  · Ramipril currently held due to hyperkalemia on presentation, switched to amlodipine 2 5 mg daily  · continue amlodipine 2 5 mg daily, Toprol- mg daily  · Monitor BP      Hyperlipidemia  Assessment & Plan  Patient being continued on usual atorvastatin    Hyperkalemia-resolved as of 5/10/2021  Assessment & Plan  · Potassium upon admission 5 9, status post treatment, now resolved  · Was given calcium gluconate, D50/insulin/sodium bicarbonate  · Resolved    Leukocytosis-resolved as of 5/9/2021  Assessment & Plan  · Present upon admission at 17   · No evidence of active infection  Reported chills but no fever home  · May be secondary to hemoconcentration from dehydration  · Resolved      VTE Pharmacologic Prophylaxis:   Pharmacologic: Enoxaparin (Lovenox)  Mechanical VTE Prophylaxis in Place: Yes    Patient Centered Rounds: I have performed bedside rounds with nursing staff today  Discussions with Specialists or Other Care Team Provider:     Education and Discussions with Family / Patient:  Patient, declined family communication    Time Spent for Care: 30 minutes  More than 50% of total time spent on counseling and coordination of care as described above      Current Length of Stay: 3 day(s)    Current Patient Status: Inpatient   Certification Statement: The patient will continue to require additional inpatient hospital stay due to   Discharge Plan:  Likely 24-48 hours    Code Status: Level 1 - Full Code      Subjective:   Seen  Has no new complaints to offer  Discussed findings on CT scan of the abdomen and pelvis    Objective:     Vitals:   Temp (24hrs), Av °F (36 7 °C), Min:98 °F (36 7 °C), Max:98 1 °F (36 7 °C)    Temp:  [98 °F (36 7 °C)-98 1 °F (36 7 °C)] 98 °F (36 7 °C)  HR:  [65-70] 65  Resp:  [18-21] 18  BP: (133-139)/(60-65) 139/65  SpO2:  [97 %-100 %] 97 %  Body mass index is 20 37 kg/m²  Input and Output Summary (last 24 hours): Intake/Output Summary (Last 24 hours) at 5/10/2021 1359  Last data filed at 5/10/2021 0820  Gross per 24 hour   Intake 900 ml   Output 1800 ml   Net -900 ml       Physical Exam:     Physical Exam  Vitals signs reviewed  Constitutional:       General: He is not in acute distress  Appearance: Normal appearance  He is not ill-appearing, toxic-appearing or diaphoretic  HENT:      Head: Normocephalic  Eyes:      Extraocular Movements: Extraocular movements intact  Pupils: Pupils are equal, round, and reactive to light  Neck:      Musculoskeletal: Normal range of motion and neck supple  No neck rigidity or muscular tenderness  Vascular: No carotid bruit  Cardiovascular:      Rate and Rhythm: Normal rate and regular rhythm  Pulses: Normal pulses  Heart sounds: Normal heart sounds  No murmur  No friction rub  Pulmonary:      Effort: Pulmonary effort is normal       Breath sounds: Normal breath sounds  No stridor  No wheezing, rhonchi or rales  Chest:      Chest wall: No tenderness  Abdominal:      General: Abdomen is flat  Bowel sounds are normal  There is no distension  Palpations: Abdomen is soft  Tenderness: There is no abdominal tenderness  There is no guarding or rebound  Musculoskeletal: Normal range of motion           General: No swelling or tenderness  Right lower leg: No edema  Left lower leg: No edema  Skin:     General: Skin is warm and dry  Coloration: Skin is not jaundiced  Neurological:      General: No focal deficit present  Mental Status: He is alert and oriented to person, place, and time  Cranial Nerves: No cranial nerve deficit  Sensory: No sensory deficit  Additional Data:     Labs:    Results from last 7 days   Lab Units 05/10/21  0455  05/08/21  0436 05/07/21  0948   WBC Thousand/uL 7 69   < > 6 97 17 74*   HEMOGLOBIN g/dL 8 9*   < > 7 7* 9 1*   HEMATOCRIT % 26 4*   < > 23 1* 26 9*   PLATELETS Thousands/uL 197   < > 150 141*   BANDS PCT %  --   --  1  --    NEUTROS PCT %  --   --   --  95*   LYMPHS PCT %  --   --   --  3*   LYMPHO PCT %  --   --  6*  --    MONOS PCT %  --   --   --  1*   MONO PCT %  --   --  1*  --    EOS PCT %  --   --  0 0    < > = values in this interval not displayed  Results from last 7 days   Lab Units 05/10/21  0455   SODIUM mmol/L 133*   POTASSIUM mmol/L 4 2   CHLORIDE mmol/L 100   CO2 mmol/L 24   BUN mg/dL 17   CREATININE mg/dL 0 79   ANION GAP mmol/L 9   CALCIUM mg/dL 7 9*   ALBUMIN g/dL 2 1*   TOTAL BILIRUBIN mg/dL 0 20   ALK PHOS U/L 105   ALT U/L 38   AST U/L 24   GLUCOSE RANDOM mg/dL 86         Results from last 7 days   Lab Units 05/10/21  1150 05/10/21  0739 05/09/21  2113 05/09/21  1558 05/09/21  1143 05/09/21  0750 05/08/21  2053 05/08/21  1646 05/08/21  1137 05/08/21  0836 05/07/21  2143 05/07/21  1228   POC GLUCOSE mg/dl 191* 85 220* 301* 305* 169* 244* 221* 247* 319* 316* 225*         Results from last 7 days   Lab Units 05/09/21  0456 05/08/21  0436   PROCALCITONIN ng/ml 0 90* 1 88*           * I Have Reviewed All Lab Data Listed Above  * Additional Pertinent Lab Tests Reviewed:  All Labs Within Last 24 Hours Reviewed    Imaging:    Imaging Reports Reviewed Today Include:  CT abdomen and pelvis  Imaging Personally Reviewed by Myself Includes:  CT abdomen and pelvis    Recent Cultures (last 7 days):     Results from last 7 days   Lab Units 05/09/21  0457 05/07/21  1110   BLOOD CULTURE  No Growth at 24 hrs  No Growth at 24 hrs  Streptococcus anginosus*  Streptococcus anginosus*   GRAM STAIN RESULT   --  Gram positive cocci in chains*  Gram positive cocci in chains*       Last 24 Hours Medication List:   Current Facility-Administered Medications   Medication Dose Route Frequency Provider Last Rate    acetaminophen  650 mg Oral Q6H PRN Petty Murdock PA-C      amLODIPine  2 5 mg Oral Daily Francisco Crook DO      aspirin  81 mg Oral Daily Petty Murdock PA-C      atorvastatin  40 mg Oral Daily With Rohharitha and DANTE Noble      cefTRIAXone  1,000 mg Intravenous Q24H Nasra Phelps MD 1,000 mg (05/09/21 2124)    enoxaparin  40 mg Subcutaneous Daily Petty Murdock PA-C      insulin glargine  12 Units Subcutaneous HS Keshawn Batista PA-C      insulin lispro  1-5 Units Subcutaneous TID AC Petty Murdock PA-C      insulin lispro  3 Units Subcutaneous TID With Meals Keshawn Batista PA-C      metoprolol succinate  100 mg Oral Daily Petty Murdock PA-C      ondansetron  4 mg Intravenous Q6H PRN Petty Murdock PA-C      traMADol  50 mg Oral Q6H PRN Petty Murdock PA-C          Today, Patient Was Seen By: César Kmep MD    ** Please Note: Dictation voice to text software may have been used in the creation of this document   **

## 2021-05-10 NOTE — CONSULTS
Medical Oncology/Hematology Consult Note  Deyanira Lan, 67 y o , 1945  /-[birth year], [id number]  05/10/21    Assessment and Plan:    1  Adenocarcinoma of the pancreas  Patient was diagnosed with pancreatic cancer in 3/2021  He follows with Dr Kareen Ravi in Medical Oncology  He has been undergoing neoadjuvant chemotherapy with gemcitabine and Abraxane weekly 3/4 weeks  His doses have been reduced due to treatment related side effects  Plan was to treat for 3 cycles and then re-image to assess disease response and possibly refer for resection  Patient has completed 2 cycles of therapy  CT of abdomen and pelvis done while inpatient on 5/9 demonstrates stable disease  Patient has poor performance status  ECOG 2-3  I am concerned that he will not be a candidate for surgical resection due to his overall performance status, generalized weakness, chronic nonhealing wounds  He has required hospitalization for symptoms hyponatremia, dehydration after completion of each cycle of chemotherapy so far  Patient would like to continue with disease directed therapy  His chemo regimen will be further adjusted as an outpatient to try and prevent ongoing hospitalizations  We will drop Abraxane from his treatment regimen and he will continue on single agent gemcitabine as an outpatient  No inpatient recommendations at this time  Patient will require outpatient follow-up follow with Dr Kareen Ravi upon discharge    2  Mesenteric vein thrombosis  Incidental finding on CT of abdomen and pelvis with tiny filling defect noted at the junction of the SMV and portal confluence  Patient has been started on anticoagulation with Xarelto  Agree  Patient likely has hypercoagulopathy secondary to underlying malignancy  He will require lifelong anticoagulation  Please do not hesitate to contact me if you have any questions or need additional information  Thank you for this consult  Maik Bradford MSN, CRNP, [facility], [facility]  Hematology Oncology Nurse Practitioner      Reason for Consultation: Patient with history of pancreatic adenocarcinoma on chemotherapy here with bacteremia on IV antibiotics   CT scan of the abdomen shows nonocclusive thrombosis around SMV/portal vein   Please see with recommendations      History of present illness:   Re Cobb is a 67 y o  male with newly diagnosed adenocarcinoma of the pancreas  Patient presented with symptoms of significant abdominal pain, nausea, and changes in his stool  His initial workup was completed a [address] scan demonstrated a large pancreatic cyst as well as an abnormality of the pancreatic tissue with atrophy, dilated duct, and potential for pancreatic mass of the pancreatic head  He underwent an EUS  Guided FNA of the pancreatic mass on 2/3/21 and Pathology demonstrated adenocarcinoma  CT chest was negative for metastatic disease  CA 19 9 was near 20,000 at presentation  He was seen for initial consult by Dr Cinthya Elder on 03/15/2021 for consideration of neoadjuvant chemotherapy prior to resection  He was started on weekly gemcitabine and Abraxane  His last dose of chemotherapy was given on 5/4/21  His tumor marker has been trending down  His CA 19-9 was 2643 at presentation, this is down to 648    Patient was seen in the Oncology Clinic by me on 5/7/2021 and noted to be extremely weak and fatigued  He endorsed symptoms of worsening fatigue, dizziness, generalized weakness  He was sent to ED for further evaluation  Workup demonstrated evidence of hyponatremia with a sodium of 129, hyperkalemia with potassium of 5 9  Blood cultures demonstrated evidence of streptococcal bacteremia  He is on IV antibiotics  CT of chest and abdomen completed on 5/9 demonstrated no evidence of infection  The mass in the pancreatic head is similar when compared to his imaging done in January    Incidentally found, is a small thrombus at the junction of the SMV and portal sho [place] current outpatient oncology regimen is: Dose Reduced Gemzar and Abraxane , last dose 5/4/2021      Oncologic History:  Primary Outpatient Hematology/Oncology Provider: [facility]  Oncology History   Adenocarcinoma of pancreas ([facility] [address] )   3/10/2021 Initial Diagnosis    Adenocarcinoma of pancreas ([facility] [address] )     3/23/2021 -  Chemotherapy    pegfilgrastim (NEULASTA ONPRO) subcutaneous injection kit 6 mg, 6 mg, Subcutaneous, Once, 1 of 1 cycle  PACLitaxel protein bound (ABRAXANE) 235 mg in IVPB 47 mL, 125 mg/m2 = 235 mg, Intravenous, Once, 2 of 2 cycles  Dose modification: 100 mg/m2 (original dose 125 mg/m2, Cycle 2, Reason: Dose Not Tolerated)  Administration: 235 mg (3/23/2021), 235 mg (3/30/2021), 235 mg (4/6/2021), 188 mg (4/20/2021), 188 mg (5/4/2021), 188 mg (4/27/2021)  gemcitabine (GEMZAR) 1,800 mg in sodium chloride 0 9 % 250 mL infusion, 1,879 8 mg, Intravenous, Once, 2 of 6 cycles  Dose modification: 800 mg/m2 (original dose 1,000 mg/m2, Cycle 2, Reason: Dose Not Tolerated)  Administration: 1,800 mg (3/23/2021), 1,800 mg (3/30/2021), 1,800 mg (4/6/2021), 1,504 2 mg (4/20/2021), 1,504 2 mg (5/4/2021), 1,504 2 mg (4/27/2021)         Interval history:  Patient seen and examined  He appears much brighter than when I last saw him in clinic on 05/07  Patient reports he is feeling better  He states I am not as dizzy or woozy he does continue to endorse generalized weakness  He denies any abdominal pain or discomfort  No nausea/vomiting  His appetite is decent  I reviewed findings on CT scan which demonstrates stable disease  No great improvement but his disease is not worsening either  I am concerned however, as patient has not been able to tolerate treatment  Due to his generalized weakness, and overall performance status I am not sure if he would be a great surgical candidate  He does have chronic sacral wound  Patient verbalizes understanding    He does remain treatment focused  We will make further adjustments to his chemo regimen as an outpatient  Review of Systems   Constitutional: Positive for fatigue  Neurological: Positive for weakness  All other systems reviewed and are negative  All other review of systems were negative  Past medical history:   Past Medical History:   Diagnosis Date    Cancer [facility])     pancreatic    Diabetes mellitus ([address] )     Frequent urination     GERD (gastroesophageal reflux disease)     Hyperlipidemia     Hypertension     Peripheral neuropathy     Weakness     Weight loss        Past surgical history:   Past Surgical History:   Procedure Laterality Date    APPENDECTOMY      CATARACT EXTRACTION      COLONOSCOPY      SKIN LESION EXCISION      of a wart on right arm    TUNNELED VENOUS PORT PLACEMENT N/A 3/26/2021    Procedure: INSERTION VENOUS PORT (PORT-A-CATH);   Surgeon: César Miranda MD;  Location:  MAIN OR;  Service: Surgical Oncology       Allergies: No Known Allergies    Home medications:   Medications Prior to Admission   Medication    Accu-Chek FastClix Lancets MISC    Accu-Chek Guide test strip    acetaminophen (TYLENOL) 325 mg tablet    aspirin 81 mg chewable tablet    atorvastatin (LIPITOR) 40 mg tablet    Blood Glucose Monitoring Suppl (Accu-Chek Guide) w/Device KIT    insulin glargine (Lantus SoloStar) 100 units/mL injection pen    metFORMIN (GLUCOPHAGE) 850 mg tablet    metoprolol succinate (TOPROL-XL) 100 mg 24 hr tablet    NovoLOG FlexPen 100 units/mL injection pen    prochlorperazine (COMPAZINE) 10 mg tablet    ramipril (ALTACE) 10 MG capsule    ReliOn Pen Needles 31G X 6 MM MISC    sodium chloride (GRAHAM 128) 5 % hypertonic ophthalmic solution    traMADol (ULTRAM) 50 mg tablet       Hospital medications:   Current Facility-Administered Medications:     acetaminophen (TYLENOL) tablet 650 mg, 650 mg, Oral, Q6H PRN, Sudhakar Lomas PA-C, 650 mg at 05/09/21 5513    amLODIPine (NORVASC) tablet 2 5 mg, 2 5 mg, Oral, Daily, Amberly Diego DO, 2 5 mg at 05/10/21 0821    aspirin chewable tablet 81 mg, 81 mg, Oral, Daily, Jule Koyanagi, PA-C, 81 mg at 05/10/21 1203    atorvastatin (LIPITOR) tablet 40 mg, 40 mg, Oral, Daily With Yasmeen Diego PA-C, 40 mg at 05/09/21 1626    cefTRIAXone (ROCEPHIN) IVPB (premix in dextrose) 2,000 mg 50 mL, 2,000 mg, Intravenous, Q24H, Lillian Bernard MD    insulin glargine (LANTUS) subcutaneous injection 12 Units 0 12 mL, 12 Units, Subcutaneous, HS, Enriqueta Weaver PA-C, 12 Units at 05/09/21 2125    insulin lispro (HumaLOG) 100 units/mL subcutaneous injection 1-5 Units, 1-5 Units, Subcutaneous, TID AC, 1 Units at 05/10/21 1153 **AND** Fingerstick Glucose (POCT), , , TID AC, Jule Koyanagi, PA-C    insulin lispro (HumaLOG) 100 units/mL subcutaneous injection 3 Units, 3 Units, Subcutaneous, TID With Meals, Jeny Meyer PA-C, 3 Units at 05/10/21 1153    metoprolol succinate (TOPROL-XL) 24 hr tablet 100 mg, 100 mg, Oral, Daily, Jule Koyanagi, PA-C, 100 mg at 05/10/21 5456    ondansetron (ZOFRAN) injection 4 mg, 4 mg, Intravenous, Q6H PRN, Jule Koyanagi, PA-C    rivaroxaban (XARELTO) tablet 15 mg, 15 mg, Oral, BID With Meals, Ronald Parra MD    traMADol (ULTRAM) tablet 50 mg, 50 mg, Oral, Q6H PRN, Jule Koyanagi, PA-C    Social history:   Social History     Tobacco Use    Smoking status: Former Smoker     Types: Pipe     Quit date: 2/23/2011     Years since quitting: 10 2    Smokeless tobacco: Never Used   Substance Use Topics    Alcohol use: Never     Frequency: Never     Binge frequency: Never    Drug use: Never       Family history: History reviewed  No pertinent family history  Vitals:  Vitals:    05/10/21 1251   BP:    Pulse:    Resp:    Temp:    SpO2: 97%       Physical Exam  Vitals signs and nursing note reviewed  Constitutional:       Appearance: He is well-developed  He is ill-appearing ( ill)        Comments: Alert, pleasant, elderly male appears older than stated age, frail  No acute distress   HENT:      Head: Normocephalic and atraumatic  Mouth/Throat:      Pharynx: No oropharyngeal exudate  Eyes:      Conjunctiva/sclera: Conjunctivae normal    Neck:      Musculoskeletal: Normal range of motion and neck supple  Cardiovascular:      Rate and Rhythm: Normal rate and regular rhythm  Pulmonary:      Effort: Pulmonary effort is normal  No respiratory distress  Abdominal:      Palpations: Abdomen is soft  Tenderness: There is no abdominal tenderness  Skin:     General: Skin is warm and dry  Neurological:      General: No focal deficit present  Mental Status: He is alert and oriented to person, place, and time  Psychiatric:         Mood and Affect: Mood normal          Behavior: Behavior normal          Recent Results (from the past 48 hour(s))   Fingerstick Glucose (POCT)    Collection Time: 05/08/21  4:46 PM   Result Value Ref Range    POC Glucose 221 (H) 65 - 140 mg/dl   Fingerstick Glucose (POCT)    Collection Time: 05/08/21  8:53 PM   Result Value Ref Range    POC Glucose 244 (H) 65 - 140 mg/dl   Procalcitonin Reflex    Collection Time: 05/09/21  4:56 AM   Result Value Ref Range    Procalcitonin 0 90 (H) <=0 25 ng/ml   Basic metabolic panel    Collection Time: 05/09/21  4:56 AM   Result Value Ref Range    Sodium 131 (L) 136 - 145 mmol/L    Potassium 4 2 3 5 - 5 3 mmol/L    Chloride 100 100 - 108 mmol/L    CO2 21 21 - 32 mmol/L    ANION GAP 10 4 - 13 mmol/L    BUN 16 5 - 25 mg/dL    Creatinine 0 67 0 60 - 1 30 mg/dL    Glucose 182 (H) 65 - 140 mg/dL    Calcium 7 3 (L) 8 3 - 10 1 mg/dL    eGFR 93 ml/min/1 73sq m   Blood culture    Collection Time: 05/09/21  4:57 AM    Specimen: Arm, Right; Blood   Result Value Ref Range    Blood Culture No Growth at 24 hrs  Blood culture    Collection Time: 05/09/21  4:57 AM    Specimen: Arm, Left; Blood   Result Value Ref Range    Blood Culture No Growth at 24 hrs  Fingerstick Glucose (POCT)    Collection Time: 05/09/21  7:50 AM   Result Value Ref Range    POC Glucose 169 (H) 65 - 140 mg/dl   Fingerstick Glucose (POCT)    Collection Time: 05/09/21 11:43 AM   Result Value Ref Range    POC Glucose 305 (H) 65 - 140 mg/dl   CBC    Collection Time: 05/09/21 12:26 PM   Result Value Ref Range    WBC 3 74 (L) 4 31 - 10 16 Thousand/uL    RBC 2 75 (L) 3 88 - 5 62 Million/uL    Hemoglobin 8 8 (L) 12 0 - 17 0 g/dL    Hematocrit 26 3 (L) 36 5 - 49 3 %    MCV 96 82 - 98 fL    MCH 32 0 26 8 - 34 3 pg    MCHC 33 5 31 4 - 37 4 g/dL    RDW 19 2 (H) 11 6 - 15 1 %    Platelets 663 188 - 414 Thousands/uL    MPV 9 7 8 9 - 12 7 fL   Comprehensive metabolic panel    Collection Time: 05/09/21 12:27 PM   Result Value Ref Range    Sodium 131 (L) 136 - 145 mmol/L    Potassium 4 1 3 5 - 5 3 mmol/L    Chloride 98 (L) 100 - 108 mmol/L    CO2 25 21 - 32 mmol/L    ANION GAP 8 4 - 13 mmol/L    BUN 17 5 - 25 mg/dL    Creatinine 0 75 0 60 - 1 30 mg/dL    Glucose 259 (H) 65 - 140 mg/dL    Calcium 7 8 (L) 8 3 - 10 1 mg/dL    Corrected Calcium 9 3 8 3 - 10 1 mg/dL    AST 21 5 - 45 U/L    ALT 31 12 - 78 U/L    Alkaline Phosphatase 103 46 - 116 U/L    Total Protein 5 1 (L) 6 4 - 8 2 g/dL    Albumin 2 1 (L) 3 5 - 5 0 g/dL    Total Bilirubin 0 20 0 20 - 1 00 mg/dL    eGFR 89 ml/min/1 73sq m   Fingerstick Glucose (POCT)    Collection Time: 05/09/21  3:58 PM   Result Value Ref Range    POC Glucose 301 (H) 65 - 140 mg/dl   Fingerstick Glucose (POCT)    Collection Time: 05/09/21  9:13 PM   Result Value Ref Range    POC Glucose 220 (H) 65 - 140 mg/dl   CBC    Collection Time: 05/10/21  4:55 AM   Result Value Ref Range    WBC 7 69 4 31 - 10 16 Thousand/uL    RBC 2 82 (L) 3 88 - 5 62 Million/uL    Hemoglobin 8 9 (L) 12 0 - 17 0 g/dL    Hematocrit 26 4 (L) 36 5 - 49 3 %    MCV 94 82 - 98 fL    MCH 31 6 26 8 - 34 3 pg    MCHC 33 7 31 4 - 37 4 g/dL    RDW 18 7 (H) 11 6 - 15 1 %    Platelets 668 225 - 117 Thousands/uL    MPV 9 7 8 9 - 12 7 fL   Comprehensive metabolic panel    Collection Time: 05/10/21  4:55 AM   Result Value Ref Range    Sodium 133 (L) 136 - 145 mmol/L    Potassium 4 2 3 5 - 5 3 mmol/L    Chloride 100 100 - 108 mmol/L    CO2 24 21 - 32 mmol/L    ANION GAP 9 4 - 13 mmol/L    BUN 17 5 - 25 mg/dL    Creatinine 0 79 0 60 - 1 30 mg/dL    Glucose 86 65 - 140 mg/dL    Calcium 7 9 (L) 8 3 - 10 1 mg/dL    Corrected Calcium 9 4 8 3 - 10 1 mg/dL    AST 24 5 - 45 U/L    ALT 38 12 - 78 U/L    Alkaline Phosphatase 105 46 - 116 U/L    Total Protein 5 3 (L) 6 4 - 8 2 g/dL    Albumin 2 1 (L) 3 5 - 5 0 g/dL    Total Bilirubin 0 20 0 20 - 1 00 mg/dL    eGFR 87 ml/min/1 73sq m   Fingerstick Glucose (POCT)    Collection Time: 05/10/21  7:39 AM   Result Value Ref Range    POC Glucose 85 65 - 140 mg/dl   Fingerstick Glucose (POCT)    Collection Time: 05/10/21 11:50 AM   Result Value Ref Range    POC Glucose 191 (H) 65 - 140 mg/dl       Imaging Studies:   Xr Chest 1 View Portable    Result Date: 5/7/2021  Narrative: CHEST INDICATION:   Shortness of breath and fatigue  COMPARISON:  4/8/2021 EXAM PERFORMED/VIEWS:  XR CHEST PORTABLE FINDINGS: Cardiomediastinal silhouette appears unremarkable  The lungs are clear  No pneumothorax or pleural effusion  Ekfrof-b-Lock catheter positioning appears unchanged     Impression: No acute cardiopulmonary disease  Workstation performed: [id number]     Ct Abdomen Pelvis W Contrast    Result Date: 5/10/2021  Narrative: CT ABDOMEN AND PELVIS WITH IV CONTRAST INDICATION:   Sepsis Strep anginosis bacteremia in patient w/ pancreatic adenoCa on neoadjuvant chemo  R/O intra-abd abscess  COMPARISON:  Outside CT from 1/28/2021 and MRI from 1/29/2021  TECHNIQUE:  CT examination of the abdomen and pelvis was performed  Axial, sagittal, and coronal 2D reformatted images were created from the source data and submitted for interpretation  Radiation dose length product (DLP) for this visit:  261 35 mGy-cm     This examination, like all CT scans performed in the [facility], was performed utilizing techniques to minimize radiation dose exposure, including the use of iterative  reconstruction and automated exposure control  IV Contrast:  100 mL of iohexol (OMNIPAQUE) Enteric Contrast:  Enteric contrast was not administered  FINDINGS: ABDOMEN LOWER CHEST:  No clinically significant abnormality identified in the visualized lower chest  LIVER/BILIARY TREE:  Unremarkable  GALLBLADDER:  No calcified gallstones  No pericholecystic inflammatory change  The common bile duct does not appear dilated  SPLEEN:  Unremarkable  PANCREAS:  There is diffuse dilatation of the pancreatic duct with cystic appearance probably related to side duct dilatation and/or the presence of main duct IPMN  There is atrophy of the parenchyma and overlying the body and tail similar to the prior CT from January  There is ill-defined soft tissue in the pancreatic head is likely related to patient's underlying tumor similar to the prior study  This area measures approximately 3 5 x 3 cm on image 25  This abuts the portal splenic confluence  Additional cystic areas noted in the pancreatic head on image 30 measuring 1 5 cm not seen on the prior study  Fat does surround the SMA  ADRENAL GLANDS:  Unremarkable  KIDNEYS/URETERS:  Unremarkable  No hydronephrosis  STOMACH AND BOWEL: Distention of the stomach is seen with fluid  No small bowel dilatation is identified  There is mild distention of the colon with fecal stasis identified  APPENDIX:  There are expected postoperative changes of appendectomy  No obvious collection is seen  ABDOMINOPELVIC CAVITY:  No ascites  No pneumoperitoneum  No lymphadenopathy  VESSELS:  Unremarkable for patient's age  Tiny filling defect is noted at the junction of the SMV and portal confluence on image 23  This area was larger on the prior study and may represent a small amount of thrombus    Splenic vein and portal vein are  otherwise patent  Atherosclerotic changes of the aorta and iliacs are noted without aneurysmal dilatation  PELVIS REPRODUCTIVE ORGANS:  Unremarkable for patient's age  URINARY BLADDER:  Unremarkable  ABDOMINAL WALL/INGUINAL REGIONS: Punctate gas density in the lower abdominal walls probably related to injection sites  OSSEOUS STRUCTURES:  No acute fracture or destructive osseous lesion  The vertebral endplates are seen  Impression: No evidence of collection  Mass in the pancreatic head is suspicious for adenocarcinoma similar to the study from January  Cystic appearance of the distal pancreas may be related to ductal dilatation and/or main duct IPMN with pancreatic parenchymal atrophy noted  New cystic area is noted inferiorly in the pancreatic head  Small amount of thrombus appears to be evident at the portosplenic confluence  Workstation performed: [id number]       Counseling / Coordination of Care  Total floor / unit time spent today 75  minutes  Greater than 50% of total time was spent with the patient and / or family counseling and / or coordination of care  A description of the counseling / coordination of care:  Review of chart, review of imaging, discussion with primary medical attending  Bedside assessment of patient and discussion about treatment plan    JOI Romero    Please note: This report has been generated by voice recognition software system  Therefore, there may be syntax, spelling and/or grammatical errors  Please call if you have any questions

## 2021-05-11 NOTE — ASSESSMENT & PLAN NOTE
· Sodium worsened and now 128  · Treat with intravenous fluids at 75 cc an hour  · Trend BMP  ·  Encourage oral intake

## 2021-05-11 NOTE — PROGRESS NOTES
[place]  Progress Note - Bj Weller 1945, 67 y o  male MRN: [id number]  Unit/Bed#: -Paty Encounter: [id number]  Primary Care Provider: Shari Garnica DO   Date and time admitted to hospital: 5/7/2021  9:27 AM    * Streptococcal bacteremia  Assessment & Plan  Unknown source at present, no recent dental work, port site without erythema or tenderness  Blood cultures from 05/07 growing Streptococcus anginosus, repeat blood cultures from 05/09/2021 without growth    CT of the abdomen and pelvis without source of infection, again shows mass in the pancreatic head, tiny filling defect is noted at the junction of the SMV and portal confluence  Echocardiogram with normal ejection fraction, negative for valvular vegetation    Infectious Disease on board, continue ceftriaxone on 05/09/2021 #3    Mesenteric venous thrombosis (HCC)  Assessment & Plan  Incidental finding of CT scan of the abdomen and pelvis with tiny filling defect noted at the junction of the SMV and portal confluence likely due to underlying malignancy  Continue Xarelto loading for 21 days then transition to 20 mg daily  hematology on board    Hyponatremia  Assessment & Plan  · Sodium worsened and now 128  · Treat with intravenous fluids at 75 cc an hour  · Trend BMP  ·  Encourage oral intake      Generalized weakness  Assessment & Plan  · Likely due to ongoing pancreatic malignancy pain treated with chemotherapy  · Improved since presentation, PT/OT recommend home with family support    Sacral wound  Assessment & Plan  · Stable sacral wound noted upon exam   No erythema, drainage -local care and off weighting    Anemia  Assessment & Plan  · Likely secondary to chemo  · Continue to follow serial labs    Adenocarcinoma of pancreas [facility])  Assessment & Plan  · Dx March 2021  · Follows with Oncology outpatient, Dr Bina White, to receive 2-3months adjuvant chemotherapy prior to resection by Dr Reyes He   · Currently undergoing chemotherapy treatment with Abraxane and Gemzar - last treatment 4/6  · Oncology team consulted    Type 2 diabetes mellitus with hyperglycemia, with long-term current use of insulin [facility])  Assessment & Plan  Lab Results   Component Value Date    HGBA1C 9 4 (H) 04/08/2021       Recent Labs     05/10/21  1649 05/10/21  2126 05/11/21  0739 05/11/21  1059   POCGLU 279* 220* 211* 227*       Blood Sugar Average: Last 72 hrs:  (P) 231 [id number]   · Recent A1c shows poor control  · Increase Lantus to 15 units hs, Continue sliding scale coverage, increase pre meal insulin at 5 units t i d   · Adjust insulin regimen as needed  · Metformin on hold-will restart on discharge    HTN (hypertension)  Assessment & Plan  · Home medication is ramipril 10 mg daily, Toprol- mg daily  · Ramipril currently held due to hyperkalemia on presentation, switched to amlodipine 2 5 mg daily  · continue amlodipine 2 5 mg daily, Toprol- mg daily  · Monitor BP      Hyperlipidemia  Assessment & Plan  Patient being continued on usual atorvastatin    Hyperkalemia-resolved as of 5/10/2021  Assessment & Plan  · Potassium upon admission 5 9, status post treatment, now resolved  · Was given calcium gluconate, D50/insulin/sodium bicarbonate  · Resolved    Leukocytosis-resolved as of 5/9/2021  Assessment & Plan  · Present upon admission at 17   · No evidence of active infection  Reported chills but no fever home  · May be secondary to hemoconcentration from dehydration  · Resolved      VTE Pharmacologic Prophylaxis:   Pharmacologic: Rivaroxaban (Xarelto)  Mechanical VTE Prophylaxis in Place: No    Patient Centered Rounds: I have performed bedside rounds with nursing staff today  Discussions with Specialists or Other Care Team Provider:     Education and Discussions with Family / Patient:  Patient, declined family communication    Time Spent for Care: 30 minutes    More than 50% of total time spent on counseling and coordination of care as described above  Current Length of Stay: 4 day(s)    Current Patient Status: Inpatient   Certification Statement: The patient will continue to require additional inpatient hospital stay due to IV antibiotics    Discharge Plan:  Likely tomorrow    Code Status: Level 1 - Full Code      Subjective:   Seen  No new complaints to offer today  Feels well    Objective:     Vitals:   Temp (24hrs), Av 2 °F (36 8 °C), Min:98 1 °F (36 7 °C), Max:98 2 °F (36 8 °C)    Temp:  [98 1 °F (36 7 °C)-98 2 °F (36 8 °C)] 98 2 °F (36 8 °C)  HR:  [67-88] 88  Resp:  [18] 18  BP: (121-140)/(59-71) 121/59  SpO2:  [99 %-100 %] 100 %  Body mass index is 20 37 kg/m²  Input and Output Summary (last 24 hours): Intake/Output Summary (Last 24 hours) at 2021 1416  Last data filed at 2021 1101  Gross per 24 hour   Intake 780 ml   Output 2250 ml   Net -1470 ml       Physical Exam:     Physical Exam  Vitals signs reviewed  Constitutional:       General: He is not in acute distress  Appearance: He is ill-appearing  He is not toxic-appearing or diaphoretic  Comments: Cachectic looking   HENT:      Head: Normocephalic  Eyes:      Extraocular Movements: Extraocular movements intact  Pupils: Pupils are equal, round, and reactive to light  Neck:      Musculoskeletal: Normal range of motion and neck supple  No neck rigidity or muscular tenderness  Vascular: No carotid bruit  Cardiovascular:      Rate and Rhythm: Normal rate and regular rhythm  Pulses: Normal pulses  Heart sounds: Normal heart sounds  No murmur  No friction rub  Pulmonary:      Effort: Pulmonary effort is normal       Breath sounds: Normal breath sounds  No stridor  No wheezing, rhonchi or rales  Chest:      Chest wall: No tenderness  Abdominal:      General: Abdomen is flat  Bowel sounds are normal  There is no distension  Palpations: Abdomen is soft  Tenderness: There is no abdominal tenderness   There is no guarding or rebound  Musculoskeletal: Normal range of motion  General: No swelling or tenderness  Right lower leg: No edema  Left lower leg: No edema  Skin:     General: Skin is warm and dry  Coloration: Skin is not jaundiced  Neurological:      General: No focal deficit present  Mental Status: He is alert and oriented to person, place, and time  Cranial Nerves: No cranial nerve deficit  Sensory: No sensory deficit  Additional Data:     Labs:    Results from last 7 days   Lab Units 05/11/21  0513  05/08/21  0436  05/07/21  0948   WBC Thousand/uL 7 43   < > 6 97  --  17 74*   HEMOGLOBIN g/dL 8 7*   < > 7 7*  --  9 1*   HEMATOCRIT % 25 8*   < > 23 1*  --  26 9*   PLATELETS Thousands/uL 175   < > 150  --  141*   BANDS PCT %  --   --  1  --   --    NEUTROS PCT %  --   --   --   --  95*   LYMPHS PCT %  --   --   --   --  3*   LYMPHO PCT % 21  --  6*   < >  --    MONOS PCT %  --   --   --   --  1*   MONO PCT % 8  --  1*   < >  --    EOS PCT % 0  --  0   < > 0    < > = values in this interval not displayed       Results from last 7 days   Lab Units 05/11/21  0513 05/10/21  0455   SODIUM mmol/L 128* 133*   POTASSIUM mmol/L 4 3 4 2   CHLORIDE mmol/L 98* 100   CO2 mmol/L 18* 24   BUN mg/dL 16 17   CREATININE mg/dL 0 72 0 79   ANION GAP mmol/L 12 9   CALCIUM mg/dL 8 2* 7 9*   ALBUMIN g/dL  --  2 1*   TOTAL BILIRUBIN mg/dL  --  0 20   ALK PHOS U/L  --  105   ALT U/L  --  38   AST U/L  --  24   GLUCOSE RANDOM mg/dL 227* 86         Results from last 7 days   Lab Units 05/11/21  1059 05/11/21  0739 05/10/21  2126 05/10/21  1649 05/10/21  1150 05/10/21  0739 05/09/21  2113 05/09/21  1558 05/09/21  1143 05/09/21  0750 05/08/21  2053 05/08/21  1646   POC GLUCOSE mg/dl 227* 211* 220* 279* 191* 85 220* 301* 305* 169* 244* 221*         Results from last 7 days   Lab Units 05/09/21  0456 05/08/21  0436   PROCALCITONIN ng/ml 0 90* 1 88*           * I Have Reviewed All Lab Data Listed Above   * Additional Pertinent Lab Tests Reviewed: All Labs Within Last 24 Hours Reviewed    Imaging:    Imaging Reports Reviewed Today Include:   Imaging Personally Reviewed by Myself Includes:      Recent Cultures (last 7 days):     Results from last 7 days   Lab Units 05/09/21  0457 05/07/21  1110   BLOOD CULTURE  No Growth at 48 hrs  No Growth at 48 hrs  Streptococcus anginosus*  Streptococcus anginosus*   GRAM STAIN RESULT   --  Gram positive cocci in chains*  Gram positive cocci in chains*       Last 24 Hours Medication List:   Current Facility-Administered Medications   Medication Dose Route Frequency Provider Last Rate    acetaminophen  650 mg Oral Q6H PRN Mildred Quezada PA-C      amLODIPine  2 5 mg Oral Daily Meriam Carmen, DO      aspirin  81 mg Oral Daily Mildred Quezada PA-C      atorvastatin  40 mg Oral Daily With [employer] Noble      cefTRIAXone  2,000 mg Intravenous Q24H Autumn Armas MD 2,000 mg (05/10/21 5956)    insulin glargine  12 Units Subcutaneous HS Enriqueta Weaver PA-C      insulin lispro  1-5 Units Subcutaneous TID AC Mildred Quezada PA-C      insulin lispro  3 Units Subcutaneous TID With Meals Tavares Redd PA-C      metoprolol succinate  100 mg Oral Daily Mildred Quezada PA-C      ondansetron  4 mg Intravenous Q6H PRN Mildred Quezada PA-C      rivaroxaban  15 mg Oral BID With Meals Archana Khan MD      sodium chloride  75 mL/hr Intravenous Continuous Archana Khan MD      traMADol  50 mg Oral Q6H PRN Mildred Quezada PA-C          Today, Patient Was Seen By: Archana Khan MD    ** Please Note: Dictation voice to text software may have been used in the creation of this document   **

## 2021-05-11 NOTE — PROGRESS NOTES
Pastoral Care Progress Note    2021  Patient: Re Cobb : [birth year]  Admission Date & Time: 2021 8647  MRN: [id number] CSN: [id number]                     Chaplaincy Interventions Utilized:   Relationship Building: Cultivated a relationship of care and support  Patient shared the reason for being admitted  Very friendly and open to prayer  He indicated that he went to a [address] [place] [address] here in [facility]  Ritual: Provided prayer    Patient was touched by prayer and became teary  I emphasized that God was walking with him   Patient was grateful for the visit        21 1300   Clinical Encounter Type   Visited With Patient   Routine Visit Introduction   Referral To   (census/rounds)   Voodoo Encounters   Voodoo Needs Prayer        21 1300   Clinical Encounter Type   Visited With Patient   Routine Visit Introduction   Referral To   (census/rounds)   Voodoo Encounters   Voodoo Needs Prayer

## 2021-05-11 NOTE — TELEPHONE ENCOUNTER
Please inform wife that the primary team is managing blood sugars, and his insulin dose has been adjusted     if it is needed they will call us for management    Krys Leblanc MD

## 2021-05-11 NOTE — CASE MANAGEMENT
LOS: 4  Continuing to fool patient, At round, patient will now be on Xeralto for blood clot  Placed call to [facility] at [phone] and was informed by pharmacist  the cost for Cathy Osuna would be $47 00 a month  Notified patient of the above and he reports he can afford it  A coupon was given to patient for 1st 30 day supply free  His cousin can transport home with [facility]

## 2021-05-11 NOTE — PROGRESS NOTES
Pamela Campos  67 y o   male  1945  mrn [id number]    Assessment/Plan: 1  Streptococcus anginosis bacteremia/Leukocytosis : Remains afebrile and WBC count has decreased to nml  Admission bld cx's grew Strep anginosus - usually occurs from mouth or intra-abd source  This milleri group Strep species is known for its pyogenic nature, prone to developing abscesses, including odontogenic and intra-abdominal abscesses  Repeat bld cx's are neg so far  Also need to consider endocarditis and possible "seeding" of his port  Abd CT showed diffuse dilatation of pancreatic duct, no hydro, distended stomach, fecal stasis in colon, and ?small clot in SMV but no abscess - ?possibility of biliary duct or colon source  ECHO was neg for valvular vegetation     Immunosuppressed male admitted with ~2 weeks of generalized weakness and loose stool, found to have leukocytosis      A  Cont ceftriaxone for tx of Strep anginosis bacteremia - Pt will need 2 week course of IV abx  B  Awaiting results of repeat bld cx's  C  Pt will need CT of maxilla/mandible to look for dental source for his bacteremia    Subjective: Feels better   Denies abd pain    Objective:  Tmax: 98 2  Lungs: Clear  Abd: +BS, soft, nontender  Ext: No calf tenderness    Labs:  CBC w/diff  Recent Labs     05/11/21 0513   WBC 7 43   HGB 8 7*   HCT 25 8*      LYMPHOPCT 21   MONOPCT 8   EOSPCT 0     BMP  Recent Labs     05/11/21  0513   K 4 3   CL 98*   CO2 18*   BUN 16   CREATININE 0 72   CALCIUM 8 2*     CMP  Recent Labs     05/10/21  0455 05/11/21  0513   K 4 2 4 3    98*   CO2 24 18*   BUN 17 16   CREATININE 0 79 0 72   CALCIUM 7 9* 8 2*   ALKPHOS 105  --    ALT 38  --    AST 24  --         labrc    Cultures:  Lab Results   Component Value Date    BLOODCX No Growth at 24 hrs  05/09/2021    BLOODCX No Growth at 24 hrs  05/09/2021    BLOODCX Streptococcus anginosus (A) 05/07/2021    BLOODCX Streptococcus anginosus (A) 05/07/2021    BLOODCX No Growth After 5 Days  04/08/2021    BLOODCX No Growth After 5 Days   04/08/2021     No results found for: WOUNDCULT  No results found for: URINECX  No results found for: SPUTUMCULTUR    MED:  Rocephin: #3        Completed:  Vancomycin  X 1 dose on 5/9  Cefepime x 1 dose on 5/9         Current Facility-Administered Medications:     acetaminophen (TYLENOL) tablet 650 mg, 650 mg, Oral, Q6H PRN, Nisreen Maria PA-C, 650 mg at 05/10/21 2258    amLODIPine (NORVASC) tablet 2 5 mg, 2 5 mg, Oral, Daily, Amberly Fly, DO, 2 5 mg at 05/11/21 0800    aspirin chewable tablet 81 mg, 81 mg, Oral, Daily, Nisreen Maria PA-C, 81 mg at 05/11/21 0800    atorvastatin (LIPITOR) tablet 40 mg, 40 mg, Oral, Daily With Yanet Fraire PA-C, 40 mg at 05/10/21 1650    cefTRIAXone (ROCEPHIN) IVPB (premix in dextrose) 2,000 mg 50 mL, 2,000 mg, Intravenous, Q24H, Ruchi Palma MD, Last Rate: 100 mL/hr at 05/10/21 1749, 2,000 mg at 05/10/21 1749    insulin glargine (LANTUS) subcutaneous injection 12 Units 0 12 mL, 12 Units, Subcutaneous, HS, Enriqueta Weaver PA-C, 12 Units at 05/10/21 2244    insulin lispro (HumaLOG) 100 units/mL subcutaneous injection 1-5 Units, 1-5 Units, Subcutaneous, TID AC, 2 Units at 05/11/21 1132 **AND** Fingerstick Glucose (POCT), , , TID AC, Nisreen Maria PA-C    insulin lispro (HumaLOG) 100 units/mL subcutaneous injection 3 Units, 3 Units, Subcutaneous, TID With Meals, Catalina Everett PA-C, 3 Units at 05/11/21 1132    metoprolol succinate (TOPROL-XL) 24 hr tablet 100 mg, 100 mg, Oral, Daily, Nisreen Maria PA-C, 100 mg at 05/11/21 0800    ondansetron (ZOFRAN) injection 4 mg, 4 mg, Intravenous, Q6H PRN, Nisreen Maria PA-C    rivaroxaban (XARELTO) tablet 15 mg, 15 mg, Oral, BID With Meals, Yissel Humphrey MD, 15 mg at 05/11/21 0758    traMADol (ULTRAM) tablet 50 mg, 50 mg, Oral, Q6H PRN, Nisreen Maria PA-C    Principal Problem:    Streptococcal bacteremia  Active Problems:    Hyperlipidemia    HTN (hypertension)    Type 2 diabetes mellitus with hyperglycemia, with long-term current use of insulin (HCC)    Adenocarcinoma of pancreas (HCC)    Hyponatremia    Anemia    Sacral wound    Generalized weakness    Mesenteric venous thrombosis ([facility] [address] )      Johanny Walker MD

## 2021-05-11 NOTE — ASSESSMENT & PLAN NOTE
Lab Results   Component Value Date    HGBA1C 9 4 (H) 04/08/2021       Recent Labs     05/10/21  1649 05/10/21  2126 05/11/21  0739 05/11/21  1059   POCGLU 279* 220* 211* 227*       Blood Sugar Average: Last 72 hrs:  (P) 231 [id number]   · Recent A1c shows poor control    · Increase Lantus to 15 units hs, Continue sliding scale coverage, increase pre meal insulin at 5 units t i d   · Adjust insulin regimen as needed  · Metformin on hold-will restart on discharge

## 2021-05-11 NOTE — TELEPHONE ENCOUNTER
Pt wife called  He is in [place] [facility] [address]   Not sure when he is coming out  She wanted you aware to read his chart  Pt is set up with a follow up in [facility]

## 2021-05-11 NOTE — PROGRESS NOTES
Jaydon Sides  67 y o   male  1945  mrn [id number]  3  Assessment/Plan: 1  Streptococcus anginosis bacteremia/Leukocytosis : No fever and WBC count has decreased to nml  Admission bld cx's grew Strep anginosus - usually is from mouth or intra-abd source  This milleri group Strep species is known for its pyogenic nature, prone to developing abscesses, including odontogenic and intra-abdominal abscesses  Repeat bld cx's are neg so far  Also need to consider endocarditis and possible "seeding" of his port  Abd CT showed diffuse dilatation of pancreatic duct, no hydro, distended stomach, fecal stasis in colon, and ?small clot in SMV but no abscess - ?possibility of biliary duct or colon source  Immunosuppressed male admitted with ~2 weeks of generalized weakness and loose stool, found to have leukocytosis      A  Cont ceftriaxone for tx of Strep anginosis bacteremia  B  Awaiting results of repeat bld cx's  C  Will order ECHO to look for valvular vegetation  D  Pt will eventually needed CT of maxilla/mandible to look for dental source for his bacteremia            Subjective: No complaints  Remains weak  Denies any dental pain or abd pain    Objective:  Tmax: 98 4  Lungs: Clear   Chest: Port site looks good  Abd: +BS, soft, nontender  Ext: No calf tenderness    Labs:  CBC w/diff  Recent Labs     05/08/21  0436  05/10/21  0455   WBC 6 97   < > 7 69   HGB 7 7*   < > 8 9*   HCT 23 1*   < > 26 4*      < > 197   LYMPHOPCT 6*  --   --    MONOPCT 1*  --   --    EOSPCT 0  --   --     < > = values in this interval not displayed       BMP  Recent Labs     05/10/21  0455   K 4 2      CO2 24   BUN 17   CREATININE 0 79   CALCIUM 7 9*     CMP  Recent Labs     05/10/21  0455   K 4 2      CO2 24   BUN 17   CREATININE 0 79   CALCIUM 7 9*   ALKPHOS 105   ALT 38   AST 24        labrc    Cultures:  Lab Results   Component Value Date    BLOODCX No Growth at 24 hrs  05/09/2021    BLOODCX No Growth at 24 hrs  05/09/2021 BLOODCX Streptococcus anginosus (A) 05/07/2021    BLOODCX Streptococcus anginosus (A) 05/07/2021    BLOODCX No Growth After 5 Days  04/08/2021    BLOODCX No Growth After 5 Days   04/08/2021     No results found for: WOUNDCULT  No results found for: URINECX  No results found for: SPUTUMCULTUR    MED:  Rocephin: #2      Completed:  Vancomycin  X 1 dose on 5/9  Cefepime x 1 dose on 5/9         Current Facility-Administered Medications:     acetaminophen (TYLENOL) tablet 650 mg, 650 mg, Oral, Q6H PRN, Leanna Cleveland PA-C, 650 mg at 05/09/21 2223    amLODIPine (NORVASC) tablet 2 5 mg, 2 5 mg, Oral, Daily, Amberly Johnathon, DO, 2 5 mg at 05/10/21 0821    aspirin chewable tablet 81 mg, 81 mg, Oral, Daily, Leanna Cleveland PA-C, 81 mg at 05/10/21 7148    atorvastatin (LIPITOR) tablet 40 mg, 40 mg, Oral, Daily With Duran Chacon PA-C, 40 mg at 05/10/21 1650    cefTRIAXone (ROCEPHIN) IVPB (premix in dextrose) 2,000 mg 50 mL, 2,000 mg, Intravenous, Q24H, Emilie Hodgson MD, Last Rate: 100 mL/hr at 05/10/21 1749, 2,000 mg at 05/10/21 1749    insulin glargine (LANTUS) subcutaneous injection 12 Units 0 12 mL, 12 Units, Subcutaneous, HS, Enriqueta Weaver PA-C, 12 Units at 05/09/21 2125    insulin lispro (HumaLOG) 100 units/mL subcutaneous injection 1-5 Units, 1-5 Units, Subcutaneous, TID AC, 3 Units at 05/10/21 1651 **AND** Fingerstick Glucose (POCT), , , TID AC, Leanna Cleveland PA-C    insulin lispro (HumaLOG) 100 units/mL subcutaneous injection 3 Units, 3 Units, Subcutaneous, TID With Meals, Mitchel Ordonez PA-C, 3 Units at 05/10/21 1651    metoprolol succinate (TOPROL-XL) 24 hr tablet 100 mg, 100 mg, Oral, Daily, daline DANTE Cleveland, 100 mg at 05/10/21 0821    ondansetron (ZOFRAN) injection 4 mg, 4 mg, Intravenous, Q6H PRN, daginny Cleveland PA-C    rivaroxaban (XARELTO) tablet 15 mg, 15 mg, Oral, BID With Meals, Aron Aase, MD, 15 mg at 05/10/21 1650    traMADol (ULTRAM) tablet 50 mg, 50 mg, Oral, Q6H SAJAN, Keyonna Watkins PA-C    Principal Problem:    Streptococcal bacteremia  Active Problems:    Hyperlipidemia    HTN (hypertension)    Type 2 diabetes mellitus with hyperglycemia, with long-term current use of insulin (HCC)    Adenocarcinoma of pancreas (HCC)    Hyponatremia    Anemia    Sacral wound    Generalized weakness    Mesenteric venous thrombosis ([facility] [address] )      Poncho Eddy MD

## 2021-05-11 NOTE — ASSESSMENT & PLAN NOTE
· Dx March 2021  · Follows with Oncology outpatient, Dr Abbey Garcia, to receive 2-3months adjuvant chemotherapy prior to resection by Dr Bansal Seen   · Currently undergoing chemotherapy treatment with Abraxane and Gemzar - last treatment 4/6  · Oncology team consulted

## 2021-05-11 NOTE — ASSESSMENT & PLAN NOTE
Unknown source at present, no recent dental work, port site without erythema or tenderness  Blood cultures from 05/07 growing Streptococcus anginosus, repeat blood cultures from 05/09/2021 without growth    CT of the abdomen and pelvis without source of infection, again shows mass in the pancreatic head, tiny filling defect is noted at the junction of the SMV and portal confluence      Echocardiogram with normal ejection fraction, negative for valvular vegetation    Infectious Disease on board, continue ceftriaxone on 05/09/2021 #3

## 2021-05-11 NOTE — PLAN OF CARE
Problem: Potential for Falls  Goal: Patient will remain free of falls  Description: INTERVENTIONS:  - Assess patient frequently for physical needs  -  Identify cognitive and physical deficits and behaviors that affect risk of falls    -  [place] fall precautions as indicated by assessment   - Educate patient/family on patient safety including physical limitations  - Instruct patient to call for assistance with activity based on assessment  - Modify environment to reduce risk of injury  - Consider OT/PT consult to assist with strengthening/mobility  Outcome: Progressing     Problem: Prexisting or High Potential for Compromised Skin Integrity  Goal: Skin integrity is maintained or improved  Description: INTERVENTIONS:  - Identify patients at risk for skin breakdown  - Assess and monitor skin integrity  - Assess and monitor nutrition and hydration status  - Monitor labs   - Assess for incontinence   - Turn and reposition patient  - Assist with mobility/ambulation  - Relieve pressure over bony prominences  - Avoid friction and shearing  - Provide appropriate hygiene as needed including keeping skin clean and dry  - Evaluate need for skin moisturizer/barrier cream  - Collaborate with interdisciplinary team   - Patient/family teaching  - Consider wound care consult   Outcome: Progressing     Problem: PAIN - ADULT  Goal: Verbalizes/displays adequate comfort level or baseline comfort level  Description: Interventions:  - Encourage patient to monitor pain and request assistance  - Assess pain using appropriate pain scale  - Administer analgesics based on type and severity of pain and evaluate response  - Implement non-pharmacological measures as appropriate and evaluate response  - Consider cultural and social influences on pain and pain management  - Notify physician/advanced practitioner if interventions unsuccessful or patient reports new pain  Outcome: Progressing     Problem: INFECTION - ADULT  Goal: Absence or prevention of progression during hospitalization  Description: INTERVENTIONS:  - Assess and monitor for signs and symptoms of infection  - Monitor lab/diagnostic results  - Monitor all insertion sites, i e  indwelling lines, tubes, and drains  - Monitor endotracheal if appropriate and nasal secretions for changes in amount and color  - [place] appropriate cooling/warming therapies per order  - Administer medications as ordered  - Instruct and encourage patient and family to use good hand hygiene technique  - Identify and instruct in appropriate isolation precautions for identified infection/condition  Outcome: Progressing  Goal: Absence of fever/infection during neutropenic period  Description: INTERVENTIONS:  - Monitor WBC    Outcome: Progressing     Problem: SAFETY ADULT  Goal: Patient will remain free of falls  Description: INTERVENTIONS:  - Assess patient frequently for physical needs  -  Identify cognitive and physical deficits and behaviors that affect risk of falls    -  [place] fall precautions as indicated by assessment   - Educate patient/family on patient safety including physical limitations  - Instruct patient to call for assistance with activity based on assessment  - Modify environment to reduce risk of injury  - Consider OT/PT consult to assist with strengthening/mobility  Outcome: Progressing  Goal: Maintain or return to baseline ADL function  Description: INTERVENTIONS:  -  Assess patient's ability to carry out ADLs; assess patient's baseline for ADL function and identify physical deficits which impact ability to perform ADLs (bathing, care of mouth/teeth, toileting, grooming, dressing, etc )  - Assess/evaluate cause of self-care deficits   - Assess range of motion  - Assess patient's mobility; develop plan if impaired  - Assess patient's need for assistive devices and provide as appropriate  - Encourage maximum independence but intervene and supervise when necessary  - Involve family in performance of ADLs  - Assess for home care needs following discharge   - Consider OT consult to assist with ADL evaluation and planning for discharge  - Provide patient education as appropriate  Outcome: Progressing  Goal: Maintain or return mobility status to optimal level  Description: INTERVENTIONS:  - Assess patient's baseline mobility status (ambulation, transfers, stairs, etc )    - Identify cognitive and physical deficits and behaviors that affect mobility  - Identify mobility aids required to assist with transfers and/or ambulation (gait belt, sit-to-stand, lift, walker, cane, etc )  - [place] fall precautions as indicated by assessment  - Record patient progress and toleration of activity level on Mobility SBAR; progress patient to next Phase/Stage  - Instruct patient to call for assistance with activity based on assessment  - Consider rehabilitation consult to assist with strengthening/weightbearing, etc   Outcome: Progressing     Problem: DISCHARGE PLANNING  Goal: Discharge to home or other facility with appropriate resources  Description: INTERVENTIONS:  - Identify barriers to discharge w/patient and caregiver  - Arrange for needed discharge resources and transportation as appropriate  - Identify discharge learning needs (meds, wound care, etc )  - Arrange for interpretive services to assist at discharge as needed  - Refer to Case Management Department for coordinating discharge planning if the patient needs post-hospital services based on physician/advanced practitioner order or complex needs related to functional status, cognitive ability, or social support system  Outcome: Progressing     Problem: Knowledge Deficit  Goal: Patient/family/caregiver demonstrates understanding of disease process, treatment plan, medications, and discharge instructions  Description: Complete learning assessment and assess knowledge base    Interventions:  - Provide teaching at level of understanding  - Provide teaching via preferred learning methods  Outcome: Progressing

## 2021-05-11 NOTE — ASSESSMENT & PLAN NOTE
Incidental finding of CT scan of the abdomen and pelvis with tiny filling defect noted at the junction of the SMV and portal confluence likely due to underlying malignancy    Continue Xarelto loading for 21 days then transition to 20 mg daily  hematology on board

## 2021-05-12 NOTE — PROGRESS NOTES
Madeline Kuo  67 y o   male  1945  mrn [id number]    Assessment/Plan: 1  Streptococcus anginosis bacteremia/Leukocytosis : No fever but WBC count increased slightly to 12K  Pt does not have any new complaints and feels well otherwise  CT of facia bones was neg for dental infxn  Admission bld cx's grew Strep anginosus - usual sources include mouth or intra-abd source  This milleri group Strep species is known for its pyogenic nature, prone to developing abscesses, including odontogenic and intra-abdominal abscesses   Repeat bld cx's are neg  Also need to consider endocarditis and possible "seeding" of his port  Abd CT showed diffuse dilatation of pancreatic duct, no hydro, distended stomach, fecal stasis in colon, and ?small clot in SMV but no abscess - ?possibility of biliary duct or colon source  ECHO was neg for valvular vegetation     Immunosuppressed male admitted with ~2 weeks of generalized weakness and loose stool, found to have leukocytosis      A  Cont ceftriaxone x 2 weeks, from neg blood cx, for tx of Strep anginosis bacteremia - last dose on 5/22  B  Pt is not sure if abx can be given through his port or if it's dedicated to chemo only - Case management will check with his Oncologist's office  C  If port can be used, OK to d/c Pt today  D  If port cannot be used, then PICC line will need to be placed so Pt can complete his course of IV abx  E   Will follow WBC count as an out-Pt  F  Pt will f/u with me as an out-Pt - He will need repeat bld cx's 4 weeks after he completes his IV abx to check whether port has been "seeded"       Subjective: Feels better - no new complaints    Objective:  Tmax: 98 2  Lungs: Clear  Abd: BS, soft, nontender  Ext: No calf tenderness    Labs:  CBC w/diff  Recent Labs     05/11/21  0513 05/12/21  0434   WBC 7 43 12 76*   HGB 8 7* 9 8*   HCT 25 8* 29 3*    223   LYMPHOPCT 21  --    MONOPCT 8  --    EOSPCT 0  --      BMP  Recent Labs     05/12/21  0434   K 4 4   CL 99*   CO2 22   BUN 16   CREATININE 0 84   CALCIUM 7 8*     CMP  Recent Labs     05/10/21  0455  05/12/21  0434   K 4 2   < > 4 4      < > 99*   CO2 24   < > 22   BUN 17   < > 16   CREATININE 0 79   < > 0 84   CALCIUM 7 9*   < > 7 8*   ALKPHOS 105  --   --    ALT 38  --   --    AST 24  --   --     < > = values in this interval not displayed  [place] [facility]    Cultures:  Lab Results   Component Value Date    BLOODCX No Growth at 48 hrs  05/09/2021    BLOODCX No Growth at 48 hrs  05/09/2021    BLOODCX Streptococcus anginosus (A) 05/07/2021    BLOODCX Streptococcus anginosus (A) 05/07/2021    BLOODCX No Growth After 5 Days  04/08/2021    BLOODCX No Growth After 5 Days   04/08/2021     No results found for: WOUNDCULT  No results found for: URINECX  No results found for: SPUTUMCULTUR    MED:  Rocephin: #4        Completed:  Vancomycin  X 1 dose on 5/9  Cefepime x 1 dose on 5/9         Current Facility-Administered Medications:     acetaminophen (TYLENOL) tablet 650 mg, 650 mg, Oral, Q6H PRN, Anice Catching, PA-C, 650 mg at 05/11/21 2035    amLODIPine (NORVASC) tablet 2 5 mg, 2 5 mg, Oral, Daily, Amberly Fly, DO, 2 5 mg at 05/12/21 0853    aspirin chewable tablet 81 mg, 81 mg, Oral, Daily, Anice Catching, PA-C, 81 mg at 05/12/21 7302    atorvastatin (LIPITOR) tablet 40 mg, 40 mg, Oral, Daily With Raenettcindy Veras, PA-C, 40 mg at 05/11/21 1644    cefTRIAXone (ROCEPHIN) IVPB (premix in dextrose) 2,000 mg 50 mL, 2,000 mg, Intravenous, Q24H, Angel Lopes MD, Last Rate: 100 mL/hr at 05/11/21 1740, 2,000 mg at 05/11/21 1740    insulin glargine (LANTUS) subcutaneous injection 15 Units 0 15 mL, 15 Units, Subcutaneous, HS, Marquis Bernal MD, 15 Units at 05/11/21 2252    insulin lispro (HumaLOG) 100 units/mL subcutaneous injection 1-5 Units, 1-5 Units, Subcutaneous, TID AC, 4 Units at 05/12/21 1135 **AND** Fingerstick Glucose (POCT), , , TID AC, Kenneth Rhodes PA-C    insulin lispro (HumaLOG) 100 units/mL subcutaneous injection 5 Units, 5 Units, Subcutaneous, TID With Meals, Isabela Rivers MD, 5 Units at 05/12/21 1135    metoprolol succinate (TOPROL-XL) 24 hr tablet 100 mg, 100 mg, Oral, Daily, Geovanna Reveles PA-C, 100 mg at 05/12/21 0925    ondansetron (ZOFRAN) injection 4 mg, 4 mg, Intravenous, Q6H PRN, Geovanna Reveles PA-C    rivaroxaban (XARELTO) tablet 15 mg, 15 mg, Oral, BID With Meals, Isabela Rivers MD, 15 mg at 05/12/21 0925    traMADol (ULTRAM) tablet 50 mg, 50 mg, Oral, Q6H PRN, Geovanna Reveles PA-C    Principal Problem:    Streptococcal bacteremia  Active Problems:    Hyperlipidemia    HTN (hypertension)    Type 2 diabetes mellitus with hyperglycemia, with long-term current use of insulin (HCC)    Adenocarcinoma of pancreas (HCC)    Hyponatremia    Anemia    Sacral wound    Generalized weakness    [facility] ([facility]      Johanny Walker MD

## 2021-05-12 NOTE — ASSESSMENT & PLAN NOTE
· Sodium corrected to 133 with blood glucose  · Discontinue intravenous fluids  · Trend BMP  ·  Encourage oral intake

## 2021-05-12 NOTE — CASE MANAGEMENT
LOS: 5  Continuing to follow patient  As per Dr Yvonne Norman, patient will need 10 days IV Rocephine for his strep bacteremia  Met with patient and he would like to go home with his cousin and his wife  Discussed his need for IV abx and he feels he would not be able to administered it himself  He ask CM to speak with his cousin in law Jerome De Leon  Spoke with [place] at [phone] and discussed patient's need for I home IV abx  She will be talking to patientt and determine if she feels she could do it at home or if he will need SNF  Wait  Call back

## 2021-05-12 NOTE — PLAN OF CARE
Problem: PHYSICAL THERAPY ADULT  Goal: Performs mobility at highest level of function for planned discharge setting  See evaluation for individualized goals  Description: Treatment/Interventions: Functional transfer training, Therapeutic exercise, Equipment eval/education, Bed mobility, Gait training, Spoke to nursing, OT  Equipment Recommended: (Owns RW)       See flowsheet documentation for full assessment, interventions and recommendations  Note: Prognosis: Fair  Problem List: Decreased range of motion, Decreased endurance, Decreased strength, Impaired balance, Decreased mobility, Decreased skin integrity  Assessment: Pt pleasant and eager to ambulate this session; Pt continues with IV and c/o buttock wound; Pt able to verbalize need for frequent repositioning and tools to assist with pressure relief (waffle cushion, wedge); Pt able to ambulate in halls, slow steady champ, no LOB or knee buckling; Safe mobility with RW;  Pt would benefit from continued PT while in hospital and follow up with Airam Villasenor PT to increase strength, endurance, balance and progress to least restrictive device/no AD as appropriate  Pt remains reclined in chair at bedside, waffle cushion and green wedge for positioning/pressure relief; The patient's AM-PAC Basic Mobility Inpatient Short Form Raw Score is 23, Standardized Score is 50  88  A standardized score less than 42 9 suggests the patient may benefit from discharge to home with [place] [id number] PT  Please also refer to the recommendation of the Physical Therapist for safe discharge planning  Barriers to Discharge: Decreased caregiver support        PT Discharge Recommendation: Home with home health rehabilitation     PT - OK to Discharge: Yes(when medically ready)    See flowsheet documentation for full assessment

## 2021-05-12 NOTE — CASE MANAGEMENT
Spoke with patient's cousin, Marlys Cruz and she does not feel she can assist with IV abx at home through the port  She is reqesing a referral to SNF  Met with mary beth and he is now agreeable to SNF rehab and also confirmed he wants SNF  Referral sent to [facility][1] and patient is approved for admission  He will need auth from [facility] for covered days  Request for SNF approval sent to [facility], for referral through [employer]ail[employer]

## 2021-05-12 NOTE — ASSESSMENT & PLAN NOTE
Unknown source at present, no recent dental work, port site without erythema or tenderness  Blood cultures from 05/07 growing Streptococcus anginosus, repeat blood cultures from 05/09/2021 without growth    CT of the abdomen and pelvis without source of infection, again shows mass in the pancreatic head, tiny filling defect is noted at the junction of the SMV and portal confluence      Echocardiogram with normal ejection fraction, negative for valvular vegetation  CT maxillofacial/mandible without infection or abscess    Infectious Disease on board, continue ceftriaxone on 05/09/2021 #4, will continue IV antibiotics through 05/22/2021  Placement now being found for patient as family cannot cope with IV antibiotics

## 2021-05-12 NOTE — PLAN OF CARE
Problem: Potential for Falls  Goal: Patient will remain free of falls  Description: INTERVENTIONS:  - Assess patient frequently for physical needs  -  Identify cognitive and physical deficits and behaviors that affect risk of falls    -  [place] fall precautions as indicated by assessment   - Educate patient/family on patient safety including physical limitations  - Instruct patient to call for assistance with activity based on assessment  - Modify environment to reduce risk of injury  - Consider OT/PT consult to assist with strengthening/mobility  Outcome: Progressing

## 2021-05-12 NOTE — CASE MANAGEMENT
auth submitted via [employer]  Patient with Sreptoccai bacteremia and needs 10 days IV Ceftriaxone 2000mg IVPB q 24 through his port; Attending at [address] [place] [address] # [id number] SNF: [facility]PI # [id number] [facility]  NPI# [id number] address: [address] [place] [address], [facility] [id number]    auth pending  [facility] task complete

## 2021-05-12 NOTE — PLAN OF CARE
Problem: Potential for Falls  Goal: Patient will remain free of falls  Description: INTERVENTIONS:  - Assess patient frequently for physical needs  -  Identify cognitive and physical deficits and behaviors that affect risk of falls    -  [place] fall precautions as indicated by assessment   - Educate patient/family on patient safety including physical limitations  - Instruct patient to call for assistance with activity based on assessment  - Modify environment to reduce risk of injury  - Consider OT/PT consult to assist with strengthening/mobility  5/12/2021 1253 by Charles Baptiste RN  Outcome: Progressing  5/12/2021 1252 by Charles Baptiste RN  Outcome: Progressing

## 2021-05-12 NOTE — PROGRESS NOTES
[place]  Progress Note - Sherman Pollen 1945, 67 y o  male MRN: [id number]  Unit/Bed#: [facility] Encounter: [id number]  Primary Care Provider: Leeanne Sacks, DO   Date and time admitted to hospital: 5/7/2021  9:27 AM    * Streptococcal bacteremia  Assessment & Plan  Unknown source at present, no recent dental work, port site without erythema or tenderness  Blood cultures from 05/07 growing Streptococcus anginosus, repeat blood cultures from 05/09/2021 without growth    CT of the abdomen and pelvis without source of infection, again shows mass in the pancreatic head, tiny filling defect is noted at the junction of the SMV and portal confluence  Echocardiogram with normal ejection fraction, negative for valvular vegetation  CT maxillofacial/mandible without infection or abscess    Infectious Disease on board, continue ceftriaxone on 05/09/2021 #4, will continue IV antibiotics through 05/22/2021  Placement now being found for patient as family cannot cope with IV antibiotics    Mesenteric venous thrombosis ([address] )  Assessment & Plan  Incidental finding of CT scan of the abdomen and pelvis with tiny filling defect noted at the junction of the SMV and portal confluence likely due to underlying malignancy  Continue Xarelto loading for 21 days then transition to 20 mg daily  hematology on board    Hyponatremia  Assessment & Plan  · Sodium corrected to 133 with blood glucose  · Discontinue intravenous fluids  · Trend BMP  ·  Encourage oral intake      Sacral wound  Assessment & Plan  · Stable sacral wound noted upon exam   No erythema, drainage -local care and off weighting    Type 2 diabetes mellitus with hyperglycemia, with long-term current use of insulin [facility])  Assessment & Plan  Lab Results   Component Value Date    HGBA1C 9 4 (H) 04/08/2021       Recent Labs     05/12/21  0813 05/12/21  1106 05/12/21  1615 05/12/21  1725   POCGLU 177* 354* 234* 167*       Blood Sugar Average: Last 72 hrs:  (P) 226 7225   · Recent A1c shows poor control  · Increase Lantus to 20 units hs, Continue sliding scale coverage, increase pre meal insulin at 7 units t i d   · Adjust insulin regimen as needed  · Metformin on hold-will restart on discharge    Hyperlipidemia  Assessment & Plan  Patient being continued on usual atorvastatin    Hyperkalemia-resolved as of 5/10/2021  Assessment & Plan  · Potassium upon admission 5 9, status post treatment, now resolved  · Was given calcium gluconate, D50/insulin/sodium bicarbonate  · Resolved    Leukocytosis-resolved as of 2021  Assessment & Plan  · Present upon admission at 17   · No evidence of active infection  Reported chills but no fever home  · May be secondary to hemoconcentration from dehydration  · Resolved      VTE Pharmacologic Prophylaxis:   Pharmacologic: Rivaroxaban (Xarelto)  Mechanical VTE Prophylaxis in Place: Yes    Patient Centered Rounds: I have performed bedside rounds with nursing staff today  Discussions with Specialists or Other Care Team Provider:  Infectious disease    Education and Discussions with Family / Patient:  Patient, declined family communication    Time Spent for Care: 30 minutes  More than 50% of total time spent on counseling and coordination of care as described above  Current Length of Stay: 5 day(s)    Current Patient Status: Inpatient   Certification Statement: The patient will continue to require additional inpatient hospital stay due to IV antibiotics, pending placement    Discharge Plan:  SNF once placement found    Code Status: Level 1 - Full Code      Subjective:   Seen  No new complaints to offer  Informed of CT maxillofacial results without infection    Patient will now be sent to an SNF as family unable to assist with IV antibiotics at home      Objective:     Vitals:   Temp (24hrs), Av °F (36 7 °C), Min:97 8 °F (36 6 °C), Max:98 4 °F (36 9 °C)    Temp:  [97 8 °F (36 6 °C)-98 4 °F (36 9 °C)] 98 4 °F (36 9 °C)  HR:  [60-69] 69  Resp:  [12-18] 18  BP: (149-181)/() 149/68  SpO2:  [100 %] 100 %  Body mass index is 20 37 kg/m²  Input and Output Summary (last 24 hours): Intake/Output Summary (Last 24 hours) at 5/12/2021 1807  Last data filed at 5/12/2021 1746  Gross per 24 hour   Intake 1250 ml   Output 2300 ml   Net -1050 ml       Physical Exam:     Physical Exam  Vitals signs reviewed  Constitutional:       General: He is not in acute distress  Appearance: Normal appearance  He is not ill-appearing, toxic-appearing or diaphoretic  Comments: Cachectic looking   HENT:      Head: Normocephalic  Eyes:      Extraocular Movements: Extraocular movements intact  Pupils: Pupils are equal, round, and reactive to light  Neck:      Musculoskeletal: Normal range of motion and neck supple  No neck rigidity or muscular tenderness  Vascular: No carotid bruit  Cardiovascular:      Rate and Rhythm: Normal rate and regular rhythm  Pulses: Normal pulses  Heart sounds: Normal heart sounds  No murmur  No friction rub  Pulmonary:      Effort: Pulmonary effort is normal       Breath sounds: Normal breath sounds  No stridor  No wheezing, rhonchi or rales  Chest:      Chest wall: No tenderness  Abdominal:      General: Abdomen is flat  Bowel sounds are normal  There is no distension  Palpations: Abdomen is soft  Tenderness: There is no abdominal tenderness  There is no guarding or rebound  Musculoskeletal: Normal range of motion  General: No swelling or tenderness  Right lower leg: No edema  Left lower leg: No edema  Skin:     General: Skin is warm and dry  Coloration: Skin is not jaundiced  Neurological:      General: No focal deficit present  Mental Status: He is alert and oriented to person, place, and time  Cranial Nerves: No cranial nerve deficit  Sensory: No sensory deficit             Additional Data:     Labs:    Results from last 7 days   Lab Units 05/12/21  0434 05/11/21  0513  05/08/21  0436  05/07/21  0948   WBC Thousand/uL 12 76* 7 43   < > 6 97  --  17 74*   HEMOGLOBIN g/dL 9 8* 8 7*   < > 7 7*  --  9 1*   HEMATOCRIT % 29 3* 25 8*   < > 23 1*  --  26 9*   PLATELETS Thousands/uL 223 175   < > 150  --  141*   BANDS PCT %  --   --   --  1  --   --    NEUTROS PCT %  --   --   --   --   --  95*   LYMPHS PCT %  --   --   --   --   --  3*   LYMPHO PCT %  --  21  --  6*   < >  --    MONOS PCT %  --   --   --   --   --  1*   MONO PCT %  --  8  --  1*   < >  --    EOS PCT %  --  0  --  0   < > 0    < > = values in this interval not displayed  Results from last 7 days   Lab Units 05/12/21  0434  05/10/21  0455   SODIUM mmol/L 131*   < > 133*   POTASSIUM mmol/L 4 4   < > 4 2   CHLORIDE mmol/L 99*   < > 100   CO2 mmol/L 22   < > 24   BUN mg/dL 16   < > 17   CREATININE mg/dL 0 84   < > 0 79   ANION GAP mmol/L 10   < > 9   CALCIUM mg/dL 7 8*   < > 7 9*   ALBUMIN g/dL  --   --  2 1*   TOTAL BILIRUBIN mg/dL  --   --  0 20   ALK PHOS U/L  --   --  105   ALT U/L  --   --  38   AST U/L  --   --  24   GLUCOSE RANDOM mg/dL 216*   < > 86    < > = values in this interval not displayed  Results from last 7 days   Lab Units 05/12/21  1725 05/12/21  1615 05/12/21  1106 05/12/21  0813 05/11/21  2203 05/11/21  1637 05/11/21  1059 05/11/21  0739 05/10/21  2126 05/10/21  1649 05/10/21  1150 05/10/21  0739   POC GLUCOSE mg/dl 167* 234* 354* 177* 216* 273* 227* 211* 220* 279* 191* 85         Results from last 7 days   Lab Units 05/09/21  0456 05/08/21  0436   PROCALCITONIN ng/ml 0 90* 1 88*           * I Have Reviewed All Lab Data Listed Above  * Additional Pertinent Lab Tests Reviewed:  All Labs Within Last 24 Hours Reviewed    Imaging:    Imaging Reports Reviewed Today Include:  CT facial bones with contrast Imaging Personally Reviewed by Myself Includes:  CT facial bones with contrast  Recent Cultures (last 7 days):     Results from last 7 days Lab Units 05/09/21  0457 05/07/21  1110   BLOOD CULTURE  No Growth at 72 hrs  No Growth at 72 hrs  Streptococcus anginosus*  Streptococcus anginosus*   GRAM STAIN RESULT   --  Gram positive cocci in chains*  Gram positive cocci in chains*       Last 24 Hours Medication List:   Current Facility-Administered Medications   Medication Dose Route Frequency Provider Last Rate    acetaminophen  650 mg Oral Q6H PRN Figueroa Lopez PA-C      amLODIPine  2 5 mg Oral Daily [place], DO      aspirin  81 mg Oral Daily Figueroa Lopez PA-C      atorvastatin  40 mg Oral Daily With [employer] Noble      cefTRIAXone  2,000 mg Intravenous Q24H Pa Kwan MD 2,000 mg (05/11/21 1740)    insulin glargine  20 Units Subcutaneous HS Babatunde A Urbano Riedel, MD      insulin lispro  1-5 Units Subcutaneous TID AC Figueroa Lopez PA-C      [START ON 5/13/2021] insulin lispro  7 Units Subcutaneous TID With Meals Rosemarie Pascal MD      metoprolol succinate  100 mg Oral Daily Figueroa Lopez PA-C      ondansetron  4 mg Intravenous Q6H PRN Figueroa Lopez PA-C      rivaroxaban  15 mg Oral BID With Meals Rosemarie Pascal MD      traMADol  50 mg Oral Q6H PRN Figueroa Lopez PA-C          Today, Patient Was Seen By: Rosemarie Pascal MD    ** Please Note: Dictation voice to text software may have been used in the creation of this document   **

## 2021-05-12 NOTE — ASSESSMENT & PLAN NOTE
Lab Results   Component Value Date    HGBA1C 9 4 (H) 04/08/2021       Recent Labs     05/12/21  0813 05/12/21  1106 05/12/21  1615 05/12/21  1725   POCGLU 177* 354* 234* 167*       Blood Sugar Average: Last 72 hrs:  (P) 226 8125   · Recent A1c shows poor control    · Increase Lantus to 20 units hs, Continue sliding scale coverage, increase pre meal insulin at 7 units t i d   · Adjust insulin regimen as needed  · Metformin on hold-will restart on discharge

## 2021-05-12 NOTE — PHYSICAL THERAPY NOTE
PHYSICAL THERAPY NOTE       05/12/21 0952   PT Last Visit   PT Visit Date 05/12/21   Note Type   Note Type Treatment   Pain Assessment   Pain Assessment Tool 0-10   Pain Score No Pain   Restrictions/Precautions   Other Precautions   (pt states he has wound on buttock)   General   Chart Reviewed Yes   Cognition   Overall Cognitive Status WFL   Arousal/Participation Alert   Attention Within functional limits   Orientation Level Oriented X4   Memory Within functional limits   Following Commands Follows all commands and directions without difficulty   Subjective   Subjective pt states he feels ok, eager to ambulate   Bed Mobility   Supine to Sit 6  Modified independent   Transfers   Sit to Stand 6  Modified independent   Additional items Assist x 1   Stand to Sit 6  Modified independent   Additional items Assist x 1   Ambulation/Elevation   Gait pattern Forward Flexion;Decreased foot clearance; Short stride   Gait Assistance 5  Supervision   Additional items Assist x 1   Assistive Device Rolling walker   Distance 200ft with RW, slow steady champ, no LOB or knee buckling  Balance   Static Sitting Fair +   Dynamic Sitting Fair +   Static Standing Fair   Dynamic Standing Fair   Ambulatory Fair   Endurance Deficit   Endurance Deficit No   Activity Tolerance   Activity Tolerance   (no adverse effects to PT treatment noted)   Nurse Made Aware [place]   Assessment   Prognosis Fair   Problem List Decreased range of motion;Decreased endurance;Decreased strength; Impaired balance;Decreased mobility; Decreased skin integrity   Assessment Pt pleasant and eager to ambulate this session; Pt continues with IV and c/o buttock wound; Pt able to verbalize need for frequent repositioning and tools to assist with pressure relief (waffle cushion, wedge); Pt able to ambulate in halls, slow steady champ, no LOB or knee buckling;   Safe mobility with RW;  Pt would benefit from continued PT while in hospital and follow up with Airam Villasenor PT to increase strength, endurance, balance and progress to least restrictive device/no AD as appropriate  Pt remains reclined in chair at bedside, waffle cushion and green wedge for positioning/pressure relief; The patient's AM-PAC Basic Mobility Inpatient Short Form Raw Score is 23, Standardized Score is 50  88  A standardized score less than 42 9 suggests the patient may benefit from [facility]  Please also refer to the recommendation of the Physical Therapist for safe discharge planning  Barriers to Discharge Decreased caregiver support   Goals   Patient Goals to go home   STG Expiration Date 05/24/21   Plan   Treatment/Interventions ADL retraining;Functional transfer training;LE strengthening/ROM; Elevations; Therapeutic exercise; Endurance training;Bed mobility;Gait training; Compensatory technique education;Continued evaluation;Equipment eval/education;Spoke to nursing   Progress Progressing toward goals   PT Frequency Other (Comment)  (3-5x/wk)   Recommendation   PT Discharge Recommendation Home with home health rehabilitation   Equipment Recommended [place] walker   PT - OK to Discharge Yes  (when medically ready)   [place] 8 in Bed Without Bedrails 4   Lying on Back to Sitting on Edge of Flat Bed 4   Moving Bed to Chair 4   Standing Up From Chair 4   Walk in Room 4   Climb 3-5 Stairs 3   Basic Mobility Inpatient Raw Score 23   Basic Mobility Standardized Score 50 88     Ernestine Whitehead, PT          Patient Name: Marshal Smart  [place]IF[place]KE Date: 5/12/2021

## 2021-05-12 NOTE — UTILIZATION REVIEW
Continued Stay Review    Date: 5/12/2021                         Current Patient Class:  Inpatient   Current Level of Care: med surg     HPI:75 y o  male initially admitted on 5/7/2021 due to weakness  Currently on chemo for Pancreatitic cancer  Patient found to be bacteremic  Blood cultures from 5/7/2021 grew Streptococcus anginosis, started on Ceftriaxone  Assessment/Plan:  5/12/2021:   Patient has continued wound on buttocks, no pain  Has decreased strength  Wbc rising to 12 76  Differential source for bacteriemia is odontogenic and intra abdominal abscess but can not rule out endocarditis or seeding of port  IV ceftriaxone to continue  Vital Signs:   05/12/21 0816  97 9 °F (36 6 °C)  63  16  163/113Abnormal   132  100 %  None (Room air)  Sitting   05/12/21 0218  --  --  --  165/69  99  --  --  --   05/11/21 2300  97 8 °F (36 6 °C)  60  12  181/80Abnormal   115  100 %  None (Room air)  Sitting   05/11/21 1522  98 1 °F (36 7 °C)  66  --  141/66  95  100 %  --         Pertinent Labs/Diagnostic Results:   5/7/2021 CxR No acute cardiopulmonary disease  5/9/2021 ct abdomen No evidence of collection  Mass in the pancreatic head is suspicious for adenocarcinoma similar to the study from [facility]  Cystic appearance of the distal pancreas may be related to ductal dilatation and/or main duct IPMN with pancreatic parenchymal atrophy noted  New cystic area is noted inferiorly in the pancreatic head  Small amount of thrombus appears to be evident at the portosplenic confluence  5/11/2021 ct facial bones Unremarkable CT of the facial bones  5/11/2021 echo LEFT VENTRICLE:  Size was normal   Systolic function was normal  Ejection fraction was estimated to be 55 %  There was mild hypokinesis of the basal inferior and inferolateral walls    Wall thickness was normal   Doppler parameters were consistent with abnormal left ventricular relaxation (grade 1 diastolic dysfunction)    RIGHT VENTRICLE:The size was normal   Systolic function was normal    MITRAL VALVE:There was trace regurgitation    TRICUSPID VALVE:There was trace regurgitation  Results from last 7 days   Lab Units 05/12/21  0434 05/11/21  0513 05/10/21  0455 05/09/21  1226 05/08/21  0436 05/07/21  0948   WBC Thousand/uL 12 76* 7 43 7 69 3 74* 6 97 17 74*   HEMOGLOBIN g/dL 9 8* 8 7* 8 9* 8 8* 7 7* 9 1*   HEMATOCRIT % 29 3* 25 8* 26 4* 26 3* 23 1* 26 9*   PLATELETS Thousands/uL 223 175 197 194 150 141*   NEUTROS ABS Thousands/µL  --   --   --   --   --  16 92*   BANDS PCT %  --   --   --   --  1  --      Results from last 7 days   Lab Units 05/12/21  0434 05/11/21  0513 05/10/21  0455 05/09/21  1227 05/09/21  0456  05/07/21  0948   SODIUM mmol/L 131* 128* 133* 131* 131*   < >  --    POTASSIUM mmol/L 4 4 4 3 4 2 4 1 4 2   < >  --    CHLORIDE mmol/L 99* 98* 100 98* 100   < >  --    CO2 mmol/L 22 18* 24 25 21   < >  --    ANION GAP mmol/L 10 12 9 8 10   < >  --    BUN mg/dL 16 16 17 17 16   < >  --    CREATININE mg/dL 0 84 0 72 0 79 0 75 0 67   < >  --    EGFR ml/min/1 73sq m 85 91 87 89 93   < >  --    CALCIUM mg/dL 7 8* 8 2* 7 9* 7 8* 7 3*   < >  --    MAGNESIUM mg/dL  --   --   --   --   --   --  1 6    < > = values in this interval not displayed       Results from last 7 days   Lab Units 05/10/21  0455 05/09/21  1227 05/07/21  1046   AST U/L 24 21 17   ALT U/L 38 31 29   ALK PHOS U/L 105 103 106   TOTAL PROTEIN g/dL 5 3* 5 1* 5 7*   ALBUMIN g/dL 2 1* 2 1* 2 6*   TOTAL BILIRUBIN mg/dL 0 20 0 20 0 70     Results from last 7 days   Lab Units 05/12/21  1106 05/12/21  0813 05/11/21  2203 05/11/21  1637 05/11/21  1059 05/11/21  0739 05/10/21  2126 05/10/21  1649 05/10/21  1150 05/10/21  0739 05/09/21  2113 05/09/21  1558   POC GLUCOSE mg/dl 354* 177* 216* 273* 227* 211* 220* 279* 191* 85 220* 301*     Results from last 7 days   Lab Units 05/12/21  0434 05/11/21  0513 05/10/21  0455 05/09/21  1227 05/09/21  0456 05/08/21  0436 05/07/21  1046   GLUCOSE RANDOM mg/dL 216* 227* 86 259* 182* 228* 275*     BETA-HYDROXYBUTYRATE   Date Value Ref Range Status   04/08/2021 0 1 <0 6 mmol/L Final      Results from last 7 days   Lab Units 05/07/21  0948   TROPONIN I ng/mL <0 02     Results from last 7 days   Lab Units 05/09/21  0456 05/08/21  0436   PROCALCITONIN ng/ml 0 90* 1 88*     Results from last 7 days   Lab Units 05/07/21  1157   CLARITY UA  Clear   COLOR UA  Yellow   SPEC GRAV UA  1 010   PH UA  6 0   GLUCOSE UA mg/dl 250 (1/4%)*   KETONES UA mg/dl Negative   BLOOD UA  Negative   PROTEIN UA mg/dl Negative   NITRITE UA  Negative   BILIRUBIN UA  Negative   UROBILINOGEN UA E U /dl 0 2   LEUKOCYTES UA  Negative     Results from last 7 days   Lab Units 05/09/21  0457 05/07/21  1110   BLOOD CULTURE  No Growth at 72 hrs  No Growth at 72 hrs  Streptococcus anginosus*  Streptococcus anginosus*   GRAM STAIN RESULT   --  Gram positive cocci in chains*  Gram positive cocci in chains*     Results from last 7 days   Lab Units 05/11/21  0513 05/08/21  0436   TOTAL COUNTED  100 100       Medications:   Scheduled Medications:  amLODIPine, 2 5 mg, Oral, Daily  aspirin, 81 mg, Oral, Daily  atorvastatin, 40 mg, Oral, Daily With Dinner  cefTRIAXone, 2,000 mg, Intravenous, Q24H  insulin glargine, 15 Units, Subcutaneous, HS  insulin lispro, 1-5 Units, Subcutaneous, TID AC  insulin lispro, 5 Units, Subcutaneous, TID With Meals  metoprolol succinate, 100 mg, Oral, Daily  rivaroxaban, 15 mg, Oral, BID With Meals    Continuous IV Infusions:sodium chloride 0 9 % infusion   Rate: 75 mL/hr Dose: 75 mL/hr  Freq: Continuous Route: IV  Indications of Use: IV Hydration  Last Dose: 75 mL/hr (05/11/21 1443)  Start: 05/11/21 1415 End: 05/12/21 0842     PRN Meds: not used   acetaminophen, 650 mg, Oral, Q6H PRN  ondansetron, 4 mg, Intravenous, Q6H PRN  traMADol, 50 mg, Oral, Q6H PRN    Discharge Plan: to be determined       Network Utilization Review Department  ATTENTION: Please call with any questions or concerns to [phone] and carefully listen to the prompts so that you are directed to the right person  All voicemails are confidential   [place] all requests for admission clinical reviews, approved or denied determinations and any other requests to dedicated fax number below belonging to the campus where the patient is receiving treatment   List of dedicated fax numbers for the Facilities:  [address] DENIALS (Administrative/Medical Necessity) [phone]   [address] (Maternity/NICU/Pediatrics) [phone]   [address] [place] [phone] [address] [place] [address] [address] Veronica [address] [place] [address] [phone]

## 2021-05-13 NOTE — PROGRESS NOTES
[place]  Progress Note - Lawrence Messer 1945, 67 y o  male MRN: [id number]  Unit/Bed#: -[facility] Encounter: [id number]  Primary Care Provider: Yane Dalal DO   Date and time admitted to hospital: 5/7/2021  9:27 AM    * Streptococcal bacteremia  Assessment & Plan  Unknown source at present, no recent dental work, port site without erythema or tenderness  Blood cultures from 05/07 growing Streptococcus anginosus, repeat blood cultures from 05/09/2021 without growth    CT of the abdomen and pelvis without source of infection, again shows mass in the pancreatic head, tiny filling defect is noted at the junction of the SMV and portal confluence  Echocardiogram with normal ejection fraction, negative for valvular vegetation  CT maxillofacial/mandible without infection or abscess    Infectious Disease on board, continue ceftriaxone on 05/09/2021 #5, will continue IV antibiotics through 05/22/2021  Initial plan was to discharge patient today however given rising WBC now to 14,000 and immature granulocytes, will keep him  His to remains afebrile  If improving white count tomorrow will discharge otherwise will curbside Infectious Disease  Mesenteric venous thrombosis (HCC)  Assessment & Plan  Incidental finding of CT scan of the abdomen and pelvis with tiny filling defect noted at the junction of the SMV and portal confluence likely due to underlying malignancy  Continue Xarelto loading for 21 days then transition to 20 mg daily  hematology on board    Hyponatremia  Assessment & Plan  · Chronic hyponatremia Sodium corrected to 132 with blood glucose  · Trend BMP  ·  Encourage oral intake      Generalized weakness  Assessment & Plan  · Likely due to ongoing pancreatic malignancy pain treated with chemotherapy  · Improved since presentation, PT/OT recommend home with family support    Sacral wound  Assessment & Plan  · Stable sacral wound noted upon exam   No erythema, drainage -local care and off weighting    Anemia  Assessment & Plan  · Likely secondary to chemo  · Continue to follow serial labs    Adenocarcinoma of pancreas [facility])  Assessment & Plan  · Dx March 2021  · Follows with Oncology outpatient, Dr Vicente Winter, to receive 2-3months adjuvant chemotherapy prior to resection by Dr Evelyn Ponce   · Currently undergoing chemotherapy treatment with Abraxane and Gemzar - last treatment 4/6  · Oncology team consulted    Type 2 diabetes mellitus with hyperglycemia, with long-term current use of insulin [facility])  Assessment & Plan  Lab Results   Component Value Date    HGBA1C 9 4 (H) 04/08/2021       Recent Labs     05/12/21  2124 05/13/21  0736 05/13/21  1111 05/13/21  1538   POCGLU 175* 124 293* 237*       Blood Sugar Average: Last 72 hrs:  (P) 216 4375   · Recent A1c shows poor control  · Increase Lantus to 20 units hs, Continue sliding scale coverage, increase pre meal insulin at 7 units t i d   · Adjust insulin regimen as needed  · Metformin on hold-will restart on discharge    HTN (hypertension)  Assessment & Plan  · Home medication is ramipril 10 mg daily, Toprol- mg daily  · Ramipril currently held due to hyperkalemia on presentation, switched to amlodipine 2 5 mg daily  · continue amlodipine 2 5 mg daily, Toprol- mg daily  · Monitor BP      Hyperlipidemia  Assessment & Plan  Patient being continued on usual atorvastatin    Hyperkalemia-resolved as of 5/10/2021  Assessment & Plan  · Potassium upon admission 5 9, status post treatment, now resolved  · Was given calcium gluconate, D50/insulin/sodium bicarbonate  · Resolved    Leukocytosis-resolved as of 5/9/2021  Assessment & Plan  · Present upon admission at 17   · No evidence of active infection  Reported chills but no fever home  · May be secondary to hemoconcentration from dehydration     · Resolved      VTE Pharmacologic Prophylaxis:   Pharmacologic: Rivaroxaban (Xarelto)  Mechanical VTE Prophylaxis in Place: Yes    Patient Centered Rounds: I have performed bedside rounds with nursing staff today  Discussions with Specialists or Other Care Team Provider:     Education and Discussions with Family / Patient:  Patient, declined family communication    Time Spent for Care: 30 minutes  More than 50% of total time spent on counseling and coordination of care as described above  Current Length of Stay: 6 day(s)    Current Patient Status: Inpatient   Certification Statement: The patient will continue to require additional inpatient hospital stay due to IV antibiotics    Discharge Plan:  Likely home tomorrow    Code Status: Level 1 - Full Code      Subjective:   Seen  No new complaints to offer  Noted rising WBC not to 14,000, immature white blood cells  Remains afebrile will keep for an additional 24 hours  Objective:     Vitals:   Temp (24hrs), Av 8 °F (36 6 °C), Min:97 8 °F (36 6 °C), Max:97 8 °F (36 6 °C)    Temp:  [97 8 °F (36 6 °C)] 97 8 °F (36 6 °C)  HR:  [69-74] 74  Resp:  [18-22] 22  BP: (123-158)/(60-76) 144/76  SpO2:  [100 %] 100 %  Body mass index is 20 37 kg/m²  Input and Output Summary (last 24 hours): Intake/Output Summary (Last 24 hours) at 2021 1608  Last data filed at 2021 1439  Gross per 24 hour   Intake 1700 ml   Output 2900 ml   Net -1200 ml       Physical Exam:     Physical Exam  Vitals signs reviewed  Constitutional:       General: He is not in acute distress  Appearance: Normal appearance  He is not ill-appearing, toxic-appearing or diaphoretic  Comments: Cachectic looking   HENT:      Head: Normocephalic  Eyes:      Extraocular Movements: Extraocular movements intact  Pupils: Pupils are equal, round, and reactive to light  Neck:      Musculoskeletal: Normal range of motion and neck supple  No neck rigidity or muscular tenderness  Vascular: No carotid bruit  Cardiovascular:      Rate and Rhythm: Normal rate and regular rhythm  Pulses: Normal pulses  Heart sounds: Normal heart sounds  No murmur  No friction rub  Pulmonary:      Effort: Pulmonary effort is normal       Breath sounds: Normal breath sounds  No stridor  No wheezing, rhonchi or rales  Chest:      Chest wall: No tenderness  Abdominal:      General: Abdomen is flat  Bowel sounds are normal  There is no distension  Palpations: Abdomen is soft  Tenderness: There is no abdominal tenderness  There is no guarding or rebound  Musculoskeletal: Normal range of motion  General: No swelling or tenderness  Right lower leg: No edema  Left lower leg: No edema  Skin:     General: Skin is warm and dry  Coloration: Skin is not jaundiced  Neurological:      General: No focal deficit present  Mental Status: He is alert and oriented to person, place, and time  Cranial Nerves: No cranial nerve deficit  Sensory: No sensory deficit  Additional Data:     Labs:    Results from last 7 days   Lab Units 05/13/21  0500  05/11/21  0513  05/08/21  0436  05/07/21  0948   WBC Thousand/uL 14 08*   < > 7 43   < > 6 97  --  17 74*   HEMOGLOBIN g/dL 10 2*   < > 8 7*   < > 7 7*  --  9 1*   HEMATOCRIT % 30 7*   < > 25 8*   < > 23 1*  --  26 9*   PLATELETS Thousands/uL 293   < > 175   < > 150  --  141*   BANDS PCT %  --   --   --   --  1  --   --    NEUTROS PCT %  --   --   --   --   --   --  95*   LYMPHS PCT %  --   --   --   --   --   --  3*   LYMPHO PCT %  --   --  21  --  6*   < >  --    MONOS PCT %  --   --   --   --   --   --  1*   MONO PCT %  --   --  8  --  1*   < >  --    EOS PCT %  --   --  0  --  0   < > 0    < > = values in this interval not displayed       Results from last 7 days   Lab Units 05/13/21  0500  05/10/21  0455   SODIUM mmol/L 131*   < > 133*   POTASSIUM mmol/L 4 7   < > 4 2   CHLORIDE mmol/L 99*   < > 100   CO2 mmol/L 24   < > 24   BUN mg/dL 13   < > 17   CREATININE mg/dL 0 80   < > 0 79   ANION GAP mmol/L 8   < > 9   CALCIUM mg/dL 7 8*   < > 7 9*   ALBUMIN g/dL  --   --  2 1*   TOTAL BILIRUBIN mg/dL  --   --  0 20   ALK PHOS U/L  --   --  105   ALT U/L  --   --  38   AST U/L  --   --  24   GLUCOSE RANDOM mg/dL 141*   < > 86    < > = values in this interval not displayed  Results from last 7 days   Lab Units 05/13/21  1538 05/13/21  1111 05/13/21  0736 05/12/21  2124 05/12/21  1725 05/12/21  1615 05/12/21  1106 05/12/21  0813 05/11/21  2203 05/11/21  1637 05/11/21  1059 05/11/21  0739   POC GLUCOSE mg/dl 237* 293* 124 175* 167* 234* 354* 177* 216* 273* 227* 211*         Results from last 7 days   Lab Units 05/09/21  0456 05/08/21  0436   PROCALCITONIN ng/ml 0 90* 1 88*           * I Have Reviewed All Lab Data Listed Above  * Additional Pertinent Lab Tests Reviewed: All Labs Within Last 24 Hours Reviewed    Imaging:    Imaging Reports Reviewed Today Include:   Imaging Personally Reviewed by Myself Includes:      Recent Cultures (last 7 days):     Results from last 7 days   Lab Units 05/09/21  0457 05/07/21  1110   BLOOD CULTURE  No Growth After 4 Days  No Growth After 4 Days   Streptococcus anginosus*  Streptococcus anginosus*   GRAM STAIN RESULT   --  Gram positive cocci in chains*  Gram positive cocci in chains*       Last 24 Hours Medication List:   Current Facility-Administered Medications   Medication Dose Route Frequency Provider Last Rate    acetaminophen  650 mg Oral Q6H PRN Jule Koyanagi, PA-C      amLODIPine  2 5 mg Oral Daily Teddy RcDO      aspirin  81 mg Oral Daily Jule Koyanagi, PA-C      atorvastatin  40 mg Oral Daily With [employer]ANTE Noble      cefTRIAXone  2,000 mg Intravenous Q24H Lillian Bernard MD Stopped (05/12/21 1920)    insulin glargine  20 Units Subcutaneous HS Marquis Flores MD      insulin lispro  1-5 Units Subcutaneous TID AC Jule Koyanagi, PA-C      insulin lispro  7 Units Subcutaneous TID With Meals Ronald Parra MD      metoprolol succinate  100 mg Oral Daily Ryan Lovelace DANTE Leroy      ondansetron  4 mg Intravenous Q6H PRN Jule Koyanagi, PA-C      rivaroxaban  15 mg Oral BID With Meals Ronald Parra MD      traMADol  50 mg Oral Q6H PRN Jule Koyanagi, PA-C          Today, Patient Was Seen By: Ronald Parra MD    ** Please Note: Dictation voice to text software may have been used in the creation of this document   **

## 2021-05-13 NOTE — CASE MANAGEMENT
Received call from Juan Ramon Chowdary at [facility] stating that they could not accept patient back for IV administration due to staffing problems  Called and spoke with [facility] and they do not service [facility]  Called and spoke with [facility] and they do not accept patients insurance  Referral via [facility] to Valerie

## 2021-05-13 NOTE — ASSESSMENT & PLAN NOTE
· Dx March 2021  · Follows with Oncology outpatient, Dr An Forbes, to receive 2-3months adjuvant chemotherapy prior to resection by Dr Tequila Alan   · Currently undergoing chemotherapy treatment with Abraxane and Gemzar - last treatment 4/6  · Oncology team consulted

## 2021-05-13 NOTE — PLAN OF CARE
Problem: Potential for Falls  Goal: Patient will remain free of falls  Description: INTERVENTIONS:  - Assess patient frequently for physical needs  -  Identify cognitive and physical deficits and behaviors that affect risk of falls    -  [place] fall precautions as indicated by assessment   - Educate patient/family on patient safety including physical limitations  - Instruct patient to call for assistance with activity based on assessment  - Modify environment to reduce risk of injury  - Consider OT/PT consult to assist with strengthening/mobility  Outcome: Progressing     Problem: Prexisting or High Potential for Compromised Skin Integrity  Goal: Skin integrity is maintained or improved  Description: INTERVENTIONS:  - Identify patients at risk for skin breakdown  - Assess and monitor skin integrity  - Assess and monitor nutrition and hydration status  - Monitor labs   - Assess for incontinence   - Turn and reposition patient  - Assist with mobility/ambulation  - Relieve pressure over bony prominences  - Avoid friction and shearing  - Provide appropriate hygiene as needed including keeping skin clean and dry  - Evaluate need for skin moisturizer/barrier cream  - Collaborate with interdisciplinary team   - Patient/family teaching  - Consider wound care consult   Outcome: Progressing     Problem: PAIN - ADULT  Goal: Verbalizes/displays adequate comfort level or baseline comfort level  Description: Interventions:  - Encourage patient to monitor pain and request assistance  - Assess pain using appropriate pain scale  - Administer analgesics based on type and severity of pain and evaluate response  - Implement non-pharmacological measures as appropriate and evaluate response  - Consider cultural and social influences on pain and pain management  - Notify physician/advanced practitioner if interventions unsuccessful or patient reports new pain  Outcome: Progressing     Problem: INFECTION - ADULT  Goal: Absence or prevention of progression during hospitalization  Description: INTERVENTIONS:  - Assess and monitor for signs and symptoms of infection  - Monitor lab/diagnostic results  - Monitor all insertion sites, i e  indwelling lines, tubes, and drains  - Monitor endotracheal if appropriate and nasal secretions for changes in amount and color  - [place] appropriate cooling/warming therapies per order  - Administer medications as ordered  - Instruct and encourage patient and family to use good hand hygiene technique  - Identify and instruct in appropriate isolation precautions for identified infection/condition  Outcome: Progressing  Goal: Absence of fever/infection during neutropenic period  Description: INTERVENTIONS:  - Monitor WBC    Outcome: Progressing     Problem: SAFETY ADULT  Goal: Patient will remain free of falls  Description: INTERVENTIONS:  - Assess patient frequently for physical needs  -  Identify cognitive and physical deficits and behaviors that affect risk of falls    -  [place] fall precautions as indicated by assessment   - Educate patient/family on patient safety including physical limitations  - Instruct patient to call for assistance with activity based on assessment  - Modify environment to reduce risk of injury  - Consider OT/PT consult to assist with strengthening/mobility  Outcome: Progressing  Goal: Maintain or return to baseline ADL function  Description: INTERVENTIONS:  -  Assess patient's ability to carry out ADLs; assess patient's baseline for ADL function and identify physical deficits which impact ability to perform ADLs (bathing, care of mouth/teeth, toileting, grooming, dressing, etc )  - Assess/evaluate cause of self-care deficits   - Assess range of motion  - Assess patient's mobility; develop plan if impaired  - Assess patient's need for assistive devices and provide as appropriate  - Encourage maximum independence but intervene and supervise when necessary  - Involve family in performance of ADLs  - Assess for home care needs following discharge   - Consider OT consult to assist with ADL evaluation and planning for discharge  - Provide patient education as appropriate  Outcome: Progressing  Goal: Maintain or return mobility status to optimal level  Description: INTERVENTIONS:  - Assess patient's baseline mobility status (ambulation, transfers, stairs, etc )    - Identify cognitive and physical deficits and behaviors that affect mobility  - Identify mobility aids required to assist with transfers and/or ambulation (gait belt, sit-to-stand, lift, walker, cane, etc )  - [place] fall precautions as indicated by assessment  - Record patient progress and toleration of activity level on Mobility SBAR; progress patient to next Phase/Stage  - Instruct patient to call for assistance with activity based on assessment  - Consider rehabilitation consult to assist with strengthening/weightbearing, etc   Outcome: Progressing     Problem: DISCHARGE PLANNING  Goal: Discharge to home or other facility with appropriate resources  Description: INTERVENTIONS:  - Identify barriers to discharge w/patient and caregiver  - Arrange for needed discharge resources and transportation as appropriate  - Identify discharge learning needs (meds, wound care, etc )  - Arrange for interpretive services to assist at discharge as needed  - Refer to Case Management Department for coordinating discharge planning if the patient needs post-hospital services based on physician/advanced practitioner order or complex needs related to functional status, cognitive ability, or social support system  Outcome: Progressing     Problem: Knowledge Deficit  Goal: Patient/family/caregiver demonstrates understanding of disease process, treatment plan, medications, and discharge instructions  Description: Complete learning assessment and assess knowledge base    Interventions:  - Provide teaching at level of understanding  - Provide teaching via preferred learning methods  Outcome: Progressing

## 2021-05-13 NOTE — CASE MANAGEMENT
LOS: 6 days  Continuing to follow patient  Received call from patients cousin Chrissy Trejo  As per Chrissy Trejo patient has been staying with her since his discharge from [facility] on 4/25/21, while his family gets patients home ready for him  She now states that she wants patient to come back to her home rather than go to [facility], and feels comfortable with helping with his two weeks of IV rocephin via port  Referral to [facility] as [facility] is not par with patients insurance  Referral to [facility] as patient is current with them  Await availability form [facility] to supply Rocephin

## 2021-05-13 NOTE — PLAN OF CARE
Problem: Potential for Falls  Goal: Patient will remain free of falls  Description: INTERVENTIONS:  - Assess patient frequently for physical needs  -  Identify cognitive and physical deficits and behaviors that affect risk of falls    -  [place] fall precautions as indicated by assessment   - Educate patient/family on patient safety including physical limitations  - Instruct patient to call for assistance with activity based on assessment  - Modify environment to reduce risk of injury  - Consider OT/PT consult to assist with strengthening/mobility  Outcome: Progressing

## 2021-05-13 NOTE — CASE MANAGEMENT
Called and spoke with Alexey at [facility] re: availability to see patient tomorrow for start of IV abx  As per Radha Perez, they have no staff to access port tomorrow, but could see patient if port has been accessed  Spoke with nursing staff about sending patient home with port accessed and was informed that they could send patient home with port accessed with a doctors order  Await call back form Alexey with availability

## 2021-05-13 NOTE — CASE MANAGEMENT
As per , patient discharge is held due to increasing WBC    Called and notified patients cousin Alannah Nelson and called and notified Doris Durant at [facility]   She will cancel delivery of meds and equipment for today and reevaluate in am

## 2021-05-13 NOTE — ASSESSMENT & PLAN NOTE
· Chronic hyponatremia Sodium corrected to 132 with blood glucose  · Trend BMP  ·  Encourage oral intake

## 2021-05-13 NOTE — ASSESSMENT & PLAN NOTE
Lab Results   Component Value Date    HGBA1C 9 4 (H) 04/08/2021       Recent Labs     05/12/21  2124 05/13/21  0736 05/13/21  1111 05/13/21  1538   POCGLU 175* 124 293* 237*       Blood Sugar Average: Last 72 hrs:  (P) 216 4375   · Recent A1c shows poor control    · Increase Lantus to 20 units hs, Continue sliding scale coverage, increase pre meal insulin at 7 units t i d   · Adjust insulin regimen as needed  · Metformin on hold-will restart on discharge

## 2021-05-13 NOTE — ASSESSMENT & PLAN NOTE
Unknown source at present, no recent dental work, port site without erythema or tenderness  Blood cultures from 05/07 growing Streptococcus anginosus, repeat blood cultures from 05/09/2021 without growth    CT of the abdomen and pelvis without source of infection, again shows mass in the pancreatic head, tiny filling defect is noted at the junction of the SMV and portal confluence  Echocardiogram with normal ejection fraction, negative for valvular vegetation  CT maxillofacial/mandible without infection or abscess    Infectious Disease on board, continue ceftriaxone on 05/09/2021 #5, will continue IV antibiotics through 05/22/2021  Initial plan was to discharge patient today however given rising WBC now to 14,000 and immature granulocytes, will keep him  His to remains afebrile  If improving white count tomorrow will discharge otherwise will curbside Infectious Disease

## 2021-05-13 NOTE — CASE MANAGEMENT
Spoke with Leigh Craroll at [facility], and they are able to accept patient  [place] for patient is $17 60/day for equipmentand $32 13 every 7 days for medication  Called and notified marisol Trejo of same and she is agreeable  Called and spoke with [facility] office [phone] to notify them that a nurse would need to see patient tomorrow evening for medication administration teaching

## 2021-05-14 NOTE — ASSESSMENT & PLAN NOTE
· Dx March 2021  · Follows with Oncology outpatient, Dr Ashly So, to receive 2-3months adjuvant chemotherapy prior to resection by Dr Darius Jimenez   · Currently undergoing chemotherapy treatment with Abraxane and Gemzar - last treatment 4/6  · Oncology team consulted

## 2021-05-14 NOTE — PROGRESS NOTES
Milan Lizama  67 y o   male  1945  mrn [id number]    Assessment/Plan: 1  Streptococcus anginosis bacteremia/Leukocytosis : No fever but WBC count increased to 16,000, no diff done but immature granulocytes reported  Pt does not have any new complaints and feels well otherwise  LFT's have not been done in a few days       Admission bld cx's grew Strep anginosus - usual sources include mouth or intra-abd source  This milleri group Strep species is known for its pyogenic nature, prone to developing abscesses, including odontogenic and intra-abdominal abscesses      Need to clarify if patient got neulasta with last chemo 10 days ago now  Given increasing WBC, and propensity of this organism to form abscesses, I think it would be reasonable to repeat CT A and P, needs cbcd, needs liver function panel         A  Cont ceftriaxone x 2 weeks, from neg blood cx, for tx of Strep anginosis bacteremia - last dose on 5/22  B  CT chest/abd/pelvis  C  Cbcd/lft's  D  Will follow, if above negative, remains afebrile and WBC relatively stable consider d/c in am      Subjective:  Patient is feeling well, and anxious to go to [employer]  He has no fevers, but his WBC continues to increase  He has no headaches, chest pains, abd pains, n/v/d  Objective:    Gen: nad  Eyes: anicteric  Neck: supple no adenopathy  Cor: rrr s1s2  Lungs: decreased at bases, no wheezes or rhonchi  Joints: full ROM without erythema or edema  Neuro: grossly non-focal  Skin: no signs of ssti  : no monsalve cath    Labs:  CBC w/diff  Recent Labs     05/14/21 0432   WBC 16 12*   HGB 9 3*   HCT 28 2*        BMP  Recent Labs     05/14/21 0432   K 4 8   CL 99*   CO2 22   BUN 20   CREATININE 0 86   CALCIUM 7 7*     CMP  Recent Labs     05/14/21 0432   K 4 8   CL 99*   CO2 22   BUN 20   CREATININE 0 86   CALCIUM 7 7*         labrc    Cultures:  Lab Results   Component Value Date    BLOODCX No Growth After 5 Days   05/09/2021    BLOODCX No Growth After 5 Days  05/09/2021    BLOODCX Streptococcus anginosus (A) 05/07/2021    BLOODCX Streptococcus anginosus (A) 05/07/2021    BLOODCX No Growth After 5 Days  04/08/2021    BLOODCX No Growth After 5 Days   04/08/2021     No results found for: WOUNDCULT  No results found for: URINECX  No results found for: SPUTUMCULTUR    MED: reviewed      Current Facility-Administered Medications:     acetaminophen (TYLENOL) tablet 650 mg, 650 mg, Oral, Q6H PRN, Veronica Lips, PA-C, 650 mg at 05/13/21 2209    amLODIPine (NORVASC) tablet 2 5 mg, 2 5 mg, Oral, Daily, Amberly Fly, DO, 2 5 mg at 05/14/21 0807    aspirin chewable tablet 81 mg, 81 mg, Oral, Daily, Veronica Lips, PA-C, 81 mg at 05/14/21 5804    atorvastatin (LIPITOR) tablet 40 mg, 40 mg, Oral, Daily With Ricci Magana PA-C, 40 mg at 05/13/21 1604    cefTRIAXone (ROCEPHIN) IVPB (premix in dextrose) 2,000 mg 50 mL, 2,000 mg, Intravenous, Q24H, Ginny Hennessy MD, Last Rate: 100 mL/hr at 05/13/21 1857, 2,000 mg at 05/13/21 1857    insulin glargine (LANTUS) subcutaneous injection 20 Units 0 2 mL, 20 Units, Subcutaneous, HS, Hallie Damian MD, 20 Units at 05/13/21 2209    insulin lispro (HumaLOG) 100 units/mL subcutaneous injection 1-5 Units, 1-5 Units, Subcutaneous, TID AC, 5 Units at 05/14/21 1154 **AND** Fingerstick Glucose (POCT), , , TID AC, Veronica Salas PA-C    insulin lispro (HumaLOG) 100 units/mL subcutaneous injection 7 Units, 7 Units, Subcutaneous, TID With Meals, Hallie Damian MD, 7 Units at 05/14/21 1153    metoprolol succinate (TOPROL-XL) 24 hr tablet 100 mg, 100 mg, Oral, Daily, Veronica Josue PA-C, 100 mg at 05/14/21 0807    ondansetron (ZOFRAN) injection 4 mg, 4 mg, Intravenous, Q6H PRN, Veronica LipsDANTE    rivaroxaban (XARELTO) tablet 15 mg, 15 mg, Oral, BID With Meals, Hallie Damian MD, 15 mg at 05/14/21 0807    traMADol (ULTRAM) tablet 50 mg, 50 mg, Oral, Q6H PRN, Veronica Lips, PABROOK    Principal Problem:    Streptococcal bacteremia  Active Problems:    Hyperlipidemia    HTN (hypertension)    Type 2 diabetes mellitus with hyperglycemia, with long-term current use of insulin (HCC)    Adenocarcinoma of pancreas (HCC)    Hyponatremia    Anemia    Sacral wound    Generalized weakness    Mesenteric venous thrombosis (HCC)      Reji Islas MD

## 2021-05-14 NOTE — PLAN OF CARE
Problem: Potential for Falls  Goal: Patient will remain free of falls  Description: INTERVENTIONS:  - Assess patient frequently for physical needs  -  Identify cognitive and physical deficits and behaviors that affect risk of falls    -  [facility] fall precautions as indicated by assessment   - Educate patient/family on patient safety including physical limitations  - Instruct patient to call for assistance with activity based on assessment  - Modify environment to reduce risk of injury  - Consider OT/PT consult to assist with strengthening/mobility  Outcome: Progressing

## 2021-05-14 NOTE — ASSESSMENT & PLAN NOTE
Lab Results   Component Value Date    HGBA1C 9 4 (H) 04/08/2021       Recent Labs     05/13/21  1538 05/13/21  2043 05/14/21  0755 05/14/21  1111   POCGLU 237* 162* 129 395*       Blood Sugar Average: Last 72 hrs:  (P) 380 [id number]   · Recent A1c shows poor control    · Increase Lantus to 20 units hs, Continue sliding scale coverage, increase pre meal insulin at 7 units t i d   · Adjust insulin regimen as needed  · Metformin on hold-will restart on discharge

## 2021-05-14 NOTE — QUICK NOTE
QUICK NOTE - Deterioration Index  Leonel Aleman 67 y o  male MRN: [id number]  Unit/Bed#: -01 Encounter: [id number]    Date Paged: 21  Time Paged:  [phone]  Room #:  80  Arrival Time:    Deterioration index score at time of page: 60 2  %  Spoke with Haroon Clark from primary team  Need to escalate level of care: no     PROBLEMS resulting in high DI score:   50% Respiratory rate is 73   20% Age is 75   6% Sodium is 129 mmol/L   6% WBC count is 16 12 Thousand/uL   5% Potassium is 4 8 mmol/L   4% Systolic is 755   4% Pulse is 13   3% Hematocrit is 28 2 %   2% Pulse oximetry is 100 %   <1% Temperature is 97 7 °F (36 5 °C)          PLAN:     Heart rate documented as respiratory rate and respiratory rate documented as heart rate  Air in documentation PCA to fix along with nurse  No concerns    Please contact critical care via [employer] with any questions or concerns  Vitals:   Vitals:    21 0733 21 1514 21 2333 21 0753   BP: 123/60 144/76 154/68 150/65   BP Location:  Right arm Right arm Right arm   Pulse: 70 74 69 (!) 13   Resp: 20 22 19 (!) 73   Temp: 97 8 °F (36 6 °C) 97 8 °F (36 6 °C) 97 9 °F (36 6 °C) 97 7 °F (36 5 °C)   TempSrc: Oral Oral Oral Oral   SpO2: 100% 100% 99% 100%   Weight:       Height:           Respiratory:  SpO2: SpO2: 100 %, SpO2 Activity: SpO2 Activity: At Rest, SpO2 Device: O2 Device: None (Room air)       Temperature: Temp (24hrs), Av 8 °F (36 6 °C), Min:97 7 °F (36 5 °C), Max:97 9 °F (36 6 °C)  Current: Temperature: 97 7 °F (36 5 °C)    Labs:   Results from last 7 days   Lab Units 21  0432 21  0500 21  0434 21  0513  21  0436   WBC Thousand/uL 16 12* 14 08* 12 76* 7 43   < > 6 97   HEMOGLOBIN g/dL 9 3* 10 2* 9 8* 8 7*   < > 7 7*   HEMATOCRIT % 28 2* 30 7* 29 3* 25 8*   < > 23 1*   PLATELETS Thousands/uL 350 293 223 175   < > 150   MONO PCT %  --   --   --  8  --  1*    < > = values in this interval not displayed       Results from last 7 days   Lab Units 05/14/21  0432 05/13/21  0500 05/12/21  0434  05/10/21  0455 05/09/21  1227   SODIUM mmol/L 129* 131* 131*   < > 133* 131*   POTASSIUM mmol/L 4 8 4 7 4 4   < > 4 2 4 1   CHLORIDE mmol/L 99* 99* 99*   < > 100 98*   CO2 mmol/L 22 24 22   < > 24 25   BUN mg/dL 20 13 16   < > 17 17   CREATININE mg/dL 0 86 0 80 0 84   < > 0 79 0 75   CALCIUM mg/dL 7 7* 7 8* 7 8*   < > 7 9* 7 8*   ALK PHOS U/L 127*  --   --   --  105 103   ALT U/L 62  --   --   --  38 31   AST U/L 30  --   --   --  24 21    < > = values in this interval not displayed                   Results from last 7 days   Lab Units 05/09/21 0456 05/08/21  0436   PROCALCITONIN ng/ml 0 90* 1 88*       Code Status: Level 1 - Full Code

## 2021-05-14 NOTE — DISCHARGE INSTR - OTHER ORDERS
Call to reschedule your COVID vaccine - Call [facility] [phone], when cleared by doctor

## 2021-05-14 NOTE — CASE MANAGEMENT
Received call from Kendall Ac, patients ALEIDA  Had long conversation about discharge plan  Hyacinth Butler states that patients cousin Andrew Silva called her and voiced concerns about accessing patients port to infuse his antibiotics  Explained to her that I have spoken with Andrew Silva several times today and informed her that she would not have to access his port  That the visiting nurses would access the port and it would stay accessed for up to 7 days and then be deaccessed and cleansed and dressing changed and reaccessed  That Andrew Mahereker would only have to connect the abx tubing to the access tubing  Hyacinth Butler states she would be able to go over daily to infuse the medication and plans on doing same  Discussed that patients medication would be due in the afternoon/evening, not mornings, as Clarosse Butler works night shift  She understands and states she could go over every afternoon around 1600  Encouraged her to be there when the VNA did their first visit/teaching  As per Dr Astrid Quevedo, patient discharge is to be held today due to needing a CT of abdomen, and could possibly be discharged tomorrow pending results of CT scan  Called and spoke with Kellie at [facility]  As per Kellie, patients medication should be delivered this evening  Any issues with the medications over the weekend can be addressed by calling Stephy at   Called and spoke with Solomon Salinas at [facility]  [phone] re: patient discharge being held  Patient was to have [facility] on Saturday 5/15/21  Patient will now have [facility] on Sunday 5/16/21 with anticipation of patient being discharged tomorrow 5/15/21  [address] [place] [address] [phone] with any changes over the weekend

## 2021-05-14 NOTE — PROGRESS NOTES
[facility]  Progress Note - Terrie Floras 1945, 67 y o  male MRN: [id number]  Unit/Bed#: -Paty Encounter: [id number]  Primary Care Provider: Rober Ulloa DO   Date and time admitted to hospital: 5/7/2021  9:27 AM    * Streptococcal bacteremia  Assessment & Plan  Unknown source at present, no recent dental work, port site without erythema or tenderness  Blood cultures from 05/07 growing Streptococcus anginosus, repeat blood cultures from 05/09/2021 without growth    CT of the abdomen and pelvis without source of infection, again shows mass in the pancreatic head, tiny filling defect is noted at the junction of the SMV and portal confluence  Echocardiogram with normal ejection fraction, negative for valvular vegetation  CT maxillofacial/mandible without infection or abscess    Infectious Disease on board, continue ceftriaxone on 05/09/2021 #6, will continue IV antibiotics through 05/22/2021  given rising WBC , checked CT chest abdomen pelvis  No evidence of Neulasta given after last chemotherapy to suggest reason for elevated white count  remains afebrile, nontoxic looking      Mesenteric venous thrombosis (HCC)  Assessment & Plan  Incidental finding of CT scan of the abdomen and pelvis with tiny filling defect noted at the junction of the SMV and portal confluence likely due to underlying malignancy  Continue Xarelto loading for 21 days then transition to 20 mg daily  hematology on board    Hyponatremia  Assessment & Plan  · Chronic hyponatremia Sodium corrected to 132 with blood glucose  · Trend BMP  ·  Encourage oral intake      Generalized weakness  Assessment & Plan  · Likely due to ongoing pancreatic malignancy pain treated with chemotherapy  · Improved since presentation, PT/OT recommend home with family support    Sacral wound  Assessment & Plan  · Stable sacral wound noted upon exam   No erythema, drainage -local care and off weighting    Anemia  Assessment & Plan  · Likely secondary to chemo  · Continue to follow serial labs    Adenocarcinoma of pancreas [facility])  Assessment & Plan  · Dx March 2021  · Follows with Oncology outpatient, Dr An Forbes, to receive 2-3months adjuvant chemotherapy prior to resection by Dr Tequila Alan   · Currently undergoing chemotherapy treatment with Abraxane and Gemzar - last treatment 4/6  · Oncology team consulted    Type 2 diabetes mellitus with hyperglycemia, with long-term current use of insulin [facility])  Assessment & Plan  Lab Results   Component Value Date    HGBA1C 9 4 (H) 04/08/2021       Recent Labs     05/13/21  1538 05/13/21  2043 05/14/21  0755 05/14/21  1111   POCGLU 237* 162* 129 395*       Blood Sugar Average: Last 72 hrs:  (P) 377 [id number]   · Recent A1c shows poor control  · Increase Lantus to 20 units hs, Continue sliding scale coverage, increase pre meal insulin at 7 units t i d   · Adjust insulin regimen as needed  · Metformin on hold-will restart on discharge    HTN (hypertension)  Assessment & Plan  · Home medication is ramipril 10 mg daily, Toprol- mg daily  · Ramipril currently held due to hyperkalemia on presentation, switched to amlodipine 2 5 mg daily  · continue amlodipine 2 5 mg daily, Toprol- mg daily  · Monitor BP      Hyperlipidemia  Assessment & Plan  Patient being continued on usual atorvastatin    Hyperkalemia-resolved as of 5/10/2021  Assessment & Plan  · Potassium upon admission 5 9, status post treatment, now resolved  · Was given calcium gluconate, D50/insulin/sodium bicarbonate  · Resolved    Leukocytosis-resolved as of 5/9/2021  Assessment & Plan  · Present upon admission at 17   · No evidence of active infection  Reported chills but no fever home  · May be secondary to hemoconcentration from dehydration     · Resolved        VTE Pharmacologic Prophylaxis:   Pharmacologic: Rivaroxaban (Xarelto)  Mechanical VTE Prophylaxis in Place: Yes    Patient Centered Rounds: I have performed bedside rounds with nursing staff today  Discussions with Specialists or Other Care Team Provider:  Infectious disease    Education and Discussions with Family / Patient:  Patient,    Time Spent for Care: 30 minutes  More than 50% of total time spent on counseling and coordination of care as described above  Current Length of Stay: 7 day(s)    Current Patient Status: Inpatient   Certification Statement: The patient will continue to require additional inpatient hospital stay due to As above    Discharge Plan:  Ongoing    Code Status: Level 1 - Full Code      Subjective:   Seen  No new complaints to offer  No shortness of breath, cough, chest discomfort, abdominal discomfort, nausea, diarrhea  Objective:     Vitals:   Temp (24hrs), Av 8 °F (36 6 °C), Min:97 7 °F (36 5 °C), Max:97 9 °F (36 6 °C)    Temp:  [97 7 °F (36 5 °C)-97 9 °F (36 6 °C)] 97 7 °F (36 5 °C)  HR:  [13-69] 13  Resp:  [19-73] 73  BP: (150-154)/(65-68) 150/65  SpO2:  [99 %-100 %] 100 %  Body mass index is 20 37 kg/m²  Input and Output Summary (last 24 hours): Intake/Output Summary (Last 24 hours) at 2021 1624  Last data filed at 2021 1242  Gross per 24 hour   Intake 830 ml   Output 1600 ml   Net -770 ml       Physical Exam:     Physical Exam  Vitals signs reviewed  Constitutional:       General: He is not in acute distress  Appearance: He is not ill-appearing, toxic-appearing or diaphoretic  Comments: Cachectic looking   HENT:      Head: Normocephalic  Eyes:      Extraocular Movements: Extraocular movements intact  Pupils: Pupils are equal, round, and reactive to light  Neck:      Musculoskeletal: Normal range of motion and neck supple  No neck rigidity or muscular tenderness  Vascular: No carotid bruit  Cardiovascular:      Rate and Rhythm: Normal rate and regular rhythm  Pulses: Normal pulses  Heart sounds: Normal heart sounds  No murmur  No friction rub     Pulmonary:      Effort: Pulmonary effort is normal       Breath sounds: Normal breath sounds  No stridor  No wheezing, rhonchi or rales  Chest:      Chest wall: No tenderness  Abdominal:      General: Abdomen is flat  Bowel sounds are normal  There is no distension  Palpations: Abdomen is soft  Tenderness: There is no abdominal tenderness  There is no guarding or rebound  Musculoskeletal: Normal range of motion  General: No swelling or tenderness  Right lower leg: No edema  Left lower leg: No edema  Skin:     General: Skin is warm and dry  Coloration: Skin is not jaundiced  Neurological:      General: No focal deficit present  Mental Status: He is alert and oriented to person, place, and time  Cranial Nerves: No cranial nerve deficit  Sensory: No sensory deficit  Additional Data:     Labs:    Results from last 7 days   Lab Units 05/14/21  0432  05/11/21  0513  05/08/21  0436   WBC Thousand/uL 16 12*   < > 7 43   < > 6 97   HEMOGLOBIN g/dL 9 3*   < > 8 7*   < > 7 7*   HEMATOCRIT % 28 2*   < > 25 8*   < > 23 1*   PLATELETS Thousands/uL 350   < > 175   < > 150   BANDS PCT %  --   --   --   --  1   LYMPHO PCT %  --   --  21  --  6*   MONO PCT %  --   --  8  --  1*   EOS PCT %  --   --  0  --  0    < > = values in this interval not displayed       Results from last 7 days   Lab Units 05/14/21  0432   SODIUM mmol/L 129*   POTASSIUM mmol/L 4 8   CHLORIDE mmol/L 99*   CO2 mmol/L 22   BUN mg/dL 20   CREATININE mg/dL 0 86   ANION GAP mmol/L 8   CALCIUM mg/dL 7 7*   ALBUMIN g/dL 2 5*   TOTAL BILIRUBIN mg/dL 0 20   ALK PHOS U/L 127*   ALT U/L 62   AST U/L 30   GLUCOSE RANDOM mg/dL 173*         Results from last 7 days   Lab Units 05/14/21  1111 05/14/21  0755 05/13/21  2043 05/13/21  1538 05/13/21  1111 05/13/21  0736 05/12/21  2124 05/12/21  1725 05/12/21  1615 05/12/21  1106 05/12/21  0813 05/11/21  2203   POC GLUCOSE mg/dl 395* 129 162* 237* 293* 124 175* 167* 234* 354* 177* 216* Results from last 7 days   Lab Units 05/09/21  0456 05/08/21  0436   PROCALCITONIN ng/ml 0 90* 1 88*           * I Have Reviewed All Lab Data Listed Above  * Additional Pertinent Lab Tests Reviewed: All Labs Within Last 24 Hours Reviewed    Imaging:    Imaging Reports Reviewed Today Include:   Imaging Personally Reviewed by Myself Includes:     Recent Cultures (last 7 days):     Results from last 7 days   Lab Units 05/09/21  0457   BLOOD CULTURE  No Growth After 5 Days  No Growth After 5 Days  Last 24 Hours Medication List:   Current Facility-Administered Medications   Medication Dose Route Frequency Provider Last Rate    acetaminophen  650 mg Oral Q6H PRN Kamron Watkins PA-C      amLODIPine  2 5 mg Oral Daily Radha Hoyt DO      aspirin  81 mg Oral Daily Kamron Watkins PA-C      atorvastatin  40 mg Oral Daily With Colin and DANTE Noble      cefTRIAXone  2,000 mg Intravenous Q24H Perico Cooper MD 2,000 mg (05/13/21 9262)    insulin glargine  20 Units Subcutaneous HS Marquis Wray MD      insulin lispro  1-5 Units Subcutaneous TID AC Kamron Watkins PA-C      insulin lispro  7 Units Subcutaneous TID With Meals Barbara Finney MD      metoprolol succinate  100 mg Oral Daily Kamron Watkins PA-C      ondansetron  4 mg Intravenous Q6H PRN Kamron Watkins PA-C      rivaroxaban  15 mg Oral BID With Meals Barbara Finney MD      traMADol  50 mg Oral Q6H PRN Kamron Watkins PA-C          Today, Patient Was Seen By: Barbara Finney MD    ** Please Note: Dictation voice to text software may have been used in the creation of this document   **

## 2021-05-14 NOTE — CASE MANAGEMENT
Auth approved for [facility]  Auth# [id number]  Valid 5/13/21 to 5/16/21  Next review 5/17  [phone]

## 2021-05-14 NOTE — PLAN OF CARE
Problem: Potential for Falls  Goal: Patient will remain free of falls  Description: INTERVENTIONS:  - Assess patient frequently for physical needs  -  Identify cognitive and physical deficits and behaviors that affect risk of falls    -  [place] fall precautions as indicated by assessment   - Educate patient/family on patient safety including physical limitations  - Instruct patient to call for assistance with activity based on assessment  - Modify environment to reduce risk of injury  - Consider OT/PT consult to assist with strengthening/mobility  Outcome: Progressing     Problem: Prexisting or High Potential for Compromised Skin Integrity  Goal: Skin integrity is maintained or improved  Description: INTERVENTIONS:  - Identify patients at risk for skin breakdown  - Assess and monitor skin integrity  - Assess and monitor nutrition and hydration status  - Monitor labs   - Assess for incontinence   - Turn and reposition patient  - Assist with mobility/ambulation  - Relieve pressure over bony prominences  - Avoid friction and shearing  - Provide appropriate hygiene as needed including keeping skin clean and dry  - Evaluate need for skin moisturizer/barrier cream  - Collaborate with interdisciplinary team   - Patient/family teaching  - Consider wound care consult   Outcome: Progressing     Problem: PAIN - ADULT  Goal: Verbalizes/displays adequate comfort level or baseline comfort level  Description: Interventions:  - Encourage patient to monitor pain and request assistance  - Assess pain using appropriate pain scale  - Administer analgesics based on type and severity of pain and evaluate response  - Implement non-pharmacological measures as appropriate and evaluate response  - Consider cultural and social influences on pain and pain management  - Notify physician/advanced practitioner if interventions unsuccessful or patient reports new pain  Outcome: Progressing     Problem: INFECTION - ADULT  Goal: Absence or prevention of progression during hospitalization  Description: INTERVENTIONS:  - Assess and monitor for signs and symptoms of infection  - Monitor lab/diagnostic results  - Monitor all insertion sites, i e  indwelling lines, tubes, and drains  - Monitor endotracheal if appropriate and nasal secretions for changes in amount and color  - [place] appropriate cooling/warming therapies per order  - Administer medications as ordered  - Instruct and encourage patient and family to use good hand hygiene technique  - Identify and instruct in appropriate isolation precautions for identified infection/condition  Outcome: Progressing  Goal: Absence of fever/infection during neutropenic period  Description: INTERVENTIONS:  - Monitor WBC    Outcome: Progressing     Problem: SAFETY ADULT  Goal: Patient will remain free of falls  Description: INTERVENTIONS:  - Assess patient frequently for physical needs  -  Identify cognitive and physical deficits and behaviors that affect risk of falls    -  [place] fall precautions as indicated by assessment   - Educate patient/family on patient safety including physical limitations  - Instruct patient to call for assistance with activity based on assessment  - Modify environment to reduce risk of injury  - Consider OT/PT consult to assist with strengthening/mobility  Outcome: Progressing  Goal: Maintain or return to baseline ADL function  Description: INTERVENTIONS:  -  Assess patient's ability to carry out ADLs; assess patient's baseline for ADL function and identify physical deficits which impact ability to perform ADLs (bathing, care of mouth/teeth, toileting, grooming, dressing, etc )  - Assess/evaluate cause of self-care deficits   - Assess range of motion  - Assess patient's mobility; develop plan if impaired  - Assess patient's need for assistive devices and provide as appropriate  - Encourage maximum independence but intervene and supervise when necessary  - Involve family in performance of ADLs  - Assess for home care needs following discharge   - Consider OT consult to assist with ADL evaluation and planning for discharge  - Provide patient education as appropriate  Outcome: Progressing  Goal: Maintain or return mobility status to optimal level  Description: INTERVENTIONS:  - Assess patient's baseline mobility status (ambulation, transfers, stairs, etc )    - Identify cognitive and physical deficits and behaviors that affect mobility  - Identify mobility aids required to assist with transfers and/or ambulation (gait belt, sit-to-stand, lift, walker, cane, etc )  - [place] fall precautions as indicated by assessment  - Record patient progress and toleration of activity level on Mobility SBAR; progress patient to next Phase/Stage  - Instruct patient to call for assistance with activity based on assessment  - Consider rehabilitation consult to assist with strengthening/weightbearing, etc   Outcome: Progressing     Problem: DISCHARGE PLANNING  Goal: Discharge to home or other facility with appropriate resources  Description: INTERVENTIONS:  - Identify barriers to discharge w/patient and caregiver  - Arrange for needed discharge resources and transportation as appropriate  - Identify discharge learning needs (meds, wound care, etc )  - Arrange for interpretive services to assist at discharge as needed  - Refer to Case Management Department for coordinating discharge planning if the patient needs post-hospital services based on physician/advanced practitioner order or complex needs related to functional status, cognitive ability, or social support system  Outcome: Progressing     Problem: Knowledge Deficit  Goal: Patient/family/caregiver demonstrates understanding of disease process, treatment plan, medications, and discharge instructions  Description: Complete learning assessment and assess knowledge base    Interventions:  - Provide teaching at level of understanding  - Provide teaching via preferred learning methods  Outcome: Progressing

## 2021-05-14 NOTE — ASSESSMENT & PLAN NOTE
Unknown source at present, no recent dental work, port site without erythema or tenderness  Blood cultures from 05/07 growing Streptococcus anginosus, repeat blood cultures from 05/09/2021 without growth    CT of the abdomen and pelvis without source of infection, again shows mass in the pancreatic head, tiny filling defect is noted at the junction of the SMV and portal confluence      Echocardiogram with normal ejection fraction, negative for valvular vegetation  CT maxillofacial/mandible without infection or abscess    Infectious Disease on board, continue ceftriaxone on 05/09/2021 #6, will continue IV antibiotics through 05/22/2021  given rising WBC , checked CT chest abdomen pelvis  No evidence of Neulasta given after last chemotherapy to suggest reason for elevated white count  remains afebrile, nontoxic looking

## 2021-05-15 NOTE — PROGRESS NOTES
[place]  Progress Note - Sal Ibrahim 1945, 67 y o  male MRN: [id number]  Unit/Bed#: -01 Encounter: [id number]  Primary Care Provider: Bartolo Dumont DO   Date and time admitted to hospital: 5/7/2021  9:27 AM    * Streptococcal bacteremia  Assessment & Plan  Unknown source at present, no recent dental work, port site without erythema or tenderness  Blood cultures from 05/07 growing Streptococcus anginosus, repeat blood cultures from 05/09/2021 without growth    CT of the abdomen and pelvis without source of infection, again shows mass in the pancreatic head, tiny filling defect is noted at the junction of the SMV and portal confluence  Echocardiogram with normal ejection fraction, negative for valvular vegetation  CT maxillofacial/mandible without infection or abscess      CT chest abdomen pelvis 05/15/2021: Multiple extraluminal foci of air adjacent to the pylorus/proximal duodenum, extending into the region of the pancreatic head  There is a suggestion of a small fistulous tract arising from the duodenal bulb/pylorus   for perforated ulcer versus postoperative changes, given the previously identified 10 cm cystic mass adjacent to the pancreatic head is no longer seen       -cystic mass seen on prior imaging likely resolved with chemotherapy, no proof of it being drained in chart  Slightly improving WBC  Infectious Disease on board, continue ceftriaxone on 05/09/2021 #7, will continue IV antibiotics through 05/22/2021  Surgery consulted for abnormal CT scan  Mesenteric venous thrombosis (HCC)  Assessment & Plan  Incidental finding of CT scan of the abdomen and pelvis with tiny filling defect noted at the junction of the SMV and portal confluence likely due to underlying malignancy    Continue Xarelto loading for 21 days then transition to 20 mg daily  hematology on board    Hyponatremia  Assessment & Plan  · Chronic hyponatremia Sodium is 130   · Trend BMP  · Encourage oral intake  Generalized weakness  Assessment & Plan  · Likely due to ongoing pancreatic malignancy pain treated with chemotherapy  · Improved since presentation, PT/OT recommend home with family support    Sacral wound  Assessment & Plan  · Stable sacral wound noted upon exam   No erythema, drainage -local care and off weighting    Anemia  Assessment & Plan  · Likely secondary to chemo  · Continue to follow serial labs    Adenocarcinoma of pancreas [facility])  Assessment & Plan  · Dx March 2021  · Follows with Oncology outpatient, Dr Cinthya Elder, to receive 2-3months adjuvant chemotherapy prior to resection by Dr Jia Sanchez   · Currently undergoing chemotherapy treatment with Abraxane and Gemzar - last treatment 4/6  · Oncology team consulted    Type 2 diabetes mellitus with hyperglycemia, with long-term current use of insulin [facility])  Assessment & Plan  Lab Results   Component Value Date    HGBA1C 9 4 (H) 04/08/2021       Recent Labs     05/14/21  1111 05/14/21  1644 05/14/21  2048 05/15/21  0721   POCGLU 395* 161* 211* 66       Blood Sugar Average: Last 72 hrs:  (P) 995 [id number]   · Recent A1c shows poor control    · Continue Lantus 20 units hs, Continue sliding scale coverage,pre meal insulin at 7 units t i d   · Adjust insulin regimen as needed  · Metformin on hold-will restart on discharge    HTN (hypertension)  Assessment & Plan  · Home medication is ramipril 10 mg daily, Toprol- mg daily  · Ramipril currently held due to hyperkalemia on presentation, switched to amlodipine 2 5 mg daily  · continue amlodipine 2 5 mg daily, Toprol- mg daily  · Monitor BP      Hyperlipidemia  Assessment & Plan  Patient being continued on usual atorvastatin    Hyperkalemia-resolved as of 5/10/2021  Assessment & Plan  · Potassium upon admission 5 9, status post treatment, now resolved  · Was given calcium gluconate, D50/insulin/sodium bicarbonate  · Resolved    Leukocytosis-resolved as of 5/9/2021  Assessment & Plan  · Present upon admission at 15   · No evidence of active infection  Reported chills but no fever home  · May be secondary to hemoconcentration from dehydration  · Resolved      VTE Pharmacologic Prophylaxis:   Pharmacologic: Rivaroxaban (Xarelto)  Mechanical VTE Prophylaxis in Place: Yes    Patient Centered Rounds: I have performed bedside rounds with nursing staff today  Discussions with Specialists or Other Care Team Provider:     Education and Discussions with Family / Patient:  Patient, brother and cousin called, voice message left with update  Time Spent for Care: 30 minutes  More than 50% of total time spent on counseling and coordination of care as described above  Current Length of Stay: 8 day(s)    Current Patient Status: Inpatient   Certification Statement: The patient will continue to require additional inpatient hospital stay due to   Discharge Plan:  Ongoing    Code Status: Level 1 - Full Code      Subjective:   Seen  Feels well  Denies any complaints including abdominal pain    Objective:     Vitals:   Temp (24hrs), Av 8 °F (36 6 °C), Min:97 6 °F (36 4 °C), Max:98 °F (36 7 °C)    Temp:  [97 6 °F (36 4 °C)-98 °F (36 7 °C)] 97 6 °F (36 4 °C)  HR:  [63-71] 63  Resp:  [18] 18  BP: (141-163)/(67-74) 156/68  SpO2:  [99 %-100 %] 99 %  Body mass index is 20 37 kg/m²  Input and Output Summary (last 24 hours): Intake/Output Summary (Last 24 hours) at 5/15/2021 1122  Last data filed at 2021 1242  Gross per 24 hour   Intake --   Output 200 ml   Net -200 ml       Physical Exam:     Physical Exam  Vitals signs reviewed  Constitutional:       General: He is not in acute distress  Appearance: Normal appearance  He is not ill-appearing, toxic-appearing or diaphoretic  Comments: Cachectic looking   HENT:      Head: Normocephalic  Eyes:      Extraocular Movements: Extraocular movements intact  Pupils: Pupils are equal, round, and reactive to light     Neck: Musculoskeletal: Normal range of motion and neck supple  No neck rigidity or muscular tenderness  Vascular: No carotid bruit  Cardiovascular:      Rate and Rhythm: Normal rate and regular rhythm  Pulses: Normal pulses  Heart sounds: Normal heart sounds  No murmur  No friction rub  Pulmonary:      Effort: Pulmonary effort is normal       Breath sounds: Normal breath sounds  No stridor  No wheezing, rhonchi or rales  Chest:      Chest wall: No tenderness  Abdominal:      General: Abdomen is flat  Bowel sounds are normal  There is no distension  Palpations: Abdomen is soft  Tenderness: There is no abdominal tenderness  There is no guarding or rebound  Musculoskeletal: Normal range of motion  General: No swelling or tenderness  Right lower leg: No edema  Left lower leg: No edema  Skin:     General: Skin is warm and dry  Coloration: Skin is not jaundiced  Neurological:      General: No focal deficit present  Mental Status: He is alert and oriented to person, place, and time  Cranial Nerves: No cranial nerve deficit  Sensory: No sensory deficit             Additional Data:     Labs:    Results from last 7 days   Lab Units 05/15/21  0439   WBC Thousand/uL 15 57*   HEMOGLOBIN g/dL 9 3*   HEMATOCRIT % 28 2*   PLATELETS Thousands/uL 348   BANDS PCT % 4   LYMPHO PCT % 37   MONO PCT % 2*   EOS PCT % 4     Results from last 7 days   Lab Units 05/15/21  0439   SODIUM mmol/L 130*   POTASSIUM mmol/L 4 7   CHLORIDE mmol/L 98*   CO2 mmol/L 21   BUN mg/dL 15   CREATININE mg/dL 0 74   ANION GAP mmol/L 11   CALCIUM mg/dL 7 8*   ALBUMIN g/dL 2 6*   TOTAL BILIRUBIN mg/dL 0 30   ALK PHOS U/L 134*   ALT U/L 53   AST U/L 30   GLUCOSE RANDOM mg/dL 85         Results from last 7 days   Lab Units 05/15/21  0721 05/14/21  2048 05/14/21  1644 05/14/21  1111 05/14/21  0755 05/13/21  2043 05/13/21  1538 05/13/21  1111 05/13/21  0736 05/12/21  2124 05/12/21  1725 05/12/21  1615   POC GLUCOSE mg/dl 66 211* 161* 395* 129 162* 237* 293* 124 175* 167* 234*         Results from last 7 days   Lab Units 05/09/21  0456   PROCALCITONIN ng/ml 0 90*           * I Have Reviewed All Lab Data Listed Above  * Additional Pertinent Lab Tests Reviewed: All Labs Within Last 24 Hours Reviewed    Imaging:    Imaging Reports Reviewed Today Include:  CT chest abdomen pelvis  Imaging Personally Reviewed by Myself Includes:  CT chest abdomen pelvis    Recent Cultures (last 7 days):     Results from last 7 days   Lab Units 05/09/21  0457   BLOOD CULTURE  No Growth After 5 Days  No Growth After 5 Days  Last 24 Hours Medication List:   Current Facility-Administered Medications   Medication Dose Route Frequency Provider Last Rate    acetaminophen  650 mg Oral Q6H PRN Keyonna Watkins PA-C      amLODIPine  2 5 mg Oral Daily Sharda Leyva DO      aspirin  81 mg Oral Daily Keyonna Watkins PA-C      atorvastatin  40 mg Oral Daily With Maury PA-C      cefTRIAXone  2,000 mg Intravenous Q24H Poncho Eddy MD 2,000 mg (05/14/21 1816)    insulin glargine  20 Units Subcutaneous HS Marquis Jarrell MD      insulin lispro  1-5 Units Subcutaneous TID AC Keyonna Watkins PA-C      insulin lispro  7 Units Subcutaneous TID With Meals Robert Ortiz MD      metoprolol succinate  100 mg Oral Daily Keyonna Watkins PA-C      ondansetron  4 mg Intravenous Q6H PRN Keyonna Watkins PA-C      rivaroxaban  15 mg Oral BID With Meals Robert Ortiz MD      traMADol  50 mg Oral Q6H PRN Keyonna Watkins PA-C          Today, Patient Was Seen By: Robert Ortiz MD    ** Please Note: Dictation voice to text software may have been used in the creation of this document   **

## 2021-05-15 NOTE — PLAN OF CARE
Problem: Potential for Falls  Goal: Patient will remain free of falls  Description: INTERVENTIONS:  - Assess patient frequently for physical needs  -  Identify cognitive and physical deficits and behaviors that affect risk of falls    -  [place] fall precautions as indicated by assessment   - Educate patient/family on patient safety including physical limitations  - Instruct patient to call for assistance with activity based on assessment  - Modify environment to reduce risk of injury  - Consider OT/PT consult to assist with strengthening/mobility  Outcome: Progressing     Problem: Prexisting or High Potential for Compromised Skin Integrity  Goal: Skin integrity is maintained or improved  Description: INTERVENTIONS:  - Identify patients at risk for skin breakdown  - Assess and monitor skin integrity  - Assess and monitor nutrition and hydration status  - Monitor labs   - Assess for incontinence   - Turn and reposition patient  - Assist with mobility/ambulation  - Relieve pressure over bony prominences  - Avoid friction and shearing  - Provide appropriate hygiene as needed including keeping skin clean and dry  - Evaluate need for skin moisturizer/barrier cream  - Collaborate with interdisciplinary team   - Patient/family teaching  - Consider wound care consult   Outcome: Progressing     Problem: PAIN - ADULT  Goal: Verbalizes/displays adequate comfort level or baseline comfort level  Description: Interventions:  - Encourage patient to monitor pain and request assistance  - Assess pain using appropriate pain scale  - Administer analgesics based on type and severity of pain and evaluate response  - Implement non-pharmacological measures as appropriate and evaluate response  - Consider cultural and social influences on pain and pain management  - Notify physician/advanced practitioner if interventions unsuccessful or patient reports new pain  Outcome: Progressing     Problem: INFECTION - ADULT  Goal: Absence or prevention of progression during hospitalization  Description: INTERVENTIONS:  - Assess and monitor for signs and symptoms of infection  - Monitor lab/diagnostic results  - Monitor all insertion sites, i e  indwelling lines, tubes, and drains  - Monitor endotracheal if appropriate and nasal secretions for changes in amount and color  - [place] appropriate cooling/warming therapies per order  - Administer medications as ordered  - Instruct and encourage patient and family to use good hand hygiene technique  - Identify and instruct in appropriate isolation precautions for identified infection/condition  Outcome: Progressing  Goal: Absence of fever/infection during neutropenic period  Description: INTERVENTIONS:  - Monitor WBC    Outcome: Progressing     Problem: SAFETY ADULT  Goal: Patient will remain free of falls  Description: INTERVENTIONS:  - Assess patient frequently for physical needs  -  Identify cognitive and physical deficits and behaviors that affect risk of falls    -  [place] fall precautions as indicated by assessment   - Educate patient/family on patient safety including physical limitations  - Instruct patient to call for assistance with activity based on assessment  - Modify environment to reduce risk of injury  - Consider OT/PT consult to assist with strengthening/mobility  Outcome: Progressing  Goal: Maintain or return to baseline ADL function  Description: INTERVENTIONS:  -  Assess patient's ability to carry out ADLs; assess patient's baseline for ADL function and identify physical deficits which impact ability to perform ADLs (bathing, care of mouth/teeth, toileting, grooming, dressing, etc )  - Assess/evaluate cause of self-care deficits   - Assess range of motion  - Assess patient's mobility; develop plan if impaired  - Assess patient's need for assistive devices and provide as appropriate  - Encourage maximum independence but intervene and supervise when necessary  - Involve family in performance of ADLs  - Assess for home care needs following discharge   - Consider OT consult to assist with ADL evaluation and planning for discharge  - Provide patient education as appropriate  Outcome: Progressing  Goal: Maintain or return mobility status to optimal level  Description: INTERVENTIONS:  - Assess patient's baseline mobility status (ambulation, transfers, stairs, etc )    - Identify cognitive and physical deficits and behaviors that affect mobility  - Identify mobility aids required to assist with transfers and/or ambulation (gait belt, sit-to-stand, lift, walker, cane, etc )  - [place] fall precautions as indicated by assessment  - Record patient progress and toleration of activity level on Mobility SBAR; progress patient to next Phase/Stage  - Instruct patient to call for assistance with activity based on assessment  - Consider rehabilitation consult to assist with strengthening/weightbearing, etc   Outcome: Progressing     Problem: DISCHARGE PLANNING  Goal: Discharge to home or other facility with appropriate resources  Description: INTERVENTIONS:  - Identify barriers to discharge w/patient and caregiver  - Arrange for needed discharge resources and transportation as appropriate  - Identify discharge learning needs (meds, wound care, etc )  - Arrange for interpretive services to assist at discharge as needed  - Refer to Case Management Department for coordinating discharge planning if the patient needs post-hospital services based on physician/advanced practitioner order or complex needs related to functional status, cognitive ability, or social support system  Outcome: Progressing     Problem: Knowledge Deficit  Goal: Patient/family/caregiver demonstrates understanding of disease process, treatment plan, medications, and discharge instructions  Description: Complete learning assessment and assess knowledge base    Interventions:  - Provide teaching at level of understanding  - Provide teaching via preferred learning methods  Outcome: Progressing

## 2021-05-15 NOTE — ASSESSMENT & PLAN NOTE
Unknown source at present, no recent dental work, port site without erythema or tenderness  Blood cultures from 05/07 growing Streptococcus anginosus, repeat blood cultures from 05/09/2021 without growth    CT of the abdomen and pelvis without source of infection, again shows mass in the pancreatic head, tiny filling defect is noted at the junction of the SMV and portal confluence  Echocardiogram with normal ejection fraction, negative for valvular vegetation  CT maxillofacial/mandible without infection or abscess      CT chest abdomen pelvis 05/15/2021: Multiple extraluminal foci of air adjacent to the pylorus/proximal duodenum, extending into the region of the pancreatic head  There is a suggestion of a small fistulous tract arising from the duodenal bulb/pylorus   for perforated ulcer versus postoperative changes, given the previously identified 10 cm cystic mass adjacent to the pancreatic head is no longer seen       -cystic mass seen on prior imaging likely resolved with chemotherapy, no proof of it being drained in chart  Slightly improving WBC  Infectious Disease on board, continue ceftriaxone on 05/09/2021 #7, will continue IV antibiotics through 05/22/2021  Surgery consulted for abnormal CT scan

## 2021-05-15 NOTE — ASSESSMENT & PLAN NOTE
· Dx March 2021  · Follows with Oncology outpatient, Dr Vicente Winter, to receive 2-3months adjuvant chemotherapy prior to resection by Dr Evelyn Ponce   · Currently undergoing chemotherapy treatment with Abraxane and Gemzar - last treatment 4/6  · Oncology team consulted

## 2021-05-15 NOTE — PLAN OF CARE
Problem: Potential for Falls  Goal: Patient will remain free of falls  Description: INTERVENTIONS:  - Assess patient frequently for physical needs  -  Identify cognitive and physical deficits and behaviors that affect risk of falls    -  [place] fall precautions as indicated by assessment   - Educate patient/family on patient safety including physical limitations  - Instruct patient to call for assistance with activity based on assessment  - Modify environment to reduce risk of injury  - Consider OT/PT consult to assist with strengthening/mobility  Outcome: Progressing     Problem: Prexisting or High Potential for Compromised Skin Integrity  Goal: Skin integrity is maintained or improved  Description: INTERVENTIONS:  - Identify patients at risk for skin breakdown  - Assess and monitor skin integrity  - Assess and monitor nutrition and hydration status  - Monitor labs   - Assess for incontinence   - Turn and reposition patient  - Assist with mobility/ambulation  - Relieve pressure over bony prominences  - Avoid friction and shearing  - Provide appropriate hygiene as needed including keeping skin clean and dry  - Evaluate need for skin moisturizer/barrier cream  - Collaborate with interdisciplinary team   - Patient/family teaching  - Consider wound care consult   Outcome: Progressing     Problem: PAIN - ADULT  Goal: Verbalizes/displays adequate comfort level or baseline comfort level  Description: Interventions:  - Encourage patient to monitor pain and request assistance  - Assess pain using appropriate pain scale  - Administer analgesics based on type and severity of pain and evaluate response  - Implement non-pharmacological measures as appropriate and evaluate response  - Consider cultural and social influences on pain and pain management  - Notify physician/advanced practitioner if interventions unsuccessful or patient reports new pain  Outcome: Progressing     Problem: INFECTION - ADULT  Goal: Absence or prevention of progression during hospitalization  Description: INTERVENTIONS:  - Assess and monitor for signs and symptoms of infection  - Monitor lab/diagnostic results  - Monitor all insertion sites, i e  indwelling lines, tubes, and drains  - Monitor endotracheal if appropriate and nasal secretions for changes in amount and color  - [place] appropriate cooling/warming therapies per order  - Administer medications as ordered  - Instruct and encourage patient and family to use good hand hygiene technique  - Identify and instruct in appropriate isolation precautions for identified infection/condition  Outcome: Progressing  Goal: Absence of fever/infection during neutropenic period  Description: INTERVENTIONS:  - Monitor WBC    Outcome: Progressing     Problem: SAFETY ADULT  Goal: Patient will remain free of falls  Description: INTERVENTIONS:  - Assess patient frequently for physical needs  -  Identify cognitive and physical deficits and behaviors that affect risk of falls    -  [place] fall precautions as indicated by assessment   - Educate patient/family on patient safety including physical limitations  - Instruct patient to call for assistance with activity based on assessment  - Modify environment to reduce risk of injury  - Consider OT/PT consult to assist with strengthening/mobility  Outcome: Progressing  Goal: Maintain or return to baseline ADL function  Description: INTERVENTIONS:  -  Assess patient's ability to carry out ADLs; assess patient's baseline for ADL function and identify physical deficits which impact ability to perform ADLs (bathing, care of mouth/teeth, toileting, grooming, dressing, etc )  - Assess/evaluate cause of self-care deficits   - Assess range of motion  - Assess patient's mobility; develop plan if impaired  - Assess patient's need for assistive devices and provide as appropriate  - Encourage maximum independence but intervene and supervise when necessary  - Involve family in performance of ADLs  - Assess for home care needs following discharge   - Consider OT consult to assist with ADL evaluation and planning for discharge  - Provide patient education as appropriate  Outcome: Progressing  Goal: Maintain or return mobility status to optimal level  Description: INTERVENTIONS:  - Assess patient's baseline mobility status (ambulation, transfers, stairs, etc )    - Identify cognitive and physical deficits and behaviors that affect mobility  - Identify mobility aids required to assist with transfers and/or ambulation (gait belt, sit-to-stand, lift, walker, cane, etc )  - [place] fall precautions as indicated by assessment  - Record patient progress and toleration of activity level on Mobility SBAR; progress patient to next Phase/Stage  - Instruct patient to call for assistance with activity based on assessment  - Consider rehabilitation consult to assist with strengthening/weightbearing, etc   Outcome: Progressing     Problem: DISCHARGE PLANNING  Goal: Discharge to home or other facility with appropriate resources  Description: INTERVENTIONS:  - Identify barriers to discharge w/patient and caregiver  - Arrange for needed discharge resources and transportation as appropriate  - Identify discharge learning needs (meds, wound care, etc )  - Arrange for interpretive services to assist at discharge as needed  - Refer to Case Management Department for coordinating discharge planning if the patient needs post-hospital services based on physician/advanced practitioner order or complex needs related to functional status, cognitive ability, or social support system  Outcome: Progressing     Problem: Knowledge Deficit  Goal: Patient/family/caregiver demonstrates understanding of disease process, treatment plan, medications, and discharge instructions  Description: Complete learning assessment and assess knowledge base    Interventions:  - Provide teaching at level of understanding  - Provide teaching via preferred learning methods  Outcome: Progressing

## 2021-05-15 NOTE — ASSESSMENT & PLAN NOTE
Lab Results   Component Value Date    HGBA1C 9 4 (H) 04/08/2021       Recent Labs     05/14/21  1111 05/14/21  1644 05/14/21 2048 05/15/21  0721   POCGLU 395* 161* 211* 66       Blood Sugar Average: Last 72 hrs:  (P) 206 [id number]   · Recent A1c shows poor control    · Continue Lantus 20 units hs, Continue sliding scale coverage,pre meal insulin at 7 units t i d   · Adjust insulin regimen as needed  · Metformin on hold-will restart on discharge

## 2021-05-16 NOTE — PLAN OF CARE
Problem: Potential for Falls  Goal: Patient will remain free of falls  Description: INTERVENTIONS:  - Assess patient frequently for physical needs  -  Identify cognitive and physical deficits and behaviors that affect risk of falls    -  [place] fall precautions as indicated by assessment   - Educate patient/family on patient safety including physical limitations  - Instruct patient to call for assistance with activity based on assessment  - Modify environment to reduce risk of injury  - Consider OT/PT consult to assist with strengthening/mobility  Outcome: Progressing     Problem: Prexisting or High Potential for Compromised Skin Integrity  Goal: Skin integrity is maintained or improved  Description: INTERVENTIONS:  - Identify patients at risk for skin breakdown  - Assess and monitor skin integrity  - Assess and monitor nutrition and hydration status  - Monitor labs   - Assess for incontinence   - Turn and reposition patient  - Assist with mobility/ambulation  - Relieve pressure over bony prominences  - Avoid friction and shearing  - Provide appropriate hygiene as needed including keeping skin clean and dry  - Evaluate need for skin moisturizer/barrier cream  - Collaborate with interdisciplinary team   - Patient/family teaching  - Consider wound care consult   Outcome: Progressing     Problem: PAIN - ADULT  Goal: Verbalizes/displays adequate comfort level or baseline comfort level  Description: Interventions:  - Encourage patient to monitor pain and request assistance  - Assess pain using appropriate pain scale  - Administer analgesics based on type and severity of pain and evaluate response  - Implement non-pharmacological measures as appropriate and evaluate response  - Consider cultural and social influences on pain and pain management  - Notify physician/advanced practitioner if interventions unsuccessful or patient reports new pain  Outcome: Progressing     Problem: INFECTION - ADULT  Goal: Absence or prevention of progression during hospitalization  Description: INTERVENTIONS:  - Assess and monitor for signs and symptoms of infection  - Monitor lab/diagnostic results  - Monitor all insertion sites, i e  indwelling lines, tubes, and drains  - Monitor endotracheal if appropriate and nasal secretions for changes in amount and color  - [place] appropriate cooling/warming therapies per order  - Administer medications as ordered  - Instruct and encourage patient and family to use good hand hygiene technique  - Identify and instruct in appropriate isolation precautions for identified infection/condition  Outcome: Progressing  Goal: Absence of fever/infection during neutropenic period  Description: INTERVENTIONS:  - Monitor WBC    Outcome: Progressing     Problem: SAFETY ADULT  Goal: Patient will remain free of falls  Description: INTERVENTIONS:  - Assess patient frequently for physical needs  -  Identify cognitive and physical deficits and behaviors that affect risk of falls    -  [place] fall precautions as indicated by assessment   - Educate patient/family on patient safety including physical limitations  - Instruct patient to call for assistance with activity based on assessment  - Modify environment to reduce risk of injury  - Consider OT/PT consult to assist with strengthening/mobility  Outcome: Progressing  Goal: Maintain or return to baseline ADL function  Description: INTERVENTIONS:  -  Assess patient's ability to carry out ADLs; assess patient's baseline for ADL function and identify physical deficits which impact ability to perform ADLs (bathing, care of mouth/teeth, toileting, grooming, dressing, etc )  - Assess/evaluate cause of self-care deficits   - Assess range of motion  - Assess patient's mobility; develop plan if impaired  - Assess patient's need for assistive devices and provide as appropriate  - Encourage maximum independence but intervene and supervise when necessary  - Involve family in performance of ADLs  - Assess for home care needs following discharge   - Consider OT consult to assist with ADL evaluation and planning for discharge  - Provide patient education as appropriate  Outcome: Progressing  Goal: Maintain or return mobility status to optimal level  Description: INTERVENTIONS:  - Assess patient's baseline mobility status (ambulation, transfers, stairs, etc )    - Identify cognitive and physical deficits and behaviors that affect mobility  - Identify mobility aids required to assist with transfers and/or ambulation (gait belt, sit-to-stand, lift, walker, cane, etc )  - [place] fall precautions as indicated by assessment  - Record patient progress and toleration of activity level on Mobility SBAR; progress patient to next Phase/Stage  - Instruct patient to call for assistance with activity based on assessment  - Consider rehabilitation consult to assist with strengthening/weightbearing, etc   Outcome: Progressing     Problem: DISCHARGE PLANNING  Goal: Discharge to home or other facility with appropriate resources  Description: INTERVENTIONS:  - Identify barriers to discharge w/patient and caregiver  - Arrange for needed discharge resources and transportation as appropriate  - Identify discharge learning needs (meds, wound care, etc )  - Arrange for interpretive services to assist at discharge as needed  - Refer to Case Management Department for coordinating discharge planning if the patient needs post-hospital services based on physician/advanced practitioner order or complex needs related to functional status, cognitive ability, or social support system  Outcome: Progressing     Problem: Knowledge Deficit  Goal: Patient/family/caregiver demonstrates understanding of disease process, treatment plan, medications, and discharge instructions  Description: Complete learning assessment and assess knowledge base    Interventions:  - Provide teaching at level of understanding  - Provide teaching via preferred learning methods  Outcome: Progressing

## 2021-05-16 NOTE — ASSESSMENT & PLAN NOTE
Lab Results   Component Value Date    HGBA1C 9 4 (H) 04/08/2021       Recent Labs     05/15/21  1131 05/15/21  1601 05/15/21  2119 05/16/21  0705   POCGLU 214* 196* 168* 142*       Blood Sugar Average: Last 72 hrs:  (P) 728 [id number]   · Recent A1c shows poor control    · Continue Lantus 20 units hs, Continue sliding scale coverage,pre meal insulin at 7 units t i d   · Adjust insulin regimen as needed  · Metformin on hold-will restart on discharge

## 2021-05-16 NOTE — ASSESSMENT & PLAN NOTE
Unknown source at present, no recent dental work, port site without erythema or tenderness  Blood cultures from 05/07 growing Streptococcus anginosus, repeat blood cultures from 05/09/2021 without growth    CT of the abdomen and pelvis without source of infection, again shows mass in the pancreatic head, tiny filling defect is noted at the junction of the SMV and portal confluence  Echocardiogram with normal ejection fraction, negative for valvular vegetation  CT maxillofacial/mandible without infection or abscess      CT chest abdomen pelvis 05/15/2021: Multiple extraluminal foci of air adjacent to the pylorus/proximal duodenum, extending into the region of the pancreatic head  There is a suggestion of a small fistulous tract arising from the duodenal bulb/pylorus   for perforated ulcer versus postoperative changes, given the previously identified 10 cm cystic mass adjacent to the pancreatic head is no longer seen       -cystic mass seen on prior imaging likely resolved with chemotherapy, no proof of it being drained in chart       Slightly improving WBC, no 14K, he remains afebrile  Infectious Disease on board, continue ceftriaxone on 05/09/2021 #8, Flagyl started 05/17 #2   Surgery consul pending

## 2021-05-16 NOTE — PLAN OF CARE
Problem: Potential for Falls  Goal: Patient will remain free of falls  Description: INTERVENTIONS:  - Assess patient frequently for physical needs  -  Identify cognitive and physical deficits and behaviors that affect risk of falls    -  [place] fall precautions as indicated by assessment   - Educate patient/family on patient safety including physical limitations  - Instruct patient to call for assistance with activity based on assessment  - Modify environment to reduce risk of injury  - Consider OT/PT consult to assist with strengthening/mobility  Outcome: Progressing     Problem: Prexisting or High Potential for Compromised Skin Integrity  Goal: Skin integrity is maintained or improved  Description: INTERVENTIONS:  - Identify patients at risk for skin breakdown  - Assess and monitor skin integrity  - Assess and monitor nutrition and hydration status  - Monitor labs   - Assess for incontinence   - Turn and reposition patient  - Assist with mobility/ambulation  - Relieve pressure over bony prominences  - Avoid friction and shearing  - Provide appropriate hygiene as needed including keeping skin clean and dry  - Evaluate need for skin moisturizer/barrier cream  - Collaborate with interdisciplinary team   - Patient/family teaching  - Consider wound care consult   Outcome: Progressing     Problem: PAIN - ADULT  Goal: Verbalizes/displays adequate comfort level or baseline comfort level  Description: Interventions:  - Encourage patient to monitor pain and request assistance  - Assess pain using appropriate pain scale  - Administer analgesics based on type and severity of pain and evaluate response  - Implement non-pharmacological measures as appropriate and evaluate response  - Consider cultural and social influences on pain and pain management  - Notify physician/advanced practitioner if interventions unsuccessful or patient reports new pain  Outcome: Progressing     Problem: INFECTION - ADULT  Goal: Absence or prevention of progression during hospitalization  Description: INTERVENTIONS:  - Assess and monitor for signs and symptoms of infection  - Monitor lab/diagnostic results  - Monitor all insertion sites, i e  indwelling lines, tubes, and drains  - Monitor endotracheal if appropriate and nasal secretions for changes in amount and color  - [place] appropriate cooling/warming therapies per order  - Administer medications as ordered  - Instruct and encourage patient and family to use good hand hygiene technique  - Identify and instruct in appropriate isolation precautions for identified infection/condition  Outcome: Progressing  Goal: Absence of fever/infection during neutropenic period  Description: INTERVENTIONS:  - Monitor WBC    Outcome: Progressing     Problem: SAFETY ADULT  Goal: Patient will remain free of falls  Description: INTERVENTIONS:  - Assess patient frequently for physical needs  -  Identify cognitive and physical deficits and behaviors that affect risk of falls    -  [place] fall precautions as indicated by assessment   - Educate patient/family on patient safety including physical limitations  - Instruct patient to call for assistance with activity based on assessment  - Modify environment to reduce risk of injury  - Consider OT/PT consult to assist with strengthening/mobility  Outcome: Progressing  Goal: Maintain or return to baseline ADL function  Description: INTERVENTIONS:  -  Assess patient's ability to carry out ADLs; assess patient's baseline for ADL function and identify physical deficits which impact ability to perform ADLs (bathing, care of mouth/teeth, toileting, grooming, dressing, etc )  - Assess/evaluate cause of self-care deficits   - Assess range of motion  - Assess patient's mobility; develop plan if impaired  - Assess patient's need for assistive devices and provide as appropriate  - Encourage maximum independence but intervene and supervise when necessary  - Involve family in performance of ADLs  - Assess for home care needs following discharge   - Consider OT consult to assist with ADL evaluation and planning for discharge  - Provide patient education as appropriate  Outcome: Progressing  Goal: Maintain or return mobility status to optimal level  Description: INTERVENTIONS:  - Assess patient's baseline mobility status (ambulation, transfers, stairs, etc )    - Identify cognitive and physical deficits and behaviors that affect mobility  - Identify mobility aids required to assist with transfers and/or ambulation (gait belt, sit-to-stand, lift, walker, cane, etc )  - [place] fall precautions as indicated by assessment  - Record patient progress and toleration of activity level on Mobility SBAR; progress patient to next Phase/Stage  - Instruct patient to call for assistance with activity based on assessment  - Consider rehabilitation consult to assist with strengthening/weightbearing, etc   Outcome: Progressing     Problem: DISCHARGE PLANNING  Goal: Discharge to home or other facility with appropriate resources  Description: INTERVENTIONS:  - Identify barriers to discharge w/patient and caregiver  - Arrange for needed discharge resources and transportation as appropriate  - Identify discharge learning needs (meds, wound care, etc )  - Arrange for interpretive services to assist at discharge as needed  - Refer to Case Management Department for coordinating discharge planning if the patient needs post-hospital services based on physician/advanced practitioner order or complex needs related to functional status, cognitive ability, or social support system  Outcome: Progressing     Problem: Knowledge Deficit  Goal: Patient/family/caregiver demonstrates understanding of disease process, treatment plan, medications, and discharge instructions  Description: Complete learning assessment and assess knowledge base    Interventions:  - Provide teaching at level of understanding  - Provide teaching via preferred learning methods  Outcome: Progressing

## 2021-05-16 NOTE — CASE MANAGEMENT
Call placed to [facility]([phone]), spoke with Radha Mock, informed her of Pt's discharge on hold and will be not discharged today  CM to follow

## 2021-05-17 PROBLEM — I10 ESSENTIAL HYPERTENSION: Status: ACTIVE | Noted: 2021-01-01

## 2021-05-17 NOTE — ASSESSMENT & PLAN NOTE
Patient was admitted with Streptococcus anginosus bacteremia  He was placed on IV Rocephin and Flagyl and Infectious Disease was following the patient  Repeat blood cultures showed no growth  The most likely source of the bacteremia was his GI tract  Patient will undergo 10 more days of IV Rocephin therapy and 10 more days of p o  Flagyl therapy    I discussed the case with ID on the day of discharge as well as with Cody Fung on the phone

## 2021-05-17 NOTE — ASSESSMENT & PLAN NOTE
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

## 2021-05-17 NOTE — ASSESSMENT & PLAN NOTE
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

## 2021-05-17 NOTE — PLAN OF CARE
Problem: Potential for Falls  Goal: Patient will remain free of falls  Description: INTERVENTIONS:  - Assess patient frequently for physical needs  -  Identify cognitive and physical deficits and behaviors that affect risk of falls    -  [place] fall precautions as indicated by assessment   - Educate patient/family on patient safety including physical limitations  - Instruct patient to call for assistance with activity based on assessment  - Modify environment to reduce risk of injury  - Consider OT/PT consult to assist with strengthening/mobility  Outcome: Progressing     Problem: Prexisting or High Potential for Compromised Skin Integrity  Goal: Skin integrity is maintained or improved  Description: INTERVENTIONS:  - Identify patients at risk for skin breakdown  - Assess and monitor skin integrity  - Assess and monitor nutrition and hydration status  - Monitor labs   - Assess for incontinence   - Turn and reposition patient  - Assist with mobility/ambulation  - Relieve pressure over bony prominences  - Avoid friction and shearing  - Provide appropriate hygiene as needed including keeping skin clean and dry  - Evaluate need for skin moisturizer/barrier cream  - Collaborate with interdisciplinary team   - Patient/family teaching  - Consider wound care consult   Outcome: Progressing     Problem: PAIN - ADULT  Goal: Verbalizes/displays adequate comfort level or baseline comfort level  Description: Interventions:  - Encourage patient to monitor pain and request assistance  - Assess pain using appropriate pain scale  - Administer analgesics based on type and severity of pain and evaluate response  - Implement non-pharmacological measures as appropriate and evaluate response  - Consider cultural and social influences on pain and pain management  - Notify physician/advanced practitioner if interventions unsuccessful or patient reports new pain  Outcome: Progressing     Problem: INFECTION - ADULT  Goal: Absence or prevention of progression during hospitalization  Description: INTERVENTIONS:  - Assess and monitor for signs and symptoms of infection  - Monitor lab/diagnostic results  - Monitor all insertion sites, i e  indwelling lines, tubes, and drains  - Monitor endotracheal if appropriate and nasal secretions for changes in amount and color  - [place] appropriate cooling/warming therapies per order  - Administer medications as ordered  - Instruct and encourage patient and family to use good hand hygiene technique  - Identify and instruct in appropriate isolation precautions for identified infection/condition  Outcome: Progressing  Goal: Absence of fever/infection during neutropenic period  Description: INTERVENTIONS:  - Monitor WBC    Outcome: Progressing     Problem: SAFETY ADULT  Goal: Patient will remain free of falls  Description: INTERVENTIONS:  - Assess patient frequently for physical needs  -  Identify cognitive and physical deficits and behaviors that affect risk of falls    -  [place] fall precautions as indicated by assessment   - Educate patient/family on patient safety including physical limitations  - Instruct patient to call for assistance with activity based on assessment  - Modify environment to reduce risk of injury  - Consider OT/PT consult to assist with strengthening/mobility  Outcome: Progressing  Goal: Maintain or return to baseline ADL function  Description: INTERVENTIONS:  -  Assess patient's ability to carry out ADLs; assess patient's baseline for ADL function and identify physical deficits which impact ability to perform ADLs (bathing, care of mouth/teeth, toileting, grooming, dressing, etc )  - Assess/evaluate cause of self-care deficits   - Assess range of motion  - Assess patient's mobility; develop plan if impaired  - Assess patient's need for assistive devices and provide as appropriate  - Encourage maximum independence but intervene and supervise when necessary  - Involve family in performance of ADLs  - Assess for home care needs following discharge   - Consider OT consult to assist with ADL evaluation and planning for discharge  - Provide patient education as appropriate  Outcome: Progressing  Goal: Maintain or return mobility status to optimal level  Description: INTERVENTIONS:  - Assess patient's baseline mobility status (ambulation, transfers, stairs, etc )    - Identify cognitive and physical deficits and behaviors that affect mobility  - Identify mobility aids required to assist with transfers and/or ambulation (gait belt, sit-to-stand, lift, walker, cane, etc )  - [place] fall precautions as indicated by assessment  - Record patient progress and toleration of activity level on Mobility SBAR; progress patient to next Phase/Stage  - Instruct patient to call for assistance with activity based on assessment  - Consider rehabilitation consult to assist with strengthening/weightbearing, etc   Outcome: Progressing     Problem: DISCHARGE PLANNING  Goal: Discharge to home or other facility with appropriate resources  Description: INTERVENTIONS:  - Identify barriers to discharge w/patient and caregiver  - Arrange for needed discharge resources and transportation as appropriate  - Identify discharge learning needs (meds, wound care, etc )  - Arrange for interpretive services to assist at discharge as needed  - Refer to Case Management Department for coordinating discharge planning if the patient needs post-hospital services based on physician/advanced practitioner order or complex needs related to functional status, cognitive ability, or social support system  Outcome: Progressing     Problem: Knowledge Deficit  Goal: Patient/family/caregiver demonstrates understanding of disease process, treatment plan, medications, and discharge instructions  Description: Complete learning assessment and assess knowledge base    Interventions:  - Provide teaching at level of understanding  - Provide teaching via preferred learning methods  Outcome: Progressing

## 2021-05-17 NOTE — PLAN OF CARE
Problem: Potential for Falls  Goal: Patient will remain free of falls  Description: INTERVENTIONS:  - Assess patient frequently for physical needs  -  Identify cognitive and physical deficits and behaviors that affect risk of falls    -  [place] fall precautions as indicated by assessment   - Educate patient/family on patient safety including physical limitations  - Instruct patient to call for assistance with activity based on assessment  - Modify environment to reduce risk of injury  - Consider OT/PT consult to assist with strengthening/mobility  5/16/2021 2010 by Matilde Haro RN  Outcome: Progressing  5/16/2021 2009 by Matilde Haro RN  Outcome: Progressing     Problem: Prexisting or High Potential for Compromised Skin Integrity  Goal: Skin integrity is maintained or improved  Description: INTERVENTIONS:  - Identify patients at risk for skin breakdown  - Assess and monitor skin integrity  - Assess and monitor nutrition and hydration status  - Monitor labs   - Assess for incontinence   - Turn and reposition patient  - Assist with mobility/ambulation  - Relieve pressure over bony prominences  - Avoid friction and shearing  - Provide appropriate hygiene as needed including keeping skin clean and dry  - Evaluate need for skin moisturizer/barrier cream  - Collaborate with interdisciplinary team   - Patient/family teaching  - Consider wound care consult   5/16/2021 2010 by Matilde Haro RN  Outcome: Progressing  5/16/2021 2009 by Matilde Haro RN  Outcome: Progressing     Problem: PAIN - ADULT  Goal: Verbalizes/displays adequate comfort level or baseline comfort level  Description: Interventions:  - Encourage patient to monitor pain and request assistance  - Assess pain using appropriate pain scale  - Administer analgesics based on type and severity of pain and evaluate response  - Implement non-pharmacological measures as appropriate and evaluate response  - Consider cultural and social influences on pain and pain management  - Notify physician/advanced practitioner if interventions unsuccessful or patient reports new pain  5/16/2021 2010 by Bhaskar Davis RN  Outcome: Progressing  5/16/2021 2009 by Bhaskar Davis RN  Outcome: Progressing     Problem: INFECTION - ADULT  Goal: Absence or prevention of progression during hospitalization  Description: INTERVENTIONS:  - Assess and monitor for signs and symptoms of infection  - Monitor lab/diagnostic results  - Monitor all insertion sites, i e  indwelling lines, tubes, and drains  - Monitor endotracheal if appropriate and nasal secretions for changes in amount and color  - [place] appropriate cooling/warming therapies per order  - Administer medications as ordered  - Instruct and encourage patient and family to use good hand hygiene technique  - Identify and instruct in appropriate isolation precautions for identified infection/condition  5/16/2021 2010 by Bhaskar Davis RN  Outcome: Progressing  5/16/2021 2009 by Bhaskar Davis RN  Outcome: Progressing  Goal: Absence of fever/infection during neutropenic period  Description: INTERVENTIONS:  - Monitor WBC    5/16/2021 2010 by Bhaskar Davis RN  Outcome: Progressing  5/16/2021 2009 by Bhaskar Davis RN  Outcome: Progressing     Problem: SAFETY ADULT  Goal: Patient will remain free of falls  Description: INTERVENTIONS:  - Assess patient frequently for physical needs  -  Identify cognitive and physical deficits and behaviors that affect risk of falls    -  [place] fall precautions as indicated by assessment   - Educate patient/family on patient safety including physical limitations  - Instruct patient to call for assistance with activity based on assessment  - Modify environment to reduce risk of injury  - Consider OT/PT consult to assist with strengthening/mobility  5/16/2021 2010 by Bhaskar Davis RN  Outcome: Progressing  5/16/2021 2009 by Bhaskar Davis RN  Outcome: Progressing  Goal: Maintain or return to baseline ADL function  Description: INTERVENTIONS:  -  Assess patient's ability to carry out ADLs; assess patient's baseline for ADL function and identify physical deficits which impact ability to perform ADLs (bathing, care of mouth/teeth, toileting, grooming, dressing, etc )  - Assess/evaluate cause of self-care deficits   - Assess range of motion  - Assess patient's mobility; develop plan if impaired  - Assess patient's need for assistive devices and provide as appropriate  - Encourage maximum independence but intervene and supervise when necessary  - Involve family in performance of ADLs  - Assess for home care needs following discharge   - Consider OT consult to assist with ADL evaluation and planning for discharge  - Provide patient education as appropriate  5/16/2021 2010 by Mercedes Moran RN  Outcome: Progressing  5/16/2021 2009 by Mercedes Moran RN  Outcome: Progressing  Goal: Maintain or return mobility status to optimal level  Description: INTERVENTIONS:  - Assess patient's baseline mobility status (ambulation, transfers, stairs, etc )    - Identify cognitive and physical deficits and behaviors that affect mobility  - Identify mobility aids required to assist with transfers and/or ambulation (gait belt, sit-to-stand, lift, walker, cane, etc )  - [place] fall precautions as indicated by assessment  - Record patient progress and toleration of activity level on Mobility SBAR; progress patient to next Phase/Stage  - Instruct patient to call for assistance with activity based on assessment  - Consider rehabilitation consult to assist with strengthening/weightbearing, etc   5/16/2021 2010 by Mercedes Moran RN  Outcome: Progressing  5/16/2021 2009 by Mercedes Moran RN  Outcome: Progressing     Problem: DISCHARGE PLANNING  Goal: Discharge to home or other facility with appropriate resources  Description: INTERVENTIONS:  - Identify barriers to discharge w/patient and caregiver  - Arrange for needed discharge resources and transportation as appropriate  - Identify discharge learning needs (meds, wound care, etc )  - Arrange for interpretive services to assist at discharge as needed  - Refer to Case Management Department for coordinating discharge planning if the patient needs post-hospital services based on physician/advanced practitioner order or complex needs related to functional status, cognitive ability, or social support system  5/16/2021 2010 by Chepe Quinonez RN  Outcome: Progressing  5/16/2021 2009 by Chepe Quinonez RN  Outcome: Progressing     Problem: Knowledge Deficit  Goal: Patient/family/caregiver demonstrates understanding of disease process, treatment plan, medications, and discharge instructions  Description: Complete learning assessment and assess knowledge base    Interventions:  - Provide teaching at level of understanding  - Provide teaching via preferred learning methods  5/16/2021 2010 by Chepe Quinonez RN  Outcome: Progressing  5/16/2021 2009 by Chepe Quinonez RN  Outcome: Progressing

## 2021-05-17 NOTE — PHYSICAL THERAPY NOTE
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    Cognition   Overall Cognitive Status [facility] [address]   Arousal/Participation Alert; Responsive   Attention Within functional limits   Orientation Level Oriented X4   Memory Within functional limits   Following Commands Follows all commands and directions without difficulty   Subjective   Subjective pt states he feels ok, hopes to get chemo tomorrow but doesn't know when he will be getting out of hospital   Transfers   Sit to Stand 5  Supervision   Stand to Sit 5  Supervision   Ambulation/Elevation   Gait pattern Antalgic; Forward Flexion;Decreased foot clearance  (ataxic steps, decreased ankle mobility throughout )   Gait Assistance 5  Supervision   Additional items Assist x 1   Assistive Device Rolling walker   Distance 200ft with RW, antalgic gait, decreased ankle mobility throughout, appears to have some genu valgus noted   Balance   Static Sitting Fair   Dynamic Sitting Fair   Static Standing Fair   Dynamic Standing Fair -   Ambulatory Fair -   Endurance Deficit   Endurance Deficit No   Activity Tolerance   Activity Tolerance   (no adverse effects to PT treatment noted)   Assessment   Prognosis Guarded   Problem List Decreased strength;Decreased range of motion;Decreased endurance; Impaired balance;Decreased mobility; Decreased coordination;Decreased skin integrity   Assessment Pt pleasant and cooperative this session;  States he feels ok  Pt able to ambulate around unit with RW, continues to have antalgic steps and reliance on RW for safe mobility   Safe champ, able to scan environment appropriately and communicate with staff in halls without LOB  Pt returned to reclined in chair at bedside, needs in reach, pad alarm intact  Nurse aware  Pt to be D/C'd to cousin's home at D/C with use of RW for all mobility  Pt would benefit from continued PT while in hospital to increase strength, balance, mobility, improve gait quality and decrease deviations and return to PLOF; Would benefit from continued PT with Airam Villasenor PT at D/C; The patient's AM-PAC Basic Mobility Inpatient Short Form Raw Score is 23, Standardized Score is 50  88  A standardized score greater than 42 9 suggests the patient may benefit from discharge to home with Airam Villasenor PT  Please also refer to the recommendation of the Physical Therapist for safe discharge planning  Barriers to Discharge Decreased caregiver support   Barriers to Discharge Comments pt to stay with his cousin at D/C   Goals   Patient Goals to get chemo tomorrow   STG Expiration Date 05/24/21   Plan   Treatment/Interventions ADL retraining;Functional transfer training;LE strengthening/ROM; Elevations; Therapeutic exercise; Endurance training;Cognitive reorientation;Patient/family training;Equipment eval/education; Bed mobility;Gait training; Compensatory technique education;Continued evaluation   Progress Slow progress, medical status limitations   PT Frequency Other (Comment)  (3-5x/wk)   Recommendation   PT Discharge Recommendation Home with home health rehabilitation  (pt to go to cousin's home at D/C; RW for all mobility)   PT - OK to Discharge Yes  (when medically appropriate)   AM-PAC Basic Mobility Inpatient   Turning in Bed Without Bedrails 4   Lying on Back to Sitting on Edge of Flat Bed 4   Moving Bed to Chair 4   Standing Up From Chair 4   Walk in Room 4   Climb 3-5 Stairs 3   Basic Mobility Inpatient Raw Score 23   Basic Mobility Standardized Score 50 88   Suzanne Mckeon, PT          Patient Name: Jaydon Vasquez  XXSML'D Date: 5/17/2021

## 2021-05-17 NOTE — ASSESSMENT & PLAN NOTE
Patient has chronic hypertension  His blood pressure is well controlled running in the 157 systolic  Lungs are clear to auscultation, heart is regular rhythm      I will continue Toprol and Altace as an outpatient

## 2021-05-17 NOTE — ASSESSMENT & PLAN NOTE
Lab Results   Component Value Date    HGBA1C 9 4 (H) 04/08/2021       Recent Labs     05/16/21  1611 05/16/21  2158 05/17/21  0714 05/17/21  1105   POCGLU 265* 187* 153* 267*       Blood Sugar Average: Last 72 hrs:  (P) 194 [id number]     Patient will continue Lantus, metformin and pre meal Humalog on discharge

## 2021-05-17 NOTE — PLAN OF CARE
Problem: PHYSICAL THERAPY ADULT  Goal: Performs mobility at highest level of function for planned discharge setting  See evaluation for individualized goals  Description: Treatment/Interventions: Functional transfer training, Therapeutic exercise, Equipment eval/education, Bed mobility, Gait training, Spoke to nursing, OT  Equipment Recommended: (Owns RW)       See flowsheet documentation for full assessment, interventions and recommendations  Note: Prognosis: Guarded  Problem List: Decreased strength, Decreased range of motion, Decreased endurance, Impaired balance, Decreased mobility, Decreased coordination, Decreased skin integrity  Assessment: Pt pleasant and cooperative this session;  States he feels ok  Pt able to ambulate around unit with RW, continues to have antalgic steps and reliance on RW for safe mobility  Safe champ, able to scan environment appropriately and communicate with staff in halls without LOB  Pt returned to reclined in chair at bedside, needs in reach, pad alarm intact  Nurse aware  Pt to be D/C'd to cousin's home at D/C with use of RW for all mobility  Pt would benefit from continued PT while in hospital to increase strength, balance, mobility, improve gait quality and decrease deviations and return to PLOF; Would benefit from continued PT with Airam Villasenor PT at D/C; The patient's AM-PAC Basic Mobility Inpatient Short Form Raw Score is 23, Standardized Score is 50  88  A standardized score greater than 42 9 suggests the patient may benefit from discharge to home with Airam Villasenor PT  Please also refer to the recommendation of the Physical Therapist for safe discharge planning    Barriers to Discharge: Decreased caregiver support  Barriers to Discharge Comments: pt to stay with his cousin at D/C     PT Discharge Recommendation: Home with home health rehabilitation(pt to go to cousin's home at D/C; RW for all mobility)     PT - OK to Discharge: Yes(when medically appropriate)    See flowsheet documentation for full assessment

## 2021-05-17 NOTE — QUICK NOTE
Patient with known hx of adenocarcinoma of the pancreas, dx in march of 2021  Sees Dr Jovanny Horn from surg onc  Currently undergoing chemotherapy  Admitted on 5/7 for streptococcal bacteremia  Repeat Blood cx from 5/9 no growth  CT of the abdomen/pelvis  significant for potential duodenopancreatic fistula  Wbc improving  Abdomen is soft nontender nondistended, no peritoneal signs  Pt is conversing without any issues    No indications for urgent or emergent surgical intervention     Patient seen by surgical oncologist Dr Jerrell Goncalves for management of his known adenocarcinoma of pancreatic head  Patient educated to call Dr Lotus Jean-Baptiste office in next day or two for appointment next week

## 2021-05-17 NOTE — PROGRESS NOTES
[place]  Progress Note - Yaocindy Self 5[birth year], 67 y o  male MRN: [id number]  Unit/Bed#: -Paty Encounter: [id number]  Primary Care Provider: Ross Reece DO   Date and time admitted to hospital: 2021  9:27 AM    * Streptococcal bacteremia  Assessment & Plan  Patient was admitted with Streptococcus anginosus bacteremia  Was placed on IV Rocephin and Flagyl  Repeat blood cultures showed no growth  I reached out to Infectious Disease to see what abx pt should receive as an outpt    Adenocarcinoma of pancreas [facility])  Assessment & Plan  Patient has known at her cancer the pancreatic gland and he is on chemotherapy  CT of the abdomen suggested small fistula formation    Patient's abdomen is benign, he has no abdominal pain or tenderness      I reached out to Surgical Oncology to evaluate the patient    Type 2 diabetes mellitus with hyperglycemia, with long-term current use of insulin [facility])  Assessment & Plan  Lab Results   Component Value Date    HGBA1C 9 4 (H) 2021       Recent Labs     21  1611 21  2158 21  0714 21  1105   POCGLU 265* 187* 153* 267*       Blood Sugar Average: Last 72 hrs:  (P) 194 [id number]     Patient has hyperglycemia, will increase pre meal Humalog to 10 units t i d         VTE Prophylaxis: in place    Patient Centered Rounds: I rounded with patient's nurse    Current Length of Stay: 10 day(s)    Current Patient Status: Inpatient    Certification Statement: Pt requires additional inpatient hospital stay due to: see assessment and plan        Subjective:   Patient has good appetite, denies nausea, vomiting abdominal pain, chest pain, shortness breath, pleurisy, signs of GI or  bleeding    All other ROS are negative    Objective:     Vitals:   Temp (24hrs), Av 9 °F (36 6 °C), Min:97 6 °F (36 4 °C), Max:98 5 °F (36 9 °C)    Temp:  [97 6 °F (36 4 °C)-98 5 °F (36 9 °C)] 97 6 °F (36 4 °C)  HR:  [71-74] 73  Resp:  [16-19] 16  BP: (127-129)/(59-61) 127/60  SpO2:  [98 %-100 %] 100 %  Body mass index is 20 37 kg/m²  Input and Output Summary (last 24 hours): Intake/Output Summary (Last 24 hours) at 5/17/2021 1310  Last data filed at 5/16/2021 1801  Gross per 24 hour   Intake 660 ml   Output --   Net 660 ml       Physical Exam:     Physical Exam  HENT:      Head: Normocephalic  Eyes:      Conjunctiva/sclera: Conjunctivae normal    Cardiovascular:      Rate and Rhythm: Normal rate and regular rhythm  Heart sounds: No murmur  Pulmonary:      Effort: No respiratory distress  Breath sounds: No wheezing or rales  Abdominal:      General: Bowel sounds are normal  There is no distension  Palpations: Abdomen is soft  Tenderness: There is no abdominal tenderness  There is no guarding  Skin:     General: Skin is warm and dry  Comments: Patient has no lower extremity edema   Neurological:      Mental Status: He is alert and oriented to person, place, and time  Mental status is at baseline  Psychiatric:         Mood and Affect: Mood normal              I personally reviewed labs and imaging reports for today        Last 24 Hours Medication List:   Current Facility-Administered Medications   Medication Dose Route Frequency Provider Last Rate    acetaminophen  650 mg Oral Q6H PRN Petty Murdock PA-C      amLODIPine  2 5 mg Oral Daily Francisco Crook DO      aspirin  81 mg Oral Daily Petty Murdock PA-C      atorvastatin  40 mg Oral Daily With [employer] Noble      cefTRIAXone  2,000 mg Intravenous Q24H Wyatt Benitez MD 2,000 mg (05/16/21 1832)    insulin glargine  20 Units Subcutaneous HS Marquis Diamond MD      insulin lispro  1-5 Units Subcutaneous TID AC Petty Murdock PA-C      insulin lispro  7 Units Subcutaneous TID With Meals César Kemp MD      metoprolol succinate  100 mg Oral Daily Petty Murdock PA-C      metroNIDAZOLE  500 mg Intravenous Q8H Wyatt Benitez MD 500 mg (05/17/21 1147)    ondansetron  4 mg Intravenous Q6H PRN Mark Shaw PA-C      rivaroxaban  15 mg Oral BID With Meals Eric Vega MD      traMADol  50 mg Oral Q6H PRN Mark Shaw PA-C            Today, Patient Was Seen By: Aruna Carranza MD    ** Please Note: Dictation voice to text software may have been used in the creation of this document   **

## 2021-05-17 NOTE — CASE MANAGEMENT
LOS: 10 days  Continuing to follow patient  Patient is medically cleared for discharge  Called and spoke with Placido Villeda at [place] [phone] and as per Placido Villeda, patients meds and equipment were delivered on Friday  [address] [place] [phone] and spoke with Madelaine Red re: visiting nurse for tomorrow  As per Madelaine Red, a nurse is available for tomorrow and will be contacting patient

## 2021-05-17 NOTE — ASSESSMENT & PLAN NOTE
Patient was admitted with Streptococcus anginosus bacteremia  Was placed on IV Rocephin and Flagyl  Repeat blood cultures showed no growth    I reached out to Infectious Disease to see what abx pt should receive as an outpt

## 2021-05-17 NOTE — DISCHARGE SUMMARY
[place]  Discharge- [facility] 1945, 67 y o  male MRN: [id number]  Unit/Bed#: -Paty Encounter: [id number]  Primary Care Provider: Zara Baird DO   Date and time admitted to hospital: 5/7/2021  9:27 AM    * Streptococcal bacteremia  Assessment & Plan  Patient was admitted with Streptococcus anginosus bacteremia  He was placed on IV Rocephin and Flagyl and Infectious Disease was following the patient  Repeat blood cultures showed no growth  The most likely source of the bacteremia was his GI tract  Patient will undergo 10 more days of IV Rocephin therapy and 10 more days of p o  Flagyl therapy  I discussed the case with ID on the day of discharge as well as with Dillon Bach on the phone    Adenocarcinoma of pancreas [facility])  Assessment & Plan  Patient has known at her cancer the pancreatic gland and he is on chemotherapy  CT of the abdomen suggested small fistula formation    Patient's abdomen is benign, he has no abdominal pain or tenderness  I discussed the case with Dr Sun Boston on day of discharge, patient will follow-up with her as an outpatient    Type 2 diabetes mellitus with hyperglycemia, with long-term current use of insulin [facility])  Assessment & Plan  Lab Results   Component Value Date    HGBA1C 9 4 (H) 04/08/2021       Recent Labs     05/16/21  1611 05/16/21  2158 05/17/21  0714 05/17/21  1105   POCGLU 265* 187* 153* 267*       Blood Sugar Average: Last 72 hrs:  (P) 194 [id number]     Patient will continue Lantus, metformin and pre meal Humalog on discharge    Mesenteric venous thrombosis (HCC)  Assessment & Plan  CT of the abdomen showed mesenteric artery thrombosis  I discharged patient on Xarelto 15mg po bid for a total of 21 days after which he should take 20mg daily  Pt will follow-up with PCP regarding anticoagulation    Denies any signs of bleeding    Essential hypertension  Assessment & Plan  Patient has chronic hypertension    His blood pressure is well controlled running in the 282 systolic  Lungs are clear to auscultation, heart is regular rhythm  I will continue Toprol and Altace as an outpatient    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  Hospital Course:     Samantha Lai is a 67 y o  male patient who originally presented to the hospital on   Admission Orders (From admission, onward)     Ordered        05/07/21 1627  Inpatient Admission  Once                  due to bacteremia    Please see above list of diagnoses and related plan for additional information  Condition at Discharge:  good      Discharge instructions/Information to patient and family:   See after visit summary for information provided to patient and family  Provisions for Follow-Up Care:  See after visit summary for information related to follow-up care and any pertinent home health orders  Disposition:     Home with VNA Services (Reminder: Complete face to face encounter)       Discharge Statement:  I spent 40 minutes discharging the patient  This time was spent on the day of discharge  I had direct contact with the patient on the day of discharge  Greater than 50% of the total time was spent examining patient, answering all patient questions, arranging and discussing plan of care with patient as well as directly providing post-discharge instructions  Additional time then spent on discharge activities  Discharge Medications:  See after visit summary for reconciled discharge medications provided to patient and family        ** Please Note: This note has been constructed using a voice recognition system **

## 2021-05-17 NOTE — PROGRESS NOTES
Karen Rojas  67 y o   male  1945  mrn [id number]    Assessment/Plan: 1  Streptococcus anginosis bacteremia/Leukocytosis : Remains afebrile and WBC count is back down to 12K following the addition of Flagyl  CT of facial bones was neg for dental infxn as source for his Strep bacteremia  He had repeat abd/pelvic CT for evaluation of rising WBC count and it showed that 10 cm pancreatic cystic mass is no longer seen, pancreatic mass is stable, multiple foci of air adjacent to pancreatic head suggestive of fistula extending to from the prox duodenum/pylorus  General surgery evaluated the Pt and recommended consult from his oncologic surgeon, Dr Florian Alas  With new abd CT findings, intra-abd source is the most likely etiology for his Strep bacteremia  Admission bld cx's grew Strep anginosus - usual sources include mouth or intra-abd source  This milleri group Strep species is known for its pyogenic nature, prone to developing abscesses, including odontogenic and intra-abdominal abscesses   Repeat bld cx's are neg  Also need to consider endocarditis and possible "seeding" of his port  Abd CT showed diffuse dilatation of pancreatic duct, no hydro, distended stomach, fecal stasis in colon, and ?small clot in SMV but no abscess - ?possibility of biliary duct or colon source  ECHO was neg for valvular vegetation     Immunosuppressed male admitted with ~2 weeks of generalized weakness and loose stool, found to have leukocytosis      A  Awaiting eval by Pt's Oncologic surgeon regarding 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 noted on repeat Abd CT  B  Cont ceftriaxone x 10 more days along with oral Flagyl x 10 more days for tx of Strep anginosis bacteremia (Script was printed for Rocephin on 5/12  C   Pt is not sure if abx can be given through his port or if it's dedicated to chemo only - Case management was checking with his Oncologist's office  D  If port can be used, OK to d/c Pt today if no further testing is needed for the presumed fistula  E  If port cannot be used, then PICC line will need to be placed so Pt can complete his course of IV abx  F  Will follow WBC count as an out-Pt  G  Pt will f/u with me as an out-Pt - He will need repeat bld cx's 4 weeks after he completes his IV abx to check whether port has been "seeded"       Subjective: No complaints  He denies abd pain    Objective:  Tmax: 98 5  Lungs: Clear  Abd: +BS, soft, nontender  Ext: No calf tenderness    Labs:  CBC w/diff  Recent Labs     05/16/21  0509 05/17/21 0417   WBC 14 11* 12 46*   HGB 8 8* 8 8*   HCT 26 5* 26 8*    421*   LYMPHOPCT 32  --    MONOPCT 10  --    EOSPCT 4  --      BMP  Recent Labs     05/17/21  0417   K 4 8   CL 99*   CO2 22   BUN 19   CREATININE 0 77   CALCIUM 7 6*     CMP  Recent Labs     05/15/21  0439 05/17/21  0417   K 4 7 4 8   CL 98* 99*   CO2 21 22   BUN 15 19   CREATININE 0 74 0 77   CALCIUM 7 8* 7 6*   ALKPHOS 134*  --    ALT 53  --    AST 30  --         labrc    Cultures:  Lab Results   Component Value Date    BLOODCX No Growth After 5 Days  05/09/2021    BLOODCX No Growth After 5 Days  05/09/2021    BLOODCX Streptococcus anginosus (A) 05/07/2021    BLOODCX Streptococcus anginosus (A) 05/07/2021    BLOODCX No Growth After 5 Days  04/08/2021    BLOODCX No Growth After 5 Days   04/08/2021     No results found for: WOUNDCULT  No results found for: URINECX  No results found for: SPUTUMCULTUR    MED:  Rocephin: #9   Flagyl:      Completed:  Vancomycin  X 1 dose on 5/9  Cefepime x 1 dose on 5/9      Current Facility-Administered Medications:     acetaminophen (TYLENOL) tablet 650 mg, 650 mg, Oral, Q6H PRN, Anice DANTE Rhodes, 650 mg at 05/13/21 2209    amLODIPine (NORVASC) tablet 2 5 mg, 2 5 mg, Oral, Daily, Amberly Fly, DO, 2 5 mg at 05/17/21 0801    aspirin chewable tablet 81 mg, 81 mg, Oral, Daily, Anice DANTE Rhodes, 81 mg at 05/17/21 0801    atorvastatin (LIPITOR) tablet 40 mg, 40 mg, Oral, Daily With Leida Veras PA-C, 40 mg at 05/16/21 1708    cefTRIAXone (ROCEPHIN) IVPB (premix in dextrose) 2,000 mg 50 mL, 2,000 mg, Intravenous, Q24H, Ben Campbell MD, Last Rate: 100 mL/hr at 05/16/21 1832, 2,000 mg at 05/16/21 1832    insulin glargine (LANTUS) subcutaneous injection 20 Units 0 2 mL, 20 Units, Subcutaneous, HS, Marcial Kulkarni MD, 20 Units at 05/16/21 2159    insulin lispro (HumaLOG) 100 units/mL subcutaneous injection 1-5 Units, 1-5 Units, Subcutaneous, TID AC, 2 Units at 05/17/21 1143 **AND** Fingerstick Glucose (POCT), , , TID AC, Genaro Benitez PA-C    insulin lispro (HumaLOG) 100 units/mL subcutaneous injection 7 Units, 7 Units, Subcutaneous, TID With Meals, Marcial Kulkarni MD, 7 Units at 05/17/21 1143    metoprolol succinate (TOPROL-XL) 24 hr tablet 100 mg, 100 mg, Oral, Daily, Genaro Benitez PA-C, 100 mg at 05/17/21 0801    metroNIDAZOLE (FLAGYL) IVPB (premix) 500 mg 100 mL, 500 mg, Intravenous, Q8H, Ben Campbell MD, Last Rate: 200 mL/hr at 05/17/21 1147, 500 mg at 05/17/21 1147    ondansetron (ZOFRAN) injection 4 mg, 4 mg, Intravenous, Q6H PRN, Genaro Benitez PA-C    rivaroxaban (XARELTO) tablet 15 mg, 15 mg, Oral, BID With Meals, Marcial Kulkarni MD, 15 mg at 05/17/21 0801    traMADol (ULTRAM) tablet 50 mg, 50 mg, Oral, Q6H PRN, Genaro Benitez PA-C    Principal Problem:    Streptococcal bacteremia  Active Problems:    Hyperlipidemia    HTN (hypertension)    Type 2 diabetes mellitus with hyperglycemia, with long-term current use of insulin ([facility] [address] )    Adenocarcinoma of pancreas (HCC)    Hyponatremia    Anemia    Sacral wound    Generalized weakness    [facility] ([facility]      Ben Campbell MD

## 2021-05-17 NOTE — ASSESSMENT & PLAN NOTE
Patient has known at her cancer the pancreatic gland and he is on chemotherapy  CT of the abdomen suggested small fistula formation    Patient's abdomen is benign, he has no abdominal pain or tenderness      I discussed the case with Dr Jia Sanchez on day of discharge, patient will follow-up with her as an outpatient

## 2021-05-17 NOTE — PLAN OF CARE
Problem: Potential for Falls  Goal: Patient will remain free of falls  Description: INTERVENTIONS:  - Assess patient frequently for physical needs  -  Identify cognitive and physical deficits and behaviors that affect risk of falls    -  [place] fall precautions as indicated by assessment   - Educate patient/family on patient safety including physical limitations  - Instruct patient to call for assistance with activity based on assessment  - Modify environment to reduce risk of injury  - Consider OT/PT consult to assist with strengthening/mobility  Outcome: Progressing

## 2021-05-17 NOTE — ASSESSMENT & PLAN NOTE
Lab Results   Component Value Date    HGBA1C 9 4 (H) 04/08/2021       Recent Labs     05/16/21  1611 05/16/21  2158 05/17/21  0714 05/17/21  1105   POCGLU 265* 187* 153* 267*       Blood Sugar Average: Last 72 hrs:  (P) 194 [id number]     Patient has hyperglycemia, will increase pre meal Humalog to 10 units t i d

## 2021-05-18 NOTE — TELEPHONE ENCOUNTER
Maggie Garrett called to schedule follow up appointment with Dr Davi Cortez to transfer her to the office to schedule this, but was unable to complete the transfer  I gave her the phone number to the surgical oncology office, she will attempt to call later  She had other calls she needed to make right now

## 2021-05-19 NOTE — TELEPHONE ENCOUNTER
Phone call placed to pt now that he has been discharged home from the hospital   Spoke with Neill Leyden and Jerome De Leon today  Discussed the reason for my call  Follow up appointment rescheduled for 5/26/21 by phone  Pt has my correct contact info now

## 2021-05-19 NOTE — PROGRESS NOTES
Outpatient Oncology Nutrition Consultation   Type of Consult: Follow Up  Care Location: Telephone Call   Nutrition Assessment:   Oncology Diagnosis & Treatments: Adenocarcinoma of the pancreas  · Currently undergoing neoadjuvant chemotherapy (Gemzar) which started 3/23/21  Dose reduced 4/2021 and 5/24/21 due to overall performance status, and complications that resulted in hospitalization  Abraxane discontinued 5/7/21  · Potential surgical resection after chemotherapy     Oncology History   [facility] )   3/10/2021 Initial Diagnosis    Adenocarcinoma of pancreas ([facility] )     3/23/2021 -  Chemotherapy    pegfilgrastim (NEULASTA ONPRO) subcutaneous injection kit 6 mg, 6 mg, Subcutaneous, Once, 2 of 5 cycles  PACLitaxel protein bound (ABRAXANE) 235 mg in IVPB 47 mL, 125 mg/m2 = 235 mg, Intravenous, Once, 2 of 2 cycles  Dose modification: 100 mg/m2 (original dose 125 mg/m2, Cycle 2, Reason: Dose Not Tolerated)  Administration: 235 mg (3/23/2021), 235 mg (3/30/2021), 235 mg (4/6/2021), 188 mg (4/20/2021), 188 mg (5/4/2021), 188 mg (4/27/2021)  gemcitabine (GEMZAR) 1,800 mg in sodium chloride 0 9 % 250 mL infusion, 1,879 8 mg, Intravenous, Once, 3 of 6 cycles  Dose modification: 800 mg/m2 (original dose 1,000 mg/m2, Cycle 2, Reason: Dose Not Tolerated)  Administration: 1,800 mg (3/23/2021), 1,800 mg (3/30/2021), 1,800 mg (4/6/2021), 1,504 2 mg (4/20/2021), 1,504 2 mg (5/4/2021), 1,504 2 mg (4/27/2021)       Past Medical & Surgical Hx:   Patient Active Problem List   Diagnosis    Hyperlipidemia    Essential hypertension    Type 2 diabetes mellitus with hyperglycemia, with long-term current use of insulin (HCC)    Gastroesophageal reflux disease    Pancreatic cyst    [facility] pancreas [facility]    Peripheral neuropathy    Bilateral cellulitis of lower leg    Hyponatremia    Severe protein-calorie malnutrition (HCC)    Anemia    Sacral wound    Generalized weakness    Streptococcal bacteremia    Mesenteric venous thrombosis (HCC)     Past Medical History:   Diagnosis Date    [facility] )     pancreatic    Diabetes mellitus ([facility] )     Frequent urination     GERD (gastroesophageal reflux disease)     Hyperlipidemia     Hypertension     Peripheral neuropathy     Weakness     Weight loss      Past Surgical History:   Procedure Laterality Date    APPENDECTOMY      CATARACT EXTRACTION      COLONOSCOPY      SKIN LESION EXCISION      of a wart on right arm    TUNNELED VENOUS PORT PLACEMENT N/A 3/26/2021    Procedure: INSERTION VENOUS PORT (PORT-A-CATH);   Surgeon: Keren Ruggiero MD;  Location:  MAIN OR;  Service: Surgical Oncology       Review of Medications:   Vitamins, Supplements and Herbals: No, pt denies taking supplements    Current Outpatient Medications:     Accu-Chek FastClix Lancets MISC, USE 1 TO CHECK GLUCOSE TWICE DAILY TO THREE TIMES DAILY, Disp: , Rfl:     Accu-Chek Guide test strip, USE 1 STRIP TWICE DAILY TO THREE TIMES DAILY, Disp: , Rfl:     acetaminophen (TYLENOL) 325 mg tablet, Take 650 mg by mouth every 4 (four) hours as needed for mild pain, Disp: , Rfl:     amLODIPine (NORVASC) 2 5 mg tablet, Take 1 tablet (2 5 mg total) by mouth daily, Disp: 30 tablet, Rfl: 1    aspirin 81 mg chewable tablet, Chew 81 mg daily, Disp: , Rfl:     atorvastatin (LIPITOR) 40 mg tablet, Take 40 mg by mouth daily, Disp: , Rfl:     Blood Glucose Monitoring Suppl (Accu-Chek Guide) w/Device KIT, USE TO CHECK GLUCOSE TWO TO THREE TIMES DAILY, Disp: , Rfl:     insulin glargine (Lantus SoloStar) 100 units/mL injection pen, Inject 8 units at bedtime, Disp: 15 mL, Rfl: 0    metFORMIN (GLUCOPHAGE) 850 mg tablet, TAKE 1 TABLET BY MOUTH TWICE DAILY TAKE WITH MORNING AND EVENING MEAL, Disp: , Rfl:     metoprolol succinate (TOPROL-XL) 100 mg 24 hr tablet, Take 100 mg by mouth daily, Disp: , Rfl:     metroNIDAZOLE (FLAGYL) 500 mg tablet, Take 1 tablet (500 mg total) by mouth every 8 (eight) hours for 10 days, Disp: 30 tablet, Rfl: 0    NovoLOG FlexPen 100 units/mL injection pen, Inject as per sliding scale , (  Scale - 150-200 -1 units, 201-250-2 units, 251-300 -3 units, 301-350-4 units, > 350- 5  units) with meals, Disp: 15 mL, Rfl:     prochlorperazine (COMPAZINE) 10 mg tablet, Take 1 tablet (10 mg total) by mouth every 6 (six) hours as needed for nausea or vomiting, Disp: 45 tablet, Rfl: 3    ramipril (ALTACE) 10 MG capsule, Take 10 mg by mouth daily , Disp: , Rfl:     ReliOn Pen Needles 31G X 6 MM MISC, USE 1 IN THE EVENING, Disp: , Rfl:     rivaroxaban (XARELTO) 15 mg tablet, Take 1 tablet (15 mg total) by mouth 2 (two) times a day with meals for 40 doses, Disp: 40 tablet, Rfl: 0    sodium chloride (GRAHAM 128) 5 % hypertonic ophthalmic solution, 1 drop as needed, Disp: , Rfl:     traMADol (ULTRAM) 50 mg tablet, Take 1 tablet (50 mg total) by mouth every 6 (six) hours as needed for moderate pain, Disp: 15 tablet, Rfl: 0    Most Recent Lab Results: He checks his BG 2-3x/day; BG this morning was 197 which pt reports has been typical recently  Lab Results   Component Value Date    WBC 8 68 05/24/2021    IRON 24 (L) 04/10/2021    TIBC 155 (L) 04/10/2021    FERRITIN 994 (H) 04/10/2021    ALT 35 05/24/2021    AST 19 05/24/2021    ALB 3 0 (L) 05/24/2021    SODIUM 124 (L) 05/24/2021    SODIUM 132 (L) 05/17/2021    K 5 4 (H) 05/24/2021    K 4 8 05/17/2021    CL 92 (L) 05/24/2021    BUN 21 05/24/2021    BUN 19 05/17/2021    CREATININE 0 82 05/24/2021    CREATININE 0 77 05/17/2021    EGFR 86 05/24/2021    GLUCOSE 256 (H) 04/08/2021    POCGLU 219 (H) 05/17/2021    GLUF 303 (H) 05/24/2021    GLUF 178 (H) 05/03/2021    GLUC 165 (H) 05/17/2021    HGBA1C 9 4 (H) 04/08/2021    HGBA1C 7 9 02/12/2021    CALCIUM 7 8 (L) 05/24/2021    MG 1 6 05/07/2021       Anthropometric Measurements:   Height: 72"  Ht Readings from Last 1 Encounters:   05/24/21 5' 10 9" (1 801 m)     Wt Readings from Last 20 Encounters:   05/24/21 66 7 kg (147 lb)   05/07/21 64 4 kg (142 lb)   05/05/21 64 4 kg (142 lb)   05/04/21 64 2 kg (141 lb 8 6 oz)   04/27/21 65 8 kg (145 lb 1 oz)   04/20/21 66 kg (145 lb 8 1 oz)   04/08/21 68 5 kg (151 lb)   04/06/21 68 5 kg (151 lb 0 2 oz)   03/30/21 66 2 kg (145 lb 15 1 oz)   03/23/21 66 6 kg (146 lb 13 2 oz)   03/15/21 67 6 kg (149 lb)   03/12/21 67 1 kg (148 lb)   02/23/21 77 1 kg (170 lb)       Weight History:    Usual Weight: 170#; had been up to 230# 10 years ago while he was working   Home Scale: says his scale is not accurate   Comments: Question accuracy of wt hx d/t Feb 2021 Epic wt (pt says his coat and boots were very heavy when he got weighed on 2/23/21)      Oncology Nutrition-Anthropometrics      Nutrition from 5/26/2021 in [address] [place] [address] [facility] Nutrition from 3/29/2021 in [facility]   Patient age (years):  67 years  67 years   Patient (male) height (in):  67 in  67 in   Current weight (lbs):  147 lbs  146 8 lbs   Current weight to be used for anthropometric calculations (kg)  66 8 kg  66 7 kg   BMI:  19 9  19 9   IBW male  178 lb  178 lb   IBW (kg) male  80 9 kg  80 9 kg   IBW % (male)  82 6 %  82 5 %   Adjusted BW (male):  170 2 lbs  170 2 lbs   Adjusted BW in kg (male):  77 4 kg  77 4 kg   % weight change after 1 week:  --  -1 5 %   Weight change after 1 week (lbs)  --  -2 2 lbs   % weight change after 1 month:  1 3 %  (!) -13 6 %   Weight change after 1 month (lbs)  1 9 lbs  -23 2 lbs   % weight change after 3 months:  (!) -13 5 %  --   Weight change after 3 months (lbs)  -23 lbs  --        Nutrition-Focused Physical Findings: n/a due to telephone call    Food/Nutrition-Related History & Client/Social History:    Current Nutrition Impact Symptoms:  [] Nausea  [] Reduced Appetite  [] Acid Reflux    [] Vomiting  [x] Unintended Wt Loss  [] Malabsorption    [] Diarrhea   -pr reports mildly loose stools a couple times per day  -loose stools consistent with pancreatic insufficiency per SurgOnc 21 [] Unintended Wt Gain  [] Dumping Syndrome    [] Constipation  [] Thick Mucous/Secretions  [] Abdominal Pain    [] Dysgeusia (Altered Taste)   -has been putting more salt and pepper on foods but does not notice obvious dysgeusia  [] Xerostomia (Dry Mouth)  [x] Gas - frequent gas and belching  - typically eats very fast and chews well   [] Dysosmia (Altered Smell)  [] Thrush  [] Difficulty Chewing    [] Oral Mucositis (Sore Mouth)  [] Fatigue  [x] Hyperglycemia - Last A1C on 21 was 9 4%   [] Odynophagia  [] Esophagitis  [x] Other: Wounds on sacrum & ankles - these are healing per pt   [] Dysphagia  [] Early Satiety  [] No Problems Eating      Food Allergies & Intolerances: no    Current Diet: CHO-Controlled   Current Nutrition Intake:  Increased since last visit  Appetite: Good  Nutrition Route: PO  Activity level: limited by weakness    24 Hr Diet Recall:  Breakfast: 1 scrambled egg, 1 slice wheat toast with butter, corn flakes with milk and 1 banana   Snack: sometimes will have a light [place] or regular yogurt  Lunch: leftover pepperoni pizza (3 slices at the most)  Snack: Glucerna shake  Dinner: 3/4" slice of meatloaf, mixed vegetables, baked sweet potato with salt and pepper  Snack: yogurt    Beverages: water (16 oz x3), 1% milk (in cereal, tea, and coffee), regular tea with 1% milk (8 oz x2-3), Glucerna (10 oz x1)   -Averaging ~58 oz of caffeine-free fluids per day (71% est needs)  Supplements:    Glucerna Hunger Smart (10 oz, 180 kcal, 15 g pro) - QD      Oncology Nutrition-Estimated Needs      Nutrition from 3/29/2021 in [facility] [place]   Weight type used  IBW   Weight in kilograms (kg) used for estimated needs  80 9 kg   Energy needs formula:   35-40 kcal/kg   Energy needs based on 35 kcal/k kcal   Energy needs based on 40 kcal/k kcal   Protein needs formula:  1 5-2 g/kg   Protein needs based on 1 5 g/kg  121 g   Protein needs based on 2 g/kg  162 g   Fluid needs formula:  30-35 mL/kg   Fluid needs based on 30 mL/kg  2427 mL   Fluid needs in ounces  82 oz   Fluid needs based on 35 mL/kg  2832 mL   Fluid needs in ounces  96 oz           Discussion & Intervention:   Kallie Johnson was evaluated today for an RD follow up regarding wt loss  Kallie Johnson is currently undergoing tx for pancreatic cancer (neoadjuvant chemotherapy)  He is doing well today other than some dizziness which he plans to discuss with his PCP  He is pleased that he has recently gained some weight  He has a good appetite and feels that he is eating well  He says that when he checks his BG it has been ~197  He is making efforts to eat a carb controlled diet  His po fluid intake has improved  He is eating small and frequent meals and snacks  He reports some mildly loose stools but says they are not an issue at this time  He continues with frequent gas and belching  He says that his sacral and ankle wounds are healing  Reviewed 24 hour recall, which revealed an adequate po intake, and discussed ways to increase kcal, protein, and fluid intakes, optimize nutrient intake and adhere to a carbohydrate controlled diet  Also reviewed the importance of wt management throughout the tx process and the role of a high kcal/ high protein diet and carbohydrate controlled diet in managing wt and overall health    Based on today's assessment, discussion included: MNT for: hx wt loss, DM, Gas, wounds, a high kcal/protein diet & food choices to include at all meals & snacks (Examples of high kcal foods: cheese, full-fat dairy products, nut butter, plant-based fats, coconut oil/milk, avocado, butter, cream soup, etc  Examples of high protein foods: eggs, chicken, fish, beans/legumes, nuts/nut butters, bone broth, etc ) , a diabetic diet & food choices to include at all meals & snacks, spreading carbohydrate intake throughout the day & counting carbohydrates for adequate glycemic control, fortifying foods for added kcal and protein (examples include: adding cheese to foods such as eggs, mashed potatoes, casseroles, etc ; Making oatmeal with whole milk rather than water; Making fortified mashed potatoes with cream, butter, dry milk powder, plain [place] yogurt, and cheese ), adequate hydration & fluid choices, eating smaller more frequent meals every 2-3 hours (5-6 small meals/day), eating when feeling most hungry and increasing DM oral nutrition supplements  Moving forward, Linda Charles was encouraged to increase his Glucerna shakes BID as snacks in the morning and afternoon, avoid the common gas producing foods and eating behaviors (such as eating too quickly, not chewing well, talking while eating, using a straw, etc ), incorporate more light [place] yogurt, and increase his po hydration  Materials Provided: not applicable  All questions and concerns addressed during todays visit  Linda Charles has RD contact information  Nutrition Diagnosis:    Increased Nutrient Needs (kcal & pro) related to increased demand for nutrients and disease state as evidenced by cancer dx and pt undergoing tx for cancer   Altered Nutrition Related Laboratory Values related to endocrine and pancreatic dysfunction as evidenced by hyperglycemia  Monitoring & Evaluation:   Goals:  · weight maintenance/stabilization  · adequate nutrition impact symptom management  · pt to meet >/=75% estimated nutrition needs daily  · adequate glycemic control    · Progress Towards Goals: Progressing    Nutrition Rx & Recommendations:  · Diet: Diabetic, High Calorie & High Protein Diet  · Eat slowly and chew food thoroughly before swallowing  · Small, frequent meals/snacks may be easier to tolerate than 3 large daily meals  Aim for 5-6 small meals per day (every 2-3 hours)  · Include protein at all meals/snacks    · Stay hydrated by sipping fluids of choice/tolerance throughout the day   · Liquid nutrition may be better tolerated than solids at times  · Alter food choices and eating patterns to accommodate changing needs  · Refer to Eating Hints book for other meal/snack ideas and symptom management  · For weight loss: monitor your weight at home at least 2x/week, record your weight, start by adding 250-500 extra calories per day, eat 5-6 small scheduled meals every 2-3 hrs, choose foods that are high in protein and calories (see pages 49-53 in your Eating Hints book), drink liquids with calories for example: milkshakes/smoothies/juice/soup/whole milk/chocolate milk, cook with protein fortified milk (see recipe on page 36 in your Eating Hints book), consider ready-to-drink oral nutrition supplements such as Ensure Plus, Ensure Enlive, Boost Plus, or Boost Very High Calorie, avoid "diet" and "light" foods when possible, avoid drinking too much with meals, contact your dietitian with any continued weight loss over the course of 1 week  For more info see pages 35-37 in your Eating Hints book  · Weigh yourself regularly  If you notice weight loss, make an effort to increase your daily food/calorie intake  If you continue to notice loss after these efforts, reach out to your dietitian to establish a plan to stabilize weight  · Avoid gas-producing foods such as broccoli, cabbage, sauerkraut, Cr[place] sprouts, cauliflower, corn, cucumbers, onions, peppers, beans, peas, lentils, apples, apple juice, avocado, and melons  Carbonated drinks, chewing gum, and drinking through a straw can also cause gas  Limiting lactose may help for those who are sensitive to milk products  · Fluids: Increase your fluid intake to 80-95 oz per day  Try to choose fluids with calories when possible: 1% milk, Glucerna shakes, etc   Sip your fluids throughout the day  Limit large quantities of fluid before bed to avoid overnight urination    · Nutrition Supplements: Increase Glucerna to 2 per day (with AM and afternoon snacks)  · 2-3 light [place] yogurt's per day  Choose a yogurt that has <10 grams of sugar per serving      Follow Up Plan: 6/23 at 10am  Recommend Referral to Other Providers: none at this time

## 2021-05-20 NOTE — LETTER
May 20, 2021     Makayla Delarosa, [address] [address]    Patient: Thomas Vick   YOB: 1945   Date of Visit: 5/20/2021       Dear Dr Solomon Borden:    Thank you for referring Thomas Vick to me for evaluation  Below are my notes for this consultation  If you have questions, please do not hesitate to call me  I look forward to following your patient along with you  Sincerely,        Ashley Patino MD        CC: No Recipients  Ashley Patino MD  5/20/2021 12:03 PM  Sign when Signing Visit  Surgical Oncology Consultation    [address] [address]    Patient:  Thomas Vick  [birth year]  [id number]    Primary Care provider:  Ivett Alfaro DO  [address] [place] [address]    Referring provider:  No referring provider defined for this encounter  Diagnoses and all orders for this visit:    Adenocarcinoma of pancreas [facility])        Chief Complaint   Patient presents with    Follow-up       No follow-ups on file      Oncology History   Adenocarcinoma of pancreas ([facility] [address] )   3/10/2021 Initial Diagnosis    Adenocarcinoma of pancreas ([facility] [address] )     3/23/2021 -  Chemotherapy    pegfilgrastim (NEULASTA ONPRO) subcutaneous injection kit 6 mg, 6 mg, Subcutaneous, Once, 1 of 1 cycle  PACLitaxel protein bound (ABRAXANE) 235 mg in IVPB 47 mL, 125 mg/m2 = 235 mg, Intravenous, Once, 2 of 2 cycles  Dose modification: 100 mg/m2 (original dose 125 mg/m2, Cycle 2, Reason: Dose Not Tolerated)  Administration: 235 mg (3/23/2021), 235 mg (3/30/2021), 235 mg (4/6/2021), 188 mg (4/20/2021), 188 mg (5/4/2021), 188 mg (4/27/2021)  gemcitabine (GEMZAR) 1,800 mg in sodium chloride 0 9 % 250 mL infusion, 1,879 8 mg, Intravenous, Once, 2 of 6 cycles  Dose modification: 800 mg/m2 (original dose 1,000 mg/m2, Cycle 2, Reason: Dose Not Tolerated)  Administration: 1,800 mg (3/23/2021), 1,800 mg (3/30/2021), 1,800 mg (4/6/2021), 1,504 2 mg (4/20/2021), 1,504 2 mg (5/4/2021), 1,504 2 mg (4/27/2021)         History of Present Illness 3/2021 : This is a 43-year-old gentleman presents with a new diagnosis of pancreatic cancer  Briefly, the patient was admitted to [facility] about week's history significant abdominal pain, nausea, changes in stool  Workup there included CT scan which revealed large pancreatic cyst as well as an abnormality of the pancreatic tissue with atrophy, dilated duct, and potential for pancreatic mass of the pancreatic head  The patient clinically improved and was discharged home  He then underwent an EUS with our team, which revealed large pancreatic cyst, cyst fluid was consistent mucinous neoplasm, and there was a pancreatic head mass detected, and FNA of this mass revealed adenocarcinoma  The patient presents today for evaluation  Of note, CT chest has been completed, with no evidence of malignancy  CA 19 9 is near 20,000  Today he states that his symptoms have much improved  The symptoms he was having when he presented to [place] with the worse they have been in the course of his disease  He states that he feels quite well  The pain that he was experiencing is nearly gone, although he has intermittent pain that resolved quickly  He is tolerating p o  intake with no issues  He experiences no nausea no vomiting  He does have loose stool every once in a while, although the consistency of his stools not appear to be consistent with pancreatic insufficiency  Likewise, he is insulin dependent, but there have been no changes to his insulin requirements recently  He endorses some fatigue, but denies any significant weight loss  He denies any jaundice, bright red blood per rectum, changes in his functional status  He denies any history of pancreatitis, any history of gallbladder pathology, any risk factors for gallbladder pathology or pancreatitis    He has no drinking history  He denies any chest pain, shortness of breath or history of cardiac issues  He lives alone and accomplishes all of his own ADLs  He walks regularly  This is a 65-year-old gentleman with a new diagnosis of pancreatic head adenocarcinoma, as well as a large mucinous cyst   Initially, based on his presentation, it appeared that the cyst was causing significant pain as well as the potential for compressive symptoms  At this time, however, the symptoms appear to be nearly resolved and the patient is well tolerating p o  intake with no significant pain  Given the infiltrative nature of the mass as well as the very high CA 19 9, I would like to pursue neoadjuvant chemotherapy in this patient  I will discuss this with Dr Alma Lima, who sees the patient sees next week  I discussed the nature of pancreatic cancer, as well as the rationale for the recommended sequence of therapy  I discussed we will likely pursue 2-3 months of systemic therapy, followed by repeat imaging to gauge response  I have also recommended placement of port, and the nature of this procedure was explained in full to the patient and his brother  I discussed the complications of bleeding, infection, damage to the lung, pneumothorax  Consent was obtained today  All questions answered  Interval History 5/20/21:    The patient is seen today status post hospitalization at [facility] for decompensation with abdominal pain  He was sent from his Medical Oncology office due to concerns about his medical status  He was admitted with Streptococcus bacteremia  He was placed on IV Rocephin and Flagyl and pelvis with ID is following the patient  He has been discharged on IV antibiotics as well as p o  Flagyl  Due to ongoing symptoms  A repeat CT scan was obtained on admission as well as several days later, revealing resolution of the cystic lesion of the pancreas with air between the duodenum and pancreas, suggesting possible fistula    He was tolerating a diet with no further fevers and no significant abdominal pain and was discharged with a benign abdominal exam    He was seen by medical oncology as an inpatient, who commented the that he has had a rough course with his systemic treatment and has required dose reductions as well as intermittent hospitalization  The plan is for him to undergo single agent gemcitabine moving forward given his decompensation  He is seen today for follow-up after his hospitalization  He states that he is doing relatively well  He feels much better than on admission to the hospital   Physical therapy has come to see him and is initiating a program with him  He is eating well with no abdominal pain, no nausea or vomiting  He is having daily stools  No fevers chills other infectious symptoms  Review of Systems  Complete ROS Surg Onc:   Constitutional: The patient denies new or recent history of general fatigue, no recent weight loss, no change in appetite  Eyes: No complaints of visual problems, no scleral icterus  ENT: No complaints of ear pain, no hoarseness, no difficulty swallowing,  no tinnitus and no new masses in head, oral cavity, or neck  Cardiovascular: No complaints of chest pain, no palpitations, no ankle edema  Respiratory: No complaints of shortness of breath, no cough  Gastrointestinal: No complaints of jaundice, no bloody stools, no pale stools  Genitourinary: No complaints of dysuria, no hematuria, no nocturia, no frequent urination, no urethral discharge  Musculoskeletal: No complaints of weakness, paralysis, joint stiffness or arthralgias  Integumentary: No complaints of rash, no new lesions  Neurological: No complaints of convulsions, no seizures, no dizziness  Hematologic/Lymphatic: No complaints of easy bruising  Endocrine:  No hot or cold intolerance  No polydipsia, polyphagia, or polyuria  Allergy/immunology:  No environmental allergies  No food allergies    Not immunocompromised  Skin:  No pallor or rash  No wound  Patient Active Problem List   Diagnosis    Hyperlipidemia    Essential hypertension    Type 2 diabetes mellitus with hyperglycemia, with long-term current use of insulin (HCC)    Gastroesophageal reflux disease    Pancreatic cyst    [facility] pancreas ([facility] )    Peripheral neuropathy    Bilateral cellulitis of lower leg    Hyponatremia    Severe protein-calorie malnutrition (HCC)    Anemia    Sacral wound    Generalized weakness    Streptococcal bacteremia    Mesenteric venous thrombosis (HCC)     Past Medical History:   Diagnosis Date    Cancer ([facility] )     pancreatic    Diabetes mellitus ([facility] )     Frequent urination     GERD (gastroesophageal reflux disease)     Hyperlipidemia     Hypertension     Peripheral neuropathy     Weakness     Weight loss      Past Surgical History:   Procedure Laterality Date    APPENDECTOMY      CATARACT EXTRACTION      COLONOSCOPY      SKIN LESION EXCISION      of a wart on right arm    TUNNELED VENOUS PORT PLACEMENT N/A 3/26/2021    Procedure: INSERTION VENOUS PORT (PORT-A-CATH); Surgeon: Anderson Garrett MD;  [facility] OR;  Service: Surgical Oncology     No family history on file    Social History     Socioeconomic History    Marital status:      Spouse name: Not on file    Number of children: Not on file    Years of education: Not on file    Highest education level: Not on file   Occupational History    Not on file   Social Needs    Financial resource strain: Not on file    Food insecurity     Worry: Not on file     Inability: Not on file    Transportation needs     Medical: Not on file     Non-medical: Not on file   Tobacco Use    Smoking status: Former Smoker     Types: Pipe     Quit date: 2/23/2011     Years since quitting: 10 2    Smokeless tobacco: Never Used   Substance and Sexual Activity    Alcohol use: Never     Frequency: Never     Binge frequency: Never    Drug use: Never    Sexual activity: Not on file   Lifestyle    Physical activity     Days per week: Not on file     Minutes per session: Not on file    Stress: Not on file   Relationships    Social connections     Talks on phone: Not on file     Gets together: Not on file     Attends Taoism service: Not on file     Active member of club or organization: Not on file     Attends meetings of clubs or organizations: Not on file     Relationship status: Not on file    Intimate partner violence     Fear of current or ex partner: Not on file     Emotionally abused: Not on file     Physically abused: Not on file     Forced sexual activity: Not on file   Other Topics Concern    Not on file   Social History Narrative    Not on file       Current Outpatient Medications:     Accu-Chek FastClix Lancets MISC, USE 1 TO CHECK GLUCOSE TWICE DAILY TO THREE TIMES DAILY, Disp: , Rfl:     Accu-Chek Guide test strip, USE 1 STRIP TWICE DAILY TO THREE TIMES DAILY, Disp: , Rfl:     acetaminophen (TYLENOL) 325 mg tablet, Take 650 mg by mouth every 4 (four) hours as needed for mild pain, Disp: , Rfl:     amLODIPine (NORVASC) 2 5 mg tablet, Take 1 tablet (2 5 mg total) by mouth daily, Disp: 30 tablet, Rfl: 1    aspirin 81 mg chewable tablet, Chew 81 mg daily, Disp: , Rfl:     atorvastatin (LIPITOR) 40 mg tablet, Take 40 mg by mouth daily, Disp: , Rfl:     Blood Glucose Monitoring Suppl (Accu-Chek Guide) w/Device KIT, USE TO CHECK GLUCOSE TWO TO THREE TIMES DAILY, Disp: , Rfl:     cefTRIAXone (ROCEPHIN) 2000 mg IVPB, Infuse 50 mL (2,000 mg total) into a venous catheter every 24 hours for 11 days, Disp: 550 mL, Rfl: 0    insulin glargine (Lantus SoloStar) 100 units/mL injection pen, Inject 8 units at bedtime, Disp: 15 mL, Rfl: 0    metFORMIN (GLUCOPHAGE) 850 mg tablet, TAKE 1 TABLET BY MOUTH TWICE DAILY TAKE WITH MORNING AND EVENING MEAL, Disp: , Rfl:     metoprolol succinate (TOPROL-XL) 100 mg 24 hr tablet, Take 100 mg by mouth daily, Disp: , Rfl:     metroNIDAZOLE (FLAGYL) 500 mg tablet, Take 1 tablet (500 mg total) by mouth every 8 (eight) hours for 10 days, Disp: 30 tablet, Rfl: 0    NovoLOG FlexPen 100 units/mL injection pen, Inject as per sliding scale , (  Scale - 150-200 -1 units, 201-250-2 units, 251-300 -3 units, 301-350-4 units, > 350- 5  units) with meals, Disp: 15 mL, Rfl:     prochlorperazine (COMPAZINE) 10 mg tablet, Take 1 tablet (10 mg total) by mouth every 6 (six) hours as needed for nausea or vomiting, Disp: 45 tablet, Rfl: 3    ramipril (ALTACE) 10 MG capsule, Take 10 mg by mouth daily , Disp: , Rfl:     ReliOn Pen Needles 31G X 6 MM MISC, USE 1 IN THE EVENING, Disp: , Rfl:     rivaroxaban (XARELTO) 15 mg tablet, Take 1 tablet (15 mg total) by mouth 2 (two) times a day with meals for 40 doses, Disp: 40 tablet, Rfl: 0    sodium chloride (GRAHAM 128) 5 % hypertonic ophthalmic solution, 1 drop as needed, Disp: , Rfl:     traMADol (ULTRAM) 50 mg tablet, Take 1 tablet (50 mg total) by mouth every 6 (six) hours as needed for moderate pain, Disp: 15 tablet, Rfl: 0  No Known Allergies    Vitals:    05/20/21 1113   BP: 126/60   Pulse: 103   Temp: 97 8 °F (36 6 °C)       Physical Exam   General: Appears weak, appears stated age  Skin: Warm, anicteric  HEENT: Normocephalic, atraumatic; sclera aniceteric, mucous membranes moist; cervical nodes without adenopathy  Cardiopulmonary: RRR, Easy WOB, no BLE edema  Abd: Flat and soft, nontender  MSK: Symmetric, no cyanosis, no overt weakness  Lymphatic: No cervical, axillary or inguinal lymphadenopathy  Neuro: Affect appropriate, no gross motor abnormalities      Pathology:  Final Diagnosis   A -C  Pancreas, head mass: (Thin-Prep, smears, and cell block)  Malignant (PSC Category VI) - See note    Adenocarcinoma      Note: The above diagnostic category is part of the six-tiered system proposed by The Papan[place]ou Society of Cytopathology for the reporting of pancreaticobiliary specimens  This proposed scheme provides terminology that correlates the cytologic diagnostic nomenclature with the 2010 WHO classification of pancreatic tumors and standardizes the categorization of the various diseases of the pancreas, some of which are difficult to diagnose specifically by cytology  *The Papan[place] Society of Cytopathology System for Reporting Pancreaticobiliary Cytology: Definitions, Criteria and Explanatory Notes  April Collins; Cha, 2015      Dr Florencio Luo is notified of the diagnosis in [facility] via [employer] on 2/25/2021  at 1:10 pm    Intradepartmental consultation is in agreement      D E  Pancreas, body cyst: (Thin-Prep and cell block)  Non-Neoplastic ([place] category II)  Mixed inflammatory cells  Labs: Reviewed     Imaging  Ct Chest W Contrast    Result Date: 3/7/2021  Narrative: CT CHEST WITH IV CONTRAST INDICATION:   C25 9: Malignant neoplasm of pancreas, unspecified  COMPARISON:  None  TECHNIQUE: CT examination of the chest was performed  Axial, sagittal, and coronal 2D reformatted images were created from the source data and submitted for interpretation  Radiation dose length product (DLP) for this visit:  211 22 mGy-cm   This examination, like all CT scans performed in the [facility], was performed utilizing techniques to minimize radiation dose exposure, including the use of iterative  reconstruction and automated exposure control  IV Contrast:  85 mL of iohexol (OMNIPAQUE)  350 FINDINGS: LUNGS:  No pulmonary nodule or infiltrate  Minimal linear scar at the lung bases left greater than right  Central airways are clear  PLEURA:  Unremarkable  HEART/GREAT VESSELS:  Heart is not enlarged  No pericardial effusion  Aortic and coronary artery calcification  MEDIASTINUM AND SHAILESH:  Unremarkable   CHEST WALL AND LOWER NECK:   Incidental discovery of one or more thyroid nodule(s) measuring less than 1 5 cm and without suspicious features is noted in this patient who is above 28years old; according to guidelines published in the February 2015 white  paper on incidental thyroid nodules in the Journal of the [employer] of radiology Kwesi Arriola), no further evaluation is recommended  VISUALIZED STRUCTURES IN THE UPPER ABDOMEN:  Partially visualized cystic mass that appears to be arising from the junction of the pancreatic neck and body measuring at least 9 8 x 8 5 cm  Heterogeneous hypodense in the head and neck of the pancreas measures approximately 3 5 x 3 5 cm image 62 series 601 and image 67 series 2 attributed to recent biopsy proven pancreatic malignancy  Severe atrophy and pancreatic duct dilation in the body and tail of the pancreas  OSSEOUS STRUCTURES:  No acute fracture or osseous destructive lesion identified  Mild degenerative changes of the spine  Impression: Pancreatic head and neck mass measuring approximately 3 5 x 3 5 cm attributed to recent biopsy proven malignancy  No evidence of thoracic metastatic disease  Workstation performed: [id number]     I reviewed the above laboratory and imaging data  I personally reviewed the outside films and reports  Discussion/Summary:       Thanh Li is a 21-year-old gentleman with the diagnosis of locally advanced pancreas cancer  At this juncture, he appears to be struggling with chemotherapy  CT scans have revealed overall stability of disease, however, he has developed what appears to be a 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  At this juncture, however, he appears to be relatively in [place] asymptomatic with normal p o  intake, no abdominal pain, and no ongoing infectious symptoms  He is on IV antibiotics for his bacteremia at this juncture  It is unclear what the source of this bacteremia is, though it is possible it originated from this inflammation/fistula of the UGI tract    I discussed with Thanh Li that the option at this juncture is to continue with chemotherapy  We will have to see how he tolerates this as well as follow-up scans could following completion of an entire chemotherapy course, 6 mo of treatment  I will see him following completion of chemotherapy with new staging scans  At this juncture, I a m  hesitant to consider surgery given his functional status  We will have to see how he does over the next several months  I will communicate with Dr Jeff Hinson

## 2021-05-20 NOTE — PROGRESS NOTES
Surgical Oncology Consultation    [address]  [facility] [address]  [address]  [facility] [address]    Patient:  Leonel Aleman  [birth year]  [id number]    Primary Care provider:  Fish Koenig DO  [address] [address]    Referring provider:  No referring provider defined for this encounter  Diagnoses and all orders for this visit:    Adenocarcinoma of pancreas [facility])        Chief Complaint   Patient presents with    Follow-up       No follow-ups on file  Oncology History   Adenocarcinoma of pancreas ([facility] [address] )   3/10/2021 Initial Diagnosis    Adenocarcinoma of pancreas ([facility] [address] )     3/23/2021 -  Chemotherapy    pegfilgrastim (NEULASTA ONPRO) subcutaneous injection kit 6 mg, 6 mg, Subcutaneous, Once, 1 of 1 cycle  PACLitaxel protein bound (ABRAXANE) 235 mg in IVPB 47 mL, 125 mg/m2 = 235 mg, Intravenous, Once, 2 of 2 cycles  Dose modification: 100 mg/m2 (original dose 125 mg/m2, Cycle 2, Reason: Dose Not Tolerated)  Administration: 235 mg (3/23/2021), 235 mg (3/30/2021), 235 mg (4/6/2021), 188 mg (4/20/2021), 188 mg (5/4/2021), 188 mg (4/27/2021)  gemcitabine (GEMZAR) 1,800 mg in sodium chloride 0 9 % 250 mL infusion, 1,879 8 mg, Intravenous, Once, 2 of 6 cycles  Dose modification: 800 mg/m2 (original dose 1,000 mg/m2, Cycle 2, Reason: Dose Not Tolerated)  Administration: 1,800 mg (3/23/2021), 1,800 mg (3/30/2021), 1,800 mg (4/6/2021), 1,504 2 mg (4/20/2021), 1,504 2 mg (5/4/2021), 1,504 2 mg (4/27/2021)         History of Present Illness 3/2021 : This is a 58-year-old gentleman presents with a new diagnosis of pancreatic cancer  Briefly, the patient was admitted to [facility] about week's history significant abdominal pain, nausea, changes in stool    Workup there included CT scan which revealed large pancreatic cyst as well as an abnormality of the pancreatic tissue with atrophy, dilated duct, and potential for pancreatic mass of the pancreatic head  The patient clinically improved and was discharged home  He then underwent an EUS with our team, which revealed large pancreatic cyst, cyst fluid was consistent mucinous neoplasm, and there was a pancreatic head mass detected, and FNA of this mass revealed adenocarcinoma  The patient presents today for evaluation  Of note, CT chest has been completed, with no evidence of malignancy  CA 19 9 is near 20,000  Today he states that his symptoms have much improved  The symptoms he was having when he presented to [place] with the worse they have been in the course of his disease  He states that he feels quite well  The pain that he was experiencing is nearly gone, although he has intermittent pain that resolved quickly  He is tolerating p o  intake with no issues  He experiences no nausea no vomiting  He does have loose stool every once in a while, although the consistency of his stools not appear to be consistent with pancreatic insufficiency  Likewise, he is insulin dependent, but there have been no changes to his insulin requirements recently  He endorses some fatigue, but denies any significant weight loss  He denies any jaundice, bright red blood per rectum, changes in his functional status  He denies any history of pancreatitis, any history of gallbladder pathology, any risk factors for gallbladder pathology or pancreatitis  He has no drinking history  He denies any chest pain, shortness of breath or history of cardiac issues  He lives alone and accomplishes all of his own ADLs  He walks regularly  This is a 69-year-old gentleman with a new diagnosis of pancreatic head adenocarcinoma, as well as a large mucinous cyst   Initially, based on his presentation, it appeared that the cyst was causing significant pain as well as the potential for compressive symptoms    At this time, however, the symptoms appear to be nearly resolved and the patient is well tolerating p o  intake with no significant pain  Given the infiltrative nature of the mass as well as the very high CA 19 9, I would like to pursue neoadjuvant chemotherapy in this patient  I will discuss this with Dr Sharon Boxer, who sees the patient sees next week  I discussed the nature of pancreatic cancer, as well as the rationale for the recommended sequence of therapy  I discussed we will likely pursue 2-3 months of systemic therapy, followed by repeat imaging to gauge response  I have also recommended placement of port, and the nature of this procedure was explained in full to the patient and his brother  I discussed the complications of bleeding, infection, damage to the lung, pneumothorax  Consent was obtained today  All questions answered  Interval History 5/20/21:    The patient is seen today status post hospitalization at [facility] for decompensation with abdominal pain  He was sent from his Medical Oncology office due to concerns about his medical status  He was admitted with Streptococcus bacteremia  He was placed on IV Rocephin and Flagyl and pelvis with ID is following the patient  He has been discharged on IV antibiotics as well as p o  Flagyl  Due to ongoing symptoms  A repeat CT scan was obtained on admission as well as several days later, revealing resolution of the cystic lesion of the pancreas with air between the duodenum and pancreas, suggesting possible fistula  He was tolerating a diet with no further fevers and no significant abdominal pain and was discharged with a benign abdominal exam    He was seen by medical oncology as an inpatient, who commented the that he has had a rough course with his systemic treatment and has required dose reductions as well as intermittent hospitalization  The plan is for him to undergo single agent gemcitabine moving forward given his decompensation  He is seen today for follow-up after his hospitalization  He states that he is doing relatively well  He feels much better than on admission to the hospital   Physical therapy has come to see him and is initiating a program with him  He is eating well with no abdominal pain, no nausea or vomiting  He is having daily stools  No fevers chills other infectious symptoms  Review of Systems  Complete ROS Surg Onc:   Constitutional: The patient denies new or recent history of general fatigue, no recent weight loss, no change in appetite  Eyes: No complaints of visual problems, no scleral icterus  ENT: No complaints of ear pain, no hoarseness, no difficulty swallowing,  no tinnitus and no new masses in head, oral cavity, or neck  Cardiovascular: No complaints of chest pain, no palpitations, no ankle edema  Respiratory: No complaints of shortness of breath, no cough  Gastrointestinal: No complaints of jaundice, no bloody stools, no pale stools  Genitourinary: No complaints of dysuria, no hematuria, no nocturia, no frequent urination, no urethral discharge  Musculoskeletal: No complaints of weakness, paralysis, joint stiffness or arthralgias  Integumentary: No complaints of rash, no new lesions  Neurological: No complaints of convulsions, no seizures, no dizziness  Hematologic/Lymphatic: No complaints of easy bruising  Endocrine:  No hot or cold intolerance  No polydipsia, polyphagia, or polyuria  Allergy/immunology:  No environmental allergies  No food allergies  Not immunocompromised  Skin:  No pallor or rash  No wound        Patient Active Problem List   Diagnosis    Hyperlipidemia    Essential hypertension    Type 2 diabetes mellitus with hyperglycemia, with long-term current use of insulin (HCC)    Gastroesophageal reflux disease    Pancreatic cyst    Adenocarcinoma of pancreas ([address] )    Peripheral neuropathy    Bilateral cellulitis of lower leg    Hyponatremia    Severe protein-calorie malnutrition (HCC)    Anemia    Sacral wound    Generalized weakness    Streptococcal bacteremia    Mesenteric venous thrombosis (HCC)     Past Medical History:   Diagnosis Date    Cancer ([facility] [address] )     pancreatic    Diabetes mellitus ([facility] )     Frequent urination     GERD (gastroesophageal reflux disease)     Hyperlipidemia     Hypertension     Peripheral neuropathy     Weakness     Weight loss      Past Surgical History:   Procedure Laterality Date    APPENDECTOMY      CATARACT EXTRACTION      COLONOSCOPY      SKIN LESION EXCISION      of a wart on right arm    TUNNELED VENOUS PORT PLACEMENT N/A 3/26/2021    Procedure: INSERTION VENOUS PORT (PORT-A-CATH); Surgeon: Erik Negrete MD;  Location:  MAIN OR;  Service: Surgical Oncology     No family history on file    Social History     Socioeconomic History    Marital status:      Spouse name: Not on file    Number of children: Not on file    Years of education: Not on file    Highest education level: Not on file   Occupational History    Not on file   Social Needs    Financial resource strain: Not on file    Food insecurity     Worry: Not on file     Inability: Not on file    Transportation needs     Medical: Not on file     Non-medical: Not on file   Tobacco Use    Smoking status: Former Smoker     Types: Pipe     Quit date: 2/23/2011     Years since quitting: 10 2    Smokeless tobacco: Never Used   Substance and Sexual Activity    Alcohol use: Never     Frequency: Never     Binge frequency: Never    Drug use: Never    Sexual activity: Not on file   Lifestyle    Physical activity     Days per week: Not on file     Minutes per session: Not on file    Stress: Not on file   Relationships    Social connections     Talks on phone: Not on file     Gets together: Not on file     Attends Yazdanism service: Not on file     Active member of club or organization: Not on file     Attends meetings of clubs or organizations: Not on file     Relationship status: Not on file    Intimate partner violence     Fear of current or ex partner: Not on file     Emotionally abused: Not on file     Physically abused: Not on file     Forced sexual activity: Not on file   Other Topics Concern    Not on file   Social History Narrative    Not on file       Current Outpatient Medications:     Accu-Chek FastClix Lancets MISC, USE 1 TO CHECK GLUCOSE TWICE DAILY TO THREE TIMES DAILY, Disp: , Rfl:     Accu-Chek Guide test strip, USE 1 STRIP TWICE DAILY TO THREE TIMES DAILY, Disp: , Rfl:     acetaminophen (TYLENOL) 325 mg tablet, Take 650 mg by mouth every 4 (four) hours as needed for mild pain, Disp: , Rfl:     amLODIPine (NORVASC) 2 5 mg tablet, Take 1 tablet (2 5 mg total) by mouth daily, Disp: 30 tablet, Rfl: 1    aspirin 81 mg chewable tablet, Chew 81 mg daily, Disp: , Rfl:     atorvastatin (LIPITOR) 40 mg tablet, Take 40 mg by mouth daily, Disp: , Rfl:     Blood Glucose Monitoring Suppl (Accu-Chek Guide) w/Device KIT, USE TO CHECK GLUCOSE TWO TO THREE TIMES DAILY, Disp: , Rfl:     cefTRIAXone (ROCEPHIN) 2000 mg IVPB, Infuse 50 mL (2,000 mg total) into a venous catheter every 24 hours for 11 days, Disp: 550 mL, Rfl: 0    insulin glargine (Lantus SoloStar) 100 units/mL injection pen, Inject 8 units at bedtime, Disp: 15 mL, Rfl: 0    metFORMIN (GLUCOPHAGE) 850 mg tablet, TAKE 1 TABLET BY MOUTH TWICE DAILY TAKE WITH MORNING AND EVENING MEAL, Disp: , Rfl:     metoprolol succinate (TOPROL-XL) 100 mg 24 hr tablet, Take 100 mg by mouth daily, Disp: , Rfl:     metroNIDAZOLE (FLAGYL) 500 mg tablet, Take 1 tablet (500 mg total) by mouth every 8 (eight) hours for 10 days, Disp: 30 tablet, Rfl: 0    NovoLOG FlexPen 100 units/mL injection pen, Inject as per sliding scale , (  Scale - 150-200 -1 units, 201-250-2 units, 251-300 -3 units, 301-350-4 units, > 350- 5  units) with meals, Disp: 15 mL, Rfl:     prochlorperazine (COMPAZINE) 10 mg tablet, Take 1 tablet (10 mg total) by mouth every 6 (six) hours as needed for nausea or vomiting, Disp: 45 tablet, Rfl: 3    ramipril (ALTACE) 10 MG capsule, Take 10 mg by mouth daily , Disp: , Rfl:     ReliOn Pen Needles 31G X 6 MM MISC, USE 1 IN THE EVENING, Disp: , Rfl:     rivaroxaban (XARELTO) 15 mg tablet, Take 1 tablet (15 mg total) by mouth 2 (two) times a day with meals for 40 doses, Disp: 40 tablet, Rfl: 0    sodium chloride (GRAHAM 128) 5 % hypertonic ophthalmic solution, 1 drop as needed, Disp: , Rfl:     traMADol (ULTRAM) 50 mg tablet, Take 1 tablet (50 mg total) by mouth every 6 (six) hours as needed for moderate pain, Disp: 15 tablet, Rfl: 0  No Known Allergies    Vitals:    05/20/21 1113   BP: 126/60   Pulse: 103   Temp: 97 8 °F (36 6 °C)       Physical Exam   General: Appears weak, appears stated age  Skin: Warm, anicteric  HEENT: Normocephalic, atraumatic; sclera aniceteric, mucous membranes moist; cervical nodes without adenopathy  Cardiopulmonary: RRR, Easy WOB, no BLE edema  Abd: Flat and soft, nontender  MSK: Symmetric, no cyanosis, no overt weakness  Lymphatic: No cervical, axillary or inguinal lymphadenopathy  Neuro: Affect appropriate, no gross motor abnormalities      Pathology:  Final Diagnosis   A -C  Pancreas, head mass: (Thin-Prep, smears, and cell block)  Malignant (PSC Category VI) - See note  Adenocarcinoma      Note: The above diagnostic category is part of the six-tiered system proposed by The Papan[place] Society of Cytopathology for the reporting of pancreaticobiliary specimens  This proposed scheme provides terminology that correlates the cytologic diagnostic nomenclature with the 2010 WHO classification of pancreatic tumors and standardizes the categorization of the various diseases of the pancreas, some of which are difficult to diagnose specifically by cytology  *The Papan[place]olaou Society of Cytopathology System for Reporting Pancreaticobiliary Cytology: Definitions, Criteria and Explanatory Notes  Jovanny Neri; Cha, 2015      Dr Tati Jackson is notified of the diagnosis in [facility] via [employer] on 2/25/2021  at 1:10 pm    Intradepartmental consultation is in agreement      D E  Pancreas, body cyst: (Thin-Prep and cell block)  Non-Neoplastic ([place] category II)  Mixed inflammatory cells  Labs: Reviewed     Imaging  Ct Chest W Contrast    Result Date: 3/7/2021  Narrative: CT CHEST WITH IV CONTRAST INDICATION:   C25 9: Malignant neoplasm of pancreas, unspecified  COMPARISON:  None  TECHNIQUE: CT examination of the chest was performed  Axial, sagittal, and coronal 2D reformatted images were created from the source data and submitted for interpretation  Radiation dose length product (DLP) for this visit:  211 22 mGy-cm   This examination, like all CT scans performed in the [facility], was performed utilizing techniques to minimize radiation dose exposure, including the use of iterative  reconstruction and automated exposure control  IV Contrast:  85 mL of iohexol (OMNIPAQUE)  350 FINDINGS: LUNGS:  No pulmonary nodule or infiltrate  Minimal linear scar at the lung bases left greater than right  Central airways are clear  PLEURA:  Unremarkable  HEART/GREAT VESSELS:  Heart is not enlarged  No pericardial effusion  Aortic and coronary artery calcification  MEDIASTINUM AND SHAILESH:  Unremarkable  CHEST WALL AND LOWER NECK:   Incidental discovery of one or more thyroid nodule(s) measuring less than 1 5 cm and without suspicious features is noted in this patient who is above 28years old; according to guidelines published in the February 2015 white  paper on incidental thyroid nodules in the Journal of the [employer] of radiology [facility]), no further evaluation is recommended  VISUALIZED STRUCTURES IN THE UPPER ABDOMEN:  Partially visualized cystic mass that appears to be arising from the junction of the pancreatic neck and body measuring at least 9 8 x 8 5 cm   Heterogeneous hypodense in the head and neck of the pancreas measures approximately 3 5 x 3 5 cm image 62 series 601 and image 67 series 2 attributed to recent biopsy proven pancreatic malignancy  Severe atrophy and pancreatic duct dilation in the body and tail of the pancreas  OSSEOUS STRUCTURES:  No acute fracture or osseous destructive lesion identified  Mild degenerative changes of the spine  Impression: Pancreatic head and neck mass measuring approximately 3 5 x 3 5 cm attributed to recent biopsy proven malignancy  No evidence of thoracic metastatic disease  Workstation performed: [id number]     I reviewed the above laboratory and imaging data  I personally reviewed the outside films and reports  Discussion/Summary:       Dex Echeverria is a 58-year-old gentleman with the diagnosis of locally advanced pancreas cancer  At this juncture, he appears to be struggling with chemotherapy  CT scans have revealed overall stability of disease, however, he has developed what appears to be a 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  At this juncture, however, he appears to be relatively in [place] asymptomatic with normal p o  intake, no abdominal pain, and no ongoing infectious symptoms  He is on IV antibiotics for his bacteremia at this juncture  It is unclear what the source of this bacteremia is, though it is possible it originated from this inflammation/fistula of the UGI tract  I discussed with Dex Echeverria that the option at this juncture is to continue with chemotherapy  We will have to see how he tolerates this as well as follow-up scans could following completion of an entire chemotherapy course, 6 mo of treatment  I will see him following completion of chemotherapy with new staging scans  At this juncture, I a m  hesitant to consider surgery given his functional status  We will have to see how he does over the next several months  I will communicate with Dr Emily Dubois

## 2021-05-24 NOTE — PROGRESS NOTES
Hematology/Oncology Outpatient Follow- up Note  Jaydon Vasquez 67 y o  male MRN: @ Encounter: [id number]        Date:  5/24/2021    Presenting Complaint/Diagnosis : Newly diagnosed localized pancreatic cancer  HPI:       Jaydon Vasquez is seen for initial consultation 3/15/2021 regarding  Newly diagnosed pancreatic cancer  The patient presented to [facility] with nausea and vomiting  Workup revealed a pancreatic head mass and a cyst   Biopsy revealed adenocarcinoma  Per the report from the outside hospital he does not have any metastatic disease apart from the disease in his pancreas which is quite infiltrative  He was seen by our colleagues in Surgical Oncology will have recommended neoadjuvant chemotherapy prior to resection  I think this is very reasonable based on the nature and extent of his disease  He is 67years of age with an ECOG performance status of 0-1  We went over various regimens and I think gemcitabine and Abraxane would be best suited for his disease at full dose       Previous Hematologic/ Oncologic History:    Oncology History   Adenocarcinoma of pancreas ([facility] [address] )   3/10/2021 Initial Diagnosis    Adenocarcinoma of pancreas ([facility] [address] )     3/23/2021 -  Chemotherapy    pegfilgrastim (NEULASTA ONPRO) subcutaneous injection kit 6 mg, 6 mg, Subcutaneous, Once, 1 of 1 cycle  PACLitaxel protein bound (ABRAXANE) 235 mg in IVPB 47 mL, 125 mg/m2 = 235 mg, Intravenous, Once, 2 of 2 cycles  Dose modification: 100 mg/m2 (original dose 125 mg/m2, Cycle 2, Reason: Dose Not Tolerated)  Administration: 235 mg (3/23/2021), 235 mg (3/30/2021), 235 mg (4/6/2021), 188 mg (4/20/2021), 188 mg (5/4/2021), 188 mg (4/27/2021)  gemcitabine (GEMZAR) 1,800 mg in sodium chloride 0 9 % 250 mL infusion, 1,879 8 mg, Intravenous, Once, 2 of 6 cycles  Dose modification: 800 mg/m2 (original dose 1,000 mg/m2, Cycle 2, Reason: Dose Not Tolerated)  Administration: 1,800 mg (3/23/2021), 1,800 mg (3/30/2021), 1,800 mg (4/6/2021), 1,504 2 mg (4/20/2021), 1,504 2 mg (5/4/2021), 1,504 2 mg (4/27/2021)         Current Hematologic/ Oncologic Treatment:      Gemcitabine and Abraxane status post 2 cycles  The patient will now be changed to single agent gemcitabine based on performance status and recent hospitalizations    Interval History:       The patient returns for follow-up visit  He is admitted to the hospital for an infection  Imaging at the time showed  Multiple extraluminal foci of air adjacent to the pylorus/proximal duodenum extending into the region of the pancreatic head with a suggestion of a small fistulous tract arising from the duodenal bulb/pylorus  Previous seen 10 cm cystic mass adjacent to the pancreatic head is no longer seen according to the report  There was a stable cystic lesion of the pancreatic head with severe pancreatic duct dilatation and multiple side branch pancreatic cyst lesions  The patient was seen by our colleagues in Surgical Oncology  On the 20th of May  Where they were hesitant to operate at this time and recommended 6 months of neoadjuvant treatment after which they could reassess for the possibility of surgery  Again based on his performance status right now I do not think he is a candidate especially looking at his disease date  We will obviously decrease his chemotherapy to single agent gemcitabine and also dose reduce this  This is based on the risk for infection and this fistulous tract with potential complications  Once the patient's performance status improves we will consider adding on Abraxane or even switching to modified FOLFOX 6  As far symptoms today are concerned he is recovering from his hospitalization  Still feels fatigued  Is on oral antibiotics at this point  Denies any nausea or vomiting  Denies any diarrhea  Is staying hydrated  Is able to eat and has an appetite  His blood pressure was low today and he states he was slightly dizzy in the morning    I suspect this is related to his blood pressure medication and I advised him to talk primary care physician as soon as possible about decreasing this  He does have an appointment with them later this week  Otherwise his 14 point review of systems today was negative  Test Results:    Imaging: Xr Chest 1 View Portable    Result Date: 5/7/2021  Narrative: CHEST INDICATION:   Shortness of breath and fatigue  COMPARISON:  4/8/2021 EXAM PERFORMED/VIEWS:  XR CHEST PORTABLE FINDINGS: Cardiomediastinal silhouette appears unremarkable  The lungs are clear  No pneumothorax or pleural effusion  Ayjhlr-d-Mrch catheter positioning appears unchanged     Impression: No acute cardiopulmonary disease  Workstation performed: [id number]     Ct Facial Bones W Contrast    Result Date: 5/11/2021  Narrative: CT FACIAL SOFT TISSUES WITH INTRAVENOUS CONTRAST INDICATION:   Dentofacial anomaly bacteremia, ? dental source  COMPARISON: None  TECHNIQUE:  Axial images through the facial soft tissues were performed  Reformatted images were created in multiple planes  Radiation dose length product (DLP) for this visit:  466 05 mGy-cm   This examination, like all CT scans performed in the [facility], was performed utilizing techniques to minimize radiation dose exposure, including the use of iterative  reconstruction and automated exposure control  IV Contrast:  85 mL of iohexol (OMNIPAQUE) IMAGE QUALITY:  Diagnostic  FINDINGS: SOFT TISSUES: No discrete soft tissue mass  No signs of soft tissue infection  No abscess formation  No hematoma  DENTITION: Examination limited by artifact from patient's dental work  There is no large cavity or periapical lucency identified to suggest dental source for infection  FACIAL BONES: There is no facial bone fracture identified  No discrete lytic or blastic lesion  No destructive lesions  ORBITS: Normal globes  Preseptal and retrobulbar soft tissues are unremarkable   SINUSES: The paranasal sinuses are clear  Impression: Unremarkable CT of the facial bones  Workstation performed: [id number]     Ct Chest Abdomen Pelvis W Contrast    Result Date: 5/15/2021  Narrative: CT CHEST, ABDOMEN AND PELVIS WITH IV CONTRAST INDICATION:   Sepsis; Neoplasm: pancreas; Bacteremia; bacteremia/leukocytosis  pancreatic ca  COMPARISON:  Contrast-enhanced CT abdomen pelvis May 9, 2021  TECHNIQUE: CT examination of the chest, abdomen and pelvis was performed  Axial, sagittal, and coronal 2D reformatted images were created from the source data and submitted for interpretation  Radiation dose length product (DLP) for this visit:  640 9 mGy-cm   This examination, like all CT scans performed in the [facility], was performed utilizing techniques to minimize radiation dose exposure, including the use of iterative reconstruction and automated exposure control  IV Contrast:  100 mL of iohexol (OMNIPAQUE) Enteric Contrast: Enteric contrast was administered  50 mL of iohexol (OMNIPAQUE) FINDINGS: CHEST LUNGS:  Lungs are clear  There is no tracheal or endobronchial lesion  PLEURA:  Unremarkable  HEART/GREAT VESSELS:  Unremarkable for patient's age  MEDIASTINUM AND SHAILESH:  The heart is mildly enlarged  Coronary artery calcifications  No evidence of a pericardial effusion  Atherosclerotic changes are present in the aortic arch  CHEST WALL AND LOWER NECK:   Unremarkable  ABDOMEN LIVER/BILIARY TREE:  One or more subcentimeter sharply circumscribed low-density hepatic lesion(s) are noted, too small to accurately characterize, but statistically most likely to represent subcentimeter hepatic cysts  No suspicious solid hepatic lesion is identified  Hepatic contours are normal   No biliary dilatation  GALLBLADDER: Incompletely distended  Inadequately evaluated  No calcified gallstones  No pericholecystic inflammatory change  SPLEEN:  Unremarkable   PANCREAS: Again identified is a cystic mass in the pancreatic head measuring approximately 3 5 x 3 cm (series 2, image 71)  This appears similar to the most recent prior study  Stable additional cystic lesion of the pancreatic head, measuring 12 mm (series 2, image 76)  The previously identified 10 cm cystic mass adjacent to the pancreatic head, displacing the stomach anteriorly, is no longer seen  There are multiple small foci of air adjacent to the pancreatic head (series 2, image 69)  There is a suggestion of a fistula extending from the proximal duodenum/pylorus  The pancreas is atrophic  Stable severe pancreatic duct dilatation  Multiple cystic lesions arising from the pancreatic duct, similar to the prior studies  ADRENAL GLANDS:  Unremarkable  KIDNEYS/URETERS:  Symmetric enhancement  No renal calyceal or ureteric calculi  Mild caliectasis and ureterectasis  STOMACH AND BOWEL:  The stomach is decompressed  There is a hiatal hernia  There is mild edema of the walls of the pylorus and proximal duodenum  There are multiple extraluminal foci of air adjacent to the pylorus and pancreatic head  There is a suggestion of a small fistulous tract arising from the proximal duodenum/pylorus (series 2, image 72) The proximal small bowel is distended and filled with oral contrast material   Distal small bowel is distended with fluid and feces  The proximal colon is severely distended and filled with both feces and air, up to the splenic flexure  The transverse colon is redundant and tortuous  Distally, the descending colon and sigmoid colon are normally distended and feces filled  Scattered  diverticula of the descending colon and sigmoid colon  Nothing to suggest acute diverticulitis  APPENDIX: Not clearly identified  No findings to suggest appendicitis  ABDOMINOPELVIC CAVITY:  No ascites  No pneumoperitoneum  No lymphadenopathy  VESSELS:  Atherosclerotic changes are present in the abdominal aorta and its branch vessels   There is severe narrowing at the confluence of portal vein with the splenic, superior mesenteric and inferior mesenteric veins (series 601, image 83; series 2, image 69)  PELVIS REPRODUCTIVE ORGANS:  The prostate gland is enlarged and heterogeneous in CT density  URINARY BLADDER:  Markedly distended  ABDOMINAL WALL/INGUINAL REGIONS:  Unremarkable  OSSEOUS STRUCTURES:  No acute fracture or destructive osseous lesion  Spinal degenerative changes are noted  Impression: 1  Multiple extraluminal foci of air adjacent to the pylorus/proximal duodenum, extending into the region of the pancreatic head  There is a suggestion of a small fistulous tract arising from the duodenal bulb/pylorus (series 2, image 72)  Correlate for perforated ulcer versus postoperative changes, given the previously identified 10 cm cystic mass adjacent to the pancreatic head is no longer seen  Clinical correlation is recommended  2   Stable cystic lesions of the pancreatic head with severe pancreatic duct dilatation and multiple side branch pancreatic cystic lesions  3   Mild bilateral caliectasis and ureterectasis, likely on the basis of vesicoureteral reflux disease  4   Moderate constipation  Transition zone at the level of the splenic flexure of the colon, likely due to peristalsis  If there is concern for colonic lesion, consider follow-up colonoscopy  I personally discussed this study with Eric Rizvi on 5/15/2021 at 10:10 AM  Workstation performed: [id number]     Ct Abdomen Pelvis W Contrast    Result Date: 5/10/2021  Narrative: CT ABDOMEN AND PELVIS WITH IV CONTRAST INDICATION:   Sepsis Strep anginosis bacteremia in patient w/ pancreatic adenoCa on neoadjuvant chemo  R/O intra-abd abscess  COMPARISON:  Outside CT from 1/28/2021 and MRI from 1/29/2021  TECHNIQUE:  CT examination of the abdomen and pelvis was performed  Axial, sagittal, and coronal 2D reformatted images were created from the source data and submitted for interpretation   Radiation dose length product (DLP) for this visit:  261 35 mGy-cm   This examination, like all CT scans performed in the [facility], was performed utilizing techniques to minimize radiation dose exposure, including the use of iterative  reconstruction and automated exposure control  IV Contrast:  100 mL of iohexol (OMNIPAQUE) Enteric Contrast:  Enteric contrast was not administered  FINDINGS: ABDOMEN LOWER CHEST:  No clinically significant abnormality identified in the visualized lower chest  LIVER/BILIARY TREE:  Unremarkable  GALLBLADDER:  No calcified gallstones  No pericholecystic inflammatory change  The common bile duct does not appear dilated  SPLEEN:  Unremarkable  PANCREAS:  There is diffuse dilatation of the pancreatic duct with cystic appearance probably related to side duct dilatation and/or the presence of main duct IPMN  There is atrophy of the parenchyma and overlying the body and tail similar to the prior CT from January  There is ill-defined soft tissue in the pancreatic head is likely related to patient's underlying tumor similar to the prior study  This area measures approximately 3 5 x 3 cm on image 25  This abuts the portal splenic confluence  Additional cystic areas noted in the pancreatic head on image 30 measuring 1 5 cm not seen on the prior study  Fat does surround the SMA  ADRENAL GLANDS:  Unremarkable  KIDNEYS/URETERS:  Unremarkable  No hydronephrosis  STOMACH AND BOWEL: Distention of the stomach is seen with fluid  No small bowel dilatation is identified  There is mild distention of the colon with fecal stasis identified  APPENDIX:  There are expected postoperative changes of appendectomy  No obvious collection is seen  ABDOMINOPELVIC CAVITY:  No ascites  No pneumoperitoneum  No lymphadenopathy  VESSELS:  Unremarkable for patient's age  Tiny filling defect is noted at the junction of the SMV and portal confluence on image 23    This area was larger on the prior study and may represent a small amount of thrombus  Splenic vein and portal vein are  otherwise patent  Atherosclerotic changes of the aorta and iliacs are noted without aneurysmal dilatation  PELVIS REPRODUCTIVE ORGANS:  Unremarkable for patient's age  URINARY BLADDER:  Unremarkable  ABDOMINAL WALL/INGUINAL REGIONS: Punctate gas density in the lower abdominal walls probably related to injection sites  OSSEOUS STRUCTURES:  No acute fracture or destructive osseous lesion  The vertebral endplates are seen  Impression: No evidence of collection  Mass in the pancreatic head is suspicious for adenocarcinoma similar to the study from January  Cystic appearance of the distal pancreas may be related to ductal dilatation and/or main duct IPMN with pancreatic parenchymal atrophy noted  New cystic area is noted inferiorly in the pancreatic head  Small amount of thrombus appears to be evident at the portosplenic confluence  Workstation performed: [id number]       Labs:   Lab Results   Component Value Date    WBC 12 46 (H) 05/17/2021    HGB 8 8 (L) 05/17/2021    HCT 26 8 (L) 05/17/2021    MCV 97 05/17/2021     (H) 05/17/2021     Lab Results   Component Value Date    K 4 8 05/17/2021    CL 99 (L) 05/17/2021    CO2 22 05/17/2021    BUN 19 05/17/2021    CREATININE 0 77 05/17/2021    GLUCOSE 256 (H) 04/08/2021    GLUF 178 (H) 05/03/2021    CALCIUM 7 6 (L) 05/17/2021    CORRECTEDCA 8 9 05/15/2021    AST 30 05/15/2021    ALT 53 05/15/2021    ALKPHOS 134 (H) 05/15/2021    EGFR 88 05/17/2021     Lab Results   Component Value Date    IRON 24 (L) 04/10/2021    TIBC 155 (L) 04/10/2021    FERRITIN 994 (H) 04/10/2021       ROS: As stated in the history of present illness otherwise his 14 point review of systems today was negative        Active Problems:   Patient Active Problem List   Diagnosis    Hyperlipidemia    Essential hypertension    Type 2 diabetes mellitus with hyperglycemia, with long-term current use of insulin (HCC)    Gastroesophageal reflux disease  Pancreatic cyst    Adenocarcinoma of pancreas (HCC)    Peripheral neuropathy    Bilateral cellulitis of lower leg    Hyponatremia    Severe protein-calorie malnutrition (HCC)    Anemia    Sacral wound    Generalized weakness    Streptococcal bacteremia    Mesenteric venous thrombosis (HCC)       Past Medical History:   Past Medical History:   Diagnosis Date    Cancer ([facility] )     pancreatic    Diabetes mellitus ([facility] )     Frequent urination     GERD (gastroesophageal reflux disease)     Hyperlipidemia     Hypertension     Peripheral neuropathy     Weakness     Weight loss        Surgical History:   Past Surgical History:   Procedure Laterality Date    APPENDECTOMY      CATARACT EXTRACTION      COLONOSCOPY      SKIN LESION EXCISION      of a wart on right arm    TUNNELED VENOUS PORT PLACEMENT N/A 3/26/2021    Procedure: INSERTION VENOUS PORT (PORT-A-CATH); Surgeon: Keren Ruggiero MD;  Location:  MAIN OR;  Service: Surgical Oncology       Family History:  No family history on file  Cancer-related family history is not on file      Social History:   Social History     Socioeconomic History    Marital status:      Spouse name: Not on file    Number of children: Not on file    Years of education: Not on file    Highest education level: Not on file   Occupational History    Not on file   Social Needs    Financial resource strain: Not on file    Food insecurity     Worry: Not on file     Inability: Not on file   [employer] needs     Medical: Not on file     Non-medical: Not on file   Tobacco Use    Smoking status: Former Smoker     Types: Pipe     Quit date: 2/23/2011     Years since quitting: 10 2    Smokeless tobacco: Never Used   Substance and Sexual Activity    Alcohol use: Never     Frequency: Never     Binge frequency: Never    Drug use: Never    Sexual activity: Not on file   Lifestyle    Physical activity     Days per week: Not on file     Minutes per session: Not on file    Stress: Not on file   Relationships    Social connections     Talks on phone: Not on file     Gets together: Not on file     Attends Hinduism service: Not on file     Active member of club or organization: Not on file     Attends meetings of clubs or organizations: Not on file     Relationship status: Not on file    Intimate partner violence     Fear of current or ex partner: Not on file     Emotionally abused: Not on file     Physically abused: Not on file     Forced sexual activity: Not on file   Other Topics Concern    Not on file   Social History Narrative    Not on file       Current Medications:   Current Outpatient Medications   Medication Sig Dispense Refill    Accu-Chek FastClix Lancets MISC USE 1 TO CHECK GLUCOSE TWICE DAILY TO THREE TIMES DAILY      Accu-Chek Guide test strip USE 1 STRIP TWICE DAILY TO THREE TIMES DAILY      acetaminophen (TYLENOL) 325 mg tablet Take 650 mg by mouth every 4 (four) hours as needed for mild pain      amLODIPine (NORVASC) 2 5 mg tablet Take 1 tablet (2 5 mg total) by mouth daily 30 tablet 1    aspirin 81 mg chewable tablet Chew 81 mg daily      atorvastatin (LIPITOR) 40 mg tablet Take 40 mg by mouth daily      Blood Glucose Monitoring Suppl (Accu-Chek Guide) w/Device KIT USE TO CHECK GLUCOSE TWO TO THREE TIMES DAILY      insulin glargine (Lantus SoloStar) 100 units/mL injection pen Inject 8 units at bedtime 15 mL 0    metFORMIN (GLUCOPHAGE) 850 mg tablet TAKE 1 TABLET BY MOUTH TWICE DAILY TAKE WITH MORNING AND EVENING MEAL      metoprolol succinate (TOPROL-XL) 100 mg 24 hr tablet Take 100 mg by mouth daily      metroNIDAZOLE (FLAGYL) 500 mg tablet Take 1 tablet (500 mg total) by mouth every 8 (eight) hours for 10 days 30 tablet 0    NovoLOG FlexPen 100 units/mL injection pen Inject as per sliding scale , (  Scale - 150-200 -1 units, 201-250-2 units, 251-300 -3 units, 301-350-4 units, > 350- 5  units) with meals 15 mL     prochlorperazine (COMPAZINE) 10 mg tablet Take 1 tablet (10 mg total) by mouth every 6 (six) hours as needed for nausea or vomiting 45 tablet 3    ramipril (ALTACE) 10 MG capsule Take 10 mg by mouth daily       ReliOn Pen Needles 31G X 6 MM MISC USE 1 IN THE EVENING      rivaroxaban (XARELTO) 15 mg tablet Take 1 tablet (15 mg total) by mouth 2 (two) times a day with meals for 40 doses 40 tablet 0    sodium chloride (GRAHAM 128) 5 % hypertonic ophthalmic solution 1 drop as needed      traMADol (ULTRAM) 50 mg tablet Take 1 tablet (50 mg total) by mouth every 6 (six) hours as needed for moderate pain 15 tablet 0     No current facility-administered medications for this visit  Allergies: No Known Allergies    Physical Exam:    Body surface area is 1 85 meters squared  Wt Readings from Last 3 Encounters:   05/24/21 66 7 kg (147 lb)   05/07/21 64 4 kg (142 lb)   05/05/21 64 4 kg (142 lb)        Temp Readings from Last 3 Encounters:   05/24/21 97 8 °F (36 6 °C) (Temporal)   05/20/21 97 8 °F (36 6 °C) (Tympanic)   05/17/21 97 7 °F (36 5 °C) (Oral)        BP Readings from Last 3 Encounters:   05/24/21 (!) 96/42   05/20/21 126/60   05/17/21 129/60         Pulse Readings from Last 3 Encounters:   05/24/21 95   05/20/21 103   05/17/21 84           Physical Exam     Constitutional   General appearance: No acute distress, well appearing and well nourished  Eyes   Conjunctiva and lids: No swelling, erythema or discharge  Pupils and irises: Equal, round and reactive to light  Ears, Nose, Mouth, and Throat   External inspection of ears and nose: Normal     Nasal mucosa, septum, and turbinates: Normal without edema or erythema  Oropharynx: Normal with no erythema, edema, exudate or lesions  Pulmonary   Respiratory effort: No increased work of breathing or signs of respiratory distress  Auscultation of lungs: Clear to auscultation  Cardiovascular   Palpation of heart: Normal PMI, no thrills  Auscultation of heart: Normal rate and rhythm, normal S1 and S2, without murmurs  Examination of extremities for edema and/or varicosities: Normal     Carotid pulses: Normal     Abdomen   Abdomen: Non-tender, no masses  Liver and spleen: No hepatomegaly or splenomegaly  Lymphatic   Palpation of lymph nodes in neck: No lymphadenopathy  Musculoskeletal   Gait and station: Normal     Digits and nails: Normal without clubbing or cyanosis  Inspection/palpation of joints, bones, and muscles: Normal     Skin   Skin and subcutaneous tissue: Normal without rashes or lesions  Neurologic   Cranial nerves: Cranial nerves 2-12 intact  Sensation: No sensory loss  Psychiatric   Orientation to person, place, and time: Normal     Mood and affect: Normal         Assessment / Plan:      The patient is a pleasant 21-year-old male with newly diagnosed adenocarcinoma of the pancreas which appears to be localized to the pancreas  He was seen by our colleagues in Surgical Oncology and they recommended neoadjuvant treatment  He has had 2 cycles of gemcitabine and Abraxane so far both with complications and hospitalizations  His most recent imaging shows a question of a fistulous tract  And he was admitted to the hospital with an infection and was on IV antibiotics even as an outpatient  He has now transitioned to oral antibiotics  At this point I do not think he is a good candidate for gemcitabine and Abraxane together based on the multiple complications and hospitalizations he has had since starting treatment  I will drop his chemotherapy to single agent gemcitabine with a dose reduction  Hopefully he will be able to tolerate this  I will give him Neulasta growth factor support until we see his counts are stable because of the risk of infection and his fistula    If his performance status improves I will add on Abraxane or switch him to modified FOLFOX 6 but for now I think he will only be able to tolerate gemcitabine single agent  The patient agrees with this assessment as does his   I will see him back in a month  We will adjust his chemotherapy  He will get Neulasta growth factor support  If his blood counts run high we will discontinue the growth factor  Goals and Barriers:  Current Goal:  Prolong Survival from Adenocarcinoma of the pancreas  Barriers: None  Patient's Capacity to Self Care:  Patient able to self care  Portions of the record may have been created with voice recognition software   Occasional wrong word or "sound a like" substitutions may have occurred due to the inherent limitations of voice recognition software   Read the chart carefully and recognize, using context, where substitutions have occurred

## 2021-05-26 NOTE — PATIENT INSTRUCTIONS
Nutrition Rx & Recommendations:  · Diet: Diabetic, High Calorie & High Protein Diet  · Eat slowly and chew food thoroughly before swallowing  · Small, frequent meals/snacks may be easier to tolerate than 3 large daily meals  Aim for 5-6 small meals per day (every 2-3 hours)  · Include protein at all meals/snacks  · Stay hydrated by sipping fluids of choice/tolerance throughout the day  · Liquid nutrition may be better tolerated than solids at times  · Alter food choices and eating patterns to accommodate changing needs  · Refer to Eating Hints book for other meal/snack ideas and symptom management  · For weight loss: monitor your weight at home at least 2x/week, record your weight, start by adding 250-500 extra calories per day, eat 5-6 small scheduled meals every 2-3 hrs, choose foods that are high in protein and calories (see pages 49-53 in your Eating Hints book), drink liquids with calories for example: milkshakes/smoothies/juice/soup/whole milk/chocolate milk, cook with protein fortified milk (see recipe on page 36 in your Eating Hints book), consider ready-to-drink oral nutrition supplements such as Ensure Plus, Ensure Enlive, Boost Plus, or Boost Very High Calorie, avoid "diet" and "light" foods when possible, avoid drinking too much with meals, contact your dietitian with any continued weight loss over the course of 1 week  For more info see pages 35-37 in your Eating Hints book  · Weigh yourself regularly  If you notice weight loss, make an effort to increase your daily food/calorie intake  If you continue to notice loss after these efforts, reach out to your dietitian to establish a plan to stabilize weight  · Avoid gas-producing foods such as broccoli, cabbage, sauerkraut, Artesian sprouts, cauliflower, corn, cucumbers, onions, peppers, beans, peas, lentils, apples, apple juice, avocado, and melons  Carbonated drinks, chewing gum, and drinking through a straw can also cause gas    Limiting lactose may help for those who are sensitive to milk products  · Fluids: Increase your fluid intake to 80-95 oz per day  Try to choose fluids with calories when possible: 1% milk, Glucerna shakes, etc   Sip your fluids throughout the day  Limit large quantities of fluid before bed to avoid overnight urination  · Nutrition Supplements: Increase Glucerna to 2 per day (with AM and afternoon snacks)  · 2-3 light [place] yogurt's per day  Choose a yogurt that has <10 grams of sugar per serving      Follow Up Plan: 6/23 at 10am  Recommend Referral to Other Providers: none at this time

## 2021-05-30 PROBLEM — R42 DIZZINESS: Status: ACTIVE | Noted: 2021-01-01

## 2021-05-30 PROBLEM — E87.2 LACTIC ACIDOSIS: Status: RESOLVED | Noted: 2021-01-01 | Resolved: 2021-01-01

## 2021-05-30 NOTE — PLAN OF CARE
Problem: Potential for Falls  Goal: Patient will remain free of falls  Description: INTERVENTIONS:  - Assess patient frequently for physical needs  -  Identify cognitive and physical deficits and behaviors that affect risk of falls    -  [place] fall precautions as indicated by assessment   - Educate patient/family on patient safety including physical limitations  - Instruct patient to call for assistance with activity based on assessment  - Modify environment to reduce risk of injury  - Consider OT/PT consult to assist with strengthening/mobility  Outcome: Progressing     Problem: Prexisting or High Potential for Compromised Skin Integrity  Goal: Skin integrity is maintained or improved  Description: INTERVENTIONS:  - Identify patients at risk for skin breakdown  - Assess and monitor skin integrity  - Assess and monitor nutrition and hydration status  - Monitor labs   - Assess for incontinence   - Turn and reposition patient  - Assist with mobility/ambulation  - Relieve pressure over bony prominences  - Avoid friction and shearing  - Provide appropriate hygiene as needed including keeping skin clean and dry  - Evaluate need for skin moisturizer/barrier cream  - Collaborate with interdisciplinary team   - Patient/family teaching  - Consider wound care consult   Outcome: Progressing     Problem: PAIN - ADULT  Goal: Verbalizes/displays adequate comfort level or baseline comfort level  Description: Interventions:  - Encourage patient to monitor pain and request assistance  - Assess pain using appropriate pain scale  - Administer analgesics based on type and severity of pain and evaluate response  - Implement non-pharmacological measures as appropriate and evaluate response  - Consider cultural and social influences on pain and pain management  - Notify physician/advanced practitioner if interventions unsuccessful or patient reports new pain  Outcome: Progressing     Problem: INFECTION - ADULT  Goal: Absence or prevention of progression during hospitalization  Description: INTERVENTIONS:  - Assess and monitor for signs and symptoms of infection  - Monitor lab/diagnostic results  - Monitor all insertion sites, i e  indwelling lines, tubes, and drains  - Monitor endotracheal if appropriate and nasal secretions for changes in amount and color  - [place] appropriate cooling/warming therapies per order  - Administer medications as ordered  - Instruct and encourage patient and family to use good hand hygiene technique  - Identify and instruct in appropriate isolation precautions for identified infection/condition  Outcome: Progressing  Goal: Absence of fever/infection during neutropenic period  Description: INTERVENTIONS:  - Monitor WBC    Outcome: Progressing     Problem: SAFETY ADULT  Goal: Patient will remain free of falls  Description: INTERVENTIONS:  - Assess patient frequently for physical needs  -  Identify cognitive and physical deficits and behaviors that affect risk of falls    -  [place] fall precautions as indicated by assessment   - Educate patient/family on patient safety including physical limitations  - Instruct patient to call for assistance with activity based on assessment  - Modify environment to reduce risk of injury  - Consider OT/PT consult to assist with strengthening/mobility  Outcome: Progressing  Goal: Maintain or return to baseline ADL function  Description: INTERVENTIONS:  -  Assess patient's ability to carry out ADLs; assess patient's baseline for ADL function and identify physical deficits which impact ability to perform ADLs (bathing, care of mouth/teeth, toileting, grooming, dressing, etc )  - Assess/evaluate cause of self-care deficits   - Assess range of motion  - Assess patient's mobility; develop plan if impaired  - Assess patient's need for assistive devices and provide as appropriate  - Encourage maximum independence but intervene and supervise when necessary  - Involve family in performance of ADLs  - Assess for home care needs following discharge   - Consider OT consult to assist with ADL evaluation and planning for discharge  - Provide patient education as appropriate  Outcome: Progressing  Goal: Maintain or return mobility status to optimal level  Description: INTERVENTIONS:  - Assess patient's baseline mobility status (ambulation, transfers, stairs, etc )    - Identify cognitive and physical deficits and behaviors that affect mobility  - Identify mobility aids required to assist with transfers and/or ambulation (gait belt, sit-to-stand, lift, walker, cane, etc )  - [place] fall precautions as indicated by assessment  - Record patient progress and toleration of activity level on Mobility SBAR; progress patient to next Phase/Stage  - Instruct patient to call for assistance with activity based on assessment  - Consider rehabilitation consult to assist with strengthening/weightbearing, etc   Outcome: Progressing     Problem: DISCHARGE PLANNING  Goal: Discharge to home or other facility with appropriate resources  Description: INTERVENTIONS:  - Identify barriers to discharge w/patient and caregiver  - Arrange for needed discharge resources and transportation as appropriate  - Identify discharge learning needs (meds, wound care, etc )  - Arrange for interpretive services to assist at discharge as needed  - Refer to Case Management Department for coordinating discharge planning if the patient needs post-hospital services based on physician/advanced practitioner order or complex needs related to functional status, cognitive ability, or social support system  Outcome: Progressing     Problem: Knowledge Deficit  Goal: Patient/family/caregiver demonstrates understanding of disease process, treatment plan, medications, and discharge instructions  Description: Complete learning assessment and assess knowledge base    Interventions:  - Provide teaching at level of understanding  - Provide teaching via preferred learning methods  Outcome: Progressing     Problem: CARDIOVASCULAR - ADULT  Goal: Maintains optimal cardiac output and hemodynamic stability  Description: INTERVENTIONS:  - Monitor I/O, vital signs and rhythm  - Monitor for S/S and trends of decreased cardiac output  - Administer and titrate ordered vasoactive medications to optimize hemodynamic stability  - Assess quality of pulses, skin color and temperature  - Assess for signs of decreased coronary artery perfusion  - Instruct patient to report change in severity of symptoms  Outcome: Progressing  Goal: Absence of cardiac dysrhythmias or at baseline rhythm  Description: INTERVENTIONS:  - Continuous cardiac monitoring, vital signs, obtain 12 lead EKG if ordered  - Administer antiarrhythmic and heart rate control medications as ordered  - Monitor electrolytes and administer replacement therapy as ordered  Outcome: Progressing     Problem: METABOLIC, FLUID AND ELECTROLYTES - ADULT  Goal: Electrolytes maintained within normal limits  Description: INTERVENTIONS:  - Monitor labs and assess patient for signs and symptoms of electrolyte imbalances  - Administer electrolyte replacement as ordered  - Monitor response to electrolyte replacements, including repeat lab results as appropriate  - Instruct patient on fluid and nutrition as appropriate  Outcome: Progressing  Goal: Fluid balance maintained  Description: INTERVENTIONS:  - Monitor labs   - Monitor I/O and WT  - Instruct patient on fluid and nutrition as appropriate  - Assess for signs & symptoms of volume excess or deficit  Outcome: Progressing  Goal: Glucose maintained within target range  Description: INTERVENTIONS:  - Monitor Blood Glucose as ordered  - Assess for signs and symptoms of hyperglycemia and hypoglycemia  - Administer ordered medications to maintain glucose within target range  - Assess nutritional intake and initiate nutrition service referral as needed  Outcome: Progressing

## 2021-05-30 NOTE — ASSESSMENT & PLAN NOTE
· Adenocarcinoma of the pancreas dx in March 2021  · Patient recent seen by Dr Raul Thompson as outpatient 5/24   · Seen by Surgical Oncology who recommended neoadjuvant treatment  · Patient has had 2 cycles of gemcitabine and Abraxane so far both with complications and hospitalizations  · "At this point I do not think he is a good candidate for gemcitabine and Abraxane together based on the multiple complications and hospitalizations he has had since starting treatment  I will drop his chemotherapy to single agent gemcitabine with a dose reduction  Hopefully he will be able to tolerate this  I will give him Neulasta growth factor support until we see his counts are stable because of the risk of 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

## 2021-05-30 NOTE — ASSESSMENT & PLAN NOTE
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

## 2021-05-30 NOTE — ED PROVIDER NOTES
History  Chief Complaint   Patient presents with    Dizziness     Patient states that he started feeling dizzy this morning     67year old male presents for evaluation of lightheadedness and dizziness beginning this morning  Patient states he woke with mild lightheadedness, but just before breakfast, became dizzy with transient blurring of his vision  Patient states he has had similar symptoms with hypoglycemia, but had checked his blood glucose and found it to be 140  Patient last gave himself insulin prior to breakfast this morning  Patient has history of pancreatic cancer with last chemotherapy 2 weeks ago  Next session scheduled for next week  Patient has not been able to be vaccinated against COVID due to his poor health  Patient states he has a chronic sporadic cough which is unchanged  No fevers  No chest pain, back pain or abdominal pain  No nausea, vomiting or diarrhea  No dysuria or frequency  Patient had been admitted to this hospital 5/7/21-5/17/21 for hyponatremia, Streptococcus anginosus bacteremia treated with rocephin and flagyl  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 Taking?    Accu-Chek FastClix Lancets MISC  Self Yes No   Sig: USE 1 TO CHECK GLUCOSE TWICE DAILY TO THREE TIMES DAILY   Accu-Chek Guide test strip  Self Yes No   Sig: USE 1 STRIP TWICE DAILY TO THREE TIMES DAILY   Blood Glucose Monitoring Suppl (Accu-Chek Guide) w/Device KIT  Self Yes No   Sig: USE TO CHECK GLUCOSE TWO TO THREE TIMES DAILY   NovoLOG FlexPen 100 units/mL injection pen  Self No No   Sig: Inject as per sliding scale , (  Scale - 150-200 -1 units, 201-250-2 units, 251-300 -3 units, 301-350-4 units, > 350- 5  units) with meals   ReliOn Pen Needles 31G X 6 MM MISC  Self Yes No   Sig: USE 1 IN THE EVENING   acetaminophen (TYLENOL) 325 mg tablet  Self Yes No   Sig: Take 650 mg by mouth every 4 (four) hours as needed for mild pain   amLODIPine (NORVASC) 2 5 mg tablet   No No   Sig: Take 1 tablet (2 5 mg total) by mouth daily   aspirin 81 mg chewable tablet  Self Yes No   Sig: Chew 81 mg daily   atorvastatin (LIPITOR) 40 mg tablet  Self Yes No   Sig: Take 40 mg by mouth daily   insulin glargine (Lantus SoloStar) 100 units/mL injection pen  Self No No   Sig: Inject 8 units at bedtime   metFORMIN (GLUCOPHAGE) 850 mg tablet  Self Yes No   Sig: TAKE 1 TABLET BY MOUTH TWICE DAILY TAKE WITH MORNING AND EVENING MEAL   metoprolol succinate (TOPROL-XL) 100 mg 24 hr tablet  Self Yes No   Sig: Take 100 mg by mouth daily   prochlorperazine (COMPAZINE) 10 mg tablet  Self No No   Sig: Take 1 tablet (10 mg total) by mouth every 6 (six) hours as needed for nausea or vomiting   ramipril (ALTACE) 10 MG capsule  Self Yes No   Sig: Take 10 mg by mouth daily    rivaroxaban (XARELTO) 15 mg tablet   No No   Sig: Take 1 tablet (15 mg total) by mouth 2 (two) times a day with meals for 40 doses   sodium chloride (GRAHAM 128) 5 % hypertonic ophthalmic solution  Self Yes No   Si drop as needed   traMADol (ULTRAM) 50 mg tablet  Self No No   Sig: Take 1 tablet (50 mg total) by mouth every 6 (six) hours as needed for moderate pain      Facility-Administered Medications: None       Past Medical History:   Diagnosis Date    Cancer ([facility] [address] )     pancreatic    Diabetes mellitus (HCC)     Frequent urination     GERD (gastroesophageal reflux disease)     Hyperlipidemia     Hypertension     Peripheral neuropathy     Weakness     Weight loss        Past Surgical History:   Procedure Laterality Date    APPENDECTOMY      CATARACT EXTRACTION      COLONOSCOPY      SKIN LESION EXCISION      of a wart on right arm    TUNNELED VENOUS PORT PLACEMENT N/A 3/26/2021    Procedure: INSERTION VENOUS PORT (PORT-A-CATH); Surgeon: Sunil Randle MD;  Location:  MAIN OR;  Service: Surgical Oncology       History reviewed  No pertinent family history  I have reviewed and agree with the history as documented  E-Cigarette/Vaping    E-Cigarette Use Never User      E-Cigarette/Vaping Substances    Nicotine No     THC No     CBD No     Flavoring No     Other No     Unknown No      Social History     Tobacco Use    Smoking status: Former Smoker     Types: Pipe     Quit date: 2/23/2011     Years since quitting: 10 2    Smokeless tobacco: Never Used   Substance Use Topics    Alcohol use: Never     Frequency: Never     Binge frequency: Never    Drug use: Never       Review of Systems   Constitutional: Positive for fatigue  Negative for appetite change, diaphoresis and fever  HENT: Negative for congestion and sore throat  Eyes: Positive for visual disturbance  Respiratory: Positive for cough (chronic)  Negative for shortness of breath  Cardiovascular: Negative for chest pain and leg swelling  Gastrointestinal: Negative for abdominal pain, constipation, diarrhea, nausea and vomiting  Musculoskeletal: Negative for myalgias  Skin: Negative for rash and wound  Neurological: Positive for dizziness and light-headedness  Negative for syncope and headaches  All other systems reviewed and are negative  Physical Exam  Physical Exam  Vitals signs and nursing note reviewed  Constitutional:       General: He is not in acute distress  Appearance: He is well-developed  He is ill-appearing   He is not toxic-appearing or diaphoretic  HENT:      Head: Normocephalic and atraumatic  Right Ear: External ear normal       Left Ear: External ear normal       Nose: Nose normal       Mouth/Throat:      Mouth: Mucous membranes are dry  Eyes:      General: No scleral icterus  Cardiovascular:      Rate and Rhythm: Normal rate and regular rhythm  Pulses: Normal pulses  Pulmonary:      Effort: Pulmonary effort is normal  No respiratory distress  Breath sounds: Normal breath sounds  Abdominal:      General: There is no distension  Palpations: Abdomen is soft  Tenderness: There is no abdominal tenderness  Musculoskeletal: Normal range of motion  General: No deformity  Skin:     Findings: No rash  Neurological:      General: No focal deficit present  Mental Status: He is alert and oriented to person, place, and time  Psychiatric:         Mood and Affect: Mood normal          Vital Signs  ED Triage Vitals [05/30/21 0823]   Temperature Pulse Respirations Blood Pressure SpO2   (!) 97 °F (36 1 °C) 79 18 117/59 100 %      Temp Source Heart Rate Source Patient Position - Orthostatic VS BP Location FiO2 (%)   Temporal -- Sitting Right arm --      Pain Score       --           Vitals:    05/30/21 0823 05/30/21 0900 05/30/21 0930   BP: 117/59 134/60 133/62   Pulse: 79 77 80   Patient Position - Orthostatic VS: Sitting           Visual Acuity  Visual Acuity      Most Recent Value   L Pupil Size (mm)  3   R Pupil Size (mm)  3          ED Medications  Medications   sodium chloride 0 9 % bolus 1,000 mL (1,000 mL Intravenous New Bag 5/30/21 0924)       Diagnostic Studies  Results Reviewed     Procedure Component Value Units Date/Time    Novel Coronavirus (Covid-19),PCR [facility] - 2 Hour Stat [[id number]]  (Normal) Collected: 05/30/21 0838    Lab Status: Final result Specimen: Nares from Nasopharyngeal Swab Updated: 05/30/21 0952     SARS-CoV-2 Negative    Narrative:       The specimen collection materials, transport medium, and/or testing methodology utilized in the production of these test results have been proven to be reliable in a limited validation with an abbreviated program under the Emergency Utilization Authorization provided by the FDA  Testing reported as "Presumptive positive" will be confirmed with secondary testing to ensure result accuracy  Clinical caution and judgement should be used with the interpretation of these results with consideration of the clinical impression and other laboratory testing  Testing reported as "Positive" or "Negative" has been proven to be accurate according to standard laboratory validation requirements  All testing is performed with control materials showing appropriate reactivity at standard intervals  NT-BNP PRO [[id number]]  (Normal) Collected: 05/30/21 0838    Lab Status: Final result Specimen: Blood from Arm, Right Updated: 05/30/21 0948     NT-proBNP 126 pg/mL     Lactic acid [[id number]]  (Abnormal) Collected: 05/30/21 0850    Lab Status: Final result Specimen: Blood from Arm, Right Updated: 05/30/21 0945     LACTIC ACID 3 1 mmol/L     Narrative:      Result may be elevated if tourniquet was used during collection      Lactic acid 2 Hours [[id number]]     Lab Status: No result Specimen: Blood     Manual Differential(PHLEBS Do Not Order) [[id number]]  (Abnormal) Collected: 05/30/21 0838    Lab Status: Final result Specimen: Blood from Arm, Right Updated: 05/30/21 0918     Segmented % 63 %      Bands % 3 %      Lymphocytes % 7 %      Monocytes % 18 %      Eosinophils, % 6 %      Basophils % 0 %      Metamyelocytes% 1 %      Myelocytes % 2 %      Absolute Neutrophils 7 94 Thousand/uL      Lymphocytes Absolute 0 84 Thousand/uL      Monocytes Absolute 2 17 Thousand/uL      Eosinophils Absolute 0 72 Thousand/uL      Basophils Absolute 0 00 Thousand/uL      Total Counted 100     Anisocytosis Present     Platelet Estimate Adequate    CBC and differential [[id number]]  (Abnormal) Collected: 05/30/21 0838    Lab Status: Final result Specimen: Blood from Arm, Right Updated: 05/30/21 0918     WBC 12 03 Thousand/uL      RBC 3 09 Million/uL      Hemoglobin 10 2 g/dL      Hematocrit 30 1 %      MCV 97 fL      MCH 33 0 pg      MCHC 33 9 g/dL      RDW 18 4 %      MPV 9 3 fL      Platelets 736 Thousands/uL      nRBC 0 /100 WBCs     Narrative: This is an appended report  These results have been appended to a previously verified report      Comprehensive metabolic panel [[id number]]  (Abnormal) Collected: 05/30/21 0838    Lab Status: Final result Specimen: Blood from Arm, Right Updated: 05/30/21 0917     Sodium 120 mmol/L      Potassium 5 4 mmol/L      Chloride 89 mmol/L      CO2 24 mmol/L      ANION GAP 7 mmol/L      BUN 21 mg/dL      Creatinine 0 83 mg/dL      Glucose 257 mg/dL      Calcium 8 0 mg/dL      Corrected Calcium 8 8 mg/dL      AST 22 U/L      ALT 28 U/L      Alkaline Phosphatase 97 U/L      Total Protein 6 5 g/dL      Albumin 3 0 g/dL      Total Bilirubin 0 40 mg/dL      eGFR 85 ml/min/1 73sq m     Narrative:      [place] guidelines for Chronic Kidney Disease (CKD):     Stage 1 with normal or high GFR (GFR > 90 mL/min/1 73 square meters)    Stage 2 Mild CKD (GFR = 60-89 mL/min/1 73 square meters)    Stage 3A Moderate CKD (GFR = 45-59 mL/min/1 73 square meters)    Stage 3B Moderate CKD (GFR = 30-44 mL/min/1 73 square meters)    Stage 4 Severe CKD (GFR = 15-29 mL/min/1 73 square meters)    Stage 5 End Stage CKD (GFR <15 mL/min/1 73 square meters)  Note: GFR calculation is accurate only with a steady state creatinine    Phosphorus [[id number]]  (Normal) Collected: 05/30/21 0838    Lab Status: Final result Specimen: Blood from Arm, Right Updated: 05/30/21 0917     Phosphorus 3 7 mg/dL     Magnesium [[id number]]  (Normal) Collected: 05/30/21 0838    Lab Status: Final result Specimen: Blood from Arm, Right Updated: 05/30/21 4055 Magnesium 1 6 mg/dL     Troponin I [[id number]]  (Normal) Collected: 05/30/21 0838    Lab Status: Final result Specimen: Blood from Arm, Right Updated: 05/30/21 0907     Troponin I <0 02 ng/mL     Fingerstick Glucose (POCT) [[id number]]  (Abnormal) Collected: 05/30/21 0822    Lab Status: Final result Updated: 05/30/21 0837     POC Glucose 240 mg/dl                  XR chest 1 view portable   ED Interpretation by Troy Harrison MD (05/30 5266)   No acute pulmonary pathology  Study appears similar to prior      Final Result by Nena Parnell MD (05/30 8203)      No acute cardiopulmonary disease  Workstation performed: [id number]         CT head without contrast   Final Result by Paco Payton DO (05/30 6810)   Stable cerebral atrophy with chronic small vessel ischemic white matter disease  No acute intracranial abnormality  If there is continued concern for acute cerebral ischemia, consider follow-up MRI of the brain with diffusion-weighted sequencing  The study was marked in EPIC for significant notification           Workstation performed: [id number]                    Procedures  ECG 12 Lead Documentation Only    Date/Time: 5/30/2021 8:28 AM  Performed by: Troy Harrison MD  Authorized by: Troy Harrison MD     Indications / Diagnosis:  Lightheaded  ECG reviewed by me, the ED Provider: yes    Patient location:  ED  Previous ECG:     Previous ECG:  Compared to current    Comparison ECG info:  5/7/21 normal sinus rhythm with twave flattening in aVL    Similarity:  No change  Interpretation:     Interpretation: normal    Rate:     ECG rate:  83    ECG rate assessment: normal    Rhythm:     Rhythm: sinus rhythm    Ectopy:     Ectopy: none    QRS:     QRS axis:  Normal    QRS intervals:  Normal  Conduction:     Conduction: normal    ST segments:     ST segments:  Normal  T waves:     T waves: inverted      Inverted:  AVL             ED Course  ED Course as of May 30 1013   Sun May 30, 2021   2246 123 when corrected for hyperglycemia   Sodium(!): 120                         Initial Sepsis Screening     Row Name 05/30/21 7686                Is the patient's history suggestive of a new or worsening infection? No  -EE        Suspected source of infection  --        Are two or more of the following signs & symptoms of infection both present and new to the patient? No  -EE        Indicate SIRS criteria  Leukocytosis (WBC > 86051 IJL)  -EE        If the answer is yes to both questions, suspicion of sepsis is present  --        If severe sepsis is present AND tissue hypoperfusion perists in the hour after fluid resuscitation or lactate > 4, the patient meets criteria for SEPTIC SHOCK  --        Are any of the following organ dysfunction criteria present within 6 hours of suspected infection and SIRS criteria that are NOT considered to be chronic conditions? --        Organ dysfunction  --        Date of presentation of severe sepsis  --        Time of presentation of severe sepsis  --        Tissue hypoperfusion persists in the hour after crystalloid fluid administration, evidenced, by either:  --        Was hypotension present within one hour of the conclusion of crystalloid fluid administration?  --        Date of presentation of septic shock  --        Time of presentation of septic shock  --          User Key  (r) = Recorded By, (t) = Taken By, (c) = Cosigned By    [address] Name Provider Type    EE April Rubio MD Physician          SBIRT 22yo+      Most Recent Value   SBIRT (25 yo +)   In order to provide better care to our patients, we are screening all of our patients for alcohol and drug use  Would it be okay to ask you these screening questions?   No Filed at: 05/30/2021 0848                    MDM  Number of Diagnoses or Management Options  Dizziness: new and requires workup  Hyponatremia: new and requires workup  Diagnosis management comments: 67year old male presents for evaluation of dizziness and lightheadedness beginning this morning  Last chemotherapy 2 weeks ago  Recent admission for bacteremia and hyponatremia  Worsening of chronic hyponatremia on labs today  Suspect this is source of patient's symptoms  Patient appears dehydrated  1 L NS  No infectious source at this time with recent completion of antibiotics  Patient admitted for further evaluation and management  Amount and/or Complexity of Data Reviewed  Clinical lab tests: ordered and reviewed  Tests in the radiology section of CPT®: ordered and reviewed  Independent visualization of images, tracings, or specimens: yes    Patient Progress  Patient progress: stable      Disposition  Final diagnoses:   Hyponatremia   Dizziness     Time reflects when diagnosis was documented in both MDM as applicable and the Disposition within this note     Time User Action Codes Description Comment    5/30/2021  9:21 AM Tiffany DUBON Add [E87 1] Hyponatremia     5/30/2021  9:22 AM Marissa Parada Add [R42] Dizziness       ED Disposition     ED Disposition Condition Date/Time Comment    Admit Stable Sun May 30, 2021 10:11 AM Case was discussed with KAMILAH and the patient's admission status was agreed to be Admission Status: inpatient status to the service of Dr Angie Jimenez   Follow-up Information    None         Patient's Medications   Discharge Prescriptions    No medications on file     No discharge procedures on file      PDMP Review       Value Time User    PDMP Reviewed  Yes 5/9/2021  9:29 AM Kulwinder Farrell DO          ED Provider  Electronically Signed by           Cary Hernandez MD  05/30/21 5594

## 2021-05-30 NOTE — H&P
[place]  H&P- [place] 1945, 67 y o  male MRN: [id number]  Unit/Bed#: -01 Encounter: [id number]  Primary Care Provider: Nimisha Bond DO   Date and time admitted to hospital: 5/30/2021  8:17 AM    * Hyponatremia  Assessment & Plan  · Acute on chronic, patient has hx of hyponatremia, was on chemotherapy for pancreatic CA, recently hospitalized for sepsis/hyponatremia due to streptococcus anginosus thought to be related to GI source - recently finished PO flagyl and rocephin   · Patient presented with fatigue and dizziness found to have Na - 120   · Poor appetite   · Head CT - Stable cerebral atrophy with chronic small vessel ischemic white matter disease  No acute intracranial abnormality  · Patient received 1 L NSS solution in the ED   · Trend BMP q 8 hrs   · Last admission d/c Na was 132, recent outpatient lab draw on 5/24 reveals decrease to 124  · Patient is not on supplementation at this time   · Neuro checks q shift   · OOB with assistance   · If unable to manage Na successfully will consult Nephrology     Dizziness  Assessment & Plan  · Patient expresses feeling dizzy for a couple of months now, his BP regimen was decreased, d/c Norvasc & BB   · Hold lisinopril   · Interventions for Na correction as above   · OOB with assistance  · PT/OT  · Avoid hypotension     Adenocarcinoma of pancreas [facility])  Assessment & Plan  · Adenocarcinoma of the pancreas dx in March 2021  · Patient recent seen by Dr Jojo Gamez as outpatient 5/24   · Seen by Surgical Oncology who recommended neoadjuvant treatment  · Patient has had 2 cycles of gemcitabine and Abraxane so far both with complications and hospitalizations  · "At this point I do not think he is a good candidate for gemcitabine and Abraxane together based on the multiple complications and hospitalizations he has had since starting treatment  I will drop his chemotherapy to single agent gemcitabine with a dose reduction  Hopefully he will be able to tolerate this  I will give him Neulasta growth factor support until we see his counts are stable because of the risk of infection and his fistula  If his performance status improves I will add on Abraxane or switch him to modified FOLFOX 6 but for now I think he will only be able to tolerate gemcitabine single agent  The patient agrees with this assessment as does his   I will see him back in a month  We will adjust his chemotherapy  He will get Neulasta growth factor support  If his blood counts run high we will discontinue the growth factor "  · F/u with heme-onc as outpatient     Type 2 diabetes mellitus with hyperglycemia, with long-term current use of insulin [facility])  Assessment & Plan  Lab Results   Component Value Date    HGBA1C 9 4 (H) 04/08/2021       Recent Labs     05/30/21  0822   POCGLU 240*       Blood Sugar Average: Last 72 hrs:  (P) 240     · Patient is on Lantuss and metformin as per home regimen  · Stop metformin   · Start SSI  · Diabetic diet   · Goal glucose < 180  · Resume 8 units Lantus HS if patient taking adequate PO intake     Lactic acidosis-resolved as of 5/30/2021  Assessment & Plan  · Resolved  · Possibly due to metformin use vs dehydration  · Patient with poor appetite  · 1 L NSS given in the ED      Mesenteric venous thrombosis (HCC)  Assessment & Plan  · Continue xarelto     Leukocytosis  Assessment & Plan  · WBC 12 03 this admission  · Patient recently completed abx tx for bacteremia from streptococcus anginosus   · Denies fevers or chills   · Obtained Procal   · Trend WBC & temp     Anemia  Assessment & Plan  · Chronic - stable   · 2 weeks ago noted to be 8 8  · On admission 10 2   · Trend CBC daily     Severe protein-calorie malnutrition ([address] )  Assessment & Plan  Malnutrition Findings:           BMI Findings: Body mass index is 19 86 kg/m²       · Diabetic diet  · Nutrition consult     Essential hypertension  Assessment & Plan  · Currently normotensive   · Recently decreased med regimen   · Hold ramipril   · Maintain SBP < 160    History and Physical - [facility]    Patient Information: Sherman Levi 67 y o  male MRN: [id number]  Unit/Bed#: -01 Encounter: [id number]  Admitting Physician: Fina Washburn  PCP: Leeanne Sacks, DO  Date of Admission:  05/30/21    Assessment/Plan:    Hospital Problem List:     Principal Problem:    Hyponatremia  Active Problems:    Adenocarcinoma of pancreas ([address] )    Dizziness    Type 2 diabetes mellitus with hyperglycemia, with long-term current use of insulin (HCC)    Essential hypertension    Severe protein-calorie malnutrition (HCC)    Anemia    Leukocytosis    Mesenteric venous thrombosis (HCC)      VTE Prophylaxis: Rivaroxaban (Xarelto)  / sequential compression device   Code Status: Level 3 DNR/DNI  POLST: There is no POLST form on file for this patient (pre-hospital)    Anticipated Length of Stay:  Patient will be admitted on an Inpatient basis with an anticipated length of stay of 2 midnights  Justification for Hospital Stay: Hyponatremia     Total Time for Visit, including Counseling / Coordination of Care: 23 minutes  Greater than 50% of this total time spent on direct patient counseling and coordination of care  Chief Complaint:   Dizziness/Fatigue     History of Present Illness:    Sherman Levi is a 67 y o  male who presents with PMH of recently diagnosed with adenomacarcinoma of the pancreas s/p palliative chemo, HTN, HLD & DM type II  The patient was most recently hospitalized for bacteremia and hyponatremia treated with IV and p o  Antibiotics, last antibiotic dose was yesterday  The patient presents to the emergency department today due to dizziness and fatigue  Sodium was 120 on admission labs, as per discharge labs the patient's sodium was 132  He is not on any supplementation at home  He does express a poor appetite     He was given 1 L NSS in the ED & his repeat Na remained at 120  Started patient on NSS @ 50cc/hr with q 8 hr BMPs  The patient was most recently seen as an outpatient for dizziness, thought to be related to use of antihypertensives  His BB & Norvasc were discontinued  Admitted to SD level 2 for closer monitoring  Review of Systems:    Review of Systems   Constitutional: Positive for fatigue  HENT: Negative  Eyes: Negative  Respiratory: Negative  Cardiovascular: Negative  Gastrointestinal: Negative  Endocrine: Negative  Genitourinary: Negative  Musculoskeletal: Positive for gait problem  Skin: Negative  Allergic/Immunologic: Negative  Neurological: Positive for dizziness  Hematological: Negative  Psychiatric/Behavioral: Negative  Past Medical and Surgical History:     Past Medical History:   Diagnosis Date    Cancer [facility])     pancreatic    Diabetes mellitus ([facility] [address] )     Frequent urination     GERD (gastroesophageal reflux disease)     Hyperlipidemia     Hypertension     Peripheral neuropathy     Weakness     Weight loss        Past Surgical History:   Procedure Laterality Date    APPENDECTOMY      CATARACT EXTRACTION      COLONOSCOPY      SKIN LESION EXCISION      of a wart on right arm    TUNNELED VENOUS PORT PLACEMENT N/A 3/26/2021    Procedure: INSERTION VENOUS PORT (PORT-A-CATH); Surgeon: Cristian Figueroa MD;  Location:  MAIN OR;  Service: Surgical Oncology       Meds/Allergies:    Prior to Admission medications    Medication Sig Start Date End Date Taking?  Authorizing Provider   Accu-Chek FastClix Lancets MISC USE 1 TO CHECK GLUCOSE TWICE DAILY TO THREE TIMES DAILY 2/9/21  Yes Historical Provider, MD   Accu-Chek Guide test strip USE 1 STRIP TWICE DAILY TO THREE TIMES DAILY 2/9/21  Yes Historical Provider, MD   acetaminophen (TYLENOL) 325 mg tablet Take 650 mg by mouth every 4 (four) hours as needed for mild pain   Yes Historical Provider, MD   aspirin 81 mg chewable tablet Chew 81 mg daily   Yes Historical Provider, MD   atorvastatin (LIPITOR) 40 mg tablet Take 40 mg by mouth daily 12/20/20  Yes Historical Provider, MD   Blood Glucose Monitoring Suppl (Accu-Chek Guide) w/Device KIT USE TO CHECK GLUCOSE TWO TO THREE TIMES DAILY 1/8/21  Yes Historical Provider, MD   insulin glargine (Lantus SoloStar) 100 units/mL injection pen Inject 8 units at bedtime 5/5/21  Yes Suyapa Nicole MD   metFORMIN (GLUCOPHAGE) 850 mg tablet TAKE 1 TABLET BY MOUTH TWICE DAILY TAKE WITH MORNING AND EVENING MEAL 12/20/20  Yes Historical Provider, MD   NovoLOG FlexPen 100 units/mL injection pen Inject as per sliding scale , (  Scale - 150-200 -1 units, 201-250-2 units, 251-300 -3 units, 301-350-4 units, > 350- 5  units) with meals 5/5/21  Yes Suyapa Nicole MD   prochlorperazine (COMPAZINE) 10 mg tablet Take 1 tablet (10 mg total) by mouth every 6 (six) hours as needed for nausea or vomiting 3/17/21  Yes Carina Delgadillo MD   ramipril (ALTACE) 10 MG capsule Take 10 mg by mouth daily  12/20/20  Yes Historical Provider, MD   ReliOn Pen Needles 31G X 6 MM MISC USE 1 IN THE EVENING 1/1/21  Yes Historical Provider, MD   rivaroxaban (XARELTO) 15 mg tablet Take 1 tablet (15 mg total) by mouth 2 (two) times a day with meals for 40 doses 5/11/21 5/31/21 Yes Patel Kim MD   sodium chloride (GRAHAM 128) 5 % hypertonic ophthalmic solution 1 drop as needed   Yes Historical Provider, MD   metoprolol succinate (TOPROL-XL) 100 mg 24 hr tablet Take 100 mg by mouth daily 3/8/21 5/30/21 Yes Historical Provider, MD   traMADol (ULTRAM) 50 mg tablet Take 1 tablet (50 mg total) by mouth every 6 (six) hours as needed for moderate pain  Patient not taking: Reported on 5/30/2021 3/23/21   JOI Curtis   amLODIPine (NORVASC) 2 5 mg tablet Take 1 tablet (2 5 mg total) by mouth daily 5/11/21 5/30/21  Patel Kim MD     I have reviewed home medications with patient personally      Allergies: No Known Allergies    Social History:     Marital Status:      Substance Use History:   Social History     Substance and Sexual Activity   Alcohol Use Never    Frequency: Never    Binge frequency: Never     Social History     Tobacco Use   Smoking Status Former Smoker    Types: Pipe    Quit date: 2/23/2011    Years since quitting: 10 2   Smokeless Tobacco Never Used     Social History     Substance and Sexual Activity   Drug Use Never       Family History:    History reviewed  No pertinent family history  Physical Exam:     Vitals:   Blood Pressure: 132/65 (05/30/21 1110)  Pulse: 92 (05/30/21 1110)  Temperature: (!) 97 °F (36 1 °C) (05/30/21 0823)  Temp Source: Temporal (05/30/21 4186)  Respirations: 19 (05/30/21 1110)  Height: 5' 11" (180 3 cm) (05/30/21 1106)  Weight - Scale: 64 6 kg (142 lb 6 4 oz) (05/30/21 1106)  SpO2: 97 % (05/30/21 1110)    Physical Exam  Constitutional:       Appearance: Normal appearance  He is not toxic-appearing  HENT:      Head: Normocephalic and atraumatic  Right Ear: External ear normal       Left Ear: External ear normal       Mouth/Throat:      Mouth: Mucous membranes are moist       Pharynx: Oropharynx is clear  Eyes:      Extraocular Movements: Extraocular movements intact  Conjunctiva/sclera: Conjunctivae normal       Pupils: Pupils are equal, round, and reactive to light  Neck:      Musculoskeletal: Normal range of motion  Cardiovascular:      Rate and Rhythm: Normal rate and regular rhythm  Pulses: Normal pulses  Heart sounds: Normal heart sounds  Pulmonary:      Effort: Pulmonary effort is normal  No respiratory distress  Breath sounds: Normal breath sounds  Abdominal:      General: Abdomen is flat  Bowel sounds are normal  There is no distension  Palpations: Abdomen is soft  Tenderness: There is no abdominal tenderness  Musculoskeletal: Normal range of motion        Comments: Generalized deconditioning    Lymphadenopathy: Cervical: No cervical adenopathy  Skin:     General: Skin is warm and dry  Capillary Refill: Capillary refill takes less than 2 seconds  Neurological:      General: No focal deficit present  Mental Status: He is alert and oriented to person, place, and time  Psychiatric:         Mood and Affect: Mood normal          Behavior: Behavior normal          Thought Content: Thought content normal          Additional Data:     Lab Results: I have personally reviewed pertinent reports  Results from last 7 days   Lab Units 05/30/21  0838   WBC Thousand/uL 12 03*   HEMOGLOBIN g/dL 10 2*   HEMATOCRIT % 30 1*   PLATELETS Thousands/uL 330   LYMPHO PCT % 7*   MONO PCT % 18*   EOS PCT % 6     Results from last 7 days   Lab Units 05/30/21  1256 05/30/21  0838   POTASSIUM mmol/L 4 9 5 4*   CHLORIDE mmol/L 90* 89*   CO2 mmol/L 22 24   BUN mg/dL 19 21   CREATININE mg/dL 0 73 0 83   CALCIUM mg/dL 7 9* 8 0*   ALK PHOS U/L  --  97   ALT U/L  --  28   AST U/L  --  22           Imaging: I have personally reviewed pertinent reports  Xr Chest 1 View Portable    Result Date: 5/30/2021  Narrative: CHEST INDICATION:   cough, lightheadedness  COMPARISON:  Chest radiograph from 5/7/2021 and chest CT from 5/14/2021  EXAM PERFORMED/VIEWS:  XR CHEST PORTABLE  FINDINGS:  Right port in SVC  Cardiomediastinal silhouette normal  Lungs clear  No effusion or pneumothorax  Osseous structures normal for age  Healed left rib fractures  Impression: No acute cardiopulmonary disease  Workstation performed: [id number]     Xr Chest 1 View Portable    Result Date: 5/7/2021  Narrative: CHEST INDICATION:   Shortness of breath and fatigue  COMPARISON:  4/8/2021 EXAM PERFORMED/VIEWS:  XR CHEST PORTABLE FINDINGS: Cardiomediastinal silhouette appears unremarkable  The lungs are clear  No pneumothorax or pleural effusion  Tpdgly-s-Ubma catheter positioning appears unchanged     Impression: No acute cardiopulmonary disease   Workstation performed: [id number]     Ct Head Without Contrast    Result Date: 5/30/2021  Narrative: CT BRAIN - WITHOUT CONTRAST INDICATION:   dizziness, transient blurry vision  Dizziness  Patient states that he started feeling dizzy this morning 67year old male presents for evaluation of lightheadedness and dizziness beginning this morning  Patient states he woke with mild lightheadedness, but just before breakfast, became dizzy with transient blurring of his vision  Patient states he has had similar symptoms with hypoglycemia, but had checked his blood glucose and found it to be 140  Patient last gave himself insulin prior to breakfast this morning  Patient has history of pancreatic cancer with last chemotherapy 2 weeks ago  Next session scheduled for next week  Patient has not been able to be vaccinated against COVID due to his poor health  Patient states he has a chronic sporadic cough which is unchanged  No fevers  No chest pain, back pain or abdominal pain  No nausea, vomiting or diarrhea  No dysuria or frequency  Patient had been admitted to this hospital 5/7/21-5/17/21 for hyponatremia, Streptococcus anginosus bacteremia treated with rocephin and flagyl  CT of the abdomen during that admission 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  Patient has suspected COVID-19  COMPARISON:  Noncontrast CT brain April 8, 2021 TECHNIQUE:  CT examination of the brain was performed  In addition to axial images, sagittal and coronal 2D reformatted images were created and submitted for interpretation  Radiation dose length product (DLP) for this visit:  877 08 mGy-cm   This examination, like all CT scans performed in the [facility], was performed utilizing techniques to minimize radiation dose exposure, including the use of iterative  reconstruction and automated exposure control  IMAGE QUALITY:  Diagnostic   FINDINGS: PARENCHYMA: Decreased attenuation is noted in periventricular and subcortical white matter demonstrating an appearance that is statistically most likely to represent mild microangiopathic change  No CT signs of acute infarction  No intracranial mass, mass effect or midline shift  No acute parenchymal hemorrhage  Bilateral internal carotid artery and vertebral artery calcifications  VENTRICLES AND EXTRA-AXIAL SPACES:  Enlargement of ventricles and extra-axial CSF spaces, out of proportion to the patient's age most consistent with cerebral and cerebellar atrophy  VISUALIZED ORBITS AND PARANASAL SINUSES:  No acute abnormality involving the orbits  Bilateral lens surgery  Mild scattered sinus mucosal thickening is noted  No fluid levels are seen  CALVARIUM AND EXTRACRANIAL SOFT TISSUES:  Normal      Impression: Stable cerebral atrophy with chronic small vessel ischemic white matter disease  No acute intracranial abnormality  If there is continued concern for acute cerebral ischemia, consider follow-up MRI of the brain with diffusion-weighted sequencing  The study was marked in EPIC for significant notification  Workstation performed: [id number]     Ct Facial Bones W Contrast    Result Date: 5/11/2021  Narrative: CT FACIAL SOFT TISSUES WITH INTRAVENOUS CONTRAST INDICATION:   Dentofacial anomaly bacteremia, ? dental source  COMPARISON: None  TECHNIQUE:  Axial images through the facial soft tissues were performed  Reformatted images were created in multiple planes  Radiation dose length product (DLP) for this visit:  466 05 mGy-cm   This examination, like all CT scans performed in the [facility], was performed utilizing techniques to minimize radiation dose exposure, including the use of iterative  reconstruction and automated exposure control  IV Contrast:  85 mL of iohexol (OMNIPAQUE) IMAGE QUALITY:  Diagnostic  FINDINGS: SOFT TISSUES: No discrete soft tissue mass  No signs of soft tissue infection  No abscess formation  No hematoma   DENTITION: Examination limited by artifact from patient's dental work  There is no large cavity or periapical lucency identified to suggest dental source for infection  FACIAL BONES: There is no facial bone fracture identified  No discrete lytic or blastic lesion  No destructive lesions  ORBITS: Normal globes  Preseptal and retrobulbar soft tissues are unremarkable  SINUSES: The paranasal sinuses are clear  Impression: Unremarkable CT of the facial bones  Workstation performed: [id number]     Ct Chest Abdomen Pelvis W Contrast    Result Date: 5/15/2021  Narrative: CT CHEST, ABDOMEN AND PELVIS WITH IV CONTRAST INDICATION:   Sepsis; Neoplasm: pancreas; Bacteremia; bacteremia/leukocytosis  pancreatic ca  COMPARISON:  Contrast-enhanced CT abdomen pelvis May 9, 2021  TECHNIQUE: CT examination of the chest, abdomen and pelvis was performed  Axial, sagittal, and coronal 2D reformatted images were created from the source data and submitted for interpretation  Radiation dose length product (DLP) for this visit:  640 9 mGy-cm   This examination, like all CT scans performed in the [facility], was performed utilizing techniques to minimize radiation dose exposure, including the use of iterative reconstruction and automated exposure control  IV Contrast:  100 mL of iohexol (OMNIPAQUE) Enteric Contrast: Enteric contrast was administered  50 mL of iohexol (OMNIPAQUE) FINDINGS: CHEST LUNGS:  Lungs are clear  There is no tracheal or endobronchial lesion  PLEURA:  Unremarkable  HEART/GREAT VESSELS:  Unremarkable for patient's age  MEDIASTINUM AND SHAILESH:  The heart is mildly enlarged  Coronary artery calcifications  No evidence of a pericardial effusion  Atherosclerotic changes are present in the aortic arch  CHEST WALL AND LOWER NECK:   Unremarkable   ABDOMEN LIVER/BILIARY TREE:  One or more subcentimeter sharply circumscribed low-density hepatic lesion(s) are noted, too small to accurately characterize, but statistically most likely to represent subcentimeter hepatic cysts  No suspicious solid hepatic lesion is identified  Hepatic contours are normal   No biliary dilatation  GALLBLADDER: Incompletely distended  Inadequately evaluated  No calcified gallstones  No pericholecystic inflammatory change  SPLEEN:  Unremarkable  PANCREAS: Again identified is a cystic mass in the pancreatic head measuring approximately 3 5 x 3 cm (series 2, image 71)  This appears similar to the most recent prior study  Stable additional cystic lesion of the pancreatic head, measuring 12 mm (series 2, image 76)  The previously identified 10 cm cystic mass adjacent to the pancreatic head, displacing the stomach anteriorly, is no longer seen  There are multiple small foci of air adjacent to the pancreatic head (series 2, image 69)  There is a suggestion of a fistula extending from the proximal duodenum/pylorus  The pancreas is atrophic  Stable severe pancreatic duct dilatation  Multiple cystic lesions arising from the pancreatic duct, similar to the prior studies  ADRENAL GLANDS:  Unremarkable  KIDNEYS/URETERS:  Symmetric enhancement  No renal calyceal or ureteric calculi  Mild caliectasis and ureterectasis  STOMACH AND BOWEL:  The stomach is decompressed  There is a hiatal hernia  There is mild edema of the walls of the pylorus and proximal duodenum  There are multiple extraluminal foci of air adjacent to the pylorus and pancreatic head  There is a suggestion of a small fistulous tract arising from the proximal duodenum/pylorus (series 2, image 72) The proximal small bowel is distended and filled with oral contrast material   Distal small bowel is distended with fluid and feces  The proximal colon is severely distended and filled with both feces and air, up to the splenic flexure  The transverse colon is redundant and tortuous  Distally, the descending colon and sigmoid colon are normally distended and feces filled    Scattered diverticula of the descending colon and sigmoid colon  Nothing to suggest acute diverticulitis  APPENDIX: Not clearly identified  No findings to suggest appendicitis  ABDOMINOPELVIC CAVITY:  No ascites  No pneumoperitoneum  No lymphadenopathy  VESSELS:  Atherosclerotic changes are present in the abdominal aorta and its branch vessels  There is severe narrowing at the confluence of portal vein with the splenic, superior mesenteric and inferior mesenteric veins (series 601, image 83; series 2, image 69)  PELVIS REPRODUCTIVE ORGANS:  The prostate gland is enlarged and heterogeneous in CT density  URINARY BLADDER:  Markedly distended  ABDOMINAL WALL/INGUINAL REGIONS:  Unremarkable  OSSEOUS STRUCTURES:  No acute fracture or destructive osseous lesion  Spinal degenerative changes are noted  Impression: 1  Multiple extraluminal foci of air adjacent to the pylorus/proximal duodenum, extending into the region of the pancreatic head  There is a suggestion of a small fistulous tract arising from the duodenal bulb/pylorus (series 2, image 72)  Correlate for perforated ulcer versus postoperative changes, given the previously identified 10 cm cystic mass adjacent to the pancreatic head is no longer seen  Clinical correlation is recommended  2   Stable cystic lesions of the pancreatic head with severe pancreatic duct dilatation and multiple side branch pancreatic cystic lesions  3   Mild bilateral caliectasis and ureterectasis, likely on the basis of vesicoureteral reflux disease  4   Moderate constipation  Transition zone at the level of the splenic flexure of the colon, likely due to peristalsis  If there is concern for colonic lesion, consider follow-up colonoscopy    I personally discussed this study with Vicente De Santiago on 5/15/2021 at 10:10 AM  Workstation performed: [id number]     Ct Abdomen Pelvis W Contrast    Result Date: 5/10/2021  Narrative: CT ABDOMEN AND PELVIS WITH IV CONTRAST INDICATION: Sepsis Strep anginosis bacteremia in patient w/ pancreatic adenoCa on neoadjuvant chemo  R/O intra-abd abscess  COMPARISON:  Outside CT from 1/28/2021 and MRI from 1/29/2021  TECHNIQUE:  CT examination of the abdomen and pelvis was performed  Axial, sagittal, and coronal 2D reformatted images were created from the source data and submitted for interpretation  Radiation dose length product (DLP) for this visit:  261 35 mGy-cm   This examination, like all CT scans performed in the [facility], was performed utilizing techniques to minimize radiation dose exposure, including the use of iterative  reconstruction and automated exposure control  IV Contrast:  100 mL of iohexol (OMNIPAQUE) Enteric Contrast:  Enteric contrast was not administered  FINDINGS: ABDOMEN LOWER CHEST:  No clinically significant abnormality identified in the visualized lower chest  LIVER/BILIARY TREE:  Unremarkable  GALLBLADDER:  No calcified gallstones  No pericholecystic inflammatory change  The common bile duct does not appear dilated  SPLEEN:  Unremarkable  PANCREAS:  There is diffuse dilatation of the pancreatic duct with cystic appearance probably related to side duct dilatation and/or the presence of main duct IPMN  There is atrophy of the parenchyma and overlying the body and tail similar to the prior CT from January  There is ill-defined soft tissue in the pancreatic head is likely related to patient's underlying tumor similar to the prior study  This area measures approximately 3 5 x 3 cm on image 25  This abuts the portal splenic confluence  Additional cystic areas noted in the pancreatic head on image 30 measuring 1 5 cm not seen on the prior study  Fat does surround the SMA  ADRENAL GLANDS:  Unremarkable  KIDNEYS/URETERS:  Unremarkable  No hydronephrosis  STOMACH AND BOWEL: Distention of the stomach is seen with fluid  No small bowel dilatation is identified    There is mild distention of the colon with fecal stasis identified  APPENDIX:  There are expected postoperative changes of appendectomy  No obvious collection is seen  ABDOMINOPELVIC CAVITY:  No ascites  No pneumoperitoneum  No lymphadenopathy  VESSELS:  Unremarkable for patient's age  Tiny filling defect is noted at the junction of the SMV and portal confluence on image 23  This area was larger on the prior study and may represent a small amount of thrombus  Splenic vein and portal vein are  otherwise patent  Atherosclerotic changes of the aorta and iliacs are noted without aneurysmal dilatation  PELVIS REPRODUCTIVE ORGANS:  Unremarkable for patient's age  URINARY BLADDER:  Unremarkable  ABDOMINAL WALL/INGUINAL REGIONS: Punctate gas density in the lower abdominal walls probably related to injection sites  OSSEOUS STRUCTURES:  No acute fracture or destructive osseous lesion  The vertebral endplates are seen  Impression: No evidence of collection  Mass in the pancreatic head is suspicious for adenocarcinoma similar to the study from January  Cystic appearance of the distal pancreas may be related to ductal dilatation and/or main duct IPMN with pancreatic parenchymal atrophy noted  New cystic area is noted inferiorly in the pancreatic head  Small amount of thrombus appears to be evident at the portosplenic confluence  Workstation performed: [id number]       EKG, Pathology, and Other Studies Reviewed on Admission:   · EKG: SR     Allscripts / Epic Records Reviewed: Yes     ** Please Note: This note has been constructed using a voice recognition system   **

## 2021-05-30 NOTE — ASSESSMENT & PLAN NOTE
· Currently normotensive   · Recently decreased med regimen   · Hold ramipril   · Maintain SBP < 160

## 2021-05-30 NOTE — ACP (ADVANCE CARE PLANNING)
Discussed code status with patient upon assessment, presented & explained all code levels and interventions associated with each code level  Upon extensive discussion and consideration of ongoing diagnosis of pancreatic CA patient opted to be a level 3 code status understanding that he would still receive all medical treatment with the exception of a breathing tube and or CPR  The patient states that if it ever came down to it he would not want a feeding tube  Encouraged patient to discuss his wishes with his family  He does not have any children or a spouse and currently lives with his cousin, Acosta Salas and his wife Tami Max  He states that Acosta Salas and his step- brother Patricia Mccloud would be his decision makers  The patients code status was updated to level 3 DNR/DNI       Time spent: 21 mins

## 2021-05-30 NOTE — ASSESSMENT & PLAN NOTE
· WBC 12 03 this admission  · Patient recently completed abx tx for bacteremia from streptococcus anginosus   · Denies fevers or chills   · Obtained Procal   · Trend WBC & temp

## 2021-05-30 NOTE — ASSESSMENT & PLAN NOTE
Malnutrition Findings:           BMI Findings: Body mass index is 19 86 kg/m²       · Diabetic diet  · Nutrition consult

## 2021-05-30 NOTE — ASSESSMENT & PLAN NOTE
· Patient expresses feeling dizzy for a couple of months now, his BP regimen was decreased, d/c Norvasc & BB   · Hold lisinopril   · Interventions for Na correction as above   · OOB with assistance  · PT/OT  · Avoid hypotension

## 2021-05-30 NOTE — ASSESSMENT & PLAN NOTE
Lab Results   Component Value Date    HGBA1C 9 4 (H) 04/08/2021       Recent Labs     05/30/21  0822   POCGLU 240*       Blood Sugar Average: Last 72 hrs:  (P) 240     · Patient is on Lantuss and metformin as per home regimen  · Stop metformin   · Start SSI  · Diabetic diet   · Goal glucose < 180  · Resume 8 units Lantus HS if patient taking adequate PO intake

## 2021-05-31 PROBLEM — R42 DIZZINESS: Status: RESOLVED | Noted: 2021-01-01 | Resolved: 2021-01-01

## 2021-05-31 NOTE — PLAN OF CARE
Problem: Potential for Falls  Goal: Patient will remain free of falls  Description: INTERVENTIONS:  - Assess patient frequently for physical needs  -  Identify cognitive and physical deficits and behaviors that affect risk of falls    -  [place] fall precautions as indicated by assessment   - Educate patient/family on patient safety including physical limitations  - Instruct patient to call for assistance with activity based on assessment  - Modify environment to reduce risk of injury  - Consider OT/PT consult to assist with strengthening/mobility  Outcome: Progressing

## 2021-05-31 NOTE — ASSESSMENT & PLAN NOTE
Malnutrition Findings:           BMI Findings: Body mass index is 19 34 kg/m²       · Diabetic diet  · Nutrition consult

## 2021-05-31 NOTE — CONSULTS
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  4  Diabetes has per primary service  5  Anemia chronic disease, continue monitor closely  6  Malnutrition, encourage protein supplementation  7  Lactic acidosis, resolved  8  Recent Streptococcus bacteremia    Plan:  · Given improvement with normal saline would continue this for now  · Would check cortisol as well as TSH  · Suspect patient will need to be discharged on sodium chloride tablets given recurrent hyponatremia    History of Present Illness   Physician Requesting Consult: Dannie Hong MD  Reason for Consult / Principal Problem:  Recurrent hyponatremia  HPI: Manuel Kenny is a 67y o  year old male who presents with dizziness lightheadedness  58-year-old male with a past medical history of recently diagnosed adenocarcinoma of the pancreas status post palliative chemotherapy, hypertension, dyslipidemia as well as dated be [de-identified]  Patient was recently hospitalized for bacteremia as well as hyponatremia  At that time was treated with IV antibiotics as well as saline infusion  Discharge sodium was noted to be use 132, outpatient laboratory studies showed a sodium of 124  Unfortunate presents with sodium of 120  Currently patient is feeling better  No further dizziness or lightheadedness  Denies any chest pain shortness of breath  Appetite is stable  Tolerating protein supplements  Denies any nausea vomiting or diarrhea          History obtained from chart review and the patient    Review of Systems Constitutional: Positive for appetite change  Respiratory: Negative for chest tightness and shortness of breath  Cardiovascular: Negative for chest pain  Gastrointestinal: Negative for abdominal distention, diarrhea, nausea and vomiting  Neurological: Positive for dizziness, weakness and light-headedness  Pertinent findings of a 10 point review of systems noted above otherwise all others negative    Historical Information   Patient Active Problem List   Diagnosis    Hyperlipidemia    Essential hypertension    Type 2 diabetes mellitus with hyperglycemia, with long-term current use of insulin (HCC)    Gastroesophageal reflux disease    Pancreatic cyst    [facility] of pancreas ([facility] )    Peripheral neuropathy    Bilateral cellulitis of lower leg    Hyponatremia    Severe protein-calorie malnutrition (HCC)    Anemia    Sacral wound    Leukocytosis    Generalized weakness    Streptococcal bacteremia    Mesenteric venous thrombosis (HCC)     Past Medical History:   Diagnosis Date    Cancer ([facility] )     pancreatic    Diabetes mellitus ([facility] )     Frequent urination     GERD (gastroesophageal reflux disease)     Hyperlipidemia     Hypertension     Peripheral neuropathy     Weakness     Weight loss      Past Surgical History:   Procedure Laterality Date    APPENDECTOMY      CATARACT EXTRACTION      COLONOSCOPY      SKIN LESION EXCISION      of a wart on right arm    TUNNELED VENOUS PORT PLACEMENT N/A 3/26/2021    Procedure: INSERTION VENOUS PORT (PORT-A-CATH);   Surgeon: Silverio Childs MD;  [facility];  Service: Surgical Oncology     Social History   Social History     Substance and Sexual Activity   Alcohol Use Never    Frequency: Never    Binge frequency: Never     Social History     Substance and Sexual Activity   Drug Use Never     Social History     Tobacco Use   Smoking Status Former Smoker    Types: Pipe    Quit date: 2/23/2011    Years since quitting: 10 2 Smokeless Tobacco Never Used     History reviewed  No pertinent family history  Meds/Allergies   current meds:   Current Facility-Administered Medications   Medication Dose Route Frequency    acetaminophen (TYLENOL) tablet 650 mg  650 mg Oral Q6H PRN    aspirin chewable tablet 81 mg  81 mg Oral Daily    atorvastatin (LIPITOR) tablet 40 mg  40 mg Oral Daily    insulin glargine (LANTUS) subcutaneous injection 10 Units 0 1 mL  10 Units Subcutaneous QAM    insulin lispro (HumaLOG) 100 units/mL subcutaneous injection 1-5 Units  1-5 Units Subcutaneous TID AC    insulin lispro (HumaLOG) 100 units/mL subcutaneous injection 1-5 Units  1-5 Units Subcutaneous HS    insulin lispro (HumaLOG) 100 units/mL subcutaneous injection 5 Units  5 Units Subcutaneous TID With Meals    polyethylene glycol (MIRALAX) packet 17 g  17 g Oral Daily PRN    rivaroxaban (XARELTO) tablet 15 mg  15 mg Oral BID With Meals    sodium chloride 0 9 % infusion  50 mL/hr Intravenous Continuous       No Known Allergies      Objective   /68   Pulse 93   Temp 97 8 °F (36 6 °C)   Resp 16   Ht 5' 11" (1 803 m)   Wt 62 9 kg (138 lb 10 7 oz)   SpO2 98%   BMI 19 34 kg/m²     Intake/Output Summary (Last 24 hours) at 5/31/2021 1635  Last data filed at 5/31/2021 1633  Gross per 24 hour   Intake 2647 49 ml   Output 4125 ml   Net -1477 51 ml       Current Weight: Weight - Scale: 62 9 kg (138 lb 10 7 oz)    Physical Exam  Constitutional:       Appearance: Normal appearance  He is not ill-appearing  HENT:      Head: Normocephalic and atraumatic  Eyes:      General: No scleral icterus  Neck:      Musculoskeletal: Neck supple  Cardiovascular:      Rate and Rhythm: Normal rate and regular rhythm  Pulmonary:      Effort: Pulmonary effort is normal       Breath sounds: Normal breath sounds  Abdominal:      General: There is no distension  Palpations: Abdomen is soft  Tenderness: There is no abdominal tenderness  Musculoskeletal:         General: No deformity  Right lower leg: No edema  Left lower leg: No edema  Lymphadenopathy:      Cervical: No cervical adenopathy  Skin:     General: Skin is warm and dry  Findings: No rash  Neurological:      Mental Status: He is alert and oriented to person, place, and time             Lab Results:    Results from last 7 days   Lab Units 05/31/21  0402   WBC Thousand/uL 10 64*   HEMOGLOBIN g/dL 9 5*   HEMATOCRIT % 27 7*   PLATELETS Thousands/uL 292     Results from last 7 days   Lab Units 05/31/21  1225   POTASSIUM mmol/L 5 3   CHLORIDE mmol/L 95*   CO2 mmol/L 24   BUN mg/dL 19   CREATININE mg/dL 0 91   CALCIUM mg/dL 7 8*

## 2021-05-31 NOTE — UTILIZATION REVIEW
Initial Clinical Review    Admission: Date/Time/Statement:   Admission Orders (From admission, onward)     Ordered        05/30/21 1011  Inpatient Admission  Once                   Orders Placed This Encounter   Procedures    Inpatient Admission     Standing Status:   Standing     Number of Occurrences:   1     Order Specific Question:   Level of Care     Answer:   Level 2 Stepdown / HOT [14]     Order Specific Question:   Estimated length of stay     Answer:   More than 2 Midnights     Order Specific Question:   Certification     Answer:   I certify that inpatient services are medically necessary for this patient for a duration of greater than two midnights  See H&P and MD Progress Notes for additional information about the patient's course of treatment  ED Arrival Information     Expected Arrival Acuity Means of Arrival Escorted By Service Admission Type    - 5/30/2021 08:13 Urgent Walk-In Family Member Hospitalist Urgent    Arrival Complaint    Weakness        Chief Complaint   Patient presents with    Dizziness     Patient states that he started feeling dizzy this morning     Initial Presentation:   Mr Serg Carmona is a 66 yo male who presents to the ED from home with c/o dizziness, fatigue, poor appetite  In the ED his Na was 120, previous d/c Na was 132  Recent admission for bacteremia and hyponatremia  Last antibiotic dose was 5/29  PMH: Pancreatic CA s/p palliative chemo, HTN, HLD, IDDM, malnutrition, Anemia, mesenteric venous thrombosis, dizziness  In the ED he was treated IV NSS 1 L with repeat Na of 120  He continues on IV NSS @ 50 ml/hr x 8 hr   Imaging was negative for acute disease  Recently his BB and Norvasc were stopped d/t dizziness  He is admitted to INPATIENT status at Level 2 SD with hyponatremia - trend BMP q 8 hr, neuro checks q shift, OOB with assist, if unable to manage Na will consult Nephrology  Dizziness - hold Lisinopril, PT/OT, avoid hypotension    Pancreatic AdenoCA - follow with Heme Onc as OP  IDDM - SSI cover  Lactic acidosis - 3 1 - resolved with IV fluids in ED  Leukocytosis - check procalcitonin, trend WBCs and temps  Malnutrition - nutrition consult  HTN - hold Ramipril, maintain SBP <160  Date: 5/31   Day 2:   Hyponatremia - to 126 within correction goal, continue to trend BMP q 8 hr  Continue IV fluids  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  Leukocytosis  - today 10 64, decreasing from admission, no s/s of infection, monitoring off IV antibiotics  IDDM - w/o good glucose control currently  Stopping Metformin  Lantus 10 u q AM starting  Continues on Xarelto         ED Triage Vitals   Temperature Pulse Respirations Blood Pressure SpO2   05/30/21 0823 05/30/21 0823 05/30/21 0823 05/30/21 0823 05/30/21 0823   (!) 97 °F (36 1 °C) 79 18 117/59 100 %      Temp Source Heart Rate Source Patient Position - Orthostatic VS BP Location FiO2 (%)   05/30/21 0823 05/30/21 1000 05/30/21 0823 05/30/21 0823 --   Temporal Monitor Sitting Right arm       Pain Score       05/30/21 1110       No Pain          Wt Readings from Last 1 Encounters:   05/31/21 62 9 kg (138 lb 10 7 oz)     Additional Vital Signs:   05/31/21 0711  97 9 °F (36 6 °C)  93  21  128/59  85  98 %  --  --  None (Room air)  Lying   05/31/21 0400  --  92  21  121/92  102  100 %  --  --  --  --   05/31/21 0353  97 9 °F (36 6 °C)  101  24Abnormal   121/92  102  98 %  28  2 L/min  Nasal cannula  Lying   05/30/21 2341  97 8 °F (36 6 °C)  91  23Abnormal   150/69  99  97 %  28  2 L/min  Nasal cannula  Lying   05/30/21 1900  97 5 °F (36 4 °C)  89  19  148/78  94  98 %  --  --  None (Room air)  Lying   05/30/21 1500  97 9 °F (36 6 °C)  83  19  155/67  97  96 %  --  --  --  --   05/30/21 1110  98 °F (36 7 °C)  92  19  132/65  92  97 %  --  --  None (Room air)  Lying   05/30/21 1030  --  76  17  157/68  98  100 %  --  --  None (Room air)  Lying   05/30/21 1000  --  77  19  148/63  91  99 %  --  --  None (Room air)  Lying 05/30/21 0930  --  80  18  133/62  89  99 %  --  --  --  --   05/30/21 0900  --  77  18  134/60  86  100 %  --  --  --  --     Pertinent Labs/Diagnostic Test Results:     5/30 CT head - Stable cerebral atrophy with chronic small vessel ischemic white matter disease  No acute intracranial abnormality  If there is continued concern for acute cerebral ischemia, consider follow-up MRI of the brain with diffusion-weighted sequencing      5/30 CXR - no acute disease  5/30 ECG - NSR       Results from last 7 days   Lab Units 05/30/21  0838   SARS-COV-2  Negative     Results from last 7 days   Lab Units 05/31/21  0402 05/30/21  0838   WBC Thousand/uL 10 64* 12 03*   HEMOGLOBIN g/dL 9 5* 10 2*   HEMATOCRIT % 27 7* 30 1*   PLATELETS Thousands/uL 292 330   NEUTROS ABS Thousands/µL 5 48  --    BANDS PCT %  --  3         Results from last 7 days   Lab Units 05/31/21  0402 05/30/21 1958 05/30/21  1256 05/30/21  0838   SODIUM mmol/L 126* 125* 120* 120*   POTASSIUM mmol/L 4 9 5 3 4 9 5 4*   CHLORIDE mmol/L 95* 92* 90* 89*   CO2 mmol/L 23 26 22 24   ANION GAP mmol/L 8 7 8 7   BUN mg/dL 14 19 19 21   CREATININE mg/dL 0 60 0 78 0 73 0 83   EGFR ml/min/1 73sq m 98 88 90 85   CALCIUM mg/dL 7 9* 7 8* 7 9* 8 0*   MAGNESIUM mg/dL 2 0  --   --  1 6   PHOSPHORUS mg/dL 3 8  --   --  3 7     Results from last 7 days   Lab Units 05/30/21  0838   AST U/L 22   ALT U/L 28   ALK PHOS U/L 97   TOTAL PROTEIN g/dL 6 5   ALBUMIN g/dL 3 0*   TOTAL BILIRUBIN mg/dL 0 40     Results from last 7 days   Lab Units 05/31/21  1103 05/31/21  0711 05/30/21  2105 05/30/21  1618 05/30/21  1148 05/30/21  0822   POC GLUCOSE mg/dl 284* 236* 223* 345* 251* 240*     Results from last 7 days   Lab Units 05/31/21  0402 05/30/21 1958 05/30/21  1256 05/30/21  0838   GLUCOSE RANDOM mg/dL 195* 220* 249* 257*     Results from last 7 days   Lab Units 05/30/21  1717   OSMOLALITY, SERUM mmol/*         BETA-HYDROXYBUTYRATE   Date Value Ref Range Status   04/08/2021 0 1 <0 6 mmol/L Final      Results from last 7 days   Lab Units 05/30/21  0838   TROPONIN I ng/mL <0 02     Results from last 7 days   Lab Units 05/30/21  1256   PROCALCITONIN ng/ml <0 05     Results from last 7 days   Lab Units 05/30/21  1256 05/30/21  1049 05/30/21  0850   LACTIC ACID mmol/L 1 9 2 7* 3 1*     Results from last 7 days   Lab Units 05/30/21  0838   NT-PRO BNP pg/mL 126     Results from last 7 days   Lab Units 05/30/21  1714   SODIUM UR  57   CREATININE UR mg/dL <13 0     ED Treatment:   Medication Administration from 05/30/2021 0813 to 05/30/2021 1106       Date/Time Order Dose Route Action Action by Comments     05/30/2021 1024 sodium chloride 0 9 % bolus 1,000 mL 0 mL Intravenous Stopped Ronna Welch RN      05/30/2021 6803 sodium chloride 0 9 % bolus 1,000 mL 1,000 mL Intravenous New Bag Ronna Welch RN         Past Medical History:   Diagnosis Date    Cancer [facility])     pancreatic    Diabetes mellitus ([facility] )     Frequent urination     GERD (gastroesophageal reflux disease)     Hyperlipidemia     Hypertension     Peripheral neuropathy     Weakness     Weight loss      Present on Admission:   Hyponatremia   Adenocarcinoma of pancreas (HCC)   Severe protein-calorie malnutrition (HCC)   Mesenteric venous thrombosis (HCC)   Anemia   Essential hypertension    Admitting Diagnosis: Dizziness [R42]  Hyponatremia [E87 1]  Weakness of extremity [R29 898]     Age/Sex: 67 y o  male     Admission Orders:    Scheduled Medications:  aspirin, 81 mg, Oral, Daily  atorvastatin, 40 mg, Oral, Daily  insulin glargine, 10 Units, Subcutaneous, QAM  insulin lispro, 1-5 Units, Subcutaneous, TID AC  insulin lispro, 1-5 Units, Subcutaneous, HS  rivaroxaban, 15 mg, Oral, BID With Meals      Continuous IV Infusions:  sodium chloride, 50 mL/hr, Intravenous, Continuous      PRN Meds:  acetaminophen, 650 mg, Oral, Q6H PRN  polyethylene glycol, 17 g, Oral, Daily PRN    Level 2 Stepdown - 5/31 to Med Surg LOC  BMP Q 8 hr   ADA diet   SCDs  Up w/ assist   POC GLUCOSE AC/HS WITH SSI COVERAGE   Neuro checks q shift   IP CONSULT TO CASE MANAGEMENT  IP CONSULT TO NEPHROLOGY    Network Utilization Review Department  ATTENTION: Please call with any questions or concerns to [phone] and carefully listen to the prompts so that you are directed to the right person  All voicemails are confidential   Sudie Crigler all requests for admission clinical reviews, approved or denied determinations and any other requests to dedicated fax number below belonging to the campus where the patient is receiving treatment   List of dedicated fax numbers for the Facilities:  [address] DENIALS (Administrative/Medical Necessity) [phone]   [address] (Maternity/NICU/Pediatrics) [phone]   [address] [place] [phone] [id number] [place] [address] [address] [facility] [place] [address] [phone]

## 2021-05-31 NOTE — PROGRESS NOTES
[place]  Progress Note - Lawrence Messer 1945, 67 y o  male MRN: [id number]  Unit/Bed#: -01 Encounter: [id number]  Primary Care Provider: Yane Dalal DO   Date and time admitted to hospital: 5/30/2021  8:17 AM    * Hyponatremia  Assessment & Plan  · Acute on chronic, patient has hx of hyponatremia, was on chemotherapy for pancreatic CA, recently hospitalized for sepsis/hyponatremia due to streptococcus anginosus thought to be related to GI source - recently finished PO flagyl and rocephin   · Patient presented with fatigue and dizziness found to have Na - 120   · Poor appetite   · Head CT - Stable cerebral atrophy with chronic small vessel ischemic white matter disease  No acute intracranial abnormality  · Patient received 1 L NSS solution in the ED, then started on NSS @ 50cc/hr   · Na this AM was 126  · Trend BMP q 8 hrs   · Last admission d/c Na was 132, recent outpatient lab draw on 5/24 reveals decrease to 124  · Patient is not on supplementation at this time   · Neuro checks q shift   · OOB with assistance   · Nephro consultation   · Urine Na was 57, Urine osmolality - 285, serum Osm - 276      Adenocarcinoma of pancreas (HCC)  Assessment & Plan  · Adenocarcinoma of the pancreas dx in March 2021  · Patient recent seen by Dr Anastasia Severe as outpatient 5/24   · Seen by Surgical Oncology who recommended neoadjuvant treatment  · Patient has had 2 cycles of gemcitabine and Abraxane so far both with complications and hospitalizations  · "At this point I do not think he is a good candidate for gemcitabine and Abraxane together based on the multiple complications and hospitalizations he has had since starting treatment  I will drop his chemotherapy to single agent gemcitabine with a dose reduction  Hopefully he will be able to tolerate this  I will give him Neulasta growth factor support until we see his counts are stable because of the risk of infection and his fistula  If his performance status improves I will add on Abraxane or switch him to modified FOLFOX 6 but for now I think he will only be able to tolerate gemcitabine single agent  The patient agrees with this assessment as does his   I will see him back in a month  We will adjust his chemotherapy  He will get Neulasta growth factor support  If his blood counts run high we will discontinue the growth factor "  · F/u with heme-onc as outpatient     Dizziness-resolved as of 5/31/2021  Assessment & Plan  · Patient expresses feeling dizzy for a couple of months now, his BP regimen was decreased, d/c Norvasc & BB   · Hold lisinopril   · Dizziness resolved this AM  · Interventions for Na correction as above   · OOB with assistance  · PT/OT  · Avoid hypotension   · SBP - 120-140s    Type 2 diabetes mellitus with hyperglycemia, with long-term current use of insulin [facility])  Assessment & Plan  Lab Results   Component Value Date    HGBA1C 9 4 (H) 04/08/2021       Recent Labs     05/30/21  0822 05/30/21  1148 05/30/21  1618 05/30/21  2105   POCGLU 240* 251* 345* 223*       Blood Sugar Average: Last 72 hrs:  (P) 264 75     · Patient is on Lantuss and metformin as per home regimen  · Stop metformin   · Continue SSI  · Diabetic diet   · Goal glucose < 180  · Start Lantus 10 units this AM     Mesenteric venous thrombosis (HCC)  Assessment & Plan  · Continue xarelto     Leukocytosis  Assessment & Plan  · WBC 12 03 this admission, trending down > 10 64  · Patient recently completed abx tx for bacteremia from streptococcus anginosus   · Denies fevers or chills   · Procal negative - 0 05  · Trend WBC & temp     Anemia  Assessment & Plan  · Chronic - stable   · 2 weeks ago noted to be 8 8  · On admission 10 2 > 9 5, drop likely dilutional    · Trend CBC daily     Severe protein-calorie malnutrition (HCC)  Assessment & Plan  Malnutrition Findings:           BMI Findings: Body mass index is 19 34 kg/m²       · Diabetic diet  · Nutrition consult     Essential hypertension  Assessment & Plan  · Currently normotensive   · Recently decreased med regimen   · Hold ramipril   · Maintain SBP < 160    VTE Pharmacologic Prophylaxis:   Pharmacologic: Rivaroxaban (Xarelto)  Mechanical VTE Prophylaxis in Place: Yes    Patient Centered Rounds: I have performed bedside rounds with nursing staff today  Discussions with Specialists or Other Care Team Provider: IM attending, will discuss with Nephro     Education and Discussions with Family / Patient: Discussed all findings and treatment plan with patient  Will update family upon arrival      Time Spent for Care: 20 minutes  More than 50% of total time spent on counseling and coordination of care as described above  Current Length of Stay: 1 day(s)    Current Patient Status: Inpatient   Certification Statement: The patient will continue to require additional inpatient hospital stay due to Hyponatremia management     Discharge Plan: within 24-48 hrs    Code Status: Level 3 - DNAR and DNI      Subjective:   "I'm better today"    Objective:     Vitals:   Temp (24hrs), Av 7 °F (36 5 °C), Min:97 °F (36 1 °C), Max:98 °F (36 7 °C)    Temp:  [97 °F (36 1 °C)-98 °F (36 7 °C)] 97 9 °F (36 6 °C)  HR:  [] 93  Resp:  [17-24] 21  BP: (117-157)/(59-92) 128/59  SpO2:  [96 %-100 %] 98 %  Body mass index is 19 34 kg/m²  Input and Output Summary (last 24 hours): Intake/Output Summary (Last 24 hours) at 2021 0727  Last data filed at 2021 0701  Gross per 24 hour   Intake 3102 49 ml   Output 3850 ml   Net -747 51 ml       Physical Exam:     Physical Exam  Constitutional:       General: He is not in acute distress  Appearance: Normal appearance  HENT:      Head: Normocephalic and atraumatic  Right Ear: External ear normal       Left Ear: External ear normal       Nose: Nose normal       Mouth/Throat:      Mouth: Mucous membranes are moist       Pharynx: Oropharynx is clear  Eyes:      Extraocular Movements: Extraocular movements intact  Conjunctiva/sclera: Conjunctivae normal       Pupils: Pupils are equal, round, and reactive to light  Neck:      Musculoskeletal: Normal range of motion  Cardiovascular:      Rate and Rhythm: Normal rate and regular rhythm  Pulses: Normal pulses  Pulmonary:      Effort: Pulmonary effort is normal  No respiratory distress  Breath sounds: Normal breath sounds  Abdominal:      General: Abdomen is flat  Bowel sounds are normal  There is no distension  Palpations: Abdomen is soft  Tenderness: There is no abdominal tenderness  Musculoskeletal: Normal range of motion  Lymphadenopathy:      Cervical: No cervical adenopathy  Skin:     General: Skin is warm and dry  Capillary Refill: Capillary refill takes less than 2 seconds  Neurological:      General: No focal deficit present  Mental Status: He is alert and oriented to person, place, and time  Psychiatric:         Mood and Affect: Mood normal          Behavior: Behavior normal          Thought Content:  Thought content normal        Additional Data:     Labs:    Results from last 7 days   Lab Units 05/31/21  0402 05/30/21  0838   WBC Thousand/uL 10 64* 12 03*   HEMOGLOBIN g/dL 9 5* 10 2*   HEMATOCRIT % 27 7* 30 1*   PLATELETS Thousands/uL 292 330   BANDS PCT %  --  3   NEUTROS PCT % 52  --    LYMPHS PCT % 19  --    LYMPHO PCT %  --  7*   MONOS PCT % 16*  --    MONO PCT %  --  18*   EOS PCT % 9* 6     Results from last 7 days   Lab Units 05/31/21  0402  05/30/21  0838   SODIUM mmol/L 126*   < > 120*   POTASSIUM mmol/L 4 9   < > 5 4*   CHLORIDE mmol/L 95*   < > 89*   CO2 mmol/L 23   < > 24   BUN mg/dL 14   < > 21   CREATININE mg/dL 0 60   < > 0 83   ANION GAP mmol/L 8   < > 7   CALCIUM mg/dL 7 9*   < > 8 0*   ALBUMIN g/dL  --   --  3 0*   TOTAL BILIRUBIN mg/dL  --   --  0 40   ALK PHOS U/L  --   --  97   ALT U/L  --   --  28   AST U/L  --   --  22   GLUCOSE RANDOM mg/dL 195*   < > 257*    < > = values in this interval not displayed  Results from last 7 days   Lab Units 05/30/21  2105 05/30/21  1618 05/30/21  1148 05/30/21  0822   POC GLUCOSE mg/dl 223* 345* 251* 240*         Results from last 7 days   Lab Units 05/30/21  1256 05/30/21  1049 05/30/21  0850   LACTIC ACID mmol/L 1 9 2 7* 3 1*   PROCALCITONIN ng/ml <0 05  --   --            * I Have Reviewed All Lab Data Listed Above  * Additional Pertinent Lab Tests Reviewed: Leonardo 65 Admission Reviewed    Imaging:    Imaging Reports Reviewed Today Include: No new imaging for today   Imaging Personally Reviewed by Myself Includes:  Cxr/Head CT    Recent Cultures (last 7 days):           Last 24 Hours Medication List:   Current Facility-Administered Medications   Medication Dose Route Frequency Provider Last Rate    acetaminophen  650 mg Oral Q6H PRN Pako BackJOI danielson      aspirin  81 mg Oral Daily Pako BackJOI danielson      atorvastatin  40 mg Oral Daily JOI Gonzalez      insulin glargine  10 Units Subcutaneous QAM Pako BackJOI danielson      insulin lispro  1-5 Units Subcutaneous TID AC JOI Gonzalez      insulin lispro  1-5 Units Subcutaneous HS JOI Gonzalez      polyethylene glycol  17 g Oral Daily PRN Pako BackerJOI      rivaroxaban  15 mg Oral BID With Meals JOI Mckeon      sodium chloride  50 mL/hr Intravenous Continuous JOI Mckeon 50 mL/hr (05/30/21 1410)        Today, Patient Was Seen By: JOI Mckeon    ** Please Note: Dictation voice to text software may have been used in the creation of this document   **

## 2021-05-31 NOTE — ASSESSMENT & PLAN NOTE
· Adenocarcinoma of the pancreas dx in March 2021  · Patient recent seen by Dr Amada Olvera as outpatient 5/24   · Seen by Surgical Oncology who recommended neoadjuvant treatment  · Patient has had 2 cycles of gemcitabine and Abraxane so far both with complications and hospitalizations  · "At this point I do not think he is a good candidate for gemcitabine and Abraxane together based on the multiple complications and hospitalizations he has had since starting treatment  I will drop his chemotherapy to single agent gemcitabine with a dose reduction  Hopefully he will be able to tolerate this  I will give him Neulasta growth factor support until we see his counts are stable because of the risk of 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

## 2021-05-31 NOTE — UTILIZATION REVIEW
Inpatient Admission Authorization Request   NOTIFICATION OF INPATIENT ADMISSION/INPATIENT AUTHORIZATION REQUEST   SERVICING FACILITY:   [place] [address]  Tax ID: [id number]  NPI: [id number]  Place of Service: Inpatient [address] [facility]  [address]  Place of Service Code: 24     ATTENDING PROVIDER:  Attending Name and NPI#: Isabella Venegas [[id number]]  Address: [address] [place] [address]  Phone: [phone]     UTILIZATION REVIEW CONTACT:  [facility] Utilization   Network Utilization Review Department  Phone: [phone]  Fax [phone]  Email: [email or web address]     PHYSICIAN ADVISORY SERVICES:  FOR KJAH-BY-AVPI REVIEW - MEDICAL NECESSITY DENIAL  Phone: [phone]  Fax: [phone]  Email: [email or web address]     TYPE OF REQUEST:  Inpatient Status     ADMISSION INFORMATION:  ADMISSION DATE/TIME: 5/30/21 1011  PATIENT DIAGNOSIS CODE/DESCRIPTION:  Dizziness [R42]  Hyponatremia [E87 1]  Weakness of extremity [R29 898]  DISCHARGE DATE/TIME: No discharge date for patient encounter  DISCHARGE DISPOSITION (IF DISCHARGED): Home with Home Health Care     IMPORTANT INFORMATION:  Please contact the [facility] directly with any questions or concerns regarding this request  Department voicemails are confidential     Send requests for admission clinical reviews, concurrent reviews, approvals, and administrative denials due to lack of clinical to fax [phone]

## 2021-05-31 NOTE — ASSESSMENT & PLAN NOTE
· Chronic - stable   · 2 weeks ago noted to be 8 8  · On admission 10 2 > 9 5, drop likely dilutional    · Trend CBC daily

## 2021-05-31 NOTE — ASSESSMENT & PLAN NOTE
Lab Results   Component Value Date    HGBA1C 9 4 (H) 04/08/2021       Recent Labs     05/30/21  0822 05/30/21  1148 05/30/21  1618 05/30/21  2105   POCGLU 240* 251* 345* 223*       Blood Sugar Average: Last 72 hrs:  (P) 264 75     · Patient is on Lantuss and metformin as per home regimen  · Stop metformin   · Continue SSI  · Diabetic diet   · Goal glucose < 180  · Start Lantus 10 units this AM

## 2021-05-31 NOTE — ASSESSMENT & PLAN NOTE
· WBC 12 03 this admission, trending down > 10 64  · Patient recently completed abx tx for bacteremia from streptococcus anginosus   · Denies fevers or chills   · Procal negative - 0 05  · Trend WBC & temp

## 2021-05-31 NOTE — ASSESSMENT & PLAN NOTE
· Acute on chronic, patient has hx of hyponatremia, was on chemotherapy for pancreatic CA, recently hospitalized for sepsis/hyponatremia due to streptococcus anginosus thought to be related to GI source - recently finished PO flagyl and rocephin   · Patient presented with fatigue and dizziness found to have Na - 120   · Poor appetite   · Head CT - Stable cerebral atrophy with chronic small vessel ischemic white matter disease  No acute intracranial abnormality      · Patient received 1 L NSS solution in the ED, then started on NSS @ 50cc/hr   · Na this AM was 126  · Trend BMP q 8 hrs   · Last admission d/c Na was 132, recent outpatient lab draw on 5/24 reveals decrease to 124  · Patient is not on supplementation at this time   · Neuro checks q shift   · OOB with assistance   · Nephro consultation   · Urine Na was 57, Urine osmolality - 285, serum Osm - 276

## 2021-05-31 NOTE — ASSESSMENT & PLAN NOTE
· Patient expresses feeling dizzy for a couple of months now, his BP regimen was decreased, d/c Norvasc & BB   · Hold lisinopril   · Dizziness resolved this AM  · Interventions for Na correction as above   · OOB with assistance  · PT/OT  · Avoid hypotension   · SBP - 120-140s

## 2021-06-01 NOTE — PLAN OF CARE
Problem: Potential for Falls  Goal: Patient will remain free of falls  Description: INTERVENTIONS:  - Assess patient frequently for physical needs  -  Identify cognitive and physical deficits and behaviors that affect risk of falls    -  [place] fall precautions as indicated by assessment   - Educate patient/family on patient safety including physical limitations  - Instruct patient to call for assistance with activity based on assessment  - Modify environment to reduce risk of injury  - Consider OT/PT consult to assist with strengthening/mobility  Outcome: Progressing

## 2021-06-01 NOTE — ASSESSMENT & PLAN NOTE
Malnutrition Findings:       Severe protein calorie malnutrition, in the setting of pancreatic adenocarcinoma, as evidenced by temporal muscle wasting, hollowed orbitals, clavicle protrusion with prominent bones and ribs and < 75% PO intake within the last month, requiring nutrition consult    BMI Findings: Body mass index is 20 97 kg/m²       · Diabetic diet  · Nutrition consult

## 2021-06-01 NOTE — DISCHARGE INSTR - AVS FIRST PAGE
Dear Angie Canchola,     It was our pleasure to care for you here at [facility], [address] [place] [address]  It is our hope that we were always able to exceed the expected standards for your care during your stay  You were hospitalized due to dizziness and fatigue and was found to be hyponatremic, na 120  You were treated with IV fluids and your levels improved  You were cared for on the general medical floor under the service of Shelly Henry MD with the [facility] who covers for your primary care physician (PCP), Jim Hassan DO, while you were hospitalized  If you have any questions or concerns related to this hospitalization, you may contact us at [phone]  For follow up as well as medication refills, we recommend that you follow up with your primary care physician  A registered nurse will reach out to you by phone within a few days after your discharge to answer any additional questions that you may have after going home  However, at this time we provide for you here, the most important instructions / recommendations at discharge:     · Notable Medication Adjustments -                 Begin taking 1 salt tablet three times daily  Follow 1 2L/day fluid restriction  Obtain bloodwork to monitor sodium level 6/3/21  · Testing Required after Discharge -   · BMP check on 6/3/21, discuss results with your PCP and nephrologist  · Important follow up information -   · Follow up with your primary care doctor within 1 week, discuss refills with them  · Follow up with the nephrologist as scheduled  · Please review this entire after visit summary as additional general instructions including medication list, appointments, activity, diet, any pertinent wound care, and other additional recommendations from your care team that may be provided for you        Sincerely,     Shelly Henry MD

## 2021-06-01 NOTE — ASSESSMENT & PLAN NOTE
· Chronic anemia - stable   · 2 weeks ago noted to be 8 8  · On admission 10 2 > 9 5, drop likely dilutional given IVF use for hyponatremia  · HB stable at 8 5 today

## 2021-06-01 NOTE — DISCHARGE SUMMARY
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, presented with fatigue and dizziness  · Patient has hx of hyponatremia, was on chemotherapy for pancreatic CA, recently hospitalized for sepsis/hyponatremia due to streptococcus anginosus thought to be related to GI source - recently finished PO flagyl and rocephin   · Head CT - Stable cerebral atrophy with chronic small vessel ischemic white matter disease  No acute intracranial abnormality      · S/p 1 L NSS solution in the ED, then started on NSS @ 50cc/hr   · Na improved to 130 today  · Cleared for discharge, started on 1g salt tabs TID by nephrology  · Patient to follow up with Nephrology on discharge, to have BMP check on 6/3/21      Mesenteric venous thrombosis (HCC)  Assessment & Plan  · Continue xarelto     Leukocytosis  Assessment & Plan  · WBC 12 03 this admission most likely stress induced  · Wbc downtrended to 10 49, no indication of acute infection at this time  · Patient recently completed abx tx for bacteremia from streptococcus anginosus   · Denies fevers or chills   · Procal negative - 0 05  · Trend WBC & temp     Anemia  Assessment & Plan  · Chronic anemia - stable   · 2 weeks ago noted to be 8 8  · On admission 10 2 > 9 5, drop likely dilutional given IVF use for hyponatremia  · HB stable at 8 5 today      Severe protein-calorie malnutrition (HCC)  Assessment & Plan  Malnutrition Findings:       Severe protein calorie malnutrition, in the setting of pancreatic adenocarcinoma, as evidenced by temporal muscle wasting, hollowed orbitals, clavicle protrusion with prominent bones and ribs and < 75% PO intake within the last month, requiring nutrition consult    BMI Findings: Body mass index is 20 97 kg/m²  · Diabetic diet  · Nutrition consult     Adenocarcinoma of pancreas [facility])  Assessment & Plan  · Adenocarcinoma of the pancreas dx in March 2021  · Patient recent seen by Dr Noemi Casey as outpatient 5/24   · Seen by Surgical Oncology who recommended neoadjuvant treatment  · Patient has had 2 cycles of gemcitabine and Abraxane so far both with complications and hospitalizations  Now on gentamicibine single agent  · Continued outpatient scheduled F/u with heme-onc    Type 2 diabetes mellitus with hyperglycemia, with long-term current use of insulin [facility])  Assessment & Plan  Lab Results   Component Value Date    HGBA1C 9 4 (H) 04/08/2021       Recent Labs     05/31/21  1630 05/31/21  2119 06/01/21  0746 06/01/21  1135   POCGLU 296* 122 185* 309*       Blood Sugar Average: Last 72 hrs:  (P) 249 1     · Uncontrolled DM, A1c 9 4  · Patient is on Lantuss and metformin as per home regimen  · Stop metformin while inpatient  · On insulin lantus 10iu hs and SSI      Essential hypertension  Assessment & Plan  · Currently normotensive   · Can resume home meds on discharge      Discharging Physician / Practitioner: Lyly Cordoba MD  PCP: Adam Awad DO  Admission Date:   Admission Orders (From admission, onward)     Ordered        05/30/21 1011  Inpatient Admission  Once                   Discharge Date: 06/01/21    Resolved Problems  Date Reviewed: 5/31/2021          Resolved    Lactic acidosis 5/30/2021     Resolved by  Walter Hollins CRNP    Dizziness 5/31/2021     Resolved by  Walter Hollins, [facility] Stay:  · Nephrology    Procedures Performed:   ·     Significant Findings / Test Results:   · 5/30/21 Chest Xray    FINDINGS:  Right port in SVC      Cardiomediastinal silhouette normal      Lungs clear   No effusion or pneumothorax      Osseous structures normal for age  Healed left rib fractures      IMPRESSION:     No acute cardiopulmonary disease        Incidental Findings:   ·      Test Results Pending at Discharge (will require follow up):   ·      Outpatient Tests Requested:  ·     Complications:      Reason for Admission: Dizziness and fatigue    Hospital Course:     Marilin Levy is a 67 y o  male patient with PMH of pancreatic cancer, HTN, HLD, DM, recent strep bacteremia treatment who originally presented to the hospital on 5/30/2021 due to dizziness and fatigue and was found to be hyponatremic, Na 120 most likely secondary to SIADH and volume depletion  He was placed on IVFs and he improved  Na 130 today  He was also managed by nephrology while inpatient and would be discharged on salt tabs 1g TID, to follow up closely with nephrology outpatient  Please see above list of diagnoses and related plan for additional information  Condition at Discharge: stable     Discharge Day Visit / Exam:     Subjective:  No overnight events, denies dizziness, feeling better  Vitals: Blood Pressure: 143/71 (06/01/21 0748)  Pulse: 97 (05/31/21 2244)  Temperature: 97 8 °F (36 6 °C) (05/31/21 2244)  Temp Source: Oral (05/31/21 2244)  Respirations: 16 (06/01/21 0748)  Height: 5' 11" (180 3 cm) (05/30/21 1106)  Weight - Scale: 68 2 kg (150 lb 5 7 oz) (06/01/21 0600)  SpO2: 98 % (05/31/21 0711)  Exam:   Physical Exam  Vitals signs and nursing note reviewed  Constitutional:       General: He is not in acute distress  Appearance: Normal appearance  He is not toxic-appearing or diaphoretic  Comments: Cachetic, chronically ill looking, with temporal muscle wasting, prominent ribs, clavicle   Neck:      Musculoskeletal: Neck supple  No neck rigidity or muscular tenderness  Cardiovascular:      Rate and Rhythm: Normal rate and regular rhythm  Pulses: Normal pulses  Heart sounds: No murmur     Pulmonary:      Effort: Pulmonary effort is normal  No respiratory distress  Breath sounds: Normal breath sounds  No wheezing  Abdominal:      General: Bowel sounds are normal  There is no distension  Palpations: Abdomen is soft  Tenderness: There is no abdominal tenderness  There is no right CVA tenderness, left CVA tenderness or guarding  Musculoskeletal: Normal range of motion  General: No swelling or deformity  Right lower leg: No edema  Left lower leg: No edema  Skin:     General: Skin is warm and dry  Capillary Refill: Capillary refill takes less than 2 seconds  Coloration: Skin is not jaundiced or pale  Findings: No bruising, erythema, lesion or rash  Neurological:      General: No focal deficit present  Mental Status: He is alert  Mental status is at baseline  Cranial Nerves: No cranial nerve deficit  Psychiatric:         Mood and Affect: Mood normal          Thought Content: Thought content normal          Judgment: Judgment normal        Discussion with Family: patient opted to update his brother himself    Discharge instructions/Information to patient and family:   See after visit summary for information provided to patient and family  Provisions for Follow-Up Care:  See after visit summary for information related to follow-up care and any pertinent home health orders  Disposition:     Home    For Discharges to [facility] SNF:   · Not Applicable to this Patient - Not Applicable to this Patient    Planned Readmission: No     Discharge Statement:  I spent 45 minutes discharging the patient  This time was spent on the day of discharge  I had direct contact with the patient on the day of discharge  Greater than 50% of the total time was spent examining patient, answering all patient questions, arranging and discussing plan of care with patient as well as directly providing post-discharge instructions  Additional time then spent on discharge activities      Discharge Medications:  See after visit summary for reconciled discharge medications provided to patient and family        ** Please Note: This note has been constructed using a voice recognition system **

## 2021-06-01 NOTE — ASSESSMENT & PLAN NOTE
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, presented with fatigue and dizziness  · Patient has hx of hyponatremia, was on chemotherapy for pancreatic CA, recently hospitalized for sepsis/hyponatremia due to streptococcus anginosus thought to be related to GI source - recently finished PO flagyl and rocephin   · Head CT - Stable cerebral atrophy with chronic small vessel ischemic white matter disease  No acute intracranial abnormality      · S/p 1 L NSS solution in the ED, then started on NSS @ 50cc/hr   · Na improved to 130 today  · Cleared for discharge, started on 1g salt tabs TID by nephrology  · Patient to follow up with Nephrology on discharge, to have BMP check on 6/3/21

## 2021-06-01 NOTE — ASSESSMENT & PLAN NOTE
· Adenocarcinoma of the pancreas dx in March 2021  · Patient recent seen by Dr Jojo Gamez as outpatient 5/24   · Seen by Surgical Oncology who recommended neoadjuvant treatment  · Patient has had 2 cycles of gemcitabine and Abraxane so far both with complications and hospitalizations  Now on gentamicibine single agent    · Continued outpatient scheduled F/u with heme-onc

## 2021-06-01 NOTE — DISCHARGE INSTRUCTIONS
Begin taking 1 salt tablet three times daily  Follow 1 2L/day fluid restriction  Obtain bloodwork to monitor sodium level 6/3/21  Hyponatremia   WHAT YOU NEED TO KNOW:   Hyponatremia occurs when the amount of sodium (salt) in your blood is lower than normal  Sodium is an electrolyte (mineral) that helps your muscles, heart, and digestive system work properly  It helps control blood pressure and fluid balance  DISCHARGE INSTRUCTIONS:   Follow up with your healthcare provider as directed: You may need to return for more tests  Write down your questions so you remember to ask them during your visits  Nutrition:  You may need to increase your intake of sodium  Foods that are high in sodium include milk, packaged snacks such as pretzels, or processed meats (angulo, sausage, and ham)  Ask your dietitian to help you create a meal plan that is right for you  Liquids: Follow your healthcare provider's advice if you need to limit the amount of liquid you drink  Ask how much liquid to drink each day and which liquids are best for you  You may be asked to drink liquids that have water, sugar, and salt, such as juices, milk, or sports drinks  These liquids help your body hold in fluid and prevent dehydration  Contact your healthcare provider if:   · You have muscle cramps or twitching  · You feel very weak or tired  · You have nausea or are vomiting  · You have questions or concerns about your condition or care  Seek care immediately or call 911 if:   · You have a seizure  · You have an irregular heartbeat  · You have trouble breathing  · You cannot move your arms and legs  · You are confused or cannot think clearly  © Copyright [address] Information is for End User's use only and may not be sold, redistributed or otherwise used for commercial purposes   All illustrations and images included in CareNotes® are the copyrighted property of [employer]  or Emily Jefferson  The above information is an  only  It is not intended as medical advice for individual conditions or treatments  Talk to your doctor, nurse or pharmacist before following any medical regimen to see if it is safe and effective for you

## 2021-06-01 NOTE — PROGRESS NOTES
Pastoral Care Progress Note    2021  Patient: Stana Gaucher :   Admission Date & Time: 2021 0259  MRN: [id number] CSN: [id number]                     Chaplaincy Interventions Utilized:   Relationship Building: Cultivated a relationship of care and support  Prayer was getting ready to be discharged  He was afraid that he was being discharged too soon and may have to come back again  He also expressed concerns about his pancreatic cancer and was not feeling hopeful about his overall prognosis  He also feared that he was not forgiven for his sins from [employer]    I tried to offer overall encouragement that he was forgiven and invited him to focus on [employer] love for him      21 1400   Clinical Encounter Type   Visited With Patient   Routine Visit Follow-up   Referral From Nurse   Referral To    Tenriism Encounters   Tenriism Needs Prayer        21 1400   Clinical Encounter Type   Visited With Patient   Routine Visit Follow-up   Referral From Nurse   Referral To    Tenriism Encounters   Tenriism Needs Prabhavin

## 2021-06-01 NOTE — ASSESSMENT & PLAN NOTE
· WBC 12 03 this admission most likely stress induced  · Wbc downtrended to 10 49, no indication of acute infection at this time  · Patient recently completed abx tx for bacteremia from streptococcus anginosus   · Denies fevers or chills   · Procal negative - 0 05  · Trend WBC & temp

## 2021-06-01 NOTE — PLAN OF CARE
Problem: Potential for Falls  Goal: Patient will remain free of falls  Description: INTERVENTIONS:  - Assess patient frequently for physical needs  -  Identify cognitive and physical deficits and behaviors that affect risk of falls    -  [place] fall precautions as indicated by assessment   - Educate patient/family on patient safety including physical limitations  - Instruct patient to call for assistance with activity based on assessment  - Modify environment to reduce risk of injury  - Consider OT/PT consult to assist with strengthening/mobility  6/1/2021 1320 by Grace Martin RN  Outcome: Adequate for Discharge  6/1/2021 1319 by Grace Martin RN  Outcome: Progressing  6/1/2021 0739 by Grace Martin RN  Outcome: Progressing

## 2021-06-01 NOTE — TREATMENT PLAN
sNa improved to 130  Would d/c on 1g TID salt tab and 1 2L/day fluid restriction  Repeat BMP Thursday, 6/3/21  Should have outpatient f/u with renal  Office to call with f/u appt  Have d/w SLIM  [id number] for d/c from renal perspective

## 2021-06-02 NOTE — TELEPHONE ENCOUNTER
I called and schedule patient for [place] Tse on 06/17/21 at 12:30 with Dr Stark and patient knows to have labs done a few days prior to the visit

## 2021-06-02 NOTE — TELEPHONE ENCOUNTER
----- Message from Elgin Gerard DO sent at 6/1/2021 12:05 PM EDT -----  Regarding: [facility] hospital f/u appt  Patient to be discharged from [facility] today  Please schedule hospital follow up for hyponatremia in 2-3 weeks with first available provider  Patient should have BMP prior to office visit which has already been ordered  Thanks

## 2021-06-02 NOTE — UTILIZATION REVIEW
Notification of Discharge   This is a Notification of Discharge from our facility [address]  Please be advised that this patient has been discharge from our facility  Below you will find the admission and discharge date and time including the patients disposition  UTILIZATION REVIEW CONTACT:  Cha Morgan  Utilization   Network Utilization Review Department  Phone: [phone] x carefully listen to the prompts  All voicemails are confidential   Email: [email or web address]     PHYSICIAN ADVISORY SERVICES:  FOR TITV-GJ-[facility] REVIEW - MEDICAL NECESSITY DENIAL  Phone: [phone]  Fax: [phone]  Email: [email or web address]     PRESENTATION DATE: 5/30/2021  8:17 AM  OBERVATION ADMISSION DATE:   INPATIENT ADMISSION DATE: 5/30/21 1011   DISCHARGE DATE: 6/1/2021  2:53 PM  DISPOSITION: Home/Self Care Home/Self Care      IMPORTANT INFORMATION:  Send all requests for admission clinical reviews, approved or denied determinations and any other requests to dedicated fax number below belonging to the campus where the patient is receiving treatment   List of dedicated fax numbers:  [address] DENIALS (Administrative/Medical Necessity) [phone]   [address]  [facility] (Maternity/NICU/Pediatrics) [phone]   [facility] [phone]   Lissett Boswell [phone]   [facility] [phone]   [facility] [address] [facility] [phone]   [facility]  [phone]   [address] [facility]  [address] [facility] [address] [facility] [phone]

## 2021-06-02 NOTE — TELEPHONE ENCOUNTER
Patient relative Alena Menjivar in with patient would like to confirm if he will still have the infusion appt scheduled 6/7 due to recently being discharged from the hospital  Best call back [phone]

## 2021-06-04 NOTE — TELEPHONE ENCOUNTER
----- Message from Mary Juan DO sent at 6/3/2021  4:01 PM EDT -----  Recommend he increases his salt tabs to 2g TID from 1g TID as sNa a bit lower at 129  Will also recommend repeat BMP next week  He should continue 1 2L/day fluid restriction   Thanks

## 2021-06-07 NOTE — PROGRESS NOTES
Patient completed chemotherapy infusion with no adverse reactions  AVS provided, patient left unit escorted in [facility]

## 2021-06-07 NOTE — PLAN OF CARE
Problem: Potential for Falls  Goal: Patient will remain free of falls  Description: INTERVENTIONS:  - Assess patient frequently for physical needs  -  Identify cognitive and physical deficits and behaviors that affect risk of falls    -  [place] fall precautions as indicated by assessment   - Educate patient/family on patient safety including physical limitations  - Instruct patient to call for assistance with activity based on assessment  - Modify environment to reduce risk of injury  - Consider OT/PT consult to assist with strengthening/mobility  Outcome: Progressing

## 2021-06-14 NOTE — PROGRESS NOTES
Outpatient Oncology Nutrition Consultation   Type of Consult: Follow Up  Care Location: Telephone Call   Nutrition Assessment:   Oncology Diagnosis & Treatments: Adenocarcinoma of the pancreas  · Currently undergoing neoadjuvant chemotherapy (Gemzar) which started 3/23/21  Dose reduced 4/2021 and 5/24/21 due to overall performance status, and complications that resulted in hospitalization  Abraxane discontinued 5/7/21  · Potential surgical resection after chemotherapy     Oncology History   A[facility] ([facility] )   3/10/2021 Initial Diagnosis    Adenocarcinoma of pancreas ([facility] )     3/23/2021 -  Chemotherapy    pegfilgrastim (Denise Butler), 6 mg, Subcutaneous, Once, 2 of 5 cycles  Administration: 6 mg (6/21/2021)  paclitaxel protein-bound (ABRAXANE) IVPB, 125 mg/m2 = 235 mg, Intravenous, Once, 2 of 2 cycles  Dose modification: 100 mg/m2 (original dose 125 mg/m2, Cycle 2, Reason: Dose Not Tolerated)  Administration: 235 mg (3/23/2021), 235 mg (3/30/2021), 235 mg (4/6/2021), 188 mg (4/20/2021), 188 mg (5/4/2021), 188 mg (4/27/2021)  gemcitabine (GEMZAR) infusion, 1,879 8 mg, Intravenous, Once, 3 of 6 cycles  Dose modification: 800 mg/m2 (original dose 1,000 mg/m2, Cycle 2, Reason: Dose Not Tolerated)  Administration: 1,800 mg (3/23/2021), 1,800 mg (3/30/2021), 1,800 mg (4/6/2021), 1,504 2 mg (4/20/2021), 1,504 2 mg (5/4/2021), 1,504 2 mg (4/27/2021), 1,504 2 mg (6/7/2021), 1,504 2 mg (6/14/2021), 1,504 2 mg (6/21/2021)       Past Medical & Surgical Hx:   Patient Active Problem List   Diagnosis    Hyperlipidemia    Essential hypertension    Type 2 diabetes mellitus with hyperglycemia, with long-term current use of insulin (HCC)    Gastroesophageal reflux disease    Pancreatic cyst    Adenocarcinoma of pancreas ([facility] )    Peripheral neuropathy    Bilateral cellulitis of lower leg    Hyponatremia    Severe protein-calorie malnutrition (HCC)    Anemia    Sacral wound    Leukocytosis    Generalized weakness    Streptococcal bacteremia    Mesenteric venous thrombosis (HCC)    GWEN (acute kidney injury) ([facility] )     Past Medical History:   Diagnosis Date    Cancer ([facility] )     pancreatic    Diabetes mellitus ([facility] )     Frequent urination     GERD (gastroesophageal reflux disease)     Hyperlipidemia     Hypertension     Peripheral neuropathy     Weakness     Weight loss      Past Surgical History:   Procedure Laterality Date    APPENDECTOMY      CATARACT EXTRACTION      COLONOSCOPY      SKIN LESION EXCISION      of a wart on right arm    TUNNELED VENOUS PORT PLACEMENT N/A 3/26/2021    Procedure: INSERTION VENOUS PORT (PORT-A-CATH);   Surgeon: Yfn Booth MD;  Location:  MAIN OR;  Service: Surgical Oncology       Review of Medications:   Vitamins, Supplements and Herbals: Med List Reviewed & pt is only taking: Salt Tabs (SIADH)    Current Outpatient Medications:     Accu-Chek FastClix Lancets MISC, USE 1 TO CHECK GLUCOSE TWICE DAILY TO THREE TIMES DAILY, Disp: , Rfl:     Accu-Chek Guide test strip, USE 1 STRIP TWICE DAILY TO THREE TIMES DAILY, Disp: , Rfl:     acetaminophen (TYLENOL) 325 mg tablet, Take 650 mg by mouth every 4 (four) hours as needed for mild pain, Disp: , Rfl:     aspirin 81 mg chewable tablet, Chew 81 mg daily, Disp: , Rfl:     atorvastatin (LIPITOR) 40 mg tablet, Take 40 mg by mouth daily, Disp: , Rfl:     Blood Glucose Monitoring Suppl (Accu-Chek Guide) w/Device KIT, USE TO CHECK GLUCOSE TWO TO THREE TIMES DAILY, Disp: , Rfl:     insulin glargine (Lantus SoloStar) 100 units/mL injection pen, Inject 8 units at bedtime, Disp: 15 mL, Rfl: 0    metFORMIN (GLUCOPHAGE) 850 mg tablet, TAKE 1 TABLET BY MOUTH TWICE DAILY TAKE WITH MORNING AND EVENING MEAL, Disp: , Rfl:     NovoLOG FlexPen 100 units/mL injection pen, Inject as per sliding scale , (  Scale - 150-200 -1 units, 201-250-2 units, 251-300 -3 units, 301-350-4 units, > 350- 5  units) with meals, Disp: 15 mL, Rfl:     prochlorperazine (COMPAZINE) 10 mg tablet, Take 1 tablet (10 mg total) by mouth every 6 (six) hours as needed for nausea or vomiting, Disp: 45 tablet, Rfl: 3    ramipril (ALTACE) 10 MG capsule, Take 10 mg by mouth daily , Disp: , Rfl:     ReliOn Pen Needles 31G X 6 MM MISC, USE 1 IN THE EVENING, Disp: , Rfl:     rivaroxaban (XARELTO) 15 mg tablet, Take 1 tablet (15 mg total) by mouth 2 (two) times a day with meals for 40 doses (Patient taking differently: Take 15 mg by mouth daily with breakfast ), Disp: 40 tablet, Rfl: 0    sodium chloride (GRAHAM 128) 5 % hypertonic ophthalmic solution, 1 drop as needed, Disp: , Rfl:     sodium chloride 1 g tablet, Take 1 tablet (1 g total) by mouth 3 (three) times a day with meals, Disp: 90 tablet, Rfl: 0    traMADol (ULTRAM) 50 mg tablet, Take 1 tablet (50 mg total) by mouth every 6 (six) hours as needed for moderate pain (Patient not taking: Reported on 6/17/2021), Disp: 15 tablet, Rfl: 0    Most Recent Lab Results: He checks his BG 2-3x/day; BG has been ~140-150 in AM and ~300 in PM 2 hrs after eating  Daily Average ~257  Says BG is very high after chemo, >400  Pt states doctor is aware of BG readings    Lab Results   Component Value Date    WBC 5 95 06/18/2021    IRON 24 (L) 04/10/2021    TIBC 155 (L) 04/10/2021    FERRITIN 994 (H) 04/10/2021    ALT 32 06/18/2021    AST 18 06/18/2021    ALB 2 9 (L) 06/18/2021    SODIUM 131 (L) 06/18/2021    SODIUM 131 (L) 06/11/2021    K 5 3 06/18/2021    K 4 6 06/11/2021    CL 97 (L) 06/18/2021    BUN 26 (H) 06/18/2021    BUN 30 (H) 06/11/2021    CREATININE 0 90 06/18/2021    CREATININE 0 95 06/11/2021    EGFR 83 06/18/2021    PHOS 3 8 05/31/2021    PHOS 3 7 05/30/2021    GLUCOSE 256 (H) 04/08/2021    POCGLU 309 (H) 06/01/2021    GLUF 396 (H) 06/18/2021    GLUF 291 (H) 06/11/2021    GLUC 115 05/31/2021    HGBA1C 9 4 (H) 04/08/2021    HGBA1C 7 9 02/12/2021    CALCIUM 8 7 06/18/2021    MG 2 0 05/31/2021       Anthropometric Measurements:   Height: 72"  Ht Readings from Last 1 Encounters:   06/21/21 5' 10 87" (1 8 m)     Wt Readings from Last 20 Encounters:   06/21/21 64 8 kg (142 lb 13 7 oz)   06/17/21 64 9 kg (143 lb)   06/14/21 64 5 kg (142 lb 3 2 oz)   06/07/21 64 3 kg (141 lb 12 1 oz)   06/01/21 68 2 kg (150 lb 5 7 oz)   05/24/21 66 7 kg (147 lb)   05/07/21 64 4 kg (142 lb)   05/05/21 64 4 kg (142 lb)   05/04/21 64 2 kg (141 lb 8 6 oz)   04/27/21 65 8 kg (145 lb 1 oz)   04/20/21 66 kg (145 lb 8 1 oz)   04/08/21 68 5 kg (151 lb)   04/06/21 68 5 kg (151 lb 0 2 oz)   03/30/21 66 2 kg (145 lb 15 1 oz)   03/23/21 66 6 kg (146 lb 13 2 oz)   03/15/21 67 6 kg (149 lb)   03/12/21 67 1 kg (148 lb)   02/23/21 77 1 kg (170 lb)       Weight History:    Usual Weight: 170#; had been up to 230# 10 years ago while he was working   Home Scale: says his scale is not accurate   Comments: Question accuracy of wt hx d/t Feb 2021 Epic wt (pt says his coat and boots were very heavy when he got weighed on 2/23/21)      Oncology Nutrition-Anthropometrics      Nutrition from 6/23/2021 in [address] [place] [address] Dietitian [facility] Nutrition from 5/26/2021 in [place] [address] Oncology Dietitian [facility]   Patient age (years):  67 years  67 years   Patient (male) height (in):  67 in  67 in   Current weight (lbs):  142 9 lbs  147 lbs   Current weight to be used for anthropometric calculations (kg)  65 kg  66 8 kg   BMI:  19 4  19 9   IBW male  178 lb  178 lb   IBW (kg) male  80 9 kg  80 9 kg   IBW % (male)  80 3 %  82 6 %   Adjusted BW (male):  169 2 lbs  170 2 lbs   Adjusted BW in kg (male):  76 9 kg  77 4 kg   % weight change after 1 week:  0 5 %  --   Weight change after 1 week (lbs)  0 7 lbs  --   % weight change after 1 month:  -2 8 %  1 3 %   Weight change after 1 month (lbs)  -4 1 lbs  1 9 lbs   % weight change after 3 months:  -2 7 %  (!) -13 5 %   Weight change after 3 months (lbs)  -4 lbs  -23 lbs        Nutrition-Focused Physical Findings: n/a due to telephone call    Food/Nutrition-Related History & Client/Social History:    Current Nutrition Impact Symptoms:  [] Nausea  [] Reduced Appetite  [] Acid Reflux    [] Vomiting  [x] Unintended Wt Loss - slow and ongoing  [] Malabsorption    [] Diarrhea - 1x last week after eating spicy foods  -typically has small soft to hard BM's 2-3x/day  -loose stools consistent with pancreatic insufficiency per SurgOnc 5/20/21 [] Unintended Wt Gain  [] Dumping Syndrome    [] Constipation  [] Thick Mucous/Secretions  [] Abdominal Pain    [] Dysgeusia (Altered Taste) - no issues reported [] Xerostomia (Dry Mouth)  [x] Gas - frequent gas and belching  - typically eats very fast and chews well   [] Dysosmia (Altered Smell)  [] Thrush  [] Difficulty Chewing    [] Oral Mucositis (Sore Mouth)  [] Fatigue  [x] Hyperglycemia - Last A1C on 4/8/21 was 9 4%   [] Odynophagia  [] Esophagitis  [x] Other: Wound on sacrum is ongoing; ankle wound healed    [] Dysphagia  [] Early Satiety  [] No Problems Eating      Food Allergies & Intolerances: no    Current Diet: CHO-Controlled, Now on 1 2 L Fluid Restriction per Hospital AVS 6/1/21 ([facility])  Current Nutrition Intake: Unchanged from last visit  Appetite: Good  Nutrition Route: PO  Activity level: limited by weakness    24 Hr Diet Recall:  Breakfast: bowl of plain Cheerios, 1 banana, 1% milk, 1 egg, 2 small pc scrapple, 1 pc white toast with a small amount of butter, low-sodium V8 juice  Snack: light [place] or regular yogurt (1-2x/day)  Lunch: tuna sandwich and a light [place] yogurt  Snack: Glucerna shake  Dinner: good portions of pot pie with smoked sausage, noodles, corn  Snack: SF pudding    Beverages: water (16 9 oz x3), 1% milk (in cereal, tea, and coffee), decaf tea with 1% milk (8 oz x1), regular coffee with 1% milk (8 oz x1), Glucerna (10 oz x1)   -1 2L/40 oz Fluid Restriction but pt states his oncologist told him to drink a lot   -Averaging ~77 oz of caffeine-free fluids per day  Supplements:  Glucerna Hunger Smart (10 oz, 180 kcal, 15 g pro) - QD    Oncology Nutrition-Estimated Needs      Nutrition from 3/29/2021 in [facility] Oncology Dietitian [place]   Weight type used  IBW   Weight in kilograms (kg) used for estimated needs  80 9 kg   Energy needs formula:   35-40 kcal/kg   Energy needs based on 35 kcal/k kcal   Energy needs based on 40 kcal/k kcal   Protein needs formula:  1 5-2 g/kg   Protein needs based on 1 5 g/kg  121 g   Protein needs based on 2 g/kg  162 g   Fluid needs formula:  30-35 mL/kg   Fluid needs based on 30 mL/kg  2427 mL   Fluid needs in ounces  82 oz   Fluid needs based on 35 mL/kg  2832 mL   Fluid needs in ounces  96 oz           Discussion & Intervention:   Martha Hubbard was evaluated today for an RD follow up regarding wt loss  Martha Hubbard is currently undergoing tx for pancreatic cancer (neoadjuvant chemotherapy)  He is doing well today  He is now on salt tabs and a fluid restriction due to SIADH  He is trying to reduce his fluid intake but is exceeding his 1 2L daily goal   He continues to drink Glucerna daily and eat light Greek yogurt BID  He continues with a slow wt loss  He says his ankle wound is healed but his sacral wound still hurts  He denies N/V/D/C today  His BG remains elevated, especially after chemo  He says his daily average BG reading is 257  Reviewed 24 hour recall, which revealed an inadequate po intake, and discussed ways to increase kcal, protein, and fluid intakes, optimize nutrient intake and adhere to a carbohydrate controlled diet  Also reviewed the importance of wt management throughout the tx process and the role of a high kcal/ high protein diet and carbohydrate controlled diet in managing wt and overall health    Based on today's assessment, discussion included: MNT for: wt loss, DM, Gas, wounds, a high kcal/protein diet & food choices to include at all meals & snacks (Examples of high kcal foods: cheese, full-fat dairy products, nut butter, plant-based fats, coconut oil/milk, avocado, butter, cream soup, etc  Examples of high protein foods: eggs, chicken, fish, beans/legumes, nuts/nut butters, bone broth, etc ) , a diabetic diet & food choices to include at all meals & snacks, spreading carbohydrate intake throughout the day & counting carbohydrates for adequate glycemic control, adequate hydration & fluid choices, sipping on calorie containing beverages (examples include: adding whole milk or cream to coffee, oral nutrition supplements, juice, electrolyte replacement beverages, milk, etc ), having consistent and planned snacks between meals, eating when feeling most hungry and increasing DM oral nutrition supplements  Moving forward, Morelia Jordan was encouraged to replace 1 bottle of water with an additional Glucerna daily (total of 2 Glucerna daily), be mindful of his daily fluid restriction, and consume 2-3 light Greek yogurts daily  Materials Provided: mailed to pt: appt reminder card  All questions and concerns addressed during todays visit  Morelia Jordan has RD contact information  Nutrition Diagnosis:    Inadequate Energy Intake related to physiological causes, disease state and treatment related issues as evidenced by food recall, wt loss and discussion with pt and/or family   Increased Nutrient Needs (kcal & pro) related to increased demand for nutrients and disease state as evidenced by cancer dx and pt undergoing tx for cancer   Altered Nutrition Related Laboratory Values related to endocrine and pancreatic dysfunction as evidenced by hyperglycemia       Monitoring & Evaluation:   Goals:  · weight maintenance/stabilization  · adequate nutrition impact symptom management  · pt to meet >/=75% estimated nutrition needs daily  · adequate glycemic control    · Progress Towards Goals: Progressing    Nutrition Rx & Recommendations:  · Diet: Diabetic, High Calorie & High Protein Diet  · Eat slowly and chew food thoroughly before swallowing  · Small, frequent meals/snacks may be easier to tolerate than 3 large daily meals  Aim for 5-6 small meals per day (every 2-3 hours)  · Include protein at all meals/snacks  · Stay hydrated by sipping fluids of choice/tolerance throughout the day  · Liquid nutrition may be better tolerated than solids at times  · Alter food choices and eating patterns to accommodate changing needs  · Refer to Eating Hints book for other meal/snack ideas and symptom management  · For weight loss: monitor your weight at home at least 2x/week, record your weight, start by adding 250-500 extra calories per day, eat 5-6 small scheduled meals every 2-3 hrs, choose foods that are high in protein and calories (see pages 49-53 in your Eating Hints book), drink liquids with calories for example: milkshakes/smoothies/juice/soup/whole milk/chocolate milk, cook with protein fortified milk (see recipe on page 36 in your Eating Hints book), consider ready-to-drink oral nutrition supplements such as Ensure Plus, Ensure Enlive, Boost Plus, or Boost Very High Calorie, avoid "diet" and "light" foods when possible, avoid drinking too much with meals, contact your dietitian with any continued weight loss over the course of 1 week  For more info see pages 35-37 in your Eating Hints book  · Weigh yourself regularly  If you notice weight loss, make an effort to increase your daily food/calorie intake  If you continue to notice loss after these efforts, reach out to your dietitian to establish a plan to stabilize weight  · Avoid gas-producing foods such as broccoli, cabbage, sauerkraut, [place] sprouts, cauliflower, corn, cucumbers, onions, peppers, beans, peas, lentils, apples, apple juice, avocado, and melons  Carbonated drinks, chewing gum, and drinking through a straw can also cause gas  Limiting lactose may help for those who are sensitive to milk products  · Fluid Restriction per physician     · Nutrition Supplements: Increase Glucerna to 2 per day (with AM and afternoon snacks)  Replace one bottle of water the additional bottle of Glucerna because of your fluid restriction  · 2-3 light [place] yogurt's per day  Choose a yogurt that has <10 grams of sugar per serving      Follow Up Plan: 7/21/21 at 10:30am by phone  Recommend Referral to Other Providers: none at this time

## 2021-06-14 NOTE — PROGRESS NOTES
Patient completed chemotherapy infusion with no adverse reactions  AVS provided, brother Ralf called for   Patient ambulated to [facility] using walker to wait for ride home

## 2021-06-17 PROBLEM — N17.9 AKI (ACUTE KIDNEY INJURY) (HCC): Status: ACTIVE | Noted: 2021-01-01

## 2021-06-17 NOTE — PROGRESS NOTES
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  4  Diabetes mellitus type 2, uncontrolled - last A1C 9 4 as of April 2021, continue metformin  5  Anemia of chronic disease - last Hgb 9 as of 6/11/21, repeat CBC, no GALI d/t malignancy  6  HTN - BP well controlled today, continue ramipril 10mg daily  7  Hypoalbuminemia - alb 2 8 as of 6/11/21, repeat CMP, encourage protein supplementation  8  Elevated sCr possibly GWEN as sCr at baseline 0 6-0 7, up to 0 95 with rise in BUN as well to 30  Repeat BMP along with UA with microscopy  May need IVF if sCr continues to rise  Obtain labs (blood and urine testing) tomorrow  RTC in 4 months  Guy Phelps was seen today for follow-up and hyponatremia  Diagnoses and all orders for this visit:    GWEN (acute kidney injury) ([facility] [address] )  -     Urinalysis with microscopic; Future  -     Protein / creatinine ratio, urine; Future    Type 2 diabetes mellitus with hyperglycemia, with long-term current use of insulin ([facility])  -     Urinalysis with microscopic; Future  -     Protein / creatinine ratio, urine; Future  -     CBC and differential; Future    Essential hypertension    Hyponatremia    Anemia, unspecified type  -     CBC and differential; Future        Assessment/Plan:  1  Recurrent hyponatremia likely multifactorial, suspect some degree volume depletion as well as SIADH  Improved with IVF inpatient   On salt tabs 1g three times daily -last sNa 133 when corrected for glucose as of 6/11/21, repeat BMP  -cortisol ok  -Urine sodium, 57, urine osmolality 285  2  Pancreatic adenocarcinoma - on gemcitabine  3  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  4  Diabetes mellitus type 2, uncontrolled - last A1C 9 4 as of April 2021, continue metformin  5  Anemia of chronic disease - last Hgb 9 as of 6/11/21, repeat CBC, no GALI d/t malignancy  6  HTN - BP well controlled today, continue ramipril 10mg daily  7  Hypoalbuminemia - alb 2 8 as of 6/11/21, repeat CMP, encourage protein supplementation  8  Elevated sCr possibly GWEN as sCr at baseline 0 6-0 7, up to 0 95 with rise in BUN as well to 30  Repeat BMP along with UA with microscopy  May need IVF if sCr continues to rise  Obtain labs (blood and urine testing) tomorrow  RTC in 4 months  SUBJECTIVE / INTERVAL HISTORY:  67 y o  male presents in hospital follow up of hyponatremia  Terrie Dinero admitted for dizziness, with recently Dx adenoca s/p palliative chemo, with recent admission for strep bacteremia and hyponatremia  Represented with hyponatremia this last admission with sNa 120, improved to 130s by hospital d/c with IVF  On protein supplements  Also discharged on sodium tablets  Denies NSAID use  Uses  Tylenol prn  Eats three meals a day  Uses protein supplements including glucerna  Also eats greek yogurt  DM type 2 - blood sugars in 400s but improved to 190s  HTN - BP well controlled  Review of Systems   Constitutional: Negative for chills and fever  HENT: Negative for sore throat  Eyes: Negative for visual disturbance  Respiratory: Negative for cough and shortness of breath  Cardiovascular: Negative for chest pain and leg swelling  Gastrointestinal: Negative for abdominal pain, constipation, diarrhea, nausea and vomiting  Frequent bowel movements but not loose   Endocrine: Negative for polyuria     Genitourinary: Negative for decreased urine volume, difficulty urinating, dysuria and hematuria  Musculoskeletal: Negative for back pain and myalgias  Skin: Negative for rash  Neurological: Positive for dizziness (with standing  feels like he might lose his balance moving with walker but transient)  Negative for light-headedness and numbness  Psychiatric/Behavioral: Negative for confusion  OBJECTIVE:  /58 (BP Location: Left arm, Patient Position: Sitting, Cuff Size: Standard)   Pulse 104   Resp 16   Ht 5' 10 91" (1 801 m)   Wt 64 9 kg (143 lb)   BMI 20 00 kg/m²  Body mass index is 20 kg/m²  Physical exam:  Physical Exam  Vitals reviewed  Constitutional:       General: He is not in acute distress  Appearance: Normal appearance  He is well-developed  He is not diaphoretic  Comments: thin   HENT:      Head: Normocephalic and atraumatic  Nose: Nose normal       Mouth/Throat:      Mouth: Mucous membranes are moist       Pharynx: No oropharyngeal exudate  Eyes:      General: No scleral icterus  Right eye: No discharge  Left eye: No discharge  Comments: eyeglasses   Neck:      Thyroid: No thyromegaly  Cardiovascular:      Rate and Rhythm: Normal rate and regular rhythm  Heart sounds: Normal heart sounds  Pulmonary:      Effort: Pulmonary effort is normal       Breath sounds: Normal breath sounds  No wheezing or rales  Abdominal:      General: Bowel sounds are normal  There is no distension  Palpations: Abdomen is soft  Tenderness: There is no abdominal tenderness  Musculoskeletal:         General: Swelling (trace RLE ankle) present  Normal range of motion  Cervical back: Neck supple  Lymphadenopathy:      Cervical: No cervical adenopathy  Skin:     General: Skin is warm and dry  Findings: No rash  Neurological:      General: No focal deficit present  Mental Status: He is alert        Comments: awake   Psychiatric:         Mood and Affect: Mood normal          Behavior: Behavior normal          Medications:    Current Outpatient Medications:     Accu-Chek FastClix Lancets MISC, USE 1 TO CHECK GLUCOSE TWICE DAILY TO THREE TIMES DAILY, Disp: , Rfl:     Accu-Chek Guide test strip, USE 1 STRIP TWICE DAILY TO THREE TIMES DAILY, Disp: , Rfl:     acetaminophen (TYLENOL) 325 mg tablet, Take 650 mg by mouth every 4 (four) hours as needed for mild pain, Disp: , Rfl:     aspirin 81 mg chewable tablet, Chew 81 mg daily, Disp: , Rfl:     atorvastatin (LIPITOR) 40 mg tablet, Take 40 mg by mouth daily, Disp: , Rfl:     Blood Glucose Monitoring Suppl (Accu-Chek Guide) w/Device KIT, USE TO CHECK GLUCOSE TWO TO THREE TIMES DAILY, Disp: , Rfl:     insulin glargine (Lantus SoloStar) 100 units/mL injection pen, Inject 8 units at bedtime, Disp: 15 mL, Rfl: 0    metFORMIN (GLUCOPHAGE) 850 mg tablet, TAKE 1 TABLET BY MOUTH TWICE DAILY TAKE WITH MORNING AND EVENING MEAL, Disp: , Rfl:     NovoLOG FlexPen 100 units/mL injection pen, Inject as per sliding scale , (  Scale - 150-200 -1 units, 201-250-2 units, 251-300 -3 units, 301-350-4 units, > 350- 5  units) with meals, Disp: 15 mL, Rfl:     prochlorperazine (COMPAZINE) 10 mg tablet, Take 1 tablet (10 mg total) by mouth every 6 (six) hours as needed for nausea or vomiting, Disp: 45 tablet, Rfl: 3    ramipril (ALTACE) 10 MG capsule, Take 10 mg by mouth daily , Disp: , Rfl:     ReliOn Pen Needles 31G X 6 MM MISC, USE 1 IN THE EVENING, Disp: , Rfl:     rivaroxaban (XARELTO) 15 mg tablet, Take 1 tablet (15 mg total) by mouth 2 (two) times a day with meals for 40 doses (Patient taking differently: Take 15 mg by mouth daily with breakfast ), Disp: 40 tablet, Rfl: 0    sodium chloride (GRAHAM 128) 5 % hypertonic ophthalmic solution, 1 drop as needed, Disp: , Rfl:     sodium chloride 1 g tablet, Take 1 tablet (1 g total) by mouth 3 (three) times a day with meals, Disp: 90 tablet, Rfl: 0    traMADol (ULTRAM) 50 mg tablet, Take 1 tablet (50 mg total) by mouth every 6 (six) hours as needed for moderate pain (Patient not taking: Reported on 6/17/2021), Disp: 15 tablet, Rfl: 0    Allergies: Allergies as of 06/17/2021    (No Known Allergies)       The following portions of the patient's history were reviewed and updated as appropriate: past family history, past surgical history and problem list     Laboratory Results:  Lab Results   Component Value Date    SODIUM 131 (L) 06/11/2021    K 4 6 06/11/2021    CL 97 (L) 06/11/2021    CO2 25 06/11/2021    BUN 30 (H) 06/11/2021    CREATININE 0 95 06/11/2021    GLUC 115 05/31/2021    CALCIUM 8 4 06/11/2021        Lab Results   Component Value Date    CALCIUM 8 4 06/11/2021    PHOS 3 8 05/31/2021       Portions of the record may have been created with voice recognition software   Occasional wrong word or "sound a like" substitutions may have occurred due to the inherent limitations of voice recognition software   Read the chart carefully and recognize, using context, where substitutions have occurred

## 2021-06-17 NOTE — PATIENT INSTRUCTIONS
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

## 2021-06-21 NOTE — PROGRESS NOTES
Pt tolerated chemo treatment with no adverse reaction  Neulasta OnPro applied to ROBERTO  Green light flashing  Pt and caretaker educated on device and when to remove  AVS printed and given  Pt left unit ambulatory with steady gait using cane

## 2021-06-21 NOTE — PLAN OF CARE
Problem: Potential for Falls  Goal: Patient will remain free of falls  Description: INTERVENTIONS:  - Educate patient/family on patient safety including physical limitations  - Instruct patient to call for assistance with activity   - Consult OT/PT to assist with strengthening/mobility   - Keep Call bell within reach  - Keep bed low and locked with side rails adjusted as appropriate  - Keep care items and personal belongings within reach  - Initiate and maintain comfort rounds  - Make Fall Risk Sign visible to staff  - Offer Toileting every Hours, in advance of need  - Initiate/Maintain alarm  - Obtain necessary fall risk management equipment: - Apply yellow socks and bracelet for high fall risk patients  - Consider moving patient to room near nurses station  Outcome: Progressing

## 2021-06-23 NOTE — PATIENT INSTRUCTIONS
Nutrition Rx & Recommendations:  · Diet: Diabetic, High Calorie & High Protein Diet  · Eat slowly and chew food thoroughly before swallowing  · Small, frequent meals/snacks may be easier to tolerate than 3 large daily meals  Aim for 5-6 small meals per day (every 2-3 hours)  · Include protein at all meals/snacks  · Stay hydrated by sipping fluids of choice/tolerance throughout the day  · Liquid nutrition may be better tolerated than solids at times  · Alter food choices and eating patterns to accommodate changing needs  · Refer to Eating Hints book for other meal/snack ideas and symptom management  · For weight loss: monitor your weight at home at least 2x/week, record your weight, start by adding 250-500 extra calories per day, eat 5-6 small scheduled meals every 2-3 hrs, choose foods that are high in protein and calories (see pages 49-53 in your Eating Hints book), drink liquids with calories for example: milkshakes/smoothies/juice/soup/whole milk/chocolate milk, cook with protein fortified milk (see recipe on page 36 in your Eating Hints book), consider ready-to-drink oral nutrition supplements such as Ensure Plus, Ensure Enlive, Boost Plus, or Boost Very High Calorie, avoid "diet" and "light" foods when possible, avoid drinking too much with meals, contact your dietitian with any continued weight loss over the course of 1 week  For more info see pages 35-37 in your Eating Hints book  · Weigh yourself regularly  If you notice weight loss, make an effort to increase your daily food/calorie intake  If you continue to notice loss after these efforts, reach out to your dietitian to establish a plan to stabilize weight  · Avoid gas-producing foods such as broccoli, cabbage, sauerkraut, Pollock sprouts, cauliflower, corn, cucumbers, onions, peppers, beans, peas, lentils, apples, apple juice, avocado, and melons  Carbonated drinks, chewing gum, and drinking through a straw can also cause gas    Limiting lactose may help for those who are sensitive to milk products  · Fluid Restriction per physician  · Nutrition Supplements: Increase Glucerna to 2 per day (with AM and afternoon snacks)  Replace one bottle of water the additional bottle of Glucerna because of your fluid restriction  · 2-3 light [place] yogurt's per day  Choose a yogurt that has <10 grams of sugar per serving      Follow Up Plan: 7/21/21 at 10:30am by phone  Recommend Referral to Other Providers: none at this time

## 2021-06-23 NOTE — TELEPHONE ENCOUNTER
Per Lefty Nolan patient can have the vaccine July 1 or 2, prior to his next chemo on 07/06  Vernona Fraction understood and will call for availability of getting it

## 2021-06-28 NOTE — PROGRESS NOTES
Hematology/Oncology Outpatient Follow- up Note  Manuel Kenny 67 y o  male MRN: @ Encounter: [id number]        Date:  6/28/2021    Presenting Complaint/Diagnosis : Newly diagnosed localized pancreatic cancer  HPI:    Don Kwon seen for initial consultation 3/15/2021 regarding [facility] diagnosed pancreatic cancer   The patient presented to [facility] with nausea and vomiting   Workup revealed a pancreatic head mass and a cyst   Biopsy revealed adenocarcinoma   Per the report from the outside hospital he does not have any metastatic disease apart from the disease in his pancreas which is quite infiltrative  Marcus Mean was seen by our colleagues in Surgical Oncology will have recommended neoadjuvant chemotherapy prior to resection   I think this is very reasonable based on the nature and extent of his disease  Marcus Mean is 67years of age with an ECOG performance status of 0-1   We went over various regimens and I think gemcitabine and Abraxane would be best suited for his disease at full dose       Previous Hematologic/ Oncologic History:    Oncology History   Adenocarcinoma of pancreas ([facility] [address] )   3/10/2021 Initial Diagnosis    Adenocarcinoma of pancreas ([facility] [address] )     3/23/2021 -  Chemotherapy    pegfilgrastim (Laurell Check), 6 mg, Subcutaneous, Once, 2 of 5 cycles  Administration: 6 mg (6/21/2021)  paclitaxel protein-bound (ABRAXANE) IVPB, 125 mg/m2 = 235 mg, Intravenous, Once, 2 of 2 cycles  Dose modification: 100 mg/m2 (original dose 125 mg/m2, Cycle 2, Reason: Dose Not Tolerated)  Administration: 235 mg (3/23/2021), 235 mg (3/30/2021), 235 mg (4/6/2021), 188 mg (4/20/2021), 188 mg (5/4/2021), 188 mg (4/27/2021)  gemcitabine (GEMZAR) infusion, 1,879 8 mg, Intravenous, Once, 3 of 6 cycles  Dose modification: 800 mg/m2 (original dose 1,000 mg/m2, Cycle 2, Reason: Dose Not Tolerated)  Administration: 1,800 mg (3/23/2021), 1,800 mg (3/30/2021), 1,800 mg (4/6/2021), 1,504 2 mg (4/20/2021), 1,504 2 mg (5/4/2021), 1,504 2 mg (4/27/2021), 1,504 2 mg (6/7/2021), 1,504 2 mg (6/14/2021), 1,504 2 mg (6/21/2021)         Current Hematologic/ Oncologic Treatment:      Gemcitabine and Abraxane status post 2 cycles  He was then changed to single agent gemcitabine based on performance status and recent hospitalizations  Interval History:        The patient returns for follow-up visit  His CA 19 9 is actually coming down from to 620 in May to 3933 in June and is now actually 15 70  He is now on single agent gemcitabine based on his performance status  Cycle 4  Will start on the 6th of July  His most recent blood cultures were actually negative  Most recent blood work I have from June 18th shows his white count is holding up  Hemoglobin was 9 4 with a platelet count of 782  He states he is eating quite well  He is feeling better  Is still trying to gain weight though  Denies any nausea denies any vomiting denies any diarrhea  Overall he states he feels better than he did a few months ago  Still walks with a walker  ECOG performance status is 0-1  The rest of his 14 point review of systems today was negative  Test Results:    Imaging: XR chest 1 view portable    Result Date: 5/30/2021  Narrative: CHEST INDICATION:   cough, lightheadedness  COMPARISON:  Chest radiograph from 5/7/2021 and chest CT from 5/14/2021  EXAM PERFORMED/VIEWS:  XR CHEST PORTABLE  FINDINGS:  Right port in SVC  Cardiomediastinal silhouette normal  Lungs clear  No effusion or pneumothorax  Osseous structures normal for age  Healed left rib fractures  Impression: No acute cardiopulmonary disease  Workstation performed: [id number]     CT head without contrast    Result Date: 5/30/2021  Narrative: CT BRAIN - WITHOUT CONTRAST INDICATION:   dizziness, transient blurry vision  Dizziness  Patient states that he started feeling dizzy this morning 67year old male presents for evaluation of lightheadedness and dizziness beginning this morning   Patient states he woke with mild lightheadedness, but just before breakfast, became dizzy with transient blurring of his vision  Patient states he has had similar symptoms with hypoglycemia, but had checked his blood glucose and found it to be 140  Patient last gave himself insulin prior to breakfast this morning  Patient has history of pancreatic cancer with last chemotherapy 2 weeks ago  Next session scheduled for next week  Patient has not been able to be vaccinated against COVID due to his poor health  Patient states he has a chronic sporadic cough which is unchanged  No fevers  No chest pain, back pain or abdominal pain  No nausea, vomiting or diarrhea  No dysuria or frequency  Patient had been admitted to this hospital 5/7/21-5/17/21 for hyponatremia, Streptococcus anginosus bacteremia treated with rocephin and flagyl  CT of the abdomen during that admission 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  Patient has suspected COVID-19  COMPARISON:  Noncontrast CT brain April 8, 2021 TECHNIQUE:  CT examination of the brain was performed  In addition to axial images, sagittal and coronal 2D reformatted images were created and submitted for interpretation  Radiation dose length product (DLP) for this visit:  877 08 mGy-cm   This examination, like all CT scans performed in the [facility], was performed utilizing techniques to minimize radiation dose exposure, including the use of iterative  reconstruction and automated exposure control  IMAGE QUALITY:  Diagnostic  FINDINGS: PARENCHYMA: Decreased attenuation is noted in periventricular and subcortical white matter demonstrating an appearance that is statistically most likely to represent mild microangiopathic change  No CT signs of acute infarction  No intracranial mass, mass effect or midline shift  No acute parenchymal hemorrhage  Bilateral internal carotid artery and vertebral artery calcifications   VENTRICLES AND EXTRA-AXIAL SPACES:  Enlargement of ventricles and extra-axial CSF spaces, out of proportion to the patient's age most consistent with cerebral and cerebellar atrophy  VISUALIZED ORBITS AND PARANASAL SINUSES:  No acute abnormality involving the orbits  Bilateral lens surgery  Mild scattered sinus mucosal thickening is noted  No fluid levels are seen  CALVARIUM AND EXTRACRANIAL SOFT TISSUES:  Normal      Impression: Stable cerebral atrophy with chronic small vessel ischemic white matter disease  No acute intracranial abnormality  If there is continued concern for acute cerebral ischemia, consider follow-up MRI of the brain with diffusion-weighted sequencing  The study was marked in EPIC for significant notification  Workstation performed: [id number]       Labs:   Lab Results   Component Value Date    WBC 5 95 06/18/2021    HGB 9 4 (L) 06/18/2021    HCT 28 7 (L) 06/18/2021     (H) 06/18/2021     06/18/2021     Lab Results   Component Value Date    K 5 3 06/18/2021    CL 97 (L) 06/18/2021    CO2 25 06/18/2021    BUN 26 (H) 06/18/2021    CREATININE 0 90 06/18/2021    GLUCOSE 256 (H) 04/08/2021    GLUF 396 (H) 06/18/2021    CALCIUM 8 7 06/18/2021    CORRECTEDCA 9 6 06/18/2021    AST 18 06/18/2021    ALT 32 06/18/2021    ALKPHOS 95 06/18/2021    EGFR 83 06/18/2021       Lab Results   Component Value Date    IRON 24 (L) 04/10/2021    TIBC 155 (L) 04/10/2021    FERRITIN 994 (H) 04/10/2021     ROS: As stated in the history of present illness otherwise his 14 point review of systems today was negative        Active Problems:   Patient Active Problem List   Diagnosis    Hyperlipidemia    Essential hypertension    Type 2 diabetes mellitus with hyperglycemia, with long-term current use of insulin (HCC)    Gastroesophageal reflux disease    Pancreatic cyst    Adenocarcinoma of pancreas ([address]    Peripheral neuropathy    Bilateral cellulitis of lower leg    Hyponatremia    Severe protein-calorie malnutrition ([address] )  Anemia    Sacral wound    Leukocytosis    Generalized weakness    Streptococcal bacteremia    Mesenteric venous thrombosis (HCC)    [facility]) ([facility] )       Past Medical History:   Past Medical History:   Diagnosis Date    Cancer ([facility] )     pancreatic    Diabetes mellitus ([facility] )     Frequent urination     GERD (gastroesophageal reflux disease)     Hyperlipidemia     Hypertension     Peripheral neuropathy     Weakness     Weight loss        Surgical History:   Past Surgical History:   Procedure Laterality Date    APPENDECTOMY      CATARACT EXTRACTION      COLONOSCOPY      SKIN LESION EXCISION      of a wart on right arm    TUNNELED VENOUS PORT PLACEMENT N/A 3/26/2021    Procedure: INSERTION VENOUS PORT (PORT-A-CATH); Surgeon: Raul Contreras MD;  Location:  MAIN OR;  Service: Surgical Oncology       Family History:  No family history on file  Cancer-related family history is not on file  Social History:   Social History     Socioeconomic History    Marital status:      Spouse name: Not on file    Number of children: Not on file    Years of education: Not on file    Highest education level: Not on file   Occupational History    Not on file   Tobacco Use    Smoking status: Former Smoker     Types: Pipe     Quit date: 2/23/2011     Years since quitting: 10 3    Smokeless tobacco: Never Used   Vaping Use    Vaping Use: Never used   Substance and Sexual Activity    Alcohol use: Never    Drug use: Never    Sexual activity: Not on file   Other Topics Concern    Not on file   Social History Narrative    Not on file     Social Determinants of Health     Financial Resource Strain:     Difficulty of Paying Living Expenses:    Food Insecurity:     Worried About [address] in the Last Year:     [address] in the Last Year:    Transportation Needs:     Lack of Transportation (Medical):      Lack of Transportation (Non-Medical):    Physical Activity:     Days of Exercise per Week:     Minutes of Exercise per Session:    Stress:     Feeling of Stress :    Social Connections:     Frequency of Communication with Friends and Family:     Frequency of Social Gatherings with Friends and Family:     Attends Congregation Services:     Active Member of Clubs or Organizations:     Attends Club or Organization Meetings:     Marital Status:    Intimate Partner Violence:     Fear of Current or Ex-Partner:     Emotionally Abused:     Physically Abused:     Sexually Abused:        Current Medications:   Current Outpatient Medications   Medication Sig Dispense Refill    Accu-Chek FastClix Lancets MISC USE 1 TO CHECK GLUCOSE TWICE DAILY TO THREE TIMES DAILY      Accu-Chek Guide test strip USE 1 STRIP TWICE DAILY TO THREE TIMES DAILY      acetaminophen (TYLENOL) 325 mg tablet Take 650 mg by mouth every 4 (four) hours as needed for mild pain      aspirin 81 mg chewable tablet Chew 81 mg daily      atorvastatin (LIPITOR) 40 mg tablet Take 40 mg by mouth daily      Blood Glucose Monitoring Suppl (Accu-Chek Guide) w/Device KIT USE TO CHECK GLUCOSE TWO TO THREE TIMES DAILY      insulin glargine (Lantus SoloStar) 100 units/mL injection pen Inject 8 units at bedtime 15 mL 0    metFORMIN (GLUCOPHAGE) 850 mg tablet TAKE 1 TABLET BY MOUTH TWICE DAILY TAKE WITH MORNING AND EVENING MEAL      NovoLOG FlexPen 100 units/mL injection pen Inject as per sliding scale , (  Scale - 150-200 -1 units, 201-250-2 units, 251-300 -3 units, 301-350-4 units, > 350- 5  units) with meals 15 mL     prochlorperazine (COMPAZINE) 10 mg tablet Take 1 tablet (10 mg total) by mouth every 6 (six) hours as needed for nausea or vomiting 45 tablet 3    ramipril (ALTACE) 10 MG capsule Take 10 mg by mouth daily       ReliOn Pen Needles 31G X 6 MM MISC USE 1 IN THE EVENING      sodium chloride (GRAHAM 128) 5 % hypertonic ophthalmic solution 1 drop as needed      sodium chloride 1 g tablet Take 1 tablet (1 g total) by mouth 3 (three) times a day with meals 90 tablet 0    traMADol (ULTRAM) 50 mg tablet Take 1 tablet (50 mg total) by mouth every 6 (six) hours as needed for moderate pain 15 tablet 0    rivaroxaban (XARELTO) 15 mg tablet Take 1 tablet (15 mg total) by mouth 2 (two) times a day with meals for 40 doses (Patient taking differently: Take 15 mg by mouth daily with breakfast ) 40 tablet 0     No current facility-administered medications for this visit  Allergies: No Known Allergies    Physical Exam:    Body surface area is 1 85 meters squared  Wt Readings from Last 3 Encounters:   06/28/21 67 1 kg (148 lb)   06/21/21 64 8 kg (142 lb 13 7 oz)   06/17/21 64 9 kg (143 lb)        Temp Readings from Last 3 Encounters:   06/28/21 98 4 °F (36 9 °C) (Temporal)   06/21/21 (!) 97 2 °F (36 2 °C) (Temporal)   06/14/21 97 5 °F (36 4 °C) (Temporal)        BP Readings from Last 3 Encounters:   06/28/21 116/54   06/21/21 124/57   06/17/21 126/58         Pulse Readings from Last 3 Encounters:   06/28/21 97   06/21/21 91   06/17/21 104        Physical Exam     Constitutional   General appearance: No acute distress, well appearing and well nourished  Eyes   Conjunctiva and lids: No swelling, erythema or discharge  Pupils and irises: Equal, round and reactive to light  Ears, Nose, Mouth, and Throat   External inspection of ears and nose: Normal     Nasal mucosa, septum, and turbinates: Normal without edema or erythema  Oropharynx: Normal with no erythema, edema, exudate or lesions  Pulmonary   Respiratory effort: No increased work of breathing or signs of respiratory distress  Auscultation of lungs: Clear to auscultation  Cardiovascular   Palpation of heart: Normal PMI, no thrills  Auscultation of heart: Normal rate and rhythm, normal S1 and S2, without murmurs      Examination of extremities for edema and/or varicosities: Normal     Carotid pulses: Normal     Abdomen   Abdomen: Non-tender, no masses  Liver and spleen: No hepatomegaly or splenomegaly  Lymphatic   Palpation of lymph nodes in neck: No lymphadenopathy  Musculoskeletal   Gait and station:   Walks with a walker  Digits and nails: Normal without clubbing or cyanosis  Inspection/palpation of joints, bones, and muscles: Normal     Skin   Skin and subcutaneous tissue: Normal without rashes or lesions  Neurologic   Cranial nerves: Cranial nerves 2-12 intact  Sensation: No sensory loss  Psychiatric   Orientation to person, place, and time: Normal     Mood and affect: Normal         Assessment / Plan:      The patient is a pleasant 78-year-old male with newly diagnosed adenocarcinoma of the pancreas which appears to be localized to the pancreas  He was seen by our colleagues in Surgical Oncology and they recommended neoadjuvant treatment  He had 2 cycles of gemcitabine and Abraxane but was admitted multiple times for complications and so we decreased him to gemcitabine at 800 milligrams/meter sq  He was tolerating this well and his CA 19 9 has come down to the 1500 range  Based on his CA 23 9 coming down and him feeling much better and actually doing reasonably well on his current chemotherapy compared to previously we will continue him on this  I will see him back in a month  I plan on reimaging him in approximately 2 months as long as the CA 19 9 keeps going down  We will then have him re-evaluated by our colleagues in surgery and hopefully he will be stronger by then and will have gained even more weight  The patient is agreeable to this  Plan  I will see him back in a month  Until then if he has any questions he will call our office  Goals and Barriers:  Current Goal:  Prolong Survival from   Pancreatic cancer  Barriers: None  Patient's Capacity to Self Care:  Patient able to self care      Portions of the record may have been created with voice recognition software   Occasional wrong word or "sound a like" substitutions may have occurred due to the inherent limitations of voice recognition software   Read the chart carefully and recognize, using context, where substitutions have occurred

## 2021-07-06 NOTE — PROGRESS NOTES
Pt tolerated treatment with no adv reactions; AVS given; pt left unit ambulatory with steady gait using walker
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
## 2021-07-12 NOTE — TELEPHONE ENCOUNTER
Call from patient's cousin Sussy No  [phone]  May patient have treatment today as he was started on prednisone and vicodin

## 2021-07-12 NOTE — PROGRESS NOTES
Outpatient Oncology Nutrition Consultation   Type of Consult: Follow Up  Care Location: Telephone Call   Nutrition Assessment:   Oncology Diagnosis & Treatments: Adenocarcinoma of the pancreas  · Currently undergoing neoadjuvant chemotherapy (Gemzar) which started 3/23/21  Dose reduced 4/2021 and 5/24/21 due to overall performance status, and complications that resulted in hospitalization  Abraxane discontinued 5/7/21  · Potential surgical resection after chemotherapy  · Hospitalized 7/14/21-7/19/21 for weakness  Discharged to [facility] at discharge  Now on a 1800 mL Fluid Restriction    Oncology History   Adenocarcinoma of pancreas ([facility] )   3/10/2021 Initial Diagnosis    Adenocarcinoma of pancreas ([facility] )     3/23/2021 -  Chemotherapy    pegfilgrastim (Racheal Dial), 6 mg, Subcutaneous, Once, 3 of 9 cycles  Administration: 6 mg (6/21/2021)  paclitaxel protein-bound (ABRAXANE) IVPB, 125 mg/m2 = 235 mg, Intravenous, Once, 2 of 2 cycles  Dose modification: 100 mg/m2 (original dose 125 mg/m2, Cycle 2, Reason: Dose Not Tolerated)  Administration: 235 mg (3/23/2021), 235 mg (3/30/2021), 235 mg (4/6/2021), 188 mg (4/20/2021), 188 mg (5/4/2021), 188 mg (4/27/2021)  gemcitabine (GEMZAR) infusion, 1,879 8 mg, Intravenous, Once, 4 of 10 cycles  Dose modification: 800 mg/m2 (original dose 1,000 mg/m2, Cycle 2, Reason: Dose Not Tolerated)  Administration: 1,800 mg (3/23/2021), 1,800 mg (3/30/2021), 1,800 mg (4/6/2021), 1,504 2 mg (4/20/2021), 1,504 2 mg (5/4/2021), 1,504 2 mg (4/27/2021), 1,504 2 mg (6/7/2021), 1,504 2 mg (6/14/2021), 1,504 2 mg (6/21/2021), 1,504 2 mg (7/6/2021), 1,504 2 mg (7/12/2021)       Past Medical & Surgical Hx:   Patient Active Problem List   Diagnosis    Hyperlipidemia    Essential hypertension    Type 2 diabetes mellitus with hyperglycemia, with long-term current use of insulin (HCC)    Gastroesophageal reflux disease    Pancreatic cyst    Adenocarcinoma of pancreas ([facility] )    Peripheral neuropathy    Bilateral cellulitis of lower leg    Hyponatremia    Severe protein-calorie malnutrition (HCC)    Anemia    Sacral wound    Leukocytosis    Generalized weakness    Streptococcal bacteremia    Mesenteric venous thrombosis (HCC)    GWEN (acute kidney injury) ([facility] [address] )    Goals of care, counseling/discussion     Past Medical History:   Diagnosis Date    Cancer ([facility] )     pancreatic    [facility]     Frequent urination     GERD (gastroesophageal reflux disease)     Hyperlipidemia     Hypertension     Peripheral neuropathy     Weakness     Weight loss      Past Surgical History:   Procedure Laterality Date    APPENDECTOMY      CATARACT EXTRACTION      COLONOSCOPY      SKIN LESION EXCISION      of a wart on right arm    TUNNELED VENOUS PORT PLACEMENT N/A 3/26/2021    Procedure: INSERTION VENOUS PORT (PORT-A-CATH);   Surgeon: Juan Naidu MD;  Location:  MAIN OR;  Service: Surgical Oncology       Review of Medications:   Vitamins, Supplements and Herbals: Med List Reviewed & pt is only taking: Salt Tabs (SIADH)    Current Outpatient Medications:     Accu-Chek FastClix Lancets MISC, USE 1 TO CHECK GLUCOSE TWICE DAILY TO THREE TIMES DAILY, Disp: , Rfl:     Accu-Chek Guide test strip, USE 1 STRIP TWICE DAILY TO THREE TIMES DAILY, Disp: , Rfl:     acetaminophen (TYLENOL) 325 mg tablet, Take 650 mg by mouth every 4 (four) hours as needed for mild pain (Patient not taking: Reported on 7/14/2021), Disp: , Rfl:     atorvastatin (LIPITOR) 40 mg tablet, Take 40 mg by mouth daily, Disp: , Rfl:     Blood Glucose Monitoring Suppl (Accu-Chek Guide) w/Device KIT, USE TO CHECK GLUCOSE TWO TO THREE TIMES DAILY, Disp: , Rfl:     escitalopram (LEXAPRO) 5 mg tablet, Take 5 mg by mouth daily, Disp: , Rfl:     insulin glargine (Lantus SoloStar) 100 units/mL injection pen, Inject 10 units at bedtime, Disp: 15 mL, Rfl: 0    insulin lispro (HumaLOG) 100 units/mL injection, Inject 5 Units under the skin 3 (three) times a day with meals, Disp: , Rfl: 0    LORazepam (ATIVAN) 0 5 mg tablet, Take 1 tablet (0 5 mg total) by mouth every 8 (eight) hours as needed for anxiety for up to 10 days, Disp: 10 tablet, Rfl: 0    melatonin 3 mg, Take 2 tablets (6 mg total) by mouth daily at bedtime, Disp: , Rfl: 0    metFORMIN (GLUCOPHAGE) 850 mg tablet, TAKE 1 TABLET BY MOUTH TWICE DAILY TAKE WITH MORNING AND EVENING MEAL, Disp: , Rfl:     NovoLOG FlexPen 100 units/mL injection pen, Inject as per sliding scale , (  Scale - 150-200 -1 units, 201-250-2 units, 251-300 -3 units, 301-350-4 units, > 350- 5  units) with meals, Disp: 15 mL, Rfl:     oxyCODONE (ROXICODONE) 5 mg immediate release tablet, Take 1 tablet (5 mg total) by mouth every 4 (four) hours as needed for severe pain for up to 10 daysMax Daily Amount: 30 mg, Disp: 10 tablet, Rfl: 0    prochlorperazine (COMPAZINE) 10 mg tablet, Take 1 tablet (10 mg total) by mouth every 6 (six) hours as needed for nausea or vomiting (Patient not taking: Reported on 7/14/2021), Disp: 45 tablet, Rfl: 3    ramipril (ALTACE) 10 MG capsule, Take 10 mg by mouth daily , Disp: , Rfl:     ReliOn Pen Needles 31G X 6 MM MISC, USE 1 IN THE EVENING, Disp: , Rfl:     rivaroxaban (XARELTO) 15 mg tablet, Take 1 tablet (15 mg total) by mouth daily with breakfast for 40 doses, Disp: , Rfl:     saccharomyces boulardii (FLORASTOR) 250 mg capsule, Take 1 capsule (250 mg total) by mouth 2 (two) times a day, Disp: , Rfl: 0    sodium chloride 1 g tablet, Take 2 tablets (2 g total) by mouth 3 (three) times a day with meals, Disp: 90 tablet, Rfl: 0    Most Recent Lab Results: He checks his BG 2-3x/day; BG now being monitored by [facility], pt says BG is a little high  Lab Results   Component Value Date    WBC 6 64 07/19/2021    IRON 24 (L) 04/10/2021    TIBC 155 (L) 04/10/2021    FERRITIN 994 (H) 04/10/2021    ALT 19 07/16/2021    AST 10 07/16/2021    ALB 2 1 (L) 07/16/2021 SODIUM 131 (L) 07/19/2021    SODIUM 130 (L) 07/18/2021    K 4 4 07/19/2021    K 4 1 07/18/2021     07/19/2021    BUN 21 07/19/2021    BUN 17 07/18/2021    CREATININE 0 71 07/19/2021    CREATININE 0 69 07/18/2021    EGFR 91 07/19/2021    PHOS 3 8 05/31/2021    PHOS 3 7 05/30/2021    GLUCOSE 98 07/14/2021    POCGLU 284 (H) 07/19/2021    GLUF 308 (H) 07/09/2021    GLUF 354 (H) 07/03/2021    GLUC 103 07/19/2021    HGBA1C 9 4 (H) 04/08/2021    HGBA1C 7 9 02/12/2021    CALCIUM 7 7 (L) 07/19/2021    MG 2 0 05/31/2021       Anthropometric Measurements:   Height: 72"  Ht Readings from Last 1 Encounters:   07/14/21 5' 10" (1 778 m)     Wt Readings from Last 20 Encounters:   07/14/21 66 7 kg (147 lb)   07/12/21 67 4 kg (148 lb 9 4 oz)   07/06/21 67 1 kg (147 lb 14 9 oz)   06/28/21 67 1 kg (148 lb)   06/21/21 64 8 kg (142 lb 13 7 oz)   06/17/21 64 9 kg (143 lb)   06/14/21 64 5 kg (142 lb 3 2 oz)   06/07/21 64 3 kg (141 lb 12 1 oz)   06/01/21 68 2 kg (150 lb 5 7 oz)   05/24/21 66 7 kg (147 lb)   05/07/21 64 4 kg (142 lb)   05/05/21 64 4 kg (142 lb)   05/04/21 64 2 kg (141 lb 8 6 oz)   04/27/21 65 8 kg (145 lb 1 oz)   04/20/21 66 kg (145 lb 8 1 oz)   04/08/21 68 5 kg (151 lb)   04/06/21 68 5 kg (151 lb 0 2 oz)   03/30/21 66 2 kg (145 lb 15 1 oz)   03/23/21 66 6 kg (146 lb 13 2 oz)   03/15/21 67 6 kg (149 lb)       Weight History:    Usual Weight: 170#; had been up to 230# 10 years ago while he was working   Home Scale: says his scale is not accurate   Comments: Question accuracy of wt hx d/t Feb 2021 Epic wt (pt says his coat and boots were very heavy when he got weighed on 2/23/21)      Oncology Nutrition-Anthropometrics      Nutrition from 7/21/2021 in [address] [place] [address] Dietitian [facility] Nutrition from 6/23/2021 in [place] [address] Oncology Dietitian [facility]   Patient age (years):  67 years  67 years   Patient (male) height (in):  67 in  67 in   Current weight (lbs):  147 lbs  142 9 lbs   Current weight to be used for anthropometric calculations (kg)  66 8 kg  65 kg   BMI:  19 9  19 4   IBW male  178 lb  178 lb   IBW (kg) male  80 9 kg  80 9 kg   IBW % (male)  82 6 %  80 3 %   Adjusted BW (male):  170 2 lbs  169 2 lbs   Adjusted BW in kg (male):  77 4 kg  76 9 kg   % weight change after 1 week:  -0 6 %  0 5 %   Weight change after 1 week (lbs)  -0 9 lbs  0 7 lbs   % weight change after 1 month:  3 4 %  -2 8 %   Weight change after 1 month (lbs)  4 8 lbs  -4 1 lbs   % weight change after 3 months:  1 %  -2 7 %   Weight change after 3 months (lbs)  1 5 lbs  -4 lbs        Nutrition-Focused Physical Findings: n/a due to telephone call    Food/Nutrition-Related History & Client/Social History:    Current Nutrition Impact Symptoms:  [] Nausea  [] Reduced Appetite  [] Acid Reflux    [] Vomiting  [x] Unintended Wt Loss - slow and ongoing  [] Malabsorption    [] Diarrhea  [] Unintended Wt Gain  [] Dumping Syndrome    [] Constipation  [] Thick Mucous/Secretions  [] Abdominal Pain    [] Dysgeusia (Altered Taste) - no issues reported [] Xerostomia (Dry Mouth)  [x] Gas - frequent gas and belching  - typically eats very fast and chews well   [] Dysosmia (Altered Smell)  [] Thrush  [] Difficulty Chewing    [] Oral Mucositis (Sore Mouth)  [] Fatigue  [x] Hyperglycemia - Last A1C on 4/8/21 was 9 4%   [] Odynophagia  [] Esophagitis  [x] Other: Wound on sacrum is ongoing but healing per pt   [] Dysphagia  [] Early Satiety  [] No Problems Eating      Food Allergies & Intolerances: no    Current Diet: CHO-Controlled, Now on 1 8 L Fluid Restriction per Hospital AVS 7/16/21  Current Nutrition Intake: Unchanged from last visit  Appetite: Good  Nutrition Route: PO  Activity level: limited by weakness, currently in a SNF receiving therapies     24 Hr Diet Recall: pt requested to have yogurt at [facility], encouraged him to ask for [place] yogurt  Breakfast: 3 pc toast, double portion of eggs, oatmeal, fresh strawberries, coffee, milk, juice  Snack: n/a  Lunch: meatloaf and mashed potatoes  Snack: n/a  Dinner: chicken sandwich with a slice of tomato, corn  Snack: still hungry so asked for a grilled cheese sandwich     Beverages: water, juice, coffee, milk   -1 8 oz Fluid Restriction  Supplements:    Glucerna Hunger Smart (10 oz, 180 kcal, 15 g pro) - was drinking it QD, SNF not provding it yet but pt says it will start soon    Oncology Nutrition-Estimated Needs      Nutrition from 3/29/2021 in [address] [place] [address] Dietitian [facility]   Weight type used  IBW   Weight in kilograms (kg) used for estimated needs  80 9 kg   Energy needs formula:   35-40 kcal/kg   Energy needs based on 35 kcal/k kcal   Energy needs based on 40 kcal/k kcal   Protein needs formula:  1 5-2 g/kg   Protein needs based on 1 5 g/kg  121 g   Protein needs based on 2 g/kg  162 g   Fluid needs formula:  30-35 mL/kg   Fluid needs based on 30 mL/kg  2427 mL   Fluid needs in ounces  82 oz   Fluid needs based on 35 mL/kg  2832 mL   Fluid needs in ounces  96 oz           Discussion & Intervention:   Steve Enrique was evaluated today for an RD follow up regarding wt loss  Steve Enrique is currently undergoing tx for pancreatic cancer (neoadjuvant chemotherapy)  He is doing well today and was recently discharged from the hospital to [facility]  He says that his appetite is good and he is eating well  He says that [facility] is now sending him double portions  He requested Glucerna be provided but says it has not happened yet, so his family plans to bring it in for him soon  He also requested yogurt be added to his meal plan; discussed asking for [place] yogurt for additional protein  He has gained wt over the past month  He says his BG remains a little high and his sacral wound continues to heal    Reviewed 24 hour recall, which revealed an suboptimal po intake, and discussed ways to increase kcal, protein, and fluid intakes, optimize nutrient intake and adhere to a carbohydrate controlled diet  Also reviewed the importance of wt management throughout the tx process and the role of a high kcal/ high protein diet and carbohydrate controlled diet in managing wt and overall health  Based on today's assessment, discussion included: MNT for: hx wt loss, DM, wounds, a high kcal/protein diet & food choices to include at all meals & snacks (Examples of high kcal foods: cheese, full-fat dairy products, nut butter, plant-based fats, coconut oil/milk, avocado, butter, cream soup, etc  Examples of high protein foods: eggs, chicken, fish, beans/legumes, nuts/nut butters, bone broth, etc ) , a diabetic diet & food choices to include at all meals & snacks, spreading carbohydrate intake throughout the day & counting carbohydrates for adequate glycemic control, adequate hydration & fluid choices, sipping on calorie containing beverages (examples include: adding whole milk or cream to coffee, oral nutrition supplements, juice, electrolyte replacement beverages, milk, etc ), having consistent and planned snacks between meals, eating when feeling most hungry and increasing DM oral nutrition supplements  Moving forward, Tor Ortega was encouraged to increase kcal and protein intakes  Pt informed that [facility] now has a new oncology RD, Levon Evans, and he was provided with her contact info  Tor Ortega would like to hold off on setting up a follow up appt until he is discharged from [facility], he says he will reach out at that time to schedule his follow up  Materials Provided: not applicable  All questions and concerns addressed during todays visit  Tor Ortega has RD contact information  Nutrition Diagnosis:    Increased Nutrient Needs (kcal & pro) related to increased demand for nutrients and disease state as evidenced by cancer dx and pt undergoing tx for cancer   Altered Nutrition Related Laboratory Values related to endocrine and pancreatic dysfunction as evidenced by hyperglycemia       Monitoring & Evaluation: Goals:  · weight maintenance/stabilization  · adequate nutrition impact symptom management  · pt to meet >/=75% estimated nutrition needs daily  · adequate glycemic control    · Progress Towards Goals: Progressing    Nutrition Rx & Recommendations:  · Diet: Diabetic, High Calorie & High Protein Diet  · Eat slowly and chew food thoroughly before swallowing  · Small, frequent meals/snacks may be easier to tolerate than 3 large daily meals  Aim for 5-6 small meals per day (every 2-3 hours)  · Include protein at all meals/snacks  · Stay hydrated by sipping fluids of choice/tolerance throughout the day  · Liquid nutrition may be better tolerated than solids at times  · Alter food choices and eating patterns to accommodate changing needs  · Refer to Eating Hints book for other meal/snack ideas and symptom management  · For weight loss: monitor your weight at home at least 2x/week, record your weight, start by adding 250-500 extra calories per day, eat 5-6 small scheduled meals every 2-3 hrs, choose foods that are high in protein and calories (see pages 49-53 in your Eating Hints book), drink liquids with calories for example: milkshakes/smoothies/juice/soup/whole milk/chocolate milk, cook with protein fortified milk (see recipe on page 36 in your Eating Hints book), consider ready-to-drink oral nutrition supplements such as Ensure Plus, Ensure Enlive, Boost Plus, or Boost Very High Calorie, avoid "diet" and "light" foods when possible, avoid drinking too much with meals, contact your dietitian with any continued weight loss over the course of 1 week  For more info see pages 35-37 in your Eating Hints book  · Weigh yourself regularly  If you notice weight loss, make an effort to increase your daily food/calorie intake  If you continue to notice loss after these efforts, reach out to your dietitian to establish a plan to stabilize weight    · Avoid gas-producing foods such as broccoli, cabbage, sauerkraut, [place] sprouts, cauliflower, corn, cucumbers, onions, peppers, beans, peas, lentils, apples, apple juice, avocado, and melons  Carbonated drinks, chewing gum, and drinking through a straw can also cause gas  Limiting lactose may help for those who are sensitive to milk products  · Fluid Restriction per physician  · Nutrition Supplements: Increase Glucerna to 2 per day (with AM and afternoon snacks)  · 2-3 light [place] yogurt's per day  Choose a yogurt that has <10 grams of sugar per serving      Follow Up Plan: pt to contact Bruna Marte RD, LDN once he is discharged from [facility] to schedule a follow up  Recommend Referral to Other Providers: none at this time

## 2021-07-12 NOTE — TELEPHONE ENCOUNTER
Returned call to patients cousin  Patient is at the infusion center now and they are waiting to hear back from Dr Brayan Diaz confirming it is ok to proceed with treatment  I told the daughter I would forward this msg as FYI to update RN with Dr Jovita Bennett

## 2021-07-12 NOTE — PROGRESS NOTES
Pt tolerated chemo treatment with no adverse reaction  Pt left unit ambulatory with steady gait using walker  AVS printed and given to pt

## 2021-07-14 PROBLEM — R65.10 SIRS (SYSTEMIC INFLAMMATORY RESPONSE SYNDROME) (HCC): Status: ACTIVE | Noted: 2021-01-01

## 2021-07-14 PROBLEM — R40.4 EPISODES OF STARING: Status: ACTIVE | Noted: 2021-01-01

## 2021-07-14 NOTE — ASSESSMENT & PLAN NOTE
Background:  Patient with pancreatic adenocarcinoma undergoing chemotherapy recently started on Norco reports dizziness and weakness  Patient denied fever, nausea, vomiting, diarrhea, chest pain, cough  He did admit to feeling cold with some possible chills  Episode of hypotension noted in emergency department, 74/28 which responded to IV fluids appropriately  Patient did have leukocytosis of 18 84, currently undergoing chemotherapy outpatient, suspect this may be related to Neulasta  Imaging:  CT head, CXR unremarkable  Lactic of 3 9 ; status post 500cc bolus; trended down 2 2  IV Fluids at 75cc/hr  Hold off on antibiotics for now  · Procalcitonin ordered; await result  · Suspect that SIRS criteria is met secondary to side effects of chemotherapy and recent narcotic use   Patient offering no acute complaints other than feeling weak  · Monitor leukocytosis and fever curve, patient afebrile at time of admission

## 2021-07-14 NOTE — H&P
[place]  H&P- Di Bowling 1945, 67 y o  male MRN: [id number]  Unit/Bed#: -01 Encounter: [id number]  Primary Care Provider: Chuckie Page DO   Date and time admitted to hospital: 7/14/2021  8:42 AM    * Generalized weakness  Assessment & Plan  · Recently started on prednisone taper, Lexapro and Norco for pain, took last one early in AM  · Prednisone- 30mg daily, continue taper, decreasing by 10 each per outpatient instructions  · Charan Horta as this may have contributed to dizziness and hypotension  · Patient noted to be hypotensive in emergency department, 86/46, pressures did improve with fluid bolus  · Initial lactic acid 3 9, improved to 2 2 after fluids  · Patient reports he is having normal oral intake  · UA, CT head, CXR unremarkable  · CT abdomen pelvis pending    SIRS (systemic inflammatory response syndrome) ([facility] [address] )  Assessment & Plan  Background:  Patient with pancreatic adenocarcinoma undergoing chemotherapy recently started on Norco reports dizziness and weakness  Patient denied fever, nausea, vomiting, diarrhea, chest pain, cough  He did admit to feeling cold with some possible chills  Episode of hypotension noted in emergency department, 66/97 which responded to IV fluids appropriately  Patient did have leukocytosis of 18 84, currently undergoing chemotherapy outpatient, while the last is part of his treatment regimen, he did not receive this during his most recent chemotherapy session    ANC 17 9  Will start patient on cefepime vanc with pharmacy consult  Imaging:  CT head, CXR unremarkable, CT abdomen pelvis pending  Lactic of 3 9 ; status post 500cc bolus; trended down 2 2  IV Fluids at 75cc/hr  · Procalcitonin ordered; await result  · Monitor leukocytosis and fever curve, patient afebrile at time of admission    Adenocarcinoma of pancreas [facility])  Assessment & Plan  · Follows with Dr Shamar Santiago outpatient  · Undergoing chemotherapy  · Have asked Oncology to evaluate here  · Question of progressive weakness may be related to progression of disease vs chemo  · Palliative Consulted  · Dosage has been decreased in the past due to poor toleration    Episodes of staring  Assessment & Plan  · Noted by family, to last approximately 1 minute  · CT head in ED normal  · EEG pending  · Consider Neurology consult at that time  · Completely resolved with no focal neurologic deficit at time of admission  · Question if this is related to narcotic use    Type 2 diabetes mellitus with hyperglycemia, with long-term current use of insulin ([facility] [address] )  Assessment & Plan  Lab Results   Component Value Date    HGBA1C 9 4 (H) 04/08/2021       No results for input(s): POCGLU in the last 72 hours  Blood Sugar Average: Last 72 hrs:       · Managed on Lantus 8u and SSI at home  · Continue while admitted    Essential hypertension  Assessment & Plan  · Managed on ramipril outpatient  · Hold for now given hypotenstion on admission    Hyperlipidemia  Assessment & Plan  · Continue Lipitor    VTE Pharmacologic Prophylaxis: VTE Score: 5 High Risk (Score >/= 5) - Pharmacological DVT Prophylaxis Ordered: rivaroxaban (Xarelto)  Sequential Compression Devices Ordered  Code Status: Level 3 - DNAR and DNI   Discussion with family: Updated  Brittny Cain) via phone  Anticipated Length of Stay: Patient will be admitted on an observation basis with an anticipated length of stay of less than 2 midnights secondary to weakness  Total Time for Visit, including Counseling / Coordination of Care: 60 minutes Greater than 50% of this total time spent on direct patient counseling and coordination of care      Chief Complaint: weakness    History of Present Illness:  Frederick Chaidez is a 67 y o  male with a PMH of pancreatic adenocarcinoma currently undergoing chemotherapy, chronic hyponatremia, hypertension, diabetes, peripheral neuropathy, 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 and chronic anemia who presents with dizziness upon standing  Patient reports losing his balance after feeling weak and slid to the ground  He did not hit his head, he recalls the entire event  He denied any new injuries  Initial imaging negative for any acute fracture or intracranial abnormality  Sodium 133 at time of admission  Lactic acid was elevated at 3 9, this improved to 2 2 after fluids  Fluids to be continued  Oncology and palliative care consulted  Patient was recently started on prednisone taper, Lexapro and Percocet a few days prior to admission  We will continue the prednisone taper  However, suspect that Percocet may have contributed to patient's unsteadiness and dizziness as well as hypotension  Will discontinue this for now, place on scheduled Tylenol for cancer related pain and ask palliative Care to evaluate  Discontinue ramipril  Will also PT/OT evaluate the patient  Oncology has been consulted to discuss ongoing chemotherapy with patient  Per patient's caregiver, he has had a difficult time tolerating chemotherapy, question if this weakness is related to chemotherapy or progression of disease  Patient also noted to have leukocytosis on admission, ANC 17 9, discussed with Oncology, will initiate antibiotic therapy at this time  Patient is on Neulasta but this was not receive during his most recent session  CXR, UA negative  Follow-up on procalcitonin  Follow-up CT abdomen pelvis    Review of Systems:  Review of Systems   Constitutional: Positive for fatigue  Neurological: Positive for weakness  All other systems reviewed and are negative        Past Medical and Surgical History:   Past Medical History:   Diagnosis Date    Cancer [facility])     pancreatic    Diabetes mellitus ([facility] [address] )     Frequent urination     GERD (gastroesophageal reflux disease)     Hyperlipidemia     Hypertension     Peripheral neuropathy     Weakness     Weight loss        Past Surgical History:   Procedure Laterality Date    APPENDECTOMY      CATARACT EXTRACTION      COLONOSCOPY      SKIN LESION EXCISION      of a wart on right arm    TUNNELED VENOUS PORT PLACEMENT N/A 3/26/2021    Procedure: INSERTION VENOUS PORT (PORT-A-CATH); Surgeon: Naomi Dye MD;  Location:  MAIN OR;  Service: Surgical Oncology       Meds/Allergies:  Prior to Admission medications    Medication Sig Start Date End Date Taking?  Authorizing Provider   escitalopram (LEXAPRO) 5 mg tablet Take 5 mg by mouth daily   Yes Historical Provider, MD   HYDROcodone-acetaminophen (NORCO) 5-325 mg per tablet Take 1 tablet by mouth every 4 (four) hours as needed for pain   Yes Historical Provider, MD   Accu-Chek FastClix Lancets MISC USE 1 TO CHECK GLUCOSE TWICE DAILY TO THREE TIMES DAILY 2/9/21   Historical Provider, MD   Accu-Chek Guide test strip USE 1 STRIP TWICE DAILY TO THREE TIMES DAILY 2/9/21   Historical Provider, MD   acetaminophen (TYLENOL) 325 mg tablet Take 650 mg by mouth every 4 (four) hours as needed for mild pain    Historical Provider, MD   aspirin 81 mg chewable tablet Chew 81 mg daily    Historical Provider, MD   atorvastatin (LIPITOR) 40 mg tablet Take 40 mg by mouth daily 12/20/20   Historical Provider, MD   Blood Glucose Monitoring Suppl (Accu-Chek Guide) w/Device KIT USE TO CHECK GLUCOSE TWO TO THREE TIMES DAILY 1/8/21   Historical Provider, MD   HYDROcodone-acetaminophen (NORCO) 5-325 mg per tablet Take 1 tablet by mouth every 4 (four) hours as needed 7/8/21   Historical Provider, MD   insulin glargine (Lantus SoloStar) 100 units/mL injection pen Inject 8 units at bedtime 5/5/21   Jayda Najera MD   metFORMIN (GLUCOPHAGE) 850 mg tablet TAKE 1 TABLET BY MOUTH TWICE DAILY TAKE WITH MORNING AND EVENING MEAL 12/20/20   Historical Provider, MD   NovoLOG FlexPen 100 units/mL injection pen Inject as per sliding scale , (  Scale - 150-200 -1 units, 201-250-2 units, 251-300 -3 units, 301-350-4 units, > 350- 5  units) with meals 5/5/21   Isidoro Gottron, MD   predniSONE 10 mg tablet TAKE 6 TABLETS BY MOUTH ONCE DAILY FOR 3 DAYS THEN DECREASE BY ONE TABLET EVERY DAY  7/8/21   Historical Provider, MD   prochlorperazine (COMPAZINE) 10 mg tablet Take 1 tablet (10 mg total) by mouth every 6 (six) hours as needed for nausea or vomiting 3/17/21   Didi Carpenter MD   ramipril (ALTACE) 10 MG capsule Take 10 mg by mouth daily  12/20/20   Historical Provider, MD   ReliOn Pen Needles 31G X 6 MM MISC USE 1 IN THE EVENING 1/1/21   Historical Provider, MD   rivaroxaban (XARELTO) 15 mg tablet Take 1 tablet (15 mg total) by mouth 2 (two) times a day with meals for 40 doses  Patient taking differently: Take 15 mg by mouth daily with breakfast  5/11/21 6/17/21  China Fung MD   sodium chloride (GRAHAM 128) 5 % hypertonic ophthalmic solution 1 drop as needed    Historical Provider, MD   sodium chloride 1 g tablet Take 1 tablet (1 g total) by mouth 3 (three) times a day with meals 6/1/21 7/1/21  Domonique Remedies, MD   traMADol (ULTRAM) 50 mg tablet Take 1 tablet (50 mg total) by mouth every 6 (six) hours as needed for moderate pain 3/23/21   JOI Sandra     I have reviewed home medications with patient personally  Allergies: No Known Allergies    Social History:  Marital Status:    Occupation:   Patient Pre-hospital Living Situation: Home  Patient Pre-hospital Level of Mobility: walks with walker  Patient Pre-hospital Diet Restrictions: None  Substance Use History:   Social History     Substance and Sexual Activity   Alcohol Use Never     Social History     Tobacco Use   Smoking Status Former Smoker    Types: Pipe    Quit date: 2/23/2011    Years since quitting: 10 3   Smokeless Tobacco Never Used     Social History     Substance and Sexual Activity   Drug Use Never       Family History:  History reviewed  No pertinent family history      Physical Exam:     Vitals:   Blood Pressure: 125/60 (07/14/21 1300)  Pulse: 78 (07/14/21 1300)  Temperature: 98 4 °F (36 9 °C) (07/14/21 1300)  Temp Source: Oral (07/14/21 1300)  Respirations: 17 (07/14/21 1300)  Weight - Scale: 66 8 kg (147 lb 4 3 oz) (07/14/21 0853)  SpO2: 100 % (07/14/21 1230)    Physical Exam  Vitals and nursing note reviewed  Constitutional:       General: He is not in acute distress  Appearance: Normal appearance  He is well-developed  He is ill-appearing  HENT:      Head: Normocephalic and atraumatic  Eyes:      General: No scleral icterus  Conjunctiva/sclera: Conjunctivae normal    Cardiovascular:      Rate and Rhythm: Normal rate and regular rhythm  Heart sounds: No murmur heard  Pulmonary:      Effort: Pulmonary effort is normal       Breath sounds: No wheezing, rhonchi or rales  Abdominal:      General: There is no distension  Palpations: Abdomen is soft  Skin:     General: Skin is warm and dry  Coloration: Skin is pale  Neurological:      General: No focal deficit present  Mental Status: He is alert     Psychiatric:         Mood and Affect: Mood normal          Additional Data:     Lab Results:  Results from last 7 days   Lab Units 07/14/21  0914 07/14/21  0858   WBC Thousand/uL  --  18 84*   HEMOGLOBIN g/dL  --  7 8*   I STAT HEMOGLOBIN g/dl 8 5*  --    HEMATOCRIT %  --  23 7*   HEMATOCRIT, ISTAT % 25*  --    PLATELETS Thousands/uL  --  387   NEUTROS PCT %  --  95*   LYMPHS PCT %  --  3*   MONOS PCT %  --  1*   EOS PCT %  --  0     Results from last 7 days   Lab Units 07/14/21  0914 07/14/21  0858   SODIUM mmol/L  --  133*   POTASSIUM mmol/L  --  4 4   CHLORIDE mmol/L  --  100   CO2 mmol/L  --  21   CO2, I-STAT mmol/L 21  --    BUN mg/dL  --  35*   CREATININE mg/dL  --  1 00   ANION GAP mmol/L  --  12   CALCIUM mg/dL  --  8 4   ALBUMIN g/dL  --  2 9*   TOTAL BILIRUBIN mg/dL  --  1 00   ALK PHOS U/L  --  89   ALT U/L  --  19   AST U/L  --  13   GLUCOSE RANDOM mg/dL  --  101     Results from last 7 days   Lab Units 07/14/21  0858   INR  1 54*             Results from last 7 days   Lab Units 07/14/21  1139 07/14/21  1014   LACTIC ACID mmol/L 2 2* 3 9*       Imaging: Reviewed radiology reports from this admission including: chest xray, CT head and CT spine  TRAUMA - CT head wo contrast   Final Result by Arsalan Palmer DO (07/14 3516)      No acute intracranial abnormality  Stable microangiopathic changes within the brain  Workstation performed: [id number]         TRAUMA - CT spine cervical wo contrast   Final Result by Arsalan Palmer DO (07/14 6950)      No acute osseous abnormality  Mild cervical degenerative change  Workstation performed: [id number]         XR Trauma chest portable   Final Result by Benedicto Meyer MD (07/14 1234)   No acute cardiopulmonary disease  Findings are stable            Workstation performed: [id number]         CT abdomen pelvis w contrast    (Results Pending)       EKG and Other Studies Reviewed on Admission:   · EKG: NSR  HR       ** Please Note: This note has been constructed using a voice recognition system   **

## 2021-07-14 NOTE — ED PROCEDURE NOTE
PROCEDURE  ECG 12 Lead Documentation Only    Date/Time: 7/14/2021 10:18 AM  Performed by: Cinthya Gaffney DO  Authorized by: Cinthya Gaffney DO     Indications / Diagnosis:  Repeat EKG due to hypotension  ECG reviewed by me, the ED Provider: yes    Patient location:  ED  Previous ECG:     Previous ECG:  Compared to current    Similarity:  No change    Comparison to cardiac monitor: Yes    Interpretation:     Interpretation: normal           Cinthya Gaffney DO  07/14/21 1019

## 2021-07-14 NOTE — CONSULTS
Consultation - Palliative & Supportive Care   Heather Clayton  67 y o   male  /-01   MRN: [id number]  Encounter: [id number]    ASSESSMENT:    Patient Active Problem List   Diagnosis    Hyperlipidemia    Essential hypertension    Type 2 diabetes mellitus with hyperglycemia, with long-term current use of insulin (HCC)    Gastroesophageal reflux disease    Pancreatic cyst    [facility]    Peripheral neuropathy    Bilateral cellulitis of lower leg    Hyponatremia    Severe protein-calorie malnutrition (HCC)    Anemia    Sacral wound    Leukocytosis    Generalized weakness    Streptococcal bacteremia    Mesenteric venous thrombosis (HCC)    GWEN (acute kidney injury) ([facility] )       Active problems addressed:  · Pancreatic adenocarcinoma  · Generalized weakness  · Ambulatory dysfunction  · Mesenteric vein thrombosis  · Chronic Anemia  · HTN  · DM  · Peripheral neuropathy  · Constipation  · Depression  Goals of care    Consult is for goal setting    PLAN:    1  Goals:    Disease focused care  Will continue discussions regarding [address] as patient's clinical presentation evolves   Encouraged follow up with Palliative Medicine on an outpatient basis after discharge for continued symptom management  Our office will contact patient to schedule a hospital follow up  Code status: Level 3 - DNAR and DNI   Decisional apparatus:  Patient does have capacity to make medical decisions on my exam today  If such capacity is lost, patient's substitute decision maker would default to brother/[place]  Advance Directive / Living Will / POLST:  None on file    2  Social Support:   Supportive listening provided   Sad that he is weak   Well supported by friend and brother   Lives at home alone      3   Symptom management:  Constipation   · Start miralax 17g PO daily  · Start Senna S 2 tablets PO at hs  · Start bisacodyl suppository once daily PRN    Depression  · Continue escitalopram 5mg PO once daily    Cancer related pain  · Continue acetaminophen 975mg PO TID  · Start oxycodone 2 5mg PO Q4h PRN for moderate to severe pain    Controlled Substance Review    [place]MP or [place]  reviewed: No red flags were identified; safe to proceed with prescription  Amanda Lightning 07/08/2021  1   07/08/2021  Hydrocodone-Acetamin 5-325 MG  28 00  5 [address] Filiberto   [id number]   Wal ([id number])   0  28 00 MME  [place]   04/15/2021  2   04/15/2021  Tramadol Hcl 50 MG Tablet  28 00  7 De Shawn   [id number]   Omn ([id number])   0  20 00 MME  [place]   03/23/2021  1   03/23/2021  Tramadol Hcl 50 MG Tablet  15 00  4 An Bree   [id number]   Wal ([id number])   0  18 75 MME  Medicare   [place]       I have reviewed the patient's controlled substance dispensing history in the Prescription Drug Monitoring Program in compliance with the [facility] regulations before prescribing any controlled substances  We appreciate the opportunity to participate in this patient's care  We will continue to follow  Please do not hesitate to contact our on-call provider through our clinic answering service at [phone] should you have acute symptom control concerns  IDENTIFICATION:  Inpatient consult to Palliative Care  Consult performed by: JOI Hu  Consult ordered by: Ella Ware PA-C        Reason for Consult / Principal Problem: goal setting    HISTORY OF PRESENT ILLNESS:    Stephanie Bhandari is a 67 y o  male with pancreatic adenoCA (Dx in 3/10/2021)  He is currently on single agent chemotherapy/gemcitabine (abraxane was d/c'ed because of intolerance/poor performance status) with plans for possible tumor resection afterwards  He sees Dr Teena Kinney and was last seen 6/28 where his CA 19-9 went down after initiation on chemo  He is currently admitted because of a fall at home after feeling dizzy/lightheaded  CT Head and C-spine were negative for any acute pathology  On review of notes, patient's cousin/[place] is his caregiver   The patient's long time girlfriend passed away 2 weeks ago in her sleep  Patient is grieving this loss and admits to feeling depressed about this  Patient has issues with his brother  He was assessed by oncology SW and feels that patient was very weak and shouldn't be home alone without significant help  Patient is seen lying in bed with friend present  He reports his biggest concern today is constipation  Last bowel movement was 4 days ago  He is eating and drinking and his last meal was at [employer] this morning  He is passing gas  He denies nausea and vomiting  He does not have abdominal pain  Is requesting the bedpan from nurse now as he feels like he has to go to the bathroom  Right cheek is red and swollen  It is not tender, there are no open sores in his mouth and he does not have any tooth pain  He does not believe he hit is face when he fell this morning  SLIM notified  Denies pain at time of visit  Reviewed case with oncology, further imaging ordered to assess for disease progression  Will await results for further goals of care conversations  Review of Systems    Past Medical History:   Diagnosis Date    Cancer [facility])     pancreatic    Diabetes mellitus ([facility] [address] )     Frequent urination     GERD (gastroesophageal reflux disease)     Hyperlipidemia     Hypertension     Peripheral neuropathy     Weakness     Weight loss      Past Surgical History:   Procedure Laterality Date    APPENDECTOMY      CATARACT EXTRACTION      COLONOSCOPY      SKIN LESION EXCISION      of a wart on right arm    TUNNELED VENOUS PORT PLACEMENT N/A 3/26/2021    Procedure: INSERTION VENOUS PORT (PORT-A-CATH);   Surgeon: Kamini Enamorado MD;  Location:  MAIN OR;  Service: Surgical Oncology     Social History     Socioeconomic History    Marital status:      Spouse name: Not on file    Number of children: Not on file    Years of education: Not on file    Highest education level: Not on file   Occupational History    Not on file   Tobacco Use    Smoking status: Former Smoker     Types: Pipe     Quit date: 2/23/2011     Years since quitting: 10 3    Smokeless tobacco: Never Used   Vaping Use    Vaping Use: Never used   Substance and Sexual Activity    Alcohol use: Never    Drug use: Never    Sexual activity: Not on file   Other Topics Concern    Not on file   Social History Narrative    Not on file     Social Determinants of Health     Financial Resource Strain:     Difficulty of Paying Living Expenses:    Food Insecurity:     Worried About Running Out of Food in the Last Year:     [address] in the Last Year:    Transportation Needs:     Lack of Transportation (Medical):  Lack of Transportation (Non-Medical):    Physical Activity:     Days of Exercise per Week:     Minutes of Exercise per Session:    Stress:     Feeling of Stress :    Social Connections:     Frequency of Communication with Friends and Family:     Frequency of Social Gatherings with Friends and Family:     Attends Church Services:     Active Member of Clubs or Organizations:     Attends Club or Organization Meetings:     Marital Status:    Intimate Partner Violence:     Fear of Current or Ex-Partner:     Emotionally Abused:     Physically Abused:     Sexually Abused:      History reviewed  No pertinent family history  MEDICATIONS / ALLERGIES:  all current active meds have been reviewed    No Known Allergies    OBJECTIVE:  BP 99/55   Pulse 81   Temp 98 5 °F (36 9 °C) (Temporal)   Resp 18   Wt 66 8 kg (147 lb 4 3 oz)   SpO2 100%   BMI 21 08 kg/m²   Physical Exam:  Constitutional: Appears chronically ill and frail  In no acute physical or emotional distress  Head: Normocephalic and atraumatic  Face: Right check swollen and warm to touch  Eyes: EOM are normal  No ocular discharge  No scleral icterus  Neck: No visible adenopathy or masses  Respiratory: Effort normal  No stridor  No respiratory distress  Gastrointestinal: No abdominal distension  No tenderness to palpation  Musculoskeletal: No edema  Neurological: Alert, oriented and appropriately conversant  Skin: Dry, no diaphoresis  Pale  Psychiatric: Displays a normal mood and affect  Behavior, judgment and thought content appear normal      Lab Results:   I have personally reviewed pertinent labs  , CBC:   Lab Results   Component Value Date    WBC 18 84 (H) 07/14/2021    HGB 8 5 (L) 07/14/2021    HCT 25 (L) 07/14/2021     (H) 07/14/2021     07/14/2021    MCH 35 3 (H) 07/14/2021    MCHC 32 9 07/14/2021    RDW 15 6 (H) 07/14/2021    MPV 9 4 07/14/2021    NRBC 0 07/14/2021   , CMP:   Lab Results   Component Value Date    SODIUM 133 (L) 07/14/2021    K 4 4 07/14/2021     07/14/2021    CO2 21 07/14/2021    BUN 35 (H) 07/14/2021    CREATININE 1 00 07/14/2021    GLUCOSE 98 07/14/2021    CALCIUM 8 4 07/14/2021    AST 13 07/14/2021    ALT 19 07/14/2021    ALKPHOS 89 07/14/2021    EGFR 73 07/14/2021     Imaging Studies: I have personally reviewed pertinent reports  EKG, Pathology, and Other Studies: I have personally reviewed pertinent reports  Counseling / Coordination of Care: Total floor / unit time spent today 90 minutes  Greater than 50% of total time was spent with the patient and / or family counseling and / or coordination of care  A description of the counseling / coordination of care: provided medical updates, discussed palliative care,  determined competency, determined goals of care, determined POA, determined social/family support, discussed plans of care, discussed symptom management, provided psychosocial support       Andree Lance, MSN, [address] [phone]

## 2021-07-14 NOTE — ASSESSMENT & PLAN NOTE
· Noted by family, to last approximately 1 minute  · CT head in ED normal  · EEG pending  · Consider Neurology consult at that time  · Completely resolved with no focal neurologic deficit at time of admission  · Question if this is related to narcotic use

## 2021-07-14 NOTE — PLAN OF CARE
Problem: MOBILITY - ADULT  Goal: Maintain or return to baseline ADL function  Description: INTERVENTIONS:  -  Assess patient's ability to carry out ADLs; assess patient's baseline for ADL function and identify physical deficits which impact ability to perform ADLs (bathing, care of mouth/teeth, toileting, grooming, dressing, etc )  - Assess/evaluate cause of self-care deficits   - Assess range of motion  - Assess patient's mobility; develop plan if impaired  - Assess patient's need for assistive devices and provide as appropriate  - Encourage maximum independence but intervene and supervise when necessary  - Involve family in performance of ADLs  - Assess for home care needs following discharge   - Consider OT consult to assist with ADL evaluation and planning for discharge  - Provide patient education as appropriate  Outcome: Progressing  Goal: Maintains/Returns to pre admission functional level  Description: INTERVENTIONS:  - Perform BMAT or MOVE assessment daily    - Set and communicate daily mobility goal to care team and patient/family/caregiver  - Collaborate with rehabilitation services on mobility goals if consulted  - Perform Range of Motion 2 times a day  - Reposition patient every 2 hours    - Dangle patient 2 times a day  - Stand patient 2 times a day  - Ambulate patient 2 times a day  - Out of bed to chair 2 times a day   - Out of bed for meals 3 times a day  - Out of bed for toileting  - Record patient progress and toleration of activity level   Outcome: Progressing

## 2021-07-14 NOTE — ASSESSMENT & PLAN NOTE
· Recently started on prednisone taper, Lexapro and Norco for pain, took last one early in AM  · Prednisone- 30mg daily, continue taper, decreasing by 10 each per outpatient instructions  · Dee Coats as this may have contributed to dizziness and hypotension  · Patient noted to be hypotensive in emergency department, 86/46, pressures did improve with fluid bolus  · Initial lactic acid 3 9, improved to 2 2 after fluids  · Patient reports he is having normal oral intake  · UA, CT head, CXR unremarkable

## 2021-07-14 NOTE — ASSESSMENT & PLAN NOTE
Background:  Patient with pancreatic adenocarcinoma undergoing chemotherapy recently started on Norco reports dizziness and weakness  Patient denied fever, nausea, vomiting, diarrhea, chest pain, cough  He did admit to feeling cold with some possible chills  Episode of hypotension noted in emergency department, 11/14 which responded to IV fluids appropriately  Patient did have leukocytosis of 18 84, currently undergoing chemotherapy outpatient, while neulasta part of his treatment regimen, he did not receive this during his most recent chemotherapy session    ANC 17 9  Will start patient on cefepime vanc with pharmacy consult  Imaging:  CT head, CXR unremarkable, CT abdomen pelvis noted as above  Lactic of 3 9 ; status post 500cc bolus; trended down 2 2  · Procalcitonin elevated 1 21  · Follow up blood cultures  · Monitor leukocytosis and fever curve, patient afebrile at time of admission No masses; no nipple discharge

## 2021-07-14 NOTE — ASSESSMENT & PLAN NOTE
· Recently started on prednisone taper, Lexapro and Norco for pain, took last one early in AM  · Prednisone- 30mg daily, continue taper, decreasing by 10 each per outpatient instructions  · Valeta Sly as this may have contributed to dizziness and hypotension  · Patient noted to be hypotensive in emergency department, 86/46, pressures did improve with fluid bolus - remains stable  · Initial lactic acid 3 9, improved to 2 2 after fluids  · Patient reports he is having normal oral intake  · UA, CT head, CXR unremarkable  · CT abdomen pelvis: "Mild interval increase in size of cystic lesion in the head and uncinate process of the pancreas measuring 1 5 cm  Stable severe pancreatic duct dilatation  Decreased gas and inflammation between the medial border of the pylorus and head of the pancreas  Persistent inflammation along the medial aspect of the pylorus and proximal 2nd portion of the duodenum  1 3 cm low-attenuation, possibly cystic lesion or fluid collection along the medial border of the pylorus  Possible contained perforation or cystic metastasis    Constipation "

## 2021-07-14 NOTE — ASSESSMENT & PLAN NOTE
· Follows with Dr Myrtle Pabon outpatient  · Undergoing chemotherapy  · Oncology evaluated patient yesterday, CT abdomen/pelvis obtained which shows some increase in cystic lesion in head of the pancreas as well as possible area of contained perforation versus cystic metastasis  · Question of progressive weakness may be related to progression of disease vs chemo  · Per surgery more likely area of metastasis, no abdominal pain, no surgical intervention - outpt follow up with surg onc  · Palliative Consulted - as of now patient remains disease treatment focused  · Chemotherapy has been decreased in the past due to poor toleration

## 2021-07-14 NOTE — ASSESSMENT & PLAN NOTE
Lab Results   Component Value Date    HGBA1C 9 4 (H) 04/08/2021       No results for input(s): POCGLU in the last 72 hours      Blood Sugar Average: Last 72 hrs:       · Managed on Lantus 8u and SSI at home  · Continue while admitted

## 2021-07-14 NOTE — ASSESSMENT & PLAN NOTE
· Noted by family, to last approximately 1 minute  · CT head in ED normal  · EEG ordered on admission  · Consider Neurology consult at that time  · Completely resolved with no focal neurologic deficit at time of admission  · Question if this is related to narcotic use/hypotension  · No recurrence since admission

## 2021-07-14 NOTE — ED PROVIDER NOTES
Emergency Department Trauma Note  Gm Mccullough 67 y o  male MRN: [id number]  Unit/Bed#: /-01 Encounter: [id number]      Trauma Alert: Trauma Acuity: Trauma Evaluation  Model of Arrival:   via    Trauma Team: Current Providers  Attending Provider: Anthony Maxwell DO  Attending Provider: Anthony Rodriguez MD  Registered Nurse: Debby Azul RN  Advanced Practitioner: Jeniffer Hsieh PA-C  Consulting Physician: Kain Lemus, [address] [facility] Physician: Heidy Norwood MD  Advanced Practitioner: JOI Law  Consulting Physician: Maximus Alonso DO  Registered Nurse: Deyanira Adair RN  Registered Nurse: Keri Whyte RN  Patient Care Assistant: Kayy Ellington  Consultants: None      History of Present Illness     Chief Complaint:   Chief Complaint   Patient presents with    Fall     HPI:  Gm Mccullough is a 67 y o  male who presents with fall  Mechanism:Details of Incident: Patient states that he did not feel good this morning when he got up was standing at the bathroom sink  His legs collapsed and fell down to his left knee while still holding onto the sink  Injury Date: 07/14/21 Injury Time: 0700      This 48-year-old man with history of adenocarcinoma of pancreas undergoing chemotherapy, chronic hyponatremia, hypertension, diabetes, peripheral neuropathy, 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 and chronic anemia states after got up from bed and used his walker to the bathroom he felt very dizzy  He then became  weak and lightheaded and could not keep his balance  His legs gave out on him  He went down on his left knee while holding on to the sink  He did not fall any farther  He did not lose consciousness  He has no new injuries  He has chronic neck and right shoulder pain for which he has had x-rays  He states that pain is no worse than usual   Patient was started on Lexapro and Percocet a few days ago          Review of Systems   Constitutional: Positive for activity change and appetite change  Negative for chills, diaphoresis and fever  HENT: Negative for congestion, ear pain, rhinorrhea and sore throat  Respiratory: Positive for cough and shortness of breath  Cardiovascular: Positive for chest pain, palpitations and leg swelling  Gastrointestinal: Negative for abdominal pain, blood in stool, constipation, diarrhea, nausea and vomiting  Genitourinary: Negative for dysuria, flank pain and hematuria  Musculoskeletal: Positive for arthralgias, gait problem and myalgias  Negative for neck stiffness  Skin: Negative for wound  Neurological: Positive for dizziness, syncope, weakness and light-headedness  Negative for seizures and headaches  Hematological: Does not bruise/bleed easily  Psychiatric/Behavioral: Positive for dysphoric mood  Historical Information     Immunizations: There is no immunization history on file for this patient  Past Medical History:   Diagnosis Date    Cancer [facility])     pancreatic    Diabetes mellitus ([facility] [address] )     Frequent urination     GERD (gastroesophageal reflux disease)     Hyperlipidemia     Hypertension     Peripheral neuropathy     Weakness     Weight loss      History reviewed  No pertinent family history  Past Surgical History:   Procedure Laterality Date    APPENDECTOMY      CATARACT EXTRACTION      COLONOSCOPY      SKIN LESION EXCISION      of a wart on right arm    TUNNELED VENOUS PORT PLACEMENT N/A 3/26/2021    Procedure: INSERTION VENOUS PORT (PORT-A-CATH);   Surgeon: Jennifer Heath MD;  Location:  MAIN OR;  Service: Surgical Oncology     Social History     Tobacco Use    Smoking status: Former Smoker     Types: Pipe     Quit date: 2/23/2011     Years since quitting: 10 3    Smokeless tobacco: Never Used   Vaping Use    Vaping Use: Never used   Substance Use Topics    Alcohol use: Never    Drug use: Never     E-Cigarette/Vaping    E-Cigarette Use Never User E-Cigarette/Vaping Substances    Nicotine No     THC No     CBD No     Flavoring No     Other No     Unknown No        Family History: non-contributory    Meds/Allergies   Prior to Admission Medications   Prescriptions Last Dose Informant Patient Reported? Taking?    Accu-Chek FastClix Lancets MISC  Self Yes No   Sig: USE 1 TO CHECK GLUCOSE TWICE DAILY TO THREE TIMES DAILY   Accu-Chek Guide test strip  Self Yes No   Sig: USE 1 STRIP TWICE DAILY TO THREE TIMES DAILY   Blood Glucose Monitoring Suppl (Accu-Chek Guide) w/Device KIT  Self Yes No   Sig: USE TO CHECK GLUCOSE TWO TO THREE TIMES DAILY   HYDROcodone-acetaminophen (NORCO) 5-325 mg per tablet 7/14/2021 at Unknown time  Yes Yes   Sig: Take 1 tablet by mouth every 4 (four) hours as needed   HYDROcodone-acetaminophen (NORCO) 5-325 mg per tablet   Yes Yes   Sig: Take 1 tablet by mouth every 4 (four) hours as needed for pain   NovoLOG FlexPen 100 units/mL injection pen  Self No No   Sig: Inject as per sliding scale , (  Scale - 150-200 -1 units, 201-250-2 units, 251-300 -3 units, 301-350-4 units, > 350- 5  units) with meals   ReliOn Pen Needles 31G X 6 MM MISC  Self Yes No   Sig: USE 1 IN THE EVENING   acetaminophen (TYLENOL) 325 mg tablet Not Taking at Unknown time Self Yes No   Sig: Take 650 mg by mouth every 4 (four) hours as needed for mild pain   Patient not taking: Reported on 7/14/2021   aspirin 81 mg chewable tablet Not Taking at Unknown time Self Yes No   Sig: Chew 81 mg daily   Patient not taking: Reported on 7/14/2021   atorvastatin (LIPITOR) 40 mg tablet 7/13/2021 at Unknown time Self Yes Yes   Sig: Take 40 mg by mouth daily   escitalopram (LEXAPRO) 5 mg tablet 7/13/2021 at Unknown time  Yes Yes   Sig: Take 5 mg by mouth daily   insulin glargine (Lantus SoloStar) 100 units/mL injection pen  Self No No   Sig: Inject 8 units at bedtime   metFORMIN (GLUCOPHAGE) 850 mg tablet 7/14/2021 at Unknown time Self Yes Yes   Sig: TAKE 1 TABLET BY MOUTH TWICE DAILY TAKE WITH MORNING AND EVENING MEAL   predniSONE 10 mg tablet 2021 at Unknown time  Yes Yes   Sig: TAKE 6 TABLETS BY MOUTH ONCE DAILY FOR 3 DAYS THEN DECREASE BY ONE TABLET EVERY DAY  prochlorperazine (COMPAZINE) 10 mg tablet Not Taking at Unknown time Self No No   Sig: Take 1 tablet (10 mg total) by mouth every 6 (six) hours as needed for nausea or vomiting   Patient not taking: Reported on 2021   ramipril (ALTACE) 10 MG capsule 2021 at Unknown time Self Yes Yes   Sig: Take 10 mg by mouth daily    rivaroxaban (XARELTO) 15 mg tablet   No No   Sig: Take 1 tablet (15 mg total) by mouth 2 (two) times a day with meals for 40 doses   Patient taking differently: Take 15 mg by mouth daily with breakfast    sodium chloride (GRAHAM 128) 5 % hypertonic ophthalmic solution Not Taking at Unknown time Self Yes No   Si drop as needed   Patient not taking: Reported on 2021   sodium chloride 1 g tablet   No No   Sig: Take 1 tablet (1 g total) by mouth 3 (three) times a day with meals   traMADol (ULTRAM) 50 mg tablet Not Taking at Unknown time Self No No   Sig: Take 1 tablet (50 mg total) by mouth every 6 (six) hours as needed for moderate pain   Patient not taking: Reported on 2021      Facility-Administered Medications: None       No Known Allergies    PHYSICAL EXAM    PE limited by: ** weakness and lightheadedness *    Objective   Vitals:   First set: Temperature: 98 5 °F (36 9 °C) (21 0849)  Pulse: 96 (21 0853)  Respirations: 18 (21 0853)  Blood Pressure: 123/60 (21 0853)  SpO2: 100 % (21 0853)    Primary Survey:   (A) Airway: normal vocalizations  (B) Breathing:  Symmetric air intake and chest rise  (C) Circulation: Pulses:   normal  (D) Disabliity:  GCS Total:  15  (E) Expose:  Completed    Secondary Survey: (Click on Physical Exam tab above)  Physical Exam  Vitals and nursing note reviewed  Constitutional:       General: He is not in acute distress  Appearance: He is ill-appearing  He is not diaphoretic  Comments: Appears malnourished   HENT:      Head: Normocephalic and atraumatic  Right Ear: External ear normal       Left Ear: External ear normal       Nose: Nose normal  No congestion or rhinorrhea  Mouth/Throat:      Mouth: Mucous membranes are dry  Pharynx: No posterior oropharyngeal erythema  Eyes:      General: No scleral icterus  Extraocular Movements: Extraocular movements intact  Pupils: Pupils are equal, round, and reactive to light  Cardiovascular:      Rate and Rhythm: Normal rate and regular rhythm  Pulses: Normal pulses  Heart sounds: Normal heart sounds  Pulmonary:      Effort: Pulmonary effort is normal       Breath sounds: Normal breath sounds  Chest:      Chest wall: No tenderness  Abdominal:      General: Abdomen is flat  Bowel sounds are normal       Palpations: Abdomen is soft  Tenderness: There is no abdominal tenderness  There is no guarding or rebound  Musculoskeletal:         General: No swelling, tenderness (neck and right shoulder) or signs of injury  Cervical back: Neck supple  No rigidity or tenderness  Right lower leg: No edema  Left lower leg: No edema  Comments: Decreased ROM of shoulders due to pain  Lymphadenopathy:      Cervical: No cervical adenopathy  Skin:     General: Skin is warm and dry  Capillary Refill: Capillary refill takes less than 2 seconds  Findings: No rash  Neurological:      General: No focal deficit present  Mental Status: He is alert and oriented to person, place, and time  Sensory: No sensory deficit  Motor: Weakness present  Coordination: Coordination normal       Gait: Gait abnormal    Psychiatric:         Attention and Perception: Attention normal  He does not perceive auditory or visual hallucinations  Mood and Affect: Mood is depressed  Affect is inappropriate           Speech: Speech is delayed  Behavior: Behavior normal  Behavior is cooperative  Thought Content: Thought content is not paranoid or delusional  Thought content does not include homicidal or suicidal ideation  Cervical spine cleared by clinical criteria? No (imaging required)      Invasive Devices     Central Venous Catheter Line            Port A Cath 03/26/21 Right Subclavian 110 days          Peripheral Intravenous Line            Peripheral IV 07/14/21 Left Antecubital <1 day                Lab Results:   Results Reviewed     Procedure Component Value Units Date/Time    Procalcitonin with AM Reflex [[id number]]  (Abnormal) Collected: 07/14/21 1014    Lab Status: Final result Specimen: Blood from Arm, Left Updated: 07/14/21 1448     Procalcitonin 0 27 ng/ml     Lactic acid 2 Hours [[id number]]  (Abnormal) Collected: 07/14/21 1139    Lab Status: Final result Specimen: Blood from Arm, Left Updated: 07/14/21 1311     LACTIC ACID 2 2 mmol/L     Narrative:      Result may be elevated if tourniquet was used during collection      Urine Microscopic [[id number]]  (Normal) Collected: 07/14/21 1137    Lab Status: Final result Specimen: Urine, Straight Cath Updated: 07/14/21 1209     RBC, UA 2-4 /hpf      WBC, UA None Seen /hpf      Epithelial Cells None Seen /hpf      Bacteria, UA Occasional /hpf     UA w Reflex to Microscopic w Reflex to Culture [[id number]]  (Abnormal) Collected: 07/14/21 1137    Lab Status: Final result Specimen: Urine, Straight Cath Updated: 07/14/21 1155     Color, UA Yellow     Clarity, UA Clear     Specific [place], UA 1 020     pH, UA 6 0     Leukocytes, UA Negative     Nitrite, UA Negative     Protein, UA Negative mg/dl      Glucose,  (1/10%) mg/dl      Ketones, UA Trace mg/dl      Urobilinogen, UA 1 0 E U /dl      Bilirubin, UA Negative     Blood, UA Trace-Intact    Lactic acid [[id number]]  (Abnormal) Collected: 07/14/21 1014    Lab Status: Final result Specimen: Blood from Arm, Left Updated: 07/14/21 1103     LACTIC ACID 3 9 mmol/L     Narrative:      Result may be elevated if tourniquet was used during collection  Blood culture #1 [[id number]] Collected: 07/14/21 1000    Lab Status: In process Specimen: Blood from Arm, Left Updated: 07/14/21 1018    Blood culture #2 [[id number]] Collected: 07/14/21 1014    Lab Status: In process Specimen: Blood from Arm, Right Updated: 07/14/21 1018    CBC and differential [[id number]]  (Abnormal) Collected: 07/14/21 0858    Lab Status: Final result Specimen: Blood from Arm, Left Updated: 07/14/21 0955     WBC 18 84 Thousand/uL      RBC 2 21 Million/uL      Hemoglobin 7 8 g/dL      Hematocrit 23 7 %       fL      MCH 35 3 pg      MCHC 32 9 g/dL      RDW 15 6 %      MPV 9 4 fL      Platelets 304 Thousands/uL      nRBC 0 /100 WBCs      Neutrophils Relative 95 %      Immat GRANS % 1 %      Lymphocytes Relative 3 %      Monocytes Relative 1 %      Eosinophils Relative 0 %      Basophils Relative 0 %      Neutrophils Absolute 17 90 Thousands/µL      Immature Grans Absolute 0 13 Thousand/uL      Lymphocytes Absolute 0 51 Thousands/µL      Monocytes Absolute 0 20 Thousand/µL      Eosinophils Absolute 0 08 Thousand/µL      Basophils Absolute 0 02 Thousands/µL     Narrative: This is an appended report  These results have been appended to a previously verified report      Troponin I [[id number]]  (Normal) Collected: 07/14/21 0858    Lab Status: Final result Specimen: Blood from Arm, Left Updated: 07/14/21 0934     Troponin I <0 02 ng/mL     APTT [[id number]]  (Normal) Collected: 07/14/21 0858    Lab Status: Final result Specimen: Blood from Arm, Left Updated: 07/14/21 0927     PTT 25 seconds     Protime-INR [[id number]]  (Abnormal) Collected: 07/14/21 0858    Lab Status: Final result Specimen: Blood from Arm, Left Updated: 07/14/21 0927     Protime 18 4 seconds      INR 1 54    Comprehensive metabolic panel [[id number]]  (Abnormal) Collected: 07/14/21 4088 Lab Status: Final result Specimen: Blood from Arm, Left Updated: 07/14/21 0923     Sodium 133 mmol/L      Potassium 4 4 mmol/L      Chloride 100 mmol/L      CO2 21 mmol/L      ANION GAP 12 mmol/L      BUN 35 mg/dL      Creatinine 1 00 mg/dL      Glucose 101 mg/dL      Calcium 8 4 mg/dL      Corrected Calcium 9 3 mg/dL      AST 13 U/L      ALT 19 U/L      Alkaline Phosphatase 89 U/L      Total Protein 5 8 g/dL      Albumin 2 9 g/dL      Total Bilirubin 1 00 mg/dL      eGFR 73 ml/min/1 73sq m     Narrative:      [place] guidelines for Chronic Kidney Disease (CKD):     Stage 1 with normal or high GFR (GFR > 90 mL/min/1 73 square meters)    Stage 2 Mild CKD (GFR = 60-89 mL/min/1 73 square meters)    Stage 3A Moderate CKD (GFR = 45-59 mL/min/1 73 square meters)    Stage 3B Moderate CKD (GFR = 30-44 mL/min/1 73 square meters)    Stage 4 Severe CKD (GFR = 15-29 mL/min/1 73 square meters)    Stage 5 End Stage CKD (GFR <15 mL/min/1 73 square meters)  Note: GFR calculation is accurate only with a steady state creatinine    POCT Blood Gas (CG8+) [[id number]]  (Abnormal) Collected: 07/14/21 0914    Lab Status: Final result Specimen: Venous Updated: 07/14/21 0918     ph, Jose E ISTAT 7 398     pCO2, Jose E i-STAT 33 1 mm HG      pO2, Jose E i-STAT 22 0 mm HG      BE, i-STAT -4 mmol/L      HCO3, Jose E i-STAT 20 4 mmol/L      CO2, i-STAT 21 mmol/L      O2 Sat, i-STAT 38 %      SODIUM, I-STAT 133 mmol/l      Potassium, i-STAT 4 2 mmol/L      Calcium, Ionized i-STAT 1 20 mmol/L      Hct, i-STAT 25 %      Hgb, i-STAT 8 5 g/dl      Glucose, i-STAT 98 mg/dl      Specimen Type VENOUS                 Imaging Studies:   Direct to CT: No  TRAUMA - CT head wo contrast   Final Result by Tari Ordonez DO (07/14 0809)      No acute intracranial abnormality  Stable microangiopathic changes within the brain                    Workstation performed: [id number]         TRAUMA - CT spine cervical wo contrast   Final Result by Del Riddle DO (07/14 0931)      No acute osseous abnormality  Mild cervical degenerative change  Workstation performed: [id number]         XR Trauma chest portable   Final Result by Izzy Hodges MD (07/14 1235)   No acute cardiopulmonary disease  Findings are stable            Workstation performed: [id number]         CT abdomen pelvis w contrast    (Results Pending)         Procedures  ECG 12 Lead Documentation Only    Date/Time: 7/14/2021 9:02 AM  Performed by: Saud Brady DO  Authorized by: Saud Brady DO     ECG reviewed by me, the ED Provider: yes    Patient location:  ED  Previous ECG:     Previous ECG:  Compared to current    Similarity:  No change    Comparison to cardiac monitor: Yes    Interpretation:     Interpretation: normal               ED Course  ED Course as of Jul 14 1553   Wed Jul 14, 2021   0957 Patient sleeping  Blood pressure 89 systolic  Sinus rhythm  Give 500 cubic centimeters saline bolus  MDM  Number of Diagnoses or Management Options  Adenocarcinoma of head of pancreas ([address] )  Hypotension: new and requires workup  Weakness: new and requires workup  Diagnosis management comments: 42-year-old male with multiple medical problems including pancreatic cancer and depression complains of sudden weakness dizziness and inability to ambulate  He has a flat affect and appears sleepy, without focal neurologic deficit  During the ED stay he became hypotensive while sleeping  He improved with small bolus of fluids  He started Lexapro and Percocet 2-3 days ago         Amount and/or Complexity of Data Reviewed  Clinical lab tests: ordered and reviewed  Tests in the radiology section of CPT®: ordered and reviewed  Review and summarize past medical records: yes  Discuss the patient with other providers: yes  Independent visualization of images, tracings, or specimens: yes            Disposition  [facility] Transfer: No  Final diagnoses: Weakness   Hypotension   Adenocarcinoma of head of pancreas ([facility] )     Time reflects when diagnosis was documented in both MDM as applicable and the Disposition within this note     Time User Action Codes Description Comment    7/14/2021 11:57 AM Felicia JefersonAditi Add [C25 9] Adenocarcinoma of pancreas ([facility] )     7/14/2021 11:59 AM Evangelista Brisker Add [R53 1] Weakness     7/14/2021 11:59 AM Evangelista Brisker Add [I95 9] Hypotension     7/14/2021 11:59 AM Evangelista Brisker Add [C25 0] Adenocarcinoma of head of pancreas [facility])       ED Disposition     ED Disposition Condition Date/Time Comment    Admit Stable Wed Jul 14, 2021 11:58 AM Case was discussed with Dr Michelle Gutierrez and the patient's admission status was agreed to be Admission Status: observation status to the service of Dr Michelle Gutierrez   Follow-up Information    None       Current Discharge Medication List      CONTINUE these medications which have NOT CHANGED    Details   atorvastatin (LIPITOR) 40 mg tablet Take 40 mg by mouth daily      escitalopram (LEXAPRO) 5 mg tablet Take 5 mg by mouth daily      !! HYDROcodone-acetaminophen (NORCO) 5-325 mg per tablet Take 1 tablet by mouth every 4 (four) hours as needed      !! HYDROcodone-acetaminophen (NORCO) 5-325 mg per tablet Take 1 tablet by mouth every 4 (four) hours as needed for pain      metFORMIN (GLUCOPHAGE) 850 mg tablet TAKE 1 TABLET BY MOUTH TWICE DAILY TAKE WITH MORNING AND EVENING MEAL      predniSONE 10 mg tablet TAKE 6 TABLETS BY MOUTH ONCE DAILY FOR 3 DAYS THEN DECREASE BY ONE TABLET EVERY DAY        ramipril (ALTACE) 10 MG capsule Take 10 mg by mouth daily       Accu-Chek FastClix Lancets MISC USE 1 TO CHECK GLUCOSE TWICE DAILY TO THREE TIMES DAILY      Accu-Chek Guide test strip USE 1 STRIP TWICE DAILY TO THREE TIMES DAILY      acetaminophen (TYLENOL) 325 mg tablet Take 650 mg by mouth every 4 (four) hours as needed for mild pain      aspirin 81 mg chewable tablet Chew 81 mg daily Blood Glucose Monitoring Suppl (Accu-Chek Guide) w/Device KIT USE TO CHECK GLUCOSE TWO TO THREE TIMES DAILY      insulin glargine (Lantus SoloStar) 100 units/mL injection pen Inject 8 units at bedtime  Qty: 15 mL, Refills: 0    Associated Diagnoses: Type 2 diabetes mellitus with hyperglycemia, with long-term current use of insulin ([facility])      NovoLOG FlexPen 100 units/mL injection pen Inject as per sliding scale , (  Scale - 150-200 -1 units, 201-250-2 units, 251-300 -3 units, 301-350-4 units, > 350- 5  units) with meals  Qty: 15 mL    Associated Diagnoses: Type 2 diabetes mellitus with hyperglycemia, with long-term current use of insulin ([facility])      prochlorperazine (COMPAZINE) 10 mg tablet Take 1 tablet (10 mg total) by mouth every 6 (six) hours as needed for nausea or vomiting  Qty: 45 tablet, Refills: 3    Associated Diagnoses: Adenocarcinoma of pancreas (HCC)      ReliOn Pen Needles 31G X 6 MM MISC USE 1 IN THE EVENING      rivaroxaban (XARELTO) 15 mg tablet Take 1 tablet (15 mg total) by mouth 2 (two) times a day with meals for 40 doses  Qty: 40 tablet, Refills: 0    Associated Diagnoses: Mesenteric venous thrombosis (HCC)      sodium chloride (GRAHAM 128) 5 % hypertonic ophthalmic solution 1 drop as needed      sodium chloride 1 g tablet Take 1 tablet (1 g total) by mouth 3 (three) times a day with meals  Qty: 90 tablet, Refills: 0    Associated Diagnoses: Hyponatremia      traMADol (ULTRAM) 50 mg tablet Take 1 tablet (50 mg total) by mouth every 6 (six) hours as needed for moderate pain  Qty: 15 tablet, Refills: 0    Associated Diagnoses: Adenocarcinoma of pancreas (HCC)       ! ! - Potential duplicate medications found  Please discuss with provider  No discharge procedures on file      PDMP Review       Value Time User    PDMP Reviewed  Yes 7/14/2021  1:13 PM Naomie Price MD          ED Provider  Electronically Signed by         Jazzmine Souza DO  07/14/21 1923

## 2021-07-14 NOTE — CONSULTS
Medical Oncology/Hematology Consult Note  Gm Mccullough, 67 y o , 1945  /-[birth year], [id number]  07/14/21    Assessment and Plan:    1  Adenocarcinoma of the pancreas  Patient was diagnosed with pancreatic cancer in 3/2021  He follows with Dr Monty Galvan in Medical Oncology  He was initially started on neoadjuvant chemotherapy with gemcitabine and Abraxane  His treatment regimen has been adjusted secondary to declining performance status, multiple hospitalizations, and treatment related side effects  Patient has currently been undergoing single agent therapy with gemcitabine  Last imaging from May demonstrated stable disease  His tumor marker has been trending down indicating treatment response  Patient last received chemotherapy on 07/12/2021  He was treated with single agent gemcitabine  Neulasta growth factor support was not given after this treatment  He is now hospitalized with progressive generalized weakness, dizziness resulting in fall  CT of head negative  He does have leukocytosis, hypotension and symptoms of chills  Blood cultures are pending  CT of head, chest x-ray unremarkable  -CT of abdomen and pelvis is requested  If patient has evidence of disease progression, hospice would be a reasonable consideration given patient's declining performance status and inability to tolerate more aggressive chemotherapy in the past   However, if patient wishes to continue with disease directed therapies as he has indicated today, a change in regimen would be indicated and considered on an outpatient basis depending on patient's overall performance status once medically optimized  ECOG 2    Medical oncology will follow along  Please do not hesitate to contact me if you have any questions or need additional information  Thank you for this consult  Luis Green MSN, [facility], [address] [facility]  Hematology Oncology Nurse Practitioner      Reason for Consultation: Pancreatic Cancer         History of present illness:   Som Prater is a 67 y o  male with biopsy-proven adenocarcinoma of the pancreas diagnosed in February 2021  Imaging was negative for metastatic disease  His CA 19 9 was near 20,000 at presentation  He also has a 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  Patient is well-known to Medical Oncology and follows with Dr Mariaelena Raymond  He was initiated on treatment with neoadjuvant chemotherapy with weekly gemcitabine and Abraxane  However due to multiple recent hospitalizations and declining performance status his treatment regimen was changed to single agent gemcitabine  He was last treated on 7/12/2021  He did not receive Neulasta growth factor support after this treatment  Patient  presents with increased generalized weakness, dizziness resulting in fall  Patient reports his legs gave out on him  He did not lose consciousness  He was noted to be hypotensive in the emergency room  Initial workup did not identify any acute injuries  CT of head shows no acute intracranial abnormality  Blood work demonstrates leukocytosis with white count 18 84, ANC 17 9  Hemoglobin 7 8, , platelet count 772  Sodium 133  Creatinine 1 00  Blood cultures are pending    Medical Oncology is consulted to evaluate patient to determine if his progressive weakness is related to disease progression versus side effects of chemotherapy  Of note, patient's CA 19-9 has been trending down indicating some response to treatment, most recent tumor marker is down to 795  This was over 3000 back in June  Som Prater current outpatient oncology regimen is: Gemcitabine 800 mg/m2 day 1 8 15, last dose 7/12/2021      Oncologic History:  Primary Outpatient Hematology/Oncology Provider: [facility]    Oncology History   Adenocarcinoma of pancreas ([facility] [address] )   3/10/2021 Initial Diagnosis    Adenocarcinoma of pancreas ([facility] [address] )     3/23/2021 -  Chemotherapy    pegfilgrastim (Racheal Dial), 6 mg, Subcutaneous, Once, 3 of 9 cycles  Administration: 6 mg (6/21/2021)  paclitaxel protein-bound (ABRAXANE) IVPB, 125 mg/m2 = 235 mg, Intravenous, Once, 2 of 2 cycles  Dose modification: 100 mg/m2 (original dose 125 mg/m2, Cycle 2, Reason: Dose Not Tolerated)  Administration: 235 mg (3/23/2021), 235 mg (3/30/2021), 235 mg (4/6/2021), 188 mg (4/20/2021), 188 mg (5/4/2021), 188 mg (4/27/2021)  gemcitabine (GEMZAR) infusion, 1,879 8 mg, Intravenous, Once, 4 of 10 cycles  Dose modification: 800 mg/m2 (original dose 1,000 mg/m2, Cycle 2, Reason: Dose Not Tolerated)  Administration: 1,800 mg (3/23/2021), 1,800 mg (3/30/2021), 1,800 mg (4/6/2021), 1,504 2 mg (4/20/2021), 1,504 2 mg (5/4/2021), 1,504 2 mg (4/27/2021), 1,504 2 mg (6/7/2021), 1,504 2 mg (6/14/2021), 1,504 2 mg (6/21/2021), 1,504 2 mg (7/6/2021), 1,504 2 mg (7/12/2021)           Interval history:  Patient seen and examined  He appears very weak and fatigued  He is endorsing chills  He is complaining of right shoulder pain and weakness in his right arm stating he is so weak he cannot even lift cup of water to his mouth to drink  Patient states he has been feeling well up until about a day ago and feels as though he has been tolerating his chemotherapy without much difficulty  He reports feeling constipated, he states his last bowel movement was approximately 4 days ago  He denies any nausea/vomiting  Review of Systems   Constitutional: Positive for activity change, chills and fatigue  Gastrointestinal: Positive for constipation  Musculoskeletal: Positive for arthralgias (right shoulder pain ) and gait problem  Skin: Positive for pallor  Neurological: Positive for weakness  Hematological: Bruises/bleeds easily  All other systems reviewed and are negative  All other review of systems were negative      Past medical history:   Past Medical History:   Diagnosis Date    Cancer [facility])     pancreatic    Diabetes mellitus ([facility] [address] )     Frequent urination     GERD (gastroesophageal reflux disease)     Hyperlipidemia     Hypertension     Peripheral neuropathy     Weakness     Weight loss        Past surgical history:   Past Surgical History:   Procedure Laterality Date    APPENDECTOMY      CATARACT EXTRACTION      COLONOSCOPY      SKIN LESION EXCISION      of a wart on right arm    TUNNELED VENOUS PORT PLACEMENT N/A 3/26/2021    Procedure: INSERTION VENOUS PORT (PORT-A-CATH);   Surgeon: Johnathan Camarillo MD;  Location:  MAIN OR;  Service: Surgical Oncology       Allergies: No Known Allergies    Home medications:   Medications Prior to Admission   Medication    atorvastatin (LIPITOR) 40 mg tablet    escitalopram (LEXAPRO) 5 mg tablet    HYDROcodone-acetaminophen (NORCO) 5-325 mg per tablet    HYDROcodone-acetaminophen (NORCO) 5-325 mg per tablet    metFORMIN (GLUCOPHAGE) 850 mg tablet    predniSONE 10 mg tablet    ramipril (ALTACE) 10 MG capsule    Accu-Chek FastClix Lancets [facility]    Accu-Chek Guide test strip    acetaminophen (TYLENOL) 325 mg tablet    aspirin 81 mg chewable tablet    Blood Glucose Monitoring Suppl (Accu-Chek Guide) w/Device KIT    insulin glargine (Lantus SoloStar) 100 units/mL injection pen    NovoLOG FlexPen 100 units/mL injection pen    prochlorperazine (COMPAZINE) 10 mg tablet    ReliOn Pen Needles 31G X 6 MM MISC    rivaroxaban (XARELTO) 15 mg tablet    sodium chloride (GRAHAM 128) 5 % hypertonic ophthalmic solution    sodium chloride 1 g tablet    traMADol (ULTRAM) 50 mg tablet       Hospital medications:   Current Facility-Administered Medications:     acetaminophen (TYLENOL) tablet 650 mg, 650 mg, Oral, Q4H PRN, Rachael VAUGHN PA-C    aspirin chewable tablet 81 mg, 81 mg, Oral, Daily, Rachael VAUGHN PA-C    atorvastatin (LIPITOR) tablet 40 mg, 40 mg, Oral, HS, Aide Partida MD    escitalopram (LEXAPRO) tablet 5 mg, 5 mg, Oral, Daily, Rachael VAUGHN PA-C, 5 mg at 07/14/21 1355    insulin glargine (LANTUS) subcutaneous injection 8 Units 0 08 mL, 8 Units, Subcutaneous, HS, Rachael VAUGHN PA-C    multi-electrolyte (PLASMALYTE-A/ISOLYTE-S PH 7 4) IV solution, 75 mL/hr, Intravenous, Continuous, Rachael VAUGHN PA-C, Last Rate: 75 mL/hr at 07/14/21 1340, 75 mL/hr at 07/14/21 1340    polyethylene glycol (MIRALAX) packet 17 g, 17 g, Oral, Daily, Rachael VAUGHN PA-C, 17 g at 07/14/21 1355    [START ON 7/16/2021] predniSONE tablet 10 mg, 10 mg, Oral, Daily, Rachael VAUGHN PA-C    [START ON 7/15/2021] predniSONE tablet 20 mg, 20 mg, Oral, Daily, Rachael VAUGHN PA-C    prochlorperazine (COMPAZINE) tablet 10 mg, 10 mg, Oral, Q6H PRN, Rachael VAUGHN PA-C    rivaroxaban (XARELTO) tablet 15 mg, 15 mg, Oral, Daily With Dinner, Rachael VAUGHN PA-C    sodium chloride tablet 1 g, 1 g, Oral, TID With Meals, [address] [place] [address] DANTE VAUGHN, 1 g at 07/14/21 1355    traMADol (ULTRAM) tablet 50 mg, 50 mg, Oral, Q6H PRN, Zac Minaya PA-C    Social history:   Social History     Tobacco Use    Smoking status: Former Smoker     Types: Pipe     Quit date: 2/23/2011     Years since quitting: 10 3    Smokeless tobacco: Never Used   Vaping Use    Vaping Use: Never used   Substance Use Topics    Alcohol use: Never    Drug use: Never       Family history: History reviewed  No pertinent family history  Vitals:  Vitals:    07/14/21 1300   BP: 125/60   Pulse: 78   Resp: 17   Temp: 98 4 °F (36 9 °C)   SpO2:        Physical Exam  Constitutional:       Appearance: He is ill-appearing (Chronically ill)  Comments: Alert, pleasant, patient appears weak, frail  No acute distress  He is endorsing chills and weakness   HENT:      Head: Normocephalic and atraumatic  Mouth/Throat:      Pharynx: No oropharyngeal exudate  Eyes:      General: No scleral icterus  Right eye: No discharge  Left eye: No discharge  Cardiovascular:      Rate and Rhythm: Normal rate and regular rhythm     Pulmonary: Effort: Pulmonary effort is normal  No respiratory distress  Abdominal:      General: There is no distension  Palpations: Abdomen is soft  Tenderness: There is no abdominal tenderness  Musculoskeletal:      Cervical back: Normal range of motion  Right lower leg: No edema  Left lower leg: No edema  Skin:     Coloration: Skin is pale  Neurological:      General: No focal deficit present  Mental Status: He is alert and oriented to person, place, and time  Psychiatric:         Mood and Affect: Mood is depressed           Behavior: Behavior normal          Recent Results (from the past 48 hour(s))   CBC and differential    Collection Time: 07/14/21  8:58 AM   Result Value Ref Range    WBC 18 84 (H) 4 31 - 10 16 Thousand/uL    RBC 2 21 (L) 3 88 - 5 62 Million/uL    Hemoglobin 7 8 (L) 12 0 - 17 0 g/dL    Hematocrit 23 7 (L) 36 5 - 49 3 %     (H) 82 - 98 fL    MCH 35 3 (H) 26 8 - 34 3 pg    MCHC 32 9 31 4 - 37 4 g/dL    RDW 15 6 (H) 11 6 - 15 1 %    MPV 9 4 8 9 - 12 7 fL    Platelets 605 825 - 225 Thousands/uL    nRBC 0 /100 WBCs    Neutrophils Relative 95 (H) 43 - 75 %    Immat GRANS % 1 0 - 2 %    Lymphocytes Relative 3 (L) 14 - 44 %    Monocytes Relative 1 (L) 4 - 12 %    Eosinophils Relative 0 0 - 6 %    Basophils Relative 0 0 - 1 %    Neutrophils Absolute 17 90 (H) 1 85 - 7 62 Thousands/µL    Immature Grans Absolute 0 13 0 00 - 0 20 Thousand/uL    Lymphocytes Absolute 0 51 (L) 0 60 - 4 47 Thousands/µL    Monocytes Absolute 0 20 0 17 - 1 22 Thousand/µL    Eosinophils Absolute 0 08 0 00 - 0 61 Thousand/µL    Basophils Absolute 0 02 0 00 - 0 10 Thousands/µL   Protime-INR    Collection Time: 07/14/21  8:58 AM   Result Value Ref Range    Protime 18 4 (H) 11 6 - 14 5 seconds    INR 1 54 (H) 0 84 - 1 19   APTT    Collection Time: 07/14/21  8:58 AM   Result Value Ref Range    PTT 25 23 - 37 seconds   Troponin I    Collection Time: 07/14/21  8:58 AM   Result Value Ref Range    Troponin I <0 02 <=0 04 ng/mL   Comprehensive metabolic panel    Collection Time: 07/14/21  8:58 AM   Result Value Ref Range    Sodium 133 (L) 136 - 145 mmol/L    Potassium 4 4 3 5 - 5 3 mmol/L    Chloride 100 100 - 108 mmol/L    CO2 21 21 - 32 mmol/L    ANION GAP 12 4 - 13 mmol/L    BUN 35 (H) 5 - 25 mg/dL    Creatinine 1 00 0 60 - 1 30 mg/dL    Glucose 101 65 - 140 mg/dL    Calcium 8 4 8 3 - 10 1 mg/dL    Corrected Calcium 9 3 8 3 - 10 1 mg/dL    AST 13 5 - 45 U/L    ALT 19 12 - 78 U/L    Alkaline Phosphatase 89 46 - 116 U/L    Total Protein 5 8 (L) 6 4 - 8 2 g/dL    Albumin 2 9 (L) 3 5 - 5 0 g/dL    Total Bilirubin 1 00 0 20 - 1 00 mg/dL    eGFR 73 ml/min/1 73sq m   POCT Blood Gas (CG8+)    Collection Time: 07/14/21  9:14 AM   Result Value Ref Range    ph, Jose E ISTAT 7 398 7 300 - 7 400    pCO2, Jose E i-STAT 33 1 (L) 42 0 - 50 0 mm HG    pO2, Jose E i-STAT 22 0 (L) 35 0 - 45 0 mm HG    BE, i-STAT -4 (L) -2 - 3 mmol/L    HCO3, Jose E i-STAT 20 4 (L) 24 0 - 30 0 mmol/L    CO2, i-STAT 21 21 - 32 mmol/L    O2 Sat, i-STAT 38 (L) 60 - 85 %    SODIUM, I-STAT 133 (L) 136 - 145 mmol/l    Potassium, i-STAT 4 2 3 5 - 5 3 mmol/L    Calcium, Ionized i-STAT 1 20 1  12 - 1 32 mmol/L    Hct, i-STAT 25 (L) 36 5 - 49 3 %    Hgb, i-STAT 8 5 (L) 12 0 - 17 0 g/dl    Glucose, i-STAT 98 65 - 140 mg/dl    Specimen Type VENOUS    Lactic acid    Collection Time: 07/14/21 10:14 AM   Result Value Ref Range    LACTIC ACID 3 9 (HH) 0 5 - 2 0 mmol/L   UA w Reflex to Microscopic w Reflex to Culture    Collection Time: 07/14/21 11:37 AM    Specimen: Urine, Straight Cath   Result Value Ref Range    Color, UA Yellow     Clarity, UA Clear     Specific Gravity, UA 1 020 1 003 - 1 030    pH, UA 6 0 4 5, 5 0, 5 5, 6 0, 6 5, 7 0, 7 5, 8 0    Leukocytes, UA Negative Negative    Nitrite, UA Negative Negative    Protein, UA Negative Negative mg/dl    Glucose,  (1/10%) (A) Negative mg/dl    Ketones, UA Trace (A) Negative mg/dl    Urobilinogen, UA 1 0 0 2, 1 0 E U /dl E U /dl    Bilirubin, UA Negative Negative    Blood, UA Trace-Intact (A) Negative   Urine Microscopic    Collection Time: 07/14/21 11:37 AM   Result Value Ref Range    RBC, UA 2-4 None Seen, 0-1, 1-2, 2-4, 0-5 /hpf    WBC, UA None Seen None Seen, 0-1, 1-2, 0-5, 2-4 /hpf    Epithelial Cells None Seen None Seen, Occasional /hpf    Bacteria, UA Occasional None Seen, Occasional /hpf   Lactic acid 2 Hours    Collection Time: 07/14/21 11:39 AM   Result Value Ref Range    LACTIC ACID 2 2 (HH) 0 5 - 2 0 mmol/L       Imaging Studies:   XR Trauma chest portable    Result Date: 7/14/2021  Narrative: CHEST INDICATION:   TRAUMA  COMPARISON:  5/30/2021 EXAM PERFORMED/VIEWS:  XR CHEST PORTABLE Single view FINDINGS: Right-sided port terminating mid SVC Cardiomediastinal silhouette appears unremarkable  The lungs are clear  No pneumothorax or pleural effusion  Osseous structures appear within normal limits for patient age  Impression: No acute cardiopulmonary disease  Findings are stable Workstation performed: [id number]     TRAUMA - CT head wo contrast    Result Date: 7/14/2021  Narrative: CT BRAIN - WITHOUT CONTRAST INDICATION:   TRAUMA  COMPARISON:  5/30/2021 TECHNIQUE:  CT examination of the brain was performed  In addition to axial images, sagittal and coronal 2D reformatted images were created and submitted for interpretation  Radiation dose length product (DLP) for this visit:  876 mGy-cm   This examination, like all CT scans performed in the [facility], was performed utilizing techniques to minimize radiation dose exposure, including the use of iterative reconstruction and automated exposure control  IMAGE QUALITY:  Diagnostic  FINDINGS: PARENCHYMA: Decreased attenuation is noted in periventricular and subcortical white matter demonstrating an appearance that is statistically most likely to represent mild microangiopathic change  Mild cerebral volume loss  No CT signs of acute infarction    No intracranial mass, mass effect or midline shift  No acute parenchymal hemorrhage  Mild vascular calcification of the distal vertebral arteries and cavernous internal carotid arteries  VENTRICLES AND EXTRA-AXIAL SPACES:  Normal for the patient's age  VISUALIZED ORBITS AND PARANASAL SINUSES:  Unremarkable  CALVARIUM AND EXTRACRANIAL SOFT TISSUES:  Normal      Impression: No acute intracranial abnormality  Stable microangiopathic changes within the brain  Workstation performed: [id number]     TRAUMA - CT spine cervical wo contrast    Result Date: 7/14/2021  Narrative: CT CERVICAL SPINE - WITHOUT CONTRAST INDICATION:   TRAUMA  COMPARISON:  None  TECHNIQUE:  CT examination of the cervical spine was performed without intravenous contrast   Contiguous axial images were obtained  Sagittal and coronal reconstructions were performed  Radiation dose length product (DLP) for this visit:  355 mGy-cm   This examination, like all CT scans performed in the [facility], was performed utilizing techniques to minimize radiation dose exposure, including the use of iterative reconstruction and automated exposure control  IMAGE QUALITY:  Diagnostic  FINDINGS: ALIGNMENT:  Normal alignment of the cervical spine  No subluxation  VERTEBRAL BODIES:  No fracture  DEGENERATIVE CHANGES:  Mild cervical degenerative change  No significant central canal stenosis  PREVERTEBRAL AND PARASPINAL SOFT TISSUES:  Vascular calcifications of the distal common carotid artery and proximal cervical internal carotid artery  Probable moderate to high-grade stenosis of the right ICA origin based on the degree of calcification  1 3 cm low-density nodule within the right lobe of the thyroid gland  THORACIC INLET:  Unremarkable  Impression: No acute osseous abnormality  Mild cervical degenerative change  Workstation performed: [id number]       Counseling / Coordination of Care  Total floor / unit time spent today 90 minutes   Greater than 50% of total time was spent with the patient and / or family counseling and / or coordination of care  A description of the counseling / coordination of care:  Chart review, discussion with primary medical team, discussion with palliative care provider, bedside assessment of patient and symptoms, discussion regarding treatment options/goals of care  Patient does endorse his desire to continue with treatment at this time  JOI Kirkland    Please note: This report has been generated by voice recognition software system  Therefore, there may be syntax, spelling and/or grammatical errors  Please call if you have any questions

## 2021-07-14 NOTE — ASSESSMENT & PLAN NOTE
· Follows with Dr Garcia Her outpatient  · Undergoing chemotherapy  · Have asked Oncology to evaluate here  · Question of progressive weakness may be related to progression of disease vs chemo  · Palliative Consulted  · Dosage has been decreased in the past due to poor toleration

## 2021-07-14 NOTE — ASSESSMENT & PLAN NOTE
· Managed on ramipril outpatient  · Hold for now given hypotenstion on admission  · Consider resuming tomorrow if blood pressure remains stable

## 2021-07-15 PROBLEM — Z71.89 GOALS OF CARE, COUNSELING/DISCUSSION: Status: ACTIVE | Noted: 2021-01-01

## 2021-07-15 NOTE — ASSESSMENT & PLAN NOTE
· Acute on chronic, no evidence of active bleeding  · Hemoglobin noted to be 6 5 this a m    · Received 1 unit PRBCs, hemodynamically stable  · Trend H/H

## 2021-07-15 NOTE — CONSULTS
Consultation - General Surgery   Di Bowling 67 y o  male MRN: [id number]  Unit/Bed#: -01 Encounter: [id number]     Assessment/Plan   26-year-old male with history of pancreatic cancer diagnosed in February of 2021  Follows with Dr Shamar Santiago  Patient being treated with chemotherapy  Regimen has been adjusted due to his declining status  He has had multiple recent hospitalizations      Patient presented to  Emergency department last evening with complaints of generalized weakness and falling  CT the head was negative  Patient found to have significant leukocytosis and hypotension  Workup in progress       CT of the abdomen pelvis shows evidence of disease progression as below  IMPRESSION:  1   Mild interval increase in size of cystic lesion in the head and uncinate process of the pancreas measuring 1 5 cm   Stable severe pancreatic duct dilatation  2   Decreased gas and inflammation between the medial border of the pylorus and head of the pancreas   Persistent inflammation along the medial aspect of the pylorus and proximal 2nd portion of the duodenum  3   1 3 cm low-attenuation, possibly cystic lesion or fluid collection along the medial border of the pylorus   Possible contained perforation or cystic metastasis  4   Constipation      Consulted  for highlighted above findings        On exam patient denies abdominal pain  States he feels well today  He is eating a cheeseburger  Abdomen is nondistended and nontender      Plan:    Feel that gastric perforation is unlikely as patient denies abdominal pain and is tolerating a full diet  Patient should continue follow-up with surgical Oncology  patient has been followed in the past by Dr Zelaya Saliva  Findings on CT likely related to disease progression    If concerns for possible perforation can further evaluate with upper GI or EGD       Hospice should be considered for this patient with disease progression and declining performance status         Consults     Review of Systems   Constitutional: Positive for chills  Negative for appetite change, fatigue and unexpected weight change  HENT: Negative  Eyes: Negative  Respiratory: Negative  Negative for cough and shortness of breath  Cardiovascular: Negative  Negative for chest pain and palpitations  Gastrointestinal: Positive for abdominal pain and constipation  Negative for blood in stool, nausea and vomiting  Endocrine: Negative  Genitourinary: Negative  Negative for difficulty urinating and dysuria  Musculoskeletal: Negative  Skin: Negative  Negative for rash and wound  Allergic/Immunologic: Negative  Neurological: Positive for dizziness and weakness  Hematological: Bruises/bleeds easily  Psychiatric/Behavioral: Negative  All other systems reviewed and are negative         Historical Information         Medical History        Past Medical History:   Diagnosis Date    Cancer [facility])       pancreatic    Diabetes mellitus ([facility] [place] [address] )      Frequent urination      GERD (gastroesophageal reflux disease)      Hyperlipidemia      Hypertension      Peripheral neuropathy      Weakness      Weight loss           Surgical History         Past Surgical History:   Procedure Laterality Date    APPENDECTOMY        CATARACT EXTRACTION        COLONOSCOPY        SKIN LESION EXCISION         of a wart on right arm    TUNNELED VENOUS PORT PLACEMENT N/A 3/26/2021     Procedure: INSERTION VENOUS PORT (PORT-A-CATH);   Surgeon: David Jacob MD;  Location:  MAIN OR;  Service: Surgical Oncology         Social History         Social History          Substance and Sexual Activity   Alcohol Use Never      Social History          Substance and Sexual Activity   Drug Use Never            E-Cigarette/Vaping    E-Cigarette Use Never User              E-Cigarette/Vaping Substances    Nicotine No      THC No      CBD No      Flavoring No      Other No      Unknown No      Social History           Tobacco Use   Smoking Status Former Smoker    Types: Pipe    Quit date: 2/23/2011    Years since quitting: 10 3   Smokeless Tobacco Never Used      Family History: non-contributory     Meds/Allergies   all current active meds have been reviewed  No Known Allergies     Objective   First Vitals:   Blood Pressure: 123/60 (07/14/21 0853)  Pulse: 96 (07/14/21 0853)  Temperature: 98 5 °F (36 9 °C) (07/14/21 0849)  Temp Source: Temporal (07/14/21 0849)  Respirations: 18 (07/14/21 0853)  Height: 5' 10" (177 8 cm) (07/14/21 1500)  Weight - Scale: 66 8 kg (147 lb 4 3 oz) (07/14/21 0853)  SpO2: 100 % (07/14/21 0853)     Current Vitals:   Blood Pressure: (!) 171/75 (07/15/21 1115)  Pulse: 72 (07/15/21 1115)  Temperature: 97 8 °F (36 6 °C) (07/15/21 1115)  Temp Source: Oral (07/15/21 0700)  Respirations: 22 (07/15/21 1115)  Height: 5' 10" (177 8 cm) (07/14/21 1500)  Weight - Scale: 66 7 kg (147 lb) (07/14/21 1500)  SpO2: 99 % (07/15/21 0700)        Intake/Output Summary (Last 24 hours) at 7/15/2021 1302  Last data filed at 7/15/2021 1115  Gross per 24 hour   Intake 956 ml   Output 2425 ml   Net -1469 ml             Invasive Devices            Central Venous Catheter Line                     Port A Cath 03/26/21 Right Subclavian 111 days                   Peripheral Intravenous Line                     Peripheral IV 07/14/21 Right Antecubital <1 day      Peripheral IV 07/15/21 Left Wrist <1 day                     Physical Exam  Constitutional:       General: He is not in acute distress  Appearance: He is well-developed  He is not diaphoretic  Comments: Elderly male, non-toxic   HENT:      Head: Normocephalic and atraumatic  Mouth/Throat:      Pharynx: No oropharyngeal exudate  Eyes:      General: No scleral icterus  Right eye: No discharge  Left eye: No discharge  Neck:      Thyroid: No thyromegaly  Vascular: No JVD  Trachea: No tracheal deviation  Comments: Trachea midline    Cardiovascular:      Rate and Rhythm: Normal rate and regular rhythm  Heart sounds: Normal heart sounds  No murmur heard  Pulmonary:      Effort: Pulmonary effort is normal  No respiratory distress  Breath sounds: Normal breath sounds  No wheezing  Abdominal:      General: Bowel sounds are normal  There is no distension  Palpations: Abdomen is soft  Tenderness: There is no abdominal tenderness  Musculoskeletal:         General: No deformity  Normal range of motion  Cervical back: Normal range of motion and neck supple  Skin:     General: Skin is warm and dry  Coloration: Skin is pale  Findings: No rash  Neurological:      Mental Status: He is alert and oriented to person, place, and time  Comments: No focal deficits   Psychiatric:         Behavior: Behavior normal             Lab Results:   I have personally reviewed pertinent lab results  , CBC:         Lab Results   Component Value Date     WBC 13 19 (H) 07/15/2021     HGB 6 5 (LL) 07/15/2021     HCT 19 5 (L) 07/15/2021      (H) 07/15/2021      07/15/2021     MCH 34 8 (H) 07/15/2021     MCHC 33 3 07/15/2021     RDW 15 7 (H) 07/15/2021     MPV 9 9 07/15/2021     NRBC 0 07/15/2021   , CMP:         Lab Results   Component Value Date     SODIUM 130 (L) 07/15/2021     K 3 6 07/15/2021      07/15/2021     CO2 21 07/15/2021     BUN 27 (H) 07/15/2021     CREATININE 0 69 07/15/2021     CALCIUM 7 7 (L) 07/15/2021     AST 14 07/15/2021     ALT 19 07/15/2021     ALKPHOS 75 07/15/2021     EGFR 92 07/15/2021   , Coagulation: No results found for: PT, INR, APTT, Urinalysis: No results found for: COLORU, CLARITYU, SPECGRAV, PHUR, LEUKOCYTESUR, NITRITE, PROTEINUA, GLUCOSEU, KETONESU, BILIRUBINUR, BLOODU, Amylase: No results found for: AMYLASE, Lipase: No results found for: LIPASE  Imaging: I have personally reviewed pertinent reports       As above  EKG, Pathology, and Other Studies: I have personally reviewed pertinent reports        David Dumont PA-C               Revision History

## 2021-07-15 NOTE — PROGRESS NOTES
Pastoral Care Progress Note    7/15/2021  Patient: Geoff Ceja :   Admission Date & Time: 2021 0842  MRN: [id number] CSN: [id number]                     Chaplaincy Interventions Utilized:   Relationship Building: Cultivated a relationship of care and support  The patient said that his pancreatic cancer was spreading and kidneys were slowing down  He was tearful  He said he felt alone and was scared of the time between now and dying  We talked about his janneth and the help and support that God as well as his family and janneth community can provide      Ritual: Provided prayer     07/15/21 1500   Clinical Encounter Type   Visited With Patient   Routine Visit Introduction   Referral From Physician   Referral To [address] [facility]eds Prayer

## 2021-07-15 NOTE — PLAN OF CARE
Problem: OCCUPATIONAL THERAPY ADULT  Goal: Performs self-care activities at highest level of function for planned discharge setting  See evaluation for individualized goals  Description: Treatment Interventions: ADL retraining, Functional transfer training, Endurance training, Patient/family training, Compensatory technique education          See flowsheet documentation for full assessment, interventions and recommendations  7/15/2021 1642 by Caro Espana OT  Note: Limitation: Decreased ADL status, Decreased self-care trans, Decreased high-level ADLs, Decreased endurance  Prognosis: Fair  Assessment: Pt is a 67 y o  male seen for OT evaluation at [address] [place] [address], admitted 7/14/2021 w/ Generalized weakness  OT completed extensive review of pt's medical and social history  Comorbidities affecting pt's functional performance at time of assessment include: HTN, pancreatic ca,chronic sacral wound, episodes of staring, and hyponatremia  Personal factors affecting pt at time of IE include:limited home support, difficulty performing ADLS, difficulty performing IADLS  and decreased functional mobility  Pt with active OT orders  Prior to admission, pt was living with cousin in house with 1st floor set-up  Pt required assist with ADLS and IADLS, (+) drove, & required no use of DME PTA  Upon evaluation: Pt requires min A x 1 for functional mobility/transfers, superivsion for UB ADLs and mod A for LB ADLS 2* the following deficits impacting occupational performance: weakness, decreased strength, decreased balance and decreased tolerance  Pt to benefit from continued skilled OT tx while in the hospital to address deficits as defined above and maximize level of functional independence w ADL's and functional mobility  Occupational Performance areas to address include: bathing/shower, toilet hygiene, dressing and functional mobility  Based on findings, pt is of high complexity   The patient's raw score on the AM-PAC Daily Activity inpatient short form is 17, standardized score is 37 26, less than 39 4  Patients at this level are likely to benefit from DC to post-acute rehabilitation services  Please refer to the recommendation of the Occupational Therapist for safe DC planning  At this time, OT recommendations at time of discharge are short term rehab  OT Discharge Recommendation: Post acute rehabilitation services       7/15/2021 1642 by Manoj Adam OT  Note: Limitation: Decreased ADL status, Decreased self-care trans, Decreased high-level ADLs, Decreased endurance  Prognosis: Fair  Assessment: Pt is a 67 y o  male seen for OT evaluation at [facility], admitted 7/14/2021 w/ Generalized weakness  OT completed extensive review of pt's medical and social history  Comorbidities affecting pt's functional performance at time of assessment include: HTN, pancreatic ca,chronic sacral wound, episodes of staring, and hyponatremia  Personal factors affecting pt at time of IE include:limited home support, difficulty performing ADLS, difficulty performing IADLS  and decreased functional mobility  Pt with active OT orders  Prior to admission, pt was living with cousin in house with 1st floor set-up  Pt required assist with ADLS and IADLS, (+) drove, & required no use of DME PTA  Upon evaluation: Pt requires min A x 1 for functional mobility/transfers, superivsion for UB ADLs and mod A for LB ADLS 2* the following deficits impacting occupational performance: weakness, decreased strength, decreased balance and decreased tolerance  Pt to benefit from continued skilled OT tx while in the hospital to address deficits as defined above and maximize level of functional independence w ADL's and functional mobility  Occupational Performance areas to address include: bathing/shower, toilet hygiene, dressing and functional mobility  Based on findings, pt is of high complexity   The patient's raw score on the AM-PAC Daily Activity inpatient short form is 17, standardized score is 37 26, less than 39 4  Patients at this level are likely to benefit from DC to post-acute rehabilitation services  Please refer to the recommendation of the Occupational Therapist for safe DC planning  At this time, OT recommendations at time of discharge are short term rehab       OT Discharge Recommendation: Post acute rehabilitation services

## 2021-07-15 NOTE — QUICK NOTE
Consultation - General Surgery   Scott Murphy 67 y o  male MRN: [id number]  Unit/Bed#: -01 Encounter: [id number]    Assessment/Plan   18-year-old male with history of pancreatic cancer diagnosed in February of 2021  Follows with Dr Jovita Bennett  Patient being treated with chemotherapy  Regimen has been adjusted due to his declining status  He has had multiple recent hospitalizations  Patient presented to  Emergency department last evening with complaints of generalized weakness and falling  CT the head was negative  Patient found to have significant leukocytosis and hypotension  Workup in progress  CT of the abdomen pelvis shows evidence of disease progression as below  IMPRESSION:  1  Mild interval increase in size of cystic lesion in the head and uncinate process of the pancreas measuring 1 5 cm  Stable severe pancreatic duct dilatation  2   Decreased gas and inflammation between the medial border of the pylorus and head of the pancreas  Persistent inflammation along the medial aspect of the pylorus and proximal 2nd portion of the duodenum  3   1 3 cm low-attenuation, possibly cystic lesion or fluid collection along the medial border of the pylorus  Possible contained perforation or cystic metastasis  4   Constipation  Consulted  for highlighted above findings  On exam patient denies abdominal pain  States he feels well today  He is eating a cheeseburger  Abdomen is nondistended and nontender  Plan:    Feel that gastric perforation is unlikely as patient denies abdominal pain and is tolerating a full diet  Patient should continue follow-up with surgical Oncology  patient has been followed in the past by Dr Ginny Jeff  Findings on CT likely related to disease progression  If concerns for possible perforation can further evaluate with upper GI or EGD       Hospice should be considered for this patient with disease progression and declining performance status  Consults    Review of Systems   Constitutional: Positive for chills  Negative for appetite change, fatigue and unexpected weight change  HENT: Negative  Eyes: Negative  Respiratory: Negative  Negative for cough and shortness of breath  Cardiovascular: Negative  Negative for chest pain and palpitations  Gastrointestinal: Positive for abdominal pain and constipation  Negative for blood in stool, nausea and vomiting  Endocrine: Negative  Genitourinary: Negative  Negative for difficulty urinating and dysuria  Musculoskeletal: Negative  Skin: Negative  Negative for rash and wound  Allergic/Immunologic: Negative  Neurological: Positive for dizziness and weakness  Hematological: Bruises/bleeds easily  Psychiatric/Behavioral: Negative  All other systems reviewed and are negative  Historical Information   Past Medical History:   Diagnosis Date    Cancer [facility])     pancreatic    [facility] mellitus ([facility] [address] )     Frequent urination     GERD (gastroesophageal reflux disease)     Hyperlipidemia     Hypertension     Peripheral neuropathy     Weakness     Weight loss      Past Surgical History:   Procedure Laterality Date    APPENDECTOMY      CATARACT EXTRACTION      COLONOSCOPY      SKIN LESION EXCISION      of a wart on right arm    TUNNELED VENOUS PORT PLACEMENT N/A 3/26/2021    Procedure: INSERTION VENOUS PORT (PORT-A-CATH);   Surgeon: Juan Naidu MD;  Location: [facility];  Service: Surgical Oncology     Social History   Social History     Substance and Sexual Activity   Alcohol Use Never     Social History     Substance and Sexual Activity   Drug Use Never     E-Cigarette/Vaping    E-Cigarette Use Never User      E-Cigarette/Vaping Substances    Nicotine No     THC No     CBD No     Flavoring No     Other No     Unknown No      Social History     Tobacco Use   Smoking Status Former Smoker    Types: Pipe    Quit date: 2/23/2011    Years since quitting: 10 3   Smokeless Tobacco Never Used     Family History: non-contributory    Meds/Allergies   all current active meds have been reviewed  No Known Allergies    Objective   First Vitals:   Blood Pressure: 123/60 (07/14/21 0853)  Pulse: 96 (07/14/21 0853)  Temperature: 98 5 °F (36 9 °C) (07/14/21 0849)  Temp Source: Temporal (07/14/21 0849)  Respirations: 18 (07/14/21 0853)  Height: 5' 10" (177 8 cm) (07/14/21 1500)  Weight - Scale: 66 8 kg (147 lb 4 3 oz) (07/14/21 0853)  SpO2: 100 % (07/14/21 0853)    Current Vitals:   Blood Pressure: (!) 171/75 (07/15/21 1115)  Pulse: 72 (07/15/21 1115)  Temperature: 97 8 °F (36 6 °C) (07/15/21 1115)  Temp Source: Oral (07/15/21 0700)  Respirations: 22 (07/15/21 1115)  Height: 5' 10" (177 8 cm) (07/14/21 1500)  Weight - Scale: 66 7 kg (147 lb) (07/14/21 1500)  SpO2: 99 % (07/15/21 0700)      Intake/Output Summary (Last 24 hours) at 7/15/2021 1302  Last data filed at 7/15/2021 1115  Gross per 24 hour   Intake 956 ml   Output 2425 ml   Net -1469 ml       Invasive Devices     Central Venous Catheter Line            Port A Cath 03/26/21 Right Subclavian 111 days          Peripheral Intravenous Line            Peripheral IV 07/14/21 Right Antecubital <1 day    Peripheral IV 07/15/21 Left Wrist <1 day                Physical Exam  Constitutional:       General: He is not in acute distress  Appearance: He is well-developed  He is not diaphoretic  Comments: Elderly male, non-toxic   HENT:      Head: Normocephalic and atraumatic  Mouth/Throat:      Pharynx: No oropharyngeal exudate  Eyes:      General: No scleral icterus  Right eye: No discharge  Left eye: No discharge  Neck:      Thyroid: No thyromegaly  Vascular: No JVD  Trachea: No tracheal deviation  Comments: Trachea midline    Cardiovascular:      Rate and Rhythm: Normal rate and regular rhythm  Heart sounds: Normal heart sounds  No murmur heard       Pulmonary:      Effort: Pulmonary effort is normal  No respiratory distress  Breath sounds: Normal breath sounds  No wheezing  Abdominal:      General: Bowel sounds are normal  There is no distension  Palpations: Abdomen is soft  Tenderness: There is no abdominal tenderness  Musculoskeletal:         General: No deformity  Normal range of motion  Cervical back: Normal range of motion and neck supple  Skin:     General: Skin is warm and dry  Coloration: Skin is pale  Findings: No rash  Neurological:      Mental Status: He is alert and oriented to person, place, and time  Comments: No focal deficits   Psychiatric:         Behavior: Behavior normal          Lab Results:   I have personally reviewed pertinent lab results  , CBC:   Lab Results   Component Value Date    WBC 13 19 (H) 07/15/2021    HGB 6 5 (LL) 07/15/2021    HCT 19 5 (L) 07/15/2021     (H) 07/15/2021     07/15/2021    MCH 34 8 (H) 07/15/2021    MCHC 33 3 07/15/2021    RDW 15 7 (H) 07/15/2021    MPV 9 9 07/15/2021    NRBC 0 07/15/2021   , CMP:   Lab Results   Component Value Date    SODIUM 130 (L) 07/15/2021    K 3 6 07/15/2021     07/15/2021    CO2 21 07/15/2021    BUN 27 (H) 07/15/2021    CREATININE 0 69 07/15/2021    CALCIUM 7 7 (L) 07/15/2021    AST 14 07/15/2021    ALT 19 07/15/2021    ALKPHOS 75 07/15/2021    EGFR 92 07/15/2021   , Coagulation: No results found for: PT, INR, APTT, Urinalysis: No results found for: COLORU, CLARITYU, SPECGRAV, PHUR, LEUKOCYTESUR, NITRITE, PROTEINUA, GLUCOSEU, KETONESU, BILIRUBINUR, BLOODU, Amylase: No results found for: AMYLASE, Lipase: No results found for: LIPASE  Imaging: I have personally reviewed pertinent reports  As above  EKG, Pathology, and Other Studies: I have personally reviewed pertinent reports        Krery Mcdaniel PA-C

## 2021-07-15 NOTE — UTILIZATION REVIEW
Initial Clinical Review    Admission: Date/Time/Statement:   Admission Order: Observation 7/14 @ 1201 and changed to Inpatient on 7/15 @ 1541  Pt requiring continued stay for Dizziness/ Anemia/ Hyponatremia and treatment  Ordered        07/15/21 1541  Inpatient Admission  Once         07/14/21 1201  Place in Observation  Once                   Orders Placed This Encounter   Procedures    Inpatient Admission     Standing Status:   Standing     Number of Occurrences:   1     Order Specific Question:   Level of Care     Answer:   Med Surg [16]     Order Specific Question:   Estimated length of stay     Answer:   More than 2 Midnights     Order Specific Question:   Certification     Answer:   I certify that inpatient services are medically necessary for this patient for a duration of greater than two midnights  See H&P and MD Progress Notes for additional information about the patient's course of treatment  ED Arrival Information     Expected Arrival Acuity    - 7/14/2021 08:40 Emergent         Means of arrival Escorted by Service Admission type    Wheelchair Family Member  Song Darnell) General Medicine Emergency         Arrival complaint    Fall        Chief Complaint   Patient presents with   Lonni Fling     Initial Presentation:   65y Male to ED presents with dizziness upon standing  Lost his balance after feeling weak and slid to the ground  No head strike  PMH for Pancreatic adenocarcinoma currently undergoing chemotherapy, chronic hyponatremia, hypertension, diabetes, peripheral neuropathy, mesenteric vein thrombosis  In ED on admit Na 133, Lactic acid elevated at 3 9 which improved after 2 2  Recently started on prednisone taper, Lexapro and Percocet a few prior to admit  Per pt  Caregiver, pt had difficulty time tolerated chemo  Pt c/o feeling cold with some possible chills  Admit Observation level of care for SIRS, Generalized weakness, Episodes of staring and Adenocarcinoma of pancreas underoing chemo  Leukocytosis of 18 84  Start Iv antibiotics  Recently started on Norco reports dizziness and weakness  Lactic of 3 9, s/p 500 ml bolus, trended  down to 2 2  IVFs  Procalcitonin  Prednisone- 30mg daily, continue taper, decreasing by 10 each per outpatient instructions  CT Abd/Pelvis pending  Noted with episodes of hypotension in ED, 86/46 and improved with IVFs  Per family, pt had 1 episode of staring for approx  1min  EEG pending  Completely resolved with no focal neurologic deficit at time of admit  F/u bdl cultures  Trend CBC and temp curve  7/14 Palliative Care cons; Symptom management  Constipation; start miralax, senna and bisacodyl  7/14 Medical-Oncology cons; Diagnosed with pancreatic cancer in 3/2021  initially started on neoadjuvant chemotherapy with gemcitabine and Abraxane  His treatment regimen has been adjusted secondary to declining performance status, multiple hospitalizations, and treatment related side effects  Patient has currently been undergoing single agent therapy with gemcitabine  Last imaging from May demonstrated stable disease  His tumor marker has been trending down indicating treatment response  Last chemo on 7/12/2021  Now hospitalized with progressive generalized weakness, dizziness resulting in fall  CT of head negative  He does have leukocytosis, hypotension and symptoms of chills  Blood cultures are pending  CT of head, chest x-ray unremarkable  CT of abdomen and pelvis pending  If patient has evidence of disease progression, hospice would be a reasonable consideration given patient's declining performance status and inability to tolerate more aggressive chemotherapy in the past   However, if patient wishes to continue with disease directed therapies as he has indicated today, a change in regimen would be indicated and considered on an outpatient basis depending on patient's overall performance status once medically optimized   ECOG 2     7/15 Progress notes: Anemia  Hold Norco as this may have contributed to dizziness and hypotension  Start Iv antibiotics  Elevated Procal 1 21  F/u bld cultures  Hgb this am 6 5 and given bld transfusion 1 unit PRBCs  Trend H&H  Na 130 today, IVF d/c  Restart fluid restriction  Continue Na tabs 1g TID  Trend BMP      7/15 General Surgery cons; CT of the abdomen pelvis shows evidence of disease progression as below  1   Mild interval increase in size of cystic lesion in the head and uncinate process of the pancreas measuring 1 5 cm   Stable severe pancreatic duct dilatation  2   Decreased gas and inflammation between the medial border of the pylorus and head of the pancreas   Persistent inflammation along the medial aspect of the pylorus and proximal 2nd portion of the duodenum  3   1 3 cm low-attenuation, possibly cystic lesion or fluid collection along the medial border of the pylorus   Possible contained perforation or cystic metastasis  4   Constipation    Feel that gastric perforation is unlikely as patient denies abdominal pain and is tolerating a full diet   Patient should continue follow-up with surgical Oncology   Findings on CT likely related to disease progression   If concerns for possible perforation can further evaluate with upper GI or EGD  Hospice should be considered for this patient with disease progression and declining performance status        Date:  Day 2:     ED Triage Vitals   Temperature Pulse Respirations Blood Pressure SpO2   07/14/21 0849 07/14/21 0853 07/14/21 0853 07/14/21 0853 07/14/21 0853   98 5 °F (36 9 °C) 96 18 123/60 100 %      Temp Source Heart Rate Source Patient Position - Orthostatic VS BP Location FiO2 (%)   07/14/21 0849 07/14/21 0900 07/14/21 0853 07/14/21 0853 --   Temporal Monitor Lying Right arm       Pain Score       07/14/21 1500       No Pain          Wt Readings from Last 1 Encounters:   07/14/21 66 7 kg (147 lb)     Additional Vital Signs:   07/15/21 0700  97 6 °F (36 4 °C)  77  17 146/67  97  99 %  None (Room air)  Lying   07/14/21 2354  98 1 °F (36 7 °C)  94  20  152/65  94  99 %  None (Room air)  Lying   07/14/21 2045  --  --  --  --  --  --  None (Room air)  --   07/14/21 1544  97 8 °F (36 6 °C)  81  19  133/60  87  100 %  None (Room air)  Lying   07/14/21 1300  98 4 °F (36 9 °C)  78  17  125/60  87  --  --  Lying   07/14/21 1230  --  81  18  99/55  --  100 %  --  --   07/14/21 1200  --  80  18  106/53  --  100 %  --  --   07/14/21 1130  --  88  19  105/55  --  100 %         Pertinent Labs/Diagnostic Test Results:   7/14  PCXR - No acute cardiopulmonary disease  Findings are stable    CT Head - No acute intracranial abnormality  Stable microangiopathic changes within the brain  CT Cervical Spine - No acute osseous abnormality  Mild cervical degenerative change  CT abd/pelvis - 1  Mild interval increase in size of cystic lesion in the head and uncinate process of the pancreas measuring 1 5 cm  Stable severe pancreatic duct dilatation  2   Decreased gas and inflammation between the medial border of the pylorus and head of the pancreas  Persistent inflammation along the medial aspect of the pylorus and proximal 2nd portion of the duodenum  3   1 3 cm low-attenuation, possibly cystic lesion or fluid collection along the medial border of the pylorus  Possible contained perforation or cystic metastasis    4   Constipation           Lab Units 07/15/21  0526 07/14/21  0914 07/14/21  0858   WBC Thousand/uL 13 19*  --  18 84*   HEMOGLOBIN g/dL 6 5*  --  7 8*   I STAT HEMOGLOBIN g/dl  --  8 5*  --    HEMATOCRIT % 19 5*  --  23 7*   HEMATOCRIT, ISTAT %  --  25*  --    PLATELETS Thousands/uL 300  --  387   NEUTROS ABS Thousands/µL 11 81*  --  17 90*         Lab Units 07/15/21  0522 07/14/21  0914 07/14/21  0858   SODIUM mmol/L 130*  --  133*   POTASSIUM mmol/L 3 6  --  4 4   CHLORIDE mmol/L 100  --  100   CO2 mmol/L 21  --  21   CO2, I-STAT mmol/L  --  21  --    ANION GAP mmol/L 9  --  12 BUN mg/dL 27*  --  35*   CREATININE mg/dL 0 69  --  1 00   EGFR ml/min/1 73sq m 92  --  73   CALCIUM mg/dL 7 7*  --  8 4   CALCIUM, IONIZED, ISTAT mmol/L  --  1 20  --      Lab Units 07/15/21  0522 07/14/21  0858   AST U/L 14 13   ALT U/L 19 19   ALK PHOS U/L 75 89   TOTAL PROTEIN g/dL 5 0* 5 8*   ALBUMIN g/dL 2 3* 2 9*   TOTAL BILIRUBIN mg/dL 1 00 1 00     Results from last 7 days   Lab Units 07/15/21  1547 07/15/21  1059 07/15/21  0659 07/14/21  2352 07/14/21  2116 07/14/21  2057 07/14/21  1548   POC GLUCOSE mg/dl 416* 306* 174* 281* 421* 428* 134     Results from last 7 days   Lab Units 07/15/21  0522 07/14/21  0858   GLUCOSE RANDOM mg/dL 183* 101             BETA-HYDROXYBUTYRATE   Date Value Ref Range Status   04/08/2021 0 1 <0 6 mmol/L Final              Results from last 7 days   Lab Units 07/14/21  0914   PH, STACIE I-STAT  7 398   PCO2, STACIE ISTAT mm HG 33 1*   PO2, STACIE ISTAT mm HG 22 0*   HCO3, STACIE ISTAT mmol/L 20 4*   I STAT BASE EXC mmol/L -4*   I STAT O2 SAT % 38*         Results from last 7 days   Lab Units 07/14/21  0858   TROPONIN I ng/mL <0 02         Results from last 7 days   Lab Units 07/14/21  0858   PROTIME seconds 18 4*   INR  1 54*   PTT seconds 25         Results from last 7 days   Lab Units 07/15/21  0522 07/14/21  1014   PROCALCITONIN ng/ml 1 21* 0 27*     Results from last 7 days   Lab Units 07/14/21  2143 07/14/21  1139 07/14/21  1014   LACTIC ACID mmol/L 1 8 2 2* 3 9*         Results from last 7 days   Lab Units 07/14/21  1137   CLARITY UA  Clear   COLOR UA  Yellow   SPEC GRAV UA  1 020   PH UA  6 0   GLUCOSE UA mg/dl 100 (1/10%)*   KETONES UA mg/dl Trace*   BLOOD UA  Trace-Intact*   PROTEIN UA mg/dl Negative   NITRITE UA  Negative   BILIRUBIN UA  Negative   UROBILINOGEN UA E U /dl 1 0   LEUKOCYTES UA  Negative   WBC UA /hpf None Seen   RBC UA /hpf 2-4   BACTERIA UA /hpf Occasional   EPITHELIAL CELLS WET PREP /hpf None Seen       Results from last 7 days   Lab Units 07/14/21  1014 07/14/21  1000   BLOOD CULTURE  Received in Microbiology Lab  Culture in Progress  Received in Microbiology Lab  Culture in Progress         ED Treatment:   Medication Administration from 07/14/2021 0840 to 07/14/2021 1256       Date/Time Order Dose Route Action     07/14/2021 0959 sodium chloride 0 9 % bolus 500 mL 500 mL Intravenous New Bag        Past Medical History:   Diagnosis Date    Cancer ([facility] )     pancreatic    Diabetes mellitus ([facility] )     Frequent urination     GERD (gastroesophageal reflux disease)     Hyperlipidemia     Hypertension     Peripheral neuropathy     Weakness     Weight loss      Present on Admission:   Generalized weakness   Hyperlipidemia   Adenocarcinoma of pancreas ([facility] )   Essential hypertension   Hyponatremia   Anemia      Admitting Diagnosis: Injury [T14 90XA]  Weakness [R53 1]  Hypotension [I95 9]  Adenocarcinoma of pancreas (HCC) [C25 9]  Adenocarcinoma of head of pancreas (HCC) [C25 0]  Age/Sex: 67 y o  male     Admission Orders:  Scheduled Medications:  acetaminophen, 975 mg, Oral, Q8H [address]  aspirin, 81 mg, Oral, Daily  atorvastatin, 40 mg, Oral, HS  cefepime, 2,000 mg, Intravenous, Q12H  escitalopram, 5 mg, Oral, Daily  insulin glargine, 8 Units, Subcutaneous, HS  insulin lispro, 1-5 Units, Subcutaneous, TID AC  insulin lispro, 1-5 Units, Subcutaneous, HS  polyethylene glycol, 17 g, Oral, Daily  [START ON 7/16/2021] predniSONE, 10 mg, Oral, Daily  rivaroxaban, 15 mg, Oral, Daily With Dinner  senna-docusate sodium, 2 tablet, Oral, BID  sodium chloride, 1 g, Oral, TID With Meals  vancomycin, 12 5 mg/kg, Intravenous, Q12H      Continuous IV Infusions:     PRN Meds:  hydrALAZINE, 5 mg, Intravenous, Q6H PRN  prochlorperazine, 10 mg, Oral, Q6H PRN      Bld culture x2  IP CONSULT TO PALLIATIVE CARE  IP CONSULT TO ONCOLOGY  IP CONSULT TO CASE MANAGEMENT  IP CONSULT TO PHARMACY  IP CONSULT TO ACUTE CARE SURGERY    Network Utilization Review Department  ATTENTION: Please call with any questions or concerns to [phone] and carefully listen to the prompts so that you are directed to the right person  All voicemails are confidential   Sanjeev Calender all requests for admission clinical reviews, approved or denied determinations and any other requests to dedicated fax number below belonging to the campus where the patient is receiving treatment   List of dedicated fax numbers for the Facilities:  [address] DENIALS (Administrative/Medical Necessity) [phone]   [address] (Maternity/NICU/Pediatrics) [phone]   [address] [place] [address] [id number] [place] [address] Jaleel Veronica [address] [place] [address] [phone]

## 2021-07-15 NOTE — PROGRESS NOTES
Progress note - Palliative and Supportive Care   Julian Leblanc 67 y o  male [id number]    Patient Active Problem List   Diagnosis    Hyperlipidemia    Essential hypertension    Type 2 diabetes mellitus with hyperglycemia, with long-term current use of insulin (HCC)    Gastroesophageal reflux disease    Pancreatic cyst    Adenocarcinoma of pancreas (HCC)    Peripheral neuropathy    Bilateral cellulitis of lower leg    Hyponatremia    Severe protein-calorie malnutrition (HCC)    Anemia    Sacral wound    Leukocytosis    Generalized weakness    Streptococcal bacteremia    Mesenteric venous thrombosis (HCC)    [facility]) [facility] )    Episodes of staring    SIRS (systemic inflammatory response syndrome) ([facility])     · Active issues specifically addressed today include: Pancreatic adenocarcinoma  · Generalized weakness  · Ambulatory dysfunction  · Mesenteric vein thrombosis  · Chronic Anemia  · HTN  · DM  · Peripheral neuropathy  · Constipation  Depression  Goals of care    Plan:  1  Symptom management -    - Constipation    - increase senna to BID    - Continue Miralax and PRN bisacodyl   - Depression    - continue Lexapro   - Cancer pain    - Tylenol 975 TID    - Low dose PRN oxy (No doses used)    2  Goals - Level 3, otherwise disease directed   -     Code Status: DNR/DNI - Level 3   Decisional apparatus:  Patient is competent on my exam today  If competence is lost, patient's substitute decision maker would default to sibling by [place] 169  Advance Directive / Living Will / POLST:  No, will need    Interval history:       Patient feeling well this AM, he has not had a BM but reports passing flatus  Pain controlled  No other complaints       MEDICATIONS / ALLERGIES:     all current active meds have been reviewed and current meds:   Current Facility-Administered Medications   Medication Dose Route Frequency    acetaminophen (TYLENOL) tablet 975 mg  975 mg Oral Q8H [address]    aspirin chewable tablet 81 mg  81 mg Oral Daily    atorvastatin (LIPITOR) tablet 40 mg  40 mg Oral HS    cefepime (MAXIPIME) IVPB (premix in dextrose) 2,000 mg 50 mL  2,000 mg Intravenous Q12H    escitalopram (LEXAPRO) tablet 5 mg  5 mg Oral Daily    insulin glargine (LANTUS) subcutaneous injection 8 Units 0 08 mL  8 Units Subcutaneous HS    insulin lispro (HumaLOG) 100 units/mL subcutaneous injection 1-5 Units  1-5 Units Subcutaneous TID AC    insulin lispro (HumaLOG) 100 units/mL subcutaneous injection 1-5 Units  1-5 Units Subcutaneous HS    multi-electrolyte (PLASMALYTE-A/ISOLYTE-S PH 7 4) IV solution  75 mL/hr Intravenous Continuous    polyethylene glycol (MIRALAX) packet 17 g  17 g Oral Daily    [START ON 7/16/2021] predniSONE tablet 10 mg  10 mg Oral Daily    prochlorperazine (COMPAZINE) tablet 10 mg  10 mg Oral Q6H PRN    rivaroxaban (XARELTO) tablet 15 mg  15 mg Oral Daily With Dinner    senna-docusate sodium (SENOKOT S) 8 6-50 mg per tablet 2 tablet  2 tablet Oral BID    sodium chloride tablet 1 g  1 g Oral TID With Meals    vancomycin (VANCOCIN) IVPB (premix in dextrose) 750 mg 150 mL  12 5 mg/kg Intravenous Q12H       No Known Allergies    OBJECTIVE:    Physical Exam  Physical Exam  Vitals and nursing note reviewed  Constitutional:       General: He is not in acute distress  Appearance: He is ill-appearing  HENT:      Head: Normocephalic and atraumatic  Right Ear: External ear normal       Left Ear: External ear normal       Nose: Nose normal    Eyes:      General: No scleral icterus  Right eye: No discharge  Left eye: No discharge  Cardiovascular:      Rate and Rhythm: Normal rate and regular rhythm  Pulmonary:      Effort: Pulmonary effort is normal  No respiratory distress  Breath sounds: Normal breath sounds  Abdominal:      General: Bowel sounds are normal  There is no distension  Palpations: Abdomen is soft  Skin:     General: Skin is warm and dry  Coloration: Skin is pale  Neurological:      Mental Status: He is alert and oriented to person, place, and time  Lab Results:   I have personally reviewed pertinent labs  , CBC:   Lab Results   Component Value Date    WBC 13 19 (H) 07/15/2021    HGB 6 5 (LL) 07/15/2021    HCT 19 5 (L) 07/15/2021     (H) 07/15/2021     07/15/2021    MCH 34 8 (H) 07/15/2021    MCHC 33 3 07/15/2021    RDW 15 7 (H) 07/15/2021    MPV 9 9 07/15/2021    NRBC 0 07/15/2021   , CMP:   Lab Results   Component Value Date    SODIUM 130 (L) 07/15/2021    K 3 6 07/15/2021     07/15/2021    CO2 21 07/15/2021    BUN 27 (H) 07/15/2021    CREATININE 0 69 07/15/2021    CALCIUM 7 7 (L) 07/15/2021    AST 14 07/15/2021    ALT 19 07/15/2021    ALKPHOS 75 07/15/2021    EGFR 92 07/15/2021   , PT/PTT:No results found for: PT, PTT  Imaging Studies: reviewed  EKG, Pathology, and Other Studies: review    Counseling / Coordination of Care    Total floor / unit time spent today 15+ minutes  Greater than 50% of total time was spent with the patient and / or family counseling and / or coordination of care  A description of the counseling / coordination of care: chart review, medication review with changes, supportive listening

## 2021-07-15 NOTE — ASSESSMENT & PLAN NOTE
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

## 2021-07-15 NOTE — CASE MANAGEMENT
Admitted under observation status, observation notice previously reviewed and provided  Not a bundle or readmission  CM consulted for social barriers  Patient is alert and oriented x 4 and able to participate in CM assessment  Patient undergoing testing at time of assessment, sister in law Shae at bedside and patient agreeable for CM to speak with to introduce self, explain role, complete CM assessment, and discuss DC planning  Patient PTA was staying with his cousin and cousin's wife Cheyenne Peterson in their 1 floor home  Patient was requiring moderate assist with ADLs and supervision with ambulation in addition to use of walker  Per ALEIDA Niurka Steven was unable to continue providing care for patient with her spouse battling his own health issues  The plan was to potentially move patient back to his home where he lived alone, 2 floors with 4 SHANTA and stair lift to upper level where bedroom and bathroom are located  Per Shari Alberto, when they went over to clean the home it was not in the best condition and patient was upset with his brother and ALEIDA for throwing away things that were beyond repair and trying to tidy the home  Per Shari Alberto the family does not feel it is in patient's best interest to return home alone and requires at least rehab prior to returning home  Hx [facility] most recently with [facility]  Hx STR at [address] [place] [address] reports this was a poor experience and she would not be interested in patient returning here  Patient has RAISSA and BECCA, designating Ralf as healthcare representative, Shari Alberto reports she will obtain copy and bring it in to be scanned into patient's chart  Patient was being managed on medication by PCP for depression with recent challenges of losing his SO and housing/lifestyle changes, as well as medical status  Not seeing therapist or psychiatrist, and no IP stays  No Hx substance use  Patient does not drive and relies on family for transportation   Patient refuses help from family with medication management, and [facility] is concerned about patient's medication compliance reporting patient will take insulin for instance forget he took it then take another dose  Family has tried to help manage medications and patient refuses  PCP is Dr Alatorre Officer, patient has prescription coverage, and prefers using [facility]     CM discussed that therapy will be evaluating patient and confirming what patient's ideal DC plan would be  [facility] reports that family has had multiple discussions that it is not safe to be home alone at this time in his condition, and patient and family are agreeable to at least STR with possible need for LTC  CM discussed freedom of choice in facilities and noted dual eligible facilities if family was interested in [facility]  CM provided list of all facilities within 15 miles of patient's zip code from [place] for family to review  CM to follow up after therapy evaluates and further medical workup with family on DCP  Per [facility] they would like to know how patient's cancer diagnosis is being managed and prognosis with this to ensure they are doing the best thing by the patient  CM informed [facility] of same who reports they will look into this and update family  CM will continue to follow for further care coordination needs  No

## 2021-07-15 NOTE — PHYSICAL THERAPY NOTE
PT eval   07/15/21 1050   PT Last Visit   PT Visit Date 07/15/21   Note Type   Note type Evaluation   Pain Assessment   Pain Assessment Tool Pain Assessment not indicated - pt denies pain   Pain Score No Pain   Home Living   Type of [address] to live on main level with bedroom/bathroom   Home Equipment Walker   Additional Comments wsa staying eith relatives unable to return there or to own home   Prior Function   Level of [place] Needs assistance with IADLs; Independent with ADLs and functional mobility   Lives With [facility] Help From Family   ADL Assistance Independent   IADLs Needs assistance   Falls in the last 6 months 1 to 4   Vocational Retired   Comments was rec'ing weekly chemo family drives, does own adls   Restrictions/Precautions   Wells Jesse Bearing Precautions Per Order No   Other Precautions Fall Risk;Multiple lines   General   Additional Pertinent History undergoing chemo for pancreatic cancer   Family/Caregiver Present No   Cognition   Overall Cognitive Status WFL   Arousal/Participation Alert   Orientation Level Oriented X4   Memory Within functional limits   Following Commands Follows all commands and directions without difficulty   RUE Assessment   RUE Assessment   (shldr painful and weak others wfl, prior w/u neg)   LUE Assessment   LUE Assessment WFL   RLE Assessment   RLE Assessment WFL  (strength 3+/5)   LLE Assessment   LLE Assessment WFL  (strength 3+/5)   Coordination   Movements are Fluid and Coordinated 0   Coordination and Movement Description R shldr liomtied to 90degrees and sore   Sensation WFL   Proprioception   RLE Proprioception Grossly intact   LLE Proprioception Grossly Intact   Bed Mobility   Supine to Sit 5  Supervision   Additional items Verbal cues; Bedrails;HOB elevated; Increased time required   Transfers   Sit to Stand 4  Minimal assistance   Additional items Assist x 1; Increased time required;Verbal cues;Armrests   Stand to Sit 4  Minimal assistance   Additional items Assist x 1; Increased time required;Armrests; Verbal cues   Stand pivot 4  Minimal assistance   Additional items Assist x 1; Armrests; Increased time required;Verbal cues  (rw)   Ambulation/Elevation   Gait pattern Forward Flexion;Narrow COLE; Short stride; Step to; Inconsistent champ   Gait Assistance 4  Minimal assist   Additional items Assist x 1   Assistive Device Rolling walker   Distance 5'   Balance   Static Sitting Good   Dynamic Sitting Fair   Static Standing Fair   Dynamic Standing Fair -   Ambulatory Fair -   Endurance Deficit   Endurance Deficit Yes   Endurance Deficit Description tires quickly low hgb to rec 1 unit PRBCS   Activity Tolerance   Activity Tolerance Patient limited by fatigue   Nurse Made Aware LISA Dumont   Assessment   Prognosis Good   Problem List Decreased strength;Decreased endurance; Impaired balance;Decreased mobility   Assessment Pt presents with imparied strength, blaance, activity tolerance  Able to transfer with rw adn 1 asist  SEt up in recliner with aircushion  Pt can benefit fomr skilledTP serivces to improve funcitonal status  REcommend in-pt rehab at d/c to maximize funcitonal recovery  Will follow see goals  Barriers to Discharge   (medical status)   Goals   Patient Goals get better   STG Expiration Date 07/25/21   Short Term Goal #1 1) safe ind transfers 2) safe ind amb with rw 50' level no sob 3) improve strength 1/2 grade 5)improve balance to good   Plan   Treatment/Interventions ADL retraining;Functional transfer training;LE strengthening/ROM; Therapeutic exercise; Endurance training;Patient/family training;Equipment eval/education; Bed mobility;Gait training;Spoke to case management;Spoke to nursing;OT   PT Frequency 2-3x/wk   Recommendation   PT Discharge Recommendation Post acute rehabilitation services   Equipment Recommended [facility] Recommended Wheeled walker   PT - OK to Discharge Yes   Additional Comments   (to STR with medical erick)   AM-PAC Basic Mobility Inpatient   Turning in Bed Without Bedrails 3   Lying on Back to Sitting on Edge of Flat Bed 3   Moving Bed to Chair 3   Standing Up From Chair 3   Walk in Room 2   Climb 3-5 Stairs 2   Basic Mobility Inpatient Raw Score 16   Basic Mobility Standardized Score 38 32   Modified Redford Scale   Modified Redford Scale 4   Johanne Alvarado, PT

## 2021-07-15 NOTE — OCCUPATIONAL THERAPY NOTE
Occupational Therapy Evaluation 12:02-10:12 & Treat 12:13-12:23     Patient Name: Julian WALLISY Date: 7/15/2021  Problem List  Principal Problem:    Generalized weakness  Active Problems:    Hyperlipidemia    Essential hypertension    Type 2 diabetes mellitus with hyperglycemia, with long-term current use of insulin (HCC)    Adenocarcinoma of pancreas (HCC)    Hyponatremia    Anemia    Episodes of staring    SIRS (systemic inflammatory response syndrome) (HCC)    Goals of care, counseling/discussion    Past Medical History  Past Medical History:   Diagnosis Date    Cancer ([facility] [address] )     pancreatic    Diabetes mellitus ([facility] [address] )     Frequent urination     GERD (gastroesophageal reflux disease)     Hyperlipidemia     Hypertension     Peripheral neuropathy     Weakness     Weight loss      Past Surgical History  Past Surgical History:   Procedure Laterality Date    APPENDECTOMY      CATARACT EXTRACTION      COLONOSCOPY      SKIN LESION EXCISION      of a wart on right arm    TUNNELED VENOUS PORT PLACEMENT N/A 3/26/2021    Procedure: INSERTION VENOUS PORT (PORT-A-CATH); Surgeon: Jennifer Heath MD;  Location: [facility] OR;  Service: Surgical Oncology         07/15/21 1223   OT Last Visit   OT Visit Date 07/15/21   Note Type   Note type Evaluation & Treat   Restrictions/Precautions   Weight Bearing Precautions Per Order No   Other Precautions Fall Risk   Pain Assessment   Pain Assessment Tool Pain Assessment not indicated - pt denies pain   [facility] Living   [facility] main [facility] with bedroom/bathroom   Prior Function   Level of [place] Needs assistance with ADLs and functional mobility; Needs assistance with IADLs   Lives With Family   Receives Help From Family   ADL Assistance Needs assistance   IADLs Needs assistance   Falls in the last 6 months 1 to 4   Vocational Retired   Lifestyle   Autonomy Pt typically lives alone in a [facility], however has recently been staying with cousin and cousin's family  Per chart review, plan was for [facility] to [facility] home, however cousin was concerned whether pt would be able to manage there  Subjective   Subjective Pt received sitting in recliner  Pt agreeable to session  ADL   Eating Assistance 5  Supervision/Setup   Eating Deficit Setup   Grooming Assistance 5  Supervision/Setup   Grooming Deficit Setup   UB Bathing Assistance 4  Minimal Assistance   LB Bathing Assistance 3  Moderate Assistance   UB Dressing Assistance 5  Supervision/Setup   LB Dressing Assistance 3  Moderate Assistance   Toileting Assistance  3  Moderate Assistance   Bed Mobility   Additional Comments Pt received sitting in recliner   Transfers   Sit to Stand 4  Minimal assistance   Additional items Assist x 1;Verbal cues   Stand to Sit 4  Minimal assistance   Additional items Assist x 1;Verbal cues   Stand pivot 4  Minimal assistance   Additional items Assist x 1;Verbal cues   Balance   Static Sitting Good   Dynamic Sitting Fair +   Static Standing Fair -   Dynamic Standing Fair -   Activity Tolerance   Activity Tolerance Patient limited by fatigue   Medical Staff Made Aware RN Simon Miguel, KARLY Haddad   RUE Assessment   RUE Assessment WFL   LUE Assessment   LUE Assessment WFL   Hand Function   Gross Motor Coordination Functional   Cognition   Overall Cognitive Status WFL   Arousal/Participation Alert   Attention Within functional limits   Orientation Level Oriented X4   Memory Within functional limits   Following Commands Follows all commands and directions without difficulty   Assessment   Limitation Decreased ADL status; Decreased self-care trans;Decreased high-level ADLs; Decreased endurance   Prognosis Fair   Assessment Pt is a 67 y o  male seen for OT evaluation at [address] [place] [address], admitted 7/14/2021 w/ Generalized weakness  OT completed extensive review of pt's medical and social history   Comorbidities affecting pt's functional performance at time of assessment include: HTN, pancreatic ca,chronic sacral wound, episodes of staring, and hyponatremia  Personal factors affecting pt at time of IE include:limited home support, difficulty performing ADLS, difficulty performing IADLS  and decreased functional mobility  Pt with active OT orders  Prior to admission, pt was living with cousin in house with 1st floor set-up  Pt required assist with ADLS and IADLS, (+) drove, & required no use of DME PTA  Upon evaluation: Pt requires min A x 1 for functional mobility/transfers, superivsion for UB ADLs and mod A for LB ADLS 2* the following deficits impacting occupational performance: weakness, decreased strength, decreased balance and decreased tolerance  Pt to benefit from continued skilled OT tx while in the hospital to address deficits as defined above and maximize level of functional independence w ADL's and functional mobility  Occupational Performance areas to address include: bathing/shower, toilet hygiene, dressing and functional mobility  Based on findings, pt is of high complexity  The patient's raw score on the AM-PAC Daily Activity inpatient short form is 17, standardized score is 37 26, less than 39 4  Patients at this level are likely to benefit from DC to post-acute rehabilitation services  Please refer to the recommendation of the Occupational Therapist for safe DC planning  At this time, OT recommendations at time of discharge are short term rehab  Goals   Patient Goals Pt wishes to get better   Plan   Treatment Interventions ADL retraining;Functional transfer training; Endurance training;Patient/family training; Compensatory technique education   Goal Expiration Date 07/25/21   OT Treatment Day 0   OT Frequency 2-3x/wk   Additional Treatment Session   Treatment Assessment Pt seen for OT tx session with focus on functional balance, functional mobility, ADL status, and transfer safety  Patient agreeable to OT treatment session   Pt endorsed need to have a BM at this time  Performed sit<>stand with min A x 1  VC required to perform with appropriate hand placement  Performed commode transfer with min A x 1 followed by toileting with mod A  Pt returned to recliner with min A x1 with RW and repositioned for comfort  Patient continues to be functioning below baseline level, occupational performance remains limited secondary to factors listed above, and pt at increased risk for falls and injury  From OT standpoint, recommendation at time of d/c would be Short Term Rehab  Patient to benefit from continued Occupational Therapy treatment while in the hospital to address deficits as defined above and maximize level of functional independence with ADLs and functional mobility  Pt left with call bell in reach, tray table in reach, needs met, chair alarm activated, SCDs on  Recommendation   OT Discharge Recommendation Post acute rehabilitation services   AM-PAC Daily Activity Inpatient   Lower Body Dressing 2   Bathing 2   Toileting 2   Upper Body Dressing 3   Grooming 4   Eating 4   Daily Activity Raw Score 17   Daily Activity Standardized Score (Calc for Raw Score >=11) 37 26   AM-PAC Applied Cognition Inpatient   Following a Speech/Presentation 3   Understanding Ordinary Conversation 4   Taking Medications 4   Remembering Where Things Are Placed or Put Away 4   Remembering List of 4-5 Errands 4   Taking Care of Complicated Tasks 3   Applied Cognition Raw Score 22   Applied Cognition Standardized Score 47 83         Pt will achieve the following goals within 10 days      *Pt will complete UB bathing and dressing with mod I     *Pt will complete LB bathing and dressing with mod I      * Pt will complete toileting w/ mod I w/ G hygiene/thoroughness using DME PRN    *Pt will complete bed mobility with mod I, with bed flat and no side rail to prep for purposeful tasks    *Pt will perform functional transfers with on/off all surfaces with mod I using DME as needed w/ G balance/safety  *Pt will increase standing tolerance to 5 minutes in order to complete sinkside ADL task  *Pt will participate in UE therapeutic exercise in order to maximize strength for ADL transfers  *Pt will identify 3-5 fall risks during ADL routine to ensure home safety upon discharge  *Pt will improve functional mobility during ADL/IADL/leisure tasks to mod I using DME as needed w/ G balance/safety               Caro Espana OTR/L

## 2021-07-15 NOTE — ASSESSMENT & PLAN NOTE
· Discussed at length with patient directly at bedside as well as his brother Zeferino Contreras via phone per patient request  · Patient verbalizes understanding of findings on his CT scan abdomen/pelvis concerning for enlarging cystic lesion of his pancreas as well as possible cystic metastasis  · Per patient he confirms that he did not tolerate more aggressive chemotherapy in the past; ongoing concern that he has had multiple readmissions over the last couple of months related to his progressive weakness  · Discussed at length regarding goals of care, options including discharge to rehab to attempt to work on his strength once medically cleared verses pursuing hospice cares  · Patient states that the symptoms related to his cancer are well controlled, and would like to try to pursue being discharged to rehab and ultimately continue chemotherapy if possible  Patient's brother Zeferino Contreras is in agreement with this plan  · Patient remains DNR/DNI however does not have a formal living will    Consider POLST on discharge

## 2021-07-15 NOTE — ACP (ADVANCE CARE PLANNING)
Serious Illness Conversation    1  What is your understanding now of where you are with your illness? Prognostic Understanding: appropriate understanding of prognosis     2  How much information about what is likely to be ahead with your illness would you like to have? Information: patient wants to be fully informed     3  What did you (clinician) communicate to the patient? Prognostic Communication: Function - I hope that this is not the case, but Im worried that this may be as strong as you will feel, and things are likely to get more difficult  4  If your health situation worsens, what are your most important goals? Goals: be physically comfortable, be spiritually and emotionally at peace     5  What are the biggest fears and worries about the future and your health? Fears/Worries: loss of control, preparing for death     6  What abilities are so critical to your life that you cannot imagine living without them? Unacceptable Function: being in pain or very uncomfortable, not being myself     7  What gives you strength as you think about the future with your illness? Araseli in [facility] [address] - will request  to speak with patient     8  If you become sicker, how much are you willing to go through for the possibility of gaining more time? Be in the hospital: Yes Have a feeding tube: No   Be on a ventilator: No Be uncomfortable: No   Be on dialysis: No Undergo aggressive test and/or procedures: No      9  How much does your proxy and family know about your priorities and wishes? Discussion Discussion: wants clinician to talk with family     Concepcione heard you say that being comfortable and coherent is really important to you  Keeping that in mind, and what we know about your illness, I recommend that we speak more with your family and determine plan moving forward  This will help us make sure that your treatment plans reflect whats important to you  How does this plan sound to you?  I will do everything I can to help you through this    Patient verbalized understanding of the plan     I have spent 20 minutes speaking with my patient on advanced care planning today or during this visit     Advanced directives  Five Wishes: Patient does not have Five Wishes- would not like information

## 2021-07-15 NOTE — PLAN OF CARE
Problem: PHYSICAL THERAPY ADULT  Goal: Performs mobility at highest level of function for planned discharge setting  See evaluation for individualized goals  Description: Treatment/Interventions: ADL retraining, Functional transfer training, LE strengthening/ROM, Therapeutic exercise, Endurance training, Patient/family training, Equipment eval/education, Bed mobility, Gait training, Spoke to case management, Spoke to nursing, OT  Equipment Recommended: Manuel Sellers       See flowsheet documentation for full assessment, interventions and recommendations  Outcome: Progressing  Note: Prognosis: Good  Problem List: Decreased strength, Decreased endurance, Impaired balance, Decreased mobility  Assessment: Pt presents with imparied strength, blaance, activity tolerance  Able to transfer with rw adn 1 asist  SEt up in recliner with aircushion  Pt can benefit fomr skilledTP serivces to improve funcitonal status  REcommend in-pt rehab at d/c to maximize funcitonal recovery  Will follow see goals  Barriers to Discharge:  (medical status)        PT Discharge Recommendation: Post acute rehabilitation services     PT - OK to Discharge: Yes    See flowsheet documentation for full assessment

## 2021-07-15 NOTE — PROGRESS NOTES
[place]     Progress Note - Niall Goodwin 1945, 67 y o  male MRN: [id number]  Unit/Bed#: -01 Encounter: [id number]  Primary Care Provider: Jo Ann Lombardi DO   Date and time admitted to hospital: 7/14/2021  8:42 AM    * Generalized weakness  Assessment & Plan  · Recently started on prednisone taper, Lexapro and Norco for pain, took last one early in AM  · Prednisone- 30mg daily, continue taper, decreasing by 10 each per outpatient instructions  · Erma Woods as this may have contributed to dizziness and hypotension  · Patient noted to be hypotensive in emergency department, 86/46, pressures did improve with fluid bolus - remains stable  · Initial lactic acid 3 9, improved to 2 2 after fluids  · Patient reports he is having normal oral intake  · UA, CT head, CXR unremarkable  · CT abdomen pelvis: "Mild interval increase in size of cystic lesion in the head and uncinate process of the pancreas measuring 1 5 cm  Stable severe pancreatic duct dilatation  Decreased gas and inflammation between the medial border of the pylorus and head of the pancreas  Persistent inflammation along the medial aspect of the pylorus and proximal 2nd portion of the duodenum  1 3 cm low-attenuation, possibly cystic lesion or fluid collection along the medial border of the pylorus  Possible contained perforation or cystic metastasis    Constipation "    Goals of care, counseling/discussion  Assessment & Plan  · Discussed at length with patient directly at bedside as well as his brother Asmita Tellez via phone per patient request  · Patient verbalizes understanding of findings on his CT scan abdomen/pelvis concerning for enlarging cystic lesion of his pancreas as well as possible cystic metastasis  · Per patient he confirms that he did not tolerate more aggressive chemotherapy in the past; ongoing concern that he has had multiple readmissions over the last couple of months related to his progressive weakness  · Discussed at length regarding goals of care, options including discharge to rehab to attempt to work on his strength once medically cleared verses pursuing hospice cares  · Patient states that the symptoms related to his cancer are well controlled, and would like to try to pursue being discharged to rehab and ultimately continue chemotherapy if possible  Patient's brother Carmel Reynolds is in agreement with this plan  · Patient remains DNR/DNI however does not have a formal living will  Consider POLST on discharge    Adenocarcinoma of pancreas [facility])  Assessment & Plan  · Follows with Dr Beau Giron outpatient  · Undergoing chemotherapy  · Oncology evaluated patient yesterday, CT abdomen/pelvis obtained which shows some increase in cystic lesion in head of the pancreas as well as possible area of contained perforation versus cystic metastasis  · Question of progressive weakness may be related to progression of disease vs chemo  · Per surgery more likely area of metastasis, no abdominal pain, no surgical intervention - outpt follow up with surg onc  · Palliative Consulted - as of now patient remains disease treatment focused  · Chemotherapy has been decreased in the past due to poor toleration    SIRS (systemic inflammatory response syndrome) ([facility] [address] )  Assessment & Plan  Background:  Patient with pancreatic adenocarcinoma undergoing chemotherapy recently started on Norco reports dizziness and weakness  Patient denied fever, nausea, vomiting, diarrhea, chest pain, cough  He did admit to feeling cold with some possible chills  Episode of hypotension noted in emergency department, 51/44 which responded to IV fluids appropriately  Patient did have leukocytosis of 18 84, currently undergoing chemotherapy outpatient, while neulasta part of his treatment regimen, he did not receive this during his most recent chemotherapy session    ANC 17 9  Will start patient on cefepime vanc with pharmacy consult  Imaging:  CT head, CXR unremarkable, CT abdomen pelvis noted as above  Lactic of 3 9 ; status post 500cc bolus; trended down 2 2  · Procalcitonin elevated 1 21  · Follow up blood cultures  · Monitor leukocytosis and fever curve, patient afebrile at time of admission    Episodes of staring  Assessment & Plan  · Noted by family, to last approximately 1 minute  · CT head in ED normal  · EEG ordered on admission  · Consider Neurology consult at that time  · Completely resolved with no focal neurologic deficit at time of admission  · Question if this is related to narcotic use/hypotension  · No recurrence since admission    Anemia  Assessment & Plan  · Acute on chronic, no evidence of active bleeding  · Hemoglobin noted to be 6 5 this a m  · Received 1 unit PRBCs, hemodynamically stable  · Trend H/H    Hyponatremia  Assessment & Plan  · 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  · Followed by Nephrology on recent admission  · Appears euvolemic  · Discontinue IVF, restart fluid restriction  · Continue sodium tabs 1 g TID  · Trend BMP    Type 2 diabetes mellitus with hyperglycemia, with long-term current use of insulin (HCC)  Assessment & Plan  Lab Results   Component Value Date    HGBA1C 9 4 (H) 04/08/2021       No results for input(s): POCGLU in the last 72 hours  Blood Sugar Average: Last 72 hrs:       · Managed on Lantus 8u and SSI at home  · Continue while admitted    Essential hypertension  Assessment & Plan  · Managed on ramipril outpatient  · Hold for now given hypotenstion on admission  · Consider resuming tomorrow if blood pressure remains stable    Hyperlipidemia  Assessment & Plan  · Continue Lipitor    VTE Pharmacologic Prophylaxis:   Pharmacologic: Rivaroxaban (Xarelto)  Mechanical VTE Prophylaxis in Place: Yes    Patient Centered Rounds: I have performed bedside rounds with nursing staff today      Discussions with Specialists or Other Care Team Provider: Nursing, CM, Attending, Surgery AP, appreciate palliative care note,      Education and Discussions with Family / Patient:  Discussed with patient directly at bedside  Discussed with patient's brother at length via phone  Time Spent for Care: 1 hour  More than 50% of total time spent on counseling and coordination of care as described above  Current Length of Stay: 0 day(s)    Current Patient Status: Inpatient   Certification Statement: The patient will continue to require additional inpatient hospital stay due to SIRS, goals of care discussion, disposition planning    Discharge Plan:  Pending clinical course    Code Status: Level 3 - DNAR and DNI      Subjective:   Patient states he feels improved since admission  Reports good appetite  Had a large bowel movement earlier this morning  Denies chest pain/palpitations, shortness of breath, nausea vomiting, abdominal pain  Objective:     Vitals:   Temp (24hrs), Av 8 °F (36 6 °C), Min:97 4 °F (36 3 °C), Max:98 1 °F (36 7 °C)    Temp:  [97 4 °F (36 3 °C)-98 1 °F (36 7 °C)] 98 °F (36 7 °C)  HR:  [70-94] 82  Resp:  [16-22] 16  BP: (146-191)/(65-84) 148/67  SpO2:  [99 %-100 %] 100 %  Body mass index is 21 09 kg/m²  Input and Output Summary (last 24 hours): Intake/Output Summary (Last 24 hours) at 7/15/2021 1601  Last data filed at 7/15/2021 1552  Gross per 24 hour   Intake 956 ml   Output 2675 ml   Net -1719 ml       Physical Exam:     Physical Exam  Vitals and nursing note reviewed  Constitutional:       Appearance: He is well-developed  Comments: Appears comfortable, no acute distress   HENT:      Head: Normocephalic and atraumatic  Eyes:      General: No scleral icterus  Extraocular Movements: Extraocular movements intact  Conjunctiva/sclera: Conjunctivae normal    Cardiovascular:      Rate and Rhythm: Normal rate and regular rhythm  Heart sounds: No murmur heard  Pulmonary:      Effort: Pulmonary effort is normal  No respiratory distress        Breath sounds: Normal breath sounds  No wheezing, rhonchi or rales  Abdominal:      General: Bowel sounds are normal       Palpations: Abdomen is soft  Tenderness: There is no abdominal tenderness  There is no guarding or rebound  Musculoskeletal:      Cervical back: Normal range of motion  Comments: Able to move upper/lower extremities bilaterally, no extremity edema   Skin:     General: Skin is warm and dry  Neurological:      Mental Status: He is alert and oriented to person, place, and time  Psychiatric:         Mood and Affect: Affect is tearful  Speech: Speech normal          Behavior: Behavior normal            Additional Data:     Labs:    Results from last 7 days   Lab Units 07/15/21  0526   WBC Thousand/uL 13 19*   HEMOGLOBIN g/dL 6 5*   HEMATOCRIT % 19 5*   PLATELETS Thousands/uL 300   NEUTROS PCT % 90*   LYMPHS PCT % 7*   MONOS PCT % 1*   EOS PCT % 1     Results from last 7 days   Lab Units 07/15/21  0522   SODIUM mmol/L 130*   POTASSIUM mmol/L 3 6   CHLORIDE mmol/L 100   CO2 mmol/L 21   BUN mg/dL 27*   CREATININE mg/dL 0 69   ANION GAP mmol/L 9   CALCIUM mg/dL 7 7*   ALBUMIN g/dL 2 3*   TOTAL BILIRUBIN mg/dL 1 00   ALK PHOS U/L 75   ALT U/L 19   AST U/L 14   GLUCOSE RANDOM mg/dL 183*     Results from last 7 days   Lab Units 07/14/21  0858   INR  1 54*     Results from last 7 days   Lab Units 07/15/21  1547 07/15/21  1059 07/15/21  0659 07/14/21  2352 07/14/21  2116 07/14/21  2057 07/14/21  1548   POC GLUCOSE mg/dl 416* 306* 174* 281* 421* 428* 134         Results from last 7 days   Lab Units 07/15/21  0522 07/14/21  2143 07/14/21  1139 07/14/21  1014   LACTIC ACID mmol/L  --  1 8 2 2* 3 9*   PROCALCITONIN ng/ml 1 21*  --   --  0 27*           * I Have Reviewed All Lab Data Listed Above  * Additional Pertinent Lab Tests Reviewed:  All Labs Within Last 24 Hours Reviewed    Imaging:    Imaging Reports Reviewed Today Include: CT abd pelvis  Imaging Personally Reviewed by Myself Includes:      Recent Cultures (last 7 days):     Results from last 7 days   Lab Units 07/14/21  1014 07/14/21  1000   BLOOD CULTURE  Received in Microbiology Lab  Culture in Progress  Received in Microbiology Lab  Culture in Progress  Last 24 Hours Medication List:   Current Facility-Administered Medications   Medication Dose Route Frequency Provider Last Rate    acetaminophen  975 mg Oral Q8H [address] JOI Mckeon      aspirin  81 mg Oral Daily [address] [place], [place]      atorvastatin  40 mg Oral HS Long Galicia MD      cefepime  2,000 mg Intravenous Q12H Rachael VAUGHN PA-C 2,000 mg (07/15/21 1418)    escitalopram  5 mg Oral Daily [facility] VDANTE      hydrALAZINE  5 mg Intravenous Q6H PRN Hank Melendez PA-C      insulin glargine  8 Units Subcutaneous HS Rachael VAUGHN PA-C      insulin lispro  1-5 Units Subcutaneous TID AC Rachael VAUGHN PA-C      insulin lispro  1-5 Units Subcutaneous HS Rachael VAUGHN PA-C      polyethylene glycol  17 g Oral Daily Rachael VAUGHN PA-C      [START ON 7/16/2021] predniSONE  10 mg Oral Daily [address] [facility] DANTE VAUGHN      prochlorperazine  10 mg Oral Q6H PRN Marj hSipley PA-C      rivaroxaban  15 mg Oral Daily With icix DANTE VAUGHN      senna-docusate sodium  2 tablet Oral BID Amberly Haley DO      sodium chloride  1 g Oral TID With Meals [address] [facility] DANTE VAUGHN      vancomycin  12 5 mg/kg Intravenous Q12H Rachael VAUGHN PA-C 750 mg (07/15/21 1418)        Today, Patient Was Seen By: Hank Melendez PA-C    ** Please Note: Dictation voice to text software may have been used in the creation of this document   **

## 2021-07-16 PROBLEM — R40.4 EPISODES OF STARING: Status: RESOLVED | Noted: 2021-01-01 | Resolved: 2021-01-01

## 2021-07-16 NOTE — PLAN OF CARE
Problem: MOBILITY - ADULT  Goal: Maintain or return to baseline ADL function  Description: INTERVENTIONS:  -  Assess patient's ability to carry out ADLs; assess patient's baseline for ADL function and identify physical deficits which impact ability to perform ADLs (bathing, care of mouth/teeth, toileting, grooming, dressing, etc )  - Assess/evaluate cause of self-care deficits   - Assess range of motion  - Assess patient's mobility; develop plan if impaired  - Assess patient's need for assistive devices and provide as appropriate  - Encourage maximum independence but intervene and supervise when necessary  - Involve family in performance of ADLs  - Assess for home care needs following discharge   - Consider OT consult to assist with ADL evaluation and planning for discharge  - Provide patient education as appropriate  Outcome: Progressing  Goal: Maintains/Returns to pre admission functional level  Description: INTERVENTIONS:  - Perform BMAT or MOVE assessment daily    - Set and communicate daily mobility goal to care team and patient/family/caregiver     - Collaborate with rehabilitation services on mobility goals if consulted  - Ambulate patient 4 times a day  - Out of bed to chair 4 times a day   - Out of bed for meals 4 times a day  - Out of bed for toileting  - Record patient progress and toleration of activity level   Outcome: Progressing     Problem: PAIN - ADULT  Goal: Verbalizes/displays adequate comfort level or baseline comfort level  Description: Interventions:  - Encourage patient to monitor pain and request assistance  - Assess pain using appropriate pain scale  - Administer analgesics based on type and severity of pain and evaluate response  - Implement non-pharmacological measures as appropriate and evaluate response  - Consider cultural and social influences on pain and pain management  - Notify physician/advanced practitioner if interventions unsuccessful or patient reports new pain  Outcome: Progressing     Problem: INFECTION - ADULT  Goal: Absence or prevention of progression during hospitalization  Description: INTERVENTIONS:  - Assess and monitor for signs and symptoms of infection  - Monitor lab/diagnostic results  - Monitor all insertion sites, i e  indwelling lines, tubes, and drains  - Monitor endotracheal if appropriate and nasal secretions for changes in amount and color  - [place] appropriate cooling/warming therapies per order  - Administer medications as ordered  - Instruct and encourage patient and family to use good hand hygiene technique  - Identify and instruct in appropriate isolation precautions for identified infection/condition  Outcome: Progressing  Goal: Absence of fever/infection during neutropenic period  Description: INTERVENTIONS:  - Monitor WBC    Outcome: Progressing     Problem: SAFETY ADULT  Goal: Maintain or return to baseline ADL function  Description: INTERVENTIONS:  -  Assess patient's ability to carry out ADLs; assess patient's baseline for ADL function and identify physical deficits which impact ability to perform ADLs (bathing, care of mouth/teeth, toileting, grooming, dressing, etc )  - Assess/evaluate cause of self-care deficits   - Assess range of motion  - Assess patient's mobility; develop plan if impaired  - Assess patient's need for assistive devices and provide as appropriate  - Encourage maximum independence but intervene and supervise when necessary  - Involve family in performance of ADLs  - Assess for home care needs following discharge   - Consider OT consult to assist with ADL evaluation and planning for discharge  - Provide patient education as appropriate  Outcome: Progressing  Goal: Maintains/Returns to pre admission functional level  Description: INTERVENTIONS:  - Perform BMAT or MOVE assessment daily    - Set and communicate daily mobility goal to care team and patient/family/caregiver       Goal: Patient will remain free of falls  Description: INTERVENTIONS:  - Educate patient/family on patient safety including physical limitations  - Instruct patient to call for assistance with activity   - Consult OT/PT to assist with strengthening/mobility   - Keep Call bell within reach  - Keep bed low and locked with side rails adjusted as appropriate  - Keep care items and personal belongings within reach  - Initiate and maintain comfort rounds  - Make Fall Risk Sign visible to staff  - Offer Toileting every 2 Hours, in advance of need  - Initiate/Maintain alarm  - Obtain necessary fall risk management equipment:   - Apply yellow socks and bracelet for high fall risk patients  - Consider moving patient to room near nurses station  Outcome: Progressing     Problem: DISCHARGE PLANNING  Goal: Discharge to home or other facility with appropriate resources  Description: INTERVENTIONS:  - Identify barriers to discharge w/patient and caregiver  - Arrange for needed discharge resources and transportation as appropriate  - Identify discharge learning needs (meds, wound care, etc )  - Arrange for interpretive services to assist at discharge as needed  - Refer to Case Management Department for coordinating discharge planning if the patient needs post-hospital services based on physician/advanced practitioner order or complex needs related to functional status, cognitive ability, or social support system  Outcome: Progressing     Problem: Knowledge Deficit  Goal: Patient/family/caregiver demonstrates understanding of disease process, treatment plan, medications, and discharge instructions  Description: Complete learning assessment and assess knowledge base    Interventions:  - Provide teaching at level of understanding  - Provide teaching via preferred learning methods  Outcome: Progressing     Problem: Potential for Falls  Goal: Patient will remain free of falls  Description: INTERVENTIONS:  - Educate patient/family on patient safety including physical limitations  - Instruct patient to call for assistance with activity   - Consult OT/PT to assist with strengthening/mobility   - Keep Call bell within reach  - Keep bed low and locked with side rails adjusted as appropriate  - Keep care items and personal belongings within reach  - Initiate and maintain comfort rounds  - Make Fall Risk Sign visible to staff  - Offer Toileting every 2 Hours, in advance of need  - Initiate/Maintain alarm  - Obtain necessary fall risk management equipment:   - Apply yellow socks and bracelet for high fall risk patients  - Consider moving patient to room near nurses station  Outcome: Progressing     Problem: Prexisting or High Potential for Compromised Skin Integrity  Goal: Skin integrity is maintained or improved  Description: INTERVENTIONS:  - Identify patients at risk for skin breakdown  - Assess and monitor skin integrity  - Assess and monitor nutrition and hydration status  - Monitor labs   - Assess for incontinence   - Turn and reposition patient  - Assist with mobility/ambulation  - Relieve pressure over bony prominences  - Avoid friction and shearing  - Provide appropriate hygiene as needed including keeping skin clean and dry  - Evaluate need for skin moisturizer/barrier cream  - Collaborate with interdisciplinary team   - Patient/family teaching  - Consider wound care consult   Outcome: Progressing

## 2021-07-16 NOTE — UTILIZATION REVIEW
Initial Clinical Review    Admission: Date/Time/Statement:   Admission Order: Observation 7/14 @ 1201 and changed to Inpatient on 7/15 @ 1541  Pt requiring continued stay for Dizziness/ Anemia/ Hyponatremia and treatment  Ordered        07/15/21 1541  Inpatient Admission  Once         07/14/21 1201  Place in Observation  Once                   Orders Placed This Encounter   Procedures    Inpatient Admission     Standing Status:   Standing     Number of Occurrences:   1     Order Specific Question:   Level of Care     Answer:   Med Surg [16]     Order Specific Question:   Estimated length of stay     Answer:   More than 2 Midnights     Order Specific Question:   Certification     Answer:   I certify that inpatient services are medically necessary for this patient for a duration of greater than two midnights  See H&P and MD Progress Notes for additional information about the patient's course of treatment  ED Arrival Information     Expected Arrival Acuity    - 7/14/2021 08:40 Emergent         Means of arrival Escorted by Service Admission type    Wheelchair Family Member  Milady Rojas) General Medicine Emergency         Arrival complaint    Fall        Chief Complaint   Patient presents with   Rodolfo Fung     Initial Presentation:   65y Male to ED presents with dizziness upon standing  Lost his balance after feeling weak and slid to the ground  No head strike  PMH for Pancreatic adenocarcinoma currently undergoing chemotherapy, chronic hyponatremia, hypertension, diabetes, peripheral neuropathy, mesenteric vein thrombosis  In ED on admit Na 133, Lactic acid elevated at 3 9 which improved after 2 2  Recently started on prednisone taper, Lexapro and Percocet a few prior to admit  Per pt  Caregiver, pt had difficulty time tolerated chemo  Pt c/o feeling cold with some possible chills  Admit Observation level of care for SIRS, Generalized weakness, Episodes of staring and Adenocarcinoma of pancreas underoing chemo  Leukocytosis of 18 84  Start Iv antibiotics  Recently started on Norco reports dizziness and weakness  Lactic of 3 9, s/p 500 ml bolus, trended  down to 2 2  IVFs  Procalcitonin  Prednisone- 30mg daily, continue taper, decreasing by 10 each per outpatient instructions  CT Abd/Pelvis pending  Noted with episodes of hypotension in ED, 86/46 and improved with IVFs  Per family, pt had 1 episode of staring for approx  1min  EEG pending  Completely resolved with no focal neurologic deficit at time of admit  F/u bdl cultures  Trend CBC and temp curve  7/14 Palliative Care cons; Symptom management  Constipation; start miralax, senna and bisacodyl  7/14 Medical-Oncology cons; Diagnosed with pancreatic cancer in 3/2021  initially started on neoadjuvant chemotherapy with gemcitabine and Abraxane  His treatment regimen has been adjusted secondary to declining performance status, multiple hospitalizations, and treatment related side effects  Patient has currently been undergoing single agent therapy with gemcitabine  Last imaging from May demonstrated stable disease  His tumor marker has been trending down indicating treatment response  Last chemo on 7/12/2021  Now hospitalized with progressive generalized weakness, dizziness resulting in fall  CT of head negative  He does have leukocytosis, hypotension and symptoms of chills  Blood cultures are pending  CT of head, chest x-ray unremarkable  CT of abdomen and pelvis pending  If patient has evidence of disease progression, hospice would be a reasonable consideration given patient's declining performance status and inability to tolerate more aggressive chemotherapy in the past   However, if patient wishes to continue with disease directed therapies as he has indicated today, a change in regimen would be indicated and considered on an outpatient basis depending on patient's overall performance status once medically optimized   ECOG 2     7/15 Progress notes: Anemia  Hold Norco as this may have contributed to dizziness and hypotension  Start Iv antibiotics  Elevated Procal 1 21  F/u bld cultures  Hgb this am 6 5 and given bld transfusion 1 unit PRBCs  Trend H&H  Na 130 today, IVF d/c  Restart fluid restriction  Continue Na tabs 1g TID  Trend BMP      7/15 General Surgery cons; CT of the abdomen pelvis shows evidence of disease progression as below  1   Mild interval increase in size of cystic lesion in the head and uncinate process of the pancreas measuring 1 5 cm   Stable severe pancreatic duct dilatation  2   Decreased gas and inflammation between the medial border of the pylorus and head of the pancreas   Persistent inflammation along the medial aspect of the pylorus and proximal 2nd portion of the duodenum  3   1 3 cm low-attenuation, possibly cystic lesion or fluid collection along the medial border of the pylorus   Possible contained perforation or cystic metastasis  4   Constipation    Feel that gastric perforation is unlikely as patient denies abdominal pain and is tolerating a full diet   Patient should continue follow-up with surgical Oncology   Findings on CT likely related to disease progression   If concerns for possible perforation can further evaluate with upper GI or EGD  Hospice should be considered for this patient with disease progression and declining performance status  Date: 7/16 Day 2:   Progress notes; Pt confirms that he did not tolerate more aggressive chemotherapy in the past; ongoing concern that he has had multiple readmissions over the last couple of months related to his progressive weakness  Discussed at length regarding goals of care, options including discharge to rehab to attempt to work on his strength once medically cleared verses pursuing hospice cares  Family meeting requested by pt and sister-in-law hopefully for tomorrow 7/17   Per sister-in-law, she does not want to see patient suffer but will be supportive of whatever he of the pylorus and proximal 2nd portion of the duodenum  3   1 3 cm low-attenuation, possibly cystic lesion or fluid collection along the medial border of the pylorus  Possible contained perforation or cystic metastasis    4   Constipation           Lab Units 07/15/21  0526 07/14/21  0914 07/14/21  0858   WBC Thousand/uL 13 19*  --  18 84*   HEMOGLOBIN g/dL 6 5*  --  7 8*   I STAT HEMOGLOBIN g/dl  --  8 5*  --    HEMATOCRIT % 19 5*  --  23 7*   HEMATOCRIT, ISTAT %  --  25*  --    PLATELETS Thousands/uL 300  --  387   NEUTROS ABS Thousands/µL 11 81*  --  17 90*         Lab Units 07/15/21  0522 07/14/21  0914 07/14/21  0858   SODIUM mmol/L 130*  --  133*   POTASSIUM mmol/L 3 6  --  4 4   CHLORIDE mmol/L 100  --  100   CO2 mmol/L 21  --  21   CO2, I-STAT mmol/L  --  21  --    ANION GAP mmol/L 9  --  12   BUN mg/dL 27*  --  35*   CREATININE mg/dL 0 69  --  1 00   EGFR ml/min/1 73sq m 92  --  73   CALCIUM mg/dL 7 7*  --  8 4   CALCIUM, IONIZED, ISTAT mmol/L  --  1 20  --      Lab Units 07/15/21  0522 07/14/21  0858   AST U/L 14 13   ALT U/L 19 19   ALK PHOS U/L 75 89   TOTAL PROTEIN g/dL 5 0* 5 8*   ALBUMIN g/dL 2 3* 2 9*   TOTAL BILIRUBIN mg/dL 1 00 1 00     Results from last 7 days   Lab Units 07/16/21  1119 07/16/21  0730 07/15/21  2110 07/15/21  1547 07/15/21  1059 07/15/21  0659 07/14/21  2352 07/14/21  2116 07/14/21  2057 07/14/21  1548   POC GLUCOSE mg/dl 258* 169* 346* 416* 306* 174* 281* 421* 428* 134     Results from last 7 days   Lab Units 07/16/21  0511 07/15/21  0522 07/14/21  0858   GLUCOSE RANDOM mg/dL 168* 183* 101             BETA-HYDROXYBUTYRATE   Date Value Ref Range Status   04/08/2021 0 1 <0 6 mmol/L Final              Results from last 7 days   Lab Units 07/14/21  0914   PH, STACIE I-STAT  7 398   PCO2, STACIE ISTAT mm HG 33 1*   PO2, STACIE ISTAT mm HG 22 0*   HCO3, STACIE ISTAT mmol/L 20 4*   I STAT BASE EXC mmol/L -4*   I STAT O2 SAT % 38*         Results from last 7 days   Lab Units 07/14/21  0878 TROPONIN I ng/mL <0 02         Results from last 7 days   Lab Units 07/14/21  0858   PROTIME seconds 18 4*   INR  1 54*   PTT seconds 25         Results from last 7 days   Lab Units 07/15/21  0522 07/14/21  1014   PROCALCITONIN ng/ml 1 21* 0 27*     Results from last 7 days   Lab Units 07/14/21  2143 07/14/21  1139 07/14/21  1014   LACTIC ACID mmol/L 1 8 2 2* 3 9*         Results from last 7 days   Lab Units 07/16/21  1043 07/14/21  1137   CLARITY UA   --  Clear   COLOR UA   --  Yellow   SPEC GRAV UA   --  1 020   PH UA   --  6 0   GLUCOSE UA mg/dl  --  100 (1/10%)*   KETONES UA mg/dl  --  Trace*   BLOOD UA   --  Trace-Intact*   PROTEIN UA mg/dl  --  Negative   NITRITE UA   --  Negative   BILIRUBIN UA   --  Negative   UROBILINOGEN UA E U /dl  --  1 0   LEUKOCYTES UA   --  Negative   WBC UA /hpf  --  None Seen   RBC UA /hpf  --  2-4   BACTERIA UA /hpf  --  Occasional   EPITHELIAL CELLS WET PREP /hpf  --  None Seen   SODIUM UR  96  --        Results from last 7 days   Lab Units 07/14/21  1014 07/14/21  1000   BLOOD CULTURE  No Growth at 24 hrs  No Growth at 24 hrs         ED Treatment:   Medication Administration from 07/14/2021 0840 to 07/14/2021 1256       Date/Time Order Dose Route Action     07/14/2021 0959 sodium chloride 0 9 % bolus 500 mL 500 mL Intravenous New Bag        Past Medical History:   Diagnosis Date    Cancer ([facility] )     pancreatic    Diabetes mellitus ([facility] )     Frequent urination     GERD (gastroesophageal reflux disease)     Hyperlipidemia     Hypertension     Peripheral neuropathy     Weakness     Weight loss      Present on Admission:   Generalized weakness   Hyperlipidemia   Adenocarcinoma of pancreas ([facility] )   Essential hypertension   Hyponatremia   Anemia      Admitting Diagnosis: Injury [T14 90XA]  Weakness [R53 1]  Hypotension [I95 9]  Adenocarcinoma of pancreas (HCC) [C25 9]  Adenocarcinoma of head of pancreas (HCC) [C25 0]  Age/Sex: 67 y o  male     Admission Orders:  Scheduled Medications:  acetaminophen, 975 mg, Oral, Q8H [address]  aspirin, 81 mg, Oral, Daily  atorvastatin, 40 mg, Oral, HS  cefepime, 2,000 mg, Intravenous, Q12H  escitalopram, 5 mg, Oral, Daily  insulin glargine, 8 Units, Subcutaneous, HS  insulin lispro, 1-5 Units, Subcutaneous, TID AC  insulin lispro, 1-5 Units, Subcutaneous, HS  insulin lispro, 5 Units, Subcutaneous, TID With Meals  polyethylene glycol, 17 g, Oral, Daily  rivaroxaban, 15 mg, Oral, Daily With Dinner  senna-docusate sodium, 2 tablet, Oral, BID  sodium chloride, 1 g, Oral, TID With Meals  vancomycin, 12 5 mg/kg, Intravenous, Q12H      Continuous IV Infusions:    multi-electrolyte (PLASMALYTE-A/ISOLYTE-S PH 7 4) IV solution   Rate: 75 mL/hr Dose: 75 mL/hr  Freq: Continuous Route: IV  Last Dose: Stopped (07/15/21 1605)  Start: 07/14/21 1315 End: 07/15/21 1550    PRN Meds:  hydrALAZINE, 5 mg, Intravenous, Q6H PRN  oxyCODONE, 2 5 mg, Oral, Q4H PRN  oxyCODONE, 5 mg, Oral, Q4H PRN  prochlorperazine, 10 mg, Oral, Q6H PRN      Bld culture x2  7/15 S/p Bld Transfusion x1 unit RBC  IP CONSULT TO PALLIATIVE CARE  IP CONSULT TO ONCOLOGY  IP CONSULT TO CASE MANAGEMENT  IP CONSULT TO PHARMACY  IP CONSULT TO ACUTE CARE SURGERY  IP CONSULT TO NEPHROLOGY    Network Utilization Review Department  ATTENTION: Please call with any questions or concerns to [phone] and carefully listen to the prompts so that you are directed to the right person  All voicemails are confidential   [facility] all requests for admission clinical reviews, approved or denied determinations and any other requests to dedicated fax number below belonging to the campus where the patient is receiving treatment   List of dedicated fax numbers for the Facilities:  [address] DENIALS (Administrative/Medical Necessity) [phone]   [address] [place] [address] (Maternity/NICU/Pediatrics) [phone]   [address] [address] [facility] [phone] [place] [address] J[address] [place] [address] 951 [phone]

## 2021-07-16 NOTE — CONSULTS
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   sNa dropped s/p IVF but sCr improved    On salt tabs 1g three times daily   -sNa 133-->129    -high urine osm, urine Cl, urine Na support SIADH  -low serum uric acid supports SIADH  -continue 1 8L/day fluid restriction  -increase salt tabs to 2g TID  -f/u am BMP  2  Pancreatic adenocarcinoma - on gemcitabine  3  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  4  Diabetes mellitus type 2, uncontrolled - last A1C 9 4 as of April 2021, management per primary team  5  Anemia of chronic disease - Hgb down to 7 3, repeat CBC, no GALI d/t malignancy  Last iron panel as of April 2021 shows low iron at 24, high ferritin, low iron sat of 15%, low TIBC of 155    6  HTN - BP well controlled today, did have low BP improved with IVF, home ACEi on hold  7  Hypoalbuminemia - alb 2 1, encourage protein supplementation, consider IV albumin if hypotension recurs  8  Elevated sCr possibly GWEN as sCr at baseline 0 6-0 7, up to 1 on admission, with rise in BUN as well to 35  GWEN resolved now    SUMMARY OF RECOMMENDATIONS:  F/u am BMP on higher dose salt tabs 2g TID, fluid restriction as above  HISTORY OF PRESENT ILLNESS:  Requesting Physician: Long Galicia MD  Reason for Consult: hyponatremia    Eliza Sales is a 67y o  year old male who was admitted to [facility] after presenting with dizziness and fatigue  A renal consultation is requested today for assistance in the management of hyponatremia  Patient presents with dizziness  He initially got 500 mL saline on admission  He has been feeling tired lately  Denies belly pain  States that he feels well despite chemotherapy without nausea or vomiting  He denies fevers, chills, diarrhea, dizziness, difficulty with urination or chest pain  He denies NSAID use    He does complain of some shortness of breath while talking today as well as occasional leg edema  He does take 1 g t i d  Salt tabs at home  Of note, patient with low blood pressure on admission  Follows with me outpatient for hyponatremia  PAST MEDICAL HISTORY:  Past Medical History:   Diagnosis Date    Cancer [facility])     pancreatic    Diabetes mellitus ([facility] )     Frequent urination     GERD (gastroesophageal reflux disease)     Hyperlipidemia     Hypertension     Peripheral neuropathy     Weakness     Weight loss        PAST SURGICAL HISTORY:  Past Surgical History:   Procedure Laterality Date    APPENDECTOMY      CATARACT EXTRACTION      COLONOSCOPY      SKIN LESION EXCISION      of a wart on right arm    TUNNELED VENOUS PORT PLACEMENT N/A 3/26/2021    Procedure: INSERTION VENOUS PORT (PORT-A-CATH); Surgeon: Justin Gonzalez MD;  Location:  MAIN OR;  Service: Surgical Oncology       ALLERGIES:  No Known Allergies    SOCIAL HISTORY:  Social History     Substance and Sexual Activity   Alcohol Use Never     Social History     Substance and Sexual Activity   Drug Use Never     Social History     Tobacco Use   Smoking Status Former Smoker    Types: Pipe    Quit date: 2/23/2011    Years since quitting: 10 4   Smokeless Tobacco Never Used       FAMILY HISTORY:  History reviewed  No pertinent family history      MEDICATIONS:    Current Facility-Administered Medications:     acetaminophen (TYLENOL) tablet 975 mg, 975 mg, Oral, Q8H [address], Jennifer P Bloch, CRNP, 975 mg at 07/16/21 1407    aspirin chewable tablet 81 mg, 81 mg, Oral, Daily, Rachael VAUGHN PA-C, 81 mg at 07/16/21 7526    atorvastatin (LIPITOR) tablet 40 mg, 40 mg, Oral, HS, Domonique Guallpa MD, 40 mg at 07/15/21 2130    cefepime (MAXIPIME) IVPB (premix in dextrose) 2,000 mg 50 mL, 2,000 mg, Intravenous, Q12H, Rachael VAUGHN PA-C, Last Rate: 100 mL/hr at 07/16/21 1435, 2,000 mg at 07/16/21 1435    escitalopram (LEXAPRO) tablet 5 mg, 5 mg, Oral, Daily, Rachael VAUGHN PA-C, 5 mg at 07/16/21 5879    hydrALAZINE (APRESOLINE) injection 5 mg, 5 mg, Intravenous, Q6H PRN, Antony Robertson PA-C, 5 mg at 07/15/21 1420    insulin glargine (LANTUS) subcutaneous injection 8 Units 0 08 mL, 8 Units, Subcutaneous, HS, Rachael VAUGHN PA-C, 8 Units at 07/15/21 2130    insulin lispro (HumaLOG) 100 units/mL subcutaneous injection 1-5 Units, 1-5 Units, Subcutaneous, TID AC, 2 Units at 07/16/21 1120 **AND** Fingerstick Glucose (POCT), , , TID AC, Rachael VAUGHN PA-C    insulin lispro (HumaLOG) 100 units/mL subcutaneous injection 1-5 Units, 1-5 Units, Subcutaneous, HS, Rachael VAUGHN PA-C, 3 Units at 07/15/21 2130    insulin lispro (HumaLOG) 100 units/mL subcutaneous injection 5 Units, 5 Units, Subcutaneous, TID With Meals, Elizabeth Bernardo PA-C, 5 Units at 07/16/21 1121    oxyCODONE (ROXICODONE) IR tablet 2 5 mg, 2 5 mg, Oral, Q4H PRN, Elizabeth Bernardo PA-C    oxyCODONE (ROXICODONE) IR tablet 5 mg, 5 mg, Oral, Q4H PRN, Elizabeth Bernardo PA-C    polyethylene glycol (MIRALAX) packet 17 g, 17 g, Oral, Daily, Rachael VAUGHN PA-C, 17 g at 07/16/21 6111    prochlorperazine (COMPAZINE) tablet 10 mg, 10 mg, Oral, Q6H PRN, Marchia Cockayne V, PA-C    rivaroxaban (XARELTO) tablet 15 mg, 15 mg, Oral, Daily With Dinner, Rachael VAUGHN PA-C, 15 mg at 07/15/21 1604    senna-docusate sodium (SENOKOT S) 8 6-50 mg per tablet 2 tablet, 2 tablet, Oral, BID, Amberly Haley DO, 2 tablet at 07/16/21 8646    sodium chloride tablet 1 g, 1 g, Oral, TID With Meals, Marchia Cockayne V, PA-C, 1 g at 07/16/21 1407    vancomycin (VANCOCIN) IVPB (premix in dextrose) 750 mg 150 mL, 12 5 mg/kg, Intravenous, Q8H, Domonique Guallpa MD    REVIEW OF SYSTEMS:  More than 10 systems were reviewed  No other pertinent positive findings other than those mentioned in HPI      PHYSICAL EXAM:  Current Weight: Weight - Scale: 66 7 kg (147 lb)  First Weight: Weight - Scale: 66 8 kg (147 lb 4 3 oz)  Vitals:    07/15/21 1449 07/15/21 2239 07/16/21 0732 07/16/21 1527   BP: 148/67 138/63 136/59 133/62   BP Location: Left arm Left arm Left arm Left arm   Pulse: 82 99 89 82   Resp: 16 21 (!) 25 18   Temp: 98 °F (36 7 °C) 97 7 °F (36 5 °C) 98 °F (36 7 °C) 98 3 °F (36 8 °C)   TempSrc: Oral Oral Oral Oral   SpO2: 100% 95% 95% 99%   Weight:       Height:           Intake/Output Summary (Last 24 hours) at 7/16/2021 1529  Last data filed at 7/16/2021 1527  Gross per 24 hour   Intake 490 ml   Output 2825 ml   Net -2335 ml     Physical Exam  Vitals reviewed  Constitutional:       General: He is not in acute distress  Appearance: He is well-developed  He is not diaphoretic  HENT:      Head: Normocephalic and atraumatic  Nose: Nose normal       Mouth/Throat:      Mouth: Mucous membranes are moist       Pharynx: No oropharyngeal exudate  Eyes:      General: No scleral icterus  Right eye: No discharge  Left eye: No discharge  Neck:      Thyroid: No thyromegaly  Cardiovascular:      Rate and Rhythm: Normal rate and regular rhythm  Heart sounds: Normal heart sounds  Pulmonary:      Effort: Pulmonary effort is normal       Breath sounds: Normal breath sounds  No wheezing or rales  Abdominal:      General: Bowel sounds are normal  There is no distension  Palpations: Abdomen is soft  Tenderness: There is no abdominal tenderness  Musculoskeletal:         General: No swelling  Normal range of motion  Cervical back: Neck supple  Lymphadenopathy:      Cervical: No cervical adenopathy  Skin:     General: Skin is warm and dry  Coloration: Skin is pale  Findings: No rash  Neurological:      Mental Status: He is alert        Comments: awake   Psychiatric:         Mood and Affect: Mood normal          Behavior: Behavior normal          Invasive Devices:      Lab Results:   Results from last 7 days   Lab Units 07/16/21  0511 07/15/21  2218 07/15/21  0522 07/14/21  0914 07/14/21  0858   WBC Thousand/uL 10 77* 13 19*  --   --  18 84*   HEMOGLOBIN g/dL 7 3* 6 5*  --   --  7 8*   I STAT HEMOGLOBIN g/dl  --   --   --  8 5*  --    HEMATOCRIT % 21 3* 19 5*  --   --  23 7*   HEMATOCRIT, ISTAT %  --   --   --  25*  --    PLATELETS Thousands/uL 228 300  --   --  387   POTASSIUM mmol/L 4 0  --  3 6  --  4 4   CHLORIDE mmol/L 100  --  100  --  100   CO2 mmol/L 21  --  21  --  21   CO2, I-STAT mmol/L  --   --   --  21  --    BUN mg/dL 19  --  27*  --  35*   CREATININE mg/dL 0 65  --  0 69  --  1 00   CALCIUM mg/dL 7 4*  --  7 7*  --  8 4   ALK PHOS U/L 64  --  75  --  89   ALT U/L 19  --  19  --  19   AST U/L 10  --  14  --  13   GLUCOSE, ISTAT mg/dl  --   --   --  98  --

## 2021-07-16 NOTE — PROGRESS NOTES
[place]     Progress Note - Taylor Cesar 1945, 67 y o  male MRN: [id number]  Unit/Bed#: -01 Encounter: [id number]  Primary Care Provider: Tobi Weems DO   Date and time admitted to hospital: 7/14/2021  8:42 AM    * Generalized weakness  Assessment & Plan  · Recently started on prednisone taper, Lexapro and Norco for pain, took last one early in AM  · Prednisone- 30mg daily, continue taper, decreasing by 10 each per outpatient instructions  · Patient noted to be hypotensive in emergency department, 86/46, pressures did improve with fluid bolus - remains stable  · Initial lactic acid 3 9, improved to 2 2 after fluids  · Patient reports he is having normal oral intake  · UA, CT head, CXR unremarkable  · CT abdomen pelvis: "Mild interval increase in size of cystic lesion in the head and uncinate process of the pancreas measuring 1 5 cm  Stable severe pancreatic duct dilatation  Decreased gas and inflammation between the medial border of the pylorus and head of the pancreas  Persistent inflammation along the medial aspect of the pylorus and proximal 2nd portion of the duodenum  1 3 cm low-attenuation, possibly cystic lesion or fluid collection along the medial border of the pylorus  Possible contained perforation or cystic metastasis    Constipation "  · Patient overall is feeling improved  · PT/OT evaluated patient, recommended rehab - patient currently agreeable with this plan however ongoing goals of care discussions    Goals of care, counseling/discussion  Assessment & Plan  · Discussed at length with patient directly at bedside as well as his brother Edy Alonso via phone 7/15 per patient request  · Patient verbalizes understanding of findings on his CT scan abdomen/pelvis concerning for enlarging cystic lesion of his pancreas as well as possible cystic metastasis  · Per patient he confirms that he did not tolerate more aggressive chemotherapy in the past; ongoing concern that he has had multiple readmissions over the last couple of months related to his progressive weakness  · Discussed at length regarding goals of care, options including discharge to rehab to attempt to work on his strength once medically cleared verses pursuing hospice cares  · Patient states that the symptoms related to his cancer are well controlled, and would like to try to pursue being discharged to rehab and ultimately continue chemotherapy if possible  Patient's brother Casey Grant is in agreement with this plan  · Patient remains DNR/DNI however does not have a formal living will  Consider POLST on discharge  · Discussed with patient and sister-in-law Mayra Salazar at bedside 7/16 - patient and Mayra Salazar are requesting a family meeting hopefully tomorrow, 7/17  TT sent to palliative care  Pepper Carrier she does not want to see patient suffer but will be supportive of whatever he decides  She recognizes he has had frequent hospital admissions and gradual decline  Adenocarcinoma of pancreas [facility])  Assessment & Plan  · Follows with Dr Lizzy Paniagua outpatient  · Undergoing chemotherapy  · Oncology evaluated patient yesterday, CT abdomen/pelvis obtained which shows some increase in cystic lesion in head of the pancreas as well as possible area of contained perforation versus cystic metastasis  · Question of progressive weakness may be related to progression of disease vs chemo  · Per surgery more likely area of metastasis, no abdominal pain, no surgical intervention - outpt follow up with surg onc  · Palliative Consulted - as of now patient remains disease treatment focused  · Chemotherapy has been decreased in the past due to poor toleration  · Discussed with Dr Lizzy Paniagua - will review imaging and provide recommendations moving forward  Patient is unsure if he wants to continue chemotherapy but would like to know all options available to him      SIRS (systemic inflammatory response syndrome) (HCC)  Assessment & Plan  Background:  Patient with pancreatic adenocarcinoma undergoing chemotherapy recently started on Norco reports dizziness and weakness  Patient denied fever, nausea, vomiting, diarrhea, chest pain, cough  He did admit to feeling cold with some possible chills  Episode of hypotension noted in emergency department, 87/61 which responded to IV fluids appropriately  Patient did have leukocytosis of 18 84, currently undergoing chemotherapy outpatient, while neulasta part of his treatment regimen, he did not receive this during his most recent chemotherapy session    ANC 17 9  Will start patient on cefepime vanc with pharmacy consult  Imaging:  CT head, CXR unremarkable, CT abdomen pelvis noted as above  Lactic of 3 9 ; status post 500cc bolus; trended down 2 2  · Procalcitonin elevated 1 21  · Follow up blood cultures - negative to date x2 at 24 hrs  · Monitor leukocytosis and fever curve, patient afebrile at time of admission  · No clear source of infection at this time, follow up cultures and procalcitonin presentation may have been related to patient's known cancer/dehydration    Anemia  Assessment & Plan  · Acute on chronic, no evidence of active bleeding  · Hemoglobin noted to be 6 5 yesterday  · Received 1 unit PRBCs, hemodynamically stable  · Trend H/H  · hgb 7 3 this AM    Hyponatremia  Assessment & Plan  · 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  · Followed by Nephrology on recent admission  · Appears euvolemic  · Discontinued IVF, restart fluid restriction  · Continue sodium tabs 1 g TID  · Trend BMP  · Nephrology consulted - appreciate recommendations  ·  this AM - trend    Type 2 diabetes mellitus with hyperglycemia, with long-term current use of insulin [facility])  Assessment & Plan  Lab Results   Component Value Date    HGBA1C 9 4 (H) 04/08/2021       Recent Labs     07/15/21  1547 07/15/21  2110 07/16/21  0730 07/16/21  1119   POCGLU 416* 346* 169* 258*       Blood Sugar Average: Last 72 hrs:       · Managed on Lantus 8u and SSI at home  · Start humalog 5 units TID with meals due to hyperglycemia  · Continue while admitted    Essential hypertension  Assessment & Plan  · Managed on ramipril outpatient  · Hold for now given hypotenstion on admission  · BP now improved  · Consider resumption of ramipril 6/17    Hyperlipidemia  Assessment & Plan  · Continue Lipitor    Episodes of staring-resolved as of 7/16/2021  Assessment & Plan  · Noted by family, to last approximately 1 minute  · CT head in ED normal  · EEG ordered on admission  · Consider Neurology consult at that time  · Completely resolved with no focal neurologic deficit at time of admission  · Question if this is related to narcotic use/hypotension  · No recurrence since admission    VTE Pharmacologic Prophylaxis:   Pharmacologic: Rivaroxaban (Xarelto)  Mechanical VTE Prophylaxis in Place: Yes    Patient Centered Rounds: I have performed bedside rounds with nursing staff today  Discussions with Specialists or Other Care Team Provider: Nursing, CM, Nephrology, TT to palliative care AP, TT to oncology Dr Nina Harrison    Education and Discussions with Family / Patient: Discussed at length with patient and patient's sister in law Bernabe at bedside  Time Spent for Care: 45 minutes  More than 50% of total time spent on counseling and coordination of care as described above  Current Length of Stay: 1 day(s)    Current Patient Status: Inpatient   Certification Statement: The patient will continue to require additional inpatient hospital stay due to goals of care discussion, disposition planning    Discharge Plan: Pending clinical course    Code Status: Level 3 - DNAR and DNI      Subjective:   Patient states he overall feels improved since admission  Understandably patient is very overwhelmed regarding decisions moving forward whether he would want to pursue chemotherapy or not if this was still an option    Denies chest pain/palpitations, shortness of breath, nausea/vomiting, abdominal pain  Objective:     Vitals:   Temp (24hrs), Av 8 °F (36 6 °C), Min:97 4 °F (36 3 °C), Max:98 °F (36 7 °C)    Temp:  [97 4 °F (36 3 °C)-98 °F (36 7 °C)] 98 °F (36 7 °C)  HR:  [82-99] 89  Resp:  [16-25] 25  BP: (136-191)/(59-72) 136/59  SpO2:  [95 %-100 %] 95 %  Body mass index is 21 09 kg/m²  Input and Output Summary (last 24 hours): Intake/Output Summary (Last 24 hours) at 2021 1136  Last data filed at 2021 0800  Gross per 24 hour   Intake 490 ml   Output 2525 ml   Net -2035 ml       Physical Exam:     Physical Exam  Vitals and nursing note reviewed  Constitutional:       Appearance: He is well-developed  Comments: Appears comfortable, no acute distress   HENT:      Head: Normocephalic and atraumatic  Eyes:      General: No scleral icterus  Extraocular Movements: Extraocular movements intact  Conjunctiva/sclera: Conjunctivae normal    Cardiovascular:      Rate and Rhythm: Normal rate and regular rhythm  Heart sounds: S1 normal and S2 normal  No murmur heard  Pulmonary:      Effort: Pulmonary effort is normal  No respiratory distress  Breath sounds: Normal breath sounds  No wheezing, rhonchi or rales  Abdominal:      General: Bowel sounds are normal       Palpations: Abdomen is soft  Tenderness: There is no abdominal tenderness  There is no guarding or rebound  Musculoskeletal:      Cervical back: Normal range of motion  Comments: Able to move upper/lower ext bilaterally, no edema   Skin:     General: Skin is warm and dry  Neurological:      Mental Status: He is alert and oriented to person, place, and time  Psychiatric:         Mood and Affect: Affect is tearful           Speech: Speech normal          Behavior: Behavior normal            Additional Data:     Labs:    Results from last 7 days   Lab Units 21  0511   WBC Thousand/uL 10 77*   HEMOGLOBIN g/dL 7 3*   HEMATOCRIT % 21 3*   PLATELETS Thousands/uL 228   NEUTROS PCT % 85*   LYMPHS PCT % 10*   MONOS PCT % 1*   EOS PCT % 3     Results from last 7 days   Lab Units 07/16/21  0511   SODIUM mmol/L 129*   POTASSIUM mmol/L 4 0   CHLORIDE mmol/L 100   CO2 mmol/L 21   BUN mg/dL 19   CREATININE mg/dL 0 65   ANION GAP mmol/L 8   CALCIUM mg/dL 7 4*   ALBUMIN g/dL 2 1*   TOTAL BILIRUBIN mg/dL 1 00   ALK PHOS U/L 64   ALT U/L 19   AST U/L 10   GLUCOSE RANDOM mg/dL 168*     Results from last 7 days   Lab Units 07/14/21  0858   INR  1 54*     Results from last 7 days   Lab Units 07/16/21  1119 07/16/21  0730 07/15/21  2110 07/15/21  1547 07/15/21  1059 07/15/21  0659 07/14/21  2352 07/14/21  2116 07/14/21  2057 07/14/21  1548   POC GLUCOSE mg/dl 258* 169* 346* 416* 306* 174* 281* 421* 428* 134         Results from last 7 days   Lab Units 07/15/21  0522 07/14/21  2143 07/14/21  1139 07/14/21  1014   LACTIC ACID mmol/L  --  1 8 2 2* 3 9*   PROCALCITONIN ng/ml 1 21*  --   --  0 27*           * I Have Reviewed All Lab Data Listed Above  * Additional Pertinent Lab Tests Reviewed: All Labs Within Last 24 Hours Reviewed    Imaging:    Imaging Reports Reviewed Today Include:   Imaging Personally Reviewed by Myself Includes:      Recent Cultures (last 7 days):     Results from last 7 days   Lab Units 07/14/21  1014 07/14/21  1000   BLOOD CULTURE  No Growth at 24 hrs  No Growth at 24 hrs         Last 24 Hours Medication List:   Current Facility-Administered Medications   Medication Dose Route Frequency Provider Last Rate    acetaminophen  975 mg Oral Q8H [facility] [place] JOI Fortune      aspirin  81 mg Oral Daily [address] [place], [place]      atorvastatin  40 mg Oral HS Sai Delong MD      cefepime  2,000 mg Intravenous Q12H OLIVIA Chanel-C 2,000 mg (07/16/21 0329)    escitalopram  5 mg Oral Daily [facility] DANTE VAUGHN      hydrALAZINE  5 mg Intravenous Q6H PRN Mariela Kapadia PA-C      insulin glargine  8 Units Subcutaneous HS Rachael VAUGHN PA-C  insulin lispro  1-5 Units Subcutaneous TID AC Rachael Pat V, DANTE      insulin lispro  1-5 Units Subcutaneous HS Rachael Pat V, DANTE      insulin lispro  5 Units Subcutaneous TID With Meals Valencia August, DANTE      oxyCODONE  2 5 mg Oral Q4H PRN Valencia August, DANTE      oxyCODONE  5 mg Oral Q4H PRN Valencia August, DANTE      polyethylene glycol  17 g Oral Daily [address] [place], [place]      prochlorperazine  10 mg Oral Q6H PRN [facility] DANTE Tyler      rivaroxaban  15 mg Oral Daily With Jebbit VDANTE      senna-docusate sodium  2 tablet Oral BID Amberly Haley DO      sodium chloride  1 g Oral TID With Meals [address] [facility] V, DANTE      vancomycin  12 5 mg/kg Intravenous Q12H Rachael Pat V, DANTE 750 mg (07/16/21 0330)        Today, Patient Was Seen By: Mechelle Hughes PA-C    ** Please Note: Dictation voice to text software may have been used in the creation of this document   **

## 2021-07-16 NOTE — PLAN OF CARE
Problem: PHYSICAL THERAPY ADULT  Goal: Performs mobility at highest level of function for planned discharge setting  See evaluation for individualized goals  Description: Treatment/Interventions: ADL retraining, Functional transfer training, LE strengthening/ROM, Therapeutic exercise, Endurance training, Patient/family training, Equipment eval/education, Bed mobility, Gait training, Spoke to case management, Spoke to nursing, OT  Equipment Recommended: Nanci Mcpherson       See flowsheet documentation for full assessment, interventions and recommendations  Outcome: Progressing  Note: Prognosis: Good  Problem List: Decreased strength, Decreased endurance, Impaired balance, Decreased mobility, Decreased coordination  Assessment: Pt able to mobilize to chair and set up for lunch  States appetite is good at present  Somewhat tearful after Pastors visit  Reassurance provided  Con't to recommend in-pt rehab at d/c to maximize functional status  Con't PT  Barriers to Discharge:  (medical clearance)        PT Discharge Recommendation: Post acute rehabilitation services     PT - OK to Discharge: Yes    See flowsheet documentation for full assessment

## 2021-07-16 NOTE — QUICK NOTE
Discussed with Dr Nina Harrison of oncology regarding patient whom he sees as an outpatient  Agrees that rehab would be a reasonable option at this time to try to help with patient's functional status  Decision regarding chemotherapy can be discussed as an outpatient depending on his progress  Called patient's sister-in-law Antionette Pearl with update, she reports family would still like to have a meeting likely tomorrow afternoon and will notify us of a time

## 2021-07-16 NOTE — ASSESSMENT & PLAN NOTE
· Managed on ramipril outpatient  · Hold for now given hypotenstion on admission  · BP now improved  · Consider resumption of ramipril 6/17

## 2021-07-16 NOTE — ASSESSMENT & PLAN NOTE
· Discussed at length with patient directly at bedside as well as his brother Humberto Milan via phone 7/15 per patient request  · Patient verbalizes understanding of findings on his CT scan abdomen/pelvis concerning for enlarging cystic lesion of his pancreas as well as possible cystic metastasis  · Per patient he confirms that he did not tolerate more aggressive chemotherapy in the past; ongoing concern that he has had multiple readmissions over the last couple of months related to his progressive weakness  · Discussed at length regarding goals of care, options including discharge to rehab to attempt to work on his strength once medically cleared verses pursuing hospice cares  · Patient states that the symptoms related to his cancer are well controlled, and would like to try to pursue being discharged to rehab and ultimately continue chemotherapy if possible  Patient's brother Humberto Milan is in agreement with this plan  · Patient remains DNR/DNI however does not have a formal living will  Consider POLST on discharge  · Discussed with patient and sister-in-law Sharidamon Alberto at bedside 7/16 - patient and Shari Alberto are requesting a family meeting hopefully tomorrow, 7/17  TT sent to palliative care  Hank Vazquez she does not want to see patient suffer but will be supportive of whatever he decides  She recognizes he has had frequent hospital admissions and gradual decline

## 2021-07-16 NOTE — CASE MANAGEMENT
CM called and spoke to patient's brother Ralf to follow up on STR choices  Ralf spoke to his wife Lorraine Huerta and both report they would like referrals to 1  [facility] 2  [facility]  referrals sent

## 2021-07-16 NOTE — OCCUPATIONAL THERAPY NOTE
Occupational Therapy Tx Note     Patient Name: Ian Gutierrez  Today's Date: 7/16/2021  Problem List  Principal Problem:    Generalized weakness  Active Problems:    Hyperlipidemia    Essential hypertension    Type 2 diabetes mellitus with hyperglycemia, with long-term current use of insulin (HCC)    Adenocarcinoma of pancreas (HCC)    Hyponatremia    Anemia    SIRS (systemic inflammatory response syndrome) (HCC)    Goals of care, counseling/discussion    Past Medical History  Past Medical History:   Diagnosis Date    [facility] [address] )     pancreatic    Diabetes mellitus ([facility] [address] )     Frequent urination     GERD (gastroesophageal reflux disease)     Hyperlipidemia     Hypertension     Peripheral neuropathy     Weakness     Weight loss      Past Surgical History  Past Surgical History:   Procedure Laterality Date    APPENDECTOMY      CATARACT EXTRACTION      COLONOSCOPY      SKIN LESION EXCISION      of a wart on right arm    TUNNELED VENOUS PORT PLACEMENT N/A 3/26/2021    Procedure: INSERTION VENOUS PORT (PORT-A-CATH); Surgeon: Fabio Avelar MD;  Location: [facility] OR;  Service: Surgical Oncology         07/16/21 1153   OT Last Visit   OT Visit Date 07/16/21   Note Type   Note Type Treatment   Restrictions/Precautions   Weight Bearing Precautions Per Order No   Other Precautions Fall Risk   Pain Assessment   Pain Assessment Tool Pain Assessment not indicated - pt denies pain   ADL   Eating Assistance 7  Independent   Eating Deficit Setup   Eating Comments Assistance required to manage various containers and packages   Grooming Assistance 7  Independent   Grooming Deficit Setup   Bed Mobility   Supine to Sit 5  Supervision   Additional items Verbal cues   Additional Comments Pt remained seated in recliner by end of session      Transfers   Sit to Stand 4  Minimal assistance   Additional items Assist x 1;Verbal cues   Stand to Sit 4  Minimal assistance   Additional items Assist x 1;Verbal cues   Stand pivot 4 Minimal assistance   Additional items Assist x 1;Verbal cues  (RW)   Cognition   Overall Cognitive Status WFL   Arousal/Participation Alert   Attention Within functional limits   Orientation Level Oriented X4   Memory Within functional limits   Following Commands Follows all commands and directions without difficulty   Comments Pt tearful  Just had visit with his Yarsani  and endorses that it has helped him cope with son's death  Activity Tolerance   Activity Tolerance Patient tolerated treatment well   Medical Staff Made Aware PT Johanne   Assessment   Assessment Pt seen for OT tx session with focus on functional balance, functional mobility, ADL status, and transfer safety  Patient agreeable to OT treatment session  Pt received supine in bed  Performed bed mobility with supervision  Performed transfers with min A x 1 with RW  Pt demonstrates some improvement in balance  Encouraged to eat all his meals OOB  Required set-up for meal and grooming task  Patient continues to be functioning below baseline level, occupational performance remains limited secondary to factors listed above, and pt at increased risk for falls and injury  The patient's raw score on the AM-PAC Daily Activity inpatient short form is 17, standardized score is 37 26, less than 39 4  Patients at this level are likely to benefit from DC to post-acute rehabilitation services  Please refer to the recommendation of the Occupational Therapist for safe DC planning  From OT standpoint, recommendation at time of d/c would be Short Term Rehab  Patient to benefit from continued Occupational Therapy treatment while in the hospital to address deficits as defined above and maximize level of functional independence with ADLs and functional mobility  Pt left with call bell in reach, tray table in reach, needs met, chair alarm activated  Plan   Treatment Interventions ADL retraining;Functional transfer training; Endurance training;Patient/family

## 2021-07-16 NOTE — ASSESSMENT & PLAN NOTE
· Acute on chronic, no evidence of active bleeding  · Hemoglobin noted to be 6 5 yesterday  · Received 1 unit PRBCs, hemodynamically stable  · Trend H/H  · hgb 7 3 this AM

## 2021-07-16 NOTE — ASSESSMENT & PLAN NOTE
· 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  · Followed by Nephrology on recent admission  · Appears euvolemic  · Discontinued IVF, restart fluid restriction  · Continue sodium tabs 1 g TID  · Trend BMP  · Nephrology consulted - appreciate recommendations  ·  this AM - trend

## 2021-07-16 NOTE — ASSESSMENT & PLAN NOTE
· Recently started on prednisone taper, Lexapro and Norco for pain, took last one early in AM  · Prednisone- 30mg daily, continue taper, decreasing by 10 each per outpatient instructions  · Patient noted to be hypotensive in emergency department, 86/46, pressures did improve with fluid bolus - remains stable  · Initial lactic acid 3 9, improved to 2 2 after fluids  · Patient reports he is having normal oral intake  · UA, CT head, CXR unremarkable  · CT abdomen pelvis: "Mild interval increase in size of cystic lesion in the head and uncinate process of the pancreas measuring 1 5 cm  Stable severe pancreatic duct dilatation  Decreased gas and inflammation between the medial border of the pylorus and head of the pancreas  Persistent inflammation along the medial aspect of the pylorus and proximal 2nd portion of the duodenum  1 3 cm low-attenuation, possibly cystic lesion or fluid collection along the medial border of the pylorus  Possible contained perforation or cystic metastasis    Constipation "  · Patient overall is feeling improved  · PT/OT evaluated patient, recommended rehab - patient currently agreeable with this plan however ongoing goals of care discussions

## 2021-07-16 NOTE — PHYSICAL THERAPY NOTE
PT tx     07/16/21 1155   PT Last Visit   PT Visit Date 07/16/21   Note Type   Note Type Treatment   Pain Assessment   Pain Assessment Tool Pain Assessment not indicated - pt denies pain   Pain Score No Pain   Restrictions/Precautions   Weight Bearing Precautions Per Order No   General   Chart Reviewed Yes   Additional Pertinent History may need more blood today   Cognition   Overall Cognitive Status WFL   Arousal/Participation Alert   Attention Within functional limits   Orientation Level Oriented X4   Following Commands Follows all commands and directions without difficulty   Subjective   Subjective Feels ok agrees to mobilize just finished visit with    Bed Mobility   Rolling R 5  Supervision   Additional items Assist x 1;HOB elevated; Bedrails; Increased time required   Supine to Sit 5  Supervision   Additional items Assist x 1; Increased time required;Verbal cues; Bedrails;HOB elevated   Transfers   Sit to Stand 4  Minimal assistance   Additional items Assist x 2; Increased time required;Verbal cues   Stand to Sit 4  Minimal assistance   Additional items Assist x 2; Increased time required;Armrests; Verbal cues   Stand pivot 4  Minimal assistance   Additional items Assist x 2; Increased time required;Verbal cues;Armrests  (rw)   Ambulation/Elevation   Gait pattern Narrow COLE; Forward Flexion; Inconsistent champ; Short stride; Step to   Gait Assistance 4  Minimal assist   Additional items Assist x 2;Verbal cues; Tactile cues   Assistive Device Rolling walker   Distance 6'   Balance   Static Sitting Good   Dynamic Sitting Fair +   Static Standing Fair   Dynamic Standing [id number]   Endurance Deficit   Endurance Deficit No   Activity Tolerance   Activity Tolerance Patient tolerated treatment well   Medical Staff Made Aware OT Amada   Nurse Made Aware [facility]   Assessment   Prognosis Good   Problem List Decreased strength;Decreased endurance; Impaired balance;Decreased mobility; Decreased coordination Assessment Pt able to mobilize to chair and set up for lunch  States appetite is good at present  Somewhat tearful after Past[facility] visit  Reassurance provided  Con't to recommend in-pt rehab at d/c to maximize functional status  Con't PT   Barriers to Discharge   (medical clearance)   Goals   Patient Goals get better   Plan   Treatment/Interventions ADL retraining;LE strengthening/ROM; Functional transfer training; Therapeutic exercise; Endurance training;Gait training;Spoke to nursing;Spoke to case management;OT   Progress Slow progress, medical status limitations   PT Frequency 5x/wk   Recommendation   PT Discharge Recommendation Post acute rehabilitation services   Equipment Recommended [facility] Recommended Wheeled walker   PT - OK to Discharge Yes   Additional Comments   (to str with medical clearance)   AM-PAC Basic Mobility Inpatient   Turning in Bed Without Bedrails 3   Lying on Back to Sitting on Edge of Flat Bed 3   Moving Bed to Chair 3   Standing Up From Chair 3   Walk in Room 2   Climb 3-5 Stairs 1   Basic Mobility Inpatient Raw Score 15   Basic Mobility Standardized Score 36 97   Naveen Fenton, PT

## 2021-07-16 NOTE — PLAN OF CARE
Problem: OCCUPATIONAL THERAPY ADULT  Goal: Performs self-care activities at highest level of function for planned discharge setting  See evaluation for individualized goals  Description: Treatment Interventions: ADL retraining, Functional transfer training, Endurance training, Patient/family training, Compensatory technique education          See flowsheet documentation for full assessment, interventions and recommendations  Outcome: Progressing  Note: Limitation: Decreased ADL status, Decreased self-care trans, Decreased high-level ADLs, Decreased endurance  Prognosis: Fair  Assessment: Pt seen for OT tx session with focus on functional balance, functional mobility, ADL status, and transfer safety  Patient agreeable to OT treatment session  Pt received supine in bed  Performed bed mobility with supervision  Performed transfers with min A x 1 with RW  Pt demonstrates some improvement in balance  Encouraged to eat all his meals OOB  Required set-up for meal and grooming task  Patient continues to be functioning below baseline level, occupational performance remains limited secondary to factors listed above, and pt at increased risk for falls and injury  The patient's raw score on the AM-PAC Daily Activity inpatient short form is 17, standardized score is 37 26, less than 39 4  Patients at this level are likely to benefit from DC to post-acute rehabilitation services  Please refer to the recommendation of the Occupational Therapist for safe DC planning  From OT standpoint, recommendation at time of d/c would be Short Term Rehab  Patient to benefit from continued Occupational Therapy treatment while in the hospital to address deficits as defined above and maximize level of functional independence with ADLs and functional mobility  Pt left with call bell in reach, tray table in reach, needs met, chair alarm activated       OT Discharge Recommendation: Post acute rehabilitation services

## 2021-07-16 NOTE — ASSESSMENT & PLAN NOTE
· Follows with Dr Veronica Barahona outpatient  · Undergoing chemotherapy  · Oncology evaluated patient yesterday, CT abdomen/pelvis obtained which shows some increase in cystic lesion in head of the pancreas as well as possible area of contained perforation versus cystic metastasis  · Question of progressive weakness may be related to progression of disease vs chemo  · Per surgery more likely area of metastasis, no abdominal pain, no surgical intervention - outpt follow up with surg onc  · Palliative Consulted - as of now patient remains disease treatment focused  · Chemotherapy has been decreased in the past due to poor toleration  · Discussed with Dr Veronica Barahona - will review imaging and provide recommendations moving forward  Patient is unsure if he wants to continue chemotherapy but would like to know all options available to him

## 2021-07-16 NOTE — CASE MANAGEMENT
Per SLIM in care team rounds paitent and family confirmed the DC plan is to STR, and they plan to have a family meeting tomorrow to discuss medical goals of care and further updates  SLIM to update CM regarding meeting as no CM on site coverage on the weekend  CM met with patient to introduce self, explain role, and verify DC planning  Patient confirms plan at DC is STR and requests CM call ALEIDA Mead or brother Ralf regarding choices as he states he is not familiar with the locations and feels they would be best in making this decision  CM called and left voicemail for ALEIDA Kaufman  CM awaiting call back from ALEIDA to discuss choices

## 2021-07-16 NOTE — ASSESSMENT & PLAN NOTE
Background:  Patient with pancreatic adenocarcinoma undergoing chemotherapy recently started on Norco reports dizziness and weakness  Patient denied fever, nausea, vomiting, diarrhea, chest pain, cough  He did admit to feeling cold with some possible chills  Episode of hypotension noted in emergency department, 04/03 which responded to IV fluids appropriately  Patient did have leukocytosis of 18 84, currently undergoing chemotherapy outpatient, while neulasta part of his treatment regimen, he did not receive this during his most recent chemotherapy session    ANC 17 9  Will start patient on cefepime vanc with pharmacy consult  Imaging:  CT head, CXR unremarkable, CT abdomen pelvis noted as above  Lactic of 3 9 ; status post 500cc bolus; trended down 2 2  · Procalcitonin elevated 1 21  · Follow up blood cultures - negative to date x2 at 24 hrs  · Monitor leukocytosis and fever curve, patient afebrile at time of admission  · No clear source of infection at this time, follow up cultures and procalcitonin presentation may have been related to patient's known cancer/dehydration

## 2021-07-16 NOTE — CASE MANAGEMENT
[facility] which is patient's first choice  CM spoke to Sukhi Smalls in admissions who confirms they are able to accept for tentative DC Monday pending medical stability per estimated DC timeframe in care team rounds  CM submitted for insurance authorization via [employer] with reference number [id number]  Awaiting determination  PASRR completed at facility request and sent via [employer] to  [facility]  CM updated facilities via [employer] of same  CM updated brothjagjit Arambula via phone that [facility]"" accepted and authorization has been submitted to ensure no delays in patient's DC planning process   appreciates update  [facility] pending medical stability  Insurance authorization pending

## 2021-07-16 NOTE — ASSESSMENT & PLAN NOTE
Lab Results   Component Value Date    HGBA1C 9 4 (H) 04/08/2021       Recent Labs     07/15/21  1547 07/15/21  2110 07/16/21  0730 07/16/21  1119   POCGLU 416* 346* 169* 258*       Blood Sugar Average: Last 72 hrs:       · Managed on Lantus 8u and SSI at home  · Start humalog 5 units TID with meals due to hyperglycemia  · Continue while admitted

## 2021-07-17 NOTE — PLAN OF CARE
Problem: MOBILITY - ADULT  Goal: Maintain or return to baseline ADL function  Description: INTERVENTIONS:  -  Assess patient's ability to carry out ADLs; assess patient's baseline for ADL function and identify physical deficits which impact ability to perform ADLs (bathing, care of mouth/teeth, toileting, grooming, dressing, etc )  - Assess/evaluate cause of self-care deficits   - Assess range of motion  - Assess patient's mobility; develop plan if impaired  - Assess patient's need for assistive devices and provide as appropriate  - Encourage maximum independence but intervene and supervise when necessary  - Involve family in performance of ADLs  - Assess for home care needs following discharge   - Consider OT consult to assist with ADL evaluation and planning for discharge  - Provide patient education as appropriate  Outcome: Progressing  Goal: Maintains/Returns to pre admission functional level  Description: INTERVENTIONS:  - Perform BMAT or MOVE assessment daily    - Set and communicate daily mobility goal to care team and patient/family/caregiver  - Collaborate with rehabilitation services on mobility goals if consulted  - Perform Range of Motion times a day  - Reposition patient every hours    - Dangle patient times a day  - Stand patient times a day  - Ambulate patient times a day  - Out of bed to chair times a day   - Out of bed for meals times a day  - Out of bed for toileting  - Record patient progress and toleration of activity level   Outcome: Progressing     Problem: PAIN - ADULT  Goal: Verbalizes/displays adequate comfort level or baseline comfort level  Description: Interventions:  - Encourage patient to monitor pain and request assistance  - Assess pain using appropriate pain scale  - Administer analgesics based on type and severity of pain and evaluate response  - Implement non-pharmacological measures as appropriate and evaluate response  - Consider cultural and social influences on pain and pain management  - Notify physician/advanced practitioner if interventions unsuccessful or patient reports new pain  Outcome: Progressing     Problem: INFECTION - ADULT  Goal: Absence or prevention of progression during hospitalization  Description: INTERVENTIONS:  - Assess and monitor for signs and symptoms of infection  - Monitor lab/diagnostic results  - Monitor all insertion sites, i e  indwelling lines, tubes, and drains  - Monitor endotracheal if appropriate and nasal secretions for changes in amount and color  - [place] appropriate cooling/warming therapies per order  - Administer medications as ordered  - Instruct and encourage patient and family to use good hand hygiene technique  - Identify and instruct in appropriate isolation precautions for identified infection/condition  Outcome: Progressing  Goal: Absence of fever/infection during neutropenic period  Description: INTERVENTIONS:  - Monitor WBC    Outcome: Progressing     Problem: SAFETY ADULT  Goal: Maintain or return to baseline ADL function  Description: INTERVENTIONS:  -  Assess patient's ability to carry out ADLs; assess patient's baseline for ADL function and identify physical deficits which impact ability to perform ADLs (bathing, care of mouth/teeth, toileting, grooming, dressing, etc )  - Assess/evaluate cause of self-care deficits   - Assess range of motion  - Assess patient's mobility; develop plan if impaired  - Assess patient's need for assistive devices and provide as appropriate  - Encourage maximum independence but intervene and supervise when necessary  - Involve family in performance of ADLs  - Assess for home care needs following discharge   - Consider OT consult to assist with ADL evaluation and planning for discharge  - Provide patient education as appropriate  Outcome: Progressing  Goal: Maintains/Returns to pre admission functional level  Description: INTERVENTIONS:  - Perform BMAT or MOVE assessment daily    - Set and communicate daily mobility goal to care team and patient/family/caregiver  - Collaborate with rehabilitation services on mobility goals if consulted  - Perform Range of Motion times a day  - Reposition patient every hours    - Dangle patient times a day  - Stand patient times a day  - Ambulate patient times a day  - Out of bed to chair times a day   - Out of bed for meals times a day  - Out of bed for toileting  - Record patient progress and toleration of activity level   Outcome: Progressing  Goal: Patient will remain free of falls  Description: INTERVENTIONS:  - Educate patient/family on patient safety including physical limitations  - Instruct patient to call for assistance with activity   - Consult OT/PT to assist with strengthening/mobility   - Keep Call bell within reach  - Keep bed low and locked with side rails adjusted as appropriate  - Keep care items and personal belongings within reach  - Initiate and maintain comfort rounds  - Make Fall Risk Sign visible to staff  - Offer Toileting every Hours, in advance of need  - Initiate/Maintain alarm  - Obtain necessary fall risk management equipment:   - Apply yellow socks and bracelet for high fall risk patients  - Consider moving patient to room near nurses station  Outcome: Progressing     Problem: DISCHARGE PLANNING  Goal: Discharge to home or other facility with appropriate resources  Description: INTERVENTIONS:  - Identify barriers to discharge w/patient and caregiver  - Arrange for needed discharge resources and transportation as appropriate  - Identify discharge learning needs (meds, wound care, etc )  - Arrange for interpretive services to assist at discharge as needed  - Refer to Case Management Department for coordinating discharge planning if the patient needs post-hospital services based on physician/advanced practitioner order or complex needs related to functional status, cognitive ability, or social support system  Outcome: Progressing Problem: Knowledge Deficit  Goal: Patient/family/caregiver demonstrates understanding of disease process, treatment plan, medications, and discharge instructions  Description: Complete learning assessment and assess knowledge base    Interventions:  - Provide teaching at level of understanding  - Provide teaching via preferred learning methods  Outcome: Progressing     Problem: Potential for Falls  Goal: Patient will remain free of falls  Description: INTERVENTIONS:  - Educate patient/family on patient safety including physical limitations  - Instruct patient to call for assistance with activity   - Consult OT/PT to assist with strengthening/mobility   - Keep Call bell within reach  - Keep bed low and locked with side rails adjusted as appropriate  - Keep care items and personal belongings within reach  - Initiate and maintain comfort rounds  - Make Fall Risk Sign visible to staff  - Offer Toileting every Hours, in advance of need  - Initiate/Maintain alarm  - Obtain necessary fall risk management equipment:  - Apply yellow socks and bracelet for high fall risk patients  - Consider moving patient to room near nurses station  Outcome: Progressing     Problem: Prexisting or High Potential for Compromised Skin Integrity  Goal: Skin integrity is maintained or improved  Description: INTERVENTIONS:  - Identify patients at risk for skin breakdown  - Assess and monitor skin integrity  - Assess and monitor nutrition and hydration status  - Monitor labs   - Assess for incontinence   - Turn and reposition patient  - Assist with mobility/ambulation  - Relieve pressure over bony prominences  - Avoid friction and shearing  - Provide appropriate hygiene as needed including keeping skin clean and dry  - Evaluate need for skin moisturizer/barrier cream  - Collaborate with interdisciplinary team   - Patient/family teaching  - Consider wound care consult   Outcome: Progressing

## 2021-07-17 NOTE — NURSING NOTE
Notified MD regarding 3 loose stools with pt this morning  Pt is on Vancomycin for neutropenia  Miralax and Senokot were d'ryan  Pt placed on contact precautions to rule out C-diff  A stool sample was ordered  Also, pt was placed on Florastor  Also, noticed pt has a stage 2 wound on left buttocks  A wound care consult was ordered

## 2021-07-17 NOTE — ASSESSMENT & PLAN NOTE
· Managed on ramipril outpatient  · Hold for now given hypotenstion on admission  · BP now improved  · Resume ramipril

## 2021-07-17 NOTE — PROGRESS NOTES
Vancomycin Assessment    Tasha Saucedo is a 67 y o  male who is currently receiving vancomycin 750mg IV Q8H for bacteremia, other sepsis   Relevant clinical data and objective history reviewed:  Creatinine   Date Value Ref Range Status   07/17/2021 0 73 0 60 - 1 30 mg/dL Final     Comment:     Standardized to IDMS reference method   07/16/2021 0 65 0 60 - 1 30 mg/dL Final     Comment:     Standardized to IDMS reference method   07/15/2021 0 69 0 60 - 1 30 mg/dL Final     Comment:     Standardized to IDMS reference method     /68 (BP Location: Left arm)   Pulse 80   Temp 98 4 °F (36 9 °C) (Oral)   Resp 19   Ht 5' 10" (1 778 m)   Wt 66 7 kg (147 lb)   SpO2 99%   BMI 21 09 kg/m²   I/O last 3 completed shifts: In: 1020 [P O :1020]  Out: 3125 [Urine:3125]  Lab Results   Component Value Date/Time    BUN 21 07/17/2021 04:36 AM    WBC 8 47 07/17/2021 04:36 AM    HGB 7 4 (L) 07/17/2021 04:36 AM    HCT 21 6 (L) 07/17/2021 04:36 AM     (H) 07/17/2021 04:36 AM     07/17/2021 04:36 AM     Temp Readings from Last 3 Encounters:   07/17/21 98 4 °F (36 9 °C) (Oral)   07/12/21 (!) 97 3 °F (36 3 °C) (Temporal)   07/06/21 (!) 97 4 °F (36 3 °C) (Temporal)     Vancomycin Days of Therapy: 4    Assessment/Plan  The patient is currently on vancomycin utilizing scheduled dosing  The patient is receiving 750mg IV Q8H with the most recent vancomycin level being at steady-state and therapeutic based on a goal of 15-20 (appropriate for most indications) ; therefore, is clinically appropriate and dose will be continued   Pharmacy will continue to follow closely for s/sx of nephrotoxicity, infusion reactions, and appropriateness of therapy  BMP and CBC will be ordered per protocol  Plan for trough  at approximately 0700 on 7/19/21  Pharmacy will continue to follow the patients culture results and clinical progress daily      Sweetie Zaragoza, Pharmacist

## 2021-07-17 NOTE — ACP (ADVANCE CARE PLANNING)
Family meeting held directly at bedside including patient, patient's brother Mila Raygoza and his wife Florecita, patient's cousin Estrellita Echevarria and his wife Jose Kuhn  Discussed at length patient's hospital readmissions, ongoing deconditioning, and question regarding chemotherapy moving forward  Family voiced concern as they feel with each round of chemotherapy patient's condition seems to weaken and/or he ends up back in the hospital   Patient expresses goal to attempt rehab and regain some strength  Also updated family regarding discussion with Dr Gem Foster of oncology; he feels it is reasonable for patient to go to rehab and decide at a later time if chemotherapy would be recommended  Patient understands that even despite going to rehab he may not be strong enough to receive ongoing chemotherapy  Also noted that he is on the mildest chemotherapy agent available to him as he didn't tolerate stronger regimens previously  Discussed at length all recent imaging and blood work with family  Family voiced understanding and wish to support whatever decision patient makes however they want to make sure that he is considering his quality of life moving forward should he pursue more chemotherapy  [facility] once medically stable, tentatively this Monday      Time spent: 35 minutes

## 2021-07-17 NOTE — ASSESSMENT & PLAN NOTE
Background:  Patient with pancreatic adenocarcinoma undergoing chemotherapy recently started on Norco reports dizziness and weakness  Patient denied fever, nausea, vomiting, diarrhea, chest pain, cough  He did admit to feeling cold with some possible chills  Episode of hypotension noted in emergency department, 36/33 which responded to IV fluids appropriately  Patient did have leukocytosis of 18 84, currently undergoing chemotherapy outpatient, while neulasta part of his treatment regimen, he did not receive this during his most recent chemotherapy session    ANC 17 9  Will start patient on cefepime vanc with pharmacy consult  Imaging:  CT head, CXR unremarkable, CT abdomen pelvis noted as above  Lactic of 3 9 ; status post 500cc bolus; trended down 2 2  · Procalcitonin elevated 1 21  · Follow up blood cultures - negative to date x2 at 24 hrs  · Monitor leukocytosis and fever curve, patient afebrile at time of admission  · No clear source of infection at this time, follow up cultures and procalcitonin presentation may have been related to patient's known cancer/dehydration

## 2021-07-17 NOTE — ASSESSMENT & PLAN NOTE
· Follows with Dr Laine Montoya outpatient  · Undergoing chemotherapy  · Oncology evaluated patient yesterday, CT abdomen/pelvis obtained which shows some increase in cystic lesion in head of the pancreas as well as possible area of contained perforation versus cystic metastasis  · Question of progressive weakness may be related to progression of disease vs chemo  · Per surgery more likely area of metastasis, no abdominal pain, no surgical intervention - outpt follow up with surg onc  · Palliative Consulted - as of now patient remains disease treatment focused  · Chemotherapy has been decreased in the past due to poor toleration  · Discussed with Dr Laine Montoya - feels that rehab is an appropriate next step to see if patient can improve his functional status  At that time they can reconsider if chemotherapy would be warranted  Patient is currently on the mildest form of chemotherapy, historically has not tolerated more aggressive regimens in the past   Ultimately if patient is unable to tolerate this chemotherapy regimen then hospice would be appropriate

## 2021-07-17 NOTE — ASSESSMENT & PLAN NOTE
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

## 2021-07-17 NOTE — ASSESSMENT & PLAN NOTE
· Acute on chronic, no evidence of active bleeding  · Hemoglobin noted to be 6 5 yesterday  · Received 1 unit PRBCs, hemodynamically stable  · Trend H/H  · hgb 7 4 this AM

## 2021-07-17 NOTE — PROGRESS NOTES
[place]     Progress Note - Di Bowling 1945, 67 y o  male MRN: [id number]  Unit/Bed#: -01 Encounter: [id number]  Primary Care Provider: Chuckie Page DO   Date and time admitted to hospital: 7/14/2021  8:42 AM    * Generalized weakness  Assessment & Plan  · Recently started on prednisone taper, Lexapro and Norco for pain, took last one early in AM  · Prednisone- 30mg daily, continue taper, decreasing by 10 each per outpatient instructions  · Patient noted to be hypotensive in emergency department, 86/46, pressures did improve with fluid bolus - remains stable  · Initial lactic acid 3 9, improved to 2 2 after fluids  · Patient reports he is having normal oral intake  · UA, CT head, CXR unremarkable  · CT abdomen pelvis: "Mild interval increase in size of cystic lesion in the head and uncinate process of the pancreas measuring 1 5 cm  Stable severe pancreatic duct dilatation  Decreased gas and inflammation between the medial border of the pylorus and head of the pancreas  Persistent inflammation along the medial aspect of the pylorus and proximal 2nd portion of the duodenum  1 3 cm low-attenuation, possibly cystic lesion or fluid collection along the medial border of the pylorus  Possible contained perforation or cystic metastasis    Constipation "  · Patient overall is feeling improved  · PT/OT evaluated patient, recommended rehab - patient currently agreeable with this plan however ongoing goals of care discussions  · Tentative plan for possible discharge Monday to [address] of care, counseling/discussion  Assessment & Plan  · Discussed at length with patient directly at bedside as well as his brother Samantha Gomez via phone 7/15 per patient request  · Patient verbalizes understanding of findings on his CT scan abdomen/pelvis concerning for enlarging cystic lesion of his pancreas as well as possible cystic metastasis  · Per patient he confirms that he did not tolerate more aggressive chemotherapy in the past; ongoing concern that he has had multiple readmissions over the last couple of months related to his progressive weakness  · Discussed at length regarding goals of care, options including discharge to rehab to attempt to work on his strength once medically cleared verses pursuing hospice cares  · Patient states that the symptoms related to his cancer are well controlled, and would like to try to pursue being discharged to rehab and ultimately continue chemotherapy if possible  Patient's brother Citlali Barreto is in agreement with this plan  · Patient remains DNR/DNI however does not have a formal living will  Consider POLST on discharge  · Discussed with patient and sister-in-law Leydi Morgan at bedside 7/16 - patient and Leydi Morgan are requesting a family meeting hopefully tomorrow, 7/17  TT sent to palliative care  Woo Marsh she does not want to see patient suffer but will be supportive of whatever he decides  She recognizes he has had frequent hospital admissions and gradual decline  · Family meeting planned for today, 7/17/21 at 2:00 PM    Adenocarcinoma of pancreas [facility])  Assessment & Plan  · Follows with Dr Gilberto Marie outpatient  · Undergoing chemotherapy  · Oncology evaluated patient yesterday, CT abdomen/pelvis obtained which shows some increase in cystic lesion in head of the pancreas as well as possible area of contained perforation versus cystic metastasis  · Question of progressive weakness may be related to progression of disease vs chemo  · Per surgery more likely area of metastasis, no abdominal pain, no surgical intervention - outpt follow up with surg onc  · Palliative Consulted - as of now patient remains disease treatment focused  · Chemotherapy has been decreased in the past due to poor toleration  · Discussed with Dr Gilberto Marie - feels that rehab is an appropriate next step to see if patient can improve his functional status    At that time they can reconsider if chemotherapy would be warranted  Patient is currently on the mildest form of chemotherapy, historically has not tolerated more aggressive regimens in the past   Ultimately if patient is unable to tolerate this chemotherapy regimen then hospice would be appropriate  SIRS (systemic inflammatory response syndrome) (HCC)  Assessment & Plan  Background:  Patient with pancreatic adenocarcinoma undergoing chemotherapy recently started on Norco reports dizziness and weakness  Patient denied fever, nausea, vomiting, diarrhea, chest pain, cough  He did admit to feeling cold with some possible chills  Episode of hypotension noted in emergency department, 48/63 which responded to IV fluids appropriately  Patient did have leukocytosis of 18 84, currently undergoing chemotherapy outpatient, while neulasta part of his treatment regimen, he did not receive this during his most recent chemotherapy session    ANC 17 9  Will start patient on cefepime vanc with pharmacy consult  Imaging:  CT head, CXR unremarkable, CT abdomen pelvis noted as above  Lactic of 3 9 ; status post 500cc bolus; trended down 2 2  · Procalcitonin elevated 1 21  · Follow up blood cultures - negative to date x2 at 24 hrs  · Monitor leukocytosis and fever curve, patient afebrile at time of admission  · No clear source of infection at this time, follow up cultures and procalcitonin presentation may have been related to patient's known cancer/dehydration    Anemia  Assessment & Plan  · Acute on chronic, no evidence of active bleeding  · Hemoglobin noted to be 6 5 yesterday  · Received 1 unit PRBCs, hemodynamically stable  · Trend H/H  · hgb 7 4 this AM    Hyponatremia  Assessment & Plan  · 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)  Assessment & Plan  Lab Results   Component Value Date    HGBA1C 9 4 (H) 04/08/2021       Recent Labs     07/16/21  1119 07/16/21  1527 07/16/21  2047 07/17/21  0729   POCGLU 258* 388* 267* 185*       Blood Sugar Average: Last 72 hrs:       · Managed on Lantus 8u and SSI at home  · Start humalog 5 units TID with meals due to hyperglycemia  · Lantus increased to 10 units due to persistent hyperglycemia  · Continue while admitted    Essential hypertension  Assessment & Plan  · Managed on ramipril outpatient  · Hold for now given hypotenstion on admission  · BP now improved  · Resume ramipril     Hyperlipidemia  Assessment & Plan  · Continue Lipitor    VTE Pharmacologic Prophylaxis:   Pharmacologic: Rivaroxaban (Xarelto)  Mechanical VTE Prophylaxis in Place: Yes    Patient Centered Rounds: I have performed bedside rounds with nursing staff today  Discussions with Specialists or Other Care Team Provider:  Nursing, CM    Education and Discussions with Family / Patient:  Discussed with patient directly at bedside  Discussed with patient's sister-in-law Randy Fournier, family meeting planned for 2:00 p m  today  Time Spent for Care: 30 minutes  More than 50% of total time spent on counseling and coordination of care as described above  Current Length of Stay: 2 day(s)    Current Patient Status: Inpatient   Certification Statement: The patient will continue to require additional inpatient hospital stay due to ongoing goals of care discussion, hyponatremia, disposition planning    Discharge Plan: Pending clinical course    Code Status: Level 3 - DNAR and DNI      Subjective:   Patient admits to poor sleep last night  Is very worried about making decisions moving forward if he should pursue chemotherapy or not    He expresses that he would still like to try to go to rehab, however understands if his functional status is not strong enough to undergo chemotherapy then hospice would be appropriate at that time  Denies chest pain/palpitations, shortness of breath, nausea/vomiting, abdominal pain  Objective:     Vitals:   Temp (24hrs), Av 2 °F (36 8 °C), Min:98 1 °F (36 7 °C), Max:98 3 °F (36 8 °C)    Temp:  [98 1 °F (36 7 °C)-98 3 °F (36 8 °C)] 98 1 °F (36 7 °C)  HR:  [80-82] 80  Resp:  [17-18] 17  BP: (133-153)/(62-65) 137/63  SpO2:  [97 %-99 %] 99 %  Body mass index is 21 09 kg/m²  Input and Output Summary (last 24 hours): Intake/Output Summary (Last 24 hours) at 2021 1042  Last data filed at 2021 0900  Gross per 24 hour   Intake 960 ml   Output 1650 ml   Net -690 ml       Physical Exam:     Physical Exam  Vitals and nursing note reviewed  Constitutional:       Appearance: He is well-developed  Comments: Appears comfortable, no acute distress   HENT:      Head: Normocephalic and atraumatic  Eyes:      General: No scleral icterus  Extraocular Movements: Extraocular movements intact  Conjunctiva/sclera: Conjunctivae normal    Cardiovascular:      Rate and Rhythm: Normal rate and regular rhythm  Heart sounds: S1 normal and S2 normal  No murmur heard  Pulmonary:      Effort: Pulmonary effort is normal  No respiratory distress  Breath sounds: Normal breath sounds  No wheezing, rhonchi or rales  Abdominal:      General: Bowel sounds are normal       Palpations: Abdomen is soft  Tenderness: There is no abdominal tenderness  There is no guarding or rebound  Musculoskeletal:      Cervical back: Normal range of motion  Comments: Able to move upper/lower extremities bilaterally  No extremity edema   Skin:     General: Skin is warm and dry  Coloration: Skin is pale  Neurological:      Mental Status: He is alert and oriented to person, place, and time  Psychiatric:         Mood and Affect: Mood is anxious  Affect is tearful  Additional Data:     Labs:    Results from last 7 days   Lab Units 07/17/21  0436   WBC Thousand/uL 8 47   HEMOGLOBIN g/dL 7 4*   HEMATOCRIT % 21 6*   PLATELETS Thousands/uL 216   NEUTROS PCT % 79*   LYMPHS PCT % 15   MONOS PCT % 3*   EOS PCT % 3     Results from last 7 days   Lab Units 07/17/21  0436 07/16/21  0511   SODIUM mmol/L 129* 129*   POTASSIUM mmol/L 4 1 4 0   CHLORIDE mmol/L 100 100   CO2 mmol/L 23 21   BUN mg/dL 21 19   CREATININE mg/dL 0 73 0 65   ANION GAP mmol/L 6 8   CALCIUM mg/dL 7 5* 7 4*   ALBUMIN g/dL  --  2 1*   TOTAL BILIRUBIN mg/dL  --  1 00   ALK PHOS U/L  --  64   ALT U/L  --  19   AST U/L  --  10   GLUCOSE RANDOM mg/dL 172* 168*     Results from last 7 days   Lab Units 07/14/21  0858   INR  1 54*     Results from last 7 days   Lab Units 07/17/21  0729 07/16/21  2047 07/16/21  1527 07/16/21  1119 07/16/21  0730 07/15/21  2110 07/15/21  1547 07/15/21  1059 07/15/21  0659 07/14/21  2352 07/14/21  2116 07/14/21  2057   POC GLUCOSE mg/dl 185* 267* 388* 258* 169* 346* 416* 306* 174* 281* 421* 428*         Results from last 7 days   Lab Units 07/15/21  0522 07/14/21  2143 07/14/21  1139 07/14/21  1014   LACTIC ACID mmol/L  --  1 8 2 2* 3 9*   PROCALCITONIN ng/ml 1 21*  --   --  0 27*           * I Have Reviewed All Lab Data Listed Above  * Additional Pertinent Lab Tests Reviewed: All Labs Within Last 24 Hours Reviewed    Imaging:    Imaging Reports Reviewed Today Include:   Imaging Personally Reviewed by Myself Includes:      Recent Cultures (last 7 days):     Results from last 7 days   Lab Units 07/14/21  1014 07/14/21  1000   BLOOD CULTURE  No Growth at 48 hrs  No Growth at 48 hrs         Last 24 Hours Medication List:   Current Facility-Administered Medications   Medication Dose Route Frequency Provider Last Rate    acetaminophen  975 mg Oral Q8H [facility] Vane Cruz, CRNP      aspirin  81 mg Oral Daily Brian VAUGHN PA-C      atorvastatin  40 mg Oral HS Jamel Harden MD  cefepime  2,000 mg Intravenous Q12H Rachael VAUGHN PA-C 2,000 mg (07/17/21 0400)    escitalopram  5 mg Oral Daily Rachael VAUGHN PA-C      hydrALAZINE  5 mg Intravenous Q6H PRN Hermelinda Linares PA-C      insulin glargine  10 Units Subcutaneous HS Jody Mckeon PA-C      insulin lispro  2-12 Units Subcutaneous TID [facility] [place] Jody Mckeon PA-C      insulin lispro  2-12 Units Subcutaneous HS Jody Mckeon PA-C      insulin lispro  5 Units Subcutaneous TID With Meals Fercho Ellsworth      oxyCODONE  2 5 mg Oral Q4H PRN Hermelinda Linares PA-C      oxyCODONE  5 mg Oral Q4H PRN Hermelinda Linares PA-C      polyethylene glycol  17 g Oral Daily [address] [place] [address]      prochlorperazine  10 mg Oral Q6H PRN Wayne Hilton PA-C      rivaroxaban  15 mg Oral Daily With [employer] DANTE VAUGHN      senna-docusate sodium  2 tablet Oral BID Amberly Haley DO      sodium chloride  2 g Oral TID With Meals Yeni Juan DO      vancomycin  12 5 mg/kg Intravenous Ted Skinner MD Stopped (07/17/21 1038)        Today, Patient Was Seen By: Hermelinda Linares PA-C    ** Please Note: Dictation voice to text software may have been used in the creation of this document   **

## 2021-07-17 NOTE — PROGRESS NOTES
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  4  Diabetes with recent hemoglobin A1c at 9 4  5  Anemia chronic disease  Continue monitor closely  6  Hypertension, blood pressure stable     PLAN:  · Overall sodium level stable 129  · Continue with current sodium chloride tablets, 2 g 3 times daily  · Continue with fluid restriction  · Stable for discharge from Nephrology standpoint    SUBJECTIVE:  Seen and examined  Patient awake alert  Offers no new complaints  Denies any chest pain shortness of breath  Appetite seems to be stable  Review of Systems    OBJECTIVE:  Current Weight: Weight - Scale: 66 7 kg (147 lb)  Vitals:    07/16/21 0732 07/16/21 1527 07/16/21 2145 07/17/21 0700   BP: 136/59 133/62 153/65 137/63   BP Location: Left arm Left arm Left arm Left arm   Pulse: 89 82 81 80   Resp: (!) 25 18 18 17   Temp: 98 °F (36 7 °C) 98 3 °F (36 8 °C) 98 2 °F (36 8 °C) 98 1 °F (36 7 °C)   TempSrc: Oral Oral Oral Oral   SpO2: 95% 99% 97% 99%   Weight:       Height:           Intake/Output Summary (Last 24 hours) at 7/17/2021 1503  Last data filed at 7/17/2021 1230  Gross per 24 hour   Intake 960 ml   Output 1850 ml   Net -890 ml       Physical Exam  Constitutional:       Appearance: He is not ill-appearing  Eyes:      General: No scleral icterus  Cardiovascular:      Rate and Rhythm: Normal rate and regular rhythm  Pulmonary:      Effort: Pulmonary effort is normal       Breath sounds: Normal breath sounds  Abdominal:      General: There is no distension  Palpations: Abdomen is soft  Musculoskeletal:      Right lower leg: No edema  Left lower leg: No edema     Skin:     General: Skin is warm and dry  Neurological:      Mental Status: He is alert and oriented to person, place, and time           Medications:    Current Facility-Administered Medications:     acetaminophen (TYLENOL) tablet 975 mg, 975 mg, Oral, Q8H [facility], Leda P Bloch, CRNP, 975 mg at 07/17/21 1353    aspirin chewable tablet 81 mg, 81 mg, Oral, Daily, Rachael VAUGHN PA-C, 81 mg at 07/17/21 0847    atorvastatin (LIPITOR) tablet 40 mg, 40 mg, Oral, HS, Alla Maya MD, 40 mg at 07/16/21 2157    cefepime (MAXIPIME) IVPB (premix in dextrose) 2,000 mg 50 mL, 2,000 mg, Intravenous, Q12H, Rachael VAUGHN PA-C, Last Rate: 100 mL/hr at 07/17/21 0400, 2,000 mg at 07/17/21 0400    escitalopram (LEXAPRO) tablet 5 mg, 5 mg, Oral, Daily, Rachael VAUGHN PA-C, 5 mg at 07/17/21 0844    hydrALAZINE (APRESOLINE) injection 5 mg, 5 mg, Intravenous, Q6H PRN, Lachelle Robertson PA-C, 5 mg at 07/15/21 1420    insulin glargine (LANTUS) subcutaneous injection 10 Units 0 1 mL, 10 Units, Subcutaneous, HS, Candi White PA-C, 10 Units at 07/16/21 2203    insulin lispro (HumaLOG) 100 units/mL subcutaneous injection 2-12 Units, 2-12 Units, Subcutaneous, TID AC, 6 Units at 07/17/21 1240 **AND** Fingerstick Glucose (POCT), , , TID AC, Candi White PA-C    insulin lispro (HumaLOG) 100 units/mL subcutaneous injection 2-12 Units, 2-12 Units, Subcutaneous, HS, Candi White PA-C, 6 Units at 07/16/21 2158    insulin lispro (HumaLOG) 100 units/mL subcutaneous injection 5 Units, 5 Units, Subcutaneous, TID With Meals, Candi White PA-C, 5 Units at 07/17/21 1240    LORazepam (ATIVAN) tablet 0 25 mg, 0 25 mg, Oral, Q8H PRN, Candi White PA-C    melatonin tablet 6 mg, 6 mg, Oral, HS, Lachelle Robertson PA-C    oxyCODONE (ROXICODONE) IR tablet 2 5 mg, 2 5 mg, Oral, Q4H PRN, Candi White PA-C    oxyCODONE (ROXICODONE) IR tablet 5 mg, 5 mg, Oral, Q4H PRN, Candi White PA-C    prochlorperazine (COMPAZINE) tablet 10 mg, 10 mg, Oral, Q6H PRN, Aurora Pill V, PA-C    rivaroxaban (XARELTO) tablet 15 mg, 15 mg, Oral, Daily With Dinner, Nathaly Art, PA-C, 15 mg at 07/16/21 1553    saccharomyces boulardii (FLORASTOR) capsule 250 mg, 250 mg, Oral, BID, Kerri Margaret Croningloria, PA-C, 250 mg at 07/17/21 1353    sodium chloride tablet 2 g, 2 g, Oral, TID With Meals, Yeni Rivasi, DO, 2 g at 07/17/21 1241    vancomycin (VANCOCIN) IVPB (premix in dextrose) 750 mg 150 mL, 12 5 mg/kg, Intravenous, Q8H, Sai Delong MD, Stopped at 07/17/21 1038    Laboratory Results:  Results from last 7 days   Lab Units 07/17/21  0436 07/16/21  0511 07/15/21  0526 07/15/21  0522 07/14/21  0914 07/14/21  0858   WBC Thousand/uL 8 47 10 77* 13 19*  --   --  18 84*   HEMOGLOBIN g/dL 7 4* 7 3* 6 5*  --   --  7 8*   I STAT HEMOGLOBIN g/dl  --   --   --   --  8 5*  --    HEMATOCRIT % 21 6* 21 3* 19 5*  --   --  23 7*   HEMATOCRIT, ISTAT %  --   --   --   --  25*  --    PLATELETS Thousands/uL 216 228 300  --   --  387   POTASSIUM mmol/L 4 1 4 0  --  3 6  --  4 4   CHLORIDE mmol/L 100 100  --  100  --  100   CO2 mmol/L 23 21  --  21  --  21   CO2, I-STAT mmol/L  --   --   --   --  21  --    BUN mg/dL 21 19  --  27*  --  35*   CREATININE mg/dL 0 73 0 65  --  0 69  --  1 00   CALCIUM mg/dL 7 5* 7 4*  --  7 7*  --  8 4   GLUCOSE, ISTAT mg/dl  --   --   --   --  98  --

## 2021-07-17 NOTE — ASSESSMENT & PLAN NOTE
· Recently started on prednisone taper, Lexapro and Norco for pain, took last one early in AM  · Prednisone- 30mg daily, continue taper, decreasing by 10 each per outpatient instructions  · Patient noted to be hypotensive in emergency department, 86/46, pressures did improve with fluid bolus - remains stable  · Initial lactic acid 3 9, improved to 2 2 after fluids  · Patient reports he is having normal oral intake  · UA, CT head, CXR unremarkable  · CT abdomen pelvis: "Mild interval increase in size of cystic lesion in the head and uncinate process of the pancreas measuring 1 5 cm  Stable severe pancreatic duct dilatation  Decreased gas and inflammation between the medial border of the pylorus and head of the pancreas  Persistent inflammation along the medial aspect of the pylorus and proximal 2nd portion of the duodenum  1 3 cm low-attenuation, possibly cystic lesion or fluid collection along the medial border of the pylorus  Possible contained perforation or cystic metastasis    Constipation "  · Patient overall is feeling improved  · PT/OT evaluated patient, recommended rehab - patient currently agreeable with this plan however ongoing goals of care discussions  · [facility]

## 2021-07-17 NOTE — ASSESSMENT & PLAN NOTE
Lab Results   Component Value Date    HGBA1C 9 4 (H) 04/08/2021       Recent Labs     07/16/21  1119 07/16/21  1527 07/16/21 2047 07/17/21  0729   POCGLU 258* 388* 267* 185*       Blood Sugar Average: Last 72 hrs:       · Managed on Lantus 8u and SSI at home  · Start humalog 5 units TID with meals due to hyperglycemia  · Lantus increased to 10 units due to persistent hyperglycemia  · Continue while admitted

## 2021-07-17 NOTE — ASSESSMENT & PLAN NOTE
· Discussed at length with patient directly at bedside as well as his brother Tomas Lee via phone 7/15 per patient request  · Patient verbalizes understanding of findings on his CT scan abdomen/pelvis concerning for enlarging cystic lesion of his pancreas as well as possible cystic metastasis  · Per patient he confirms that he did not tolerate more aggressive chemotherapy in the past; ongoing concern that he has had multiple readmissions over the last couple of months related to his progressive weakness  · Discussed at length regarding goals of care, options including discharge to rehab to attempt to work on his strength once medically cleared verses pursuing hospice cares  · Patient states that the symptoms related to his cancer are well controlled, and would like to try to pursue being discharged to rehab and ultimately continue chemotherapy if possible  Patient's brother Tomas Lee is in agreement with this plan  · Patient remains DNR/DNI however does not have a formal living will  Consider POLST on discharge  · Discussed with patient and sister-in-law Lori Parsons at bedside 7/16 - patient and Lori Sumanthcindy are requesting a family meeting hopefully tomorrow, 7/17  TT sent to palliative care  [facility] Nathanael she does not want to see patient suffer but will be supportive of whatever he decides  She recognizes he has had frequent hospital admissions and gradual decline    · Family meeting planned for today, 7/17/21 at 2:00 PM

## 2021-07-18 NOTE — PROGRESS NOTES
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  4  Diabetes with recent hemoglobin A1c at 9 4  5  Anemia chronic disease  Continue monitor closely  6  Hypertension, blood pressure stable      PLAN:  · Overall sodium level remains stable 130  · Volume status acceptable  · No changes in current sodium chloride tablets or fluid restriction  · Stable for discharge from Nephrology standpoint    SUBJECTIVE:  Seen examined  Patient feeling better today  Appetite appears stable  Denies any chest pain or shortness of breath  Review of Systems    OBJECTIVE:  Current Weight: Weight - Scale: 66 7 kg (147 lb)  Vitals:    07/17/21 1525 07/17/21 2000 07/17/21 2359 07/18/21 0700   BP: 144/68  135/65 136/65   BP Location: Left arm  Left arm Left arm   Pulse: 80  82 75   Resp: 19 18 17   Temp: 98 4 °F (36 9 °C)  98 5 °F (36 9 °C) 97 8 °F (36 6 °C)   TempSrc: Oral  Oral Oral   SpO2: 99% 99% 99% 100%   Weight:       Height:           Intake/Output Summary (Last 24 hours) at 7/18/2021 0851  Last data filed at 7/18/2021 0815  Gross per 24 hour   Intake 950 ml   Output 2000 ml   Net -1050 ml       Physical Exam  Constitutional:       Appearance: He is not ill-appearing  HENT:      Head: Normocephalic and atraumatic  Eyes:      General: No scleral icterus  Cardiovascular:      Rate and Rhythm: Normal rate and regular rhythm  Pulmonary:      Effort: Pulmonary effort is normal       Breath sounds: Normal breath sounds  Abdominal:      General: There is no distension  Palpations: Abdomen is soft  Musculoskeletal:      Right lower leg: No edema  Left lower leg: No edema     Skin:     General: Skin is warm and dry  Findings: No rash  Neurological:      Mental Status: He is alert and oriented to person, place, and time           Medications:    Current Facility-Administered Medications:     acetaminophen (TYLENOL) tablet 975 mg, 975 mg, Oral, Q8H [facility], Jennifer P BlochJOI, 975 mg at 07/18/21 0518    aspirin chewable tablet 81 mg, 81 mg, Oral, Daily, Rachael VAUGHN PA-C, 81 mg at 07/18/21 0805    atorvastatin (LIPITOR) tablet 40 mg, 40 mg, Oral, HS, Sai Delong MD, 40 mg at 07/17/21 2119    cefepime (MAXIPIME) IVPB (premix in dextrose) 2,000 mg 50 mL, 2,000 mg, Intravenous, Q12H, Rachael VAUGHN PA-C, Last Rate: 100 mL/hr at 07/18/21 0358, 2,000 mg at 07/18/21 0358    escitalopram (LEXAPRO) tablet 5 mg, 5 mg, Oral, Daily, Rachael VAUGHN PA-C, 5 mg at 07/18/21 0805    hydrALAZINE (APRESOLINE) injection 5 mg, 5 mg, Intravenous, Q6H PRN, Kerri Robertson PA-C, 5 mg at 07/15/21 1420    insulin glargine (LANTUS) subcutaneous injection 10 Units 0 1 mL, 10 Units, Subcutaneous, HS, Mariela Kapadia PA-C, 10 Units at 07/17/21 2120    insulin lispro (HumaLOG) 100 units/mL subcutaneous injection 2-12 Units, 2-12 Units, Subcutaneous, TID AC, 2 Units at 07/17/21 1709 **AND** Fingerstick Glucose (POCT), , , TID AC, Mariela Kapadia PA-C    insulin lispro (HumaLOG) 100 units/mL subcutaneous injection 2-12 Units, 2-12 Units, Subcutaneous, HS, Mariela Kapadia PA-C, 4 Units at 07/17/21 2123    insulin lispro (HumaLOG) 100 units/mL subcutaneous injection 5 Units, 5 Units, Subcutaneous, TID With Meals, Mariela Kapadia PA-C, 5 Units at 07/18/21 0806    LORazepam (ATIVAN) tablet 0 25 mg, 0 25 mg, Oral, Q8H PRN, Mariela Kapadia PA-C    melatonin tablet 6 mg, 6 mg, Oral, HS, Kerri Robertson PA-C, 6 mg at 07/17/21 2120    oxyCODONE (ROXICODONE) IR tablet 2 5 mg, 2 5 mg, Oral, Q4H PRN, Mariela Kapadia PA-C    oxyCODONE (ROXICODONE) IR tablet 5 mg, 5 mg, Oral, Q4H PRN, Kerri Robles DANTE Robertson    prochlorperazine (COMPAZINE) tablet 10 mg, 10 mg, Oral, Q6H PRN, [address] DANTE VAUGHN    rivaroxaban (XARELTO) tablet 15 mg, 15 mg, Oral, Daily With Dinner, Rachael Adilia VAUGHN PA-C, 15 mg at 07/17/21 1709    saccharomyces boulardii (FLORASTOR) capsule 250 mg, 250 mg, Oral, BID, Jinconner Robertson PA-C, 250 mg at 07/18/21 0805    sodium chloride tablet 2 g, 2 g, Oral, TID With Meals, Yeni Stark, DO, 2 g at 07/18/21 0805    vancomycin (VANCOCIN) IVPB (premix in dextrose) 750 mg 150 mL, 12 5 mg/kg, Intravenous, Q8H, Marcos Valdovinos MD, Last Rate: 150 mL/hr at 07/18/21 0804, 750 mg at 07/18/21 0804    Laboratory Results:  Results from last 7 days   Lab Units 07/18/21  0411 07/18/21  0410 07/17/21  0436 07/16/21  0511 07/15/21  0526 07/15/21  0522 07/14/21  0914 07/14/21  0858   WBC Thousand/uL  --  4 54 8 47 10 77* 13 19*  --   --  18 84*   HEMOGLOBIN g/dL  --  7 4* 7 4* 7 3* 6 5*  --   --  7 8*   I STAT HEMOGLOBIN g/dl  --   --   --   --   --   --  8 5*  --    HEMATOCRIT %  --  21 8* 21 6* 21 3* 19 5*  --   --  23 7*   HEMATOCRIT, ISTAT %  --   --   --   --   --   --  25*  --    PLATELETS Thousands/uL  --  189 216 228 300  --   --  387   POTASSIUM mmol/L 4 1  --  4 1 4 0  --  3 6  --  4 4   CHLORIDE mmol/L 99*  --  100 100  --  100  --  100   CO2 mmol/L 24  --  23 21  --  21  --  21   CO2, I-STAT mmol/L  --   --   --   --   --   --  21  --    BUN mg/dL 17  --  21 19  --  27*  --  35*   CREATININE mg/dL 0 69  --  0 73 0 65  --  0 69  --  1 00   CALCIUM mg/dL 7 9*  --  7 5* 7 4*  --  7 7*  --  8 4   GLUCOSE, ISTAT mg/dl  --   --   --   --   --   --  98  --

## 2021-07-18 NOTE — ASSESSMENT & PLAN NOTE
· Follows with Dr Kj Xie outpatient  · Undergoing chemotherapy  · Oncology evaluated patient yesterday, CT abdomen/pelvis obtained which shows some increase in cystic lesion in head of the pancreas as well as possible area of contained perforation versus cystic metastasis  · Question of progressive weakness may be related to progression of disease vs chemo  · Per surgery more likely area of metastasis, no abdominal pain, no surgical intervention - outpt follow up with surg onc  · Palliative Consulted - as of now patient remains disease treatment focused  · Chemotherapy has been decreased in the past due to poor toleration  · Discussed with Dr Kj Xie - feels that rehab is an appropriate next step to see if patient can improve his functional status  At that time they can reconsider if chemotherapy would be warranted  Patient is currently on the mildest form of chemotherapy, historically has not tolerated more aggressive regimens in the past   Ultimately if patient is unable to tolerate this chemotherapy regimen then hospice would be appropriate

## 2021-07-18 NOTE — ASSESSMENT & PLAN NOTE
Background:  Patient with pancreatic adenocarcinoma undergoing chemotherapy recently started on Norco reports dizziness and weakness  Patient denied fever, nausea, vomiting, diarrhea, chest pain, cough  He did admit to feeling cold with some possible chills  Episode of hypotension noted in emergency department, 47/99 which responded to IV fluids appropriately  Patient did have leukocytosis of 18 84, currently undergoing chemotherapy outpatient, while neulasta part of his treatment regimen, he did not receive this during his most recent chemotherapy session    ANC 17 9  Will start patient on cefepime vanc with pharmacy consult  Imaging:  CT head, CXR unremarkable, CT abdomen pelvis noted as above  Lactic of 3 9 ; status post 500cc bolus; trended down 2 2  · Procalcitonin elevated 1 21  · Follow up blood cultures - negative to date x2 at 72 hrs  · Monitor leukocytosis and fever curve, patient afebrile at time of admission  · No clear source of infection at this time, follow up cultures and procalcitonin presentation may have been related to patient's known cancer/dehydration

## 2021-07-18 NOTE — PROGRESS NOTES
[place]     Progress Note - Ellamae Boeck 1945, 67 y o  male MRN: [id number]  Unit/Bed#: -01 Encounter: [id number]  Primary Care Provider: Bernadette Edward DO   Date and time admitted to hospital: 7/14/2021  8:42 AM    * Generalized weakness  Assessment & Plan  · Recently started on prednisone taper, Lexapro and Norco for pain, took last one early in AM  · Prednisone- 30mg daily, continue taper, decreasing by 10 each per outpatient instructions  · Patient noted to be hypotensive in emergency department, 86/46, pressures did improve with fluid bolus - remains stable  · Initial lactic acid 3 9, improved to 2 2 after fluids  · Patient reports he is having normal oral intake  · UA, CT head, CXR unremarkable  · CT abdomen pelvis: "Mild interval increase in size of cystic lesion in the head and uncinate process of the pancreas measuring 1 5 cm  Stable severe pancreatic duct dilatation  Decreased gas and inflammation between the medial border of the pylorus and head of the pancreas  Persistent inflammation along the medial aspect of the pylorus and proximal 2nd portion of the duodenum  1 3 cm low-attenuation, possibly cystic lesion or fluid collection along the medial border of the pylorus  Possible contained perforation or cystic metastasis    Constipation "  · Patient overall is feeling improved  · PT/OT evaluated patient, recommended rehab - patient currently agreeable with this plan  · Tentative plan for possible discharge Monday to [address] of care, counseling/discussion  Assessment & Plan  · Discussed at length with patient directly at bedside as well as his brother Wilmer Thibodeaux via phone 7/15 per patient request  · Patient verbalizes understanding of findings on his CT scan abdomen/pelvis concerning for enlarging cystic lesion of his pancreas as well as possible cystic metastasis  · Per patient he confirms that he did not tolerate more aggressive chemotherapy in the past; ongoing concern that he has had multiple readmissions over the last couple of months related to his progressive weakness  · Discussed at length regarding goals of care, options including discharge to rehab to attempt to work on his strength once medically cleared verses pursuing hospice cares  · Patient states that the symptoms related to his cancer are well controlled, and would like to try to pursue being discharged to rehab and ultimately continue chemotherapy if possible  Patient's brother Tania España is in agreement with this plan  · Patient remains DNR/DNI however does not have a formal living will  Consider POLST on discharge  · Discussed with patient and sister-in-law Florecita at bedside 7/16 - patient and Jannasker are requesting a family meeting hopefully tomorrow, 7/17  TT sent to palliative care  Pablito Monge she does not want to see patient suffer but will be supportive of whatever he decides  She recognizes he has had frequent hospital admissions and gradual decline  · Family meeting held 7/17    Family and patient in agreement for plan of discharge to rehab and discuss with oncology as outpatient regarding chemotherapy at that point    Adenocarcinoma of pancreas [facility])  Assessment & Plan  · Follows with Dr Nina Harrison outpatient  · Undergoing chemotherapy  · Oncology evaluated patient yesterday, CT abdomen/pelvis obtained which shows some increase in cystic lesion in head of the pancreas as well as possible area of contained perforation versus cystic metastasis  · Question of progressive weakness may be related to progression of disease vs chemo  · Per surgery more likely area of metastasis, no abdominal pain, no surgical intervention - outpt follow up with surg onc  · Palliative Consulted - as of now patient remains disease treatment focused  · Chemotherapy has been decreased in the past due to poor toleration  · Discussed with Dr Nina Harrison - feels that rehab is an appropriate next step to see if patient can improve his functional status  At that time they can reconsider if chemotherapy would be warranted  Patient is currently on the mildest form of chemotherapy, historically has not tolerated more aggressive regimens in the past   Ultimately if patient is unable to tolerate this chemotherapy regimen then hospice would be appropriate  SIRS (systemic inflammatory response syndrome) (HCC)  Assessment & Plan  Background:  Patient with pancreatic adenocarcinoma undergoing chemotherapy recently started on Norco reports dizziness and weakness  Patient denied fever, nausea, vomiting, diarrhea, chest pain, cough  He did admit to feeling cold with some possible chills  Episode of hypotension noted in emergency department, 07/65 which responded to IV fluids appropriately  Patient did have leukocytosis of 18 84, currently undergoing chemotherapy outpatient, while neulasta part of his treatment regimen, he did not receive this during his most recent chemotherapy session    ANC 17 9  Will start patient on cefepime vanc with pharmacy consult  Imaging:  CT head, CXR unremarkable, CT abdomen pelvis noted as above  Lactic of 3 9 ; status post 500cc bolus; trended down 2 2  · Procalcitonin elevated 1 21  · Follow up blood cultures - negative to date x2 at 72 hrs  · Monitor leukocytosis and fever curve, patient afebrile at time of admission  · No clear source of infection at this time, follow up cultures and procalcitonin presentation may have been related to patient's known cancer/dehydration    Anemia  Assessment & Plan  · Acute on chronic, no evidence of active bleeding  · Hemoglobin noted to be 6 5 yesterday  · Received 1 unit PRBCs, hemodynamically stable  · Trend H/H  · hgb 7 4 this AM    Hyponatremia  Assessment & Plan  · 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  · Followed by Nephrology on recent admission  · Appears euvolemic  · Discontinued IVF, restart fluid restriction  · Sodium tabs increased to 2 g TID per nephro  · Trend BMP  · Nephrology consulted - appreciate recommendations  ·  this AM - stable    Type 2 diabetes mellitus with hyperglycemia, with long-term current use of insulin [facility])  Assessment & Plan  Lab Results   Component Value Date    HGBA1C 9 4 (H) 2021       Recent Labs     21  1052 21  1526 21  2045 21  0715   POCGLU 299* 181* 237* 106       Blood Sugar Average: Last 72 hrs:       · Managed on Lantus 8u and SSI at home  · Start humalog 5 units TID with meals due to hyperglycemia  · Lantus increased to 10 units due to persistent hyperglycemia  · Continue while admitted    Essential hypertension  Assessment & Plan  · Managed on ramipril outpatient  · Hold for now given hypotenstion on admission  · BP now improved  · Resume ramipril     Hyperlipidemia  Assessment & Plan  · Continue Lipitor    VTE Pharmacologic Prophylaxis:   Pharmacologic: Rivaroxaban (Xarelto)  Mechanical VTE Prophylaxis in Place: Yes    Patient Centered Rounds: I have performed bedside rounds with nursing staff today  Discussions with Specialists or Other Care Team Provider: Nursing    Education and Discussions with Family / Patient: Discussed with patient at bedside  Discussed with patient's brother Zeferino Contreras via phone  Time Spent for Care: 20 minutes  More than 50% of total time spent on counseling and coordination of care as described above  Current Length of Stay: 3 day(s)    Current Patient Status: Inpatient   Certification Statement: The patient will continue to require additional inpatient hospital stay due to Rehab placement    Discharge Plan:  Likely discharge to rehab tomorrow    Code Status: Level 3 - DNAR and DNI      Subjective:   Patient states he feels much better today than he did yesterday  Had a good night's sleep  Denies chest pain/palpitations, shortness of breath, nausea/vomiting, abdominal pain      Objective:     Vitals:   Temp (24hrs), Av 2 °F (36 8 °C), Min:97 8 °F (36 6 °C), Max:98 5 °F (36 9 °C)    Temp:  [97 8 °F (36 6 °C)-98 5 °F (36 9 °C)] 97 8 °F (36 6 °C)  HR:  [75-82] 75  Resp:  [17-19] 17  BP: (135-144)/(65-68) 136/65  SpO2:  [99 %-100 %] 100 %  Body mass index is 21 09 kg/m²  Input and Output Summary (last 24 hours): Intake/Output Summary (Last 24 hours) at 7/18/2021 1054  Last data filed at 7/18/2021 0815  Gross per 24 hour   Intake 950 ml   Output 1800 ml   Net -850 ml       Physical Exam:     Physical Exam  Vitals and nursing note reviewed  Constitutional:       Appearance: He is well-developed  Comments: Appears comfortable, no acute distress   HENT:      Head: Normocephalic and atraumatic  Eyes:      General: No scleral icterus  Extraocular Movements: Extraocular movements intact  Conjunctiva/sclera: Conjunctivae normal    Cardiovascular:      Rate and Rhythm: Normal rate and regular rhythm  Heart sounds: S1 normal and S2 normal  No murmur heard  Pulmonary:      Effort: Pulmonary effort is normal  No respiratory distress  Breath sounds: Normal breath sounds  No wheezing, rhonchi or rales  Abdominal:      General: Bowel sounds are normal       Palpations: Abdomen is soft  Tenderness: There is no abdominal tenderness  There is no guarding or rebound  Musculoskeletal:      Cervical back: Normal range of motion  Comments: Able to move upper/lower extremities bilaterally, no extremity edema   Skin:     General: Skin is warm and dry  Neurological:      Mental Status: He is alert and oriented to person, place, and time     Psychiatric:         Mood and Affect: Mood normal          Speech: Speech normal          Behavior: Behavior normal            Additional Data:     Labs:    Results from last 7 days   Lab Units 07/18/21  0410   WBC Thousand/uL 4 54   HEMOGLOBIN g/dL 7 4*   HEMATOCRIT % 21 8*   PLATELETS Thousands/uL 189   NEUTROS PCT % 56   LYMPHS PCT % 30   MONOS PCT % 8   EOS PCT % 4     Results from last 7 days   Lab Units 07/18/21  0411 07/16/21  0511   SODIUM mmol/L 130* 129*   POTASSIUM mmol/L 4 1 4 0   CHLORIDE mmol/L 99* 100   CO2 mmol/L 24 21   BUN mg/dL 17 19   CREATININE mg/dL 0 69 0 65   ANION GAP mmol/L 7 8   CALCIUM mg/dL 7 9* 7 4*   ALBUMIN g/dL  --  2 1*   TOTAL BILIRUBIN mg/dL  --  1 00   ALK PHOS U/L  --  64   ALT U/L  --  19   AST U/L  --  10   GLUCOSE RANDOM mg/dL 83 168*     Results from last 7 days   Lab Units 07/14/21  0858   INR  1 54*     Results from last 7 days   Lab Units 07/18/21  1044 07/18/21  0715 07/17/21  2045 07/17/21  1526 07/17/21  1052 07/17/21  0729 07/16/21  2047 07/16/21  1527 07/16/21  1119 07/16/21  0730 07/15/21  2110 07/15/21  1547   POC GLUCOSE mg/dl 373* 106 237* 181* 299* 185* 267* 388* 258* 169* 346* 416*         Results from last 7 days   Lab Units 07/15/21  0522 07/14/21  2143 07/14/21  1139 07/14/21  1014   LACTIC ACID mmol/L  --  1 8 2 2* 3 9*   PROCALCITONIN ng/ml 1 21*  --   --  0 27*           * I Have Reviewed All Lab Data Listed Above  * Additional Pertinent Lab Tests Reviewed: All Labs Within Last 24 Hours Reviewed    Imaging:    Imaging Reports Reviewed Today Include:   Imaging Personally Reviewed by Myself Includes:      Recent Cultures (last 7 days):     Results from last 7 days   Lab Units 07/17/21  1332 07/14/21  1014 07/14/21  1000   BLOOD CULTURE   --  No Growth at 72 hrs  No Growth at 72 hrs     C DIFF TOXIN B BY PCR  Negative  --   --        Last 24 Hours Medication List:   Current Facility-Administered Medications   Medication Dose Route Frequency Provider Last Rate    acetaminophen  975 mg Oral Q8H [address] JOI Hu      aspirin  81 mg Oral Daily [address], [place]      atorvastatin  40 mg Oral HS Wesley Hector MD      cefepime  2,000 mg Intravenous Q12H Rachael VAUGHN PA-C 2,000 mg (07/18/21 0358)    escitalopram  5 mg Oral Daily [facility] DANTE VAUGHN      hydrALAZINE  5 mg Intravenous Q6H PRN Bhargavi Black, PA-C      insulin glargine  10 Units Subcutaneous HS [facility], PA-C      insulin lispro  2-12 Units Subcutaneous TID [facility], [place]      insulin lispro  2-12 Units Subcutaneous HS [place], [place]      insulin lispro  5 Units Subcutaneous TID With Meals Darius Mckeon, PA-ANAHY      LORazepam  0 25 mg Oral Q8H PRN [facility], PA-ANAHY      melatonin  6 mg Oral HS [place], [place]      oxyCODONE  2 5 mg Oral Q4H PRN Mechelle Hughes, PA-ANAHY      oxyCODONE  5 mg Oral Q4H PRN Mechelle Hughes, DANTE      prochlorperazine  10 mg Oral Q6H PRN Aleksey Tyler, DANTE      rivaroxaban  15 mg Oral Daily With [employer] V, DANTE      saccharomyces boulardii  250 mg Oral BID Mechelle Hughes, DANTE      sodium chloride  2 g Oral TID With Meals Yeni Miller DO      vancomycin  12 5 mg/kg Intravenous Q8H Jasmine Cline  mg (07/18/21 0804)        Today, Patient Was Seen By: Mechelle Hughes PA-C    ** Please Note: Dictation voice to text software may have been used in the creation of this document   **

## 2021-07-18 NOTE — MALNUTRITION/BMI
This medical record reflects one or more clinical indicators suggestive of malnutrition and/or morbid obesity  Malnutrition Findings:   Adult Malnutrition type: Chronic illness  Adult Degree of Malnutrition: Other severe protein calorie malnutrition  Malnutrition Characteristics: Fat loss, Muscle loss (Pt presents with severe protein calorie malnutrition as evidenced by orbital hollowing, prominent clavicle protrusion and temporal depressions )   Treat w/ CCD2, 1800 ml Fluid restriction, Glucerna BID  BMI Findings: Body mass index is 21 09 kg/m²  See Nutrition note dated 07/18/21 for additional details  Completed nutrition assessment is viewable in the nutrition documentation

## 2021-07-18 NOTE — ASSESSMENT & PLAN NOTE
· 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  · Followed by Nephrology on recent admission  · Appears euvolemic  · Discontinued IVF, restart fluid restriction  · Sodium tabs increased to 2 g TID per nephro  · Trend BMP  · Nephrology consulted - appreciate recommendations  ·  this AM - stable

## 2021-07-18 NOTE — ASSESSMENT & PLAN NOTE
· Recently started on prednisone taper, Lexapro and Norco for pain, took last one early in AM  · Prednisone- 30mg daily, continue taper, decreasing by 10 each per outpatient instructions  · Patient noted to be hypotensive in emergency department, 86/46, pressures did improve with fluid bolus - remains stable  · Initial lactic acid 3 9, improved to 2 2 after fluids  · Patient reports he is having normal oral intake  · UA, CT head, CXR unremarkable  · CT abdomen pelvis: "Mild interval increase in size of cystic lesion in the head and uncinate process of the pancreas measuring 1 5 cm  Stable severe pancreatic duct dilatation  Decreased gas and inflammation between the medial border of the pylorus and head of the pancreas  Persistent inflammation along the medial aspect of the pylorus and proximal 2nd portion of the duodenum  1 3 cm low-attenuation, possibly cystic lesion or fluid collection along the medial border of the pylorus  Possible contained perforation or cystic metastasis    Constipation "  · Patient overall is feeling improved  · PT/OT evaluated patient, recommended rehab - patient currently agreeable with this plan  · Tentative plan for possible discharge Monday to [facility]

## 2021-07-18 NOTE — ASSESSMENT & PLAN NOTE
Lab Results   Component Value Date    HGBA1C 9 4 (H) 04/08/2021       Recent Labs     07/17/21  1052 07/17/21  1526 07/17/21 2045 07/18/21  0715   POCGLU 299* 181* 237* 106       Blood Sugar Average: Last 72 hrs:       · Managed on Lantus 8u and SSI at home  · Start humalog 5 units TID with meals due to hyperglycemia  · Lantus increased to 10 units due to persistent hyperglycemia  · Continue while admitted

## 2021-07-18 NOTE — PLAN OF CARE
Problem: MOBILITY - ADULT  Goal: Maintain or return to baseline ADL function  Description: INTERVENTIONS:  -  Assess patient's ability to carry out ADLs; assess patient's baseline for ADL function and identify physical deficits which impact ability to perform ADLs (bathing, care of mouth/teeth, toileting, grooming, dressing, etc )  - Assess/evaluate cause of self-care deficits   - Assess range of motion  - Assess patient's mobility; develop plan if impaired  - Assess patient's need for assistive devices and provide as appropriate  - Encourage maximum independence but intervene and supervise when necessary  - Involve family in performance of ADLs  - Assess for home care needs following discharge   - Consider OT consult to assist with ADL evaluation and planning for discharge  - Provide patient education as appropriate  Outcome: Progressing  Goal: Maintains/Returns to pre admission functional level  Description: INTERVENTIONS:  - Perform BMAT or MOVE assessment daily    - Set and communicate daily mobility goal to care team and patient/family/caregiver  - Collaborate with rehabilitation services on mobility goals if consulted  - Perform Range of Motion times a day  - Reposition patient every hours    - Dangle patient times a day  - Stand patient times a day  - Ambulate patient times a day  - Out of bed to chair times a day   - Out of bed for meals times a day  - Out of bed for toileting  - Record patient progress and toleration of activity level   Outcome: Progressing     Problem: PAIN - ADULT  Goal: Verbalizes/displays adequate comfort level or baseline comfort level  Description: Interventions:  - Encourage patient to monitor pain and request assistance  - Assess pain using appropriate pain scale  - Administer analgesics based on type and severity of pain and evaluate response  - Implement non-pharmacological measures as appropriate and evaluate response  - Consider cultural and social influences on pain and pain management  - Notify physician/advanced practitioner if interventions unsuccessful or patient reports new pain  Outcome: Progressing     Problem: INFECTION - ADULT  Goal: Absence or prevention of progression during hospitalization  Description: INTERVENTIONS:  - Assess and monitor for signs and symptoms of infection  - Monitor lab/diagnostic results  - Monitor all insertion sites, i e  indwelling lines, tubes, and drains  - Monitor endotracheal if appropriate and nasal secretions for changes in amount and color  - [place] appropriate cooling/warming therapies per order  - Administer medications as ordered  - Instruct and encourage patient and family to use good hand hygiene technique  - Identify and instruct in appropriate isolation precautions for identified infection/condition  Outcome: Progressing  Goal: Absence of fever/infection during neutropenic period  Description: INTERVENTIONS:  - Monitor WBC    Outcome: Progressing     Problem: SAFETY ADULT  Goal: Maintain or return to baseline ADL function  Description: INTERVENTIONS:  -  Assess patient's ability to carry out ADLs; assess patient's baseline for ADL function and identify physical deficits which impact ability to perform ADLs (bathing, care of mouth/teeth, toileting, grooming, dressing, etc )  - Assess/evaluate cause of self-care deficits   - Assess range of motion  - Assess patient's mobility; develop plan if impaired  - Assess patient's need for assistive devices and provide as appropriate  - Encourage maximum independence but intervene and supervise when necessary  - Involve family in performance of ADLs  - Assess for home care needs following discharge   - Consider OT consult to assist with ADL evaluation and planning for discharge  - Provide patient education as appropriate  Outcome: Progressing  Goal: Maintains/Returns to pre admission functional level  Description: INTERVENTIONS:  - Perform BMAT or MOVE assessment daily    - Set and communicate daily mobility goal to care team and patient/family/caregiver  - Collaborate with rehabilitation services on mobility goals if consulted  - Perform Range of Motion times a day  - Reposition patient every hours    - Dangle patient times a day  - Stand patient times a day  - Ambulate patient times a day  - Out of bed to chair times a day   - Out of bed for meals times a day  - Out of bed for toileting  - Record patient progress and toleration of activity level   Outcome: Progressing  Goal: Patient will remain free of falls  Description: INTERVENTIONS:  - Educate patient/family on patient safety including physical limitations  - Instruct patient to call for assistance with activity   - Consult OT/PT to assist with strengthening/mobility   - Keep Call bell within reach  - Keep bed low and locked with side rails adjusted as appropriate  - Keep care items and personal belongings within reach  - Initiate and maintain comfort rounds  - Make Fall Risk Sign visible to staff  - Offer Toileting every Hours, in advance of need  - Initiate/Maintain alarm  - Obtain necessary fall risk management equipment:   - Apply yellow socks and bracelet for high fall risk patients  - Consider moving patient to room near nurses station  Outcome: Progressing     Problem: DISCHARGE PLANNING  Goal: Discharge to home or other facility with appropriate resources  Description: INTERVENTIONS:  - Identify barriers to discharge w/patient and caregiver  - Arrange for needed discharge resources and transportation as appropriate  - Identify discharge learning needs (meds, wound care, etc )  - Arrange for interpretive services to assist at discharge as needed  - Refer to Case Management Department for coordinating discharge planning if the patient needs post-hospital services based on physician/advanced practitioner order or complex needs related to functional status, cognitive ability, or social support system  Outcome: Progressing Problem: Knowledge Deficit  Goal: Patient/family/caregiver demonstrates understanding of disease process, treatment plan, medications, and discharge instructions  Description: Complete learning assessment and assess knowledge base    Interventions:  - Provide teaching at level of understanding  - Provide teaching via preferred learning methods  Outcome: Progressing     Problem: Potential for Falls  Goal: Patient will remain free of falls  Description: INTERVENTIONS:  - Educate patient/family on patient safety including physical limitations  - Instruct patient to call for assistance with activity   - Consult OT/PT to assist with strengthening/mobility   - Keep Call bell within reach  - Keep bed low and locked with side rails adjusted as appropriate  - Keep care items and personal belongings within reach  - Initiate and maintain comfort rounds  - Make Fall Risk Sign visible to staff  - Offer Toileting every  Hours, in advance of need  - Initiate/Maintain alarm  - Obtain necessary fall risk management equipment:   - Apply yellow socks and bracelet for high fall risk patients  - Consider moving patient to room near nurses station  Outcome: Progressing     Problem: Prexisting or High Potential for Compromised Skin Integrity  Goal: Skin integrity is maintained or improved  Description: INTERVENTIONS:  - Identify patients at risk for skin breakdown  - Assess and monitor skin integrity  - Assess and monitor nutrition and hydration status  - Monitor labs   - Assess for incontinence   - Turn and reposition patient  - Assist with mobility/ambulation  - Relieve pressure over bony prominences  - Avoid friction and shearing  - Provide appropriate hygiene as needed including keeping skin clean and dry  - Evaluate need for skin moisturizer/barrier cream  - Collaborate with interdisciplinary team   - Patient/family teaching  - Consider wound care consult   Outcome: Progressing

## 2021-07-18 NOTE — ASSESSMENT & PLAN NOTE
· Discussed at length with patient directly at bedside as well as his brother Sarahy Calderon via phone 7/15 per patient request  · Patient verbalizes understanding of findings on his CT scan abdomen/pelvis concerning for enlarging cystic lesion of his pancreas as well as possible cystic metastasis  · Per patient he confirms that he did not tolerate more aggressive chemotherapy in the past; ongoing concern that he has had multiple readmissions over the last couple of months related to his progressive weakness  · Discussed at length regarding goals of care, options including discharge to rehab to attempt to work on his strength once medically cleared verses pursuing hospice cares  · Patient states that the symptoms related to his cancer are well controlled, and would like to try to pursue being discharged to rehab and ultimately continue chemotherapy if possible  Patient's brother Sarahy Calderon is in agreement with this plan  · Patient remains DNR/DNI however does not have a formal living will  Consider POLST on discharge  · Discussed with patient and sister-in-law Connie Newell at bedside 7/16 - patient and Connie Newell are requesting a family meeting hopefully tomorrow, 7/17  TT sent to palliative care  Megha Morataya she does not want to see patient suffer but will be supportive of whatever he decides  She recognizes he has had frequent hospital admissions and gradual decline  · Family meeting held 7/17    Family and patient in agreement for plan of discharge to rehab and discuss with oncology as outpatient regarding chemotherapy at that point

## 2021-07-19 PROBLEM — R65.10 SIRS (SYSTEMIC INFLAMMATORY RESPONSE SYNDROME) (HCC): Status: RESOLVED | Noted: 2021-01-01 | Resolved: 2021-01-01

## 2021-07-19 NOTE — CASE MANAGEMENT
LOS 4 DAYS  Patient is medically clear for DC to [facility] today  Call to [facility] [address] [place], [place] left [phone] informing her Pt is medically stable and will transport to [facility] via [facility] at 12 noon today  CM also informed [facility] of this [facility]  CM also left  for [facility] Admissions ([phone]) informing them of 12 noon  today  CM met with Pt; informed him of medical clearance to DC to [facility] today with 12 noon  via [facility]  Pt agreeable, IMM signed  CM informed ALEXAIM Donaldo Valenzuela), nurse Kolby Ayala), and Pt's sister in law Belem Rojas, [phone])  PASRR completed and faxed to Dania Lauren [facility], fax# [phone]), as requested  No other outstanding CM needs identified at this time  CM dept will continue to follow to DC

## 2021-07-19 NOTE — TRANSPORTATION MEDICAL NECESSITY
Section I - General Information    Name of Patient: Jo Herrera                 :     Medicare #: [id number]  Transport Date: 21 (PCS is valid for round trips on this date and for all repetitive trips in the 60-day range as noted below )  Origin: [address] [place] [address]: [facility], [address], [place], [address]  Is the pt's stay covered under Medicare Part A (PPS/DRG)   [x]     Closest appropriate facility? If no, why is transport to more distant facility required? Yes  If hospice pt, is this transport related to pt's terminal illness? NA       Section II - Medical Necessity Questionnaire  Ambulance transportation is medically necessary only if other means of transport are contraindicated or would be potentially harmful to the patient  To meet this requirement, the patient must either be "bed confined" or suffer from a condition such that transport by means other than ambulance is contraindicated by the patient's condition  The following questions must be answered by the medical professional signing below for this form to be valid:    1)  Describe the [address] [place] [address] Street (physical and/or mental) of this patient AT [facility] that requires the patient to be transported in an ambulance and why transport by other means is contraindicated by the patient's condition: Pancreatic cancer-deteriorating; cannot tolerate sitting in wheelchair for transport to [facility]    2) Is the patient "bed confined" as defined below? No  To be "be confined" the patient must satisfy all three of the following conditions: (1) unable to get up from bed without Assistance; AND (2) unable to ambulate; AND (3) unable to sit in a chair or wheelchair  3) Can this patient safely be transported by car or wheelchair van (i e , seated during transport without a medical attendant or monitoring)?    No    4) In addition to completing questions 1-3 above, please check any of the following conditions that apply*:   *Note: supporting documentation for any boxes checked must be maintained in the patient's medical records  If hosp-hosp transfer, describe services needed at 2nd facility not available at 1st facility? Moderate/severe pain on movement   Unable to tolerate seated position for time needed to transport       Section III - Signature of Physician or Healthcare Professional  I certify that the above information is true and correct based on my evaluation of this patient, and represent that the patient requires transport by ambulance and that other forms of transport are contraindicated  I understand that this information will be used by the Centers for Medicare and Medicaid Services (CMS) to support the determination of medical necessity for ambulance services, and I represent that I have personal knowledge of the patient's condition at time of transport  []  If this box is checked, I also certify that the patient is physically or mentally incapable of signing the ambulance service's claim and that the institution with which I am affiliated has furnished care, services, or assistance to the patient  My signature below is made on behalf of the patient pursuant to 42 CFR §424 36(b)(4)   In accordance with 42 CFR §424 37, the specific reason(s) that the patient is physically or mentally incapable of signing the claim form is as follows:      Signature of Physician* or Healthcare Professional__________MIKE Arrieta____________________________________________________  Signature Date 07/19/21 (For scheduled repetitive transports, this form is not valid for transports performed more than 60 days after this date)    Printed Name & Credentials of Physician or Healthcare Professional (MD, DO, RN, etc )________________________________  *Form must be signed by patient's attending physician for scheduled, repetitive transports   For non-repetitive, unscheduled ambulance transports, if unable to obtain the signature of the attending physician, any of the following may sign (choose appropriate option below)  [] Physician Assistant []  Clinical Nurse Specialist []  Registered Nurse  []  Nurse Practitioner  [x] Discharge Planner

## 2021-07-19 NOTE — CONSULTS
The patients vancomycin therapy has been completed / discontinued  Thank you for allowing us to take part in this patient's care  Pharmacy will sign-off now; please call or re-consult if there are any questions      Milady Tolbert PharmD, BCPS

## 2021-07-19 NOTE — DISCHARGE SUMMARY
[place]     Discharge- Niall Goodwin 1945, 67 y o  male MRN: [id number]  Unit/Bed#: -01 Encounter: [id number]  Primary Care Provider: Jo Ann Lombardi DO   Date and time admitted to hospital: 7/14/2021  8:42 AM    * Generalized weakness  Assessment & Plan  · Recently started on prednisone taper, Lexapro and Norco for pain, took last one early in AM  · Prednisone- 30mg daily, continue taper, decreasing by 10 each per outpatient instructions  · Patient noted to be hypotensive in emergency department, 86/46, pressures did improve with fluid bolus - remains stable  · Initial lactic acid 3 9, improved to 2 2 after fluids  · Patient reports he is having normal oral intake  · UA, CT head, CXR unremarkable  · CT abdomen pelvis: "Mild interval increase in size of cystic lesion in the head and uncinate process of the pancreas measuring 1 5 cm  Stable severe pancreatic duct dilatation  Decreased gas and inflammation between the medial border of the pylorus and head of the pancreas  Persistent inflammation along the medial aspect of the pylorus and proximal 2nd portion of the duodenum  1 3 cm low-attenuation, possibly cystic lesion or fluid collection along the medial border of the pylorus  Possible contained perforation or cystic metastasis    Constipation "  · Patient overall is feeling improved  · PT/OT evaluated patient, recommended rehab - patient currently agreeable with this plan  · Patient stable for discharge to [facility]    Goals of care, counseling/discussion  [address] [place] [address]  · Discussed at length with patient directly at bedside as well as his brother Asmita Tellez via phone 7/15 per patient request  · Patient verbalizes understanding of findings on his CT scan abdomen/pelvis concerning for enlarging cystic lesion of his pancreas as well as possible cystic metastasis  · Per patient he confirms that he did not tolerate more aggressive chemotherapy in the past; ongoing concern that he has had multiple readmissions over the last couple of months related to his progressive weakness  · Discussed at length regarding goals of care, options including discharge to rehab to attempt to work on his strength once medically cleared verses pursuing hospice cares  · Patient states that the symptoms related to his cancer are well controlled, and would like to try to pursue being discharged to rehab and ultimately continue chemotherapy if possible  Patient's brother Ashleigh Bryan is in agreement with this plan  · Patient remains DNR/DNI however does not have a formal living will  Consider POLST on discharge  · Discussed with patient and sister-in-law [facility] at bedside 7/16 - patient and [facility] are requesting a family meeting hopefully tomorrow, 7/17  TT sent to palliative care  Jake Pickering she does not want to see patient suffer but will be supportive of whatever he decides  She recognizes he has had frequent hospital admissions and gradual decline  · Family meeting held 7/17    Family and patient in agreement for plan of discharge to rehab and discuss with oncology as outpatient regarding chemotherapy at that point    Adenocarcinoma of pancreas [facility])  Assessment & Plan  · Follows with Dr Monty Galvan outpatient  · Undergoing chemotherapy  · Oncology evaluated patient yesterday, CT abdomen/pelvis obtained which shows some increase in cystic lesion in head of the pancreas as well as possible area of contained perforation versus cystic metastasis  · Question of progressive weakness may be related to progression of disease vs chemo  · Per surgery more likely area of metastasis, no abdominal pain, no surgical intervention - outpt follow up with surg onc  · Palliative Consulted - as of now patient remains disease treatment focused  · Chemotherapy has been decreased in the past due to poor toleration  · Discussed with Dr Mnoty Galvan - feels that rehab is an appropriate next step to see if patient can improve his functional status  At that time they can reconsider if chemotherapy would be warranted  Patient is currently on the mildest form of chemotherapy, historically has not tolerated more aggressive regimens in the past   Ultimately if patient is unable to tolerate this chemotherapy regimen then hospice would be appropriate      Hyponatremia  Assessment & Plan  · 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  · Followed by Nephrology on recent admission  · Appears euvolemic  · Discontinued IVF, restart fluid restriction  · Sodium tabs increased to 2 g TID per nephro  · Nephrology consulted - appreciate recommendations  ·  this AM - stable  · Outpatient follow-up with Nephrology    Type 2 diabetes mellitus with hyperglycemia, with long-term current use of insulin [facility])  Assessment & Plan  Lab Results   Component Value Date    HGBA1C 9 4 (H) 04/08/2021       Recent Labs     07/18/21  1044 07/18/21  1541 07/18/21  2034 07/19/21  0739   POCGLU 373* 291* 395* 346*       Blood Sugar Average: Last 72 hrs:     · Managed on Lantus 8u and SSI at home  · Start humalog 8 units TID with meals due to hyperglycemia  · Lantus increased to 10 units due to persistent hyperglycemia  · Resume metformin on discharge    Essential hypertension  Assessment & Plan  · Managed on ramipril outpatient  · BP now improved  · Resume ramipril     Hyperlipidemia  Assessment & Plan  · Continue Lipitor    Discharging Physician / Practitioner: Sulema Carroll PA-C  PCP: Bill Brooks DO  Admission Date:   Admission Orders (From admission, onward)     Ordered        07/15/21 1541  Inpatient Admission  Once         07/14/21 1201  Place in Observation  Once                   Discharge Date: 07/19/21    Medical Problems     Resolved Problems  Date Reviewed: 7/19/2021        Resolved    Episodes of staring 7/16/2021     Resolved by  Sulema Carroll PA-C    SIRS (systemic inflammatory response syndrome) ([facility] [address] ) 7/19/2021     Resolved by  Giulia Montilla [address] [place] [address] PAFaraz                Consultations During Hospital Stay:  · Nephrology  · Palliative care  · Oncology  · General surgery  · PT/OT    Procedures Performed:   · None    Significant Findings / Test Results:   · CT abdomen/pelvis: "Mild interval increase in size of cystic lesion in the head and uncinate process of the pancreas measuring 1 5 cm  Stable severe pancreatic duct dilatation  Decreased gas and inflammation between the medial border of the pylorus and head of the pancreas  Persistent inflammation along the medial aspect of the pylorus and proximal 2nd portion of the duodenum  1 3 cm low-attenuation, possibly cystic lesion or fluid collection along the medial border of the pylorus  Possible contained perforation or cystic metastasis  Constipation "  · CT cervical spine:  No acute osseous abnormality  · CT head:  No acute intracranial abnormality  Stable microangiopathic changes within the brain  · CXR:  No acute cardiopulmonary disease  · Blood cultures negative x2 after 4 days  · C diff negative  · Procalcitonin 0 27, 1 21, 0 12    Incidental Findings:   · As above     Test Results Pending at Discharge (will require follow up): · None     Outpatient Tests Requested:  · CBC in 1 week    Complications:  None    Reason for Admission:  Generalized weakness    Hospital Course:     Ciarra Banda is a 67 y o  male patient who originally presented to the hospital on 7/14/2021 due to generalized weakness  Past medical history significant for adenocarcinoma, essential hypertension, chronic hyponatremia, type 2 diabetes mellitus  Patient presented to the emergency department due to generalized weakness, difficulty with ambulation and dizziness  Patient noted to have multiple readmissions over the last several months  Patient met SIRS criteria on admission, no identifiable infectious source  Was started on broad-spectrum IV antibiotics with resolution in leukocytosis and procalcitonin    Oncology was consulted and ordered CT abdomen/pelvis which did show some interval growth in cystic lesion on the pancreas  There was also small area concerning for possible contained perforation versus cystic Mets  General surgery felt that this was likely metastasis  Discussed with Oncology, recommended that it was reasonable for discharge to rehab to attempt to improve patient's functional status  Palliative Care had been consulted, patient confirms DNR/DNI code status however unsure of how he would like to proceed in the future  Patient verbalizes understanding that he is currently on the most mild form of chemotherapy it is not tolerated more aggressive treatments in past   Patient is in agreement should he not be able to undergo further chemotherapy after rehab he would consider hospice at that time  PT/OT recommended rehab and patient was medically cleared for discharge to [facility]  Please see above list of diagnoses and related plan for additional information  Condition at Discharge: stable     Discharge Day Visit / Exam:     Subjective:  "I feel okay "  Vitals: Blood Pressure: 136/63 (07/19/21 0742)  Pulse: 79 (07/19/21 0742)  Temperature: 98 4 °F (36 9 °C) (07/19/21 0742)  Temp Source: Oral (07/19/21 0742)  Respirations: 18 (07/19/21 0742)  Height: 5' 10" (177 8 cm) (07/14/21 1500)  Weight - Scale: 66 7 kg (147 lb) (07/14/21 1500)  SpO2: 98 % (07/19/21 0742)  Exam:   Physical Exam  Vitals and nursing note reviewed  Constitutional:       Appearance: He is well-developed  Comments: Appears comfortable, no acute distress   HENT:      Head: Normocephalic and atraumatic  Eyes:      General: No scleral icterus  Extraocular Movements: Extraocular movements intact  Conjunctiva/sclera: Conjunctivae normal    Cardiovascular:      Rate and Rhythm: Normal rate and regular rhythm  Heart sounds: S1 normal and S2 normal  No murmur heard       Pulmonary:      Effort: Pulmonary effort is normal  No respiratory distress  Breath sounds: Normal breath sounds  No wheezing, rhonchi or rales  Abdominal:      General: Bowel sounds are normal       Palpations: Abdomen is soft  Tenderness: There is no abdominal tenderness  There is no guarding or rebound  Musculoskeletal:      Cervical back: Normal range of motion  Comments: Able to move upper/lower extremities bilaterally, no extremity edema   Skin:     General: Skin is warm and dry  Neurological:      Mental Status: He is alert and oriented to person, place, and time  Psychiatric:         Mood and Affect: Mood normal          Speech: Speech normal          Behavior: Behavior normal          Discussion with Family: Appreciate CM notifying family of discharge time  Discharge instructions/Information to patient and family:   See after visit summary for information provided to patient and family  Provisions for Follow-Up Care:  See after visit summary for information related to follow-up care and any pertinent home health orders  Disposition:     [address] at [facility] to [address] [place] [address] SNF:   · Not Applicable to this Patient - Not Applicable to this Patient    Planned Readmission: None     Discharge Statement:  I spent 65 minutes discharging the patient  This time was spent on the day of discharge  I had direct contact with the patient on the day of discharge  Greater than 50% of the total time was spent examining patient, answering all patient questions, arranging and discussing plan of care with patient as well as directly providing post-discharge instructions  Additional time then spent on discharge activities  Discharge Medications:  See after visit summary for reconciled discharge medications provided to patient and family        ** Please Note: This note has been constructed using a voice recognition system **

## 2021-07-19 NOTE — PROGRESS NOTES
Vancomycin Assessment    Heather Clayton is a 67 y o  male who is currently receiving vancomycin 750 Q8H for bacteremia sepsis   Relevant clinical data and objective history reviewed:  Creatinine   Date Value Ref Range Status   07/19/2021 0 71 0 60 - 1 30 mg/dL Final     Comment:     Standardized to IDMS reference method   07/18/2021 0 69 0 60 - 1 30 mg/dL Final     Comment:     Standardized to IDMS reference method   07/17/2021 0 73 0 60 - 1 30 mg/dL Final     Comment:     Standardized to IDMS reference method     /63 (BP Location: Left arm)   Pulse 79   Temp 98 4 °F (36 9 °C) (Oral)   Resp 18   Ht 5' 10" (1 778 m)   Wt 66 7 kg (147 lb)   SpO2 98%   BMI 21 09 kg/m²   I/O last 3 completed shifts: In: 8092 [P O :1372]  Out: 3750 [Urine:3750]  Lab Results   Component Value Date/Time    BUN 21 07/19/2021 06:15 AM    WBC 6 64 07/19/2021 06:15 AM    HGB 7 9 (L) 07/19/2021 06:15 AM    HCT 23 0 (L) 07/19/2021 06:15 AM     (H) 07/19/2021 06:15 AM     07/19/2021 06:15 AM     Temp Readings from Last 3 Encounters:   07/19/21 98 4 °F (36 9 °C) (Oral)   07/12/21 (!) 97 3 °F (36 3 °C) (Temporal)   07/06/21 (!) 97 4 °F (36 3 °C) (Temporal)     Vancomycin Days of Therapy: 6    Assessment/Plan  The patient is currently on vancomycin utilizing scheduled dosing based on actual body weight  Baseline risks associated with therapy include: advanced age  Patient's trough came back as 21 1 mcg/mL  The patient is currently receiving 750 Q8H and after clinical evaluation will be changed to 1000 mg Q12H starting about 13 hours after last dose admin  Pharmacy will also follow closely for s/sx of nephrotoxicity, infusion reactions, and appropriateness of therapy  BMP and CBC will be ordered per protocol  Plan for trough as patient approaches steady state, prior to the 5th  dose at approximately 1130 on 07/21/21  Due to infection severity, will target a trough of 15-20 (appropriate for most indications)   Pharmacy will continue to follow the patients culture results and clinical progress daily      Addison Black, Pharmacist

## 2021-07-19 NOTE — ASSESSMENT & PLAN NOTE
· Discussed at length with patient directly at bedside as well as his brother Dorita Dominguez via phone 7/15 per patient request  · Patient verbalizes understanding of findings on his CT scan abdomen/pelvis concerning for enlarging cystic lesion of his pancreas as well as possible cystic metastasis  · Per patient he confirms that he did not tolerate more aggressive chemotherapy in the past; ongoing concern that he has had multiple readmissions over the last couple of months related to his progressive weakness  · Discussed at length regarding goals of care, options including discharge to rehab to attempt to work on his strength once medically cleared verses pursuing hospice cares  · Patient states that the symptoms related to his cancer are well controlled, and would like to try to pursue being discharged to rehab and ultimately continue chemotherapy if possible  Patient's brother Dorita Dominguez is in agreement with this plan  · Patient remains DNR/DNI however does not have a formal living will  Consider POLST on discharge  · Discussed with patient and sister-in-law Ney Garcia at bedside 7/16 - patient and Ney Garcia are requesting a family meeting hopefully tomorrow, 7/17  TT sent to palliative care  Torie Hendrix she does not want to see patient suffer but will be supportive of whatever he decides  She recognizes he has had frequent hospital admissions and gradual decline  · Family meeting held 7/17    Family and patient in agreement for plan of discharge to rehab and discuss with oncology as outpatient regarding chemotherapy at that point

## 2021-07-19 NOTE — NURSING NOTE
Per MD orders patient discharged to SNF, [facility]  Report called into facility with Dillon Angel

## 2021-07-19 NOTE — UTILIZATION REVIEW
Continued Stay Review    Date: 7/16-7/19                          Current Patient Class: inpatient  Current Level of Care: med surg    HPI:75 y o  male w/hx pancreatic ca on chemo, hyponatremia, htn, dm, neuropathy, mesenteric vein thrombosis initially admitted on 7/14 under obs, converted to IP on 7/15 due to sirs w/leukocytosis and hyoptension; generalized weakness and staring episode  He was anemic requiring prbc transfusion; and hyponatremic requiring fluid restriction and salt tabs  Imaging showed disease progression  Assessment/Plan:  7/16: renal consult: (due to hyponatremia) likely SIADH in setting of malignancy  Na dropped despite tid na tabs and with IVF, creat improved  Continue 1 8 liter fluid restriction, increase na tabs to 2gm tid  Recheck bmp in am  Keep on xarelto for mesenteric thrombus  bp improved with IVF and ace on hold  Pale color  Intake/Output Summary (Last 24 hours) at 7/16/2021 1529  Last data filed at 7/16/2021 1527      Gross per 24 hour   Intake 490 ml   Output 2825 ml   Net -2335 ml     7/17: slept poorly last night  Had 3 loose stools this am, miralax and senna dc'd  Stool sent to lab to check for c  Diff and contact precautions placed  florastor started  Stage 2 L buttock wound noted and wound care is consulted  Pale skin color, anxious, tearful, moving extremities, lungs clear, abd soft and nontender  continue prednisone taper  needs rehab to optimize functional status as he wishes to continue chemo  If he is unable to tolerate more chemo, then hospice is recommended  Started on IV antbx due to leukocytosis  Blood cultures negative to date  Sugars 200's yesterday, in 180's today  Intake/Output Summary (Last 24 hours) at 7/17/2021 1042  Last data filed at 7/17/2021 0900      Gross per 24 hour   Intake 960 ml   Output 1650 ml   Net -690 ml     7/18: na 130, no volume overload  Continue na tabs and fluid restriction  Feeling better today   bmi 21 09, nutrition evaluated and noted severe protein calorie malnutrition w/orbital hollowing and clavicle protrusion/temporal depressions, needs glucerna supplements bid  C  Diff negative  7/19: discharged to [facility] today  Feeling improved  Na 131, hgb 7 9  resuming ace, needs lab checks in a week       Vital Signs:   Date/Time  Temp  Pulse  Resp  BP MAP   SpO2  O2 Device  Patient Position - Orthostatic VS   07/19/21 0742  98 4 °F (36 9 °C)  79  18  136/63 90  98 %  None (Room air)  Sitting   07/18/21 2134  98 6 °F (37 °C)  92  17  143/64 92  97 %  None (Room air)  Lying   07/18/21 1433  98 4 °F (36 9 °C)  95  17  129/62 89  96 %  None (Room air)  Lying   07/18/21 0700  97 8 °F (36 6 °C)  75  17  136/65 94  100 %  None (Room air)  Lying   07/17/21 2359  98 5 °F (36 9 °C)  82  18  135/65 93  99 %  None (Room air)  Lying   07/17/21 2000  --  --  --  -- --  99 %  None (Room air)  --   07/17/21 1525  98 4 °F (36 9 °C)  80  19  144/68 98  99 %  None (Room air)  Sitting   07/17/21 0700  98 1 °F (36 7 °C)  80  17  137/63 90  99 %  None (Room air)  Lying         Pertinent Labs/Diagnostic Results:   Results from last 7 days   Lab Units 07/19/21  0921   SARS-COV-2  Negative     Results from last 7 days   Lab Units 07/19/21 0615 07/18/21 0410 07/17/21 0436 07/16/21  0511 07/15/21  0526   WBC Thousand/uL 6 64 4 54 8 47 10 77* 13 19*   HEMOGLOBIN g/dL 7 9* 7 4* 7 4* 7 3* 6 5*   HEMATOCRIT % 23 0* 21 8* 21 6* 21 3* 19 5*   PLATELETS Thousands/uL 149 189 216 228 300   NEUTROS ABS Thousands/µL 3 99 2 55 6 62 9 09* 11 81*         Results from last 7 days   Lab Units 07/19/21  0615 07/18/21  0411 07/17/21  0436 07/16/21  0511 07/15/21  0522 07/14/21  0914   SODIUM mmol/L 131* 130* 129* 129* 130*  --    POTASSIUM mmol/L 4 4 4 1 4 1 4 0 3 6  --    CHLORIDE mmol/L 100 99* 100 100 100  --    CO2 mmol/L 24 24 23 21 21  --    CO2, I-STAT mmol/L  --   --   --   --   --  21   ANION GAP mmol/L 7 7 6 8 9  --    BUN mg/dL 21 17 21 19 27*  --    CREATININE mg/dL 0  71 0 69 0 73 0 65 0 69  --    EGFR ml/min/1 73sq m 91 92 90 95 92  --    CALCIUM mg/dL 7 7* 7 9* 7 5* 7 4* 7 7*  --    CALCIUM, IONIZED, ISTAT mmol/L  --   --   --   --   --  1 20     Results from last 7 days   Lab Units 07/16/21  0511 07/15/21  0522 07/14/21  0858   AST U/L 10 14 13   ALT U/L 19 19 19   ALK PHOS U/L 64 75 89   TOTAL PROTEIN g/dL 4 8* 5 0* 5 8*   ALBUMIN g/dL 2 1* 2 3* 2 9*   TOTAL BILIRUBIN mg/dL 1 00 1 00 1 00     Results from last 7 days   Lab Units 07/19/21  1123 07/19/21  0739 07/18/21  2034 07/18/21  1541 07/18/21  1044 07/18/21  0715 07/17/21  2045 07/17/21  1526 07/17/21  1052 07/17/21  0729 07/16/21  2047 07/16/21  1527   POC GLUCOSE mg/dl 284* 346* 395* 291* 373* 106 237* 181* 299* 185* 267* 388*     Results from last 7 days   Lab Units 07/19/21  0615 07/18/21  0411 07/17/21  0436 07/16/21  0511 07/15/21  0522 07/14/21  0858   GLUCOSE RANDOM mg/dL 103 83 172* 168* 183* 101             BETA-HYDROXYBUTYRATE   Date Value Ref Range Status   04/08/2021 0 1 <0 6 mmol/L Final              Results from last 7 days   Lab Units 07/14/21  0914   PH, STACIE I-STAT  7 398   PCO2, STACIE ISTAT mm HG 33 1*   PO2, STACIE ISTAT mm HG 22 0*   HCO3, STACIE ISTAT mmol/L 20 4*   I STAT BASE EXC mmol/L -4*   I STAT O2 SAT % 38*         Results from last 7 days   Lab Units 07/14/21  0858   TROPONIN I ng/mL <0 02         Results from last 7 days   Lab Units 07/14/21  0858   PROTIME seconds 18 4*   INR  1 54*   PTT seconds 25         Results from last 7 days   Lab Units 07/19/21  0615 07/18/21  0411 07/15/21  0522 07/14/21  1014   PROCALCITONIN ng/ml <0 05 0 12 1 21* 0 27*     Results from last 7 days   Lab Units 07/14/21  2143 07/14/21  1139 07/14/21  1014   LACTIC ACID mmol/L 1 8 2 2* 3 9*       Results from last 7 days   Lab Units 07/16/21  1043 07/14/21  1137   CLARITY UA   --  Clear   COLOR UA   --  Yellow   SPEC GRAV UA   --  1 020   PH UA   --  6 0   GLUCOSE UA mg/dl  --  100 (1/10%)*   KETONES UA mg/dl  --  Trace* BLOOD UA   --  Trace-Intact*   PROTEIN UA mg/dl  --  Negative   NITRITE UA   --  Negative   BILIRUBIN UA   --  Negative   UROBILINOGEN UA E U /dl  --  1 0   LEUKOCYTES UA   --  Negative   WBC UA /hpf  --  None Seen   RBC UA /hpf  --  2-4   BACTERIA UA /hpf  --  Occasional   EPITHELIAL CELLS WET PREP /hpf  --  None Seen   SODIUM UR  96  --        Results from last 7 days   Lab Units 07/17/21  1332   C DIFF TOXIN B BY PCR  Negative     Results from last 7 days   Lab Units 07/14/21  1014 07/14/21  1000   BLOOD CULTURE  No Growth After 4 Days  No Growth After 4 Days  Medications:   Scheduled Medications:  acetaminophen, 975 mg, Oral, Q8H [facility]  aspirin, 81 mg, Oral, Daily  atorvastatin, 40 mg, Oral, HS  cefepime, 2,000 mg, Intravenous, Q12H  escitalopram, 5 mg, Oral, Daily  insulin glargine, 10 Units, Subcutaneous, HS  insulin lispro, 2-12 Units, Subcutaneous, TID AC  insulin lispro, 2-12 Units, Subcutaneous, HS  insulin lispro, 8 Units, Subcutaneous, TID With Meals  melatonin, 6 mg, Oral, HS  rivaroxaban, 15 mg, Oral, Daily With Dinner  saccharomyces boulardii, 250 mg, Oral, BID  sodium chloride, 2 g, Oral, TID With Meals  vancomycin, 1,000 mg, Intravenous, Q12H  PO prednisone 10mg on 7/16    Continuous IV Infusions: dc on 7/15     PRN Meds:  hydrALAZINE, 5 mg, Intravenous, Q6H PRN  LORazepam, 0 25 mg, Oral, Q8H PRN [email or web address]  oxyCODONE, 2 5 mg, Oral, Q4H PRN  oxyCODONE, 5 mg, Oral, Q4H PRN  prochlorperazine, 10 mg, Oral, Q6H PRN      Discharge Plan: DC to [facility] 7/19    Network Utilization Review Department  ATTENTION: Please call with any questions or concerns to [phone] and carefully listen to the prompts so that you are directed to the right person  All voicemails are confidential   Carolyn Fox all requests for admission clinical reviews, approved or denied determinations and any other requests to dedicated fax number below belonging to the campus where the patient is receiving treatment  List of dedicated fax numbers for the Facilities:  [address] DENIALS (Administrative/Medical Necessity) [phone]   [address] (Maternity/NICU/Pediatrics) [phone] [address] Kvng Erwin [address] [place] [address] Jaleel Veronica White [address] [facility] [phone]

## 2021-07-19 NOTE — ASSESSMENT & PLAN NOTE
· Recently started on prednisone taper, Lexapro and Norco for pain, took last one early in AM  · Prednisone- 30mg daily, continue taper, decreasing by 10 each per outpatient instructions  · Patient noted to be hypotensive in emergency department, 86/46, pressures did improve with fluid bolus - remains stable  · Initial lactic acid 3 9, improved to 2 2 after fluids  · Patient reports he is having normal oral intake  · UA, CT head, CXR unremarkable  · CT abdomen pelvis: "Mild interval increase in size of cystic lesion in the head and uncinate process of the pancreas measuring 1 5 cm  Stable severe pancreatic duct dilatation  Decreased gas and inflammation between the medial border of the pylorus and head of the pancreas  Persistent inflammation along the medial aspect of the pylorus and proximal 2nd portion of the duodenum  1 3 cm low-attenuation, possibly cystic lesion or fluid collection along the medial border of the pylorus  Possible contained perforation or cystic metastasis    Constipation "  · Patient overall is feeling improved  · PT/OT evaluated patient, recommended rehab - patient currently agreeable with this plan  · Patient stable for discharge to [facility] today

## 2021-07-19 NOTE — ASSESSMENT & PLAN NOTE
Lab Results   Component Value Date    HGBA1C 9 4 (H) 04/08/2021       Recent Labs     07/18/21  1044 07/18/21  1541 07/18/21 2034 07/19/21  0739   POCGLU 373* 291* 395* 346*       Blood Sugar Average: Last 72 hrs:     · Managed on Lantus 8u and SSI at home  · Start humalog 8 units TID with meals due to hyperglycemia  · Lantus increased to 10 units due to persistent hyperglycemia  · Resume metformin on discharge

## 2021-07-19 NOTE — ASSESSMENT & PLAN NOTE
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

## 2021-07-19 NOTE — ASSESSMENT & PLAN NOTE
· Follows with Dr Tj Bello outpatient  · Undergoing chemotherapy  · Oncology evaluated patient yesterday, CT abdomen/pelvis obtained which shows some increase in cystic lesion in head of the pancreas as well as possible area of contained perforation versus cystic metastasis  · Question of progressive weakness may be related to progression of disease vs chemo  · Per surgery more likely area of metastasis, no abdominal pain, no surgical intervention - outpt follow up with surg onc  · Palliative Consulted - as of now patient remains disease treatment focused  · Chemotherapy has been decreased in the past due to poor toleration  · Discussed with Dr Tj Bello - feels that rehab is an appropriate next step to see if patient can improve his functional status  At that time they can reconsider if chemotherapy would be warranted  Patient is currently on the mildest form of chemotherapy, historically has not tolerated more aggressive regimens in the past   Ultimately if patient is unable to tolerate this chemotherapy regimen then hospice would be appropriate

## 2021-07-20 NOTE — TELEPHONE ENCOUNTER
----- Message from Reynaldo Stratton MD sent at 7/19/2021  1:39 PM EDT -----  Hello    MA team - Can the patient have a post hospital follow-up with Dr Sherri Lowery or advanced practitioner in 2-3 weeks with BMP prior to appointment      Dr Sherri Lowery - I have not seen the patient but saw that patient was discharged today with sodium level slowly improving    Thank you

## 2021-07-21 NOTE — PATIENT INSTRUCTIONS
Nutrition Rx & Recommendations:  · Diet: Diabetic, High Calorie & High Protein Diet  · Eat slowly and chew food thoroughly before swallowing  · Small, frequent meals/snacks may be easier to tolerate than 3 large daily meals  Aim for 5-6 small meals per day (every 2-3 hours)  · Include protein at all meals/snacks  · Stay hydrated by sipping fluids of choice/tolerance throughout the day  · Liquid nutrition may be better tolerated than solids at times  · Alter food choices and eating patterns to accommodate changing needs  · Refer to Eating Hints book for other meal/snack ideas and symptom management  · For weight loss: monitor your weight at home at least 2x/week, record your weight, start by adding 250-500 extra calories per day, eat 5-6 small scheduled meals every 2-3 hrs, choose foods that are high in protein and calories (see pages 49-53 in your Eating Hints book), drink liquids with calories for example: milkshakes/smoothies/juice/soup/whole milk/chocolate milk, cook with protein fortified milk (see recipe on page 36 in your Eating Hints book), consider ready-to-drink oral nutrition supplements such as Ensure Plus, Ensure Enlive, Boost Plus, or Boost Very High Calorie, avoid "diet" and "light" foods when possible, avoid drinking too much with meals, contact your dietitian with any continued weight loss over the course of 1 week  For more info see pages 35-37 in your Eating Hints book  · Weigh yourself regularly  If you notice weight loss, make an effort to increase your daily food/calorie intake  If you continue to notice loss after these efforts, reach out to your dietitian to establish a plan to stabilize weight  · Avoid gas-producing foods such as broccoli, cabbage, sauerkraut, [place] sprouts, cauliflower, corn, cucumbers, onions, peppers, beans, peas, lentils, apples, apple juice, avocado, and melons  Carbonated drinks, chewing gum, and drinking through a straw can also cause gas    Limiting lactose may help for those who are sensitive to milk products  · Fluid Restriction per physician  · Nutrition Supplements: Increase Glucerna to 2 per day (with AM and afternoon snacks)  · 2-3 light [place] yogurt's per day  Choose a yogurt that has <10 grams of sugar per serving      Follow Up Plan: pt to contact Faith Kent RD, LDN once he is discharged from [facility] [place] to schedule a follow up  Recommend Referral to Other Providers: none at this time

## 2021-07-26 NOTE — PROGRESS NOTES
Hematology/Oncology Outpatient Follow- up Note  Scott Murphy, [birth year], [id number]  7/26/2021        Chief Complaint   Patient presents with    Follow-up       HPI:  Scott Murphy is a 68 yo male with newly diagnosed adenocarcinoma of the pancreas  Patient presented with symptoms of significant abdominal pain, nausea, and changes in his stool  His initial workup was completed a [address] scan demonstrated a large pancreatic cyst as well as an abnormality of the pancreatic tissue with atrophy, dilated duct, and potential for pancreatic mass of the pancreatic head  He underwent an EUS  Guided FNA of the pancreatic mass on 2/3/21 and  Pathology demonstrated adenocarcinoma  CT chest was negative for metastatic disease  CA 19 9 was near 20,000 at presentation  He was seen for initial consult by Dr Jovita Bennett on 03/15/2021 for consideration of neoadjuvant chemotherapy prior to resection  He was started on weekly gemcitabine and Abraxane    He had 2 cycles of gemcitabine and Abraxane but was admitted multiple times for complications so his treatment regimen was changed to single agent gemcitabine at 800 milligrams/meter sq        Previous Hematologic/ Oncologic History:    Oncology History   Adenocarcinoma of pancreas ([facility] [address] )   3/10/2021 Initial Diagnosis    Adenocarcinoma of pancreas ([facility] [address] )     3/23/2021 - 7/12/2021 Chemotherapy    pegfilgrastim (Felizardo Fat), 6 mg, Subcutaneous, Once, 3 of 9 cycles  Administration: 6 mg (6/21/2021)  paclitaxel protein-bound (ABRAXANE) IVPB, 125 mg/m2 = 235 mg, Intravenous, Once, 2 of 2 cycles  Dose modification: 100 mg/m2 (original dose 125 mg/m2, Cycle 2, Reason: Dose Not Tolerated)  Administration: 235 mg (3/23/2021), 235 mg (3/30/2021), 235 mg (4/6/2021), 188 mg (4/20/2021), 188 mg (5/4/2021), 188 mg (4/27/2021)  gemcitabine (GEMZAR) infusion, 1,879 8 mg, Intravenous, Once, 4 of 10 cycles  Dose modification: 800 mg/m2 (original dose 1,000 mg/m2, Cycle 2, Reason: Dose Not Tolerated)  Administration: 1,800 mg (3/23/2021), 1,800 mg (3/30/2021), 1,800 mg (2021), 1,504 2 mg (2021), 1,504 2 mg (2021), 1,504 2 mg (2021), 1,504 2 mg (2021), 1,504 2 mg (2021), 1,504 2 mg (2021), 1,504 2 mg (2021), 1,504 2 mg (2021)     2021 -  Chemotherapy    fluorouracil (ADRUCIL) ambulatory infusion Soln, 200 mg/m2/day = 1,830 mg (88 9 % of original dose 225 mg/m2/day), Intravenous, Over 120 hours, 0 of 5 cycles  Dose modification: 200 mg/m2/day (original dose 225 mg/m2/day, Cycle 1, Reason: Anticipated Tolerance)     Gemcitabine and Abraxane status post 2 cycles  single agent gemcitabine based on performance status and recent hospitalizations  Current Hematologic/ Oncologic Treatment:    Will refer back to Surgical Oncology for consideration of resection, if not a surgical candidate with proceed with concurrent chemoradiation    ECO-3    Interval History:   The patient presents for routine follow up  He was last seen by Dr Kj Xie on   Since his last visit, he was hospitalized at [facility] (-21) with generalized weakness  Imaging done demonstrated evidence of disease progression with some increase in the cystic lesion in the head of the pancreas as well as possible area of contained perforation vs cystic metastasis  Patient was discharged to rehab in attempt to improve his overall functional status  He is currently at [facility] undergoing rehab  Patient states he feels fairly well  He still feels weak  He has some weakness in his right arm, numbness in his fingers  He can walk short distance with walker  His appetite is good  He reports he is eating  Denies N/V/D/C  Denies abdominal pain  Denies chest pain  No shortness of breath     Most recent tumor marker from  was 795    Cancer Staging:  Cancer Staging  No matching staging information was found for the patient      Molecular Testing:         Test Results:    Imaging: EEG awake or drowsy routine    Result Date: 2021  Narrative: ELECTROENCEPHALOGRAM (EEG) Patient Name:  Norma Velásquez  MRN: [id number] :  1945 File #: Matthew [id number] 21-12 Age: 67 y o  Date performed: 7/15/2021        Report date: 2021      Study type: Routine EEG ICD 10 diagnosis: Spells/Fit NOS R56 9 Start time: 09:55 End time: 10:24 --------------------------------------------------------------------------- ---------------------------------------- Patient History: This recording was observed in a 67 y o  male to determine whether spells of presyncope/syncope are seizures  Medications include: escitalopram, cefepime --------------------------------------------------------------------------- ---------------------------------------- Description of Procedure: · 32 channel digital recording with electrodes placed according to the International 10-20 system with additional T1/T2 electrodes, EOG, EKG, and simultaneous video  The recording was technically satisfactory  --------------------------------------------------------------------------- ---------------------------------------- EEG Description: The recording was performed with the patient awake, drowsy, and asleep  He was fully oriented  During wakefulness, there were long runs of well regulated, low amplitude, posteriorly dominant, symmetric 10 cps alpha rhythm that attenuated with eye opening  There were symmetric low amplitude, frontally dominant beta activities  With drowsiness, alpha activity attenuated and was replaced by diffusely distributed theta activities  With sleep, symmetric vertex sharp waves, K complexes and sleep spindles were present  --------------------------------------------------------------------------- ---------------------------------------- Activation Procedures: Hyperventilation was not performed  Stepped photic stimulation between 1-30 cps was performed and induced symmetric photic driving  Other findings:  The single lead ECG demonstrated normal sinus rhythm --------------------------------------------------------------------------- ---------------------------------------- EEG Interpretation: This Routine EEG recorded during wakefulness, drowsiness, and sleep is normal  Trisha Ahmadi MD [facility]     XR Trauma chest portable    Result Date: 7/14/2021  Narrative: CHEST INDICATION:   TRAUMA  COMPARISON:  5/30/2021 EXAM PERFORMED/VIEWS:  XR CHEST PORTABLE Single view FINDINGS: Right-sided port terminating mid SVC Cardiomediastinal silhouette appears unremarkable  The lungs are clear  No pneumothorax or pleural effusion  Osseous structures appear within normal limits for patient age  Impression: No acute cardiopulmonary disease  Findings are stable Workstation performed: [id number]     XR spine cervical 2 or 3 vw injury    Result Date: 7/16/2021  Narrative: CERVICAL SPINE INDICATION:   M25 511: Pain in right shoulder  COMPARISON:  None VIEWS:  XR SPINE CERVICAL 2 OR 3 VW INJURY FINDINGS: No fracture or subluxation  Alignment is anatomic There is disc space narrowing at C5-C6 and C6-C7 ventral osteophytosis  There is multilevel facet arthrosis  Atherosclerotic calcifications are noted  The lung apices are clear  Impression: No acute fractures identified  Workstation performed: [id number]     XR shoulder 2+ vw right    Result Date: 7/16/2021  Narrative: RIGHT SHOULDER INDICATION:   M25 511: Pain in right shoulder  COMPARISON:  4/8/2021 VIEWS:  XR SHOULDER 2+ VW RIGHT FINDINGS: There is no acute fracture or dislocation  Mild osteoarthritis acromioclavicular joint  No lytic or blastic osseous lesion  Soft tissues are unremarkable  Impression: No acute osseous abnormality  Workstation performed: [id number]     TRAUMA - CT head wo contrast    Result Date: 7/14/2021  Narrative: CT BRAIN - WITHOUT CONTRAST INDICATION:   TRAUMA   COMPARISON:  5/30/2021 TECHNIQUE:  CT examination of the brain was performed  In addition to axial images, sagittal and coronal 2D reformatted images were created and submitted for interpretation  Radiation dose length product (DLP) for this visit:  876 mGy-cm   This examination, like all CT scans performed in the [facility], was performed utilizing techniques to minimize radiation dose exposure, including the use of iterative reconstruction and automated exposure control  IMAGE QUALITY:  Diagnostic  FINDINGS: PARENCHYMA: Decreased attenuation is noted in periventricular and subcortical white matter demonstrating an appearance that is statistically most likely to represent mild microangiopathic change  Mild cerebral volume loss  No CT signs of acute infarction  No intracranial mass, mass effect or midline shift  No acute parenchymal hemorrhage  Mild vascular calcification of the distal vertebral arteries and cavernous internal carotid arteries  VENTRICLES AND EXTRA-AXIAL SPACES:  Normal for the patient's age  VISUALIZED ORBITS AND PARANASAL SINUSES:  Unremarkable  CALVARIUM AND EXTRACRANIAL SOFT TISSUES:  Normal      Impression: No acute intracranial abnormality  Stable microangiopathic changes within the brain  Workstation performed: [id number]     TRAUMA - CT spine cervical wo contrast    Result Date: 7/14/2021  Narrative: CT CERVICAL SPINE - WITHOUT CONTRAST INDICATION:   TRAUMA  COMPARISON:  None  TECHNIQUE:  CT examination of the cervical spine was performed without intravenous contrast   Contiguous axial images were obtained  Sagittal and coronal reconstructions were performed  Radiation dose length product (DLP) for this visit:  355 mGy-cm   This examination, like all CT scans performed in the [facility], was performed utilizing techniques to minimize radiation dose exposure, including the use of iterative reconstruction and automated exposure control  IMAGE QUALITY:  Diagnostic   FINDINGS: ALIGNMENT:  Normal alignment of the cervical spine  No subluxation  VERTEBRAL BODIES:  No fracture  DEGENERATIVE CHANGES:  Mild cervical degenerative change  No significant central canal stenosis  PREVERTEBRAL AND PARASPINAL SOFT TISSUES:  Vascular calcifications of the distal common carotid artery and proximal cervical internal carotid artery  Probable moderate to high-grade stenosis of the right ICA origin based on the degree of calcification  1 3 cm low-density nodule within the right lobe of the thyroid gland  THORACIC INLET:  Unremarkable  Impression: No acute osseous abnormality  Mild cervical degenerative change  Workstation performed: [id number]     CT abdomen pelvis w contrast    Result Date: 7/14/2021  Narrative: CT ABDOMEN AND PELVIS WITH IV CONTRAST INDICATION:   Dizziness and weakness  Mild trauma  Pancreatic cancer  Undergoing chemotherapy  COMPARISON:  5/14/2021  TECHNIQUE:  CT examination of the abdomen and pelvis was performed  Axial, sagittal, and coronal 2D reformatted images were created from the source data and submitted for interpretation  Radiation dose length product (DLP) for this visit:  457 mGy-cm   This examination, like all CT scans performed in the [facility], was performed utilizing techniques to minimize radiation dose exposure, including the use of iterative reconstruction and automated exposure control  IV Contrast:  100 mL of iohexol (OMNIPAQUE) Enteric Contrast:  Enteric contrast was not administered  FINDINGS: ABDOMEN LOWER CHEST:  Visualized lung bases are clear  LIVER/BILIARY TREE:  Unremarkable  GALLBLADDER:  No calcified gallstones  No pericholecystic inflammatory change  SPLEEN:  Unremarkable  PANCREAS:  Cystic lesion in the head and uncinate process measures 1 5 cm, previously measuring 1 1 cm  Stable severe dilatation of the main pancreatic duct and cystic side branch dilatation  Inflammation between the head of the pancreas and duodenum has nearly completely resolved  Punctate foci of gas are also nearly completely resolved  Single punctate focus of present adjacent to the head of the pancreas and pylorus  ADRENAL GLANDS:  Stable mild nodularity of the right adrenal gland  KIDNEYS/URETERS:  Symmetric nephrographic phase enhancement of the kidneys  No obstructive uropathy  STOMACH AND BOWEL:  Mild inflammation along the medial border of the pylorus and proximal 2nd portion of the duodenum  Low-attenuation lesion along the medial border of the pylorus measuring 1 3 cm  Evaluation limited in the absence of duodenal distention  Significant amount of gas and stool throughout the colon  Stool distends the rectum  No evidence of bowel obstruction, colitis or diverticulitis  APPENDIX:  No findings to suggest appendicitis  ABDOMINOPELVIC CAVITY:  No organized fluid collection or lymphadenopathy  VESSELS:  No evidence of portal, proximal mesenteric vein or splenic vein thrombosis  PELVIS REPRODUCTIVE ORGANS:  No prostate enlargement  URINARY BLADDER:  Unremarkable  ABDOMINAL WALL/INGUINAL REGIONS:  Unremarkable  OSSEOUS STRUCTURES:  No acute fracture or destructive osseous lesion  Impression: 1  Mild interval increase in size of cystic lesion in the head and uncinate process of the pancreas measuring 1 5 cm  Stable severe pancreatic duct dilatation  2   Decreased gas and inflammation between the medial border of the pylorus and head of the pancreas  Persistent inflammation along the medial aspect of the pylorus and proximal 2nd portion of the duodenum  3   1 3 cm low-attenuation, possibly cystic lesion or fluid collection along the medial border of the pylorus  Possible contained perforation or cystic metastasis  4   Constipation   Workstation performed: [id number]       Labs:   Lab Results   Component Value Date    WBC 6 64 07/19/2021    HGB 7 9 (L) 07/19/2021    HCT 23 0 (L) 07/19/2021     (H) 07/19/2021     07/19/2021     Lab Results   Component Value Date K 4 4 07/19/2021     07/19/2021    CO2 24 07/19/2021    BUN 21 07/19/2021    CREATININE 0 71 07/19/2021    GLUCOSE 98 07/14/2021    GLUF 308 (H) 07/09/2021    CALCIUM 7 7 (L) 07/19/2021    CORRECTEDCA 8 9 07/16/2021    AST 10 07/16/2021    ALT 19 07/16/2021    ALKPHOS 64 07/16/2021    EGFR 91 07/19/2021           Review of Systems   Constitutional: Positive for activity change and fatigue  Musculoskeletal: Positive for arthralgias and gait problem  Neurological: Positive for weakness and numbness (fingers)  Psychiatric/Behavioral: Positive for dysphoric mood  All other systems reviewed and are negative  Active Problems:   Patient Active Problem List   Diagnosis    Hyperlipidemia    Essential hypertension    Type 2 diabetes mellitus with hyperglycemia, with long-term current use of insulin (HCC)    Gastroesophageal reflux disease    Pancreatic cyst    Adenocarcinoma of pancreas (HCC)    Peripheral neuropathy    Bilateral cellulitis of lower leg    Hyponatremia    Severe protein-calorie malnutrition (HCC)    Anemia    Sacral wound    Leukocytosis    Generalized weakness    Streptococcal bacteremia    Mesenteric venous thrombosis (HCC)    GWEN (acute kidney injury) ([facility] [address] )    Goals of care, counseling/discussion       Past Medical History:   Past Medical History:   Diagnosis Date    Cancer ([facility] [address] )     pancreatic    Diabetes mellitus ([facility] [address] )     Frequent urination     GERD (gastroesophageal reflux disease)     Hyperlipidemia     Hypertension     Peripheral neuropathy     Weakness     Weight loss        Surgical History:   Past Surgical History:   Procedure Laterality Date    APPENDECTOMY      CATARACT EXTRACTION      COLONOSCOPY      SKIN LESION EXCISION      of a wart on right arm    TUNNELED VENOUS PORT PLACEMENT N/A 3/26/2021    Procedure: INSERTION VENOUS PORT (PORT-A-CATH);   Surgeon: Jessica Biggs MD;  Location:  MAIN OR;  Service: Surgical Oncology       Family History:  No family history on file  Cancer-related family history is not on file  Social History:   Social History     Socioeconomic History    Marital status:      Spouse name: Not on file    Number of children: Not on file    Years of education: Not on file    Highest education level: Not on file   Occupational History    Not on file   Tobacco Use    Smoking status: Former Smoker     Types: Pipe     Quit date: 2/23/2011     Years since quitting: 10 4    Smokeless tobacco: Never Used   Vaping Use    Vaping Use: Never used   Substance and Sexual Activity    Alcohol use: Never    Drug use: Never    Sexual activity: Not on file   Other Topics Concern    Not on file   Social History Narrative    Not on file     Social Determinants of Health     Financial Resource Strain:     Difficulty of Paying Living Expenses:    Food Insecurity:     Worried About [address] in the Last Year:     [employer] [employer] in the Last Year:    Transportation Needs:     Lack of Transportation (Medical):      Lack of Transportation (Non-Medical):    Physical Activity:     Days of Exercise per Week:     Minutes of Exercise per Session:    Stress:     Feeling of Stress :    Social Connections:     Frequency of Communication with Friends and Family:     Frequency of Social Gatherings with Friends and Family:     Attends Rastafarian Services:     Active Member of Clubs or Organizations:     Attends Club or Organization Meetings:     Marital Status:    Intimate Partner Violence:     Fear of Current or Ex-Partner:     Emotionally Abused:     Physically Abused:     Sexually Abused:        Current Medications:   Current Outpatient Medications   Medication Sig Dispense Refill    Accu-Chek FastClix Lancets MISC USE 1 TO CHECK GLUCOSE TWICE DAILY TO THREE TIMES DAILY      Accu-Chek Guide test strip USE 1 STRIP TWICE DAILY TO THREE TIMES DAILY      acetaminophen (TYLENOL) 325 mg tablet Take 650 mg by mouth every 4 (four) hours as needed for mild pain       atorvastatin (LIPITOR) 40 mg tablet Take 40 mg by mouth daily      Blood Glucose Monitoring Suppl (Accu-Chek Guide) w/Device KIT USE TO CHECK GLUCOSE TWO TO THREE TIMES DAILY      escitalopram (LEXAPRO) 5 mg tablet Take 5 mg by mouth daily      insulin glargine (Lantus SoloStar) 100 units/mL injection pen Inject 10 units at bedtime 15 mL 0    insulin lispro (HumaLOG) 100 units/mL injection Inject 5 Units under the skin 3 (three) times a day with meals  0    LORazepam (ATIVAN) 0 5 mg tablet Take 1 tablet (0 5 mg total) by mouth every 8 (eight) hours as needed for anxiety for up to 10 days 10 tablet 0    melatonin 3 mg Take 2 tablets (6 mg total) by mouth daily at bedtime  0    metFORMIN (GLUCOPHAGE) 850 mg tablet TAKE 1 TABLET BY MOUTH TWICE DAILY TAKE WITH MORNING AND EVENING MEAL      NovoLOG FlexPen 100 units/mL injection pen Inject as per sliding scale , (  Scale - 150-200 -1 units, 201-250-2 units, 251-300 -3 units, 301-350-4 units, > 350- 5  units) with meals 15 mL     oxyCODONE (ROXICODONE) 5 mg immediate release tablet Take 1 tablet (5 mg total) by mouth every 4 (four) hours as needed for severe pain for up to 10 daysMax Daily Amount: 30 mg 10 tablet 0    prochlorperazine (COMPAZINE) 10 mg tablet Take 1 tablet (10 mg total) by mouth every 6 (six) hours as needed for nausea or vomiting 45 tablet 3    ramipril (ALTACE) 10 MG capsule Take 10 mg by mouth daily       ReliOn Pen Needles 31G X 6 MM MISC USE 1 IN THE EVENING      rivaroxaban (XARELTO) 15 mg tablet Take 1 tablet (15 mg total) by mouth daily with breakfast for 40 doses      saccharomyces boulardii (FLORASTOR) 250 mg capsule Take 1 capsule (250 mg total) by mouth 2 (two) times a day  0    sodium chloride 1 g tablet Take 2 tablets (2 g total) by mouth 3 (three) times a day with meals 90 tablet 0     No current facility-administered medications for this visit         Allergies: No Known Allergies    Physical Exam:  BP (!) 104/42 (BP Location: Left arm)   Pulse 98   Temp 98 1 °F (36 7 °C) (Temporal)   Resp 16   SpO2 98%   There is no height or weight on file to calculate BSA  Wt Readings from Last 3 Encounters:   07/14/21 66 7 kg (147 lb)   07/12/21 67 4 kg (148 lb 9 4 oz)   07/06/21 67 1 kg (147 lb 14 9 oz)           Physical Exam  Constitutional:       General: He is not in acute distress  Comments: Pleasant, frail appearing elderly male  Arrived via [facility]   HENT:      Head: Normocephalic and atraumatic  Eyes:      General: No scleral icterus  Right eye: No discharge  Left eye: No discharge  Cardiovascular:      Rate and Rhythm: Normal rate and regular rhythm  Heart sounds: Normal heart sounds  Pulmonary:      Effort: Pulmonary effort is normal       Breath sounds: Normal breath sounds  Abdominal:      General: Bowel sounds are normal       Palpations: Abdomen is soft  Musculoskeletal:      Cervical back: Normal range of motion  Skin:     General: Skin is warm and dry  Neurological:      General: No focal deficit present  Mental Status: He is alert and oriented to person, place, and time  Psychiatric:         Mood and Affect: Mood normal          Behavior: Behavior normal          Assessment / Plan:    1  Adenocarcinoma of pancreas [facility])      The patient is a very pleasant 67year old male with adenocarcinoma of the pancreas which appears to be localized to the pancreas  Orquidea Dolan was seen by our colleagues in Surgical Oncology and they recommended neoadjuvant treatment  He was initially started on neoadjuvant chemotherapy with gemcitabine and Abraxane  His treatment regimen has been adjusted secondary to declining performance status, multiple hospitalizations, and treatment related side effects  Patient has currently been undergoing single agent therapy with gemcitabine      He was just recently hospitalized again with progressive generalized weakness, dizziness resulting in fall  CT of head negative  CT of abdomen and pelvis demonstrates evidence of disease progression with some increase in the cystic lesion in the head of the pancreas as well as possible area of contained perforation vs cystic metastasis  He is currently undergoing inpatient rehab  Goals of care discussed  Patient verbalizes understanding that based on imaging, he does have disease progression  He has not tolerated chemotherapy well  He has had frequent hospital admissions and his gradual decline in his performance status  After discussion with Dr Kayla Gallagher, his current plan will be discontinued  I discussed options with patient to include referral back to his surgical oncologist to discuss potential resection, although I did share my concerns with him that he would not be able to tolerate aggressive surgical intervention given his current performance status, referral to Radiation oncology to discuss concurrent chemoradiation if he is not a surgical candidate vs transitioning to a comfort focused approach to his care  Hospice was discussed  Patient expressed understanding of all options and was clear that he would like to discuss surgery to see if that's an option  I will arrange for him to be seen in follow up with Dr Kadeem Chopra  I will also place a referral to radiation oncology to discuss concurrent chemoradiation  I explained that chemotherapy would involve 5FU CADD pump daily for 5 days while he receives RT  I went over the possible side effects to include but not limited to mouth sores, diarrhea, hair loss, risk of infection and even death  Patient signed consent  He will follow up with his surgeon, then see Rad/Onc and then return for follow up with Dr Kayla Gallagher to review all recommendations and discuss treatment plan  He was in agreement with plan of care   He is instructed to call at any time with questions or concerns       Goals and Barriers:  Current Goal:  Prolong Survival from pancreatic cancer  Barriers: None  Patient's Capacity to Self Care:  Patient  able to self care  Portions of the record may have been created with voice recognition software  Occasional wrong word or "sound a like" substitutions may have occurred due to the inherent limitations of voice recognition software  Read the chart carefully and recognize, using context, where substitutions have occurred

## 2021-07-30 NOTE — LETTER
July 30, 2021     Miguel Homans, [address]  [address] [facility] [address]  [address] [address] [address]    Patient: Ciarra Banda   YOB: 1945   Date of Visit: 7/30/2021       Dear Dr Axel Miramontes:    Thank you for referring Ciarra Banda to me for evaluation  Below are my notes for this consultation  If you have questions, please do not hesitate to call me  I look forward to following your patient along with you  Sincerely,        Cecelia Powers MD        CC: No Recipients  Cecelia Powers MD  7/30/2021 11:58 AM  Sign when Signing Visit  Surgical Oncology Consultation    [address] [id number]    Patient:  Ciarra Banda  [birth year]  [id number]    Primary Care provider:  Shannan Huerta DO  [address] [place] [address] [place]    Referring provider:  No referring provider defined for this encounter  Diagnoses and all orders for this visit:    Adenocarcinoma of pancreas ([facility] )    Other orders  -     lisinopril (ZESTRIL) 40 mg tablet; Take 40 mg by mouth daily        Chief Complaint   Patient presents with    Follow-up       No follow-ups on file      Oncology History   Adenocarcinoma of pancreas ([facility] )   3/10/2021 Initial Diagnosis    Adenocarcinoma of pancreas ([facility] )     3/23/2021 - 7/12/2021 Chemotherapy    pegfilgrastim (Fleurezeve Neymar), 6 mg, Subcutaneous, Once, 3 of 9 cycles  Administration: 6 mg (6/21/2021)  paclitaxel protein-bound (ABRAXANE) IVPB, 125 mg/m2 = 235 mg, Intravenous, Once, 2 of 2 cycles  Dose modification: 100 mg/m2 (original dose 125 mg/m2, Cycle 2, Reason: Dose Not Tolerated)  Administration: 235 mg (3/23/2021), 235 mg (3/30/2021), 235 mg (4/6/2021), 188 mg (4/20/2021), 188 mg (5/4/2021), 188 mg (4/27/2021)  gemcitabine (GEMZAR) infusion, 1,879 8 mg, Intravenous, Once, 4 of 10 cycles  Dose modification: 800 mg/m2 (original dose 1,000 mg/m2, Cycle 2, Reason: Dose Not Tolerated)  Administration: 1,800 mg (3/23/2021), 1,800 mg (3/30/2021), 1,800 mg (4/6/2021), 1,504 2 mg (4/20/2021), 1,504 2 mg (5/4/2021), 1,504 2 mg (4/27/2021), 1,504 2 mg (6/7/2021), 1,504 2 mg (6/14/2021), 1,504 2 mg (6/21/2021), 1,504 2 mg (7/6/2021), 1,504 2 mg (7/12/2021)     8/16/2021 -  Chemotherapy    fluorouracil (ADRUCIL) ambulatory infusion Soln, 200 mg/m2/day = 1,830 mg (88 9 % of original dose 225 mg/m2/day), Intravenous, Over 120 hours, 0 of 5 cycles  Dose modification: 200 mg/m2/day (original dose 225 mg/m2/day, Cycle 1, Reason: Anticipated Tolerance)         History of Present Illness 3/2021 : This is a 65-year-old gentleman presents with a new diagnosis of pancreatic cancer  Briefly, the patient was admitted to [facility] about week's history significant abdominal pain, nausea, changes in stool  Workup there included CT scan which revealed large pancreatic cyst as well as an abnormality of the pancreatic tissue with atrophy, dilated duct, and potential for pancreatic mass of the pancreatic head  The patient clinically improved and was discharged home  He then underwent an EUS with our team, which revealed large pancreatic cyst, cyst fluid was consistent mucinous neoplasm, and there was a pancreatic head mass detected, and FNA of this mass revealed adenocarcinoma  The patient presents today for evaluation  Of note, CT chest has been completed, with no evidence of malignancy  CA 19 9 is near 20,000  Today he states that his symptoms have much improved  The symptoms he was having when he presented to [place] with the worse they have been in the course of his disease  He states that he feels quite well  The pain that he was experiencing is nearly gone, although he has intermittent pain that resolved quickly  He is tolerating p o  intake with no issues  He experiences no nausea no vomiting    He does have loose stool every once in a while, although the consistency of his stools not appear to be consistent with pancreatic insufficiency  Likewise, he is insulin dependent, but there have been no changes to his insulin requirements recently  He endorses some fatigue, but denies any significant weight loss  He denies any jaundice, bright red blood per rectum, changes in his functional status  He denies any history of pancreatitis, any history of gallbladder pathology, any risk factors for gallbladder pathology or pancreatitis  He has no drinking history  He denies any chest pain, shortness of breath or history of cardiac issues  He lives alone and accomplishes all of his own ADLs  He walks regularly  This is a 69-year-old gentleman with a new diagnosis of pancreatic head adenocarcinoma, as well as a large mucinous cyst   Initially, based on his presentation, it appeared that the cyst was causing significant pain as well as the potential for compressive symptoms  At this time, however, the symptoms appear to be nearly resolved and the patient is well tolerating p o  intake with no significant pain  Given the infiltrative nature of the mass as well as the very high CA 19 9, I would like to pursue neoadjuvant chemotherapy in this patient  I will discuss this with Dr Monika Lambert, who sees the patient sees next week  I discussed the nature of pancreatic cancer, as well as the rationale for the recommended sequence of therapy  I discussed we will likely pursue 2-3 months of systemic therapy, followed by repeat imaging to gauge response  I have also recommended placement of port, and the nature of this procedure was explained in full to the patient and his brother  I discussed the complications of bleeding, infection, damage to the lung, pneumothorax  Consent was obtained today  All questions answered  Interval History 5/20/21:    The patient is seen today status post hospitalization at [facility] for decompensation with abdominal pain    He was sent from his Medical Oncology office due to concerns about his medical status  He was admitted with Streptococcus bacteremia  He was placed on IV Rocephin and Flagyl and pelvis with ID is following the patient  He has been discharged on IV antibiotics as well as p o  Flagyl  Due to ongoing symptoms  A repeat CT scan was obtained on admission as well as several days later, revealing resolution of the cystic lesion of the pancreas with air between the duodenum and pancreas, suggesting possible fistula  He was tolerating a diet with no further fevers and no significant abdominal pain and was discharged with a benign abdominal exam    He was seen by medical oncology as an inpatient, who commented the that he has had a rough course with his systemic treatment and has required dose reductions as well as intermittent hospitalization  The plan is for him to undergo single agent gemcitabine moving forward given his decompensation  He is seen today for follow-up after his hospitalization  He states that he is doing relatively well  He feels much better than on admission to the hospital   Physical therapy has come to see him and is initiating a program with him  He is eating well with no abdominal pain, no nausea or vomiting  He is having daily stools  No fevers chills other infectious symptoms  At this juncture, he appears to be struggling with chemotherapy  CT scans have revealed overall stability of disease, however, he has developed what appears to be a 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  At this juncture, however, he appears to be relatively in [place] asymptomatic with normal p o  intake, no abdominal pain, and no ongoing infectious symptoms  He is on IV antibiotics for his bacteremia at this juncture  It is unclear what the source of this bacteremia is, though it is possible it originated from this inflammation/fistula of the UGI tract    I discussed with Luis Fernando Vora that the option at this juncture is to continue with chemotherapy  We will have to see how he tolerates this as well as follow-up scans could following completion of an entire chemotherapy course, 6 mo of treatment  I will see him following completion of chemotherapy with new staging scans  Interval History 7/30/21  Returns today in follow-up  Since last being seen, he has struggled with chemotherapy  He has been admitted on several occasions for weakness and dehydration and was recently for fall  He has met with Dr Damaso Kendall recently as well as with  Tali Greene, and has decided not to pursue further chemotherapy after significant dose reductions and the inability to tolerated  Likewise, his most recent CT scan shows that he has not responded to the chemotherapy he has gotten, and the tumor has continued to grow  They briefly discussed chemoradiation therapy, which he is considering  He comes today to discuss the possibility of surgery  At this juncture, he is staying in a nursing home, and his functional status it appears has continued to decline  Review of Systems  Complete ROS Surg Onc:   Constitutional: The patient denies new or recent history of general fatigue, no recent weight loss, no change in appetite  Eyes: No complaints of visual problems, no scleral icterus  ENT: No complaints of ear pain, no hoarseness, no difficulty swallowing,  no tinnitus and no new masses in head, oral cavity, or neck  Cardiovascular: No complaints of chest pain, no palpitations, no ankle edema  Respiratory: No complaints of shortness of breath, no cough  Gastrointestinal: No complaints of jaundice, no bloody stools, no pale stools  Genitourinary: No complaints of dysuria, no hematuria, no nocturia, no frequent urination, no urethral discharge  Musculoskeletal: No complaints of weakness, paralysis, joint stiffness or arthralgias  Integumentary: No complaints of rash, no new lesions     Neurological: No complaints of convulsions, no seizures, no dizziness  Hematologic/Lymphatic: No complaints of easy bruising  Endocrine:  No hot or cold intolerance  No polydipsia, polyphagia, or polyuria  Allergy/immunology:  No environmental allergies  No food allergies  Not immunocompromised  Skin:  No pallor or rash  No wound  Patient Active Problem List   Diagnosis    Hyperlipidemia    Essential hypertension    Type 2 diabetes mellitus with hyperglycemia, with long-term current use of insulin (HCC)    Gastroesophageal reflux disease    Pancreatic cyst    Adenocarcinoma of pancreas ([facility] )    Peripheral neuropathy    Bilateral cellulitis of lower leg    Hyponatremia    Severe protein-calorie malnutrition (HCC)    Anemia    Sacral wound    Leukocytosis    Generalized weakness    Streptococcal bacteremia    Mesenteric venous thrombosis (HCC)    GWEN (acute kidney injury) ([facility] )    Goals of care, counseling/discussion     Past Medical History:   Diagnosis Date    Cancer ([place] [address] )     pancreatic    Diabetes mellitus ([place] [address] )     Frequent urination     GERD (gastroesophageal reflux disease)     Hyperlipidemia     Hypertension     Peripheral neuropathy     Weakness     Weight loss      Past Surgical History:   Procedure Laterality Date    APPENDECTOMY      CATARACT EXTRACTION      COLONOSCOPY      SKIN LESION EXCISION      of a wart on right arm    TUNNELED VENOUS PORT PLACEMENT N/A 3/26/2021    Procedure: INSERTION VENOUS PORT (PORT-A-CATH); Surgeon: Jessica Biggs MD;  Location:  MAIN OR;  Service: Surgical Oncology     No family history on file    Social History     Socioeconomic History    Marital status:      Spouse name: Not on file    Number of children: Not on file    Years of education: Not on file    Highest education level: Not on file   Occupational History    Not on file   Tobacco Use    Smoking status: Former Smoker     Types: Pipe     Quit date: 2/23/2011     Years since quitting: 10 4    Smokeless tobacco: Never Used   Vaping Use    Vaping Use: Never used   Substance and Sexual Activity    Alcohol use: Never    Drug use: Never    Sexual activity: Not on file   Other Topics Concern    Not on file   Social History Narrative    Not on file     Social Determinants of Health     Financial Resource Strain:     Difficulty of Paying Living Expenses:    Food Insecurity:     Worried About Running Out of Food in the Last Year:     [address] in the Last Year:    Transportation Needs:     Lack of Transportation (Medical):      Lack of Transportation (Non-Medical):    Physical Activity:     Days of Exercise per Week:     Minutes of Exercise per Session:    Stress:     Feeling of Stress :    Social Connections:     Frequency of Communication with Friends and Family:     Frequency of Social Gatherings with Friends and Family:     Attends Alev[employer] Services:     Active Member of Clubs or Organizations:     Attends Club or Organization Meetings:     Marital Status:    Intimate Partner Violence:     Fear of Current or Ex-Partner:     Emotionally Abused:     Physically Abused:     Sexually Abused:        Current Outpatient Medications:     acetaminophen (TYLENOL) 325 mg tablet, Take 650 mg by mouth every 4 (four) hours as needed for mild pain , Disp: , Rfl:     atorvastatin (LIPITOR) 40 mg tablet, Take 40 mg by mouth daily, Disp: , Rfl:     escitalopram (LEXAPRO) 5 mg tablet, Take 5 mg by mouth daily, Disp: , Rfl:     insulin glargine (Lantus SoloStar) 100 units/mL injection pen, Inject 10 units at bedtime, Disp: 15 mL, Rfl: 0    insulin lispro (HumaLOG) 100 units/mL injection, Inject 5 Units under the skin 3 (three) times a day with meals, Disp: , Rfl: 0    lisinopril (ZESTRIL) 40 mg tablet, Take 40 mg by mouth daily, Disp: , Rfl:     melatonin 3 mg, Take 2 tablets (6 mg total) by mouth daily at bedtime, Disp: , Rfl: 0    metFORMIN (GLUCOPHAGE) 850 mg tablet, TAKE 1 TABLET BY MOUTH TWICE DAILY TAKE WITH MORNING AND EVENING MEAL, Disp: , Rfl:     NovoLOG FlexPen 100 units/mL injection pen, Inject as per sliding scale , (  Scale - 150-200 -1 units, 201-250-2 units, 251-300 -3 units, 301-350-4 units, > 350- 5  units) with meals, Disp: 15 mL, Rfl:     oxyCODONE (ROXICODONE) 5 mg immediate release tablet, Take 1 tablet (5 mg total) by mouth every 4 (four) hours as needed for severe pain for up to 10 daysMax Daily Amount: 30 mg, Disp: 10 tablet, Rfl: 0    prochlorperazine (COMPAZINE) 10 mg tablet, Take 1 tablet (10 mg total) by mouth every 6 (six) hours as needed for nausea or vomiting, Disp: 45 tablet, Rfl: 3    rivaroxaban (XARELTO) 15 mg tablet, Take 1 tablet (15 mg total) by mouth daily with breakfast for 40 doses, Disp: , Rfl:     saccharomyces boulardii (FLORASTOR) 250 mg capsule, Take 1 capsule (250 mg total) by mouth 2 (two) times a day, Disp: , Rfl: 0    sodium chloride 1 g tablet, Take 2 tablets (2 g total) by mouth 3 (three) times a day with meals, Disp: 90 tablet, Rfl: 0    Accu-Chek FastClix Lancets MISC, USE 1 TO CHECK GLUCOSE TWICE DAILY TO THREE TIMES DAILY (Patient not taking: Reported on 7/30/2021), Disp: , Rfl:     Accu-Chek Guide test strip, USE 1 STRIP TWICE DAILY TO THREE TIMES DAILY (Patient not taking: Reported on 7/30/2021), Disp: , Rfl:     Blood Glucose Monitoring Suppl (Accu-Chek Guide) w/Device KIT, USE TO CHECK GLUCOSE TWO TO THREE TIMES DAILY (Patient not taking: Reported on 7/30/2021), Disp: , Rfl:     LORazepam (ATIVAN) 0 5 mg tablet, Take 1 tablet (0 5 mg total) by mouth every 8 (eight) hours as needed for anxiety for up to 10 days, Disp: 10 tablet, Rfl: 0    ramipril (ALTACE) 10 MG capsule, Take 10 mg by mouth daily  (Patient not taking: Reported on 7/30/2021), Disp: , Rfl:     ReliOn Pen Needles 31G X 6 MM MISC, USE 1 IN THE EVENING (Patient not taking: Reported on 7/30/2021), Disp: , Rfl:   No Known Allergies    Vitals:    07/30/21 1014   BP: 168/58 Pulse: 90   Resp: 18   Temp: 97 6 °F (36 4 °C)   SpO2: 98%       Physical Exam   General: Appears weak, appears stated age  Skin: Warm, anicteric  HEENT: Normocephalic, atraumatic; sclera aniceteric, mucous membranes moist; cervical nodes without adenopathy  Cardiopulmonary: RRR, Easy WOB, no BLE edema  Abd: Flat and soft, nontender  MSK: Symmetric, no cyanosis, no overt weakness  Lymphatic: No cervical, axillary or inguinal lymphadenopathy  Neuro: Affect appropriate, no gross motor abnormalities      Pathology:  Final Diagnosis   A -C  Pancreas, head mass: (Thin-Prep, smears, and cell block)  Malignant (PSC Category VI) - See note  Adenocarcinoma      Note: The above diagnostic category is part of the six-tiered system proposed by The Papanicolaou Society of Cytopathology for the reporting of pancreaticobiliary specimens  This proposed scheme provides terminology that correlates the cytologic diagnostic nomenclature with the 2010 WHO classification of pancreatic tumors and standardizes the categorization of the various diseases of the pancreas, some of which are difficult to diagnose specifically by cytology  *The Papanicolaou Society of Cytopathology System for Reporting Pancreaticobiliary Cytology: Definitions, Criteria and Explanatory Notes  Lucy Karimi; Cha, 2015      Dr Raffaele Moreno is notified of the diagnosis in [facility] via [facility] on 2/25/2021  at 1:10 pm    Intradepartmental consultation is in agreement      D E  Pancreas, body cyst: (Thin-Prep and cell block)  Non-Neoplastic (Tommy category II)  Mixed inflammatory cells  Labs: Reviewed     Imaging  Ct Chest W Contrast    Result Date: 3/7/2021  Narrative: CT CHEST WITH IV CONTRAST INDICATION:   C25 9: Malignant neoplasm of pancreas, unspecified  COMPARISON:  None  TECHNIQUE: CT examination of the chest was performed   Axial, sagittal, and coronal 2D reformatted images were created from the source data and submitted for interpretation  Radiation dose length product (DLP) for this visit:  211 22 mGy-cm   This examination, like all CT scans performed in the [facility], was performed utilizing techniques to minimize radiation dose exposure, including the use of iterative  reconstruction and automated exposure control  IV Contrast:  85 mL of iohexol (OMNIPAQUE)  350 FINDINGS: LUNGS:  No pulmonary nodule or infiltrate  Minimal linear scar at the lung bases left greater than right  Central airways are clear  PLEURA:  Unremarkable  HEART/GREAT VESSELS:  Heart is not enlarged  No pericardial effusion  Aortic and coronary artery calcification  MEDIASTINUM AND SHAILESH:  Unremarkable  CHEST WALL AND LOWER NECK:   Incidental discovery of one or more thyroid nodule(s) measuring less than 1 5 cm and without suspicious features is noted in this patient who is above 28years old; according to guidelines published in the February 2015 white  paper on incidental thyroid nodules in the Journal of the [employer] of radiology [facility]), no further evaluation is recommended  VISUALIZED STRUCTURES IN THE UPPER ABDOMEN:  Partially visualized cystic mass that appears to be arising from the junction of the pancreatic neck and body measuring at least 9 8 x 8 5 cm  Heterogeneous hypodense in the head and neck of the pancreas measures approximately 3 5 x 3 5 cm image 62 series 601 and image 67 series 2 attributed to recent biopsy proven pancreatic malignancy  Severe atrophy and pancreatic duct dilation in the body and tail of the pancreas  OSSEOUS STRUCTURES:  No acute fracture or osseous destructive lesion identified  Mild degenerative changes of the spine  Impression: Pancreatic head and neck mass measuring approximately 3 5 x 3 5 cm attributed to recent biopsy proven malignancy  No evidence of thoracic metastatic disease  Workstation performed: [id number]       CT 7/14/2021  IMPRESSION:        1    Mild interval increase in size of cystic lesion in the head and uncinate process of the pancreas measuring 1 5 cm  Stable severe pancreatic duct dilatation  2   Decreased gas and inflammation between the medial border of the pylorus and head of the pancreas  Persistent inflammation along the medial aspect of the pylorus and proximal 2nd portion of the duodenum  3   1 3 cm low-attenuation, possibly cystic lesion or fluid collection along the medial border of the pylorus  Possible contained perforation or cystic metastasis  4   Constipation  I reviewed the above laboratory and imaging data  I personally reviewed the outside films and reports  Discussion/Summary:       Ean Cooper is a 58-year-old gentleman with the diagnosis of locally advanced pancreas cancer  At this juncture,   His tumor has not responded to the chemotherapy he has received, he is not tolerating chemotherapy, and has elected to not pursue further chemotherapy  He is concerning chemoradiation therapy  We briefly discussed chemoradiation therapy  I discussed with him and his friend/ caregiver today that surgery is not an option at this juncture  He has not responded to chemotherapy and his pancreas cancer has continued to grow through chemotherapy  Likewise, he is not of reasonable functional status to tolerate pancreatic resection  He is set to meet with Radiation Oncology to discuss the possibility of chemoradiation  He is welcome to see me on an as-needed basis

## 2021-07-30 NOTE — PROGRESS NOTES
Surgical Oncology Consultation    [address]  [facility]  [address] [place] [address]    Patient:  Princess De Leon  [birth year]  [id number]    Primary Care provider:  Yousuf Fallon DO  [address] [address]    Referring provider:  No referring provider defined for this encounter  Diagnoses and all orders for this visit:    Adenocarcinoma of pancreas ([facility] [address] )    Other orders  -     lisinopril (ZESTRIL) 40 mg tablet; Take 40 mg by mouth daily        Chief Complaint   Patient presents with    Follow-up       No follow-ups on file      Oncology History   Adenocarcinoma of pancreas ([facility] [address] )   3/10/2021 Initial Diagnosis    Adenocarcinoma of pancreas ([facility] [address] )     3/23/2021 - 7/12/2021 Chemotherapy    pegfilgrastim ([employer]), 6 mg, Subcutaneous, Once, 3 of 9 cycles  Administration: 6 mg (6/21/2021)  paclitaxel protein-bound (ABRAXANE) IVPB, 125 mg/m2 = 235 mg, Intravenous, Once, 2 of 2 cycles  Dose modification: 100 mg/m2 (original dose 125 mg/m2, Cycle 2, Reason: Dose Not Tolerated)  Administration: 235 mg (3/23/2021), 235 mg (3/30/2021), 235 mg (4/6/2021), 188 mg (4/20/2021), 188 mg (5/4/2021), 188 mg (4/27/2021)  gemcitabine (GEMZAR) infusion, 1,879 8 mg, Intravenous, Once, 4 of 10 cycles  Dose modification: 800 mg/m2 (original dose 1,000 mg/m2, Cycle 2, Reason: Dose Not Tolerated)  Administration: 1,800 mg (3/23/2021), 1,800 mg (3/30/2021), 1,800 mg (4/6/2021), 1,504 2 mg (4/20/2021), 1,504 2 mg (5/4/2021), 1,504 2 mg (4/27/2021), 1,504 2 mg (6/7/2021), 1,504 2 mg (6/14/2021), 1,504 2 mg (6/21/2021), 1,504 2 mg (7/6/2021), 1,504 2 mg (7/12/2021)     8/16/2021 -  Chemotherapy    fluorouracil (ADRUCIL) ambulatory infusion Soln, 200 mg/m2/day = 1,830 mg (88 9 % of original dose 225 mg/m2/day), Intravenous, Over 120 hours, 0 of 5 cycles  Dose modification: 200 mg/m2/day (original dose 225 mg/m2/day, Cycle 1, Reason: Anticipated Tolerance)         History of Present Illness 3/2021 : This is a 69-year-old gentleman presents with a new diagnosis of pancreatic cancer  Briefly, the patient was admitted to [address] [place] [address] about week's history significant abdominal pain, nausea, changes in stool  Workup there included CT scan which revealed large pancreatic cyst as well as an abnormality of the pancreatic tissue with atrophy, dilated duct, and potential for pancreatic mass of the pancreatic head  The patient clinically improved and was discharged home  He then underwent an EUS with our team, which revealed large pancreatic cyst, cyst fluid was consistent mucinous neoplasm, and there was a pancreatic head mass detected, and FNA of this mass revealed adenocarcinoma  The patient presents today for evaluation  Of note, CT chest has been completed, with no evidence of malignancy  CA 19 9 is near 20,000  Today he states that his symptoms have much improved  The symptoms he was having when he presented to [place] with the worse they have been in the course of his disease  He states that he feels quite well  The pain that he was experiencing is nearly gone, although he has intermittent pain that resolved quickly  He is tolerating p o  intake with no issues  He experiences no nausea no vomiting  He does have loose stool every once in a while, although the consistency of his stools not appear to be consistent with pancreatic insufficiency  Likewise, he is insulin dependent, but there have been no changes to his insulin requirements recently  He endorses some fatigue, but denies any significant weight loss  He denies any jaundice, bright red blood per rectum, changes in his functional status  He denies any history of pancreatitis, any history of gallbladder pathology, any risk factors for gallbladder pathology or pancreatitis  He has no drinking history    He denies any chest pain, shortness of breath or history of cardiac issues  He lives alone and accomplishes all of his own ADLs  He walks regularly  This is a 50-year-old gentleman with a new diagnosis of pancreatic head adenocarcinoma, as well as a large mucinous cyst   Initially, based on his presentation, it appeared that the cyst was causing significant pain as well as the potential for compressive symptoms  At this time, however, the symptoms appear to be nearly resolved and the patient is well tolerating p o  intake with no significant pain  Given the infiltrative nature of the mass as well as the very high CA 19 9, I would like to pursue neoadjuvant chemotherapy in this patient  I will discuss this with Dr Sarah Lancaster, who sees the patient sees next week  I discussed the nature of pancreatic cancer, as well as the rationale for the recommended sequence of therapy  I discussed we will likely pursue 2-3 months of systemic therapy, followed by repeat imaging to gauge response  I have also recommended placement of port, and the nature of this procedure was explained in full to the patient and his brother  I discussed the complications of bleeding, infection, damage to the lung, pneumothorax  Consent was obtained today  All questions answered  Interval History 5/20/21:    The patient is seen today status post hospitalization at [facility] for decompensation with abdominal pain  He was sent from his Medical Oncology office due to concerns about his medical status  He was admitted with Streptococcus bacteremia  He was placed on IV Rocephin and Flagyl and pelvis with ID is following the patient  He has been discharged on IV antibiotics as well as p o  Flagyl  Due to ongoing symptoms  A repeat CT scan was obtained on admission as well as several days later, revealing resolution of the cystic lesion of the pancreas with air between the duodenum and pancreas, suggesting possible fistula    He was tolerating a diet with no further fevers and no significant abdominal pain and was discharged with a benign abdominal exam    He was seen by medical oncology as an inpatient, who commented the that he has had a rough course with his systemic treatment and has required dose reductions as well as intermittent hospitalization  The plan is for him to undergo single agent gemcitabine moving forward given his decompensation  He is seen today for follow-up after his hospitalization  He states that he is doing relatively well  He feels much better than on admission to the hospital   Physical therapy has come to see him and is initiating a program with him  He is eating well with no abdominal pain, no nausea or vomiting  He is having daily stools  No fevers chills other infectious symptoms  At this juncture, he appears to be struggling with chemotherapy  CT scans have revealed overall stability of disease, however, he has developed what appears to be a 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  At this juncture, however, he appears to be relatively in [place] asymptomatic with normal p o  intake, no abdominal pain, and no ongoing infectious symptoms  He is on IV antibiotics for his bacteremia at this juncture  It is unclear what the source of this bacteremia is, though it is possible it originated from this inflammation/fistula of the UGI tract  I discussed with Chelsie Keith that the option at this juncture is to continue with chemotherapy  We will have to see how he tolerates this as well as follow-up scans could following completion of an entire chemotherapy course, 6 mo of treatment  I will see him following completion of chemotherapy with new staging scans  Interval History 7/30/21  Returns today in follow-up  Since last being seen, he has struggled with chemotherapy  He has been admitted on several occasions for weakness and dehydration and was recently for fall    He has met with Dr Justus Bermudez recently as well as with  Amanda Bagley, and has decided not to pursue further chemotherapy after significant dose reductions and the inability to tolerated  Likewise, his most recent CT scan shows that he has not responded to the chemotherapy he has gotten, and the tumor has continued to grow  They briefly discussed chemoradiation therapy, which he is considering  He comes today to discuss the possibility of surgery  At this juncture, he is staying in a nursing home, and his functional status it appears has continued to decline  Review of Systems  Complete ROS Surg Onc:   Constitutional: The patient denies new or recent history of general fatigue, no recent weight loss, no change in appetite  Eyes: No complaints of visual problems, no scleral icterus  ENT: No complaints of ear pain, no hoarseness, no difficulty swallowing,  no tinnitus and no new masses in head, oral cavity, or neck  Cardiovascular: No complaints of chest pain, no palpitations, no ankle edema  Respiratory: No complaints of shortness of breath, no cough  Gastrointestinal: No complaints of jaundice, no bloody stools, no pale stools  Genitourinary: No complaints of dysuria, no hematuria, no nocturia, no frequent urination, no urethral discharge  Musculoskeletal: No complaints of weakness, paralysis, joint stiffness or arthralgias  Integumentary: No complaints of rash, no new lesions  Neurological: No complaints of convulsions, no seizures, no dizziness  Hematologic/Lymphatic: No complaints of easy bruising  Endocrine:  No hot or cold intolerance  No polydipsia, polyphagia, or polyuria  Allergy/immunology:  No environmental allergies  No food allergies  Not immunocompromised  Skin:  No pallor or rash  No wound        Patient Active Problem List   Diagnosis    Hyperlipidemia    Essential hypertension    Type 2 diabetes mellitus with hyperglycemia, with long-term current use of insulin (HCC)    Gastroesophageal reflux disease    Pancreatic cyst    [facility] )    Peripheral neuropathy    Bilateral cellulitis of lower leg    Hyponatremia    Severe protein-calorie malnutrition (HCC)    Anemia    Sacral wound    Leukocytosis    Generalized weakness    Streptococcal bacteremia    Mesenteric venous thrombosis (HCC)    GWEN (acute kidney injury) ([place] [address] )    Goals of care, counseling/discussion     Past Medical History:   Diagnosis Date    Cancer ([place] [address] )     pancreatic    Diabetes mellitus ([place] [address] )     Frequent urination     GERD (gastroesophageal reflux disease)     Hyperlipidemia     Hypertension     Peripheral neuropathy     Weakness     Weight loss      Past Surgical History:   Procedure Laterality Date    APPENDECTOMY      CATARACT EXTRACTION      COLONOSCOPY      SKIN LESION EXCISION      of a wart on right arm    TUNNELED VENOUS PORT PLACEMENT N/A 3/26/2021    Procedure: INSERTION VENOUS PORT (PORT-A-CATH); Surgeon: Sam Mayo MD;  Location:  MAIN OR;  Service: Surgical Oncology     No family history on file  Social History     Socioeconomic History    Marital status:      Spouse name: Not on file    Number of children: Not on file    Years of education: Not on file    Highest education level: Not on file   Occupational History    Not on file   Tobacco Use    Smoking status: Former Smoker     Types: Pipe     Quit date: 2/23/2011     Years since quitting: 10 4    Smokeless tobacco: Never Used   Vaping Use    Vaping Use: Never used   Substance and Sexual Activity    Alcohol use: Never    Drug use: Never    Sexual activity: Not on file   Other Topics Concern    Not on file   Social History Narrative    Not on file     Social Determinants of Health     Financial Resource Strain:     Difficulty of Paying Living Expenses:    Food Insecurity:     Worried About [address] in the Last Year:     [address] in the Last Year:    Transportation Needs:     Lack of Transportation (Medical):      Lack of Transportation (Non-Medical):    Physical Activity:     Days of Exercise per Week:     Minutes of Exercise per Session:    Stress:     Feeling of Stress :    Social Connections:     Frequency of Communication with Friends and Family:     Frequency of Social Gatherings with Friends and Family:     Attends [employer] Services:     Active Member of Clubs or Organizations:     Attends Club or Organization Meetings:     Marital Status:    Intimate Partner Violence:     Fear of Current or Ex-Partner:     Emotionally Abused:     Physically Abused:     Sexually Abused:        Current Outpatient Medications:     acetaminophen (TYLENOL) 325 mg tablet, Take 650 mg by mouth every 4 (four) hours as needed for mild pain , Disp: , Rfl:     atorvastatin (LIPITOR) 40 mg tablet, Take 40 mg by mouth daily, Disp: , Rfl:     escitalopram (LEXAPRO) 5 mg tablet, Take 5 mg by mouth daily, Disp: , Rfl:     insulin glargine (Lantus SoloStar) 100 units/mL injection pen, Inject 10 units at bedtime, Disp: 15 mL, Rfl: 0    insulin lispro (HumaLOG) 100 units/mL injection, Inject 5 Units under the skin 3 (three) times a day with meals, Disp: , Rfl: 0    lisinopril (ZESTRIL) 40 mg tablet, Take 40 mg by mouth daily, Disp: , Rfl:     melatonin 3 mg, Take 2 tablets (6 mg total) by mouth daily at bedtime, Disp: , Rfl: 0    metFORMIN (GLUCOPHAGE) 850 mg tablet, TAKE 1 TABLET BY MOUTH TWICE DAILY TAKE WITH MORNING AND EVENING MEAL, Disp: , Rfl:     NovoLOG FlexPen 100 units/mL injection pen, Inject as per sliding scale , (  Scale - 150-200 -1 units, 201-250-2 units, 251-300 -3 units, 301-350-4 units, > 350- 5  units) with meals, Disp: 15 mL, Rfl:     oxyCODONE (ROXICODONE) 5 mg immediate release tablet, Take 1 tablet (5 mg total) by mouth every 4 (four) hours as needed for severe pain for up to 10 daysMax Daily Amount: 30 mg, Disp: 10 tablet, Rfl: 0    prochlorperazine (COMPAZINE) 10 mg tablet, Take 1 tablet (10 mg total) by mouth every 6 (six) hours as needed for nausea or vomiting, Disp: 45 tablet, Rfl: 3    rivaroxaban (XARELTO) 15 mg tablet, Take 1 tablet (15 mg total) by mouth daily with breakfast for 40 doses, Disp: , Rfl:     saccharomyces boulardii (FLORASTOR) 250 mg capsule, Take 1 capsule (250 mg total) by mouth 2 (two) times a day, Disp: , Rfl: 0    sodium chloride 1 g tablet, Take 2 tablets (2 g total) by mouth 3 (three) times a day with meals, Disp: 90 tablet, Rfl: 0    Accu-Chek FastClix Lancets MISC, USE 1 TO CHECK GLUCOSE TWICE DAILY TO THREE TIMES DAILY (Patient not taking: Reported on 7/30/2021), Disp: , Rfl:     Accu-Chek Guide test strip, USE 1 STRIP TWICE DAILY TO THREE TIMES DAILY (Patient not taking: Reported on 7/30/2021), Disp: , Rfl:     Blood Glucose Monitoring Suppl (Accu-Chek Guide) w/Device KIT, USE TO CHECK GLUCOSE TWO TO THREE TIMES DAILY (Patient not taking: Reported on 7/30/2021), Disp: , Rfl:     LORazepam (ATIVAN) 0 5 mg tablet, Take 1 tablet (0 5 mg total) by mouth every 8 (eight) hours as needed for anxiety for up to 10 days, Disp: 10 tablet, Rfl: 0    ramipril (ALTACE) 10 MG capsule, Take 10 mg by mouth daily  (Patient not taking: Reported on 7/30/2021), Disp: , Rfl:     ReliOn Pen Needles 31G X 6 MM MISC, USE 1 IN THE EVENING (Patient not taking: Reported on 7/30/2021), Disp: , Rfl:   No Known Allergies    Vitals:    07/30/21 1014   BP: 168/58   Pulse: 90   Resp: 18   Temp: 97 6 °F (36 4 °C)   SpO2: 98%       Physical Exam   General: Appears weak, appears stated age  Skin: Warm, anicteric  HEENT: Normocephalic, atraumatic; sclera aniceteric, mucous membranes moist; cervical nodes without adenopathy  Cardiopulmonary: RRR, Easy WOB, no BLE edema  Abd: Flat and soft, nontender  MSK: Symmetric, no cyanosis, no overt weakness  Lymphatic: No cervical, axillary or inguinal lymphadenopathy  Neuro: Affect appropriate, no gross motor abnormalities      Pathology:  Final Diagnosis   A -C   Pancreas, head mass: (Thin-Prep, smears, and cell block)  Malignant (PSC Category VI) - See note  Adenocarcinoma      Note: The above diagnostic category is part of the six-tiered system proposed by The Papanicolaou Society of Cytopathology for the reporting of pancreaticobiliary specimens  This proposed scheme provides terminology that correlates the cytologic diagnostic nomenclature with the 2010 WHO classification of pancreatic tumors and standardizes the categorization of the various diseases of the pancreas, some of which are difficult to diagnose specifically by cytology  *The Papanicolaou Society of Cytopathology System for Reporting Pancreaticobiliary Cytology: Definitions, Criteria and Explanatory Notes  Mani Bass; Cha, 2015      Dr Yves Austin is notified of the diagnosis in [place] via [facility] on 2/25/2021  at 1:10 pm    Intradepartmental consultation is in agreement      D E  Pancreas, body cyst: (Thin-Prep and cell block)  Non-Neoplastic (Tommy category II)  Mixed inflammatory cells  Labs: Reviewed     Imaging  Ct Chest W Contrast    Result Date: 3/7/2021  Narrative: CT CHEST WITH IV CONTRAST INDICATION:   C25 9: Malignant neoplasm of pancreas, unspecified  COMPARISON:  None  TECHNIQUE: CT examination of the chest was performed  Axial, sagittal, and coronal 2D reformatted images were created from the source data and submitted for interpretation  Radiation dose length product (DLP) for this visit:  211 22 mGy-cm   This examination, like all CT scans performed in the [facility], was performed utilizing techniques to minimize radiation dose exposure, including the use of iterative  reconstruction and automated exposure control  IV Contrast:  85 mL of iohexol (OMNIPAQUE)  350 FINDINGS: LUNGS:  No pulmonary nodule or infiltrate  Minimal linear scar at the lung bases left greater than right  Central airways are clear  PLEURA:  Unremarkable   HEART/GREAT VESSELS:  Heart is not enlarged  No pericardial effusion  Aortic and coronary artery calcification  MEDIASTINUM AND SHAILESH:  Unremarkable  CHEST WALL AND LOWER NECK:   Incidental discovery of one or more thyroid nodule(s) measuring less than 1 5 cm and without suspicious features is noted in this patient who is above 28years old; according to guidelines published in the February 2015 white  paper on incidental thyroid nodules in the Journal of the [employer] of radiology Giovanna Santiago), no further evaluation is recommended  VISUALIZED STRUCTURES IN THE UPPER ABDOMEN:  Partially visualized cystic mass that appears to be arising from the junction of the pancreatic neck and body measuring at least 9 8 x 8 5 cm  Heterogeneous hypodense in the head and neck of the pancreas measures approximately 3 5 x 3 5 cm image 62 series 601 and image 67 series 2 attributed to recent biopsy proven pancreatic malignancy  Severe atrophy and pancreatic duct dilation in the body and tail of the pancreas  OSSEOUS STRUCTURES:  No acute fracture or osseous destructive lesion identified  Mild degenerative changes of the spine  Impression: Pancreatic head and neck mass measuring approximately 3 5 x 3 5 cm attributed to recent biopsy proven malignancy  No evidence of thoracic metastatic disease  Workstation performed: [id number]       CT 7/14/2021  IMPRESSION:        1  Mild interval increase in size of cystic lesion in the head and uncinate process of the pancreas measuring 1 5 cm  Stable severe pancreatic duct dilatation  2   Decreased gas and inflammation between the medial border of the pylorus and head of the pancreas  Persistent inflammation along the medial aspect of the pylorus and proximal 2nd portion of the duodenum  3   1 3 cm low-attenuation, possibly cystic lesion or fluid collection along the medial border of the pylorus  Possible contained perforation or cystic metastasis  4   Constipation      I reviewed the above laboratory and imaging data  I personally reviewed the outside films and reports  Discussion/Summary:       Kei Mai is a 69-year-old gentleman with the diagnosis of locally advanced pancreas cancer  At this juncture,   His tumor has not responded to the chemotherapy he has received, he is not tolerating chemotherapy, and has elected to not pursue further chemotherapy  He is concerning chemoradiation therapy  We briefly discussed chemoradiation therapy  I discussed with him and his friend/ caregiver today that surgery is not an option at this juncture  He has not responded to chemotherapy and his pancreas cancer has continued to grow through chemotherapy  Likewise, he is not of reasonable functional status to tolerate pancreatic resection  He is set to meet with Radiation Oncology to discuss the possibility of chemoradiation  He is welcome to see me on an as-needed basis

## 2021-08-03 PROBLEM — Z66 DNR (DO NOT RESUSCITATE): Status: ACTIVE | Noted: 2021-01-01

## 2021-08-03 NOTE — PROGRESS NOTES
Palliative and Supportive Care   Thao Barrera 67 y o  male [id number]    Assessment/Plan:  1  Adenocarcinoma of pancreas ([facility] )    2  Peripheral polyneuropathy    3  Severe protein-calorie malnutrition ([facility] )    4  Generalized weakness    5  Goals of care, counseling/discussion    6  DNR (do not resuscitate)    7  Advanced care planning/counseling discussion      Requested Prescriptions      No prescriptions requested or ordered in this encounter     There are no discontinued medications  PLAN:  - patient confirmed DNR/DNI status  - D/C oxycodone  - RTC in 2 weeks  - Prognosis guarded    ACP: DNI/DNR  has an advanced directive - a copy HAS NOT been provided  Representatives have queried the patient's controlled substance dispensing history in the Prescription Drug Monitoring Program in compliance with regulations before I have prescribed any controlled substances  The prescription history is consistent with prescribed therapy and our practice policies  No follow-ups on file  Subjective: In attendance at this visit: Thao Barrera and his cousin's spouse  Chief Complaint  Follow up visit for:  symptom management, assessment of goals of care, disease process education and discussion of prognosis, advance care planning, support in setting of critical illness  HPI     Thao Barrera is a 67 y o  male with pancreatic adenoCA (Dx in 3/10/2021)  He is currently on single agent chemotherapy/gemcitabine (abraxane was d/c'ed because of intolerance/poor performance status) with plans for possible tumor resection afterwards  He sees Dr Javi Perdomo and was last seen by oncology on 7/26 where his CA 19-9 went down after initiation on chemo  Patient saw Dr Marcus Schwarz with surgical oncology, and plan to proceed with chemoradiation  Meets with radiation oncology tomorrow  Patient currently in independent living but is hopeful to transition to home soon   He notes not liking the oxycodone and wants it removed from his medication list  He is very worried about what the "future holds" as he is off chemotherapy and surgery is not an option  Meets with rad onc tomorrow to discuss treatment options  He feels very well supported by his extended family and has his brother and cousin as Rita Half- will provide paperwork  He is upset that nursing facilities are re-instating visitor restrictions but is finding eddy in watching ME TV with a handmade quilt from his family  The following portions of the medical history were reviewed: past medical history, problem list, medication list, and social history      Current Outpatient Medications:     insulin glargine (Lantus SoloStar) 100 units/mL injection pen, Inject 10 units at bedtime, Disp: 15 mL, Rfl: 0    insulin lispro (HumaLOG) 100 units/mL injection, Inject 5 Units under the skin 3 (three) times a day with meals, Disp: , Rfl: 0    lisinopril (ZESTRIL) 40 mg tablet, Take 40 mg by mouth daily, Disp: , Rfl:     melatonin 3 mg, Take 2 tablets (6 mg total) by mouth daily at bedtime, Disp: , Rfl: 0    metFORMIN (GLUCOPHAGE) 850 mg tablet, TAKE 1 TABLET BY MOUTH TWICE DAILY TAKE WITH MORNING AND EVENING MEAL, Disp: , Rfl:     prochlorperazine (COMPAZINE) 10 mg tablet, Take 1 tablet (10 mg total) by mouth every 6 (six) hours as needed for nausea or vomiting, Disp: 45 tablet, Rfl: 3    rivaroxaban (XARELTO) 15 mg tablet, Take 1 tablet (15 mg total) by mouth daily with breakfast for 40 doses, Disp: , Rfl:     saccharomyces boulardii (FLORASTOR) 250 mg capsule, Take 1 capsule (250 mg total) by mouth 2 (two) times a day, Disp: , Rfl: 0    sodium chloride 1 g tablet, Take 2 tablets (2 g total) by mouth 3 (three) times a day with meals, Disp: 90 tablet, Rfl: 0    Accu-Chek FastClix Lancets MISC, USE 1 TO CHECK GLUCOSE TWICE DAILY TO THREE TIMES DAILY (Patient not taking: Reported on 7/30/2021), Disp: , Rfl:     Accu-Chek Guide test strip, USE 1 STRIP TWICE DAILY TO THREE TIMES DAILY (Patient not taking: Reported on 7/30/2021), Disp: , Rfl:     acetaminophen (TYLENOL) 325 mg tablet, Take 650 mg by mouth every 4 (four) hours as needed for mild pain , Disp: , Rfl:     atorvastatin (LIPITOR) 40 mg tablet, Take 40 mg by mouth daily, Disp: , Rfl:     Blood Glucose Monitoring Suppl (Accu-Chek Guide) w/Device KIT, USE TO CHECK GLUCOSE TWO TO THREE TIMES DAILY (Patient not taking: Reported on 7/30/2021), Disp: , Rfl:     escitalopram (LEXAPRO) 5 mg tablet, Take 5 mg by mouth daily, Disp: , Rfl:     LORazepam (ATIVAN) 0 5 mg tablet, Take 1 tablet (0 5 mg total) by mouth every 8 (eight) hours as needed for anxiety for up to 10 days, Disp: 10 tablet, Rfl: 0    NovoLOG FlexPen 100 units/mL injection pen, Inject as per sliding scale , (  Scale - 150-200 -1 units, 201-250-2 units, 251-300 -3 units, 301-350-4 units, > 350- 5  units) with meals, Disp: 15 mL, Rfl:     ramipril (ALTACE) 10 MG capsule, Take 10 mg by mouth daily  (Patient not taking: Reported on 7/30/2021), Disp: , Rfl:     ReliOn Pen Needles 31G X 6 MM MISC, USE 1 IN THE EVENING (Patient not taking: Reported on 7/30/2021), Disp: , Rfl:   Review of Systems    All other systems negative    Objective:  Vital Signs  /62 (BP Location: Right arm, Patient Position: Sitting, Cuff Size: Standard)   Pulse 96   Temp 97 5 °F (36 4 °C) (Temporal)   Resp 18   Ht 5' 10" (1 778 m)   Wt 64 8 kg (142 lb 12 8 oz)   SpO2 97%   BMI 20 49 kg/m²    Physical Exam    Constitutional: Appears chronically ill  In no acute physical or emotional distress  Head: Normocephalic and atraumatic  Eyes: EOM are normal  No ocular discharge  No scleral icterus  Neck: No visible adenopathy or masses  Cardiovascular: Regular rate and rhythm, palpable distal pulses  Respiratory: Effort normal  No stridor  No respiratory distress  Gastrointestinal: No abdominal distension  Abdomen is soft  Musculoskeletal: No edema  Neurological: Alert, oriented and appropriately conversant  Skin: No diaphoresis, dry and warm to touch, no rashes seen on exposed areas of skin  Psychiatric: Displays a normal mood and affect   Behavior, judgement and thought content appear normal

## 2021-08-03 NOTE — PROGRESS NOTES
Palliative Outpatient Assessment of Need    MSW completed an assessment of need which was completed with patient and his cousin's wife, Chyna Bob, in the office  Family dynamics: Supportive family  Relationship status: Pts girlfriend recently passed away  Duration of relationship:   Name of significant other:  Children and Ages: Had 1 son who passed away 5 years ago from cancer  Pets:None  Other important family information:  Living situation: Pt recently moved to [facility] [address]  [facility]    Patient's primary caregiver: Self    Any limitations of caregiver:  Environmental concerns or barriers:   history:  Employment history/source of income: Worked as a [employer]  Disability:  N/A  Spirituality/ Yarsanism:  Very strong janneth  Patient's strengths, social supports, and resources: Pt has excellent support from his brother and other family members, as well as a strong Confucianism community  Cultural information:   Susan [address] current or previous: Mood fluctuates- He is struggling with not knowing what to expect with his cancer  Substance use or history:    Sleep: Up off and on throughout the night to use the bathroom  Exercise: N/A  Diet/nutrition: eating well  Durable Medical Equipment needs: None at this time- He uses a walker mostly  Transportation:Family  Financial concerns: None at this time  Advanced Directive: Pt states he has a living will, which names his brother, Zeferino Contreras and cousin Abby Baez as his medical POA's  He was encouraged to bring in a copy  Other medical or social work providers involved: REENA Morris  Patient/caregiver current level of coping:  Pt is understanding of his dx  He is coping the best he can  He recalled how his son passed away from cancer and he expressed how he suffered  We expressed that we are here to support him throughout his cancer journey  I offered extra emotional support as well   He states that he has excellent support, many friends call and check in on him daily  Patient/family concerns and areas of need: Continued Support  Patient's Interests: Pt enjoys watching television, especially the ME channel  At this time he is limited as to activities, as he had to quarantine for 10 days, since he is new to the facility  *All questions may not be answered due to constraints    Follow-up discussions may need to occur

## 2021-08-04 NOTE — TELEPHONE ENCOUNTER
Received notification by Alisha Camara , on 8/4/21 that pt has triggered for oncology nutrition care (reason for referral: Malnutrition Screening Tool (MST) Triggers: scored a 4 indicating 24-33# (11-15 kg) recent wt loss and is eating poorly due to a decreased appetite  (Date of MST: 8/4/21))  Patient has been followed by oncology RD in the past  Last seen on 7/21/21  At that time, he planned to wait until he was discharged from [facility] to schedule a follow up  Christina Kam today to see if he wanted to schedule an oncology nutrition follow-up  He's currently residing in independent living, unsure when he will be going home  May be starting RT soon       Patient was agreeable to set up a follow-up appointment via phone this Friday 8/6/21 at 10:00 am

## 2021-08-04 NOTE — PROGRESS NOTES
[place] 1945 is a 67 y o  male    Patient presents for radiation consult for pancreatic cancer, referred by Dr Girish Jacobson to discuss concurrent chemoradiation (5FU/RT)  67year old male presented to [facility] with nausea, vomiting and weight loss in January 2021  Imaging revealed pancreatic cyst and concern for mass in the head of the pancreas  Dr Checo Hilton evaluated pt on 2/23/21 with plan for EUS guided FNA of pancreatic mass and drainage of peripancreatic fluid collection  2/23/21 EUS/biopsy (Dr Checo Hilton)  Large pancreatic body cyst associated with upstream duct dilation and pancreatic atrophy  The cyst was aspirated for CEA, Amylase, cytology and gram stain/ culture  The cyst was causing compression of distal body/ antrum and this is likely to be causing his weight loss  This has the appearance of a pancreatic pseudocyst    Pancreatic head prominence - FNB done to rule out malignancy  Short segment Tamayo's tongues (M1)  Biopsies done  Small hiatal hernia but Hill3 on retroflexion  Biopsy: Pancreas, head mass:   Malignant (PSC Category VI) - Adenocarcinoma  3/5/21 CT chest w contrast  Pancreatic head and neck mass measuring approximately 3 5 x 3 5 cm attributed to recent biopsy proven malignancy  No evidence of thoracic metastatic disease  3/11/21 Rectal/GI Multidisciplinary Conference  -Consult Surgical Oncology and Medical Oncology  -Genetic Counselor recommends genetic counseling    3/12/21 Surg Onc, Dr Nayeli Osuna  Given the infiltrative nature of the mass as well as the very high CA 19 9, recommended neoadjuvant chemotherapy  Pt has appt with Dr Girish Jacobson  Likely 2-3 months of neoadjuvant chemo followed by repeat imaging  3/15/21 Med Onc, Dr Girish Jacobson  recommendation based on his age and performance status is gemcitabine and Abraxane weekly 3/4 weeks for a total of 3 cycles to be followed by reimaging and consideration of surgery      3/23/21 1st cycle Gemzar/Abraxane neoadjuvant chemo    3/26/21 Port placement [facility]    4/8 - 4/14/21 [address] admission at [facility] with weakness, syncope  Tx with IV ancef for cellulitis of lower extremity  PRBC transfusion for anemia  Pt d/c to rehab at [facility]  5/7 - 5/17/21 Hosp admission [facility] for streptococcus bacteremia, tx with IV rocephin and flagyl  D/C home with VNA services  5/24/21 Dr Shelby Odonnell, Med Onc  Pt is s/p 2 cycles of Gemzar/Abraxane  Based on performance status and recent hospitalizations, change therapy to single-agent Gemzar  If status improves, will add Abraxane or switch to modified FOLFOX6      5/30 - 6/1/21 Hosp admission with dizziness  Hyponatremia with Na 120 on admission  D/C with 1g salt tablets TID       6/28/21 Dr Shelby Odonnell follow-up  Pt tolerating Gemzar single agent, CA 19 9 coming down  Plan to continue therapy  Reimage in 2 months, then re-evaluate by surgical oncology  7/12/21 Infusion, gemzar       7/14 - 7/19/21 Hosp admission with generalized weakness and s/p fall   Started on IV abx  Oncology consulted, CT abdomen/pelvis which did show some interval growth in cystic lesion on the pancreas  There was also small area concerning for possible contained perforation versus cystic Mets  General surgery felt that this was likely metastasis  Discussed with Oncology, recommended that it was reasonable for discharge to rehab to attempt to improve patient's functional status  Pt in agreement to not continue further chemotherapy after rehab and consider hospice at that time  Pt d/c to [facility]       7/14/21 CT abdomen pelvis w contrast  1  Mild interval increase in size of cystic lesion in the head and uncinate process of the pancreas measuring 1 5 cm  Stable severe pancreatic duct dilatation  2   Decreased gas and inflammation between the medial border of the pylorus and head of the pancreas    Persistent inflammation along the medial aspect of the pylorus and proximal 2nd portion of the duodenum  3   1 3 cm low-attenuation, possibly cystic lesion or fluid collection along the medial border of the pylorus  Possible contained perforation or cystic metastasis  4   Constipation  7/26/21 Med Onc follow-up  Currently at [facility] undergoing rehab  Discussed goals of care  He has disease progression  Has not tolerated chemotherapy well, has frequent hospital admissions and gradual decline in performance status  Discontinue Gemzar at this time  Discussed options including refer back to surgical oncologist to discuss resection  Referral to Radiation Oncology to discuss concurrent chemoradiation if he is not a surgical candidate  Discussed transitioning to comfort approach and hospice  Pt wishes to discuss surgery options  Also refer to Rad Onc to discuss concurrent chemoradiation with 5FU CADD pump for 5 days with RT        7/30/21 Surg Onc, Dr Binh Romero  Discussed that surgery is not an option at this time  He has not responded to chemotherapy and tumor continues to grow  He is not reasonable functional status to tolerate pancreatic resection  Briefly discussed chemoradiation, which he is considering  Plan to return on as-needed basis  8/3/21 Palliative care  Pt confirmed DNR/DNI status, prognosis guarded  Discontinue oxycodone (does not like this medication)  Currently in independent living but hopeful to transition to home soon  8/6/21 Endocrinology appt  8/16/21 Infusion, SLQ  8/17/21 Nephrology appt  8/17/21 Palliative care  8/23/21 Med Onc          Oncology History   Adenocarcinoma of pancreas ([facility] [address] )   2/2021 Initial Diagnosis    Adenocarcinoma of pancreas ([facility] [address] )     2/23/2021 Biopsy    Pancreas, head mass:   Malignant ([facility] Category VI) - Adenocarcinoma          3/23/2021 - 7/12/2021 Chemotherapy    pegfilgrastim (NEULASTA ONPRO), 6 mg, Subcutaneous, Once, 3 of 9 cycles  Administration: 6 mg (6/21/2021)  paclitaxel protein-bound (ABRAXANE) IVPB, 125 mg/m2 = 235 mg, Intravenous, Once, 2 of 2 cycles  Dose modification: 100 mg/m2 (original dose 125 mg/m2, Cycle 2, Reason: Dose Not Tolerated)  Administration: 235 mg (3/23/2021), 235 mg (3/30/2021), 235 mg (4/6/2021), 188 mg (4/20/2021), 188 mg (5/4/2021), 188 mg (4/27/2021)  gemcitabine (GEMZAR) infusion, 1,879 8 mg, Intravenous, Once, 4 of 10 cycles  Dose modification: 800 mg/m2 (original dose 1,000 mg/m2, Cycle 2, Reason: Dose Not Tolerated)  Administration: 1,800 mg (3/23/2021), 1,800 mg (3/30/2021), 1,800 mg (4/6/2021), 1,504 2 mg (4/20/2021), 1,504 2 mg (5/4/2021), 1,504 2 mg (4/27/2021), 1,504 2 mg (6/7/2021), 1,504 2 mg (6/14/2021), 1,504 2 mg (6/21/2021), 1,504 2 mg (7/6/2021), 1,504 2 mg (7/12/2021)     8/16/2021 -  Chemotherapy    fluorouracil (ADRUCIL) ambulatory infusion Soln, 200 mg/m2/day = 1,830 mg (88 9 % of original dose 225 mg/m2/day), Intravenous, Over 120 hours, 0 of 5 cycles  Dose modification: 200 mg/m2/day (original dose 225 mg/m2/day, Cycle 1, Reason: Anticipated Tolerance)         Clinical Trial: no      Health Maintenance   Topic Date Due    Hepatitis C Screening  Never done    Medicare Annual Wellness Visit (AWV)  Never done    DM Eye Exam  Never done    Depression Screening PHQ  Never done    DTaP,Tdap,and Td Vaccines (1 - Tdap) Never done    Fall Risk  Never done    Influenza Vaccine (1) 09/01/2021    HEMOGLOBIN A1C  10/08/2021    Diabetic Foot Exam  06/08/2022    BMI: Adult  08/03/2022    Pneumococcal Vaccine: 65+ Years  Completed    COVID-19 Vaccine  Completed    HIB Vaccine  Aged Out    Hepatitis B Vaccine  Aged Out    IPV Vaccine  Aged Out    Hepatitis A Vaccine  Aged Out    Meningococcal ACWY Vaccine  Aged Out    HPV Vaccine  Aged Out       Past Medical History:   Diagnosis Date    Cancer ([facility] )     pancreatic    Diabetes mellitus ([facility] )     Frequent urination     GERD (gastroesophageal reflux disease)     Hyperlipidemia     Hypertension     Pancreatic cancer ([facility] )     Peripheral neuropathy     Weakness     Weight loss        Past Surgical History:   Procedure Laterality Date    APPENDECTOMY      CATARACT EXTRACTION      COLONOSCOPY      SKIN LESION EXCISION      of a wart on right arm    TUNNELED VENOUS PORT PLACEMENT N/A 3/26/2021    Procedure: INSERTION VENOUS PORT (PORT-A-CATH);   Surgeon: Yolande Borrero MD;  Location:  MAIN OR;  Service: Surgical Oncology       Family History   Problem Relation Age of Onset    Lymphoma Son        Social History     Tobacco Use    Smoking status: Former Smoker     Types: Pipe     Quit date: 2/23/2011     Years since quitting: 10 4    Smokeless tobacco: Never Used   Vaping Use    Vaping Use: Never used   Substance Use Topics    Alcohol use: Never    Drug use: Never          Current Outpatient Medications:     acetaminophen (TYLENOL) 325 mg tablet, Take 650 mg by mouth every 4 (four) hours as needed for mild pain , Disp: , Rfl:     atorvastatin (LIPITOR) 40 mg tablet, Take 40 mg by mouth daily, Disp: , Rfl:     escitalopram (LEXAPRO) 5 mg tablet, Take 5 mg by mouth daily, Disp: , Rfl:     insulin glargine (Lantus SoloStar) 100 units/mL injection pen, Inject 10 units at bedtime, Disp: 15 mL, Rfl: 0    insulin lispro (HumaLOG) 100 units/mL injection, Inject 5 Units under the skin 3 (three) times a day with meals, Disp: , Rfl: 0    lisinopril (ZESTRIL) 40 mg tablet, Take 40 mg by mouth daily, Disp: , Rfl:     melatonin 3 mg, Take 2 tablets (6 mg total) by mouth daily at bedtime, Disp: , Rfl: 0    metFORMIN (GLUCOPHAGE) 850 mg tablet, TAKE 1 TABLET BY MOUTH TWICE DAILY TAKE WITH MORNING AND EVENING MEAL, Disp: , Rfl:     NovoLOG FlexPen 100 units/mL injection pen, Inject as per sliding scale , (  Scale - 150-200 -1 units, 201-250-2 units, 251-300 -3 units, 301-350-4 units, > 350- 5  units) with meals, Disp: 15 mL, Rfl:     prochlorperazine (COMPAZINE) 10 mg tablet, Take 1 tablet (10 mg total) by mouth every 6 (six) hours as needed for nausea or vomiting, Disp: 45 tablet, Rfl: 3    rivaroxaban (XARELTO) 15 mg tablet, Take 1 tablet (15 mg total) by mouth daily with breakfast for 40 doses, Disp: , Rfl:     saccharomyces boulardii (FLORASTOR) 250 mg capsule, Take 1 capsule (250 mg total) by mouth 2 (two) times a day, Disp: , Rfl: 0    sodium chloride 1 g tablet, Take 2 tablets (2 g total) by mouth 3 (three) times a day with meals, Disp: 90 tablet, Rfl: 0    Accu-Chek FastClix Lancets MISC, USE 1 TO CHECK GLUCOSE TWICE DAILY TO THREE TIMES DAILY (Patient not taking: Reported on 7/30/2021), Disp: , Rfl:     Accu-Chek Guide test strip, USE 1 STRIP TWICE DAILY TO THREE TIMES DAILY (Patient not taking: Reported on 7/30/2021), Disp: , Rfl:     Blood Glucose Monitoring Suppl (Accu-Chek Guide) w/Device KIT, USE TO CHECK GLUCOSE TWO TO THREE TIMES DAILY (Patient not taking: Reported on 7/30/2021), Disp: , Rfl:     ramipril (ALTACE) 10 MG capsule, Take 10 mg by mouth daily  (Patient not taking: Reported on 7/30/2021), Disp: , Rfl:     ReliOn Pen Needles 31G X 6 MM MISC, USE 1 IN THE EVENING (Patient not taking: Reported on 7/30/2021), Disp: , Rfl:     No Known Allergies     Review of Systems:  Review of Systems   Constitutional: Positive for fatigue and unexpected weight change (30 lbs over the last 6 months)  Recently appetite is good, but loss of taste   HENT: Negative  Eyes: Negative  Respiratory: Positive for shortness of breath (with activities)  Cardiovascular: Negative  Gastrointestinal: Positive for abdominal pain (right sided, mild intermittent) and constipation  Indigestion and belching, but denies nausea   Endocrine: Negative  Genitourinary: Negative  Musculoskeletal: Positive for gait problem (steady with walker, wheelchair for longer distances) and neck pain  Skin: Positive for pallor  Allergic/Immunologic: Negative      Neurological: Positive for dizziness (upon standing), weakness and numbness (neuropathy in fingers )  Psychiatric/Behavioral: The patient is nervous/anxious  Vitals:    08/04/21 0942   BP: 130/62   BP Location: Right arm   Pulse: 98   Resp: 18   Temp: 97 6 °F (36 4 °C)   TempSrc: Temporal   SpO2: 98%   Weight: 64 3 kg (141 lb 12 8 oz)         Pain assessment: 3, right side abdomen, taking tylenol prn    PFT: n/a    Imaging:No images are attached to the encounter       Teaching: NCI radiation packet     MST completed    Implantable Devices (Port, pacemaker, pain stimulator): RCW port    Hip Replacement: no

## 2021-08-04 NOTE — PROGRESS NOTES
Consultation - Radiation Oncology      WRZ:[id number] : 1945  Encounter: [id number]  Patient Information: Julian Leblanc      CHIEF COMPLAINT  Chief Complaint   Patient presents with    Consult     Radiation Oncology      Cancer Staging  No matching staging information was found for the patient  History of Present Illness   Julian Leblanc is a 67y o  year old male who presents with   Recently diagnosed unresectable adenocarcinoma the head of the pancreas  He had been receiving systemic therapy but was unable to tolerate even single agent Gemzar  He has experienced multiple hospitalizations during his chemotherapy course, and repeat imaging shows local progression of disease  He is not a surgical candidate, and presents today to discuss chemo-radiation for improved local control of primary mass  Workup to date as below:    Patient presents for radiation consult for pancreatic cancer, referred by Dr Gilberto Marie to discuss concurrent chemoradiation (5FU/RT)  67year old male presented to [facility] with nausea, vomiting and weight loss in 2021  Imaging revealed pancreatic cyst and concern for mass in the head of the pancreas  Dr Michelle Reynolds evaluated pt on 21 with plan for EUS guided FNA of pancreatic mass and drainage of peripancreatic fluid collection  21 EUS/biopsy (Dr Michelle Reynolds)  Large pancreatic body cyst associated with upstream duct dilation and pancreatic atrophy  The cyst was aspirated for CEA, Amylase, cytology and gram stain/ culture  The cyst was causing compression of distal body/ antrum and this is likely to be causing his weight loss  This has the appearance of a pancreatic pseudocyst    Pancreatic head prominence - FNB done to rule out malignancy  Short segment Tamayo's tongues (M1)  Biopsies done  Small hiatal hernia but Hill3 on retroflexion  Biopsy: Pancreas, head mass:   Malignant ([facility] Category VI) - Adenocarcinoma         3/5/21 CT chest w contrast  Pancreatic head and neck mass measuring approximately 3 5 x 3 5 cm attributed to recent biopsy proven malignancy  No evidence of thoracic metastatic disease  3/11/21 Rectal/GI Multidisciplinary Conference  -Consult Surgical Oncology and Medical Oncology  -Genetic Counselor recommends genetic counseling    3/12/21 Surg Onc, Dr Edgar Payne  Given the infiltrative nature of the mass as well as the very high CA 19 9, recommended neoadjuvant chemotherapy  Pt has appt with Dr Amandeep Buck  Likely 2-3 months of neoadjuvant chemo followed by repeat imaging  3/15/21 Med Onc, Dr Amandeep Buck  recommendation based on his age and performance status is gemcitabine and Abraxane weekly 3/4 weeks for a total of 3 cycles to be followed by reimaging and consideration of surgery  3/23/21 1st cycle Gemzar/Abraxane neoadjuvant chemo    3/26/21 Port placement [facility]    4/8 - 4/14/21 [facility] admission at [facility] with weakness, syncope  Tx with IV ancef for cellulitis of lower extremity  PRBC transfusion for anemia  Pt d/c to rehab at [facility]  5/7 - 5/17/21 Hosp admission [facility] for streptococcus bacteremia, tx with IV rocephin and flagyl  D/C home with VNA services  5/24/21 Dr Amandeep Buck, Med Onc  Pt is s/p 2 cycles of Gemzar/Abraxane  Based on performance status and recent hospitalizations, change therapy to single-agent Gemzar  If status improves, will add Abraxane or switch to modified [id number]      5/30 - 6/1/21 Hosp admission with dizziness  Hyponatremia with Na 120 on admission  D/C with 1g salt tablets TID       6/28/21 Dr Amandeep Buck follow-up  Pt tolerating Gemzar single agent, CA 19 9 coming down  Plan to continue therapy  Reimage in 2 months, then re-evaluate by surgical oncology  7/12/21 Infusion, gemzar       7/14 - 7/19/21 Hosp admission with generalized weakness and s/p fall   Started on IV abx    Oncology consulted, CT abdomen/pelvis which did show some interval growth in cystic lesion on the pancreas  There was also small area concerning for possible contained perforation versus cystic Mets  General surgery felt that this was likely metastasis  Discussed with Oncology, recommended that it was reasonable for discharge to rehab to attempt to improve patient's functional status  Pt in agreement to not continue further chemotherapy after rehab and consider hospice at that time  Pt d/c to [facility]       7/14/21 CT abdomen pelvis w contrast  1  Mild interval increase in size of cystic lesion in the head and uncinate process of the pancreas measuring 1 5 cm  Stable severe pancreatic duct dilatation  2   Decreased gas and inflammation between the medial border of the pylorus and head of the pancreas  Persistent inflammation along the medial aspect of the pylorus and proximal 2nd portion of the duodenum  3   1 3 cm low-attenuation, possibly cystic lesion or fluid collection along the medial border of the pylorus  Possible contained perforation or cystic metastasis  4   Constipation  7/26/21 Med Onc follow-up  Currently at [facility] undergoing rehab  Discussed goals of care  He has disease progression  Has not tolerated chemotherapy well, has frequent hospital admissions and gradual decline in performance status  Discontinue Gemzar at this time  Discussed options including refer back to surgical oncologist to discuss resection  Referral to Radiation Oncology to discuss concurrent chemoradiation if he is not a surgical candidate  Discussed transitioning to comfort approach and hospice  Pt wishes to discuss surgery options  Also refer to Rad Onc to discuss concurrent chemoradiation with 5FU CADD pump for 5 days with RT        7/30/21 Surg Onc, Dr Amanda Stovall  Discussed that surgery is not an option at this time  He has not responded to chemotherapy and tumor continues to grow  He is not reasonable functional status to tolerate pancreatic resection   Briefly discussed chemoradiation, which he is considering  Plan to return on as-needed basis  8/3/21 Palliative care  Pt confirmed DNR/DNI status, prognosis guarded  Discontinue oxycodone (does not like this medication)  Currently in independent living but hopeful to transition to home soon  8/6/21 Endocrinology appt  8/16/21 Infusion, SLQ  8/17/21 Nephrology appt  8/17/21 Palliative care  8/23/21 Med Onc        Historical Information   Oncology History   Adenocarcinoma of pancreas ([facility] )   2/2021 Initial Diagnosis    Adenocarcinoma of pancreas ([facility] [address] )     2/23/2021 Biopsy    Pancreas, head mass:   Malignant ([facility] Category VI) - Adenocarcinoma          3/23/2021 - 7/12/2021 Chemotherapy    pegfilgrastim (NEULASTA ONPRO), 6 mg, Subcutaneous, Once, 3 of 9 cycles  Administration: 6 mg (6/21/2021)  paclitaxel protein-bound (ABRAXANE) IVPB, 125 mg/m2 = 235 mg, Intravenous, Once, 2 of 2 cycles  Dose modification: 100 mg/m2 (original dose 125 mg/m2, Cycle 2, Reason: Dose Not Tolerated)  Administration: 235 mg (3/23/2021), 235 mg (3/30/2021), 235 mg (4/6/2021), 188 mg (4/20/2021), 188 mg (5/4/2021), 188 mg (4/27/2021)  gemcitabine (GEMZAR) infusion, 1,879 8 mg, Intravenous, Once, 4 of 10 cycles  Dose modification: 800 mg/m2 (original dose 1,000 mg/m2, Cycle 2, Reason: Dose Not Tolerated)  Administration: 1,800 mg (3/23/2021), 1,800 mg (3/30/2021), 1,800 mg (4/6/2021), 1,504 2 mg (4/20/2021), 1,504 2 mg (5/4/2021), 1,504 2 mg (4/27/2021), 1,504 2 mg (6/7/2021), 1,504 2 mg (6/14/2021), 1,504 2 mg (6/21/2021), 1,504 2 mg (7/6/2021), 1,504 2 mg (7/12/2021)     8/16/2021 -  Chemotherapy    fluorouracil (ADRUCIL) ambulatory infusion Soln, 200 mg/m2/day = 1,830 mg (88 9 % of original dose 225 mg/m2/day), Intravenous, Over 120 hours, 0 of 5 cycles  Dose modification: 200 mg/m2/day (original dose 225 mg/m2/day, Cycle 1, Reason: Anticipated Tolerance)           Past Medical History:   Diagnosis Date    [facility] )     pancreatic    Diabetes mellitus [facility]     Frequent urination     GERD (gastroesophageal reflux disease)     Hyperlipidemia     Hypertension     Pancreatic cancer ([facility] [facility] )     Peripheral neuropathy     Weakness     Weight loss      Past Surgical History:   Procedure Laterality Date    APPENDECTOMY      CATARACT EXTRACTION      COLONOSCOPY      SKIN LESION EXCISION      of a wart on right arm    TUNNELED VENOUS PORT PLACEMENT N/A 3/26/2021    Procedure: INSERTION VENOUS PORT (PORT-A-CATH);   Surgeon: Odilia Prince MD;  Location:  MAIN OR;  Service: Surgical Oncology       Family History   Problem Relation Age of Onset    Lymphoma Son        Social History   Social History     Substance and Sexual Activity   Alcohol Use Never     Social History     Substance and Sexual Activity   Drug Use Never     Social History     Tobacco Use   Smoking Status Former Smoker    Types: Pipe    Quit date: 2/23/2011    Years since quitting: 10 4   Smokeless Tobacco Never Used         Meds/Allergies     Current Outpatient Medications:     acetaminophen (TYLENOL) 325 mg tablet, Take 650 mg by mouth every 4 (four) hours as needed for mild pain , Disp: , Rfl:     atorvastatin (LIPITOR) 40 mg tablet, Take 40 mg by mouth daily, Disp: , Rfl:     escitalopram (LEXAPRO) 5 mg tablet, Take 5 mg by mouth daily, Disp: , Rfl:     insulin glargine (Lantus SoloStar) 100 units/mL injection pen, Inject 10 units at bedtime, Disp: 15 mL, Rfl: 0    insulin lispro (HumaLOG) 100 units/mL injection, Inject 5 Units under the skin 3 (three) times a day with meals, Disp: , Rfl: 0    lisinopril (ZESTRIL) 40 mg tablet, Take 40 mg by mouth daily, Disp: , Rfl:     melatonin 3 mg, Take 2 tablets (6 mg total) by mouth daily at bedtime, Disp: , Rfl: 0    metFORMIN (GLUCOPHAGE) 850 mg tablet, TAKE 1 TABLET BY MOUTH TWICE DAILY TAKE WITH MORNING AND EVENING MEAL, Disp: , Rfl:     NovoLOG FlexPen 100 units/mL injection pen, Inject as per sliding scale , (  Scale - 150-200 -1 units, 201-250-2 units, 251-300 -3 units, 301-350-4 units, > 350- 5  units) with meals, Disp: 15 mL, Rfl:     prochlorperazine (COMPAZINE) 10 mg tablet, Take 1 tablet (10 mg total) by mouth every 6 (six) hours as needed for nausea or vomiting, Disp: 45 tablet, Rfl: 3    rivaroxaban (XARELTO) 15 mg tablet, Take 1 tablet (15 mg total) by mouth daily with breakfast for 40 doses, Disp: , Rfl:     saccharomyces boulardii (FLORASTOR) 250 mg capsule, Take 1 capsule (250 mg total) by mouth 2 (two) times a day, Disp: , Rfl: 0    sodium chloride 1 g tablet, Take 2 tablets (2 g total) by mouth 3 (three) times a day with meals, Disp: 90 tablet, Rfl: 0    Accu-Chek FastClix Lancets MISC, USE 1 TO CHECK GLUCOSE TWICE DAILY TO THREE TIMES DAILY (Patient not taking: Reported on 7/30/2021), Disp: , Rfl:     Accu-Chek Guide test strip, USE 1 STRIP TWICE DAILY TO THREE TIMES DAILY (Patient not taking: Reported on 7/30/2021), Disp: , Rfl:     Blood Glucose Monitoring Suppl (Accu-Chek Guide) w/Device KIT, USE TO CHECK GLUCOSE TWO TO THREE TIMES DAILY (Patient not taking: Reported on 7/30/2021), Disp: , Rfl:     ramipril (ALTACE) 10 MG capsule, Take 10 mg by mouth daily  (Patient not taking: Reported on 7/30/2021), Disp: , Rfl:     ReliOn Pen Needles 31G X 6 MM MISC, USE 1 IN THE EVENING (Patient not taking: Reported on 7/30/2021), Disp: , Rfl:   No Known Allergies      Review of Systems   Review of Systems   Constitutional: Positive for fatigue and unexpected weight change (30 lbs over the last 6 months)  Recently appetite is good, but loss of taste   HENT: Negative  Eyes: Negative  Respiratory: Positive for shortness of breath (with activities)  Cardiovascular: Negative  Gastrointestinal: Positive for abdominal pain (right sided, mild intermittent) and constipation  Indigestion and belching, but denies nausea   Endocrine: Negative  Genitourinary: Negative      Musculoskeletal: Positive for gait problem (steady with walker, wheelchair for longer distances) and neck pain  Skin: Positive for pallor  Allergic/Immunologic: Negative  Neurological: Positive for dizziness (upon standing), weakness and numbness (neuropathy in fingers )  Psychiatric/Behavioral: The patient is nervous/anxious  OBJECTIVE:   /62 (BP Location: Right arm)   Pulse 98   Temp 97 6 °F (36 4 °C) (Temporal)   Resp 18   Wt 64 3 kg (141 lb 12 8 oz)   SpO2 98%   BMI 20 35 kg/m²   Pain Assessment:  0  Performance Status: Karnofsky: 60 - Requires occasional assistance, but is able care for most of personal needs    Physical Exam     The patient presents today in a wheelchair  Sclera anicteric  No palpable cervical or supraclavicular lymphadenopathy  Lungs clear to auscultation bilaterally  Abdomen soft nontender nondistended  No obvious swelling in the lower extremities  Normal speech  Normal affect  RESULTS  Lab Results    Chemistry        Component Value Date/Time    K 4 4 2021 0615     2021 0615    CO2 24 2021 0615    CO2 21 2021 0914    BUN 21 2021 0615    CREATININE 0 71 2021 0615        Component Value Date/Time    CALCIUM 7 7 (L) 2021 0615    ALKPHOS 64 2021 0511    AST 10 2021 0511    ALT 19 2021 0511            Lab Results   Component Value Date    WBC 6 64 2021    HGB 7 9 (L) 2021    HCT 23 0 (L) 2021     (H) 2021     2021         Imaging Studies  EEG awake or drowsy routine    Result Date: 2021  Narrative: ELECTROENCEPHALOGRAM (EEG) Patient Name:  Norma Velásquez  MRN: [id number] :  1945 File #: Matthew Betancourt 21-12 Age: 67 y o    Date performed: 7/15/2021        Report date: 2021      Study type: Routine EEG ICD 10 diagnosis: Spells/Fit NOS R56 9 Start time: 09:55 End time: 10:24 --------------------------------------------------------------------------- ---------------------------------------- Patient History: This recording was observed in a 67 y o  male to determine whether spells of presyncope/syncope are seizures  Medications include: escitalopram, cefepime --------------------------------------------------------------------------- ---------------------------------------- Description of Procedure: · 32 channel digital recording with electrodes placed according to the International 10-20 system with additional T1/T2 electrodes, EOG, EKG, and simultaneous video  The recording was technically satisfactory  --------------------------------------------------------------------------- ---------------------------------------- EEG Description: The recording was performed with the patient awake, drowsy, and asleep  He was fully oriented  During wakefulness, there were long runs of well regulated, low amplitude, posteriorly dominant, symmetric 10 cps alpha rhythm that attenuated with eye opening  There were symmetric low amplitude, frontally dominant beta activities  With drowsiness, alpha activity attenuated and was replaced by diffusely distributed theta activities  With sleep, symmetric vertex sharp waves, K complexes and sleep spindles were present  --------------------------------------------------------------------------- ---------------------------------------- Activation Procedures: Hyperventilation was not performed  Stepped photic stimulation between 1-30 cps was performed and induced symmetric photic driving  Other findings: The single lead ECG demonstrated normal sinus rhythm --------------------------------------------------------------------------- ---------------------------------------- EEG Interpretation: This Routine EEG recorded during wakefulness, drowsiness, and sleep is normal  Marylouise Lombard, MD [facility]     XR Trauma chest portable    Result Date: 7/14/2021  Narrative: CHEST INDICATION:   TRAUMA  COMPARISON:  5/30/2021 EXAM PERFORMED/VIEWS:  XR CHEST PORTABLE Single view FINDINGS: Right-sided port terminating mid SVC Cardiomediastinal silhouette appears unremarkable  The lungs are clear  No pneumothorax or pleural effusion  Osseous structures appear within normal limits for patient age  Impression: No acute cardiopulmonary disease  Findings are stable Workstation performed: [id number]     XR spine cervical 2 or 3 vw injury    Result Date: 7/16/2021  Narrative: CERVICAL SPINE INDICATION:   M25 511: Pain in right shoulder  COMPARISON:  None VIEWS:  XR SPINE CERVICAL 2 OR 3 VW INJURY FINDINGS: No fracture or subluxation  Alignment is anatomic There is disc space narrowing at C5-C6 and C6-C7 ventral osteophytosis  There is multilevel facet arthrosis  Atherosclerotic calcifications are noted  The lung apices are clear  Impression: No acute fractures identified  Workstation performed: [id number]     XR shoulder 2+ vw right    Result Date: 7/16/2021  Narrative: RIGHT SHOULDER INDICATION:   M25 511: Pain in right shoulder  COMPARISON:  4/8/2021 VIEWS:  XR SHOULDER 2+ VW RIGHT FINDINGS: There is no acute fracture or dislocation  Mild osteoarthritis acromioclavicular joint  No lytic or blastic osseous lesion  Soft tissues are unremarkable  Impression: No acute osseous abnormality  Workstation performed: [id number]     TRAUMA - CT head wo contrast    Result Date: 7/14/2021  Narrative: CT BRAIN - WITHOUT CONTRAST INDICATION:   TRAUMA  COMPARISON:  5/30/2021 TECHNIQUE:  CT examination of the brain was performed  In addition to axial images, sagittal and coronal 2D reformatted images were created and submitted for interpretation  Radiation dose length product (DLP) for this visit:  876 mGy-cm     This examination, like all CT scans performed in the [facility], was performed utilizing techniques to minimize radiation dose exposure, including the use of iterative reconstruction and automated exposure control  IMAGE QUALITY:  Diagnostic  FINDINGS: PARENCHYMA: Decreased attenuation is noted in periventricular and subcortical white matter demonstrating an appearance that is statistically most likely to represent mild microangiopathic change  Mild cerebral volume loss  No CT signs of acute infarction  No intracranial mass, mass effect or midline shift  No acute parenchymal hemorrhage  Mild vascular calcification of the distal vertebral arteries and cavernous internal carotid arteries  VENTRICLES AND EXTRA-AXIAL SPACES:  Normal for the patient's age  VISUALIZED ORBITS AND PARANASAL SINUSES:  Unremarkable  CALVARIUM AND EXTRACRANIAL SOFT TISSUES:  Normal      Impression: No acute intracranial abnormality  Stable microangiopathic changes within the brain  Workstation performed: [id number]     TRAUMA - CT spine cervical wo contrast    Result Date: 7/14/2021  Narrative: CT CERVICAL SPINE - WITHOUT CONTRAST INDICATION:   TRAUMA  COMPARISON:  None  TECHNIQUE:  CT examination of the cervical spine was performed without intravenous contrast   Contiguous axial images were obtained  Sagittal and coronal reconstructions were performed  Radiation dose length product (DLP) for this visit:  355 mGy-cm   This examination, like all CT scans performed in the [facility], was performed utilizing techniques to minimize radiation dose exposure, including the use of iterative reconstruction and automated exposure control  IMAGE QUALITY:  Diagnostic  FINDINGS: ALIGNMENT:  Normal alignment of the cervical spine  No subluxation  VERTEBRAL BODIES:  No fracture  DEGENERATIVE CHANGES:  Mild cervical degenerative change  No significant central canal stenosis  PREVERTEBRAL AND PARASPINAL SOFT TISSUES:  Vascular calcifications of the distal common carotid artery and proximal cervical internal carotid artery    Probable moderate to high-grade stenosis of the right ICA origin based on the degree of calcification  1 3 cm low-density nodule within the right lobe of the thyroid gland  THORACIC INLET:  Unremarkable  Impression: No acute osseous abnormality  Mild cervical degenerative change  Workstation performed: [id number]     CT abdomen pelvis w contrast    Result Date: 7/14/2021  Narrative: CT ABDOMEN AND PELVIS WITH IV CONTRAST INDICATION:   Dizziness and weakness  Mild trauma  Pancreatic cancer  Undergoing chemotherapy  COMPARISON:  5/14/2021  TECHNIQUE:  CT examination of the abdomen and pelvis was performed  Axial, sagittal, and coronal 2D reformatted images were created from the source data and submitted for interpretation  Radiation dose length product (DLP) for this visit:  457 mGy-cm   This examination, like all CT scans performed in the [facility], was performed utilizing techniques to minimize radiation dose exposure, including the use of iterative reconstruction and automated exposure control  IV Contrast:  100 mL of iohexol (OMNIPAQUE) Enteric Contrast:  Enteric contrast was not administered  FINDINGS: ABDOMEN LOWER CHEST:  Visualized lung bases are clear  LIVER/BILIARY TREE:  Unremarkable  GALLBLADDER:  No calcified gallstones  No pericholecystic inflammatory change  SPLEEN:  Unremarkable  PANCREAS:  Cystic lesion in the head and uncinate process measures 1 5 cm, previously measuring 1 1 cm  Stable severe dilatation of the main pancreatic duct and cystic side branch dilatation  Inflammation between the head of the pancreas and duodenum has nearly completely resolved  Punctate foci of gas are also nearly completely resolved  Single punctate focus of present adjacent to the head of the pancreas and pylorus  ADRENAL GLANDS:  Stable mild nodularity of the right adrenal gland  KIDNEYS/URETERS:  Symmetric nephrographic phase enhancement of the kidneys  No obstructive uropathy   STOMACH AND BOWEL:  Mild inflammation along the medial border of the pylorus and proximal 2nd portion of the duodenum  Low-attenuation lesion along the medial border of the pylorus measuring 1 3 cm  Evaluation limited in the absence of duodenal distention  Significant amount of gas and stool throughout the colon  Stool distends the rectum  No evidence of bowel obstruction, colitis or diverticulitis  APPENDIX:  No findings to suggest appendicitis  ABDOMINOPELVIC CAVITY:  No organized fluid collection or lymphadenopathy  VESSELS:  No evidence of portal, proximal mesenteric vein or splenic vein thrombosis  PELVIS REPRODUCTIVE ORGANS:  No prostate enlargement  URINARY BLADDER:  Unremarkable  ABDOMINAL WALL/INGUINAL REGIONS:  Unremarkable  OSSEOUS STRUCTURES:  No acute fracture or destructive osseous lesion  Impression: 1  Mild interval increase in size of cystic lesion in the head and uncinate process of the pancreas measuring 1 5 cm  Stable severe pancreatic duct dilatation  2   Decreased gas and inflammation between the medial border of the pylorus and head of the pancreas  Persistent inflammation along the medial aspect of the pylorus and proximal 2nd portion of the duodenum  3   1 3 cm low-attenuation, possibly cystic lesion or fluid collection along the medial border of the pylorus  Possible contained perforation or cystic metastasis  4   Constipation  Workstation performed: [id number]       ASSESSMENT  1  Adenocarcinoma of pancreas [facility])  Ambulatory referral to Radiation Oncology     Cancer Staging  No matching staging information was found for the patient  PLAN/DISCUSSION  No orders of the defined types were placed in this encounter  Fern Cohen is a 67y o  year old male with locally advanced unresectable adenocarcinoma the head of the pancreas  He was unable to tolerate full strength systemic therapy, even single agent gemcitabine, with multiple hospitalizations  Recent imaging reveals progression of the primary tumor with no obvious distant disease    He has been referred today for consideration of chemoradiation therapy in an attempt to provide some degree of local control  Extensive conversation was had with the patient and his family members regarding the goals of treatment  It was explained that chemoradiation will not lead to complete resolution of the tumor  The goal will be to slow further progression of the primary tumor trying to extend his life rather than provide cure  His only realistic options at this time include radiation versus chemoradiation versus hospice  Treatment will be delivered over the course of approximately 5 weeks  The associated risks and toxicities of treatment were discussed with the patient in detail, including, not limited to, fatigue, nausea, vomiting, diarrhea, abdominal discomfort, and long-term small bowel injury  We do have some concerns regarding the patient's ability to tolerate treatment,  but as this is his only realistic treatment option, he is leaning towards proceeding with treatment and has been scheduled for a CT simulation at the [facility] next week  He will receive treatment at the [facility]  Yuko Monique MD  3/9/1545,54:99 AM      Portions of the record may have been created with voice recognition software  Occasional wrong word or "sound a like" substitutions may have occurred due to the inherent limitations of voice recognition software  Read the chart carefully and recognize, using context, where substitutions have occurred

## 2021-08-06 NOTE — PATIENT INSTRUCTIONS
Continue to monitor diet  Continue Lantus to 10 units at night  Increase Humalog to 7 units with break  Continue 5 units with lunch and dinner  Apply sliding scale as needed  Continue to use Freestyle [place] and send in blood sugar logs in 2 weeks  Follow up in 3 months

## 2021-08-06 NOTE — PATIENT INSTRUCTIONS
Nutrition Rx & Recommendations:  · Diet: Diabetic, High Calorie & High Protein Diet  · Eat slowly and chew food thoroughly before swallowing  · Small, frequent meals/snacks may be easier to tolerate than 3 large daily meals  Aim for 5-6 small meals per day (every 2-3 hours)  · Include protein at all meals/snacks  · Stay hydrated by sipping fluids of choice/tolerance throughout the day  · Liquid nutrition may be better tolerated than solids at times  · Alter food choices and eating patterns to accommodate changing needs  · Refer to Eating Hints book for other meal/snack ideas and symptom management  · For weight loss: monitor your weight at home at least 2x/week, record your weight, start by adding 250-500 extra calories per day, eat 5-6 small scheduled meals every 2-3 hrs, choose foods that are high in protein and calories (see pages 49-53 in your Eating Hints book), drink liquids with calories for example: milkshakes/smoothies/juice/soup/whole milk/chocolate milk, cook with protein fortified milk (see recipe on page 36 in your Eating Hints book), consider ready-to-drink oral nutrition supplements such as Ensure Plus, Ensure Enlive, Boost Plus, or Boost Very High Calorie, avoid "diet" and "light" foods when possible, avoid drinking too much with meals, contact your dietitian with any continued weight loss over the course of 1 week  For more info see pages 35-37 in your Eating Hints book  · Weigh yourself regularly  If you notice weight loss, make an effort to increase your daily food/calorie intake  If you continue to notice loss after these efforts, reach out to your dietitian to establish a plan to stabilize weight  · Avoid gas-producing foods such as broccoli, cabbage, sauerkraut, [place] sprouts, cauliflower, corn, cucumbers, onions, peppers, beans, peas, lentils, apples, apple juice, avocado, and melons  Carbonated drinks, chewing gum, and drinking through a straw can also cause gas    Limiting lactose may help for those who are sensitive to milk products  · Nutrition Supplements: Increase Glucerna to 2 per day (with AM and afternoon snacks)  · Ask for [place] Yogurt to be sent with breakfast trays     · Snack ideas to keep in room: peanut butter, crackers, nuts  · Snack ideas to keep in room once mini fridge is installed: cheese, greek yogurt, hard boiled eggs, milk/Fairlife Milk    Follow Up Plan: phone follow up  8/20/21 at 10 AM  Recommend Referral to Other Providers: none at this time

## 2021-08-06 NOTE — PROGRESS NOTES
8/6/2021    Assessment/Plan      Diagnoses and all orders for this visit:    Type 2 diabetes mellitus with hyperglycemia, with long-term current use of insulin (HCC)    Essential hypertension    Mixed hyperlipidemia        Assessment/Plan:   1  Type 2 diabetes:  No recent hemoglobin A1c prior to office visit  Last freestyle [employer] was from July 1 through July 14, 2021  At that time his blood sugar was consistently running high  While he was in the hospital starting July 14th his Lantus was increased  At this time I would like to increase his NovoLog to 7 units  He will continue with 5 units with lunch and dinner  He will also use a sliding scale with his NovoLog  Once he reestablishes with the freestyle Marysol, recommend sending and blood sugar logs in 2 weeks for further adjustment of insulin  At this time I would like to see his A1c in the lower sevens  Follow-up in 3 months with lab work completed prior to visit  Contact the office if there is any concerns or questions especially with low blood sugar  2  Hypertension:  Normotensive in the office today  Kidney function remained stable with normal electrolytes  Continue with current medication  Repeat CMP prior to next office visit  3  Hyperlipidemia:  No recent lipid panel on file  Continue with current medication  Will obtain lipid panel prior to next office visit  CC:  Type 2 diabetes follow-up    History of Present Illness     HPI: Angel Patel is a 67y o  year old male with type 2 diabetes for 10 years  He is on oral agents and insulin at home and takes  Metformin 850 mg 1 tablet twice a day, Lantus 10 units daily, and Humalog 5 units with sliding scale  He denies any polyuria, polydipsia, nocturia and blurry vision  He denies nephropathy, retinopathy, heart attack, stroke and claudication but does admit to neuropathy  Hypoglycemic episodes: Yes rare  Last 1 happened overnight      No recent eye exam on file    The patient's last foot exam was in June 2021  Blood Sugar/Glucometer/Pump/CGM review: download freestyle Marysol from July 1 through July 14, 2021 reveals an average glucose level of 292  His blood sugars typically lowest 1st thing in the morning, and then does trend upward after breakfast   Does have some fluctuation throughout the day, and then will trend down overnight  His lowest blood sugars are typically overnight early morning and sometimes after dinner  Review of Systems   Constitutional: Positive for fatigue  Negative for activity change, appetite change and unexpected weight change  HENT: Negative for trouble swallowing  Eyes: Negative for visual disturbance  Respiratory: Negative for chest tightness and shortness of breath  Cardiovascular: Negative for chest pain, palpitations and leg swelling  Gastrointestinal: Negative for abdominal pain, diarrhea, nausea and vomiting  Endocrine: Positive for polyuria  Negative for cold intolerance, heat intolerance, polydipsia and polyphagia  Genitourinary: Negative for frequency  Musculoskeletal: Positive for arthralgias, gait problem and myalgias  Skin: Negative for rash and wound  Neurological: Positive for weakness  Negative for dizziness, light-headedness, numbness and headaches  Psychiatric/Behavioral: Negative for dysphoric mood and sleep disturbance  The patient is not nervous/anxious          Historical Information   Past Medical History:   Diagnosis Date    Cancer [facility])     pancreatic    [facility]     Frequent urination     GERD (gastroesophageal reflux disease)     Hyperlipidemia     Hypertension     [facility]     Peripheral neuropathy     Weakness     Weight loss      Past Surgical History:   Procedure Laterality Date    APPENDECTOMY      CATARACT EXTRACTION      COLONOSCOPY      SKIN LESION EXCISION      of a wart on right arm    TUNNELED VENOUS PORT PLACEMENT N/A 3/26/2021    Procedure: INSERTION VENOUS PORT (PORT-A-CATH);   Surgeon: Johnathan Camarillo MD;  Location:  MAIN OR;  Service: Surgical Oncology     Social History   Social History     Substance and Sexual Activity   Alcohol Use Never     Social History     Substance and Sexual Activity   Drug Use Never     Social History     Tobacco Use   Smoking Status Former Smoker    Types: Pipe    Quit date: 2/23/2011    Years since quitting: 10 4   Smokeless Tobacco Never Used     Family History:   Family History   Problem Relation Age of Onset    Lymphoma Son        Meds/Allergies   Current Outpatient Medications   Medication Sig Dispense Refill    Accu-Chek FastClix Lancets MISC USE 1 TO CHECK GLUCOSE TWICE DAILY TO THREE TIMES DAILY      Accu-Chek Guide test strip USE 1 STRIP TWICE DAILY TO THREE TIMES DAILY      acetaminophen (TYLENOL) 325 mg tablet Take 650 mg by mouth every 4 (four) hours as needed for mild pain       atorvastatin (LIPITOR) 40 mg tablet Take 40 mg by mouth daily      Blood Glucose Monitoring Suppl (Accu-Chek Guide) w/Device KIT USE TO CHECK GLUCOSE TWO TO THREE TIMES DAILY      escitalopram (LEXAPRO) 5 mg tablet Take 5 mg by mouth daily      insulin glargine (Lantus SoloStar) 100 units/mL injection pen Inject 10 units at bedtime 15 mL 0    insulin lispro (HumaLOG) 100 units/mL injection Inject 5 Units under the skin 3 (three) times a day with meals  0    lisinopril (ZESTRIL) 40 mg tablet Take 40 mg by mouth daily      metFORMIN (GLUCOPHAGE) 850 mg tablet TAKE 1 TABLET BY MOUTH TWICE DAILY TAKE WITH MORNING AND EVENING MEAL      NovoLOG FlexPen 100 units/mL injection pen Inject as per sliding scale , (  Scale - 150-200 -1 units, 201-250-2 units, 251-300 -3 units, 301-350-4 units, > 350- 5  units) with meals 15 mL     prochlorperazine (COMPAZINE) 10 mg tablet Take 1 tablet (10 mg total) by mouth every 6 (six) hours as needed for nausea or vomiting 45 tablet 3    rivaroxaban (XARELTO) 15 mg tablet Take 1 tablet (15 mg total) by mouth daily with breakfast for 40 doses      sodium chloride 1 g tablet Take 2 tablets (2 g total) by mouth 3 (three) times a day with meals 90 tablet 0    melatonin 3 mg Take 2 tablets (6 mg total) by mouth daily at bedtime (Patient not taking: Reported on 8/6/2021)  0    ramipril (ALTACE) 10 MG capsule Take 10 mg by mouth daily  (Patient not taking: Reported on 7/30/2021)      ReliOn Pen Needles 31G X 6 MM MISC USE 1 IN THE EVENING (Patient not taking: Reported on 7/30/2021)      saccharomyces boulardii (FLORASTOR) 250 mg capsule Take 1 capsule (250 mg total) by mouth 2 (two) times a day (Patient not taking: Reported on 8/6/2021)  0     No current facility-administered medications for this visit  No Known Allergies    Objective   Vitals: Blood pressure 124/64, pulse 105, height 5' 10" (1 778 m)  Invasive Devices     Central Venous Catheter Line            Port A Cath 03/26/21 Right Subclavian 133 days          Peripheral Intravenous Line            Peripheral IV 07/17/21 Left;Ventral (anterior) Forearm 19 days                Physical Exam  Vitals and nursing note reviewed  Constitutional:       General: He is not in acute distress  Appearance: Normal appearance  He is not diaphoretic  HENT:      Head: Normocephalic and atraumatic  Eyes:      General: No scleral icterus  Extraocular Movements: Extraocular movements intact  Conjunctiva/sclera: Conjunctivae normal       Pupils: Pupils are equal, round, and reactive to light  Cardiovascular:      Rate and Rhythm: Normal rate and regular rhythm  Heart sounds: No murmur heard  Pulmonary:      Effort: Pulmonary effort is normal  No respiratory distress  Breath sounds: Normal breath sounds  No wheezing  Musculoskeletal:      Cervical back: Normal range of motion  Right lower leg: No edema  Left lower leg: No edema  Lymphadenopathy:      Cervical: No cervical adenopathy     Skin:     General: Skin is warm and dry  Neurological:      Mental Status: He is alert and oriented to person, place, and time  Mental status is at baseline  Sensory: No sensory deficit  Motor: Tremor present  Gait: Gait abnormal (  Utilizing wheelchair)  Deep Tendon Reflexes:      Reflex Scores:       Patellar reflexes are 2+ on the right side and 2+ on the left side  Psychiatric:         Mood and Affect: Mood normal          Behavior: Behavior normal          Thought Content: Thought content normal          The history was obtained from the review of the chart and from the patient      Lab Results:    Most recent Alc is  Lab Results   Component Value Date    HGBA1C 9 4 (H) 04/08/2021           No components found for: HA1C  No components found for: GLU    Lab Results   Component Value Date    CREATININE 0 71 07/19/2021    CREATININE 0 69 07/18/2021    CREATININE 0 73 07/17/2021    BUN 21 07/19/2021    K 4 4 07/19/2021     07/19/2021    CO2 24 07/19/2021     eGFR   Date Value Ref Range Status   07/19/2021 91 ml/min/1 73sq m Final     No components found for: MALBCRER    No results found for: CHOL, HDL, TRIG, CHOLHDL    Lab Results   Component Value Date    ALT 19 07/16/2021    AST 10 07/16/2021    ALKPHOS 64 07/16/2021       Lab Results   Component Value Date    FREET4 1 02 05/31/2021           Future Appointments   Date Time Provider William Mckeon   8/9/2021  2:00 PM MD CASEY Mckay Rad [facility]c [facility]   8/16/2021  1:00 PM BE INF CHAIR 2 [facility]   8/17/2021  9:00 AM Yeni Calles, DO NEPH [facility]   8/17/2021 10:40 AM Amberly Desai, DO PALL [facility]   8/20/2021 10:00 AM Jennifer Toro ONC DIET [facility]   8/21/2021 12:00 PM BE INF CHAIR [facility]   8/23/2021  8:30 AM BE INF CHAIR 3 [facility]   8/23/2021  9:00 AM Doug Kinney MD HEM ONC QTN [facility]   8/28/2021 11:30 AM BE INF CHAIR 5 [facility]   8/30/2021  9:30 AM BE INF CHAIR [address] [place] [address]   9/4/2021 11:00 AM BE INF CHAIR 7 [facility]       Portions of the record may have been created with voice recognition software  Occasional wrong word or "sound a like" substitutions may have occurred due to the inherent limitations of voice recognition software  Read the chart carefully and recognize, using context, where substitutions have occurred

## 2021-08-06 NOTE — PROGRESS NOTES
Outpatient Oncology Nutrition Consultation   Type of Consult: Follow Up  Care Location: Telephone Call   Nutrition Assessment:   Oncology Diagnosis & Treatments: Adenocarcinoma of the pancreas  · Currently undergoing neoadjuvant chemotherapy (Gemzar) which started 3/23/21  Dose reduced 4/2021 and 5/24/21 due to overall performance status, and complications that resulted in hospitalization  Abraxane discontinued 5/7/21  · Not a surgical candidate  · Likely starting RT soon  Stopping chemo per patient d/t not tolerating well  Sim scheduled for 8/9/21    · Hospitalized 7/14/21-7/19/21 for weakness  Was discharged to [facility] at discharge  [address]   Oncology History   Adenocarcinoma of pancreas ([facility] )   2/2021 Initial Diagnosis    Adenocarcinoma of pancreas ([facility] [address] [facility] )     2/23/2021 Biopsy    Pancreas, head mass:   Malignant (PSC Category VI) - Adenocarcinoma          3/23/2021 - 7/12/2021 Chemotherapy    pegfilgrastim (NEULASTA ONPRO), 6 mg, Subcutaneous, Once, 3 of 9 cycles  Administration: 6 mg (6/21/2021)  paclitaxel protein-bound (ABRAXANE) IVPB, 125 mg/m2 = 235 mg, Intravenous, Once, 2 of 2 cycles  Dose modification: 100 mg/m2 (original dose 125 mg/m2, Cycle 2, Reason: Dose Not Tolerated)  Administration: 235 mg (3/23/2021), 235 mg (3/30/2021), 235 mg (4/6/2021), 188 mg (4/20/2021), 188 mg (5/4/2021), 188 mg (4/27/2021)  gemcitabine (GEMZAR) infusion, 1,879 8 mg, Intravenous, Once, 4 of 10 cycles  Dose modification: 800 mg/m2 (original dose 1,000 mg/m2, Cycle 2, Reason: Dose Not Tolerated)  Administration: 1,800 mg (3/23/2021), 1,800 mg (3/30/2021), 1,800 mg (4/6/2021), 1,504 2 mg (4/20/2021), 1,504 2 mg (5/4/2021), 1,504 2 mg (4/27/2021), 1,504 2 mg (6/7/2021), 1,504 2 mg (6/14/2021), 1,504 2 mg (6/21/2021), 1,504 2 mg (7/6/2021), 1,504 2 mg (7/12/2021)     8/16/2021 -  Chemotherapy    fluorouracil (ADRUCIL) ambulatory infusion Soln, 200 mg/m2/day = 1,830 mg (88 9 % of original dose 225 mg/m2/day), Intravenous, Over 120 hours, 0 of 6 cycles  Dose modification: 200 mg/m2/day (original dose 225 mg/m2/day, Cycle 1, Reason: Anticipated Tolerance)       Past Medical & Surgical Hx:   Patient Active Problem List   Diagnosis    Hyperlipidemia    Essential hypertension    Type 2 diabetes mellitus with hyperglycemia, with long-term current use of insulin (HCC)    Gastroesophageal reflux disease    Pancreatic cyst    Adenocarcinoma of pancreas (HCC)    Peripheral neuropathy    Bilateral cellulitis of lower leg    Hyponatremia    Severe protein-calorie malnutrition (HCC)    Anemia    Sacral wound    Leukocytosis    Generalized weakness    Streptococcal bacteremia    Mesenteric venous thrombosis (HCC)    GWEN (acute kidney injury) ([facility] [address] [facility] )    Goals of care, counseling/discussion    DNR (do not resuscitate)     Past Medical History:   Diagnosis Date    [facility]     pancreatic    Diabetes mellitus ([facility] )     Frequent urination     GERD (gastroesophageal reflux disease)     Hyperlipidemia     Hypertension     Pancreatic cancer ([facility] )     Peripheral neuropathy     Weakness     Weight loss      Past Surgical History:   Procedure Laterality Date    APPENDECTOMY      CATARACT EXTRACTION      COLONOSCOPY      SKIN LESION EXCISION      of a wart on right arm    TUNNELED VENOUS PORT PLACEMENT N/A 3/26/2021    Procedure: INSERTION VENOUS PORT (PORT-A-CATH);   Surgeon: Uma Meeks MD;  Location:  MAIN OR;  Service: Surgical Oncology       Review of Medications:   Vitamins, Supplements and Herbals: Med List Reviewed & pt is only taking: Salt Tabs (SIADH)    Current Outpatient Medications:     Accu-Chek FastClix Lancets MISC, USE 1 TO CHECK GLUCOSE TWICE DAILY TO THREE TIMES DAILY (Patient not taking: Reported on 7/30/2021), Disp: , Rfl:     Accu-Chek Guide test strip, USE 1 STRIP TWICE DAILY TO THREE TIMES DAILY (Patient not taking: Reported on 7/30/2021), Disp: , Rfl:    acetaminophen (TYLENOL) 325 mg tablet, Take 650 mg by mouth every 4 (four) hours as needed for mild pain , Disp: , Rfl:     atorvastatin (LIPITOR) 40 mg tablet, Take 40 mg by mouth daily, Disp: , Rfl:     Blood Glucose Monitoring Suppl (Accu-Chek Guide) w/Device KIT, USE TO CHECK GLUCOSE TWO TO THREE TIMES DAILY (Patient not taking: Reported on 7/30/2021), Disp: , Rfl:     escitalopram (LEXAPRO) 5 mg tablet, Take 5 mg by mouth daily, Disp: , Rfl:     insulin glargine (Lantus SoloStar) 100 units/mL injection pen, Inject 10 units at bedtime, Disp: 15 mL, Rfl: 0    insulin lispro (HumaLOG) 100 units/mL injection, Inject 5 Units under the skin 3 (three) times a day with meals, Disp: , Rfl: 0    lisinopril (ZESTRIL) 40 mg tablet, Take 40 mg by mouth daily, Disp: , Rfl:     melatonin 3 mg, Take 2 tablets (6 mg total) by mouth daily at bedtime, Disp: , Rfl: 0    metFORMIN (GLUCOPHAGE) 850 mg tablet, TAKE 1 TABLET BY MOUTH TWICE DAILY TAKE WITH MORNING AND EVENING MEAL, Disp: , Rfl:     NovoLOG FlexPen 100 units/mL injection pen, Inject as per sliding scale , (  Scale - 150-200 -1 units, 201-250-2 units, 251-300 -3 units, 301-350-4 units, > 350- 5  units) with meals, Disp: 15 mL, Rfl:     prochlorperazine (COMPAZINE) 10 mg tablet, Take 1 tablet (10 mg total) by mouth every 6 (six) hours as needed for nausea or vomiting, Disp: 45 tablet, Rfl: 3    ramipril (ALTACE) 10 MG capsule, Take 10 mg by mouth daily  (Patient not taking: Reported on 7/30/2021), Disp: , Rfl:     ReliOn Pen Needles 31G X 6 MM MISC, USE 1 IN THE EVENING (Patient not taking: Reported on 7/30/2021), Disp: , Rfl:     rivaroxaban (XARELTO) 15 mg tablet, Take 1 tablet (15 mg total) by mouth daily with breakfast for 40 doses, Disp: , Rfl:     saccharomyces boulardii (FLORASTOR) 250 mg capsule, Take 1 capsule (250 mg total) by mouth 2 (two) times a day, Disp: , Rfl: 0    sodium chloride 1 g tablet, Take 2 tablets (2 g total) by mouth 3 (three) times a day with meals, Disp: 90 tablet, Rfl: 0    Most Recent Lab Results: hasn't been checking them lately  Has appointment with Endo soon  Lab Results   Component Value Date    WBC 6 64 07/19/2021    IRON 24 (L) 04/10/2021    TIBC 155 (L) 04/10/2021    FERRITIN 994 (H) 04/10/2021    ALT 19 07/16/2021    AST 10 07/16/2021    ALB 2 1 (L) 07/16/2021    SODIUM 131 (L) 07/19/2021    SODIUM 130 (L) 07/18/2021    K 4 4 07/19/2021    K 4 1 07/18/2021     07/19/2021    BUN 21 07/19/2021    BUN 17 07/18/2021    CREATININE 0 71 07/19/2021    CREATININE 0 69 07/18/2021    EGFR 91 07/19/2021    PHOS 3 8 05/31/2021    PHOS 3 7 05/30/2021    GLUCOSE 98 07/14/2021    POCGLU 284 (H) 07/19/2021    GLUF 308 (H) 07/09/2021    GLUF 354 (H) 07/03/2021    GLUC 103 07/19/2021    HGBA1C 9 4 (H) 04/08/2021    HGBA1C 7 9 02/12/2021    CALCIUM 7 7 (L) 07/19/2021    MG 2 0 05/31/2021       Anthropometric Measurements:   Height: 72"  Ht Readings from Last 1 Encounters:   08/03/21 5' 10" (1 778 m)     Wt Readings from Last 20 Encounters:   08/04/21 64 3 kg (141 lb 12 8 oz)   08/03/21 64 8 kg (142 lb 12 8 oz)   07/14/21 66 7 kg (147 lb)   07/12/21 67 4 kg (148 lb 9 4 oz)   07/06/21 67 1 kg (147 lb 14 9 oz)   06/28/21 67 1 kg (148 lb)   06/21/21 64 8 kg (142 lb 13 7 oz)   06/17/21 64 9 kg (143 lb)   06/14/21 64 5 kg (142 lb 3 2 oz)   06/07/21 64 3 kg (141 lb 12 1 oz)   06/01/21 68 2 kg (150 lb 5 7 oz)   05/24/21 66 7 kg (147 lb)   05/07/21 64 4 kg (142 lb)   05/05/21 64 4 kg (142 lb)   05/04/21 64 2 kg (141 lb 8 6 oz)   04/27/21 65 8 kg (145 lb 1 oz)   04/20/21 66 kg (145 lb 8 1 oz)   04/08/21 68 5 kg (151 lb)   04/06/21 68 5 kg (151 lb 0 2 oz)   03/30/21 66 2 kg (145 lb 15 1 oz)       Weight History:    Usual Weight: 170#; had been up to 230# 10 years ago while he was working     Home Scale: says his scale is not accurate   Comments: Question accuracy of wt hx d/t Feb 2021 Epic wt (pt says his coat and boots were very heavy when he got weighed on 2/23/21)      Oncology Nutrition-Anthropometrics      Nutrition from 8/6/2021 in [facility] Dietitian [facility] Nutrition from 7/21/2021 in [facility]   Patient age (years):  67 years  67 years   Patient (male) height (in):  67 in  67 in   Current weight (lbs):  141 8 lbs  147 lbs   Current weight to be used for anthropometric calculations (kg)  64 5 kg  66 8 kg   BMI:  19 2  19 9   IBW male  178 lb  178 lb   IBW (kg) male  80 9 kg  80 9 kg   IBW % (male)  79 7 %  82 6 %   Adjusted BW (male):  169 lbs  170 2 lbs   Adjusted BW in kg (male):  76 8 kg  77 4 kg   % weight change after 1 week:  --  -0 6 %   Weight change after 1 week (lbs)  --  -0 9 lbs   % weight change after 1 month:  -4 1 %  3 4 %   Weight change after 1 month (lbs)  -6 1 lbs  4 8 lbs   % weight change after 3 months:  -0 1 %  1 %   Weight change after 3 months (lbs)  -0 2 lbs  1 5 lbs        Nutrition-Focused Physical Findings: n/a due to telephone call    Food/Nutrition-Related History & Client/Social History:    Current Nutrition Impact Symptoms:  [] Nausea  [] Reduced Appetite  [] Acid Reflux    [] Vomiting  [x] Unintended Wt Loss - fluctuating slightly [] Malabsorption    [] Diarrhea  [] Unintended Wt Gain  [] Dumping Syndrome    [] Constipation  [] Thick Mucous/Secretions  [] Abdominal Pain    [] Dysgeusia (Altered Taste) - no issues reported [] Xerostomia (Dry Mouth)  [x] Gas - sometimes gas and belching , not as often- typically eats very fast and chews well   [] Dysosmia (Altered Smell)  [] Thrush  [] Difficulty Chewing    [] Oral Mucositis (Sore Mouth)  [] Fatigue  [x] Hyperglycemia - Last A1C on 4/8/21 was 9 4%   [] Odynophagia  [] Esophagitis  [x] Other: Wound on sacrum is ongoing but healing per pt   [] Dysphagia  [] Early Satiety  [] No Problems Eating      Food Allergies & Intolerances: no    Current Diet: CHO-Controlled,   Current Nutrition Intake: Unchanged from last visit  Appetite: Good  Nutrition Route: PO  Activity level: limited by weakness    24 Hr Diet Recall: patient resides at [facility] now  3 meals/day are provided to him in his room (usually has a choice between two meal options)  Will eventually be able to go out to the common dining room  Breakfast: Welsh toast, scrambled eggs, small piece of cake, milk and OJ, 1/2 orange  Snack: n/a  Lunch: usually a sandwich OR a hot meal from facility  Snack: n/a  Dinner: meatball hoagie and macaroni salad and applesauce  Snack: crackers or cookies, or milk    Beverages: water (8oz 1-2x day), juice (8oz x2 day), coffee, milk (8oz x2-3 day)  Supplements:    Glucerna Hunger Smart (10 oz, 180 kcal, 15 g pro) - hasn't been drinking since moving [address]  Will start again soon    Oncology Nutrition-Estimated Needs      Nutrition from 3/29/2021 in [place] [id number] Oncology Dietitian [facility]   Weight type used  IBW   Weight in kilograms (kg) used for estimated needs  80 9 kg   Energy needs formula:   35-40 kcal/kg   Energy needs based on 35 kcal/k kcal   Energy needs based on 40 kcal/k kcal   Protein needs formula:  1 5-2 g/kg   Protein needs based on 1 5 g/kg  121 g   Protein needs based on 2 g/kg  162 g   Fluid needs formula:  30-35 mL/kg   Fluid needs based on 30 mL/kg  2427 mL   Fluid needs in ounces  82 oz   Fluid needs based on 35 mL/kg  2832 mL   Fluid needs in ounces  96 oz           Discussion & Intervention:   Mekhi Hearn was evaluated today for an RD follow up regarding wt loss  Mekhi Hearn is currently undergoing tx for pancreatic cancer   He is starting RT next week (sim scheduled for 21)  Per patient, he is stopping chemotherapy d/t not tolerating it well  Today, Mekhi Hearn reports he's eating well with a good appetite  He recently moved into [facility] (was previously at a SNF)  He has his own room, but not kitchen or fridge  He receives 3 meals/day to his room from the facility's kitchen   He reports he has to eat in his room for ~10 days since he's a new resident  He will eventually be able to eat in the common dining area  Abdullahi Maxwell denies having any symptoms that are effecting PO intake  He has Glucerna shakes in his room, but hasn't been drinking them  Encouraged him to start drinking these twice daily between meals  Discussed other snack ideas to keep in his room as well  He plans to get a mini fridge in the near future- discussed high calorie/high protein snack ideas for when he gets this as well  His weight is down from last month, but does seem to fluctuate +/- a few pounds  Reviewed 24 hour recall, which revealed an suboptimal po intake, and discussed ways to increase kcal, protein, and fluid intakes, optimize nutrient intake and adhere to a carbohydrate controlled diet  Also reviewed the importance of wt management throughout the tx process and the role of a high kcal/ high protein diet and carbohydrate controlled diet in managing wt and overall health  Based on today's assessment, discussion included: MNT for: hx wt loss, DM, wounds and protein, a high kcal/protein diet & food choices to include at all meals & snacks (Examples of high kcal foods: cheese, full-fat dairy products, nut butter, plant-based fats, coconut oil/milk, avocado, butter, cream soup, etc  Examples of high protein foods: eggs, chicken, fish, beans/legumes, nuts/nut butters, bone broth, etc ) , a diabetic diet & food choices to include at all meals & snacks, spreading carbohydrate intake throughout the day & counting carbohydrates for adequate glycemic control, adequate hydration & fluid choices, sipping on calorie containing beverages (examples include: adding whole milk or cream to coffee, oral nutrition supplements, juice, electrolyte replacement beverages, milk, etc ), having consistent and planned snacks between meals, eating when feeling most hungry and increasing  oral nutrition supplements     Moving forward, Abdullahi Maxwell was encouraged to increase kcal and protein intakes  Materials Provided: not applicable  All questions and concerns addressed during todays visit  Chelsie Keith has RD contact information  Nutrition Diagnosis:    Increased Nutrient Needs (kcal & pro) related to increased demand for nutrients and disease state as evidenced by cancer dx and pt undergoing tx for cancer   Altered Nutrition Related Laboratory Values related to endocrine and pancreatic dysfunction as evidenced by hyperglycemia  Monitoring & Evaluation:   Goals:  · weight maintenance/stabilization  · adequate nutrition impact symptom management  · pt to meet >/=75% estimated nutrition needs daily  · adequate glycemic control    · Progress Towards Goals: Progressing    Nutrition Rx & Recommendations:  · Diet: Diabetic, High Calorie & High Protein Diet  · Eat slowly and chew food thoroughly before swallowing  · Small, frequent meals/snacks may be easier to tolerate than 3 large daily meals  Aim for 5-6 small meals per day (every 2-3 hours)  · Include protein at all meals/snacks  · Stay hydrated by sipping fluids of choice/tolerance throughout the day  · Liquid nutrition may be better tolerated than solids at times  · Alter food choices and eating patterns to accommodate changing needs  · Refer to Eating Hints book for other meal/snack ideas and symptom management    · For weight loss: monitor your weight at home at least 2x/week, record your weight, start by adding 250-500 extra calories per day, eat 5-6 small scheduled meals every 2-3 hrs, choose foods that are high in protein and calories (see pages 49-53 in your Eating Hints book), drink liquids with calories for example: milkshakes/smoothies/juice/soup/whole milk/chocolate milk, cook with protein fortified milk (see recipe on page 36 in your Eating Hints book), consider ready-to-drink oral nutrition supplements such as Ensure Plus, Ensure Enlive, Boost Plus, or Boost Very High Calorie, avoid "diet" and "light" foods when possible, avoid drinking too much with meals, contact your dietitian with any continued weight loss over the course of 1 week  For more info see pages 35-37 in your Eating Hints book  · Weigh yourself regularly  If you notice weight loss, make an effort to increase your daily food/calorie intake  If you continue to notice loss after these efforts, reach out to your dietitian to establish a plan to stabilize weight  · Avoid gas-producing foods such as broccoli, cabbage, sauerkraut, [place] sprouts, cauliflower, corn, cucumbers, onions, peppers, beans, peas, lentils, apples, apple juice, avocado, and melons  Carbonated drinks, chewing gum, and drinking through a straw can also cause gas  Limiting lactose may help for those who are sensitive to milk products  · Nutrition Supplements: Increase Glucerna to 2 per day (with AM and afternoon snacks)  · Ask for [place] Yogurt to be sent with breakfast trays     · Snack ideas to keep in room: peanut butter, crackers, nuts  · Snack ideas to keep in room once mini fridge is installed: cheese, greek yogurt, hard boiled eggs, milk/Fairlife Milk    Follow Up Plan: phone follow up  8/20/21 at 10 AM  Recommend Referral to Other Providers: none at this time

## 2021-08-09 NOTE — TELEPHONE ENCOUNTER
Pt has infusion 8-23-21 and an appt to follow with Dr Sarah Lancaster the same day   He needs to change the time, call back # [phone] [facility]

## 2021-08-10 NOTE — TELEPHONE ENCOUNTER
Left message for patient to remind him of his appointment on 8/17 with Dr Wei Mart  There are labs to be done for the appointment

## 2021-08-11 NOTE — TELEPHONE ENCOUNTER
Call from Nora from [address]  Nora had discussed this patient with Dr Camila Quesada RN  Was expecting a call back regarding [facility] ability to service patient while residing at [address] [place] [address] Rd    Will send to Dr Arnaldo Levi' s RN    Nora aware of plan

## 2021-08-12 NOTE — TELEPHONE ENCOUNTER
I called patient and left him a message  I need to find out which facility he is in and make sure he has transportation  I discussed with Dr Evelyne Rg and we will be switching him from 5FU to Gemzar  I sent a message to infusion to accommodate the change in time  I called [facility] and advised that he will no longer be getting a pump  Waiting to hear back from patient in regards to living arrangements and change in treatment as he will need to be consented

## 2021-08-12 NOTE — TELEPHONE ENCOUNTER
I called brother and left him a message in regards to below  Patient has had Gemzar previously so consent will not be needed

## 2021-08-16 NOTE — TELEPHONE ENCOUNTER
----- Message from Tran Kyle MD sent at 8/11/2021  5:22 PM EDT -----  Regarding: RE: Chemotherapy    We can give gemcitabine 400 milligrams/meter square weekly while getting radiation   ----- Message -----  From: Anastasia Borja RN  Sent: 8/11/2021   1:47 PM EDT  To: Tran Kyle MD  Subject: Chemotherapy                                     Patient is no longer living at home  He is living in an assisted living facility  [facility] is not able to supply a pump so this would need to come from [facility] [employer] is an independent company and is not able to service patients in a living facility and the assisted living facility will not allow an outside company to come in and care for the patient  The patient is not going to be able to have a cadd pump either while at the facility  I am not sure what your plan would now be for him during his radiation  I suppose we could over oral xeloda since he is at a facility who would be administering that but I also dont know what their ability would be to dispense oral chemotherapy  Please advise  Patient is due to start treatment on Monday

## 2021-08-16 NOTE — PROGRESS NOTES
Pt  Received Gemzar today without incident  Confirmed next appt  AVS printed and given to pt  Pt  States he will go to the [facility] in [facility] for peripheral labs on Friday 8/20/21

## 2021-08-17 PROBLEM — Z66 PHYSICIAN ORDERS FOR LIFE-SUSTAINING TREATMENT (POLST) FORM INDICATES PATIENT WISH FOR DO-NOT-RESUSCITATE STATUS: Status: ACTIVE | Noted: 2021-01-01

## 2021-08-17 NOTE — PROGRESS NOTES
Record of POLST discussion     I completed a POLST form with the patient and/or the patient's designated decision-maker  The pt is competent for medical decisions today  After discussing the problems addressed during this hospitalization, and prior to this encounter, the following limits and goals were set:    Part A)  do not Attempt Resuscitation     Part B)  Limited interventions    Part C)  Yes, to antibiotics    Part D)  NA    Additional goals)  NA     Contacts)   - NA will act as primary contact and decision-maker for this patient  Counseling and Coordination of care   I spent 30 total minutes with the patient today, more than 50% of which was spent in counseling, coordination of care, and other face-to-face interactions and cares on the floor  Counseled patient/family regarding goals and means as recorded above       Debbie Madison,

## 2021-08-17 NOTE — PROGRESS NOTES
Palliative Supportive Care  met with patient and his cousin's wife, Carlos Watkins,  to continue to provide emotional support and guidance  Updated biopsychosocial information relevant to support: Pt presents today in good spirits  He reports that he just had his first radiation and chemotherapy treatments yesterday, and so far he is doing well  He is hopeful that he continues to feel well on this regimen  He spoke about [employer] and how he is displeased because they do not follow his diet regarding his diabetes  He is glad that he is aware of what he can have and has been asking for food without sugar  Unfortunately they do not offer a lot of sugar free foods, stating that they "are out " I encouraged him to have family bring in the foods he enjoys and praised him for being so aware of his diet and compliant  He recalled the recent loss of his girlfriend and shared how they first met  We expressed our condolences and provided emotional support  During his visit today a POLST form was completed  Pt was encouraged to call if he experiences any symptoms from his treatments, otherwise he will return in 1 month     Identified areas of need include: Continued Support  Resources provided: POLST form  Areas that need future follow-up include: Continue to provide support

## 2021-08-17 NOTE — PATIENT INSTRUCTIONS
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  On salt tabs 1g three times daily   -sNa 131-132 when corrected for hyperglycemia as of 8/16/21, relatively stable since hospital d/c  -high urine osm, urine Cl, urine Na during hospitalization support SIADH  -low serum uric acid supports SIADH  -continue 1 8L/day fluid restriction  -continue taking salt tabs to 2g TID  -f/u BMP  2  Pancreatic adenocarcinoma - on gemcitabine  3  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  4  Diabetes mellitus type 2, uncontrolled - last A1C 9 4 as of April 2021, management per primary team, needs improved sugar control  5  Anemia of chronic disease - Hgb improved to 9 9, monitor CBC, no GALI d/t malignancy  Last iron panel as of April 2021 shows low iron at 24, high ferritin, low iron sat of 15%, low TIBC of 155    6  HTN - BP well controlled today, ACEi still on hold  7  Hypoalbuminemia - alb 2 8, encourage protein intake  8  Elevated sCr possibly GWEN - resolved    RTC in 3 months  Obtain bloodwork in Sept 2021

## 2021-08-17 NOTE — PROGRESS NOTES
Palliative and Supportive Care   Uziel Bedoya 67 y o  male [id number]    Assessment/Plan:  1  Adenocarcinoma of pancreas ([facility] [address] )    2  Severe protein-calorie malnutrition ([facility] [address] )    3  Goals of care, counseling/discussion    4  DNR (do not resuscitate)    5  Idiopathic peripheral neuropathy    6  Physician orders for life-sustaining treatment (POLST) form indicates patient wish for do-not-resuscitate status      Requested Prescriptions      No prescriptions requested or ordered in this encounter     Medications Discontinued During This Encounter   Medication Reason    melatonin 3 mg        PLAN:  -POLST completed today  -RTC in 4 weeks    ACP: yes, completed today    Representatives have queried the patient's controlled substance dispensing history in the Prescription Drug Monitoring Program in compliance with regulations before I have prescribed any controlled substances  The prescription history is consistent with prescribed therapy and our practice policies  No follow-ups on file  Subjective: In attendance at this visit: Uziel Bedoya and his cousin's wife, Jefferson Martines  Chief Complaint  Follow up visit for:  symptom management, pain, neoplasm related, assessment of goals of care, advance care planning, emotional support  HPI    Uziel Bedoya is a 67 y o  male with pancreatic adenoCA (Dx in 3/10/2021)  He is currently on single agent chemotherapy/gemcitabine (abraxane was d/c'ed because of intolerance/poor performance status) with plans for possible tumor resection afterwards  He sees Dr Lizzy Paniagua and was last seen by oncology on 7/26 where his CA 19-9 went down after initiation on chemo  Patient saw Dr Veronica Antoine with surgical oncology, and plan to proceed with chemoradiation  Patient has started radiation and currently tolerating it well  He remains frustrated at his facility and wishes he were strong enough to return home  He has no complaints at this time  Discussed POLST form and he is agreeable to completing this today  The following portions of the medical history were reviewed: past medical history, problem list, medication list, and social history      Current Outpatient Medications:     Accu-Chek FastClix Lancets MISC, USE 1 TO CHECK GLUCOSE TWICE DAILY TO THREE TIMES DAILY, Disp: , Rfl:     Accu-Chek Guide test strip, USE 1 STRIP TWICE DAILY TO THREE TIMES DAILY, Disp: , Rfl:     acetaminophen (TYLENOL) 325 mg tablet, Take 650 mg by mouth every 4 (four) hours as needed for mild pain , Disp: , Rfl:     atorvastatin (LIPITOR) 40 mg tablet, Take 40 mg by mouth daily, Disp: , Rfl:     Blood Glucose Monitoring Suppl (Accu-Chek Guide) w/Device KIT, USE TO CHECK GLUCOSE TWO TO THREE TIMES DAILY, Disp: , Rfl:     escitalopram (LEXAPRO) 5 mg tablet, Take 5 mg by mouth daily, Disp: , Rfl:     fluorouracil 1,830 mg in CADD infusion pump, Infuse 1,830 mg (200 mg/m2/day x 1 83 m2 (Treatment Plan Recorded BSA)) into a venous catheter over 120 hours for 5 days Andrews flush ABIGAIL kit, Disp: 1 each, Rfl: 5    insulin glargine (Lantus SoloStar) 100 units/mL injection pen, Inject 10 units at bedtime, Disp: 15 mL, Rfl: 0    insulin lispro (HumaLOG) 100 units/mL injection, Inject 5 Units under the skin 3 (three) times a day with meals, Disp: , Rfl: 0    lisinopril (ZESTRIL) 40 mg tablet, Take 40 mg by mouth daily, Disp: , Rfl:     metFORMIN (GLUCOPHAGE) 850 mg tablet, TAKE 1 TABLET BY MOUTH TWICE DAILY TAKE WITH MORNING AND EVENING MEAL, Disp: , Rfl:     NovoLOG FlexPen 100 units/mL injection pen, Inject as per sliding scale , (  Scale - 150-200 -1 units, 201-250-2 units, 251-300 -3 units, 301-350-4 units, > 350- 5  units) with meals, Disp: 15 mL, Rfl:     prochlorperazine (COMPAZINE) 10 mg tablet, Take 1 tablet (10 mg total) by mouth every 6 (six) hours as needed for nausea or vomiting, Disp: 45 tablet, Rfl: 3    ramipril (ALTACE) 10 MG capsule, Take 10 mg by mouth daily  (Patient not taking: Reported on 7/30/2021), Disp: , Rfl:    ReliOn Pen Needles 31G X 6 MM MISC, USE 1 IN THE EVENING (Patient not taking: Reported on 7/30/2021), Disp: , Rfl:     rivaroxaban (XARELTO) 15 mg tablet, Take 1 tablet (15 mg total) by mouth daily with breakfast for 40 doses, Disp: , Rfl:     saccharomyces boulardii (FLORASTOR) 250 mg capsule, Take 1 capsule (250 mg total) by mouth 2 (two) times a day (Patient not taking: Reported on 8/6/2021), Disp: , Rfl: 0    sodium chloride 1 g tablet, Take 2 tablets (2 g total) by mouth 3 (three) times a day with meals, Disp: 90 tablet, Rfl: 0  No current facility-administered medications for this visit  Review of Systems   Constitutional: Positive for activity change  Gastrointestinal: Negative for diarrhea, nausea and vomiting  Skin: Positive for pallor  Psychiatric/Behavioral: The patient is not nervous/anxious  All other systems reviewed and are negative  All other systems negative    Objective:  Vital Signs  /64 (BP Location: Left arm, Patient Position: Sitting, Cuff Size: Standard)   Pulse 82   Temp (!) 97 1 °F (36 2 °C) (Temporal)   Resp 16   Ht 5' 10 47" (1 79 m)   Wt 63 2 kg (139 lb 6 4 oz)   SpO2 98%   BMI 19 74 kg/m²    Physical Exam    Constitutional: Appears chronically ill   In no acute physical or emotional distress  Head: Normocephalic and atraumatic  Eyes: EOM are normal  No ocular discharge  No scleral icterus  Neck: No visible adenopathy or masses  Cardiovascular: Regular rate and rhythm, palpable distal pulses   Respiratory: Effort normal  No stridor  No respiratory distress  Gastrointestinal: No abdominal distension  Abdomen is soft  Musculoskeletal: No edema  Neurological: Alert, oriented and appropriately conversant  Skin: No diaphoresis, dry and warm to touch, no rashes seen on exposed areas of skin  Psychiatric: Displays a normal mood and affect   Behavior, judgement and thought content appear normal

## 2021-08-17 NOTE — PATIENT INSTRUCTIONS
POLST completed and sent home with Mr Jeoffrey Kehr  Call palliative with any concerns   [phone]

## 2021-08-20 NOTE — PROGRESS NOTES
Outpatient Oncology Nutrition Consultation   Type of Consult: Follow Up  Care Location: Telephone Call   Nutrition Assessment:   Oncology Diagnosis & Treatments: Adenocarcinoma of the pancreas  · Currently undergoing neoadjuvant chemotherapy (Gemzar) which started 3/23/21  Dose reduced 4/2021 and 5/24/21 due to overall performance status, and complications that resulted in hospitalization  Abraxane discontinued 5/7/21  · Not a surgical candidate  · Hospitalized 7/14/21-7/19/21 for weakness  Was discharged to [facility] at discharge  [place] [address]   · Started RT 8/16/21  Oncology History   Adenocarcinoma of pancreas ([facility] )   2/2021 Initial Diagnosis    Adenocarcinoma of pancreas ([facility] )     2/23/2021 Biopsy    Pancreas, head mass:   Malignant (PSC Category VI) - Adenocarcinoma          3/23/2021 - 7/12/2021 Chemotherapy    pegfilgrastim (NEULASTA ONPRO), 6 mg, Subcutaneous, Once, 3 of 9 cycles  Administration: 6 mg (6/21/2021)  paclitaxel protein-bound (ABRAXANE) IVPB, 125 mg/m2 = 235 mg, Intravenous, Once, 2 of 2 cycles  Dose modification: 100 mg/m2 (original dose 125 mg/m2, Cycle 2, Reason: Dose Not Tolerated)  Administration: 235 mg (3/23/2021), 235 mg (3/30/2021), 235 mg (4/6/2021), 188 mg (4/20/2021), 188 mg (5/4/2021), 188 mg (4/27/2021)  gemcitabine (GEMZAR) infusion, 1,879 8 mg, Intravenous, Once, 4 of 10 cycles  Dose modification: 800 mg/m2 (original dose 1,000 mg/m2, Cycle 2, Reason: Dose Not Tolerated)  Administration: 1,800 mg (3/23/2021), 1,800 mg (3/30/2021), 1,800 mg (4/6/2021), 1,504 2 mg (4/20/2021), 1,504 2 mg (5/4/2021), 1,504 2 mg (4/27/2021), 1,504 2 mg (6/7/2021), 1,504 2 mg (6/14/2021), 1,504 2 mg (6/21/2021), 1,504 2 mg (7/6/2021), 1,504 2 mg (7/12/2021)     8/16/2021 - 8/16/2021 Chemotherapy    fluorouracil (ADRUCIL) ambulatory infusion Soln, 200 mg/m2/day = 1,830 mg (88 9 % of original dose 225 mg/m2/day), Intravenous, Over 120 hours, 0 of 6 cycles  Dose modification: 200 mg/m2/day (original dose 225 mg/m2/day, Cycle 1, Reason: Anticipated Tolerance)     8/16/2021 -  Chemotherapy    gemcitabine (GEMZAR) infusion, 400 mg/m2 = 720 2 mg (40 % of original dose 1,000 mg/m2), Intravenous, Once, 1 of 2 cycles  Dose modification: 400 mg/m2 (original dose 1,000 mg/m2, Cycle 1, Reason: Other (Must fill in a comment))       Past Medical & Surgical Hx:   Patient Active Problem List   Diagnosis    Hyperlipidemia    Essential hypertension    Type 2 diabetes mellitus with hyperglycemia, with long-term current use of insulin (HCC)    Gastroesophageal reflux disease    Pancreatic cyst    [facility]en[facility] pancreas [facility] )    Peripheral neuropathy    Bilateral cellulitis of lower leg    Hyponatremia    Severe protein-calorie malnutrition (HCC)    Anemia    Sacral wound    Leukocytosis    Generalized weakness    Streptococcal bacteremia    Mesenteric venous thrombosis (HCC)    [facility]    Goals of care, counseling/discussion    DNR (do not resuscitate)    Physician orders for life-sustaining treatment (POLST) form indicates patient wish for do-not-resuscitate status     Past Medical History:   Diagnosis Date    [facility]     pancreatic    Diabetes mellitus ([facility] )     Frequent urination     GERD (gastroesophageal reflux disease)     Hyperlipidemia     Hypertension     Pancreatic cancer ([facility] )     Peripheral neuropathy     Weakness     Weight loss      Past Surgical History:   Procedure Laterality Date    APPENDECTOMY      CATARACT EXTRACTION      COLONOSCOPY      SKIN LESION EXCISION      of a wart on right arm    TUNNELED VENOUS PORT PLACEMENT N/A 3/26/2021    Procedure: INSERTION VENOUS PORT (PORT-A-CATH);   Surgeon: Freddy Patterson MD;  Location:  MAIN OR;  Service: Surgical Oncology       Review of Medications:   Vitamins, Supplements and Herbals: Med List Reviewed & pt is only taking: Salt Tabs (SIADH)    Current Outpatient Medications:     Accu-Chek FastClix Lancets MISC, USE 1 TO CHECK GLUCOSE TWICE DAILY TO THREE TIMES DAILY, Disp: , Rfl:     Accu-Chek Guide test strip, USE 1 STRIP TWICE DAILY TO THREE TIMES DAILY, Disp: , Rfl:     acetaminophen (TYLENOL) 325 mg tablet, Take 650 mg by mouth every 4 (four) hours as needed for mild pain , Disp: , Rfl:     atorvastatin (LIPITOR) 40 mg tablet, Take 40 mg by mouth daily, Disp: , Rfl:     Blood Glucose Monitoring Suppl (Accu-Chek Guide) w/Device KIT, USE TO CHECK GLUCOSE TWO TO THREE TIMES DAILY, Disp: , Rfl:     escitalopram (LEXAPRO) 5 mg tablet, Take 5 mg by mouth daily, Disp: , Rfl:     fluorouracil 1,830 mg in CADD infusion pump, Infuse 1,830 mg (200 mg/m2/day x 1 83 m2 (Treatment Plan Recorded BSA)) into a venous catheter over 120 hours for 5 days Hume flush ABIGAIL kit, Disp: 1 each, Rfl: 5    insulin glargine (Lantus SoloStar) 100 units/mL injection pen, Inject 10 units at bedtime, Disp: 15 mL, Rfl: 0    insulin lispro (HumaLOG) 100 units/mL injection, Inject 5 Units under the skin 3 (three) times a day with meals, Disp: , Rfl: 0    lisinopril (ZESTRIL) 40 mg tablet, Take 40 mg by mouth daily, Disp: , Rfl:     metFORMIN (GLUCOPHAGE) 850 mg tablet, TAKE 1 TABLET BY MOUTH TWICE DAILY TAKE WITH MORNING AND EVENING MEAL, Disp: , Rfl:     NovoLOG FlexPen 100 units/mL injection pen, Inject as per sliding scale , (  Scale - 150-200 -1 units, 201-250-2 units, 251-300 -3 units, 301-350-4 units, > 350- 5  units) with meals, Disp: 15 mL, Rfl:     prochlorperazine (COMPAZINE) 10 mg tablet, Take 1 tablet (10 mg total) by mouth every 6 (six) hours as needed for nausea or vomiting, Disp: 45 tablet, Rfl: 3    ramipril (ALTACE) 10 MG capsule, Take 10 mg by mouth daily  (Patient not taking: Reported on 7/30/2021), Disp: , Rfl:     ReliOn Pen Needles 31G X 6 MM MISC, USE 1 IN THE EVENING (Patient not taking: Reported on 7/30/2021), Disp: , Rfl:     rivaroxaban (XARELTO) 15 mg tablet, Take 1 tablet (15 mg total) by mouth daily with breakfast for 40 doses, Disp: , Rfl:     saccharomyces boulardii (FLORASTOR) 250 mg capsule, Take 1 capsule (250 mg total) by mouth 2 (two) times a day (Patient not taking: Reported on 8/6/2021), Disp: , Rfl: 0    sodium chloride 1 g tablet, Take 2 tablets (2 g total) by mouth 3 (three) times a day with meals, Disp: 90 tablet, Rfl: 0    Most Recent Lab Results: checks BG 4x/day  BG have been elevated- per patient, highest has been 517  On insulin   Recently saw endocrinology   Lab Results   Component Value Date    WBC 13 51 (H) 08/16/2021    IRON 24 (L) 04/10/2021    TIBC 155 (L) 04/10/2021    FERRITIN 994 (H) 04/10/2021    ALT 29 08/16/2021    AST 12 08/16/2021    ALB 2 8 (L) 08/16/2021    SODIUM 130 (L) 08/16/2021    SODIUM 131 (L) 07/19/2021    K 4 6 08/16/2021    K 4 4 07/19/2021    CL 99 (L) 08/16/2021    BUN 28 (H) 08/16/2021    BUN 21 07/19/2021    CREATININE 0 87 08/16/2021    CREATININE 0 71 07/19/2021    EGFR 84 08/16/2021    PHOS 3 8 05/31/2021    PHOS 3 7 05/30/2021    GLUCOSE 98 07/14/2021    POCGLU 284 (H) 07/19/2021    GLUF 308 (H) 07/09/2021    GLUF 354 (H) 07/03/2021    GLUC 268 (H) 08/16/2021    HGBA1C 9 4 (H) 04/08/2021    HGBA1C 7 9 02/12/2021    CALCIUM 8 6 08/16/2021    MG 2 0 05/31/2021       Anthropometric Measurements:   Height: 72"  Ht Readings from Last 1 Encounters:   08/17/21 5' 10 47" (1 79 m)     Wt Readings from Last 20 Encounters:   08/17/21 63 2 kg (139 lb 6 4 oz)   08/16/21 63 kg (138 lb 14 2 oz)   08/04/21 64 3 kg (141 lb 12 8 oz)   08/03/21 64 8 kg (142 lb 12 8 oz)   07/14/21 66 7 kg (147 lb)   07/12/21 67 4 kg (148 lb 9 4 oz)   07/06/21 67 1 kg (147 lb 14 9 oz)   06/28/21 67 1 kg (148 lb)   06/21/21 64 8 kg (142 lb 13 7 oz)   06/17/21 64 9 kg (143 lb)   06/14/21 64 5 kg (142 lb 3 2 oz)   06/07/21 64 3 kg (141 lb 12 1 oz)   06/01/21 68 2 kg (150 lb 5 7 oz)   05/24/21 66 7 kg (147 lb)   05/07/21 64 4 kg (142 lb)   05/05/21 64 4 kg (142 lb)   05/04/21 64 2 kg (141 lb 8 6 oz)   04/27/21 65 8 kg (145 lb 1 oz)   04/20/21 66 kg (145 lb 8 1 oz)   04/08/21 68 5 kg (151 lb)       Weight History:    Usual Weight: 170#; had been up to 230# 10 years ago while he was working   Home Scale: says his scale is not accurate   Varian: (8/16/21) 136 8#   Comments: Question accuracy of wt hx d/t Feb 2021 Epic wt (pt says his coat and boots were very heavy when he got weighed on 2/23/21)      Oncology Nutrition-Anthropometrics      Nutrition from 8/20/2021 in [address] [place] [address] Dietitian [facility] Nutrition from 8/6/2021 in [place] [address] Oncology Dietitian [facility]   Patient age (years):  67 years  67 years   Patient (male) height (in):  67 in  67 in   Current weight (lbs):  139 4 lbs  141 8 lbs   Current weight to be used for anthropometric calculations (kg)  63 4 kg  64 5 kg   BMI:  18 9  19 2   IBW male  178 lb  178 lb   IBW (kg) male  80 9 kg  80 9 kg   IBW % (male)  78 3 %  79 7 %   Adjusted BW (male):  168 4 lbs  169 lbs   Adjusted BW in kg (male):  76 5 kg  76 8 kg   % weight change after 1 month:  (!) -5 2 %  -4 1 %   Weight change after 1 month (lbs)  -7 6 lbs  -6 1 lbs   % weight change after 3 months:  -5 2 %  -0 1 %   Weight change after 3 months (lbs)  -7 6 lbs  -0 2 lbs        Nutrition-Focused Physical Findings: n/a due to telephone call    Food/Nutrition-Related History & Client/Social History:    Current Nutrition Impact Symptoms:  [] Nausea  [x] Reduced Appetite -decreased since starting RT [] Acid Reflux    [] Vomiting  [x] Unintended Wt Loss  [] Malabsorption    [] Diarrhea  [] Unintended Wt Gain  [] Dumping Syndrome    [] Constipation  [] Thick Mucous/Secretions  [] Abdominal Pain    [x] Dysgeusia (Altered Taste) - since starting RT [x] Xerostomia (Dry Mouth) -d/t not drinking anything before RT [x] Gas - sometimes gas and belching , not as often- typically eats very fast and chews well   [] Dysosmia (Altered Smell)  [] Thrush  [] Difficulty Chewing    [] Oral Mucositis (Sore Mouth)  [x] Fatigue  [x] Hyperglycemia - Last A1C on 21 was 9 4%   [] Odynophagia  [] Esophagitis  [x] Other: Wound on sacrum is ongoing but healing per pt   [] Dysphagia  [] Early Satiety  [] No Problems Eating      Food Allergies & Intolerances: no    Current Diet: CHO-Controlled,   Current Nutrition Intake: Decreased since last visit  Appetite: Poor  Nutrition Route: PO   Oral Care: brushes once in a while  Activity level: limited by weakness    24 Hr Diet Recall: patient resides at [facility] now  3 meals/day provided  Breakfast:1 scrambled egg, 1 piece toast, and yogurt OR oatmeal with fruit and OJ  Snack: n/a  Lunch: usually a sandwich OR a hot meal from facility  Snack: n/a  Dinner: didn't eat a large dinner yesterday because he had a later lunch  Had a ham and cheese sandwich but didn't finish it  Snack: crackers or cookies, or milk     Beverages: water (8oz 1-2x day), juice (8oz x2 day), coffee, milk (8oz x2-3 day)  Supplements:    Glucerna Hunger Smart (10 oz, 180 kcal, 15 g pro) once in a while    Oncology Nutrition-Estimated Needs      Nutrition from 3/29/2021 in [address] [place] [address] Dietitian [facility]   Weight type used  IBW   Weight in kilograms (kg) used for estimated needs  80 9 kg   Energy needs formula:   35-40 kcal/kg   Energy needs based on 35 kcal/k kcal   Energy needs based on 40 kcal/k kcal   Protein needs formula:  1 5-2 g/kg   Protein needs based on 1 5 g/kg  121 g   Protein needs based on 2 g/kg  162 g   Fluid needs formula:  30-35 mL/kg   Fluid needs based on 30 mL/kg  2427 mL   Fluid needs in ounces  82 oz   Fluid needs based on 35 mL/kg  2832 mL   Fluid needs in ounces  96 oz           Discussion & Intervention:   Andres Urbina was evaluated today for an RD follow up regarding wt loss  Andres Urbina is currently undergoing tx for pancreatic cancer   He recently started RT   He reports decreased appetite, dysgeusia, and dry mouth since starting  He also reports he has to be NPO prior to RT, so he is eating less throughout the day  He is still residing at [place] [address], and he is provided with 3 meals/day  He states they do not give him diabetic-friendly foods, and all the food is "loaded with sugar"  He does report checking his BG often, and states they are always high  He recently saw endocrinology  He also reports he plans to meet with someone in the kitchen to discuss diabetic diet/food choices  Roseann Cervantes does not have a mini fridge in his room yet, so he's unable to keep too many snacks  We did discuss again some snacks that are not perishable  He drinks Glucerna sometimes, but prefers this cold- discussed getting a cup of ice from the kitchen to pour Glucerna into and encouraged to consume 2-3 per day  He does have significant weight loss over the last month  Reviewed 24 hour recall, which revealed an suboptimal po intake, and discussed ways to increase kcal, protein, and fluid intakes, optimize nutrient intake and adhere to a carbohydrate controlled diet  Also reviewed the importance of wt management throughout the tx process and the role of a high kcal/ high protein diet and carbohydrate controlled diet in managing wt and overall health    Based on today's assessment, discussion included: MNT for: hx wt loss, DM, wounds and protein, dysgeusia, dry mouth, practicing proper oral care, a high kcal/protein diet & food choices to include at all meals & snacks (Examples of high kcal foods: cheese, full-fat dairy products, nut butter, plant-based fats, coconut oil/milk, avocado, butter, cream soup, etc  Examples of high protein foods: eggs, chicken, fish, beans/legumes, nuts/nut butters, bone broth, etc ) , a diabetic diet & food choices to include at all meals & snacks, spreading carbohydrate intake throughout the day & counting carbohydrates for adequate glycemic control, adequate hydration & fluid choices, sipping on calorie containing beverages (examples include: adding whole milk or cream to coffee, oral nutrition supplements, juice, electrolyte replacement beverages, milk, etc ), having consistent and planned snacks between meals, eating when feeling most hungry and increasing  oral nutrition supplements  Moving forward, Luan Leger was encouraged to increase kcal and protein intakes  Materials Provided: not applicable  All questions and concerns addressed during todays visit  Luan Leger has RD contact information  Nutrition Diagnosis:    Increased Nutrient Needs (kcal & pro) related to increased demand for nutrients and disease state as evidenced by cancer dx and pt undergoing tx for cancer   Altered Nutrition Related Laboratory Values related to endocrine and pancreatic dysfunction as evidenced by hyperglycemia   Patient has clinical indicators (or ASPEN criteria) consistent with moderate protein-calorie malnutrition in the context of Acute Illness or Injury as evidenced by >5% wt loss in 1 month and </=75% energy intake vs  Estimated needs for >/=1 month  Monitoring & Evaluation:   Goals:  · weight maintenance/stabilization  · adequate nutrition impact symptom management  · pt to meet >/=75% estimated nutrition needs daily  · adequate glycemic control    · Progress Towards Goals: Progressing    Nutrition Rx & Recommendations:  · Diet: Diabetic, High Calorie & High Protein Diet  · Eat slowly and chew food thoroughly before swallowing  · Small, frequent meals/snacks may be easier to tolerate than 3 large daily meals  Aim for 5-6 small meals per day (every 2-3 hours)  · Include protein at all meals/snacks  · Stay hydrated by sipping fluids of choice/tolerance throughout the day  · Liquid nutrition may be better tolerated than solids at times  · Alter food choices and eating patterns to accommodate changing needs  · Refer to Eating Hints book for other meal/snack ideas and symptom management    · Follow proper oral care; Try baking soda/salt water rinse recipe (mix 3/4 tsp salt + 1 tsp baking soda + 1 qt water; rinse with plain water after using) in Eating Hints book (pg 18)  Brush your teeth before/after meals & before bed  · For lack of taste: Practice good oral care  Blend fresh fruits into shakes, ice cream or yogurt  Eat frozen fruits: grapes, chopped cantaloupe, watermelon  Select fresh vegetables (may be more appealing than canned/frozen)  Add extra flavor to foods: experiment with spices, salt & sweeteners, extra oil/butter; marinate meats (see pages 29-30 in your Eating Hints book)  · For weight loss: monitor your weight at home at least 2x/week, record your weight, start by adding 250-500 extra calories per day, eat 5-6 small scheduled meals every 2-3 hrs, choose foods that are high in protein and calories (see pages 49-53 in your Eating Hints book), drink liquids with calories for example: milkshakes/smoothies/juice/soup/whole milk/chocolate milk, cook with protein fortified milk (see recipe on page 36 in your Eating Hints book), consider ready-to-drink oral nutrition supplements such as Ensure Plus, Ensure Enlive, Boost Plus, or Boost Very High Calorie, avoid "diet" and "light" foods when possible, avoid drinking too much with meals, contact your dietitian with any continued weight loss over the course of 1 week  For more info see pages 35-37 in your Eating Hints book  · For dry mouth: sip water throughout the day; try very sweet drinks; chew gum or suck on hard candy, popsicles, & ice chips; eat easy to swallow foods (such as pureed cooked foods or soups); moisten food with sauce/gravy/salad dressing; do not drink beer, wine, or any type of alcohol; keep lips moist with lip balm; avoid tobacco products & second-hand smoke; try Biotene as needed (see pages 17-18 in your Eating Hints book)    Follow proper oral care; Try baking soda/salt water rinse recipe (mix 3/4 tsp salt + 1 tsp baking soda + 1 qt water; rinse with pain water after using) in Eating Hints book (pg 18)  Brush your teeth before/after meals & before bed  · Weigh yourself regularly  If you notice weight loss, make an effort to increase your daily food/calorie intake  If you continue to notice loss after these efforts, reach out to your dietitian to establish a plan to stabilize weight  · Avoid gas-producing foods such as broccoli, cabbage, sauerkraut, [place] sprouts, cauliflower, corn, cucumbers, onions, peppers, beans, peas, lentils, apples, apple juice, avocado, and melons  Carbonated drinks, chewing gum, and drinking through a straw can also cause gas  Limiting lactose may help for those who are sensitive to milk products  · Nutrition Supplements: Increase Glucerna to 2-3 per day (with AM and afternoon snacks, and nighttime snack)  · Ask for [place] Yogurt to be sent with breakfast trays  · Snack ideas to keep in room: peanut butter, crackers, nuts   Encouraged to ask kitchen for a cup of ice to pour Glucerna into as he prefers it cold   · Snack ideas to keep in room once mini fridge is installed: cheese, greek yogurt, hard boiled eggs, milk/Fairlife Milk    Follow Up Plan: phone follow up  9/3/21 at 1:30 STEVEN  Recommend Referral to Other Providers: none at this time

## 2021-08-20 NOTE — PATIENT INSTRUCTIONS
Nutrition Rx & Recommendations:  · Diet: Diabetic, High Calorie & High Protein Diet  · Eat slowly and chew food thoroughly before swallowing  · Small, frequent meals/snacks may be easier to tolerate than 3 large daily meals  Aim for 5-6 small meals per day (every 2-3 hours)  · Include protein at all meals/snacks  · Stay hydrated by sipping fluids of choice/tolerance throughout the day  · Liquid nutrition may be better tolerated than solids at times  · Alter food choices and eating patterns to accommodate changing needs  · Refer to Eating Hints book for other meal/snack ideas and symptom management  · Follow proper oral care; Try baking soda/salt water rinse recipe (mix 3/4 tsp salt + 1 tsp baking soda + 1 qt water; rinse with plain water after using) in Eating Hints book (pg 18)  Brush your teeth before/after meals & before bed  · For lack of taste: Practice good oral care  Blend fresh fruits into shakes, ice cream or yogurt  Eat frozen fruits: grapes, chopped cantaloupe, watermelon  Select fresh vegetables (may be more appealing than canned/frozen)  Add extra flavor to foods: experiment with spices, salt & sweeteners, extra oil/butter; marinate meats (see pages 29-30 in your Eating Hints book)    · For weight loss: monitor your weight at home at least 2x/week, record your weight, start by adding 250-500 extra calories per day, eat 5-6 small scheduled meals every 2-3 hrs, choose foods that are high in protein and calories (see pages 49-53 in your Eating Hints book), drink liquids with calories for example: milkshakes/smoothies/juice/soup/whole milk/chocolate milk, cook with protein fortified milk (see recipe on page 36 in your Eating Hints book), consider ready-to-drink oral nutrition supplements such as Ensure Plus, Ensure Enlive, Boost Plus, or Boost Very High Calorie, avoid "diet" and "light" foods when possible, avoid drinking too much with meals, contact your dietitian with any continued weight loss over the course of 1 week  For more info see pages 35-37 in your Eating Hints book  · For dry mouth: sip water throughout the day; try very sweet drinks; chew gum or suck on hard candy, popsicles, & ice chips; eat easy to swallow foods (such as pureed cooked foods or soups); moisten food with sauce/gravy/salad dressing; do not drink beer, wine, or any type of alcohol; keep lips moist with lip balm; avoid tobacco products & second-hand smoke; try Biotene as needed (see pages 17-18 in your Eating Hints book)  Follow proper oral care; Try baking soda/salt water rinse recipe (mix 3/4 tsp salt + 1 tsp baking soda + 1 qt water; rinse with pain water after using) in Eating Hints book (pg 18)  Brush your teeth before/after meals & before bed  · Weigh yourself regularly  If you notice weight loss, make an effort to increase your daily food/calorie intake  If you continue to notice loss after these efforts, reach out to your dietitian to establish a plan to stabilize weight  · Avoid gas-producing foods such as broccoli, cabbage, sauerkraut, [place] sprouts, cauliflower, corn, cucumbers, onions, peppers, beans, peas, lentils, apples, apple juice, avocado, and melons  Carbonated drinks, chewing gum, and drinking through a straw can also cause gas  Limiting lactose may help for those who are sensitive to milk products  · Nutrition Supplements: Increase Glucerna to 2-3 per day (with AM and afternoon snacks, and nighttime snack)  · Ask for [place] Yogurt to be sent with breakfast trays  · Snack ideas to keep in room: peanut butter, crackers, nuts   Encouraged to ask kitchen for a cup of ice to pour Glucerna into as he prefers it cold   · Snack ideas to keep in room once mini fridge is installed: cheese, greek yogurt, hard boiled eggs, milk/Fairlife Milk    Follow Up Plan: phone follow up  9/3/21 at 1:30 STEVEN  Recommend Referral to Other Providers: none at this time

## 2021-08-23 NOTE — TELEPHONE ENCOUNTER
I spoke to the nurse at [facility] and they are away of the restrictions on fluids, and is on a diabetic diet  He will continue his salt tablets

## 2021-08-23 NOTE — PROGRESS NOTES
Hematology/Oncology Outpatient Follow- up Note  Indiana Mitchell 67 y o  male MRN: @ Encounter: [id number]        Date:  8/23/2021    Presenting Complaint/Diagnosis : Newly diagnosed localized pancreatic cancer  HPI:    Waylon Case seen for initial consultation 3/15/2021 regarding [facility] diagnosed pancreatic cancer   The patient presented to [facility] with nausea and vomiting   Workup revealed a pancreatic head mass and a cyst   Biopsy revealed adenocarcinoma   Per the report from the outside hospital he does not have any metastatic disease apart from the disease in his pancreas which is quite infiltrative  [facility] was seen by our colleagues in Surgical Oncology will have recommended neoadjuvant chemotherapy prior to resection   I think this is very reasonable based on the nature and extent of his disease  [facility] is 67years of age with an ECOG performance status of 0-1   We went over various regimens and I think gemcitabine and Abraxane would be best suited for his disease at full dose      Previous Hematologic/ Oncologic History:    Oncology History   Adenocarcinoma of pancreas ([facility] [address] )   2/2021 Initial Diagnosis    Adenocarcinoma of pancreas ([facility] [address] )     2/23/2021 Biopsy    Pancreas, head mass:   Malignant (PSC Category VI) - Adenocarcinoma          3/23/2021 - 7/12/2021 Chemotherapy    pegfilgrastim (NEULASTA ONPRO), 6 mg, Subcutaneous, Once, 3 of 9 cycles  Administration: 6 mg (6/21/2021)  paclitaxel protein-bound (ABRAXANE) IVPB, 125 mg/m2 = 235 mg, Intravenous, Once, 2 of 2 cycles  Dose modification: 100 mg/m2 (original dose 125 mg/m2, Cycle 2, Reason: Dose Not Tolerated)  Administration: 235 mg (3/23/2021), 235 mg (3/30/2021), 235 mg (4/6/2021), 188 mg (4/20/2021), 188 mg (5/4/2021), 188 mg (4/27/2021)  gemcitabine (GEMZAR) infusion, 1,879 8 mg, Intravenous, Once, 4 of 10 cycles  Dose modification: 800 mg/m2 (original dose 1,000 mg/m2, Cycle 2, Reason: Dose Not Tolerated)  Administration: 1,800 mg (3/23/2021), 1,800 mg (3/30/2021), 1,800 mg (4/6/2021), 1,504 2 mg (4/20/2021), 1,504 2 mg (5/4/2021), 1,504 2 mg (4/27/2021), 1,504 2 mg (6/7/2021), 1,504 2 mg (6/14/2021), 1,504 2 mg (6/21/2021), 1,504 2 mg (7/6/2021), 1,504 2 mg (7/12/2021)     8/16/2021 - 8/16/2021 Chemotherapy    fluorouracil (ADRUCIL) ambulatory infusion Soln, 200 mg/m2/day = 1,830 mg (88 9 % of original dose 225 mg/m2/day), Intravenous, Over 120 hours, 0 of 6 cycles  Dose modification: 200 mg/m2/day (original dose 225 mg/m2/day, Cycle 1, Reason: Anticipated Tolerance)     8/16/2021 -  Chemotherapy    gemcitabine (GEMZAR) infusion, 400 mg/m2 = 720 2 mg (40 % of original dose 1,000 mg/m2), Intravenous, Once, 1 of 2 cycles  Dose modification: 400 mg/m2 (original dose 1,000 mg/m2, Cycle 1, Reason: Other (Must fill in a comment))         Gemcitabine and Abraxane     Single agent Gemcitabine    Current Hematologic/ Oncologic Treatment:      Gemcitabine at 400 mg meter  sq weekly with radiation    Interval History:       The patient returns for follow-up visit  He is currently on concurrent chemoradiation  He states he is tolerating it reasonably well  Has some epigastric tenderness on occasion but mostly has an appetite and is doing reasonably well  Denies any diarrhea  States his blood sugars have been erratic any does have an endocrinologist following this  The rest of his 14 point review of systems today was negative  Test Results:    Imaging: No results found      Labs:   Lab Results   Component Value Date    WBC 10 49 (H) 08/20/2021    HGB 10 6 (L) 08/20/2021    HCT 33 1 (L) 08/20/2021    MCV 97 08/20/2021     08/20/2021     Lab Results   Component Value Date    K 4 9 08/20/2021    CL 95 (L) 08/20/2021    CO2 23 08/20/2021    BUN 24 08/20/2021    CREATININE 1 02 08/20/2021    GLUCOSE 98 07/14/2021    GLUF 460 (H) 08/20/2021    CALCIUM 8 6 08/20/2021    CORRECTEDCA 9 2 08/20/2021    AST 16 08/20/2021    ALT 33 08/20/2021 ALKPHOS 107 08/20/2021    EGFR 71 08/20/2021       Lab Results   Component Value Date    IRON 24 (L) 04/10/2021    TIBC 155 (L) 04/10/2021    FERRITIN 994 (H) 04/10/2021     ROS: As stated in the history of present illness otherwise his 14 point review of systems today was negative  Active Problems:   Patient Active Problem List   Diagnosis    Hyperlipidemia    Essential hypertension    Type 2 diabetes mellitus with hyperglycemia, with long-term current use of insulin (HCC)    Gastroesophageal reflux disease    Pancreatic cyst    Adenocarcinoma of pancreas (HCC)    Peripheral neuropathy    Bilateral cellulitis of lower leg    Hyponatremia    Severe protein-calorie malnutrition (HCC)    Anemia    Sacral wound    Leukocytosis    Generalized weakness    Streptococcal bacteremia    Mesenteric venous thrombosis (HCC)    GWEN (acute kidney injury) ([facility] [address] )    Goals of care, counseling/discussion    DNR (do not resuscitate)    Physician orders for life-sustaining treatment (POLST) form indicates patient wish for do-not-resuscitate status       Past Medical History:   Past Medical History:   Diagnosis Date    Cancer ([facility] [address] )     pancreatic    Diabetes mellitus ([facility] [address] )     Frequent urination     GERD (gastroesophageal reflux disease)     Hyperlipidemia     Hypertension     Pancreatic cancer (HCC)     Peripheral neuropathy     Weakness     Weight loss        Surgical History:   Past Surgical History:   Procedure Laterality Date    APPENDECTOMY      CATARACT EXTRACTION      COLONOSCOPY      SKIN LESION EXCISION      of a wart on right arm    TUNNELED VENOUS PORT PLACEMENT N/A 3/26/2021    Procedure: INSERTION VENOUS PORT (PORT-A-CATH);   Surgeon: Magan Bowen MD;  Location:  MAIN OR;  Service: Surgical Oncology       Family History:    Family History   Problem Relation Age of Onset    Lymphoma Son     Diabetes Mother     Heart disease Mother        Cancer-related family history includes Lymphoma in his son  Social History:   Social History     Socioeconomic History    Marital status:      Spouse name: Not on file    Number of children: Not on file    Years of education: Not on file    Highest education level: Not on file   Occupational History    Not on file   Tobacco Use    Smoking status: Former Smoker     Types: Pipe     Quit date: 2/23/2011     Years since quitting: 10 5    Smokeless tobacco: Never Used   Vaping Use    Vaping Use: Never used   Substance and Sexual Activity    Alcohol use: Never    Drug use: Never    Sexual activity: Not on file   Other Topics Concern    Not on file   Social History Narrative    Not on file     Social Determinants of Health     Financial Resource Strain:     Difficulty of Paying Living Expenses:    Food Insecurity:     Worried About Running Out of Food in the Last Year:     [address] in the Last Year:    Transportation Needs:     Lack of Transportation (Medical):      Lack of Transportation (Non-Medical):    Physical Activity:     Days of Exercise per Week:     Minutes of Exercise per Session:    Stress:     Feeling of Stress :    Social Connections:     Frequency of Communication with Friends and Family:     Frequency of Social Gatherings with Friends and Family:     Attends [employer] Services:     Active Member of Clubs or Organizations:     Attends Club or Organization Meetings:     Marital Status:    Intimate Partner Violence:     Fear of Current or Ex-Partner:     Emotionally Abused:     Physically Abused:     Sexually Abused:        Current Medications:   Current Outpatient Medications   Medication Sig Dispense Refill    Accu-Chek FastClix Lancets MISC USE 1 TO CHECK GLUCOSE TWICE DAILY TO THREE TIMES DAILY      Accu-Chek Guide test strip USE 1 STRIP TWICE DAILY TO THREE TIMES DAILY      acetaminophen (TYLENOL) 325 mg tablet Take 650 mg by mouth every 4 (four) hours as needed for mild pain       atorvastatin (LIPITOR) 40 mg tablet Take 40 mg by mouth daily      Blood Glucose Monitoring Suppl (Accu-Chek Guide) w/Device KIT USE TO CHECK GLUCOSE TWO TO THREE TIMES DAILY      escitalopram (LEXAPRO) 5 mg tablet Take 5 mg by mouth daily      insulin glargine (Lantus SoloStar) 100 units/mL injection pen Inject 10 units at bedtime 15 mL 0    insulin lispro (HumaLOG) 100 units/mL injection Inject 5 Units under the skin 3 (three) times a day with meals  0    lisinopril (ZESTRIL) 40 mg tablet Take 40 mg by mouth daily      metFORMIN (GLUCOPHAGE) 850 mg tablet TAKE 1 TABLET BY MOUTH TWICE DAILY TAKE WITH MORNING AND EVENING MEAL      NovoLOG FlexPen 100 units/mL injection pen Inject as per sliding scale , (  Scale - 150-200 -1 units, 201-250-2 units, 251-300 -3 units, 301-350-4 units, > 350- 5  units) with meals 15 mL     prochlorperazine (COMPAZINE) 10 mg tablet Take 1 tablet (10 mg total) by mouth every 6 (six) hours as needed for nausea or vomiting 45 tablet 3    ramipril (ALTACE) 10 MG capsule Take 10 mg by mouth daily       ReliOn Pen Needles 31G X 6 MM MISC USE 1 IN THE EVENING      rivaroxaban (XARELTO) 15 mg tablet Take 1 tablet (15 mg total) by mouth daily with breakfast for 40 doses      saccharomyces boulardii (FLORASTOR) 250 mg capsule Take 1 capsule (250 mg total) by mouth 2 (two) times a day  0    sodium chloride 1 g tablet Take 2 tablets (2 g total) by mouth 3 (three) times a day with meals 90 tablet 0     No current facility-administered medications for this visit  Allergies: No Known Allergies    Physical Exam:    Body surface area is 1 79 meters squared      Wt Readings from Last 3 Encounters:   08/23/21 62 6 kg (138 lb)   08/17/21 63 2 kg (139 lb 6 4 oz)   08/16/21 63 kg (138 lb 14 2 oz)        Temp Readings from Last 3 Encounters:   08/23/21 (!) 97 4 °F (36 3 °C) (Temporal)   08/17/21 (!) 97 1 °F (36 2 °C) (Temporal)   08/16/21 98 7 °F (37 1 °C) (Temporal)        BP Readings from Last 3 Encounters:   08/23/21 112/62   08/17/21 120/64   08/17/21 118/68         Pulse Readings from Last 3 Encounters:   08/23/21 (!) 108   08/17/21 82   08/17/21 81     Physical Exam     Constitutional   General appearance: No acute distress, well appearing and well nourished  Eyes   Conjunctiva and lids: No swelling, erythema or discharge  Pupils and irises: Equal, round and reactive to light  Ears, Nose, Mouth, and Throat   External inspection of ears and nose: Normal     Nasal mucosa, septum, and turbinates: Normal without edema or erythema  Oropharynx: Normal with no erythema, edema, exudate or lesions  Pulmonary   Respiratory effort: No increased work of breathing or signs of respiratory distress  Auscultation of lungs: Clear to auscultation  Cardiovascular   Palpation of heart: Normal PMI, no thrills  Auscultation of heart: Normal rate and rhythm, normal S1 and S2, without murmurs  Examination of extremities for edema and/or varicosities: Normal     Carotid pulses: Normal     Abdomen   Abdomen: Non-tender, no masses  Liver and spleen: No hepatomegaly or splenomegaly  Lymphatic   Palpation of lymph nodes in neck: No lymphadenopathy  Musculoskeletal   Gait and station: Normal     Digits and nails: Normal without clubbing or cyanosis  Inspection/palpation of joints, bones, and muscles: Normal     Skin   Skin and subcutaneous tissue: Normal without rashes or lesions  Neurologic   Cranial nerves: Cranial nerves 2-12 intact  Sensation: No sensory loss  Psychiatric   Orientation to person, place, and time: Normal     Mood and affect: Normal         Assessment / Plan:      The patient is a very pleasant 67year old male with adenocarcinoma of the pancreas which appears to be localized to the pancreas   He was seen by our colleagues in Surgical Oncology and they recommended neoadjuvant treatment      He was initially started on neoadjuvant chemotherapy with gemcitabine and Abraxane   His treatment regimen has been adjusted secondary to declining performance status, multiple hospitalizations, and treatment related side effects   Patient has currently been undergoing single agent therapy with gemcitabine      He was hospitalized again with progressive generalized weakness, dizziness resulting in fall   CT of head negative  CT of abdomen and pelvis demonstrated evidence of disease progression with some increase in the cystic lesion in the head of the pancreas as well as possible area of contained perforation vs cystic metastasis  He is not a candidate for surgery  He wished to get concurrent chemoradiation  He is in rehab again  He is not able to get 5FU CAD pump  As result he will now get gemcitabine  Which she has started  He is aware of the risks benefits and alternatives of the medication including the side effects like nausea, vomiting, diarrhea, hair loss, low blood counts, risk of infection and even death  He is agreeable to proceeding  The gemcitabine is at 400 milligrams/meter 2 because it is for radiosensitization  The patient is in agreement with the plan  He has done well with his 1st dose  We will continue this to complete radiation with gemcitabine for chemosensitization  If the patient has any issues he will call our office  He will make an effort to eat better  His endocrinologist should be managing his blood sugars and I did write a note to the rehab facility to ensure this happens  Goals and Barriers:  Current Goal:  Prolong Survival from   Pancreatic adenocarcinoma  Barriers: None  Patient's Capacity to Self Care:  Patient  able to self care      Portions of the record may have been created with voice recognition software   Occasional wrong word or "sound a like" substitutions may have occurred due to the inherent limitations of voice recognition software   Read the chart carefully and recognize, using context, where substitutions have occurred

## 2021-08-23 NOTE — TELEPHONE ENCOUNTER
----- Message from Michelle Mcgee DO sent at 8/17/2021  9:38 AM EDT -----  Ensure facility continues 1 8L/day fluid restriction, 2g salt tabs TID and is on a diabetic diet as sugars have been high and he states he cannot get sugar free diet  He lives at [facility] in [address] [place] [address] [place]: [phone]  Thanks!

## 2021-08-27 NOTE — TELEPHONE ENCOUNTER
Critical Results   Call Received From [facility]    Lab Department Location [facility]    Lab Study glucose 509   Date Blood Work was Done Today    Read Back of Information Done Yes    Relevant Information

## 2021-08-27 NOTE — TELEPHONE ENCOUNTER
Called to speak with patient  I updated him that glucose was 506  Patient is asymptomatic at this time  He has a meter and checks his blood sugar regularly  This morning his meter read 370  Patient resides at [facility]- He will update them on his readings to have coverage adjusted accordingly  Patient states he has been having diarrhea and will discuss further with the RN when he returns from radiation therapy    He states he has been speaking with them about this  Patient was in the car with transportation while I was speaking with him - his  Carlita Gomez will make sure report gets to RN     Will send to RN to update MD on critical value

## 2021-08-30 PROBLEM — R79.89 ELEVATED LACTIC ACID LEVEL: Status: ACTIVE | Noted: 2021-01-01

## 2021-08-30 NOTE — TELEPHONE ENCOUNTER
Reason for Disposition   Patient already left for the hospital/clinic  Answer Assessment - Initial Assessment Questions  1  REASON FOR CALL or QUESTION: "What is your reason for calling today?" or "How can I best help you?" or "What question do you have that I can help answer?"    "My dad lives at [facility] and has been undergoing radiation  His blood sugar was 331 earlier and they gave him 20 units of insulin going by their doctor's orders and not my dad's endocrinologist's orders  The last time they gave him 20 units, he ended up bottoming out and it went to low  He is now hallucinating and not acting right at all so we are almost at [facility]    If he is d/c'd tonight, I need the doctor on call to send specific orders in to [facility] as Dr Carlos Alvarez has ordered for him "    Protocols used: NO CONTACT OR DUPLICATE CONTACT CALL-ADULT-, INFORMATION ONLY CALL - NO TRIAGE-ADULT-AH

## 2021-08-30 NOTE — TELEPHONE ENCOUNTER
Regarding: blood sugar 331  ----- Message from [facility] sent at 8/30/2021  6:05 PM EDT -----  Daughter called, "[facility] and and his sugars are not right  He was givent 20 units Lantis last night and he is not himself  His sugar is 331   I am upset bc they are not going by what the endocrinologist  Last time we gave him that much he passed out  "  She mentioned that they were taking him to [facility] and she wanted a call back from a nurse "

## 2021-08-31 PROBLEM — R79.89 ELEVATED LACTIC ACID LEVEL: Status: RESOLVED | Noted: 2021-01-01 | Resolved: 2021-01-01

## 2021-08-31 NOTE — H&P
Jerome Farr  H&P- Ciarra Banda 1945, 67 y o  male MRN: [id number]  Unit/Bed#: -01 Encounter: [id number]  Primary Care Provider: Shannan Huerta DO   Date and time admitted to hospital: 8/30/2021  7:54 PM    * Type 2 diabetes mellitus with hyperglycemia, with long-term current use of insulin [facility])  Assessment & Plan  Lab Results   Component Value Date    HGBA1C 9 4 (H) 04/08/2021       Recent Labs     08/30/21 2000   POCGLU 495*       Blood Sugar Average: Last 72 hrs:  (P) 495   · Blood sugar 495 on admission  · Not meeting DKA criteria  · Normal anion gap, beta hydroxybutyrate normal, pH elevated  · ED started patient on non DKA insulin drip  · Hold home regimen while on insulin drip (NovoLog 8 units every a m , NovoLog 6 units b i d  with meals, glargine 20 units at bedtime, metformin 850 mg b i d  and NovoLog sliding scale)  · Blood sugar per patient has been elevated since going to short-term rehab  Feels that the food is not diabetic friendly    Hyperkalemia  Assessment & Plan  · Potassium 5 7 on admission and creatinine normal at 1 10  · Started on insulin drip due to hyperglycemia in the ED   · Recheck BMP at midnight  · Continue to monitor and correct as needed  · Placed on telemetry    Hyponatremia  Assessment & Plan  · Sodium 122 on admission  · Sodium correction for hyperglycemia is 128   · Baseline appears to be about 128-130  · Oriented x3  · Received 2 L bolus of fluids in the ED   · Continue to monitor    Elevated lactic acid level  Assessment & Plan  · Lactic acid 4 6 on admission   · Not meeting SIRS/sepsis  Having tachycardia, other vital stable  Normal WBC  · No current sign of infection  Patient with elevated blood sugar, possibly dehydration     · Has also been receiving chemotherapy due to pancreatic cancer  · Received 2 L bolus of fluids in the ED   · Continue to monitor lactic acid     Adenocarcinoma of pancreas ([facility] [address] )  Assessment & Plan  · Diagnosed February 2021  · Has been receiving receiving chemotherapy/radiation  · Not a surgical candidate  · Consult oncology    Essential hypertension  Assessment & Plan  · Continue lisinopril 40 mg daily    Hyperlipidemia  Assessment & Plan  · Continue statin    VTE Pharmacologic Prophylaxis: VTE Score: 3 Moderate Risk (Score 3-4) - Pharmacological DVT Prophylaxis Ordered: rivaroxaban (Xarelto)  Code Status: Level 3 - DNAR and DNI   Discussion with family: Patient declined call to   Anticipated Length of Stay: Patient will be admitted on an inpatient basis with an anticipated length of stay of greater than 2 midnights secondary to Hyperglycemia  Total Time for Visit, including Counseling / Coordination of Care: 60 minutes Greater than 50% of this total time spent on direct patient counseling and coordination of care  Chief Complaint:  Elevated blood sugar    History of Present Illness:  Uziel Bedoya is a 67 y o  male with a PMH of hypertension, hyperlipidemia, adeno carcinoma of pancreas, type 2 diabetes on insulin who presents with elevated blood sugars and confusion her short-term rehab  Patient is examined in found to be alert and oriented x3  He reports his blood sugars have been elevated since going to short-term rehab about 4-5 weeks ago  He feels this is due to the food provided since it is not diabetic friendly  His blood sugar in the morning has been running around 400-500 upon waking and then decreases into the 300s throughout the day  Has been compliant with his insulin  Diagnosed with pancreatic cancer in in February 2021  Has been seeing Oncology outpatient and receiving chemotherapy  Patient is not a surgical candidate  Currently in short-term rehab due to weakness  Patient feels since diagnosis he has had decreased appetite and occasional vomiting/nausea  Denies pain  Denies shortness of breath, chest pain, abdominal pain, diarrhea, fevers or chills       Review of Systems:  Review of Systems   Constitutional: Positive for appetite change and fatigue  Negative for fever  HENT: Negative for sore throat  Respiratory: Negative for cough, chest tightness and shortness of breath  Cardiovascular: Negative for chest pain  Gastrointestinal: Positive for nausea and vomiting  Negative for abdominal distention, abdominal pain and diarrhea  Endocrine: Positive for polyuria  Genitourinary: Negative for difficulty urinating  Frequency: Increased  Musculoskeletal: Negative for arthralgias  Neurological: Positive for weakness  Negative for headaches  Psychiatric/Behavioral: Negative for agitation and behavioral problems  All other systems reviewed and are negative  Past Medical and Surgical History:   Past Medical History:   Diagnosis Date    Cancer [facility])     pancreatic    Diabetes mellitus ([facility] [address] )     Frequent urination     GERD (gastroesophageal reflux disease)     Hyperlipidemia     Hypertension     [facility] )     Peripheral neuropathy     Weakness     Weight loss        Past Surgical History:   Procedure Laterality Date    APPENDECTOMY      CATARACT EXTRACTION      COLONOSCOPY      SKIN LESION EXCISION      of a wart on right arm    TUNNELED VENOUS PORT PLACEMENT N/A 3/26/2021    Procedure: INSERTION VENOUS PORT (PORT-A-CATH); Surgeon: Carolina Riddle MD;  Location: [facility];  Service: Surgical Oncology       Meds/Allergies:  Prior to Admission medications    Medication Sig Start Date End Date Taking?  Authorizing Provider   Accu-Chek FastClix Lancets MISC USE 1 TO CHECK GLUCOSE TWICE DAILY TO THREE TIMES DAILY 2/9/21   Historical Provider, MD   Accu-Chek Guide test strip USE 1 STRIP TWICE DAILY TO THREE TIMES DAILY 2/9/21   Historical Provider, MD   acetaminophen (TYLENOL) 325 mg tablet Take 650 mg by mouth every 4 (four) hours as needed for mild pain     Historical Provider, MD   atorvastatin (LIPITOR) 40 mg tablet Take 40 mg by mouth daily 12/20/20   Historical Provider, MD   Blood Glucose Monitoring Suppl (Accu-Chek Guide) w/Device KIT USE TO CHECK GLUCOSE TWO TO THREE TIMES DAILY 1/8/21   Historical Provider, MD   escitalopram (LEXAPRO) 5 mg tablet Take 5 mg by mouth daily    Historical Provider, MD   insulin glargine (Lantus SoloStar) 100 units/mL injection pen Inject 10 units at bedtime 7/19/21   Ghazala Loomis PA-C   insulin lispro (HumaLOG) 100 units/mL injection Inject 5 Units under the skin 3 (three) times a day with meals 7/19/21   Ghazala Loomis PA-C   lisinopril (ZESTRIL) 40 mg tablet Take 40 mg by mouth daily    Historical Provider, MD   metFORMIN (GLUCOPHAGE) 850 mg tablet TAKE 1 TABLET BY MOUTH TWICE DAILY TAKE WITH MORNING AND EVENING MEAL 12/20/20   Historical Provider, MD   NovoLOG FlexPen 100 units/mL injection pen Inject as per sliding scale , (  Scale - 150-200 -1 units, 201-250-2 units, 251-300 -3 units, 301-350-4 units, > 350- 5  units) with meals 5/5/21   Yanelis Torres MD   prochlorperazine (COMPAZINE) 10 mg tablet Take 1 tablet (10 mg total) by mouth every 6 (six) hours as needed for nausea or vomiting 3/17/21   Sam Nguyễn MD   ramipril (ALTACE) 10 MG capsule Take 10 mg by mouth daily  12/20/20   Historical Provider, MD   ReliOn Pen Needles 31G X 6 MM MISC USE 1 IN THE EVENING 1/1/21   Historical Provider, MD   rivaroxaban (XARELTO) 15 mg tablet Take 1 tablet (15 mg total) by mouth daily with breakfast for 40 doses 7/19/21 8/28/21  Ghazala Loomis PA-C   saccharomyces boulardii (FLORASTOR) 250 mg capsule Take 1 capsule (250 mg total) by mouth 2 (two) times a day 7/19/21   Ghazala Loomis PA-C   sodium chloride 1 g tablet Take 2 tablets (2 g total) by mouth 3 (three) times a day with meals 7/19/21 8/18/21  Ghazala Loomis PA-C     I have reviewed home medications with patient personally      Allergies: No Known Allergies    Social History:  Marital Status:    Occupation:  Unknown  Patient Pre-hospital Living Situation: [facility]  Patient Pre-hospital Level of Mobility: walks with walker  Patient Pre-hospital Diet Restrictions:  Diabetic  Substance Use History:   Social History     Substance and Sexual Activity   Alcohol Use Never     Social History     Tobacco Use   Smoking Status Former Smoker    Types: Pipe    Quit date: 2/23/2011    Years since quitting: 10 5   Smokeless Tobacco Never Used     Social History     Substance and Sexual Activity   Drug Use Never       Family History:  Family History   Problem Relation Age of Onset    Lymphoma Son     Diabetes Mother     Heart disease Mother        Physical Exam:     Vitals:   Blood Pressure: 155/86 (08/31/21 0100)  Pulse: 100 (08/31/21 0030)  Temperature: 98 5 °F (36 9 °C) (08/31/21 0100)  Temp Source: Temporal (08/30/21 1852)  Respirations: 21 (08/31/21 0030)  Weight - Scale: 62 8 kg (138 lb 7 2 oz) (08/31/21 0129)  SpO2: 98 % (08/31/21 0030)    Physical Exam  Vitals and nursing note reviewed  Constitutional:       Appearance: Normal appearance  HENT:      Head: Normocephalic  Eyes:      Extraocular Movements: Extraocular movements intact  Pupils: Pupils are equal, round, and reactive to light  Cardiovascular:      Rate and Rhythm: Normal rate and regular rhythm  Heart sounds: No murmur heard  No gallop  Pulmonary:      Effort: No respiratory distress  Breath sounds: Normal breath sounds  No wheezing  Abdominal:      General: Bowel sounds are normal  There is no distension  Tenderness: There is no abdominal tenderness  Musculoskeletal:         General: Normal range of motion  Cervical back: Normal range of motion  Right lower leg: No edema  Left lower leg: No edema  Skin:     General: Skin is warm  Neurological:      General: No focal deficit present  Mental Status: He is alert and oriented to person, place, and time  Mental status is at baseline     Psychiatric: Mood and Affect: Mood normal          Behavior: Behavior normal          Thought Content: Thought content normal          Additional Data:     Lab Results:  Results from last 7 days   Lab Units 08/30/21 2013 08/30/21 2012 08/27/21  0952   WBC Thousand/uL  --  5 37 4 50   HEMOGLOBIN g/dL  --  9 0* 9 0*   I STAT HEMOGLOBIN g/dl 9 2*  --   --    HEMATOCRIT %  --  27 5* 28 6*   HEMATOCRIT, ISTAT % 27*  --   --    PLATELETS Thousands/uL  --  132* 160   NEUTROS PCT %  --   --  83*   LYMPHS PCT %  --   --  9*   MONOS PCT %  --   --  2*   EOS PCT %  --   --  4     Results from last 7 days   Lab Units 08/30/21 2316 08/30/21 2012   SODIUM mmol/L 130* 122*   POTASSIUM mmol/L 4 4 5 7*   CHLORIDE mmol/L 95* 91*   CO2 mmol/L 24 22   BUN mg/dL 32* 32*   CREATININE mg/dL 1 28 1 10   ANION GAP mmol/L 11 9   CALCIUM mg/dL 8 3 8 5   ALBUMIN g/dL  --  2 9*   TOTAL BILIRUBIN mg/dL  --  0 60   ALK PHOS U/L  --  107   ALT U/L  --  30   AST U/L  --  16   GLUCOSE RANDOM mg/dL 374* 462*         Results from last 7 days   Lab Units 08/31/21 0056 08/30/21 2000   POC GLUCOSE mg/dl 275* 495*         Results from last 7 days   Lab Units 08/30/21 2316 08/30/21 2012   LACTIC ACID mmol/L 4 2* 4 6*       Imaging: Reviewed radiology reports from this admission including: chest xray  XR chest 1 view portable    (Results Pending)         ** Please Note: This note has been constructed using a voice recognition system   **

## 2021-08-31 NOTE — ASSESSMENT & PLAN NOTE
· Diagnosed February 2021  · Has been receiving receiving chemotherapy/radiation  · Not a surgical candidate

## 2021-08-31 NOTE — ED PROVIDER NOTES
History  Chief Complaint   Patient presents with    Altered Mental Status     pt repotrs to er with complaints of confusion  last night was given 20 units of insulin which he normally does poorly with  normally gets 15 at night  normal yesterday, and was altered and confused today  recent blood sugar is 45     67year old male with a history of pancreatic cancer presents for evaluation of feeling confused and hyperglycemia  Patient has been having trouble recently controlling his blood sugars  He is currently oriented x3 and able to answer my questions  Blood sugar was 300+ at [address] and 20 units of insulin administered  Family is concerned that the nursing home is inappropriately dosing his insulin  Prior to Admission Medications   Prescriptions Last Dose Informant Patient Reported? Taking?    Accu-Chek FastClix Lancets [facility]  Outside Facility (Specify) Yes No   Sig: USE 1 TO CHECK GLUCOSE TWICE DAILY TO THREE TIMES DAILY   Accu-Chek Guide test strip  Outside Facility (Specify) Yes No   Sig: USE 1 STRIP TWICE DAILY TO THREE TIMES DAILY   Blood Glucose Monitoring Suppl (Accu-Chek Guide) w/Device KIT  Outside Facility (Specify) Yes No   Sig: USE TO CHECK GLUCOSE TWO TO THREE TIMES DAILY   NovoLOG FlexPen 100 units/mL injection pen  Outside Facility (Specify) No No   Sig: Inject as per sliding scale , (  Scale - 150-200 -1 units, 201-250-2 units, 251-300 -3 units, 301-350-4 units, > 350- 5  units) with meals   ReliOn Pen Needles 31G X 6 MM [facility]  Outside Facility (Specify) Yes No   Sig: USE 1 IN THE EVENING   acetaminophen (TYLENOL) 325 mg tablet  Outside Facility (Specify) Yes No   Sig: Take 650 mg by mouth every 4 (four) hours as needed for mild pain    atorvastatin (LIPITOR) 40 mg tablet  Outside Facility (Specify) Yes No   Sig: Take 40 mg by mouth daily   escitalopram (LEXAPRO) 5 mg tablet  Outside Facility (Specify) Yes No   Sig: Take 5 mg by mouth daily   insulin glargine (Lantus SoloStar) 100 units/mL injection pen  Outside Facility (Specify) No No   Sig: Inject 10 units at bedtime   insulin lispro (HumaLOG) 100 units/mL injection  Outside Facility (Specify) No No   Sig: Inject 5 Units under the skin 3 (three) times a day with meals   lisinopril (ZESTRIL) 40 mg tablet  Outside Facility (Specify) Yes No   Sig: Take 40 mg by mouth daily   metFORMIN (GLUCOPHAGE) 850 mg tablet  Outside Facility (Specify) Yes No   Sig: TAKE 1 TABLET BY MOUTH TWICE DAILY TAKE WITH MORNING AND EVENING MEAL   prochlorperazine (COMPAZINE) 10 mg tablet  Outside Facility (Specify) No No   Sig: Take 1 tablet (10 mg total) by mouth every 6 (six) hours as needed for nausea or vomiting   ramipril (ALTACE) 10 MG capsule  Outside Facility (Specify) Yes No   Sig: Take 10 mg by mouth daily    rivaroxaban (XARELTO) 15 mg tablet  Outside Facility (Specify) No No   Sig: Take 1 tablet (15 mg total) by mouth daily with breakfast for 40 doses   saccharomyces boulardii (FLORASTOR) 250 mg capsule  Outside Facility (Specify) No No   Sig: Take 1 capsule (250 mg total) by mouth 2 (two) times a day   sodium chloride 1 g tablet  Outside Facility (Specify) No No   Sig: Take 2 tablets (2 g total) by mouth 3 (three) times a day with meals      Facility-Administered Medications: None       Past Medical History:   Diagnosis Date    Cancer ([facility] [address] )     pancreatic    Diabetes mellitus (HCC)     Frequent urination     GERD (gastroesophageal reflux disease)     Hyperlipidemia     Hypertension     Pancreatic cancer (HCC)     Peripheral neuropathy     Weakness     Weight loss        Past Surgical History:   Procedure Laterality Date    APPENDECTOMY      CATARACT EXTRACTION      COLONOSCOPY      SKIN LESION EXCISION      of a wart on right arm    TUNNELED VENOUS PORT PLACEMENT N/A 3/26/2021    Procedure: INSERTION VENOUS PORT (PORT-A-CATH);   Surgeon: Ruthy Rubin MD;  Location:  MAIN OR;  Service: Surgical Oncology       Family History Problem Relation Age of Onset    Lymphoma Son     Diabetes Mother     Heart disease Mother      I have reviewed and agree with the history as documented  E-Cigarette/Vaping    E-Cigarette Use Never User      E-Cigarette/Vaping Substances    Nicotine No     THC No     CBD No     Flavoring No     Other No     Unknown No      Social History     Tobacco Use    Smoking status: Former Smoker     Types: Pipe     Quit date: 2/23/2011     Years since quitting: 10 5    Smokeless tobacco: Never Used   Vaping Use    Vaping Use: Never used   Substance Use Topics    Alcohol use: Never    Drug use: Never       Review of Systems   Constitutional: Negative for fever  Gastrointestinal: Positive for abdominal pain  Chronic abdominal pain   Neurological: Negative for dizziness, speech difficulty and weakness  All other systems reviewed and are negative  Physical Exam  Physical Exam  Vitals and nursing note reviewed  Constitutional:       General: He is not in acute distress  Appearance: He is well-developed  HENT:      Head: Normocephalic and atraumatic  Right Ear: External ear normal       Left Ear: External ear normal       Nose: Nose normal    Eyes:      General: No scleral icterus  Pulmonary:      Effort: Pulmonary effort is normal  No respiratory distress  Abdominal:      General: There is no distension  Palpations: Abdomen is soft  Musculoskeletal:         General: No deformity  Normal range of motion  Cervical back: Normal range of motion and neck supple  Comments: Able to move all 4 extremities without difficulty  Follows commands  Alert oriented x3  Skin:     General: Skin is warm  Findings: No rash  Neurological:      General: No focal deficit present  Mental Status: He is alert        Gait: Gait normal    Psychiatric:         Mood and Affect: Mood normal          Vital Signs  ED Triage Vitals [08/30/21 1852]   Temperature Pulse Respirations Blood Pressure SpO2   97 8 °F (36 6 °C) (!) 110 18 137/72 99 %      Temp Source Heart Rate Source Patient Position - Orthostatic VS BP Location FiO2 (%)   Temporal Monitor Sitting Left arm --      Pain Score       --           Vitals:    08/30/21 1852 08/30/21 2000   BP: 137/72 (!) 179/84   Pulse: (!) 110 100   Patient Position - Orthostatic VS: Sitting          Visual Acuity      ED Medications  Medications   sodium chloride 0 9 % bolus 1,000 mL (has no administration in time range)   insulin regular (HumuLIN R,NovoLIN R) 1 Units/mL in sodium chloride 0 9 % 100 mL infusion (has no administration in time range)   magnesium sulfate 2 g/50 mL IVPB (premix) 2 g (has no administration in time range)   furosemide (LASIX) injection 40 mg (has no administration in time range)   sodium chloride 0 9 % bolus 1,000 mL (1,000 mL Intravenous New Bag 8/30/21 2013)       Diagnostic Studies  Results Reviewed     Procedure Component Value Units Date/Time    Comprehensive metabolic panel [[id number]]  (Abnormal) Collected: 08/30/21 2012    Lab Status: Final result Specimen: Blood from Arm, Left Updated: 08/30/21 2101     Sodium 122 mmol/L      Potassium 5 7 mmol/L      Chloride 91 mmol/L      CO2 22 mmol/L      ANION GAP 9 mmol/L      BUN 32 mg/dL      Creatinine 1 10 mg/dL      Glucose 462 mg/dL      Calcium 8 5 mg/dL      Corrected Calcium 9 4 mg/dL      AST 16 U/L      ALT 30 U/L      Alkaline Phosphatase 107 U/L      Total Protein 6 7 g/dL      Albumin 2 9 g/dL      Total Bilirubin 0 60 mg/dL      eGFR 65 ml/min/1 73sq m     Narrative:      Susanne guidelines for Chronic Kidney Disease (CKD):     Stage 1 with normal or high GFR (GFR > 90 mL/min/1 73 square meters)    Stage 2 Mild CKD (GFR = 60-89 mL/min/1 73 square meters)    Stage 3A Moderate CKD (GFR = 45-59 mL/min/1 73 square meters)    Stage 3B Moderate CKD (GFR = 30-44 mL/min/1 73 square meters)    Stage 4 Severe CKD (GFR = 15-29 mL/min/1 73 square meters)    Stage 5 End Stage CKD (GFR <15 mL/min/1 73 square meters)  Note: GFR calculation is accurate only with a steady state creatinine    Lactic acid, plasma [[id number]]  (Abnormal) Collected: 08/30/21 2012    Lab Status: Final result Specimen: Blood from Arm, Left Updated: 08/30/21 2100     LACTIC ACID 4 6 mmol/L     Narrative:      Result may be elevated if tourniquet was used during collection      Lactic acid 2 Hours [[id number]]     Lab Status: No result Specimen: Blood     Lipase [[id number]]  (Normal) Collected: 08/30/21 2012    Lab Status: Final result Specimen: Blood from Arm, Left Updated: 08/30/21 2058     Lipase 81 u/L     Magnesium [[id number]]  (Abnormal) Collected: 08/30/21 2012    Lab Status: Final result Specimen: Blood from Arm, Left Updated: 08/30/21 2058     Magnesium 1 5 mg/dL     Phosphorus [[id number]]  (Normal) Collected: 08/30/21 2012    Lab Status: Final result Specimen: Blood from Arm, Left Updated: 08/30/21 2058     Phosphorus 3 9 mg/dL     Beta Hydroxybutyrate [[id number]]  (Normal) Collected: 08/30/21 2012    Lab Status: Final result Specimen: Blood from Arm, Left Updated: 08/30/21 2031     BETA-HYDROXYBUTYRATE 0 4 mmol/L     CBC [[id number]]  (Abnormal) Collected: 08/30/21 2012    Lab Status: Final result Specimen: Blood from Arm, Left Updated: 08/30/21 2029     WBC 5 37 Thousand/uL      RBC 2 91 Million/uL      Hemoglobin 9 0 g/dL      Hematocrit 27 5 %      MCV 95 fL      MCH 30 9 pg      MCHC 32 7 g/dL      RDW 15 8 %      Platelets 491 Thousands/uL      MPV 9 6 fL     POCT Blood Gas (CG8+) [[id number]]  (Abnormal) Collected: 08/30/21 2013    Lab Status: Final result Specimen: Venous Updated: 08/30/21 2019     ph, Jose E ISTAT 7 472     pCO2, Jose E i-STAT 29 0 mm HG      pO2, Jose E i-STAT 52 0 mm HG      BE, i-STAT -2 mmol/L      HCO3, Jose E i-STAT 21 2 mmol/L      CO2, i-STAT 22 mmol/L      O2 Sat, i-STAT 89 %      SODIUM, I-STAT 124 mmol/l      Potassium, i-STAT 5 7 mmol/L Calcium, Ionized i-STAT 1 11 mmol/L      Hct, i-STAT 27 %      Hgb, i-STAT 9 2 g/dl      Glucose, i-STAT 484 mg/dl      Specimen Type VENOUS    Troponin I [[id number]] Collected: 08/30/21 2012    Lab Status: No result Specimen: Blood from Arm, Left     UA w Reflex to Microscopic w Reflex to Culture [[id number]]     Lab Status: No result Specimen: Urine     Fingerstick Glucose (POCT) [[id number]]  (Abnormal) Collected: 08/30/21 2000    Lab Status: Final result Updated: 08/30/21 2001     POC Glucose 495 mg/dl                  XR chest 1 view portable    (Results Pending)              Procedures  CriticalCare Time  Performed by: Goldie Pak DO  Authorized by: Goldie Pak DO     Critical care provider statement:     Critical care time (minutes):  32    Critical care time was exclusive of:  Separately billable procedures and treating other patients and teaching time    Critical care was necessary to treat or prevent imminent or life-threatening deterioration of the following conditions:  Cardiac failure, endocrine crisis and metabolic crisis    Critical care was time spent personally by me on the following activities:  Blood draw for specimens, obtaining history from patient or surrogate, development of treatment plan with patient or surrogate, discussions with primary provider, evaluation of patient's response to treatment, examination of patient, ordering and performing treatments and interventions, ordering and review of laboratory studies, ordering and review of radiographic studies, re-evaluation of patient's condition and review of old charts             ED Course                             SBIRT 22yo+      Most Recent Value   SBIRT (23 yo +)   In order to provide better care to our patients, we are screening all of our patients for alcohol and drug use  Would it be okay to ask you these screening questions?   No Filed at: 08/30/2021 2002                    [facility]  Number of Diagnoses or Management Options  Hyperglycemia: new and requires workup  Hyperkalemia: new and requires workup  Hypomagnesemia: new and requires workup  Hyponatremia: new and requires workup  Diagnosis management comments: 67 yom male presenting with hyperglycemia  Obtain DKA evaluation with labs, blood gas  Administer fluids and reassess  Point care glucose 495 on arrival     Patient not in DKA at this time although other electrolyte abnormalities discovered including hyperkalemia, pseudo and true hyponatremia  Patient and brother updated with results and agreeable for admission  Will initiate insulin drip to treat both hyperkalemia and hyperglycemia  Close monitoring  Will also replete magnesium         Amount and/or Complexity of Data Reviewed  Clinical lab tests: ordered and reviewed  Tests in the radiology section of CPT®: reviewed and ordered  Tests in the medicine section of CPT®: reviewed  Decide to obtain previous medical records or to obtain history from someone other than the patient: yes  Obtain history from someone other than the patient: yes  Review and summarize past medical records: yes        Disposition  Final diagnoses:   Hyperglycemia   Hyperkalemia   Hyponatremia   Hypomagnesemia     Time reflects when diagnosis was documented in both MDM as applicable and the Disposition within this note     Time User Action Codes Description Comment    8/30/2021  9:08 PM Cherl Dowdy Add [R73 9] Hyperglycemia     8/30/2021  9:08 PM Loras Mis [E87 5] Hyperkalemia     8/30/2021  9:08 PM Cherl Dowdy Add [E87 1] Hyponatremia     8/30/2021  9:08 PM Alonso, [address] [R73 9] Hyperglycemia     8/30/2021  9:08 PM Warden Mckinnon [E87 5] Hyperkalemia     8/30/2021  9:14 PM Cherl Dowdy Add [E83 42] Hypomagnesemia       ED Disposition     ED Disposition Condition Date/Time Comment    Admit Stable Mon Aug 30, 2021  9:05 PM Case was discussed with KAMILAH and the patient's admission status was agreed to be Admission Status: inpatient status to the service of Dr Giovanna Sinha   Follow-up Information    None         Patient's Medications   Discharge Prescriptions    No medications on file     No discharge procedures on file      PDMP Review       Value Time User    PDMP Reviewed  Yes 7/19/2021  9:31 AM Radha Mederos PA-C          ED Provider  Electronically Signed by           Eric Monroe DO  08/30/21 3829

## 2021-08-31 NOTE — CASE MANAGEMENT
LOS: 1 day  Pt is not a documented bundle  Pt is not a 30 day readmission  Unplanned readmission score is 45 and red  Met with Pt  Pt presents AA&Ox3  Discussed role of , discharge planning  Pt reports he resides at [facility] assisted living  Pt reports he uses walker and has assistance with adls and goes to DR for meals  Pt reports his PCP is Dr Linda Cornelius  Pt reports his POA are his cousin([place]) brother(Ricardo) and has living will  Pt reports he has [facility] for VNA and choosing to resume [facility] upon discharge  Pt has been at [facility] in past for SNF  Referral sent to [facility] via [facility]  Call placed to [address]([phone]), spoke with Weston Pro, informed Weston Pro of Pt's status and tentative discharge on 9/1  CM informed Radha that referral was sent through [facility] via [facility]  CM to follow

## 2021-08-31 NOTE — ASSESSMENT & PLAN NOTE
Elevated Lactic acid of 4 6 on admission secondary to dehydration, no signs of infection, treated with intravenous fluid repletion and resolved

## 2021-08-31 NOTE — PLAN OF CARE
Problem: NEUROSENSORY - ADULT  Goal: Achieves stable or improved neurological status  Description: INTERVENTIONS  - Monitor and report changes in neurological status  - Monitor vital signs such as temperature, blood pressure, glucose, and any other labs ordered   - Initiate measures to prevent increased intracranial pressure  - Monitor for seizure activity and implement precautions if appropriate      Outcome: Progressing  Goal: Achieves maximal functionality and self care  Description: INTERVENTIONS  - Monitor swallowing and airway patency with patient fatigue and changes in neurological status  - Encourage and assist patient to increase activity and self care     - Encourage visually impaired, hearing impaired and aphasic patients to use assistive/communication devices  Outcome: Progressing     Problem: RESPIRATORY - ADULT  Goal: Achieves optimal ventilation and oxygenation  Description: INTERVENTIONS:  - Assess for changes in respiratory status  - Assess for changes in mentation and behavior  - Position to facilitate oxygenation and minimize respiratory effort  - Oxygen administered by appropriate delivery if ordered  - Initiate smoking cessation education as indicated  - Encourage broncho-pulmonary hygiene including cough, deep breathe, Incentive Spirometry  - Assess the need for suctioning and aspirate as needed  - Assess and instruct to report SOB or any respiratory difficulty  - Respiratory Therapy support as indicated  Outcome: Progressing     Problem: GASTROINTESTINAL - ADULT  Goal: Maintains adequate nutritional intake  Description: INTERVENTIONS:  - Monitor percentage of each meal consumed  - Identify factors contributing to decreased intake, treat as appropriate  - Assist with meals as needed  - Monitor I&O, weight, and lab values if indicated  - Obtain nutrition services referral as needed  Outcome: Progressing  Goal: Oral mucous membranes remain intact  Description: INTERVENTIONS  - Assess oral mucosa and hygiene practices  - Implement preventative oral hygiene regimen  - Implement oral medicated treatments as ordered  - Initiate Nutrition services referral as needed  Outcome: Progressing     Problem: PAIN - ADULT  Goal: Verbalizes/displays adequate comfort level or baseline comfort level  Description: Interventions:  - Encourage patient to monitor pain and request assistance  - Assess pain using appropriate pain scale  - Administer analgesics based on type and severity of pain and evaluate response  - Implement non-pharmacological measures as appropriate and evaluate response  - Consider cultural and social influences on pain and pain management  - Notify physician/advanced practitioner if interventions unsuccessful or patient reports new pain  Outcome: Progressing     Problem: INFECTION - ADULT  Goal: Absence or prevention of progression during hospitalization  Description: INTERVENTIONS:  - Assess and monitor for signs and symptoms of infection  - Monitor lab/diagnostic results  - Monitor all insertion sites, i e  indwelling lines, tubes, and drains  - Monitor endotracheal if appropriate and nasal secretions for changes in amount and color  - [place] appropriate cooling/warming therapies per order  - Administer medications as ordered  - Instruct and encourage patient and family to use good hand hygiene technique  - Identify and instruct in appropriate isolation precautions for identified infection/condition  Outcome: Progressing

## 2021-08-31 NOTE — PLAN OF CARE
Problem: Potential for Falls  Goal: Patient will remain free of falls  Description: INTERVENTIONS:  - Educate patient/family on patient safety including physical limitations  - Instruct patient to call for assistance with activity   - Consult OT/PT to assist with strengthening/mobility   - Keep Call bell within reach  - Keep bed low and locked with side rails adjusted as appropriate  - Keep care items and personal belongings within reach  - Initiate and maintain comfort rounds  - Make Fall Risk Sign visible to staff  - Offer Toileting every 2 Hours, in advance of need  - Initiate/Maintain alarm  - Obtain necessary fall risk management equipment  - Apply yellow socks and bracelet for high fall risk patients  - Consider moving patient to room near nurses station  Outcome: Progressing     Problem: NEUROSENSORY - ADULT  Goal: Achieves stable or improved neurological status  Description: INTERVENTIONS  - Monitor and report changes in neurological status  - Monitor vital signs such as temperature, blood pressure, glucose, and any other labs ordered   - Initiate measures to prevent increased intracranial pressure  - Monitor for seizure activity and implement precautions if appropriate      Outcome: Progressing  Goal: Achieves maximal functionality and self care  Description: INTERVENTIONS  - Monitor swallowing and airway patency with patient fatigue and changes in neurological status  - Encourage and assist patient to increase activity and self care     - Encourage visually impaired, hearing impaired and aphasic patients to use assistive/communication devices  Outcome: Progressing     Problem: CARDIOVASCULAR - ADULT  Goal: Maintains optimal cardiac output and hemodynamic stability  Description: INTERVENTIONS:  - Monitor I/O, vital signs and rhythm  - Monitor for S/S and trends of decreased cardiac output  - Administer and titrate ordered vasoactive medications to optimize hemodynamic stability  - Assess quality of pulses, skin color and temperature  - Assess for signs of decreased coronary artery perfusion  - Instruct patient to report change in severity of symptoms  Outcome: Progressing  Goal: Absence of cardiac dysrhythmias or at baseline rhythm  Description: INTERVENTIONS:  - Continuous cardiac monitoring, vital signs, obtain 12 lead EKG if ordered  - Administer antiarrhythmic and heart rate control medications as ordered  - Monitor electrolytes and administer replacement therapy as ordered  Outcome: Progressing     Problem: RESPIRATORY - ADULT  Goal: Achieves optimal ventilation and oxygenation  Description: INTERVENTIONS:  - Assess for changes in respiratory status  - Assess for changes in mentation and behavior  - Position to facilitate oxygenation and minimize respiratory effort  - Oxygen administered by appropriate delivery if ordered  - Initiate smoking cessation education as indicated  - Encourage broncho-pulmonary hygiene including cough, deep breathe, Incentive Spirometry  - Assess the need for suctioning and aspirate as needed  - Assess and instruct to report SOB or any respiratory difficulty  - Respiratory Therapy support as indicated  Outcome: Progressing     Problem: GASTROINTESTINAL - ADULT  Goal: Maintains adequate nutritional intake  Description: INTERVENTIONS:  - Monitor percentage of each meal consumed  - Identify factors contributing to decreased intake, treat as appropriate  - Assist with meals as needed  - Monitor I&O, weight, and lab values if indicated  - Obtain nutrition services referral as needed  Outcome: Progressing  Goal: Oral mucous membranes remain intact  Description: INTERVENTIONS  - Assess oral mucosa and hygiene practices  - Implement preventative oral hygiene regimen  - Implement oral medicated treatments as ordered  - Initiate Nutrition services referral as needed  Outcome: Progressing     Problem: GENITOURINARY - ADULT  Goal: Maintains or returns to baseline urinary function  Description: INTERVENTIONS:  - Assess urinary function  - Encourage oral fluids to ensure adequate hydration if ordered  - Administer IV fluids as ordered to ensure adequate hydration  - Administer ordered medications as needed  - Offer frequent toileting  - Follow urinary retention protocol if ordered  Outcome: Progressing  Goal: Absence of urinary retention  Description: INTERVENTIONS:  - Assess patients ability to void and empty bladder  - Monitor I/O  - Bladder scan as needed  - Discuss with physician/AP medications to alleviate retention as needed  - Discuss catheterization for long term situations as appropriate  Outcome: Progressing     Problem: METABOLIC, FLUID AND ELECTROLYTES - ADULT  Goal: Electrolytes maintained within normal limits  Description: INTERVENTIONS:  - Monitor labs and assess patient for signs and symptoms of electrolyte imbalances  - Administer electrolyte replacement as ordered  - Monitor response to electrolyte replacements, including repeat lab results as appropriate  - Instruct patient on fluid and nutrition as appropriate  Outcome: Progressing  Goal: Fluid balance maintained  Description: INTERVENTIONS:  - Monitor labs   - Monitor I/O and WT  - Instruct patient on fluid and nutrition as appropriate  - Assess for signs & symptoms of volume excess or deficit  Outcome: Progressing  Goal: Glucose maintained within target range  Description: INTERVENTIONS:  - Monitor Blood Glucose as ordered  - Assess for signs and symptoms of hyperglycemia and hypoglycemia  - Administer ordered medications to maintain glucose within target range  - Assess nutritional intake and initiate nutrition service referral as needed  Outcome: Progressing     Problem: SKIN/TISSUE INTEGRITY - ADULT  Goal: Skin Integrity remains intact(Skin Breakdown Prevention)  Description: Assess:  -Perform Efraín assessment  -Clean and moisturize skin  -Inspect skin when repositioning, toileting, and assisting with ADLS  -Assess under medical devices  -Assess extremities for adequate circulation and sensation     Bed Management:  -Have minimal linens on bed & keep smooth, unwrinkled  -Change linens as needed when moist or perspiring  -Avoid sitting or lying in one position for more than 2 hours while in bed  -Keep HOB at 30 degrees     Toileting:  -Offer bedside commode  -Assess for incontinence  -Use incontinent care products after each incontinent episode     Activity:  -Mobilize patient 3 times a day  -Encourage activity and walks on unit  -Encourage or provide ROM exercises   -Turn and reposition patient every 2 Hours  -Use appropriate equipment to lift or move patient in bed  -Instruct/ Assist with weight shifting when out of bed in chair  -Consider limitation of chair time 2 hour intervals    Skin Care:  -Avoid use of baby powder, tape, friction and shearing, hot water or constrictive clothing  -Relieve pressure over bony prominences  -Do not massage red bony areas    Next Steps:  -Teach patient strategies to minimize risks    -Consider consults to  interdisciplinary teams   Outcome: Progressing  Goal: Pressure injury heals and does not worsen  Description: Interventions:  - Implement low air loss mattress or specialty surface (Criteria met)  - Apply silicone foam dressing  - Instruct/assist with weight shifting every 60 minutes when in chair   - Limit chair time to 2 hour intervals  - Use special pressure reducing interventions when in chair   - Apply fecal or urinary incontinence containment device   - Perform passive or active ROM  - Turn and reposition patient & offload bony prominences every 2 hours   - Utilize friction reducing device or surface for transfers   - Consider consults to  interdisciplinary teams  - Use incontinent care products after each incontinent episode  - Consider nutrition services referral as needed  Outcome: Progressing     Problem: HEMATOLOGIC - ADULT  Goal: Maintains hematologic stability  Description: INTERVENTIONS  - Assess for signs and symptoms of bleeding or hemorrhage  - Monitor labs  - Administer supportive blood products/factors as ordered and appropriate  Outcome: Progressing     Problem: MUSCULOSKELETAL - ADULT  Goal: Maintain or return mobility to safest level of function  Description: INTERVENTIONS:  - Assess patient's ability to carry out ADLs; assess patient's baseline for ADL function and identify physical deficits which impact ability to perform ADLs (bathing, care of mouth/teeth, toileting, grooming, dressing, etc )  - Assess/evaluate cause of self-care deficits   - Assess range of motion  - Assess patient's mobility  - Assess patient's need for assistive devices and provide as appropriate  - Encourage maximum independence but intervene and supervise when necessary  - Involve family in performance of ADLs  - Assess for home care needs following discharge   - Consider OT consult to assist with ADL evaluation and planning for discharge  - Provide patient education as appropriate  Outcome: Progressing  Goal: Maintain proper alignment of affected body part  Description: INTERVENTIONS:  - Support, maintain and protect limb and body alignment  - Provide patient/ family with appropriate education  Outcome: Progressing     Problem: PAIN - ADULT  Goal: Verbalizes/displays adequate comfort level or baseline comfort level  Description: Interventions:  - Encourage patient to monitor pain and request assistance  - Assess pain using appropriate pain scale  - Administer analgesics based on type and severity of pain and evaluate response  - Implement non-pharmacological measures as appropriate and evaluate response  - Consider cultural and social influences on pain and pain management  - Notify physician/advanced practitioner if interventions unsuccessful or patient reports new pain  Outcome: Progressing     Problem: INFECTION - ADULT  Goal: Absence or prevention of progression during hospitalization  Description: INTERVENTIONS:  - Assess and monitor for signs and symptoms of infection  - Monitor lab/diagnostic results  - Monitor all insertion sites, i e  indwelling lines, tubes, and drains  - Monitor endotracheal if appropriate and nasal secretions for changes in amount and color  - [place] appropriate cooling/warming therapies per order  - Administer medications as ordered  - Instruct and encourage patient and family to use good hand hygiene technique  - Identify and instruct in appropriate isolation precautions for identified infection/condition  Outcome: Progressing     Problem: SAFETY ADULT  Goal: Patient will remain free of falls  Description: INTERVENTIONS:  - Educate patient/family on patient safety including physical limitations  - Instruct patient to call for assistance with activity   - Consult OT/PT to assist with strengthening/mobility   - Keep Call bell within reach  - Keep bed low and locked with side rails adjusted as appropriate  - Keep care items and personal belongings within reach  - Initiate and maintain comfort rounds  - Make Fall Risk Sign visible to staff  - Offer Toileting every 3 Hours, in advance of need  - Initiate/Maintain alarm  - Obtain necessary fall risk management equipment  - Apply yellow socks and bracelet for high fall risk patients  - Consider moving patient to room near nurses station  Outcome: Progressing  Goal: Maintain or return to baseline ADL function  Description: INTERVENTIONS:  -  Assess patient's ability to carry out ADLs; assess patient's baseline for ADL function and identify physical deficits which impact ability to perform ADLs (bathing, care of mouth/teeth, toileting, grooming, dressing, etc )  - Assess/evaluate cause of self-care deficits   - Assess range of motion  - Assess patient's mobility; develop plan if impaired  - Assess patient's need for assistive devices and provide as appropriate  - Encourage maximum independence but intervene and supervise when necessary  - Involve family in performance of ADLs  - Assess for home care needs following discharge   - Consider OT consult to assist with ADL evaluation and planning for discharge  - Provide patient education as appropriate  Outcome: Progressing  Goal: Maintains/Returns to pre admission functional level  Description: INTERVENTIONS:  - Perform BMAT or MOVE assessment daily    - Set and communicate daily mobility goal to care team and patient/family/caregiver  - Collaborate with rehabilitation services on mobility goals if consulted  - Perform Range of Motion 3 times a day  - Reposition patient every 3 hours  - Dangle patient 3 times a day  - Stand patient 3 times a day  - Ambulate patient 3 times a day  - Out of bed to chair 3 times a day   - Out of bed for meals 3 times a day  - Out of bed for toileting  - Record patient progress and toleration of activity level   Outcome: Progressing     Problem: DISCHARGE PLANNING  Goal: Discharge to home or other facility with appropriate resources  Description: INTERVENTIONS:  - Identify barriers to discharge w/patient and caregiver  - Arrange for needed discharge resources and transportation as appropriate  - Identify discharge learning needs (meds, wound care, etc )  - Arrange for interpretive services to assist at discharge as needed  - Refer to Case Management Department for coordinating discharge planning if the patient needs post-hospital services based on physician/advanced practitioner order or complex needs related to functional status, cognitive ability, or social support system  Outcome: Progressing     Problem: Knowledge Deficit  Goal: Patient/family/caregiver demonstrates understanding of disease process, treatment plan, medications, and discharge instructions  Description: Complete learning assessment and assess knowledge base    Interventions:  - Provide teaching at level of understanding  - Provide teaching via preferred learning methods  Outcome: Progressing Problem: MOBILITY - ADULT  Goal: Maintain or return to baseline ADL function  Description: INTERVENTIONS:  -  Assess patient's ability to carry out ADLs; assess patient's baseline for ADL function and identify physical deficits which impact ability to perform ADLs (bathing, care of mouth/teeth, toileting, grooming, dressing, etc )  - Assess/evaluate cause of self-care deficits   - Assess range of motion  - Assess patient's mobility; develop plan if impaired  - Assess patient's need for assistive devices and provide as appropriate  - Encourage maximum independence but intervene and supervise when necessary  - Involve family in performance of ADLs  - Assess for home care needs following discharge   - Consider OT consult to assist with ADL evaluation and planning for discharge  - Provide patient education as appropriate  Outcome: Progressing  Goal: Maintains/Returns to pre admission functional level  Description: INTERVENTIONS:  - Perform BMAT or MOVE assessment daily    - Set and communicate daily mobility goal to care team and patient/family/caregiver  - Collaborate with rehabilitation services on mobility goals if consulted  - Perform Range of Motion 3 times a day  - Reposition patient every 3 hours  - Dangle patient 3 times a day  - Stand patient 3 times a day  - Ambulate patient 3 times a day  - Out of bed to chair 3 times a day   - Out of bed for meals 3 times a day  - Out of bed for toileting  - Record patient progress and toleration of activity level   Outcome: Progressing     Problem: Nutrition/Hydration-ADULT  Goal: Nutrient/Hydration intake appropriate for improving, restoring or maintaining nutritional needs  Description: Monitor and assess patient's nutrition/hydration status for malnutrition  Collaborate with interdisciplinary team and initiate plan and interventions as ordered  Monitor patient's weight and dietary intake as ordered or per policy   Utilize nutrition screening tool and intervene as necessary  Determine patient's food preferences and provide high-protein, high-caloric foods as appropriate       INTERVENTIONS:  - Monitor oral intake, urinary output, labs, and treatment plans  - Assess nutrition and hydration status and recommend course of action  - Evaluate amount of meals eaten  - Assist patient with eating if necessary   - Allow adequate time for meals  - Recommend/ encourage appropriate diets, oral nutritional supplements, and vitamin/mineral supplements  - Order, calculate, and assess calorie counts as needed  - Recommend, monitor, and adjust tube feedings and TPN/PPN based on assessed needs  - Assess need for intravenous fluids  - Provide specific nutrition/hydration education as appropriate  - Include patient/family/caregiver in decisions related to nutrition  Outcome: Progressing

## 2021-08-31 NOTE — PHYSICAL THERAPY NOTE
PT eval     08/31/21 0921   PT Last Visit   PT Visit Date 08/31/21   Note Type   Note type Evaluation   Pain Assessment   Pain Assessment Tool 0-10   Pain Score No Pain   Home Living   Type of Home Assisted living  (Ind crt)   Prior Function   Level of [place] Needs assistance with IADLs; Needs assistance with ADLs and functional mobility   Lives With Facility staff   Receives Help From Family;Friend(s)  (drive him to radiation 5x/wk)   ADL Assistance Needs assistance   IADLs Needs assistance   Restrictions/Precautions   Weight Bearing Precautions Per Order No   Other Precautions Cognitive  (reports poor memory)   General   Additional Pertinent History pancreatic CA, adm with hyperglycemia, hyponatremia   Family/Caregiver Present No   Cognition   Overall Cognitive Status Impaired   Arousal/Participation Alert   Attention Attends with cues to redirect   Orientation Level Oriented X4   Memory Decreased short term memory   Following Commands Follows one step commands with increased time or repetition   RUE Assessment   RUE Assessment WFL   LUE Assessment   LUE Assessment WFL   RLE Assessment   RLE Assessment WFL   LLE Assessment   LLE Assessment WFL   Coordination   Movements are Fluid and Coordinated 1   Transfers   Sit to Stand 5  Supervision   Stand to Sit 5  Supervision   Stand pivot 6  Modified independent   Additional items   (rw)   Toilet transfer 4  Minimal assistance   Additional items Assist x 1; Armrests  (nomrally raised with commode seat)   Ambulation/Elevation   Gait pattern WNL; Narrow COLE; Forward Flexion   Gait Assistance 6  Modified independent   Assistive Device Rolling walker   Distance 25'x2   Balance   Static Sitting Good   Dynamic Sitting Fair +   Static Standing Fair +   Dynamic Standing Fair +   Ambulatory Fair +   Endurance Deficit   Endurance Deficit No   Activity Tolerance   Activity Tolerance Patient limited by fatigue   Nurse Made Aware LISA Coronel   Assessment   Prognosis Good   Assessment Pt able to mobilize with rw adn enter BR adn complete toileting tasks with sba  Oob in chair after session with needs in reach  Pt anxious about missing radiation treatment  Approrpatie for return to [facility] once medically cleared  Can benefit from home PT serivces to improve endurance as cancer tx continues     Barriers to Discharge   (medical clearance)   Goals   Patient Goals get better   Plan   PT Frequency   (d c PT)   Recommendation   PT Discharge Recommendation Home with home health rehabilitation   Equipment Recommended [address] [facility] Recommended Wheeled walker   PT - OK to Discharge Yes   AM-PAC Basic Mobility Inpatient   Turning in Bed Without Bedrails 4   Lying on Back to Sitting on Edge of Flat Bed 4   Moving Bed to Chair 4   Standing Up From Chair 4   Walk in Room 4   Climb 3-5 Stairs 3   Basic Mobility Inpatient Raw Score 23   Basic Mobility Standardized Score 50 88   Modified Wallace Scale   Modified Wallace Scale 2   Darylene Harari, PT

## 2021-08-31 NOTE — TELEPHONE ENCOUNTER
Can discuss further after patient is out the hospital   He will likely have adjustments to his insulin while there

## 2021-08-31 NOTE — ASSESSMENT & PLAN NOTE
· Potassium 5 7 on admission and creatinine normal at 1 10  · Started on insulin drip due to hyperglycemia in the ED   · Recheck BMP at midnight  · Continue to monitor and correct as needed  · Placed on telemetry

## 2021-08-31 NOTE — UTILIZATION REVIEW
Initial Clinical Review    Admission: Date/Time/Statement:   Admission Orders (From admission, onward)     Ordered        08/30/21 2109  Inpatient Admission  Once                   Orders Placed This Encounter   Procedures    Inpatient Admission     Standing Status:   Standing     Number of Occurrences:   1     Order Specific Question:   Level of Care     Answer:   Med Surg [16]     Order Specific Question:   Estimated length of stay     Answer:   More than 2 Midnights     Order Specific Question:   Certification     Answer:   I certify that inpatient services are medically necessary for this patient for a duration of greater than two midnights  See H&P and MD Progress Notes for additional information about the patient's course of treatment  ED Arrival Information     Expected Arrival Acuity    - 8/30/2021 18:34 Emergent         Means of arrival Escorted by Service [facility]         Arrival complaint    high blood sugar, confusion,         Chief Complaint   Patient presents with    Altered Mental Status     pt repotrs to er with complaints of confusion  last night was given 20 units of insulin which he normally does poorly with  normally gets 15 at night  normal yesterday, and was altered and confused today  recent blood sugar is 380   Initial Presentation:   67year old male w/h/o pancreatic Ca  (dx 2/2021:  Has been receiving receiving chemotherapy/radiation  Not a surgical candidate)   brought  To ER from [facility] facility by family member today due to hyperglycemia and pt feeling confused  trouble recently controlling his blood sugars  IN ER<  Pt is  oriented x3, responding to questions appropriately  Blood sugar was 300+ at [address] and 20 units of insulin administered  IN ER,   Blood blucose 495;  Normal an gap, beta hydroxybutyrate normal,  PH elevated  Started on continuous insulin gtt w/accucks q 2 hr - wean off as tolerated    K 5 7 - rossy at midnoc, correct if needed  Sodium correction for hyperglycemia is 128 (baseline)  Rossy Lactic acid q 2 hr until normalized:  Cont IVF NS @  75 cc/hr    Date:   8/31    Day 2: Insulin gtt dc'ed at 0330  Resume Lantus 22 units HS, this brought 8 units a m , 6 units b i d  With meals, continue insulin sliding scale, diabetic diet  Lactic acidosis resolved w/IVF - can dc IVF today, encourage oral hydration  Cont Na tabs 2G tid        ED Triage Vitals   Temperature Pulse Respirations Blood Pressure SpO2   08/30/21 1852 08/30/21 1852 08/30/21 1852 08/30/21 1852 08/30/21 1852   97 8 °F (36 6 °C) (!) 110 18 137/72 99 %      Temp Source Heart Rate Source Patient Position - Orthostatic VS BP Location FiO2 (%)   08/30/21 1852 08/30/21 1852 08/30/21 1852 08/30/21 1852 --   Temporal Monitor Sitting Left arm       Pain Score       08/31/21 0056       No Pain          Wt Readings from Last 1 Encounters:   08/31/21 62 8 kg (138 lb 7 2 oz)   Body mass index is 19 6 kg/m²     Additional Vital Signs:    08/31/21 0000  --  103  22  130/63  88  --  --  --   08/30/21 2300  --  108Abnormal   22  168/66  108  100 %  --  --   08/30/21 2230  --  103  24Abnormal   187/81Abnormal   117  97 %  --  --   08/30/21 2200  --  101  26Abnormal   181/81Abnormal   117  98 %  --  --   08/30/21 2000  --  100  17  179/84Abnormal   120  99 %         08/31/21 15:23:38  97 5 °F (36 4 °C)  92  18  116/64  81  100 %  None (Room air)         Pertinent Labs/Diagnostic Test Results:   ekg none  8/30  CXR -  nad       Results from last 7 days   Lab Units 08/31/21  0517 08/30/21 2013 08/30/21 2012 08/27/21  0952   WBC Thousand/uL 7 55  --  5 37 4 50   HEMOGLOBIN g/dL 8 3*  --  9 0* 9 0*   I STAT HEMOGLOBIN g/dl  --  9 2*  --   --    HEMATOCRIT % 24 9*  --  27 5* 28 6*   HEMATOCRIT, ISTAT %  -- 27*  --   --    PLATELETS Thousands/uL 124*  --  132* 160   NEUTROS ABS Thousands/µL 5 94  --   --  3 71         Results from last 7 days   Lab Units 08/31/21  0517 08/30/21 2316 08/30/21 2013 08/30/21 2012 08/27/21  0952   SODIUM mmol/L 133* 130*  --  122* 125*   POTASSIUM mmol/L 4 8 4 4  --  5 7* 4 7   CHLORIDE mmol/L 98* 95*  --  91* 91*   CO2 mmol/L 24 24  --  22 23   CO2, I-STAT mmol/L  --   --  22  --   --    ANION GAP mmol/L 11 11  --  9 11   BUN mg/dL 33* 32*  --  32* 29*   CREATININE mg/dL 0 89 1 28  --  1 10 1 10   EGFR ml/min/1 73sq m 83 54  --  65 65   CALCIUM mg/dL 8 1* 8 3  --  8 5 8 2*   CALCIUM, IONIZED, ISTAT mmol/L  --   --  1 11*  --   --    MAGNESIUM mg/dL  --   --   --  1 5*  --    PHOSPHORUS mg/dL  --   --   --  3 9  --      Results from last 7 days   Lab Units 08/31/21  0517 08/30/21 2012 08/27/21  0952   AST U/L 15 16 14   ALT U/L 29 30 25   ALK PHOS U/L 87 107 94   TOTAL PROTEIN g/dL 6 0* 6 7 6 2*   ALBUMIN g/dL 2 8* 2 9* 2 8*   TOTAL BILIRUBIN mg/dL 0 40 0 60 0 50     Results from last 7 days   Lab Units 08/31/21  1524 08/31/21  1121 08/31/21  0656 08/31/21  0446 08/31/21  0329 08/31/21  0326 08/31/21  0056 08/30/21 2000   POC GLUCOSE mg/dl 290* 253* 142* 89 69 76 275* 495*     Results from last 7 days   Lab Units 08/31/21  0517 08/30/21 2316 08/30/21 2012 08/27/21  0952   GLUCOSE RANDOM mg/dL 95 374* 462* 509*             BETA-HYDROXYBUTYRATE   Date Value Ref Range Status   08/30/2021 0 4 <0 6 mmol/L Final              Results from last 7 days   Lab Units 08/30/21 2013   PH, STACIE I-STAT  7 472*   PCO2, STACIE ISTAT mm HG 29 0*   PO2, STACIE ISTAT mm HG 52 0*   HCO3, STACIE ISTAT mmol/L 21 2*   I STAT BASE EXC mmol/L -2   I STAT O2 SAT % 89*         Results from last 7 days   Lab Units 08/30/21 2012   TROPONIN I ng/mL <0 02     Results from last 7 days   Lab Units 08/31/21  0517 08/30/21  2316 08/30/21 2012   LACTIC ACID mmol/L 2 0 4 2* 4 6*     Results from last 7 days   Lab Units 08/30/21 2012   LIPASE u/L 81     Results from last 7 days   Lab Units 08/30/21  2317   CLARITY UA  Clear   COLOR UA  Yellow   SPEC GRAV UA  1 010   PH UA  5 5   GLUCOSE UA mg/dl >=1000 (1%)*   KETONES UA mg/dl Negative   BLOOD UA  Negative   PROTEIN UA mg/dl Negative   NITRITE UA  Negative   BILIRUBIN UA  Negative   UROBILINOGEN UA E U /dl 0 2   LEUKOCYTES UA  Elevated glucose may cause decreased leukocyte values   See urine microscopic for [facility] result/*   WBC UA /hpf None Seen   RBC UA /hpf None Seen   BACTERIA UA /hpf None Seen   EPITHELIAL CELLS WET PREP /hpf None Seen         ED Treatment:   Medication Administration from 08/30/2021 1834 to 08/31/2021 0055       Date/Time Order Dose Route Action     08/30/2021 2013 sodium chloride 0 9 % bolus 1,000 mL 1,000 mL Intravenous New Bag     08/30/2021 2144 sodium chloride 0 9 % bolus 1,000 mL 1,000 mL Intravenous New Bag     08/30/2021 2145 insulin regular (HumuLIN R,NovoLIN R) 1 Units/mL in sodium chloride 0 9 % 100 mL infusion 12 Units/hr Intravenous New Bag     08/30/2021 2144 magnesium sulfate 2 g/50 mL IVPB (premix) 2 g 2 g Intravenous New Bag     08/30/2021 2144 furosemide (LASIX) injection 40 mg 40 mg Intravenous Given     08/30/2021 2318 sodium chloride 0 9 % infusion 75 mL/hr Intravenous New Bag        Past Medical History:   Diagnosis Date    Cancer ([place] [address] )     pancreatic    Diabetes mellitus ([place] [address] )     Frequent urination     GERD (gastroesophageal reflux disease)     Hyperlipidemia     Hypertension     Pancreatic cancer ([place] [address] )     Peripheral neuropathy     Weakness     Weight loss      Present on Admission:   Hyperlipidemia   Ad[facility] of pancreas ([facility] )   Essential hypertension   Hyponatremia   SIRS (systemic inflammatory response syndrome) (HCC)   Severe protein-calorie malnutrition (HCC)      Admitting Diagnosis: Hyperkalemia [E87 5]  Hypomagnesemia [E83 42]  Hyponatremia [E87 1]  Altered mental status [R41 82]  Hyperglycemia [R73 9]  Adenocarcinoma of pancreas ([facility] ) [C25 9]  Age/Sex: 67 y o  male  Admission Orders:   Telemetry;  Continuous insulin gtt,   accuck q 2 hr - wean as tolerated;  IVF NS @  75 ;  PT/OT;  cmp at midnoc, then daily in am   Consult oncology, case mgt,  Lo carb diet    Scheduled Medications:  atorvastatin, 40 mg, Oral, Daily  escitalopram, 5 mg, Oral, Daily  insulin glargine, 20 Units, Subcutaneous, HS  insulin lispro, 2-12 Units, Subcutaneous, TID AC  insulin lispro, 2-12 Units, Subcutaneous, HS  insulin lispro, 6 Units, Subcutaneous, BID With Meals  insulin lispro, 8 Units, Subcutaneous, Daily With Breakfast  lisinopril, 40 mg, Oral, Daily  rivaroxaban, 15 mg, Oral, Daily With Breakfast  sodium chloride, 2 g, Oral, TID With Meals      Continuous IV Infusions:     PRN Meds:  acetaminophen, 650 mg, Oral, Q6H PRN  oxyCODONE, 5 mg, Oral, Q4H PRN  prochlorperazine, 10 mg, Oral, Q6H PRN      Network Utilization Review Department  ATTENTION: Please call with any questions or concerns to [phone] and carefully listen to the prompts so that you are directed to the right person  All voicemails are confidential   Rell Oseguera all requests for admission clinical reviews, approved or denied determinations and any other requests to dedicated fax number below belonging to the campus where the patient is receiving treatment   List of dedicated fax numbers for the Facilities:  [address] DENIALS (Administrative/Medical Necessity) [phone]   [address] (Maternity/NICU/Pediatrics) [phone]   [address] [place] [address] 39 [id number] [facility] [phone] [address] 558 [facility] [phone]   Shahida Bunn [address] [place] [address] [phone]

## 2021-08-31 NOTE — ASSESSMENT & PLAN NOTE
· Diagnosed February 2021  · Has been receiving receiving chemotherapy/radiation  · Not a surgical candidate  · Consult oncology

## 2021-08-31 NOTE — OCCUPATIONAL THERAPY NOTE
Occupational Therapy Evaluation      Nathanael Jaelyn    8/31/2021    Principal Problem:    Type 2 diabetes mellitus with hyperglycemia, with long-term current use of insulin (HCC)  Active Problems:    Hyperlipidemia    Essential hypertension    Adenocarcinoma of pancreas (HCC)    Hyponatremia    Severe protein-calorie malnutrition (HCC)    Hyperkalemia    SIRS (systemic inflammatory response syndrome) (HCC)      Past Medical History:   Diagnosis Date    [facility] )     pancreatic    [facility]     Frequent urination     GERD (gastroesophageal reflux disease)     Hyperlipidemia     Hypertension     Pancreatic cancer ([facility] )     Peripheral neuropathy     Weakness     Weight loss        Past Surgical History:   Procedure Laterality Date    APPENDECTOMY      CATARACT EXTRACTION      COLONOSCOPY      SKIN LESION EXCISION      of a wart on right arm    TUNNELED VENOUS PORT PLACEMENT N/A 3/26/2021    Procedure: INSERTION VENOUS PORT (PORT-A-CATH); Surgeon: Rowan Mg MD;  Location:  MAIN OR;  Service: Surgical Oncology        08/31/21 0920   OT Last Visit   OT Visit Date 08/31/21   Note Type   Note type Evaluation   Restrictions/Precautions   Weight Bearing Precautions Per Order No   Other Precautions Cognitive; Chair Alarm; Fall Risk  (currently receiving cancer treatment)   Pain Assessment   Pain Assessment Tool 0-10   Pain Score No Pain   Home Living   Type of Home   ([address])   Additional Comments Pt states he has been back and forth between the hospital and rehab for months  Prior Function   Level of [place] Needs assistance with ADLs and functional mobility; Needs assistance with IADLs   Lives With Facility staff   Receives Help From Family;Friend(s)   ADL Assistance Needs assistance   IADLs Needs assistance   Comments Family and friends drive him to cancer treatments 5 days/week   Psychosocial   Psychosocial (WDL) WDL   Subjective   Subjective Pt told RN he hasn't walked since last summer  Upon clarification, pt states he hasn't gone for walks since last summer    but is able to perform household ambulation  ADL   Eating Assistance 5  Supervision/Setup   Grooming Assistance 5  Supervision/Setup   UB Bathing Assistance 5  Supervision/Setup   LB Bathing Assistance 4  Minimal Assistance   [address] 4  Minimal Assistance   LB Dressing Assistance 4  [address]  4  Minimal Assistance   Toileting Deficit Clothing management up;Clothing management down;Grab bar use; Increased time to complete;Supervison/safety;Verbal cueing;Steadying;Perineal hygiene   Transfers   Sit to Stand 5  Supervision   Stand to Sit 5  Supervision   Stand pivot 5  Supervision  (RW)   Toilet transfer 4  Minimal assistance   Additional items Assist x 1;Standard toilet  (uses commode over toilet at facility to raise it)   Functional Mobility   Functional Mobility 4  Minimal assistance   Additional Comments CGA x1 w RW   Additional items Rolling walker   Activity Tolerance   Activity Tolerance Patient limited by fatigue   Nurse Made Aware RN [facility]   RUE Assessment   RUE Assessment WFL   LUE Assessment   LUE Assessment WFL   Cognition   Overall Cognitive Status Impaired   Arousal/Participation Alert; Cooperative   Attention Attends with cues to redirect   Orientation Level Oriented to person;Oriented to place;Oriented to time;Disoriented to situation   Memory Decreased recall of precautions;Decreased recall of recent events;Decreased short term memory;Decreased recall of biographical information;Decreased long term memory  (Pt admits to memory deficits)   Following Commands Follows one step commands without difficulty   Assessment   Limitation Decreased ADL status; Decreased cognition;Decreased endurance;Decreased self-care trans   Prognosis Good   Assessment Pt is a 67 y o  male seen for OT evaluation at [address] [place] [address], admitted 8/30/2021 w/ Type 2 diabetes mellitus with hyperglycemia, with long-term current use of insulin ([facility] )  OT completed extensive review of pt's medical and social history  Comorbidities affecting pt's functional performance at time of assessment include: HTN, DM type 2, pancreatic cancer (currently in treatment), malnutrition, neuropathy, anemia, generalized weakness  Personal factors affecting pt at time of IE include:difficulty performing ADLS, difficulty performing IADLS , limited insight into deficits and health management   Prior to admission, pt was living at [facility] and was assisted for ADL/IADL, using RW, receiving therapy  Upon evaluation, pt presents to OT below baseline due to the following performance deficits: weakness, decreased strength, decreased balance, decreased tolerance, impaired memory and decreased safety awareness  Pt to benefit from continued skilled OT tx while in the hospital to address deficits as defined above and maximize level of functional independence w ADL's and functional mobility  Occupational Performance areas to address include: bathing/shower, toilet hygiene, dressing, functional mobility and functional transfers, bed mobility  The patient's raw score on the AM-PAC Daily Activity inpatient short form is 19, standardized score is 40 22, greater than 39 4  Patients at this level are likely to benefit from DC to home  Based on findings, pt is of high complexity  At this time, OT recommendations at time of discharge are return to facility with rehab services  Plan   Treatment Interventions ADL retraining;Functional transfer training;UE strengthening/ROM; Endurance training;Cognitive reorientation;Patient/family training;Equipment evaluation/education; Compensatory technique education;Continued evaluation; Energy conservation   Goal Expiration Date 09/10/21   OT Frequency 2-3x/wk   Recommendation   OT Discharge Recommendation Return to facility with rehabilitation services   OT - OK to Discharge Yes AM-PAC Daily Activity Inpatient   Lower Body Dressing 2   Bathing 3   Toileting 3   Upper Body Dressing 3   Grooming 4   Eating 4   Daily Activity Raw Score 19   Daily Activity Standardized Score (Calc for Raw Score >=11) 40 22   AM-PAC Applied Cognition Inpatient   Following a Speech/Presentation 4   Understanding Ordinary Conversation 4   Taking Medications 2   Remembering Where Things Are Placed or Put Away 2   Remembering List of 4-5 Errands 2   Taking Care of Complicated Tasks 1   Applied Cognition Raw Score 15   Applied Cognition Standardized Score 33 54     Pt will achieve the following goals within 10 days  *Pt will complete grooming with independence  *Pt will complete UB bathing and dressing with supervision  *Pt will complete LB bathing and dressing with supervision   *Pt will complete toileting (hygiene and clothing management) with modified independence  *Pt will complete bed mobility with independence, with bed flat and no side rail to prep for purposeful tasks    *Pt will perform functional transfers with modified independence in order to complete ADL routine  *Pt will increase standing tolerance to 3-5 minutes in order to complete ADL routine  *Pt will complete item retrieval with supervision and RW while demonstrating good safety  *Pt will demonstrate increased activity tolerance in order to complete ADL routine  *Pt will participate in cognitive assessment to determine level of safety for returning home    *Pt will participate in UE therapeutic exercise in order to maximize strength for ADL transfers      NEXAGE, MS, OTR/L

## 2021-08-31 NOTE — ASSESSMENT & PLAN NOTE
· Lactic acid 4 6 on admission   · Not meeting SIRS/sepsis  Having tachycardia, other vital stable  Normal WBC  · No current sign of infection  Patient with elevated blood sugar, possibly dehydration     · Has also been receiving chemotherapy due to pancreatic cancer  · Received 2 L bolus of fluids in the ED   · Continue to monitor lactic acid

## 2021-08-31 NOTE — ASSESSMENT & PLAN NOTE
Malnutrition Findings:        secondary to adenocarcinoma of the pancreas, BMI is 19 6    BMI Findings: Body mass index is 19 6 kg/m²

## 2021-08-31 NOTE — ED NOTES
Patient bedding changed due to soiling it  Patient placed in dry gown       Shola Cain RN  08/30/21 8178

## 2021-08-31 NOTE — ASSESSMENT & PLAN NOTE
· Sodium 122 on admission  · Sodium correction for hyperglycemia is 128   · Baseline appears to be about 128-130  · Oriented x3  · Received 2 L bolus of fluids in the ED   · Continue to monitor

## 2021-08-31 NOTE — ASSESSMENT & PLAN NOTE
Lab Results   Component Value Date    HGBA1C 9 4 (H) 04/08/2021       Recent Labs     08/31/21  0326 08/31/21  0329 08/31/21  0446 08/31/21  0656   POCGLU 76 69 89 142*       Blood Sugar Average: Last 72 hrs:  (P) 191     Elevated blood sugars of 495 present on admission   Not meeting DKA criteria with normal anion gap, beta hydroxybutyrate normal, pH of 7 42, initially treated per DKA protocol with insulin drip with improvement in blood sugars insulin drip discontinued  home regimen is (NovoLog 8 units every a m , NovoLog 6 units b i d  with meals, glargine 20 units at bedtime, metformin 850 mg b i d  and NovoLog sliding scale)  Resume Lantus 22 units HS, this brought 8 units a m , 6 units b i d  With meals, continue insulin sliding scale, diabetic diet  Will optimize and titrate as needed

## 2021-08-31 NOTE — PLAN OF CARE
Problem: OCCUPATIONAL THERAPY ADULT  Goal: Performs self-care activities at highest level of function for planned discharge setting  See evaluation for individualized goals  Description:   Outcome: Progressing  Note: Limitation: Decreased ADL status, Decreased cognition, Decreased endurance, Decreased self-care trans  Prognosis: Good  Assessment: Pt is a 67 y o  male seen for OT evaluation at [address] [place] [address], admitted 8/30/2021 w/ Type 2 diabetes mellitus with hyperglycemia, with long-term current use of insulin ([facility] )  OT completed extensive review of pt's medical and social history  Comorbidities affecting pt's functional performance at time of assessment include: HTN, DM type 2, pancreatic cancer (currently in treatment), malnutrition, neuropathy, anemia, generalized weakness  Personal factors affecting pt at time of IE include:difficulty performing ADLS, difficulty performing IADLS , limited insight into deficits and health management   Prior to admission, pt was living at [facility] and was assisted for ADL/IADL, using RW, receiving therapy  Upon evaluation, pt presents to OT below baseline due to the following performance deficits: weakness, decreased strength, decreased balance, decreased tolerance, impaired memory and decreased safety awareness  Pt to benefit from continued skilled OT tx while in the hospital to address deficits as defined above and maximize level of functional independence w ADL's and functional mobility  Occupational Performance areas to address include: bathing/shower, toilet hygiene, dressing, functional mobility and functional transfers, bed mobility  The patient's raw score on the AM-PAC Daily Activity inpatient short form is 19, standardized score is 40 22, greater than 39 4  Patients at this level are likely to benefit from DC to home  Based on findings, pt is of high complexity   At this time, OT recommendations at time of discharge are return to facility with rehab services  OT Discharge Recommendation: Return to facility with rehabilitation services  OT - OK to Discharge:  Yes

## 2021-08-31 NOTE — PROGRESS NOTES
[place]  Progress Note - Brown Ip [birth year], 67 y o  male MRN: [id number]  Unit/Bed#: -Paty Encounter: [id number]  Primary Care Provider: Duke Mendoza DO   Date and time admitted to hospital: 8/30/2021  7:54 PM    * Type 2 diabetes mellitus with hyperglycemia, with long-term current use of insulin [facility])  Assessment & Plan  Lab Results   Component Value Date    HGBA1C 9 4 (H) 04/08/2021       Recent Labs     08/31/21  0326 08/31/21  0329 08/31/21  0446 08/31/21  0656   POCGLU 76 69 89 142*       Blood Sugar Average: Last 72 hrs:  (P) 191     Elevated blood sugars of 495 present on admission   Not meeting DKA criteria with normal anion gap, beta hydroxybutyrate normal, pH of 7 42, initially treated per DKA protocol with insulin drip with improvement in blood sugars insulin drip discontinued  home regimen is (NovoLog 8 units every a m , NovoLog 6 units b i d  with meals, glargine 20 units at bedtime, metformin 850 mg b i d  and NovoLog sliding scale)  Resume Lantus 22 units HS, this brought 8 units a m , 6 units b i d  With meals, continue insulin sliding scale, diabetic diet  Will optimize and titrate as needed  SIRS (systemic inflammatory response syndrome) (HCC)  Assessment & Plan  Present on admission as evidenced by tachycardia tachypnea likely reactive in the setting of dehydration from hyperglycemia, improved with IV fluids  Lactic acid initially elevated at 0 6, resolved with IV fluid  Urinalysis not suggestive of an infection, chest x-ray performed on 08/30/2021 with clear lungs  Continue to monitor hemodynamics, if spikes fever or develops leukocytosis or continuous tachycardia tachypnea will further evaluate for infection      Hyponatremia  Assessment & Plan  Hypernatremia with Sodium of 122, corrected to 128 blood glucose on admission secondary to dehydration, treated with intravenous fluids and improved as sodium is now 133  Baseline appears to be about 128-130  Discontinue intravenous fluids, encourage oral hydration  Continue sodium tablets 2 g t i d   Monitor sodium    Hyperkalemia  Assessment & Plan  Potassium 5 7 on admission, treated with insulin and resolved  Monitor potassium    Severe protein-calorie malnutrition ([address] )  Assessment & Plan  Malnutrition Findings:        secondary to adenocarcinoma of the pancreas, BMI is 19 6    BMI Findings: Body mass index is 19 6 kg/m²  Adenocarcinoma of pancreas [facility])  Assessment & Plan  · Diagnosed February 2021  · Has been receiving receiving chemotherapy/radiation  · Not a surgical candidate      Essential hypertension  Assessment & Plan  · Continue lisinopril 40 mg daily  · Monitor per protocol    Hyperlipidemia  Assessment & Plan  · Continue statin    Elevated lactic acid level-resolved as of 8/31/2021  Assessment & Plan  Elevated Lactic acid of 4 6 on admission secondary to dehydration, no signs of infection, treated with intravenous fluid repletion and resolved  VTE Pharmacologic Prophylaxis: VTE Score: 3 Moderate Risk (Score 3-4) - Pharmacological DVT Prophylaxis Ordered: rivaroxaban (Xarelto)  Patient Centered Rounds: I performed bedside rounds with nursing staff today  Discussions with Specialists or Other Care Team Provider:     Education and Discussions with Family / Patient: Will update family  Time Spent for Care: 30 minutes  More than 50% of total time spent on counseling and coordination of care as described above  Current Length of Stay: 1 day(s)  Current Patient Status: Inpatient   Certification Statement: The patient will continue to require additional inpatient hospital stay due to hyperglycemia  Discharge Plan: Anticipate discharge tomorrow to rehab facility  Code Status: Level 3 - DNAR and DNI    Subjective:   He looks well  He has no complaints to offer denies cough, shortness of breath or chest pain  No nausea vomiting, no abdominal pain    No urinary symptoms  Objective:     Vitals:   Temp (24hrs), Av 8 °F (36 6 °C), Min:97 °F (36 1 °C), Max:98 5 °F (36 9 °C)    Temp:  [97 °F (36 1 °C)-98 5 °F (36 9 °C)] 98 5 °F (36 9 °C)  HR:  [] 100  Resp:  [16-26] 21  BP: (124-187)/(60-86) 155/86  SpO2:  [97 %-100 %] 98 %  Body mass index is 19 6 kg/m²  Input and Output Summary (last 24 hours): Intake/Output Summary (Last 24 hours) at 2021 0729  Last data filed at 2021 0034  Gross per 24 hour   Intake 1050 ml   Output 1300 ml   Net -250 ml       Physical Exam:   Physical Exam  Vitals and nursing note reviewed  Constitutional:       Appearance: He is well-developed  Comments: Cachectic looking   HENT:      Head: Normocephalic and atraumatic  Eyes:      Conjunctiva/sclera: Conjunctivae normal    Cardiovascular:      Rate and Rhythm: Normal rate and regular rhythm  Heart sounds: No murmur heard  Pulmonary:      Effort: Pulmonary effort is normal  No respiratory distress  Breath sounds: Normal breath sounds  Comments: Right-sided port noted, site is non erythematous or tender  Abdominal:      Palpations: Abdomen is soft  Tenderness: There is no abdominal tenderness  Musculoskeletal:      Cervical back: Neck supple  Right lower leg: No edema  Left lower leg: No edema  Skin:     General: Skin is warm and dry  Neurological:      General: No focal deficit present  Mental Status: He is alert and oriented to person, place, and time  Mental status is at baseline  Cranial Nerves: No cranial nerve deficit  Motor: No weakness            Additional Data:     Labs:  Results from last 7 days   Lab Units 21  0517   WBC Thousand/uL 7 55   HEMOGLOBIN g/dL 8 3*   HEMATOCRIT % 24 9*   PLATELETS Thousands/uL 124*   NEUTROS PCT % 80*   LYMPHS PCT % 4*   MONOS PCT % 15*   EOS PCT % 0     Results from last 7 days   Lab Units 21  0517   SODIUM mmol/L 133*   POTASSIUM mmol/L 4 8   CHLORIDE mmol/L 98*   CO2 mmol/L 24   BUN mg/dL 33*   CREATININE mg/dL 0 89   ANION GAP mmol/L 11   CALCIUM mg/dL 8 1*   ALBUMIN g/dL 2 8*   TOTAL BILIRUBIN mg/dL 0 40   ALK PHOS U/L 87   ALT U/L 29   AST U/L 15   GLUCOSE RANDOM mg/dL 95         Results from last 7 days   Lab Units 08/31/21  0656 08/31/21  0446 08/31/21  0329 08/31/21  0326 08/31/21  0056 08/30/21 2000   POC GLUCOSE mg/dl 142* 89 69 76 275* 495*         Results from last 7 days   Lab Units 08/31/21  0517 08/30/21  2316 08/30/21 2012   LACTIC ACID mmol/L 2 0 4 2* 4 6*       Lines/Drains:  Invasive Devices     Central Venous Catheter Line            Port A Cath 03/26/21 Right Subclavian 157 days          Peripheral Intravenous Line            Peripheral IV 08/30/21 Right;Ventral (anterior) Forearm <1 day                Central Line:  Goal for removal:                Imaging: Reviewed radiology reports from this admission including: chest xray and Personally reviewed the following imaging: chest xray    Recent Cultures (last 7 days):         Last 24 Hours Medication List:   Current Facility-Administered Medications   Medication Dose Route Frequency Provider Last Rate    acetaminophen  650 mg Oral Q6H PRN Leo Espinoza PA-C      atorvastatin  40 mg Oral Daily Leo Espinoza PA-C      escitalopram  5 mg Oral Daily Leo Espinoza PA-C      insulin glargine  20 Units Subcutaneous HS Yumiko Harley PA-C      insulin lispro  2-12 Units Subcutaneous TID AC Yumiko Harley PA-C      insulin lispro  2-12 Units Subcutaneous HS Leo Espinoza PA-C      insulin lispro  6 Units Subcutaneous BID With Meals Leo Espinoza PA-C      insulin lispro  8 Units Subcutaneous Daily With Breakfast Leo Espinoza PA-C      lisinopril  40 mg Oral Daily Leo Espinoza PA-C      oxyCODONE  5 mg Oral Q4H PRN Leo Espinoza PA-C      prochlorperazine  10 mg Oral Q6H PRN Leo Espinoza PA-C      rivaroxaban  15 mg Oral Daily With Breakfast Leo Espinoza PA-C      sodium chloride  2 g Oral TID With Meals Leo Espinoza, DANTE          Today, Patient Was Seen By: Valentino Laurel, MD    **Please Note: This note may have been constructed using a voice recognition system  **

## 2021-08-31 NOTE — ASSESSMENT & PLAN NOTE
Present on admission as evidenced by tachycardia tachypnea likely reactive in the setting of dehydration from hyperglycemia, improved with IV fluids  Lactic acid initially elevated at 0 6, resolved with IV fluid  Urinalysis not suggestive of an infection, chest x-ray performed on 08/30/2021 with clear lungs  Continue to monitor hemodynamics, if spikes fever or develops leukocytosis or continuous tachycardia tachypnea will further evaluate for infection

## 2021-08-31 NOTE — ASSESSMENT & PLAN NOTE
Hypernatremia with Sodium of 122, corrected to 128 blood glucose on admission secondary to dehydration, treated with intravenous fluids and improved as sodium is now 133  Baseline appears to be about 128-130  Discontinue intravenous fluids, encourage oral hydration  Continue sodium tablets 2 g t i d   Monitor sodium

## 2021-08-31 NOTE — ASSESSMENT & PLAN NOTE
Lab Results   Component Value Date    HGBA1C 9 4 (H) 04/08/2021       Recent Labs     08/30/21 2000   POCGLU 495*       Blood Sugar Average: Last 72 hrs:  (P) 495   · Blood sugar 495 on admission  · Not meeting DKA criteria  · Normal anion gap, beta hydroxybutyrate normal, pH elevated  · ED started patient on non DKA insulin drip  · Hold home regimen while on insulin drip (NovoLog 8 units every a m , NovoLog 6 units b i d  with meals, glargine 20 units at bedtime, metformin 850 mg b i d  and NovoLog sliding scale)  · Blood sugar per patient has been elevated since going to short-term rehab    Feels that the food is not diabetic friendly

## 2021-08-31 NOTE — PLAN OF CARE
Problem: Potential for Falls  Goal: Patient will remain free of falls  Description: INTERVENTIONS:  - Educate patient/family on patient safety including physical limitations  - Instruct patient to call for assistance with activity   - Consult OT/PT to assist with strengthening/mobility   - Keep Call bell within reach  - Keep bed low and locked with side rails adjusted as appropriate  - Keep care items and personal belongings within reach  - Initiate and maintain comfort rounds  - Make Fall Risk Sign visible to staff  - Offer Toileting every 1 Hours, in advance of need  - Initiate/Maintain bed alarm  - Obtain necessary fall risk management equipment: bed bound   - Apply yellow socks and bracelet for high fall risk patients  - Consider moving patient to room near nurses station  Outcome: Progressing

## 2021-09-01 PROBLEM — R65.10 SIRS (SYSTEMIC INFLAMMATORY RESPONSE SYNDROME) (HCC): Status: RESOLVED | Noted: 2021-01-01 | Resolved: 2021-01-01

## 2021-09-01 PROBLEM — E87.5 HYPERKALEMIA: Status: RESOLVED | Noted: 2021-01-01 | Resolved: 2021-01-01

## 2021-09-01 NOTE — DISCHARGE INSTRUCTIONS
Diabetic Hyperglycemia   WHAT YOU NEED TO KNOW:   Diabetic hyperglycemia is a blood glucose (sugar) level that is higher than your diabetes care team provider recommends  You may have increased thirst and urinate more often than usual    DISCHARGE INSTRUCTIONS:   Call your local emergency number (911 in the [address]) for any of the following:   · You have a seizure  · You begin to breathe fast or are short of breath  · You become weak and confused  Seek care immediately if:   · Your blood sugar level is over 240 mg/dL and  you have ketones in your urine  · Your breath smells fruity  · You have nausea and are vomiting  · You have symptoms of dehydration, such as dark yellow urine, dry mouth and lips, and dry skin  Call your care team provider if:   · You continue to have higher blood sugar levels than your care team provider recommends  · You have questions or concerns about your condition or care  Medicines:   · Medicines , such as insulin and diabetes pills, decrease blood sugar levels  · Take your medicine as directed  Contact your healthcare provider if you think your medicine is not helping or if you have side effects  Tell him or her if you are allergic to any medicine  Keep a list of the medicines, vitamins, and herbs you take  Include the amounts, and when and why you take them  Bring the list or the pill bottles to follow-up visits  Carry your medicine list with you in case of an emergency  Manage diabetic hyperglycemia:   · If you take diabetes medicine or insulin, take it as directed  Missed or wrong doses can cause your blood sugar to go up  · Tell your care team provider if you continue to have trouble managing your blood sugar  He or she may change the type, amount, or timing of your diabetes medicine or insulin  If you do not take diabetes medicine or insulin, you may need to start  · Work with your care team provider to develop a sick day plan    Illness can cause your blood sugar to rise  A sick day plan helps you control your blood sugar level when you are sick  Prevent diabetic hyperglycemia:   · Check your blood sugar levels regularly  Ask your care team provider how often to check your blood sugar and what your levels should be  · Follow your meal plan  Your blood sugar can go up if you eat a large meal or you eat more carbohydrates than recommended  Work with a dietitian to develop a meal plan that is right for you  · Exercise as directed  Physical activity, such as exercise, can help lower your blood sugar when it is high  It can also keep your blood sugar levels steady over time  Be active for at least 30 minutes, 5 days a week  Include muscle strengthening activities 2 days each week  Do not sit for longer than 30 minutes at a time  Work with your care team provider to create an activity plan  Children should get at least 60 minutes of physical activity each day  · Check your ketones before exercise  if your blood sugar level is above 240 mg/dL  Do not exercise if you have ketones in your urine  because your blood sugar level may rise even more  Ask your healthcare provider how to lower your blood sugar when you have ketones  Follow up with your care team provider as directed: Your care team provider may refer you to a dietitian  He or she can help you manage your blood sugar levels  Write down your questions so you remember to ask them during your visits  © Copyright [employer] 2021 Information is for End User's use only and may not be sold, redistributed or otherwise used for commercial purposes  All illustrations and images included in CareNotes® are the copyrighted property of A D A M , Inc  or [facility] Lyssa Adler   The above information is an  only  It is not intended as medical advice for individual conditions or treatments   Talk to your doctor, nurse or pharmacist before following any medical regimen to see if it is safe and effective for you

## 2021-09-01 NOTE — PROGRESS NOTES
Pastoral Care Progress Note    2021  Patient: Fern Cohen : [id number]  Admission Date & Time: 2021  MRN: [id number] [facility]: [id number]                     Chaplaincy Interventions Utilized:   Relationship Building: Cultivated a relationship of care and support  I know the patient from previous admissions  He said he had terrible pain last night, presumably related to his radiation  He feels he won't live much longer, and that he will see his son, who  of cancer 5 years ago  He shed some tears intermittently  He knows he will be with God    Ritual: Provided prayer     21 0900   Clinical Encounter Type   Visited With Patient   Routine Visit Introduction   Referral To   (census/rounds)   Rastafari Encounters   Rastafari Needs Prayer

## 2021-09-01 NOTE — ASSESSMENT & PLAN NOTE
Hypernatremia with Sodium of 122, corrected to 128 blood glucose on admission secondary to dehydration, treated with intravenous fluids and improved as sodium is now 129  Baseline appears to be about 128-130  Continue sodium tablets 2 g t i d   On discharge

## 2021-09-01 NOTE — DISCHARGE INSTR - AVS FIRST PAGE
Dear Tasha Saucedo,     It was our pleasure to care for you here at [address]  It is our hope that we were always able to exceed the expected standards for your care during your stay  You were hospitalized due to elevated blood sugar levels which was treated with insulin  This has resolved  You are being discharged in stable condition  Mack Stevenson were cared for on the medicine floor by Leticia Morris MD with the [facility] who covers for your primary care physician (PCP), Jesus Mahoney DO, while you were hospitalized  If you have any questions or concerns related to this hospitalization, you may contact us at [phone]  For follow up as well as any medication refills, we recommend that you follow up with your primary care physician  A registered nurse will reach out to you by phone within a few days after your discharge to answer any additional questions that you may have after going home  However, at this time we provide for you here, the most important instructions / recommendations at discharge:     · Notable Medication Adjustments -   · Lantus was increased to 25 units at night  · Pre meal insulin was increased 8 units 3 times a day  · Testing Required after Discharge -   ·   · Important follow up information -   · Primary care physician within 1-2 weeks of discharge  · Hematology/Oncology as scheduled  · Other Instructions -   ·   · Please review this entire after visit summary as additional general instructions including medication list, appointments, activity, diet, any pertinent wound care, and other additional recommendations from your care team that may be provided for you        Sincerely,     Leticia Morris MD

## 2021-09-01 NOTE — ASSESSMENT & PLAN NOTE
Lab Results   Component Value Date    HGBA1C 9 4 (H) 04/08/2021       Recent Labs     08/31/21  1524 08/31/21  2057 08/31/21  2301 09/01/21  0724   POCGLU 290* 233* 188* 212*       Blood Sugar Average: Last 72 hrs:  (P) 211 [id number]     Elevated blood sugars of 495 present on admission   Not meeting DKA criteria with normal anion gap, beta hydroxybutyrate normal, pH of 7 42, initially treated per DKA protocol with insulin drip with improvement in blood sugars insulin drip discontinued    home regimen is (NovoLog 8 units every a m , NovoLog 6 units b i d  with meals, glargine 20 units at bedtime, metformin 850 mg b i d  and NovoLog sliding scale)  Blood sugar is 229 this morning  Insulin titrated and will discharge on Lantus 25 units HS, pre meal insulin increased to 8 units t i d

## 2021-09-01 NOTE — PLAN OF CARE
Problem: Potential for Falls  Goal: Patient will remain free of falls  Description: INTERVENTIONS:  - Educate patient/family on patient safety including physical limitations  - Instruct patient to call for assistance with activity   - Consult OT/PT to assist with strengthening/mobility   - Keep Call bell within reach  - Keep bed low and locked with side rails adjusted as appropriate  - Keep care items and personal belongings within reach  - Initiate and maintain comfort rounds  - Make Fall Risk Sign visible to staff  - Offer Toileting every 1 Hours, in advance of need  - Initiate/Maintain bed alarm  - Obtain necessary fall risk management equipment: walker  - Apply yellow socks and bracelet for high fall risk patients  - Consider moving patient to room near nurses station  Outcome: Progressing

## 2021-09-01 NOTE — DISCHARGE SUMMARY
[place]  Discharge- Som Prater 1945, 67 y o  male MRN: [id number]  Unit/Bed#: -01 Encounter: [id number]  Primary Care Provider: Booker Ramos DO   Date and time admitted to hospital: 8/30/2021  7:54 PM    * Type 2 diabetes mellitus with hyperglycemia, with long-term current use of insulin [facility])  Assessment & Plan  Lab Results   Component Value Date    HGBA1C 9 4 (H) 04/08/2021       Recent Labs     08/31/21  1524 08/31/21  2057 08/31/21  2301 09/01/21  0724   POCGLU 290* 233* 188* 212*       Blood Sugar Average: Last 72 hrs:  (P) 211 [id number]     Elevated blood sugars of 495 present on admission   Not meeting DKA criteria with normal anion gap, beta hydroxybutyrate normal, pH of 7 42, initially treated per DKA protocol with insulin drip with improvement in blood sugars insulin drip discontinued  home regimen is (NovoLog 8 units every a m , NovoLog 6 units b i d  with meals, glargine 20 units at bedtime, metformin 850 mg b i d  and NovoLog sliding scale)  Blood sugar is 229 this morning  Insulin titrated and will discharge on Lantus 25 units HS, pre meal insulin increased to 8 units t i d       SIRS (systemic inflammatory response syndrome) (HCC)-resolved as of 9/1/2021  Assessment & Plan  Present on admission as evidenced by tachycardia tachypnea likely reactive in the setting of dehydration from hyperglycemia, resolved with IV fluids  Lactic acid initially elevated at 0 6, resolved with IV fluid  Urinalysis not suggestive of an infection, chest x-ray performed on 08/30/2021 with clear lungs      Hyponatremia  Assessment & Plan  Hypernatremia with Sodium of 122, corrected to 128 blood glucose on admission secondary to dehydration, treated with intravenous fluids and improved as sodium is now 129  Baseline appears to be about 128-130  Continue sodium tablets 2 g t i d   On discharge      Severe protein-calorie malnutrition [facility])  Assessment & Plan  Malnutrition Findings:   Adult Malnutrition type: Chronic illness  Adult Degree of Malnutrition: Other severe protein calorie malnutrition secondary to adenocarcinoma of the pancreas, BMI is 19 6    BMI Findings: Body mass index is 19 6 kg/m²  Adenocarcinoma of pancreas [facility])  Assessment & Plan  · Diagnosed February 2021  · Has been receiving receiving chemotherapy/radiation  · Not a surgical candidate      Essential hypertension  Assessment & Plan  · Continue lisinopril 40 mg daily on discharge    Hyperlipidemia  Assessment & Plan  · Continue statin on discharge    Elevated lactic acid level-resolved as of 8/31/2021  Assessment & Plan  Elevated Lactic acid of 4 6 on admission secondary to dehydration, no signs of infection, treated with intravenous fluid repletion and resolved        Hyperkalemia-resolved as of 9/1/2021  Assessment & Plan  Potassium 5 7 on admission, treated with insulin and resolved  Monitor potassium      Medical Problems     Resolved Problems  Date Reviewed: 8/30/2021        Resolved    Hyperkalemia 9/1/2021     Resolved by  Agustín Cheek MD    SIRS (systemic inflammatory response syndrome) ([facility] [address] ) 9/1/2021     Resolved by  Agustín Cheek MD    Elevated lactic acid level 8/31/2021     Resolved by  Agustín Cheek MD              Discharging Physician / Practitioner: Agustín Cheek MD  PCP: Duke Mendoza DO  Admission Date:   Admission Orders (From admission, onward)     Ordered        08/30/21 2109  Inpatient Admission  Once                   Discharge Date: 09/01/21    Consultations During Hospital Stay:  ·     Procedures Performed:   ·     Significant Findings / Test Results:   ·     Incidental Findings:   ·      Test Results Pending at Discharge (will require follow up):   ·      Outpatient Tests Requested:  ·     Complications:      Reason for Admission:  Elevated blood sugar levels    Hospital Course:   Stephanie Bhandari is a 67 y o  male patient with past medical history of pancreatic cancer, diabetes who originally presented to the hospital on 8/30/2021 due to elevated blood sugar level of 495  He was started on an insulin drip in the emergency room  This was changed back to subcutaneous insulin  His insulin has been titrated and is now Lantus 25 units HS, pre meal insulin 8 units t i d  He has been discharged in stable condition  Needs to follow-up with PCP within 1 week of discharge  The patient, initially admitted to the hospital as inpatient, was discharged earlier than expected given the following: Improvement in symptoms       Please see above list of diagnoses and related plan for additional information  Condition at Discharge: stable    Discharge Day Visit / Exam:   Subjective:  States he had abdominal pain last night which has since resolved also had some nausea which has also resolved  Denies complaints this morning  Vitals: Blood Pressure: 154/72 (09/01/21 0725)  Pulse: 90 (09/01/21 0725)  Temperature: 97 8 °F (36 6 °C) (09/01/21 0725)  Temp Source: Axillary (09/01/21 0725)  Respirations: 16 (09/01/21 0725)  Weight - Scale: 62 8 kg (138 lb 7 2 oz) (08/31/21 0129)  SpO2: 100 % (09/01/21 0725)  Exam:   Physical Exam  Vitals and nursing note reviewed  Constitutional:       Appearance: He is well-developed  Comments: Cachectic   HENT:      Head: Normocephalic and atraumatic  Eyes:      Conjunctiva/sclera: Conjunctivae normal    Cardiovascular:      Rate and Rhythm: Normal rate and regular rhythm  Heart sounds: No murmur heard  Pulmonary:      Effort: Pulmonary effort is normal  No respiratory distress  Breath sounds: Normal breath sounds  Abdominal:      Palpations: Abdomen is soft  Tenderness: There is no abdominal tenderness  Musculoskeletal:      Cervical back: Neck supple  Right lower leg: No edema  Left lower leg: No edema  Skin:     General: Skin is warm and dry     Neurological:      General: No focal deficit present  Mental Status: He is alert and oriented to person, place, and time  Mental status is at baseline  Cranial Nerves: No cranial nerve deficit  Motor: No weakness  Discussion with Family: Attempted to update  (brother) via phone  Left voicemail  Discharge instructions/Information to patient and family:   See after visit summary for information provided to patient and family  Provisions for Follow-Up Care:  See after visit summary for information related to follow-up care and any pertinent home health orders  Disposition:   Home with VNA Services (Reminder: Complete face to face encounter)    Planned Readmission: no     Discharge Statement:  I spent 55 minutes discharging the patient  This time was spent on the day of discharge  I had direct contact with the patient on the day of discharge  Greater than 50% of the total time was spent examining patient, answering all patient questions, arranging and discussing plan of care with patient as well as directly providing post-discharge instructions  Additional time then spent on discharge activities  Discharge Medications:  See after visit summary for reconciled discharge medications provided to patient and/or family        **Please Note: This note may have been constructed using a voice recognition system**

## 2021-09-01 NOTE — PLAN OF CARE
Problem: NEUROSENSORY - ADULT  Goal: Achieves stable or improved neurological status  Description: INTERVENTIONS  - Monitor and report changes in neurological status  - Monitor vital signs such as temperature, blood pressure, glucose, and any other labs ordered   - Initiate measures to prevent increased intracranial pressure  - Monitor for seizure activity and implement precautions if appropriate      8/31/2021 2049 by Sushila Vargas RN  Outcome: Progressing  8/31/2021 1615 by Sushila Vargas RN  Outcome: Progressing  Goal: Achieves maximal functionality and self care  Description: INTERVENTIONS  - Monitor swallowing and airway patency with patient fatigue and changes in neurological status  - Encourage and assist patient to increase activity and self care     - Encourage visually impaired, hearing impaired and aphasic patients to use assistive/communication devices  8/31/2021 2049 by Sushila Vargas RN  Outcome: Progressing  8/31/2021 1615 by Sushila Vargas RN  Outcome: Progressing     Problem: CARDIOVASCULAR - ADULT  Goal: Maintains optimal cardiac output and hemodynamic stability  Description: INTERVENTIONS:  - Monitor I/O, vital signs and rhythm  - Monitor for S/S and trends of decreased cardiac output  - Administer and titrate ordered vasoactive medications to optimize hemodynamic stability  - Assess quality of pulses, skin color and temperature  - Assess for signs of decreased coronary artery perfusion  - Instruct patient to report change in severity of symptoms  8/31/2021 2049 by Sushila Vargas RN  Outcome: Progressing  8/31/2021 1615 by Sushila Vargas RN  Outcome: Progressing  Goal: Absence of cardiac dysrhythmias or at baseline rhythm  Description: INTERVENTIONS:  - Continuous cardiac monitoring, vital signs, obtain 12 lead EKG if ordered  - Administer antiarrhythmic and heart rate control medications as ordered  - Monitor electrolytes and administer replacement therapy as ordered  8/31/2021 2049 by Ileana Humphrey RN  Outcome: Progressing  8/31/2021 1615 by Ileana Humphrey RN  Outcome: Progressing

## 2021-09-01 NOTE — ASSESSMENT & PLAN NOTE
Present on admission as evidenced by tachycardia tachypnea likely reactive in the setting of dehydration from hyperglycemia, resolved with IV fluids  Lactic acid initially elevated at 0 6, resolved with IV fluid  Urinalysis not suggestive of an infection, chest x-ray performed on 08/30/2021 with clear lungs

## 2021-09-01 NOTE — MALNUTRITION/BMI
This medical record reflects one or more clinical indicators suggestive of malnutrition and/or morbid obesity  Malnutrition Findings:   Adult Malnutrition type: Chronic illness  Adult Degree of Malnutrition: Other severe protein calorie malnutrition  Malnutrition Characteristics: Fat loss, Muscle loss, Inadequate energy, Weight loss (Pt presents with severe protein calorie malnutriton as evidenced by orbital hollowing, prominent clavicle bone, temporal depresssions, < 75% po intake > 1 month and 5% wt loss in 6 weeks(7/21/21 147 lbs, 8/31/21 139 lbs)  )  Treat with CCD2 diet, Glucerna BID  BMI Findings: Body mass index is 19 6 kg/m²  See Nutrition note dated 08/31/21  for additional details  Completed nutrition assessment is viewable in the nutrition documentation

## 2021-09-01 NOTE — TELEPHONE ENCOUNTER
Received message from patient's ALEIDA- Shae  Patient just discharged from [facility] this AM and reports that he is "tired" and does not want to seek any further treatment  She also notes worsening confusion- trying to use his phone as the TV remote and other behaviors that are concerning  Peterskirby Huerta has been trending his CA 19-9 and notes the elevation and Marcie Scott has indicated that he knows his tumor is worsening due to increased pain  Marcie Scott has requested a family meeting tomorrow at his facility with his family at [facility], weather pending  Lorraine Huerta has asked that [facility] join via phone  Will plan on doing this and also ask that MSW is present  Will reach out to his primary oncologist to discuss his recommendations (if any) for ongoing disease directed cares       Weston Lombardi, DO  Palliative and Supportive Care  [phone]

## 2021-09-01 NOTE — ASSESSMENT & PLAN NOTE
Malnutrition Findings:   Adult Malnutrition type: Chronic illness  Adult Degree of Malnutrition: Other severe protein calorie malnutrition secondary to adenocarcinoma of the pancreas, BMI is 19 6    BMI Findings: Body mass index is 19 6 kg/m²

## 2021-09-02 NOTE — PROGRESS NOTES
Family phone meeting was held today with Mahesh's family, friends and   Donny Cervantes expressed his desire to stop chemotherapy and focus on comfort care  Discussed hospice and he is interested in this and family are supportive  Referral placed         Ellis Calderon DO  Palliative and Supportive Care  [phone]

## 2021-09-03 NOTE — ED PROVIDER NOTES
Emergency Department Trauma Note  Taylor Cesar 67 y o  male MRN: [id number]  Unit/Bed#: ED 08/ED 08 Encounter: [id number]      Trauma Alert: Trauma Acuity: Trauma Evaluation  Model of Arrival: Mode of Arrival: BLS via Trauma Squad Name and Number: [id number]  Trauma Team: Current Providers  Attending Provider: Dellar Apley, DO  Consultants: None      History of Present Illness     Chief Complaint:   Chief Complaint   Patient presents with    Fall     Pt had unwitnessed fall on blood thinners     HPI:  Taylor Cesar is a 67 y o  male who presents with unwitnessed fall at skilled nursing facility while on blood thinners  The patient is without complaint  He states that he had a mechanical trip and fall  He cannot recall if he hit his head  The patient denies any pain at this time  Patient denies any headache, nausea vomiting  The patient denies any numbness or tingling     Mechanism:Details of Incident: unwintessed fall Injury Date: 09/02/21 Injury Time: 2000 Injury Occurence Location - [address] [place] [address] Way: [facility]    HPI  Review of Systems   Respiratory: Negative for chest tightness and shortness of breath  Cardiovascular: Negative for chest pain  Gastrointestinal: Positive for abdominal pain  Neurological: Negative for seizures, light-headedness and headaches  All other systems reviewed and are negative  Historical Information     Immunizations: There is no immunization history on file for this patient      Past Medical History:   Diagnosis Date    Cancer [facility])     pancreatic    Diabetes mellitus ([address] )     Frequent urination     GERD (gastroesophageal reflux disease)     Hyperlipidemia     Hypertension     Pancreatic cancer ([address] )     Peripheral neuropathy     Weakness     Weight loss        Family History   Problem Relation Age of Onset    Lymphoma Son     Diabetes Mother     Heart disease Mother      Past Surgical History:   Procedure Laterality Date    APPENDECTOMY      CATARACT EXTRACTION  COLONOSCOPY      SKIN LESION EXCISION      of a wart on right arm    TUNNELED VENOUS PORT PLACEMENT N/A 3/26/2021    Procedure: INSERTION VENOUS PORT (PORT-A-CATH); Surgeon: Ciaran Silva MD;  Location:  MAIN OR;  Service: Surgical Oncology     Social History     Tobacco Use    Smoking status: Former Smoker     Types: Pipe     Quit date: 2/23/2011     Years since quitting: 10 5    Smokeless tobacco: Never Used   Vaping Use    Vaping Use: Never used   Substance Use Topics    Alcohol use: Never    Drug use: Never     E-Cigarette/Vaping    E-Cigarette Use Never User      E-Cigarette/Vaping Substances    Nicotine No     THC No     CBD No     Flavoring No     Other No     Unknown No        Family History: non-contributory    Meds/Allergies   Prior to Admission Medications   Prescriptions Last Dose Informant Patient Reported? Taking?    Accu-Chek FastClix Lancets [facility]  Outside Facility (Specify) Yes No   Sig: USE 1 TO CHECK GLUCOSE TWICE DAILY TO THREE TIMES DAILY   Accu-Chek Guide test strip  Outside Facility (Specify) Yes No   Sig: USE 1 STRIP TWICE DAILY TO THREE TIMES DAILY   Blood Glucose Monitoring Suppl (Accu-Chek Guide) w/Device KIT  Outside Facility (Specify) Yes No   Sig: USE TO CHECK GLUCOSE TWO TO THREE TIMES DAILY   NovoLOG FlexPen 100 units/mL injection pen  Outside Facility (Specify) No No   Sig: Inject as per sliding scale , (  Scale - 150-200 -1 units, 201-250-2 units, 251-300 -3 units, 301-350-4 units, > 350- 5  units) with meals   ReliOn Pen Needles 31G X 6 MM [facility]  Outside Facility (Specify) Yes No   Sig: USE 1 IN THE EVENING   acetaminophen (TYLENOL) 325 mg tablet  Outside Facility (Specify) Yes No   Sig: Take 650 mg by mouth every 4 (four) hours as needed for mild pain    atorvastatin (LIPITOR) 40 mg tablet  Outside Facility (Specify) Yes No   Sig: Take 40 mg by mouth daily   escitalopram (LEXAPRO) 5 mg tablet  Outside Facility (Specify) Yes No   Sig: Take 5 mg by mouth daily insulin aspart (NovoLOG) 100 units/mL injection   Yes No   Sig: Inject 8 Units under the skin every morning   insulin aspart (NovoLOG) 100 units/mL injection   No No   Sig: Inject 8 Units under the skin 3 (three) times a day with meals   insulin glargine (Lantus SoloStar) 100 units/mL injection pen   No No   Sig: Inject 25 units at bedtime   lisinopril (ZESTRIL) 40 mg tablet  Outside Facility (Specify) Yes No   Sig: Take 40 mg by mouth daily   metFORMIN (GLUCOPHAGE) 850 mg tablet  Outside Facility (Specify) Yes No   Sig: TAKE 1 TABLET BY MOUTH TWICE DAILY TAKE WITH MORNING AND EVENING MEAL   oxyCODONE (OXY-IR) 5 MG capsule   Yes No   Sig: Take 5 mg by mouth every 4 (four) hours as needed for moderate pain or severe pain   prochlorperazine (COMPAZINE) 10 mg tablet  Outside Facility (Specify) No No   Sig: Take 1 tablet (10 mg total) by mouth every 6 (six) hours as needed for nausea or vomiting   rivaroxaban (XARELTO) 15 mg tablet  Outside Facility (Specify) No No   Sig: Take 1 tablet (15 mg total) by mouth daily with breakfast for 40 doses   saccharomyces boulardii (FLORASTOR) 250 mg capsule  Outside Facility (Specify) No No   Sig: Take 1 capsule (250 mg total) by mouth 2 (two) times a day   sodium chloride 1 g tablet  Outside Facility (Specify) No No   Sig: Take 2 tablets (2 g total) by mouth 3 (three) times a day with meals      Facility-Administered Medications: None       No Known Allergies    PHYSICAL EXAM    PE limited by:  Nothing    Objective   Vitals:   First set: Temperature: 98 °F (36 7 °C) (09/02/21 2015)  Pulse: 95 (09/02/21 2015)  Respirations: 18 (09/02/21 2015)  Blood Pressure: 139/65 (09/02/21 2015)  SpO2: 98 % (09/02/21 2015)    Primary Survey:   (A) Airway:  Patent  (B) Breathing:  Clear to auscultation bilaterally  (C) Circulation: Pulses:   normal  (D) Disabliity:  GCS Total:  15  (E) Expose:  Completed    Secondary Survey: (Click on Physical Exam tab above)  Physical Exam  Vitals and nursing note reviewed  Constitutional:       General: He is not in acute distress  Appearance: He is well-developed  HENT:      Head: Normocephalic and atraumatic  No raccoon eyes or Corona's sign  Right Ear: External ear normal  No hemotympanum  Left Ear: External ear normal  No hemotympanum  Nose: No nasal deformity  Eyes:      Conjunctiva/sclera: Conjunctivae normal       Pupils: Pupils are equal, round, and reactive to light  Neck:      Trachea: No tracheal deviation  Cardiovascular:      Rate and Rhythm: Normal rate and regular rhythm  Heart sounds: Normal heart sounds  No murmur heard  Pulmonary:      Effort: Pulmonary effort is normal  No respiratory distress  Breath sounds: Normal breath sounds  Chest:      Chest wall: No tenderness  Abdominal:      General: There is no distension  Palpations: Abdomen is soft  Tenderness: There is no abdominal tenderness  There is no guarding or rebound  Musculoskeletal:         General: No tenderness  Normal range of motion  Cervical back: Normal range of motion  Comments: Port-A-Cath noted in the right anterior chest wall   Skin:     General: Skin is warm and dry  Neurological:      Mental Status: He is alert and oriented to person, place, and time  Deep Tendon Reflexes: Reflexes are normal and symmetric  Cervical spine cleared by clinical criteria? No (imaging required)      Invasive Devices     Central Venous Catheter Line            Port A Cath 03/26/21 Right Subclavian 160 days                Lab Results:   Results Reviewed     None                 Imaging Studies:   Direct to CT: Yes  TRAUMA - CT head wo contrast   Final Result by Cortney Henderson DO (09/02 2045)      No acute intracranial abnormality  Microangiopathic changes                    Workstation performed: [id number]         TRAUMA - CT spine cervical wo contrast   Final Result by Cortney Henderson DO (09/02 2052)      No cervical spine fracture or traumatic malalignment  Workstation performed: [id number]               Procedures  Procedures         ED Course           MDM  Number of Diagnoses or Management Options  Accident due to mechanical fall without injury  Adenocarcinoma of pancreas [facility])  Diagnosis management comments: The plan is to obtain a CT of the head and cervical spine to rule out clinically significant injury such as skull fracture, epidural or subdural hematoma  Will also rule out cervical spine fracture or dislocation  The patient (and any family present) verbalized understanding of the discharge instructions and warnings that would necessitate return to the Emergency Department  All questions were answered prior to discharge  Amount and/or Complexity of Data Reviewed  Tests in the radiology section of CPT®: reviewed  Review and summarize past medical records: yes            Disposition  Priority One Transfer: No  Final diagnoses:   Accident due to mechanical fall without injury   Adenocarcinoma of pancreas [facility])     Time reflects when diagnosis was documented in both MDM as applicable and the Disposition within this note     Time User Action Codes Description Comment    9/2/2021  9:06 PM Siva Bains Add [N81  XXXA] Accident due to mechanical fall without injury     9/2/2021  9:07 PM Isamar RICHARDSON Add [C25 9] Adenocarcinoma of pancreas [facility])       ED Disposition     ED Disposition Condition Date/Time Comment    Discharge Stable Thu Sep 2, 2021  9:06 PM Som Prater discharge to home/self care              Follow-up Information     Follow up With Specialties Details Why Contact Mj Ramos DO Internal Medicine Schedule an appointment as soon as possible for a visit  As needed, For further evaluation [address] [place] [address] Shahida Nelsonkirby [address]  [phone]          Discharge Medication List as of 9/2/2021  9:09 PM      CONTINUE these medications which have NOT CHANGED    Details   Accu-Chek FastClix Lancets MISC USE 1 TO CHECK GLUCOSE TWICE DAILY TO THREE TIMES DAILY, Historical Med      Accu-Chek Guide test strip USE 1 STRIP TWICE DAILY TO THREE TIMES DAILY, Historical Med      acetaminophen (TYLENOL) 325 mg tablet Take 650 mg by mouth every 4 (four) hours as needed for mild pain , Historical Med      atorvastatin (LIPITOR) 40 mg tablet Take 40 mg by mouth daily, Starting Sun 12/20/2020, Historical Med      Blood Glucose Monitoring Suppl (Accu-Chek Guide) w/Device KIT USE TO CHECK GLUCOSE TWO TO THREE TIMES DAILY, Historical Med      escitalopram (LEXAPRO) 5 mg tablet Take 5 mg by mouth daily, Historical Med      !! insulin aspart (NovoLOG) 100 units/mL injection Inject 8 Units under the skin every morning, Historical Med      !! insulin aspart (NovoLOG) 100 units/mL injection Inject 8 Units under the skin 3 (three) times a day with meals, Starting Wed 9/1/2021, Normal      insulin glargine (Lantus SoloStar) 100 units/mL injection pen Inject 25 units at bedtime, Normal      lisinopril (ZESTRIL) 40 mg tablet Take 40 mg by mouth daily, Historical Med      metFORMIN (GLUCOPHAGE) 850 mg tablet TAKE 1 TABLET BY MOUTH TWICE DAILY TAKE WITH MORNING AND EVENING MEAL, Historical Med      NovoLOG FlexPen 100 units/mL injection pen Inject as per sliding scale , (  Scale - 150-200 -1 units, 201-250-2 units, 251-300 -3 units, 301-350-4 units, > 350- 5  units) with meals, No Print      oxyCODONE (OXY-IR) 5 MG capsule Take 5 mg by mouth every 4 (four) hours as needed for moderate pain or severe pain, Historical Med      prochlorperazine (COMPAZINE) 10 mg tablet Take 1 tablet (10 mg total) by mouth every 6 (six) hours as needed for nausea or vomiting, Starting Wed 3/17/2021, Normal      ReliOn Pen Needles 31G X 6 MM MISC USE 1 IN THE EVENING, Historical Med      rivaroxaban (XARELTO) 15 mg tablet Take 1 tablet (15 mg total) by mouth daily with breakfast for 40 doses, Starting Mon 7/19/2021, Until Sat 8/28/2021, No Print      saccharomyces boulardii (FLORASTOR) 250 mg capsule Take 1 capsule (250 mg total) by mouth 2 (two) times a day, Starting Mon 7/19/2021, No Print      sodium chloride 1 g tablet Take 2 tablets (2 g total) by mouth 3 (three) times a day with meals, Starting Mon 7/19/2021, Until Wed 8/18/2021, No Print       !! - Potential duplicate medications found  Please discuss with provider  No discharge procedures on file      PDMP Review       Value Time User    PDMP Reviewed  Yes 9/1/2021  9:18 AM Emmanuel Castellanos MD          ED Provider  Electronically Signed by         Stephanie Rahman DO  09/02/21 2741

## 2021-09-03 NOTE — TELEPHONE ENCOUNTER
Morgan Ramsey was scheduled for an RD phone follow up today (9/3/2021) and was unable to attend his appointment (no answer)  Chart reviewed  Per Palliative medicine's note : "[place] expressed his desire to stop chemotherapy and focus on comfort care  Discussed hospice and he is interested in this and family are supportive  Referral placed "    A call was placed and a voice message was left encouraging him to call back and reschedule his appointment at a more convenient time for him if he desires/pending hospice plan  RD contact information was provided

## 2021-09-08 NOTE — ED PROVIDER NOTES
History  Chief Complaint   Patient presents with    Fall     Pt to ED via EMS      13-year-old male presents for evaluation of mechanical fall that occurred shortly prior to arrival   Patient was attempting to urinate and then tripped and fell into the wall  Patient states he hit the right side of his head on the table and his right elbow on the floor  Laceration noted to right forearm  Hematoma to right forehead and tenderness of right anterior lateral ribs  No CT L-spine tenderness  Patient denies preceding chest pain, shortness of breath  Per chart review, patient is on Xarelto  Trauma evaluation called  Prior to Admission Medications   Prescriptions Last Dose Informant Patient Reported? Taking?    Accu[place]k FastClix [place]ets [facility] [place] (Specify) Yes No   Sig: USE 1 TO CHECK GLUCOSE TWICE DAILY TO THREE TIMES DAILY   Accu-Chek Guide test strip  Outside Facility (Specify) Yes No   Sig: USE 1 STRIP TWICE DAILY TO THREE TIMES DAILY   Blood Glucose Monitoring Suppl (Accu-Chek Guide) w/Device KIT  Outside Facility (Specify) Yes No   Sig: USE TO CHECK GLUCOSE TWO TO THREE TIMES DAILY   NovoLOG FlexPen 100 units/mL injection pen  Outside Facility (Specify) No No   Sig: Inject as per sliding scale , (  Scale - 150-200 -1 units, 201-250-2 units, 251-300 -3 units, 301-350-4 units, > 350- 5  units) with meals   ReliOn Pen Needles 31G X 6 MM [facility] [place]  Outside [place] (Specify) Yes No   Sig: USE 1 IN THE EVENING   acetaminophen (TYLENOL) 325 mg tablet  Outside Facility (Specify) Yes No   Sig: Take 650 mg by mouth every 4 (four) hours as needed for mild pain    atorvastatin (LIPITOR) 40 mg tablet  Outside Facility (Specify) Yes No   Sig: Take 40 mg by mouth daily   escitalopram (LEXAPRO) 5 mg tablet  Outside Facility (Specify) Yes No   Sig: Take 5 mg by mouth daily   insulin aspart (NovoLOG) 100 units/mL injection   Yes No   Sig: Inject 8 Units under the skin every morning   insulin aspart (NovoLOG) 100 units/mL injection   No No   Sig: Inject 8 Units under the skin 3 (three) times a day with meals   insulin glargine (Lantus SoloStar) 100 units/mL injection pen   No No   Sig: Inject 25 units at bedtime   lisinopril (ZESTRIL) 40 mg tablet  Outside Facility (Specify) Yes No   Sig: Take 40 mg by mouth daily   metFORMIN (GLUCOPHAGE) 850 mg tablet  Outside Facility (Specify) Yes No   Sig: TAKE 1 TABLET BY MOUTH TWICE DAILY TAKE WITH MORNING AND EVENING MEAL   oxyCODONE (OXY-IR) 5 MG capsule   Yes No   Sig: Take 5 mg by mouth every 4 (four) hours as needed for moderate pain or severe pain   prochlorperazine (COMPAZINE) 10 mg tablet  Outside Facility (Specify) No No   Sig: Take 1 tablet (10 mg total) by mouth every 6 (six) hours as needed for nausea or vomiting   rivaroxaban (XARELTO) 15 mg tablet  Outside Facility (Specify) No No   Sig: Take 1 tablet (15 mg total) by mouth daily with breakfast for 40 doses   saccharomyces boulardii (FLORASTOR) 250 mg capsule  Outside Facility (Specify) No No   Sig: Take 1 capsule (250 mg total) by mouth 2 (two) times a day   sodium chloride 1 g tablet  Outside Facility (Specify) No No   Sig: Take 2 tablets (2 g total) by mouth 3 (three) times a day with meals      Facility-Administered Medications: None       Past Medical History:   Diagnosis Date    Cancer ([facility] [address] )     pancreatic    Diabetes mellitus (HCC)     Frequent urination     GERD (gastroesophageal reflux disease)     Hyperlipidemia     Hypertension     Pancreatic cancer (HCC)     Peripheral neuropathy     Weakness     Weight loss        Past Surgical History:   Procedure Laterality Date    APPENDECTOMY      CATARACT EXTRACTION      COLONOSCOPY      SKIN LESION EXCISION      of a wart on right arm    TUNNELED VENOUS PORT PLACEMENT N/A 3/26/2021    Procedure: INSERTION VENOUS PORT (PORT-A-CATH);   Surgeon: Magan Bowen MD;  Location:  MAIN OR;  Service: Surgical Oncology       Family History   Problem Relation Age of Onset    Lymphoma Son     Diabetes Mother     Heart disease Mother      I have reviewed and agree with the history as documented  E-Cigarette/Vaping    E-Cigarette Use Never User      E-Cigarette/Vaping Substances    Nicotine No     THC No     CBD No     Flavoring No     Other No     Unknown No      Social History     Tobacco Use    Smoking status: Former Smoker     Types: Pipe     Quit date: 2/23/2011     Years since quitting: 10 5    Smokeless tobacco: Never Used   Vaping Use    Vaping Use: Never used   Substance Use Topics    Alcohol use: Never    Drug use: Never       Review of Systems   Constitutional: Negative for fever  Respiratory: Negative for shortness of breath  Skin: Positive for wound  All other systems reviewed and are negative  Physical Exam  Physical Exam  Vitals and nursing note reviewed  Constitutional:       General: He is not in acute distress  Appearance: He is well-developed  Comments: Airway intact  Bilateral breath sounds  Intact pulses  GCS 15  Patient fully exposed  HENT:      Head: Normocephalic and atraumatic  Right Ear: External ear normal       Left Ear: External ear normal       Nose: Nose normal    Eyes:      General: No scleral icterus  Pulmonary:      Effort: Pulmonary effort is normal  No respiratory distress  Chest:      Chest wall: Tenderness present  Abdominal:      General: There is no distension  Palpations: Abdomen is soft  Musculoskeletal:         General: No deformity  Normal range of motion  Cervical back: Normal range of motion and neck supple  Comments: 5/5 strength of bilateral upper and lower extremities  Complex laceration noted to right forearm   Skin:     General: Skin is warm  Findings: No rash  Neurological:      General: No focal deficit present  Mental Status: He is alert  Mental status is at baseline        Gait: Gait normal    Psychiatric:         Mood and Affect: Mood normal          Vital Signs  ED Triage Vitals   Temperature Pulse Respirations Blood Pressure SpO2   09/07/21 2120 09/07/21 2120 09/07/21 2120 09/07/21 2120 09/07/21 2120   97 6 °F (36 4 °C) 102 19 157/74 99 %      Temp src Heart Rate Source Patient Position - Orthostatic VS BP Location FiO2 (%)   -- 09/07/21 2120 09/07/21 2122 -- --    Monitor Lying        Pain Score       09/07/21 2126       Worst Possible Pain           Vitals:    09/07/21 2122 09/07/21 2210 09/07/21 2230 09/07/21 2300   BP: 157/74 (!) 200/85 (!) 201/87 (!) 185/100   Pulse: 101 91 98 97   Patient Position - Orthostatic VS: Lying Lying           Visual Acuity  Visual Acuity      Most Recent Value   L Pupil Size (mm)  3   R Pupil Size (mm)  3          ED Medications  Medications   tetanus-diphtheria-acellular pertussis (BOOSTRIX) IM injection 0 5 mL (0 5 mL Intramuscular Given 9/7/21 2210)   iohexol (OMNIPAQUE) 350 MG/ML injection (SINGLE-DOSE) 100 mL (100 mL Intravenous Given 9/7/21 2149)   lidocaine-epinephrine (XYLOCAINE/EPINEPHRINE) 1 %-1:100,000 injection 20 mL (20 mL Infiltration Given by Other 9/7/21 2332)       Diagnostic Studies  Results Reviewed     Procedure Component Value Units Date/Time    CBC and differential [[id number]]  (Abnormal) Collected: 09/07/21 2128    Lab Status: Final result Specimen: Blood from Arm, Left Updated: 09/07/21 2259     WBC 5 41 Thousand/uL      RBC 2 65 Million/uL      Hemoglobin 8 3 g/dL      Hematocrit 25 5 %      MCV 96 fL      MCH 31 3 pg      MCHC 32 5 g/dL      RDW 17 3 %      MPV 9 1 fL      Platelets 267 Thousands/uL     Narrative: This is an appended report  These results have been appended to a previously verified report      Manual Differential(PHLEBS Do Not Order) [[id number]]  (Abnormal) Collected: 09/07/21 2128    Lab Status: Final result Specimen: Blood from Arm, Left Updated: 09/07/21 2259     Segmented % 73 %      Bands % 3 %      Lymphocytes % 8 %      Monocytes % 9 %      Eosinophils, % 6 %      Basophils % 0 %      Plasma Cells % 1 %      Absolute Neutrophils 4 11 Thousand/uL      Lymphocytes Absolute 0 43 Thousand/uL      Monocytes Absolute 0 49 Thousand/uL      Eosinophils Absolute 0 32 Thousand/uL      Basophils Absolute 0 00 Thousand/uL      Total Counted --     RBC Morphology Present     Anisocytosis Present     Basophilic Stippling Present     Polychromasia Present     Platelet Estimate Adequate    Urine Microscopic [[id number]]  (Normal) Collected: 09/07/21 2221    Lab Status: Final result Specimen: Urine, Clean Catch Updated: 09/07/21 2258     RBC, UA 2-4 /hpf      WBC, UA None Seen /hpf      Epithelial Cells None Seen /hpf      Bacteria, UA Occasional /hpf     Troponin I [[id number]]  (Normal) Collected: 09/07/21 2159    Lab Status: Final result Specimen: Blood from Arm, Left Updated: 09/07/21 2231     Troponin I <0 02 ng/mL     UA w Reflex to Microscopic w Reflex to Culture [[id number]]  (Abnormal) Collected: 09/07/21 2221    Lab Status: Final result Specimen: Urine, Clean Catch Updated: 09/07/21 2228     Color, UA Yellow     Clarity, UA Clear     Specific Gravity, UA 1 010     pH, UA 7 0     Leukocytes, UA Negative     Nitrite, UA Negative     Protein, UA Negative mg/dl      Glucose,  (1/2%) mg/dl      Ketones, UA Negative mg/dl      Urobilinogen, UA 0 2 E U /dl      Bilirubin, UA Negative     Blood, UA Trace-Intact    Protime-INR [[id number]]  (Abnormal) Collected: 09/07/21 2128    Lab Status: Final result Specimen: Blood from Arm, Left Updated: 09/07/21 2210     Protime 15 0 seconds      INR 1 18    APTT [[id number]]  (Normal) Collected: 09/07/21 2128    Lab Status: Final result Specimen: Blood from Arm, Left Updated: 09/07/21 2210     PTT 35 seconds     Basic metabolic panel [[id number]]  (Abnormal) Collected: 09/07/21 2128    Lab Status: Final result Specimen: Blood from Arm, Left Updated: 09/07/21 2208     Sodium 126 mmol/L      Potassium 4 5 mmol/L      Chloride 94 mmol/L      CO2 25 mmol/L      ANION GAP 7 mmol/L      BUN 35 mg/dL      Creatinine 0 94 mg/dL      Glucose 174 mg/dL      Calcium 8 4 mg/dL      eGFR 78 ml/min/1 73sq m     Narrative:      [place] guidelines for Chronic Kidney Disease (CKD):     Stage 1 with normal or high GFR (GFR > 90 mL/min/1 73 square meters)    Stage 2 Mild CKD (GFR = 60-89 mL/min/1 73 square meters)    Stage 3A Moderate CKD (GFR = 45-59 mL/min/1 73 square meters)    Stage 3B Moderate CKD (GFR = 30-44 mL/min/1 73 square meters)    Stage 4 Severe CKD (GFR = 15-29 mL/min/1 73 square meters)    Stage 5 End Stage CKD (GFR <15 mL/min/1 73 square meters)  Note: GFR calculation is accurate only with a steady state creatinine                 TRAUMA - CT head wo contrast   Final Result by Joann Cranker, DO (09/07 2312)      Small right frontal scalp hematoma, no calvarial fracture or acute intracranial process is seen  Small to moderate amount of cerumen in the right external auditory canal, could cause pain and/or dizziness  Correlation with the patient's symptoms recommended      Other findings as above  CT CERVICAL SPINE - WITHOUT CONTRAST      INDICATION:   TRAUMA  COMPARISON:  CT cervical spine dated 9/2/2021  TECHNIQUE:  CT examination of the cervical spine was performed without intravenous contrast   Contiguous axial images were obtained  Sagittal and coronal reconstructions were performed  Radiation dose length product (DLP) for this visit:  892 mGy-cm  (accession [id number]), 306 mGy-cm  (accession [id number]), 244 mGy-cm  (accession [id number])  This examination, like all CT scans performed in the [facility], was performed    utilizing techniques to minimize radiation dose exposure, including the use of iterative reconstruction and automated exposure control  IMAGE QUALITY:  Diagnostic  FINDINGS:      ALIGNMENT:  Normal alignment of the cervical spine   No subluxation  VERTEBRAL BODIES:  No acute fracture      DEGENERATIVE CHANGES:  Intervertebral disc space narrowing at C5/C6 and C6/C7 with anterior, marginal, and uncovertebral osteophytosis at these levels  Facet joint arthropathy at multiple levels, more prominent on the right      PREVERTEBRAL AND PARASPINAL SOFT TISSUES: Incidental discovery of one or more thyroid nodule(s) measuring less than 1 5 cm and without suspicious features is noted in this patient who is above 28years old; according to guidelines published in the    February 2015 white paper on incidental thyroid nodules in the Journal of the [employer] of Radiology [facility]), no further evaluation is recommended  THORACIC INLET:  Please refer to the concurrent chest, abdomen, and pelvic CT report for description of the thoracic inlet findings  IMPRESSION:      Degenerative changes as described no evidence of acute cervical spine injury  CT CHEST, ABDOMEN AND PELVIS WITH IV CONTRAST      INDICATION: Trauma  Fall      COMPARISON: CT abdomen pelvis dated 7/14/2021      TECHNIQUE: CT examination of the chest, abdomen and pelvis was performed  Reformatted images were created in axial, sagittal, and coronal planes  Radiation dose length product (DLP) for this visit:  892 mGy-cm  (accession [id number]), 276 mGy-cm  (accession [id number]), 244 mGy-cm  (accession [id number])  This examination, like all CT scans performed in the [facility], was performed    utilizing techniques to minimize radiation dose exposure, including the use of iterative reconstruction and automated exposure control  IV Contrast:  100 mL of iohexol (OMNIPAQUE)   Enteric Contrast:  Enteric contrast was not administered  CHEST      LUNGS:  Lungs appear grossly clear  PLEURA:  Unremarkable  HEART/GREAT VESSELS: Moderate coronary atherosclerosis  Mild aortic atherosclerosis  No aortic aneurysm    Heart appears normal in size  MEDIASTINUM AND SHAILESH:  Right-sided Mediport terminates in the SVC      CHEST WALL AND LOWER NECK: Incidental discovery of one or more thyroid nodule(s) measuring less than 1 5 cm and without suspicious features is noted in this patient who is above 27years old; according to guidelines published in the February 2015 white    paper on incidental thyroid nodules in the Journal of the [employer] of Radiology Naz Sierra), no further evaluation is recommended  ABDOMEN: Some images are suboptimal secondary to patient motion  Subject to this,      LIVER/BILIARY TREE:  Grossly unremarkable  GALLBLADDER:  No calcified gallstones  No pericholecystic inflammatory change  SPLEEN:  Unremarkable  PANCREAS:  Atrophy of the pancreatic body and tail with prominent dilatation of the pancreatic duct  The more proximal pancreatic duct appears more normal in caliber, this raises suspicion for a possible pancreatic lesion /mass in the pancreatic neck  Consider nonemergent abdominal MR with and without contrast for further evaluation if not recently performed  ADRENAL GLANDS:  1 1 cm nodule in the right adrenal gland, nonspecific  KIDNEYS/URETERS:  Unremarkable  No hydronephrosis  STOMACH AND BOWEL:  Scattered colonic diverticulosis  No discrete evidence of acute diverticulitis  No evidence of bowel obstruction  APPENDIX:  No findings to suggest appendicitis  ABDOMINOPELVIC CAVITY:  No ascites or free intraperitoneal air  No lymphadenopathy  VESSELS:  Mild to moderate atherosclerosis; no aortic aneurysm      PELVIS      REPRODUCTIVE ORGANS:  Unremarkable for patient's age  URINARY BLADDER:  Unremarkable  ABDOMINAL WALL/INGUINAL REGIONS:  Unremarkable  OSSEOUS STRUCTURES:  Multilevel degenerative changes of the spine  No acute fracture or destructive osseous lesion           IMPRESSION:        Some images are suboptimal secondary to patient motion, but subject to this, no acute process seen  No evidence of acute traumatic injury  Atrophy of the pancreatic body and tail with prominent dilatation of the pancreatic duct  The more proximal pancreatic duct appears more normal in caliber, this raises suspicion for a possible pancreatic lesion /mass in the pancreatic neck (including    pancreatic neoplasm) series  Consider nonemergent abdominal MR with and without contrast for further evaluation if not recently performed  Coronary atherosclerosis, right adrenal nodule, colonic diverticulosis without evidence of acute diverticulitis, and other findings as above  Workstation performed: [id number]         TRAUMA - CT spine cervical wo contrast   Final Result by Oliva Santiago DO (09/07 2312)      Small right frontal scalp hematoma, no calvarial fracture or acute intracranial process is seen  Small to moderate amount of cerumen in the right external auditory canal, could cause pain and/or dizziness  Correlation with the patient's symptoms recommended      Other findings as above  CT CERVICAL SPINE - WITHOUT CONTRAST      INDICATION:   TRAUMA  COMPARISON:  CT cervical spine dated 9/2/2021  TECHNIQUE:  CT examination of the cervical spine was performed without intravenous contrast   Contiguous axial images were obtained  Sagittal and coronal reconstructions were performed  Radiation dose length product (DLP) for this visit:  892 mGy-cm  (accession [id number]), 524 mGy-cm  (accession [id number]), 244 mGy-cm  (accession [id number])  This examination, like all CT scans performed in the [facility], was performed    utilizing techniques to minimize radiation dose exposure, including the use of iterative reconstruction and automated exposure control  IMAGE QUALITY:  Diagnostic  FINDINGS:      ALIGNMENT:  Normal alignment of the cervical spine  No subluxation        VERTEBRAL BODIES:  No acute fracture DEGENERATIVE CHANGES:  Intervertebral disc space narrowing at C5/C6 and C6/C7 with anterior, marginal, and uncovertebral osteophytosis at these levels  Facet joint arthropathy at multiple levels, more prominent on the right      PREVERTEBRAL AND PARASPINAL SOFT TISSUES: Incidental discovery of one or more thyroid nodule(s) measuring less than 1 5 cm and without suspicious features is noted in this patient who is above 27years old; according to guidelines published in the    February 2015 white paper on incidental thyroid nodules in the Journal of the [employer] of Radiology Robbie Melton), no further evaluation is recommended  THORACIC INLET:  Please refer to the concurrent chest, abdomen, and pelvic CT report for description of the thoracic inlet findings  IMPRESSION:      Degenerative changes as described no evidence of acute cervical spine injury  CT CHEST, ABDOMEN AND PELVIS WITH IV CONTRAST      INDICATION: Trauma  Fall      COMPARISON: CT abdomen pelvis dated 7/14/2021      TECHNIQUE: CT examination of the chest, abdomen and pelvis was performed  Reformatted images were created in axial, sagittal, and coronal planes  Radiation dose length product (DLP) for this visit:  892 mGy-cm  (accession [id number]), 936 mGy-cm  (accession [id number]), 244 mGy-cm  (accession [id number])  This examination, like all CT scans performed in the [facility], was performed    utilizing techniques to minimize radiation dose exposure, including the use of iterative reconstruction and automated exposure control  IV Contrast:  100 mL of iohexol (OMNIPAQUE)   Enteric Contrast:  Enteric contrast was not administered  CHEST      LUNGS:  Lungs appear grossly clear  PLEURA:  Unremarkable  HEART/GREAT VESSELS: Moderate coronary atherosclerosis  Mild aortic atherosclerosis  No aortic aneurysm  Heart appears normal in size        MEDIASTINUM AND SHAILESH:  Right-sided Mediport terminates in the SVC      CHEST WALL AND LOWER NECK: Incidental discovery of one or more thyroid nodule(s) measuring less than 1 5 cm and without suspicious features is noted in this patient who is above 27years old; according to guidelines published in the February 2015 white    paper on incidental thyroid nodules in the Journal of the [address] [place] [address] of Radiology Monica Almazan), no further evaluation is recommended  ABDOMEN: Some images are suboptimal secondary to patient motion  Subject to this,      LIVER/BILIARY TREE:  Grossly unremarkable  GALLBLADDER:  No calcified gallstones  No pericholecystic inflammatory change  SPLEEN:  Unremarkable  PANCREAS:  Atrophy of the pancreatic body and tail with prominent dilatation of the pancreatic duct  The more proximal pancreatic duct appears more normal in caliber, this raises suspicion for a possible pancreatic lesion /mass in the pancreatic neck  Consider nonemergent abdominal MR with and without contrast for further evaluation if not recently performed  ADRENAL GLANDS:  1 1 cm nodule in the right adrenal gland, nonspecific  KIDNEYS/URETERS:  Unremarkable  No hydronephrosis  STOMACH AND BOWEL:  Scattered colonic diverticulosis  No discrete evidence of acute diverticulitis  No evidence of bowel obstruction  APPENDIX:  No findings to suggest appendicitis  ABDOMINOPELVIC CAVITY:  No ascites or free intraperitoneal air  No lymphadenopathy  VESSELS:  Mild to moderate atherosclerosis; no aortic aneurysm      PELVIS      REPRODUCTIVE ORGANS:  Unremarkable for patient's age  URINARY BLADDER:  Unremarkable  ABDOMINAL WALL/INGUINAL REGIONS:  Unremarkable  OSSEOUS STRUCTURES:  Multilevel degenerative changes of the spine  No acute fracture or destructive osseous lesion  IMPRESSION:        Some images are suboptimal secondary to patient motion, but subject to this, no acute process seen    No evidence of acute traumatic injury  Atrophy of the pancreatic body and tail with prominent dilatation of the pancreatic duct  The more proximal pancreatic duct appears more normal in caliber, this raises suspicion for a possible pancreatic lesion /mass in the pancreatic neck (including    pancreatic neoplasm) series  Consider nonemergent abdominal MR with and without contrast for further evaluation if not recently performed  Coronary atherosclerosis, right adrenal nodule, colonic diverticulosis without evidence of acute diverticulitis, and other findings as above  Workstation performed: [id number]         TRAUMA - CT chest abdomen pelvis w contrast   Final Result by Mague Rios DO (09/07 2312)      Small right frontal scalp hematoma, no calvarial fracture or acute intracranial process is seen  Small to moderate amount of cerumen in the right external auditory canal, could cause pain and/or dizziness  Correlation with the patient's symptoms recommended      Other findings as above  CT CERVICAL SPINE - WITHOUT CONTRAST      INDICATION:   TRAUMA  COMPARISON:  CT cervical spine dated 9/2/2021  TECHNIQUE:  CT examination of the cervical spine was performed without intravenous contrast   Contiguous axial images were obtained  Sagittal and coronal reconstructions were performed  Radiation dose length product (DLP) for this visit:  892 mGy-cm  (accession [id number]), 426 mGy-cm  (accession [id number]), 244 mGy-cm  (accession [id number])  This examination, like all CT scans performed in the [facility], was performed    utilizing techniques to minimize radiation dose exposure, including the use of iterative reconstruction and automated exposure control  IMAGE QUALITY:  Diagnostic  FINDINGS:      ALIGNMENT:  Normal alignment of the cervical spine  No subluxation        VERTEBRAL BODIES:  No acute fracture      DEGENERATIVE CHANGES:  Intervertebral disc space narrowing at C5/C6 and C6/C7 with anterior, marginal, and uncovertebral osteophytosis at these levels  Facet joint arthropathy at multiple levels, more prominent on the right      PREVERTEBRAL AND PARASPINAL SOFT TISSUES: Incidental discovery of one or more thyroid nodule(s) measuring less than 1 5 cm and without suspicious features is noted in this patient who is above 27years old; according to guidelines published in the    February 2015 white paper on incidental thyroid nodules in the Journal of the [employer] of [employer]iology Robbie Melton), no further evaluation is recommended  THORACIC INLET:  Please refer to the concurrent chest, abdomen, and pelvic CT report for description of the thoracic inlet findings  IMPRESSION:      Degenerative changes as described no evidence of acute cervical spine injury  CT CHEST, ABDOMEN AND PELVIS WITH IV CONTRAST      INDICATION: Trauma  Fall      COMPARISON: CT abdomen pelvis dated 7/14/2021      TECHNIQUE: CT examination of the chest, abdomen and pelvis was performed  Reformatted images were created in axial, sagittal, and coronal planes  Radiation dose length product (DLP) for this visit:  892 mGy-cm  (accession [id number]), 187 mGy-cm  (accession [id number]), 244 mGy-cm  (accession [id number])  This examination, like all CT scans performed in the [facility], was performed    utilizing techniques to minimize radiation dose exposure, including the use of iterative reconstruction and automated exposure control  IV Contrast:  100 mL of iohexol (OMNIPAQUE)   Enteric Contrast:  Enteric contrast was not administered  CHEST      LUNGS:  Lungs appear grossly clear  PLEURA:  Unremarkable  HEART/GREAT VESSELS: Moderate coronary atherosclerosis  Mild aortic atherosclerosis  No aortic aneurysm  Heart appears normal in size        MEDIASTINUM AND SHAILESH:  Right-sided Mediport terminates in the SVC      CHEST WALL AND LOWER NECK: Incidental discovery of one or more thyroid nodule(s) measuring less than 1 5 cm and without suspicious features is noted in this patient who is above 28years old; according to guidelines published in the February 2015 white    paper on incidental thyroid nodules in the Journal of the [employer] of Radiology [facility]), no further evaluation is recommended  ABDOMEN: Some images are suboptimal secondary to patient motion  Subject to this,      LIVER/BILIARY TREE:  Grossly unremarkable  GALLBLADDER:  No calcified gallstones  No pericholecystic inflammatory change  SPLEEN:  Unremarkable  PANCREAS:  Atrophy of the pancreatic body and tail with prominent dilatation of the pancreatic duct  The more proximal pancreatic duct appears more normal in caliber, this raises suspicion for a possible pancreatic lesion /mass in the pancreatic neck  Consider nonemergent abdominal MR with and without contrast for further evaluation if not recently performed  ADRENAL GLANDS:  1 1 cm nodule in the right adrenal gland, nonspecific  KIDNEYS/URETERS:  Unremarkable  No hydronephrosis  STOMACH AND BOWEL:  Scattered colonic diverticulosis  No discrete evidence of acute diverticulitis  No evidence of bowel obstruction  APPENDIX:  No findings to suggest appendicitis  ABDOMINOPELVIC CAVITY:  No ascites or free intraperitoneal air  No lymphadenopathy  VESSELS:  Mild to moderate atherosclerosis; no aortic aneurysm      PELVIS      REPRODUCTIVE ORGANS:  Unremarkable for patient's age  URINARY BLADDER:  Unremarkable  ABDOMINAL WALL/INGUINAL REGIONS:  Unremarkable  OSSEOUS STRUCTURES:  Multilevel degenerative changes of the spine  No acute fracture or destructive osseous lesion  IMPRESSION:        Some images are suboptimal secondary to patient motion, but subject to this, no acute process seen  No evidence of acute traumatic injury        Atrophy of the pancreatic body and tail with prominent dilatation of the pancreatic duct  The more proximal pancreatic duct appears more normal in caliber, this raises suspicion for a possible pancreatic lesion /mass in the pancreatic neck (including    pancreatic neoplasm) series  Consider nonemergent abdominal MR with and without contrast for further evaluation if not recently performed  Coronary atherosclerosis, right adrenal nodule, colonic diverticulosis without evidence of acute diverticulitis, and other findings as above  Workstation performed: [id number]         XR elbow 3+ vw RIGHT    (Results Pending)              Procedures  Laceration repair    Date/Time: 9/7/2021 11:00 PM  Performed by: Clemente Isbell DO  Authorized by: Clemente Isbell DO   Consent given by: patient  Body area: upper extremity  Location details: right elbow  Laceration length: 10 cm  Vascular damage: no  Anesthesia: local infiltration    Anesthesia:  Local Anesthetic: lidocaine 1% with epinephrine  Anesthetic total: 4 mL    Sedation:  Patient sedated: no      Wound Dehiscence:    Secondary closure or dehiscence: complex    Procedure Details:  Preparation: Patient was prepped and draped in the usual sterile fashion    Irrigation solution: saline  Irrigation method: jet lavage  Amount of cleaning: extensive  Debridement: minimal  Degree of undermining: none  Skin closure: 3-0 nylon  Number of sutures: 10  Technique: simple  Approximation: close  Approximation difficulty: complex  Dressing: 4x4 sterile gauze, antibiotic ointment, non-adhesive packing strip and gauze roll  Patient tolerance: patient tolerated the procedure well with no immediate complications               ED Course                                           MDM  Number of Diagnoses or Management Options  Adrenal nodule ([address] ): new and requires workup  Arm laceration: new and requires workup  Hematoma of scalp, initial encounter: new and requires workup  Pancreatic mass  Diagnosis management comments: 50-year-old male presenting with mechanical fall  Obtain labs, CT head, cervical spine and chest abdomen pelvis  Patient hyponatremic but was recently admitted for similar and started on sodium tablets  Laceration repaired  Amount and/or Complexity of Data Reviewed  Clinical lab tests: reviewed and ordered  Tests in the radiology section of CPT®: ordered and reviewed  Tests in the medicine section of CPT®: ordered and reviewed  Review and summarize past medical records: yes        Disposition  Final diagnoses:   Arm laceration   Hematoma of scalp, initial encounter   Pancreatic mass   Adrenal nodule ([facility] [address] )     Time reflects when diagnosis was documented in both MDM as applicable and the Disposition within this note     Time User Action Codes Description Comment    9/7/2021 11:18 PM Donnia Hew Add [J25 961T] Arm laceration     9/7/2021 11:18 PM Donnia Hew Add [S00 03XA] Hematoma of scalp, initial encounter     9/7/2021 11:19 PM Donnia Hew Add [K86 89] Pancreatic mass     9/7/2021 11:19 PM Donnia Hew Add [E27 8] Adrenal nodule [facility])       ED Disposition     ED Disposition Condition Date/Time Comment    Discharge Stable Tue Sep 7, 2021 11:18 PM Julian Leblanc discharge to home/self care  Follow-up Information     Follow up With Specialties Details Why Contact Info Additional Information    Jazmin Falk, DO Internal Medicine In 1 week For suture removal [address] [facility] [address]  [phone]        [address] Emergency Department Emergency Medicine  If symptoms worsen [address] [place], [id number]  [address] [facility] Emergency Department, [address] [place] [address] Jack[address]          Patient's Medications   Discharge Prescriptions    No medications on file     No discharge procedures on file      PDMP Review       Value Time User    PDMP Reviewed  Yes 9/1/2021 9:18 AM Narciso Arias MD          ED Provider  Electronically Signed by           Gal Reyna DO  09/08/21 [id number]

## 2021-09-13 NOTE — TELEPHONE ENCOUNTER
Called patient  LVM that he missed his appointment today and LVM for him to call back and reschedule his appointment  LVM with the number to call back

## 2021-09-16 NOTE — PROGRESS NOTES
9/16/2021 2:36 PM  [facility] facility patient requests medication refill  Filled electronically via Epic as per [place] State Law  Requested Prescriptions     Signed Prescriptions Disp Refills    Morphine Sulfate, Concentrate, 20 mg/mL concentrated solution 30 mL 0     Sig: Take 0 25 mL (5 mg total) by mouth every 8 (eight) hours 6a, 2p, and 10p   And every 1 hour as needed for pain or shortness of breathMax Daily Amount: 15 mg       Jan Love 77 Answering Service: [phone]  You can find me on [employer]!

## 2021-09-20 NOTE — PROGRESS NOTES
9/20/2021 2:16 PM  [facility] facility patient requests medication refill  Filled electronically via Epic as per [place] State Law  Requested Prescriptions     Signed Prescriptions Disp Refills    nystatin (MYCOSTATIN) cream 30 g 0     Sig: Apply topically 2 (two) times a day Apply to genitals for 14 days then prn       Jan Thomas 77 Answering Service: [phone]  You can find me on [employer]!

## 2021-09-21 NOTE — TELEPHONE ENCOUNTER
Called patient and left message to schedule follow up appointment  A reminder letter will be sent out

## 2021-09-30 NOTE — PROGRESS NOTES
9/30/2021 1:18 PM  [facility] facility patient requests medication refill  Filled electronically via Epic as per [place] State Law  Requested Prescriptions     Signed Prescriptions Disp Refills    insulin regular (HumuLIN R) 100 units/mL injection 10 mL 1     Sig: Inject 0 1 mL (10 Units total) under the skin 3 (three) times a day with meals Additional Instructions : 8 units with meals TID  Ferol Joseph   sliding scale prn with meals and -200=1 unit 201-250=2 units 251-300=3 units 301-350=4 units 351-400=5 units >400=6 units       Jan Smith 77 Answering Service: [phone]  You can find me on [employer]!

## 2021-12-24 NOTE — PROGRESS NOTES
[place]  Progress Note - Terrie Floras 1945, 67 y o  male MRN: [id number]  Unit/Bed#: -Paty Encounter: [id number]  Primary Care Provider: Rober Ulloa DO   Date and time admitted to hospital: 5/7/2021  9:27 AM    * Streptococcal bacteremia  Assessment & Plan  Unknown source at present, no recent dental work, port site without erythema or tenderness  Blood cultures from 05/07 growing Streptococcus anginosus, repeat blood cultures from 05/09/2021 without growth    CT of the abdomen and pelvis without source of infection, again shows mass in the pancreatic head, tiny filling defect is noted at the junction of the SMV and portal confluence  Echocardiogram with normal ejection fraction, negative for valvular vegetation  CT maxillofacial/mandible without infection or abscess      CT chest abdomen pelvis 05/15/2021: Multiple extraluminal foci of air adjacent to the pylorus/proximal duodenum, extending into the region of the pancreatic head  There is a suggestion of a small fistulous tract arising from the duodenal bulb/pylorus   for perforated ulcer versus postoperative changes, given the previously identified 10 cm cystic mass adjacent to the pancreatic head is no longer seen       -cystic mass seen on prior imaging likely resolved with chemotherapy, no proof of it being drained in chart  Slightly improving WBC, no 14K, he remains afebrile  Infectious Disease on board, continue ceftriaxone on 05/09/2021 #8, Flagyl started 05/17 #2   Surgery consul pending, attending notified on 05/15  Mesenteric venous thrombosis (HCC)  Assessment & Plan  Incidental finding of CT scan of the abdomen and pelvis with tiny filling defect noted at the junction of the SMV and portal confluence likely due to underlying malignancy    Continue Xarelto loading for 21 days then transition to 20 mg daily  hematology on board    Hyponatremia  Assessment & Plan  · Chronic hyponatremia Sodium was 130 on 05/15/2021  · Trend BMP  ·  Encourage oral intake  Generalized weakness  Assessment & Plan  · Likely due to ongoing pancreatic malignancy pain treated with chemotherapy  · Improved since presentation, PT/OT recommend home with family support    Sacral wound  Assessment & Plan  · Stable sacral wound noted upon exam   No erythema, drainage -local care and off weighting    Anemia  Assessment & Plan  · Likely secondary to chemo  · Continue to follow serial labs    Adenocarcinoma of pancreas [facility])  Assessment & Plan  · Dx March 2021  · Follows with Oncology outpatient, Dr Rodrigo Pierce, to receive 2-3months adjuvant chemotherapy prior to resection by Dr Haddad Bones   · Currently undergoing chemotherapy treatment with Abraxane and Gemzar - last treatment 4/6  · Oncology team consulted    Type 2 diabetes mellitus with hyperglycemia, with long-term current use of insulin [facility])  Assessment & Plan  Lab Results   Component Value Date    HGBA1C 9 4 (H) 04/08/2021       Recent Labs     05/15/21  1131 05/15/21  1601 05/15/21  2119 05/16/21  0705   POCGLU 214* 196* 168* 142*       Blood Sugar Average: Last 72 hrs:  (P) 628 [id number]   · Recent A1c shows poor control    · Continue Lantus 20 units hs, Continue sliding scale coverage,pre meal insulin at 7 units t i d   · Adjust insulin regimen as needed  · Metformin on hold-will restart on discharge    HTN (hypertension)  Assessment & Plan  · Home medication is ramipril 10 mg daily, Toprol- mg daily  · Ramipril currently held due to hyperkalemia on presentation, switched to amlodipine 2 5 mg daily  · continue amlodipine 2 5 mg daily, Toprol- mg daily  · Monitor BP      Hyperlipidemia  Assessment & Plan  Patient being continued on usual atorvastatin    Hyperkalemia-resolved as of 5/10/2021  Assessment & Plan  · Potassium upon admission 5 9, status post treatment, now resolved  · Was given calcium gluconate, D50/insulin/sodium bicarbonate  · Resolved    Leukocytosis-resolved as of 2021  Assessment & Plan  · Present upon admission at 17   · No evidence of active infection  Reported chills but no fever home  · May be secondary to hemoconcentration from dehydration  · Resolved         VTE Pharmacologic Prophylaxis:   Pharmacologic: Rivaroxaban (Xarelto)  Mechanical VTE Prophylaxis in Place: Yes    Patient Centered Rounds: I have performed bedside rounds with nursing staff today  Discussions with Specialists or Other Care Team Provider:     Education and Discussions with Family / Patient:  Suresh Onofrepreeti called on [phone], message left with update  Time Spent for Care: 30 minutes  More than 50% of total time spent on counseling and coordination of care as described above  Current Length of Stay: 9 day(s)    Current Patient Status: Inpatient   Certification Statement: The patient will continue to require additional inpatient hospital stay due to IV antibiotics for bacteremia    Discharge Plan:  Pending evaluation by surgery    Code Status: Level 1 - Full Code      Subjective:   Seen  Feels a pressure on the left side of his abdomen mostly upper  Denies pain, nausea vomiting  Objective:     Vitals:   Temp (24hrs), Av °F (36 7 °C), Min:97 9 °F (36 6 °C), Max:98 1 °F (36 7 °C)    Temp:  [97 9 °F (36 6 °C)-98 1 °F (36 7 °C)] 98 1 °F (36 7 °C)  HR:  [66-74] 71  Resp:  [18] 18  BP: (116-139)/(55-63) 116/55  SpO2:  [99 %-100 %] 99 %  Body mass index is 20 37 kg/m²  Input and Output Summary (last 24 hours): Intake/Output Summary (Last 24 hours) at 2021 0856  Last data filed at 2021 0701  Gross per 24 hour   Intake 1560 ml   Output --   Net 1560 ml       Physical Exam:     Physical Exam  Vitals signs reviewed  Constitutional:       General: He is not in acute distress  Appearance: Normal appearance  He is not ill-appearing, toxic-appearing or diaphoretic        Comments: Cachectic looking HENT:      Head: Normocephalic  Eyes:      Extraocular Movements: Extraocular movements intact  Pupils: Pupils are equal, round, and reactive to light  Neck:      Musculoskeletal: Normal range of motion and neck supple  No neck rigidity or muscular tenderness  Vascular: No carotid bruit  Cardiovascular:      Rate and Rhythm: Normal rate and regular rhythm  Pulses: Normal pulses  Heart sounds: Normal heart sounds  No murmur  No friction rub  Pulmonary:      Effort: Pulmonary effort is normal       Breath sounds: Normal breath sounds  No stridor  No wheezing, rhonchi or rales  Chest:      Chest wall: No tenderness  Abdominal:      General: Abdomen is flat  Bowel sounds are normal  There is no distension  Palpations: Abdomen is soft  Tenderness: There is no abdominal tenderness  There is no guarding or rebound  Musculoskeletal: Normal range of motion  General: No swelling or tenderness  Right lower leg: No edema  Left lower leg: No edema  Skin:     General: Skin is warm and dry  Coloration: Skin is not jaundiced  Neurological:      General: No focal deficit present  Mental Status: He is alert and oriented to person, place, and time  Cranial Nerves: No cranial nerve deficit  Sensory: No sensory deficit           Additional Data:     Labs:    Results from last 7 days   Lab Units 05/16/21  0509 05/15/21  0439   WBC Thousand/uL 14 11* 15 57*   HEMOGLOBIN g/dL 8 8* 9 3*   HEMATOCRIT % 26 5* 28 2*   PLATELETS Thousands/uL 390 348   BANDS PCT %  --  4   LYMPHO PCT %  --  37   MONO PCT %  --  2*   EOS PCT %  --  4     Results from last 7 days   Lab Units 05/15/21  0439   SODIUM mmol/L 130*   POTASSIUM mmol/L 4 7   CHLORIDE mmol/L 98*   CO2 mmol/L 21   BUN mg/dL 15   CREATININE mg/dL 0 74   ANION GAP mmol/L 11   CALCIUM mg/dL 7 8*   ALBUMIN g/dL 2 6*   TOTAL BILIRUBIN mg/dL 0 30   ALK PHOS U/L 134*   ALT U/L 53   AST U/L 30   GLUCOSE RANDOM mg/dL 85         Results from last 7 days   Lab Units 05/16/21  0705 05/15/21  2119 05/15/21  1601 05/15/21  1131 05/15/21  0721 05/14/21  2048 05/14/21  1644 05/14/21  1111 05/14/21  0755 05/13/21  2043 05/13/21  1538 05/13/21  1111   POC GLUCOSE mg/dl 142* 168* 196* 214* 66 211* 161* 395* 129 162* 237* 293*                   * I Have Reviewed All Lab Data Listed Above  * Additional Pertinent Lab Tests Reviewed: All Labs Within Last 24 Hours Reviewed    Imaging:    Imaging Reports Reviewed Today Include:   Imaging Personally Reviewed by Myself Includes:      Recent Cultures (last 7 days):           Last 24 Hours Medication List:   Current Facility-Administered Medications   Medication Dose Route Frequency Provider Last Rate    acetaminophen  650 mg Oral Q6H PRN Virgilio Crisp, PA-C      amLODIPine  2 5 mg Oral Daily [place], DO      aspirin  81 mg Oral Daily Virgilio Crisp, PA-C      atorvastatin  40 mg Oral Daily With Rohharitha and WINNIE NobleC      cefTRIAXone  2,000 mg Intravenous Q24H Aant Miguel MD 2,000 mg (05/15/21 1828)    insulin glargine  20 Units Subcutaneous HS Marquis Giron MD      insulin lispro  1-5 Units Subcutaneous TID AC Virgilio Crisp, PA-C      insulin lispro  7 Units Subcutaneous TID With Meals Billy Eagle MD      metoprolol succinate  100 mg Oral Daily Virgilio Carver, PA-C      metroNIDAZOLE  500 mg Intravenous Q8H Anat Miguel  mg (05/16/21 0508)    ondansetron  4 mg Intravenous Q6H PRN Virgilio Crisp, PA-C      rivaroxaban  15 mg Oral BID With Meals Billy Eagle MD      traMADol  50 mg Oral Q6H PRN Virgilio Crisp, PA-C          Today, Patient Was Seen By: Billy Eagle MD    ** Please Note: Dictation voice to text software may have been used in the creation of this document   ** Yes

## 2022-10-22 NOTE — ASSESSMENT & PLAN NOTE
· Resolved  · Possibly due to metformin use vs dehydration  · Patient with poor appetite  · 1 L NSS given in the ED Hospitalist Nephrology Nephrology Nephrology Nephrology Nephrology Nephrology Nephrology Nephrology Nephrology Podiatry Hospitalist Internal Medicine Internal Medicine Internal Medicine Internal Medicine Internal Medicine Internal Medicine Hospitalist Internal Medicine Internal Medicine Hospitalist Hospitalist

## 2023-12-13 NOTE — QUICK NOTE
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 significant for potential duodenopancreatic fistula  Evaluated patient at bedside with Dr Shahab Rios this morning  Abdomen is soft nontender nondistended, no peritoneal signs  No indications for urgent or emergent surgical intervention    Patient seen by surgical oncologist Dr Celeste Corea for management of his known adenocarcinoma of pancreatic head  Non urgent consult to Dr Reyes He is appropriate for determining next steps of management of this potential fistulous formation      Wendy Abdul PA-C  05/16/21  10:00 AM Detail Level: Simple Additional Notes: We counseled pt that cyst can be removed via surgical excision with a plastic surgeon, which can be considered as needed.\\nWe discussed possible inflammation or irritation of lesion in future, which can be treated with ILK injections or oral/topical antibiotics.\\nPt opted to monitor, RTC if growing/changing Render Risk Assessment In Note?: no

## 2025-01-08 NOTE — PROGRESS NOTES
Vancomycin Assessment    Heather Clayton is a 67 y o  male who is currently ordered vancomycin 1000mg IV Q12H for sepsis   Relevant clinical data and objective history reviewed:  Creatinine   Date Value Ref Range Status   07/14/2021 1 00 0 60 - 1 30 mg/dL Final     Comment:     Standardized to IDMS reference method   07/09/2021 0 95 0 60 - 1 30 mg/dL Final     Comment:     Standardized to IDMS reference method   07/03/2021 1 12 0 60 - 1 30 mg/dL Final     Comment:     Standardized to IDMS reference method     /60 (BP Location: Right arm)   Pulse 78   Temp 98 4 °F (36 9 °C) (Oral)   Resp 17   Wt 66 8 kg (147 lb 4 3 oz)   SpO2 100%   BMI 21 08 kg/m²   No intake/output data recorded  Lab Results   Component Value Date/Time    BUN 35 (H) 07/14/2021 08:58 AM    WBC 18 84 (H) 07/14/2021 08:58 AM    HGB 8 5 (L) 07/14/2021 09:14 AM    HGB 7 8 (L) 07/14/2021 08:58 AM    HCT 25 (L) 07/14/2021 09:14 AM    HCT 23 7 (L) 07/14/2021 08:58 AM     (H) 07/14/2021 08:58 AM     07/14/2021 08:58 AM     Temp Readings from Last 3 Encounters:   07/14/21 98 4 °F (36 9 °C) (Oral)   07/12/21 (!) 97 3 °F (36 3 °C) (Temporal)   07/06/21 (!) 97 4 °F (36 3 °C) (Temporal)     Vancomycin Days of Therapy: 1    Assessment/Plan  The patient is currently on vancomycin utilizing scheduled dosing based on actual body weight  Baseline risks associated with therapy include: advanced age  The patient is currently ordered Vancomycin 1000mg IV Q12H and after clinical evaluation dose will be changed to Vancomycin 1000mg IV once and then 750mg Q12H  Pharmacy will also follow closely for s/sx of nephrotoxicity, infusion reactions, and appropriateness of therapy  BMP and CBC will be ordered per protocol  Plan for trough as patient approaches steady state, prior to the 5th  dose at approximately 1430 on 7/16/21  Due to infection severity, will target a trough of 15-20 (appropriate for most indications)     Pharmacy will continue to follow the patients culture results and clinical progress daily      Armando Luo, Pharmacist Patient/Caregiver provided printed discharge information.
